# Patient Record
Sex: FEMALE | Race: WHITE | NOT HISPANIC OR LATINO | Employment: UNEMPLOYED | URBAN - METROPOLITAN AREA
[De-identification: names, ages, dates, MRNs, and addresses within clinical notes are randomized per-mention and may not be internally consistent; named-entity substitution may affect disease eponyms.]

---

## 2017-04-18 ENCOUNTER — HOSPITAL ENCOUNTER (OUTPATIENT)
Dept: RADIOLOGY | Facility: HOSPITAL | Age: 42
Discharge: HOME | End: 2017-04-18
Attending: ORTHOPAEDIC SURGERY
Payer: MEDICAID

## 2017-04-18 ENCOUNTER — CLINICAL SUPPORT (OUTPATIENT)
Dept: LAB | Facility: HOSPITAL | Age: 42
End: 2017-04-18
Attending: ORTHOPAEDIC SURGERY
Payer: MEDICAID

## 2017-04-18 DIAGNOSIS — Z01.818 PREOP EXAMINATION: Primary | ICD-10-CM

## 2017-04-18 DIAGNOSIS — Z01.818 PREOP EXAMINATION: ICD-10-CM

## 2017-04-18 PROCEDURE — 71020 XR CHEST PA AND LATERAL: CPT | Mod: 26,,, | Performed by: RADIOLOGY

## 2017-04-18 PROCEDURE — 93010 ELECTROCARDIOGRAM REPORT: CPT | Mod: ,,, | Performed by: INTERNAL MEDICINE

## 2017-04-18 PROCEDURE — 71020 XR CHEST PA AND LATERAL: CPT | Mod: TC

## 2017-04-18 PROCEDURE — 93010 EKG 12-LEAD: ICD-10-PCS | Mod: ,,, | Performed by: INTERNAL MEDICINE

## 2017-04-18 PROCEDURE — 93005 ELECTROCARDIOGRAM TRACING: CPT

## 2017-04-25 ENCOUNTER — ANESTHESIA EVENT (OUTPATIENT)
Dept: SURGERY | Facility: HOSPITAL | Age: 42
End: 2017-04-25
Payer: MEDICAID

## 2017-04-26 ENCOUNTER — HOSPITAL ENCOUNTER (OUTPATIENT)
Dept: PREADMISSION TESTING | Facility: HOSPITAL | Age: 42
Discharge: HOME | End: 2017-04-26
Attending: ORTHOPAEDIC SURGERY
Payer: MEDICAID

## 2017-04-26 VITALS
DIASTOLIC BLOOD PRESSURE: 99 MMHG | BODY MASS INDEX: 43.36 KG/M2 | RESPIRATION RATE: 18 BRPM | WEIGHT: 254 LBS | HEART RATE: 64 BPM | OXYGEN SATURATION: 99 % | HEIGHT: 64 IN | SYSTOLIC BLOOD PRESSURE: 144 MMHG

## 2017-04-26 DIAGNOSIS — Z01.818 PRE-OP EXAM: Primary | ICD-10-CM

## 2017-04-26 RX ORDER — LIDOCAINE HYDROCHLORIDE 10 MG/ML
1 INJECTION, SOLUTION EPIDURAL; INFILTRATION; INTRACAUDAL; PERINEURAL ONCE
Status: CANCELLED | OUTPATIENT
Start: 2017-04-26 | End: 2017-04-26

## 2017-04-26 RX ORDER — SODIUM CHLORIDE, SODIUM LACTATE, POTASSIUM CHLORIDE, CALCIUM CHLORIDE 600; 310; 30; 20 MG/100ML; MG/100ML; MG/100ML; MG/100ML
INJECTION, SOLUTION INTRAVENOUS CONTINUOUS
Status: CANCELLED | OUTPATIENT
Start: 2017-04-26

## 2017-04-26 RX ORDER — AMLODIPINE BESYLATE 5 MG/1
5 TABLET ORAL DAILY
COMMUNITY
End: 2017-06-06

## 2017-04-26 RX ORDER — CEFAZOLIN SODIUM 2 G/50ML
2 SOLUTION INTRAVENOUS
Status: CANCELLED | OUTPATIENT
Start: 2017-05-01

## 2017-04-26 NOTE — ANESTHESIA PREPROCEDURE EVALUATION
04/26/2017  Inna Gallardo is a 42 y.o., female is scheduled for left knee arthroscopy with meniscectomy under reg/gen on 5/01/2017.    Past Surgical History:   Procedure Laterality Date    BRAIN TUMOR EXCISION  9/2011    removal of prolactinoma    CYSTOSCOPY W/ URETERAL STENT PLACEMENT  2016    CYSTOSCOPY W/ URETERAL STENT REMOVAL  2016    HYSTERECTOMY  3/16/15    TLH/BSO for Endometriosis, AUB, fibroids, cyst    MANDIBLE SURGERY  2002    severe overbite correction    PELVIC LAPAROSCOPY  2014    Dx scope, chromopertubation-Endometriosis    TONSILLECTOMY, ADENOIDECTOMY           Anesthesia Evaluation    I have reviewed the Patient Summary Reports.    I have reviewed the Nursing Notes.   I have reviewed the Medications.     Review of Systems  Anesthesia Hx:  Hx of Anesthetic complications  History of prior surgery of interest to airway management or planning: (mandibular surgery to correct overbite) jaw. Previous anesthesia: General, MAC  Procedure performed at an [facility]. Personal Hx of Anesthesia complications  Unintended Unpleasent Intraoperative Awareness (during hysterectomy and cystoscopy; states could hear nurses in OR) and during general anesthesia   Social:  No Alcohol Use, Former Smoker    Hematology/Oncology:  Hematology Normal        EENT/Dental:   chronic allergic rhinitis   Cardiovascular:   Exercise tolerance: poor Hypertension (pt states she ran out of BP meds and needs to get refilled today), well controlled Denies Dysrhythmias.   Denies Angina.     ECG has been reviewed.    Pulmonary:   Denies Shortness of breath.    Renal/:   renal calculi    Hepatic/GI:   Hiatal Hernia, GERD, well controlled  Esophageal / Stomach Disorders Esophageal Disorder, Cramer's Esophagus    Musculoskeletal:   Left knee pain   Endocrine:  Pituitary Disease, Pituitary Tumor (prolactinoma 2011)     Psych:   depression          Physical Exam  General:  Morbid Obesity    Airway/Jaw/Neck:  Airway Findings: Mouth Opening: Small, but > 3cm Tongue: Normal  General Airway Assessment: Adult  Mallampati: III  TM Distance: Normal, at least 6 cm  Jaw/Neck Findings: (pt s/p mandibular surgery for correction of severe overbite; states has hardware in jaw)  Neck ROM: Extension Decreased, Mild  Neck Findings:  Girth Increased     Eyes/Ears/Nose:  EYES/EARS/NOSE FINDINGS: Normal   Dental:  Dental Findings: In tact   Chest/Lungs:  Chest/Lungs Clear    Heart/Vascular:  Heart Findings: Normal Heart murmur: negative    Abdomen:  Abdomen Findings: Normal    Musculoskeletal:  Musculoskeletal Findings: (left knee) Tender Joint, Swollen Joint     Mental Status:  Mental Status Findings: Normal      EKG 4/18/2017  Sinus bradycardia  Otherwise normal ECG  Compared to previous tracing  rate have decreased and T wave no longer inverted in  inferior lateral  leads  Confirmed by Faisal EDWARDS [facility] ([id number]) on 4/18/2017 2:53:58 PM    Anesthesia Plan  Type of Anesthesia, risks & benefits discussed:  Anesthesia Type:  general, regional  Patient's Preference:   Intra-op Monitoring Plan:   Intra-op Monitoring Plan Comments:   Post Op Pain Control Plan:   Post Op Pain Control Plan Comments:   Induction:   IV  Beta Blocker:  Patient is not currently on a Beta-Blocker (No further documentation required).       Informed Consent: Patient understands risks and agrees with Anesthesia plan.  Questions answered.   ASA Score: 3     Day of Surgery Review of History & Physical: I have interviewed and examined the patient. I have reviewed the patient's H&P dated:  There are no significant changes.  H&P update referred to the surgeon.         Ready For Surgery From Anesthesia Perspective.

## 2017-04-26 NOTE — DISCHARGE INSTRUCTIONS
Your surgery is scheduled for 5/1/17.    Please report to Outpatient Surgery Intake Office on the 2nd FLOOR at 5:30 a.m.          INSTRUCTIONS IMPORTANT!!!  ¨ Do not eat or drink after 12 midnight-including water. OK to brush teeth, no   gum, candy or mints!    ¨ Take only these medicines with a small swallow of water-morning of surgery: Norvasc            ____  Do not wear makeup, including mascara.  ____  No powder, lotions or creams to surgical area.  ____  Please remove all jewelry, including piercings and leave at home.  ____  No money or valuables needed. Please leave at home.  ____  Please bring any documents given by your doctor.  ____  If going home the same day, arrange for a ride home. You will not be able to             drive if Anesthesia was used.  ____  Wear loose fitting clothing. Allow for dressings, bandages.  ____  Stop Aspirin, Ibuprofen, Motrin and Aleve at least 3-5 days before surgery, unless otherwise instructed by your doctor, or the nurse.   You MAY use Tylenol/acetaminophen until day of surgery.  ____  Wash the surgical area with Hibiclens the night before surgery, and again the             morning of surgery.  Be sure to rinse hibiclens off completely (if instructed by nurse).  ____  If you take diabetic medication, do not take am of surgery unless instructed by Doctor.  ____  Call MD for temperature above 101 degrees.  ____ Stop taking any Fish Oil supplement or any Vitamins that contain Vitamin E at least 5 days prior to surgery.  ____ Do Not wear your contact lenses the day of your procedure.  You may wear your glasses.        I have read or had read and explained to me, and understand the above information.  Additional comments or instructions:  For additional questions call [phone]     Take a Hibiclens shower twice a day for 3 days prior to surgery, including the morning of surgery.   Gargle with Listerine twice a day for 2 days prior to surgery, including the morning of  surgery.      Pre-Op Bathing Instructions    Before surgery, you can play an important role in your own health.    Because skin is not sterile, we need to be sure that your skin is as free of germs as possible. By following the instructions below, you can reduce the number of germs on your skin before surgery.    IMPORTANT: You will need to shower with a special soap called Hibiclens*, available at any pharmacy.  If you are allergic to Chlorhexidine (the antiseptic in Hibiclens), use an antibacterial soap such as Dial Soap for your preoperative shower.  You will shower with Hibiclens both the night before your surgery and the morning of your surgery.  Do not use Hibiclens on the head, face or genitals to avoid injury to those areas.    STEP #1: THE NIGHT BEFORE YOUR SURGERY     1. Do not shave the area of your body where your surgery will be performed.  2. Shower and wash your hair and body as usual with your normal soap and shampoo.  3. Rinse your hair and body thoroughly after you shower to remove all soap residue.  4. With your hand, apply one packet of Hibiclens soap to the surgical site.   5. Wash the site gently for five (5) minutes. Do not scrub your skin too hard.   6. Do not wash with your regular soap after Hibiclens is used.  7. Rinse your body thoroughly.  8. Pat yourself dry with a clean, soft towel.  9. Do not use lotion, cream, or powder.  10. Wear clean clothes.    STEP #2: THE MORNING OF YOUR SURGERY     1. Repeat Step #1.    * Not to be used by people allergic to Chlorhexidine.          Anesthesia: General Anesthesia  Youre due to have surgery. During surgery, youll be given medication called anesthesia. (It is also called anesthetic.) This will keep you comfortable and pain-free. Your anesthesia provider will use general anesthesia. This sheet tells you more about it.  What is general anesthesia?     You are watched continuously during your procedure by the anesthesia provider   General  anesthesia puts you into a state like deep sleep. It goes into the bloodstream (IV anesthetics), into the lungs (gas anesthetics), or both. You feel nothing during the procedure. You will not remember it. During the procedure, the anesthesia provider monitors you continuously. He or she checks your heart rate and rhythm, blood pressure, breathing, and blood oxygen.  · IV Anesthetics. IV anesthetics are given through an IV line in your arm. Theyre often given first. This is so you are asleep before a gas anesthetic is started. Some kinds of IV anesthetics relieve pain. Others relax you. Your doctor will decide which kind is best in your case.  · Gas Anesthetics. Gas anesthetics are breathed into the lungs. They are often used to keep you asleep. They can be given through a facemask or a tube placed in your larynx or trachea (breathing tube).  ¨ If you have a facemask, your anesthesia provider will most likely place it over your nose and mouth while youre still awake. Youll breathe oxygen through the mask as your IV anesthetic is started. Gas anesthetic may be added through the mask.  ¨ If you have a tube in the larynx or trachea, it will be inserted into your throat after youre asleep.  Anesthesia tools and medications  You will likely have:  · IV anesthetics. These are put into an IV line into your bloodstream.  · Gas anesthetics. You breathe these anesthetics into your lungs, where they pass into your bloodstream.  · Pulse oximeter. This is a small clip that is attached to the end of your finger. This measures your blood oxygen level.  · Electrocardiography leads (electrodes). These are small sticky pads that are placed on your chest. They record your heart rate and rhythm.  · Blood pressure cuff. This reads your blood pressure.  Risks and possible complications  General anesthesia has some risks. These include:  · Breathing problems  · Nausea and vomiting  · Sore throat or hoarseness (usually  temporary)  · Allergic reaction to the anesthetic  · Irregular heartbeat (rare)  · Cardiac arrest (rare)   Anesthesia safety  · Follow all instructions you are given for how long not to eat or drink before your procedure.  · Be sure your doctor knows what medications and drugs you take. This includes over-the-counter medications, herbs, supplements, alcohol or other drugs. You will be asked when those were last taken.  · Have an adult family member or friend drive you home after the procedure.  · For the first 24 hours after your surgery:  ¨ Do not drive or use heavy equipment.  ¨ Have a trusted family member or spouse make important decisions or sign documents.  ¨ Avoid alcohol.  ¨ Have a responsible adult stay with you. He or she can watch for problems and help keep you safe.  Date Last Reviewed: 10/16/2014  © 0604-0991 [employer]. [address] [place] [address]. All rights reserved. This information is not intended as a substitute for professional medical care. Always follow your healthcare professional's instructions.        Knee Arthroscopy  Knee problems can often be diagnosed and treated with a technique called arthroscopy. This type of surgery is done using an instrument called an arthroscope (scope). Only a few small incisions are needed for this surgery. The procedure can be used to diagnose a knee problem. In many cases, treatment can also be done using arthroscopy.     Insertion of fluid, arthroscope, and instruments through small incisions (portals).         Patient undergoes procedure      The arthroscope  The scope allows the doctor to look directly into the knee joint. It is about the size of a pencil and contains a pathway for fluids. It also contains coated glass fibers that beam an intense, cool light into the knee joint. A camera is attached to the scope as well. It provides clear images of most areas in your knee joint. The doctor views these images on a  monitor.  Preparing for the procedure  · Have lab or other testing done as advised.  · Tell your doctor about any medicines or supplements you take  · Do not eat or drink anything for 10 hours before the procedure.  · Once you arrive for surgery, you will be given an IV line in your arm or hand. This provides fluids and medicines.  · To keep you free of pain during the surgery, youll receive medicine called anesthesia. You may have:  ¨ General anesthesia. This puts you into a deep sleep during the surgery.  ¨ Regional anesthesia. This numbs the body from the waist down.  ¨ Local anesthesia. This numbs just the knee.  In addition to regional or local anesthesia, you may receive sedation. This medicine makes you relaxed and sleepy during the surgery.  The procedure  · A few small incisions (portals) are made in your knee.  · The scope is inserted through one of the portals.  · Sterile fluid is put into the knee joint. This makes it easier to see and work inside your joint.  · Using the scope, the doctor confirms the type and degree of knee damage. If possible, the problem is treated at this time. This is done using surgical tools put through the other portals.  · When the surgery is done, all tools are removed. The incisions are closed with sutures, staples, surgical glue, or strips of surgical tape.  Risks and complications of arthroscopy  All surgeries have risks. The risks of arthroscopy include:  · Bleeding  · Infection  · Blood clots  · Swelling and stiffness of the knee  · Injury to normal tissue  · Continuing knee problems   Date Last Reviewed: 9/20/2015  © 0779-0629 [employer]. [address] [place] [address], [place] [address]. All rights reserved. This information is not intended as a substitute for professional medical care. Always follow your healthcare professional's instructions.

## 2017-04-26 NOTE — IP AVS SNAPSHOT
[facility]  [address]  [place] [address]  Phone: [phone]           Patient Discharge Instructions  Our goal is to set you up for success. This packet includes information on your condition, medications, and your home care. It will help you care for yourself to prevent having to return to the hospital.     Please ask your nurse if you have any questions.      There are many details to remember when preparing for your surgery. Here is what you will need to do, please ask your nurse if there are more specific instructions and if you have any questions:    1. Before procedure Do not smoke or drink alcoholic beverages 24 hours prior to your procedure. Do not eat or drink anything 8 hours before your procedure - this includes gum, mints, and candy.     2. Day of procedure Please remove all jewelry for the procedure. If you wear contact lenses, dentures, hearing aids or glasses, bring a container to put them in during your surgery and give to a family member.  If your doctor has scheduled you for an overnight stay, bring a small overnight bag with any personal items that you need.      3. After procedure  Make arrangements in advance for transportation home by a responsible adult. It is not safe to drive a vehicle during the 24 hours following surgery.     PLEASE NOTE: You may be contacted the day before your surgery to confirm your surgery date and arrival time. The Surgery schedule has many variables which may affect the time of your surgery case. Family members should be available if your surgery time changes.               ** Verify the list of medication(s) below is accurate and up to date. Carry this with you in case of emergency. If your medications have changed, please notify your healthcare provider.             Medication List      TAKE these medications        Additional Info                      amlodipine 5 MG tablet   Commonly known as:  NORVASC   Refills:  0   Dose:  5 mg     Instructions:  Take 5 mg by mouth once daily.     Begin Date    AM    Noon    PM    Bedtime       paroxetine 20 MG tablet   Commonly known as:  PAXIL   Quantity:  30 tablet   Refills:  11   Dose:  20 mg    Instructions:  Take 1 tablet (20 mg total) by mouth every morning.     Begin Date    AM    Noon    PM    Bedtime       rabeprazole 20 mg tablet   Commonly known as:  ACIPHEX   Quantity:  90 tablet   Refills:  0   Dose:  20 mg    Instructions:  Take 1 tablet (20 mg total) by mouth once daily.     Begin Date    AM    Noon    PM    Bedtime                  Please bring to all follow up appointments:    1. A copy of your discharge instructions.  2. All medicines you are currently taking in their original bottles.  3. Identification and insurance card.    Please arrive 15 minutes ahead of scheduled appointment time.    Please call 24 hours in advance if you must reschedule your appointment and/or time.        Your Scheduled Appointments     Apr 26, 2017  2:30 PM CDT   Pre-Admit Testing Visit with PRE[facility]ADMIT [facility]    [address]  [place] [address]   [phone]            May 02, 2017  1:00 PM CDT   New Physical Therapy Patient with Kath Gould, PT   [facility]    [address] [facility] [place] [address]   [phone]            May 09, 2017 10:20 AM CDT   Established Patient Visit with [facility] ([facility])    [address]  [place] [id number] [phone]            May 22, 2017  9:00 AM CDT   Established Patient Visit with MD MARY Baca - Rheumatology ([facility] [place])    [address] [place]  [place] [id number] [phone]              Your Future Surgeries/Procedures     May 01, 2017   Surgery with Calvin Nguyen MD   [facility])     [address]  [place] [id number] [phone]                  Discharge Instructions       Your surgery is scheduled for 5/1/17.    Please report to Outpatient Surgery Intake Office on the 2nd FLOOR at 5:30 a.m.          INSTRUCTIONS IMPORTANT!!!  ¨ Do not eat or drink after 12 midnight-including water. OK to brush teeth, no   gum, candy or mints!    ¨ Take only these medicines with a small swallow of water-morning of surgery: Norvasc            ____  Do not wear makeup, including mascara.  ____  No powder, lotions or creams to surgical area.  ____  Please remove all jewelry, including piercings and leave at home.  ____  No money or valuables needed. Please leave at home.  ____  Please bring any documents given by your doctor.  ____  If going home the same day, arrange for a ride home. You will not be able to             drive if Anesthesia was used.  ____  Wear loose fitting clothing. Allow for dressings, bandages.  ____  Stop Aspirin, Ibuprofen, Motrin and Aleve at least 3-5 days before surgery, unless otherwise instructed by your doctor, or the nurse.   You MAY use Tylenol/acetaminophen until day of surgery.  ____  Wash the surgical area with Hibiclens the night before surgery, and again the             morning of surgery.  Be sure to rinse hibiclens off completely (if instructed by nurse).  ____  If you take diabetic medication, do not take am of surgery unless instructed by Doctor.  ____  Call MD for temperature above 101 degrees.  ____ Stop taking any Fish Oil supplement or any Vitamins that contain Vitamin E at least 5 days prior to surgery.  ____ Do Not wear your contact lenses the day of your procedure.  You may wear your glasses.        I have read or had read and explained to me, and understand the above information.  Additional comments or instructions:  For additional questions call [phone]     Take a Hibiclens shower twice a day for 3 days prior to surgery, including the morning of  surgery.   Gargle with Listerine twice a day for 2 days prior to surgery, including the morning of surgery.      Pre-Op Bathing Instructions    Before surgery, you can play an important role in your own health.    Because skin is not sterile, we need to be sure that your skin is as free of germs as possible. By following the instructions below, you can reduce the number of germs on your skin before surgery.    IMPORTANT: You will need to shower with a special soap called Hibiclens*, available at any pharmacy.  If you are allergic to Chlorhexidine (the antiseptic in Hibiclens), use an antibacterial soap such as Dial Soap for your preoperative shower.  You will shower with Hibiclens both the night before your surgery and the morning of your surgery.  Do not use Hibiclens on the head, face or genitals to avoid injury to those areas.    STEP #1: THE NIGHT BEFORE YOUR SURGERY     1. Do not shave the area of your body where your surgery will be performed.  2. Shower and wash your hair and body as usual with your normal soap and shampoo.  3. Rinse your hair and body thoroughly after you shower to remove all soap residue.  4. With your hand, apply one packet of Hibiclens soap to the surgical site.   5. Wash the site gently for five (5) minutes. Do not scrub your skin too hard.   6. Do not wash with your regular soap after Hibiclens is used.  7. Rinse your body thoroughly.  8. Pat yourself dry with a clean, soft towel.  9. Do not use lotion, cream, or powder.  10. Wear clean clothes.    STEP #2: THE MORNING OF YOUR SURGERY     1. Repeat Step #1.    * Not to be used by people allergic to Chlorhexidine.          Anesthesia: General Anesthesia  Youre due to have surgery. During surgery, youll be given medication called anesthesia. (It is also called anesthetic.) This will keep you comfortable and pain-free. Your anesthesia provider will use general anesthesia. This sheet tells you more about it.  What is general  anesthesia?     You are watched continuously during your procedure by the anesthesia provider   General anesthesia puts you into a state like deep sleep. It goes into the bloodstream (IV anesthetics), into the lungs (gas anesthetics), or both. You feel nothing during the procedure. You will not remember it. During the procedure, the anesthesia provider monitors you continuously. He or she checks your heart rate and rhythm, blood pressure, breathing, and blood oxygen.  · IV Anesthetics. IV anesthetics are given through an IV line in your arm. Theyre often given first. This is so you are asleep before a gas anesthetic is started. Some kinds of IV anesthetics relieve pain. Others relax you. Your doctor will decide which kind is best in your case.  · Gas Anesthetics. Gas anesthetics are breathed into the lungs. They are often used to keep you asleep. They can be given through a facemask or a tube placed in your larynx or trachea (breathing tube).  ¨ If you have a facemask, your anesthesia provider will most likely place it over your nose and mouth while youre still awake. Youll breathe oxygen through the mask as your IV anesthetic is started. Gas anesthetic may be added through the mask.  ¨ If you have a tube in the larynx or trachea, it will be inserted into your throat after youre asleep.  Anesthesia tools and medications  You will likely have:  · IV anesthetics. These are put into an IV line into your bloodstream.  · Gas anesthetics. You breathe these anesthetics into your lungs, where they pass into your bloodstream.  · Pulse oximeter. This is a small clip that is attached to the end of your finger. This measures your blood oxygen level.  · Electrocardiography leads (electrodes). These are small sticky pads that are placed on your chest. They record your heart rate and rhythm.  · Blood pressure cuff. This reads your blood pressure.  Risks and possible complications  General anesthesia has some risks. These  include:  · Breathing problems  · Nausea and vomiting  · Sore throat or hoarseness (usually temporary)  · Allergic reaction to the anesthetic  · Irregular heartbeat (rare)  · Cardiac arrest (rare)   Anesthesia safety  · Follow all instructions you are given for how long not to eat or drink before your procedure.  · Be sure your doctor knows what medications and drugs you take. This includes over-the-counter medications, herbs, supplements, alcohol or other drugs. You will be asked when those were last taken.  · Have an adult family member or friend drive you home after the procedure.  · For the first 24 hours after your surgery:  ¨ Do not drive or use heavy equipment.  ¨ Have a trusted family member or spouse make important decisions or sign documents.  ¨ Avoid alcohol.  ¨ Have a responsible adult stay with you. He or she can watch for problems and help keep you safe.  Date Last Reviewed: 10/16/2014  © 3859-9633 [employer]. [address] [place] [address]. All rights reserved. This information is not intended as a substitute for professional medical care. Always follow your healthcare professional's instructions.        Knee Arthroscopy  Knee problems can often be diagnosed and treated with a technique called arthroscopy. This type of surgery is done using an instrument called an arthroscope (scope). Only a few small incisions are needed for this surgery. The procedure can be used to diagnose a knee problem. In many cases, treatment can also be done using arthroscopy.     Insertion of fluid, arthroscope, and instruments through small incisions (portals).         Patient undergoes procedure      The arthroscope  The scope allows the doctor to look directly into the knee joint. It is about the size of a pencil and contains a pathway for fluids. It also contains coated glass fibers that beam an intense, cool light into the knee joint. A camera is attached to the scope as well. It provides clear  images of most areas in your knee joint. The doctor views these images on a monitor.  Preparing for the procedure  · Have lab or other testing done as advised.  · Tell your doctor about any medicines or supplements you take  · Do not eat or drink anything for 10 hours before the procedure.  · Once you arrive for surgery, you will be given an IV line in your arm or hand. This provides fluids and medicines.  · To keep you free of pain during the surgery, youll receive medicine called anesthesia. You may have:  ¨ General anesthesia. This puts you into a deep sleep during the surgery.  ¨ Regional anesthesia. This numbs the body from the waist down.  ¨ Local anesthesia. This numbs just the knee.  In addition to regional or local anesthesia, you may receive sedation. This medicine makes you relaxed and sleepy during the surgery.  The procedure  · A few small incisions (portals) are made in your knee.  · The scope is inserted through one of the portals.  · Sterile fluid is put into the knee joint. This makes it easier to see and work inside your joint.  · Using the scope, the doctor confirms the type and degree of knee damage. If possible, the problem is treated at this time. This is done using surgical tools put through the other portals.  · When the surgery is done, all tools are removed. The incisions are closed with sutures, staples, surgical glue, or strips of surgical tape.  Risks and complications of arthroscopy  All surgeries have risks. The risks of arthroscopy include:  · Bleeding  · Infection  · Blood clots  · Swelling and stiffness of the knee  · Injury to normal tissue  · Continuing knee problems   Date Last Reviewed: 9/20/2015 © 2000-2016 [employer]. [address] [place] [address], [place] [address]. All rights reserved. This information is not intended as a substitute for professional medical care. Always follow your healthcare professional's instructions.            Admission Information     Date &  Time Provider Department CSN    4/26/2017  2:30 PM Calvin Nguyen MD [facility] [id number]      Care Providers     Provider Role Specialty Primary office phone    Calvin Nguyen MD Attending Provider Orthopedic Surgery [phone]      Your Vitals Were     Last Period                   03/03/2015           Recent Lab Values        4/28/2015                           6:11 AM           A1C 5.6                       Allergies as of 4/26/2017        Reactions    Ace Inhibitors Other (See Comments)    cough      [facility]     [facility] Nurse Care Line - 24/7 Assistance  Unless otherwise directed by your provider, please contact [facility], our nurse care line that is available for 24/7 assistance.     Registered nurses in the [facility] provide clinical advisement, health education, appointment booking, and other advisory services.  Call for this free service at [phone].        Advance Directives     An advance directive is a document which, in the event you are no longer able to make decisions for yourself, tells your healthcare team what kind of treatment you do or do not want to receive, or who you would like to make those decisions for you.  If you do not currently have an advance directive, [facility] encourages you to create one.  For more information call:  [phone]), [phone] ([phone]),  or log on to [email or web address].        Smoking Cessation     If you would like to quit smoking:   You may be eligible for free services if you are a [place] resident and started smoking cigarettes before September 1, 1988.  Call the Smoking Cessation Trust (SCT) toll free at (784) 896-2061 or (202) 985-9803.   Call 8-136-QUIT-NOW if you do not meet the above criteria.   Contact us via email: [email or web address]   View our website for more information: [email or web address]        Language Assistance Services     ATTENTION: Language  assistance services are available, free of charge. Please call [phone].      ATENCIÓN: Si habla emery, tiene a montalvo disposición servicios gratuitos de asistencia lingüística. Llame al [phone].     CHÚ Ý: N?u b?n nói Ti?ng Vi?t, có các d?ch v? h? tr? ngôn ng? mi?n phí dành cho b?n. G?i s? [phone].         [facility] complies with applicable Federal civil rights laws and does not discriminate on the basis of race, color, national origin, age, disability, or sex.

## 2017-04-30 NOTE — H&P
Reason For Visit   C/o left knee pain  .  HPI   KNEE PAIN: UNA CABALLERO complains of pain to the left knee.   PAIN LOCATED: Medially  ONSET: 4 months ago.  QUALITY:  Patient states the pain is worsening    INJURY? Yes.  Twisting  ASSOCIATED SYMPTOM AND TRIGGERS: Standing/Weightbearing, walking, troible w stairs, stiffness, swelling, limping, stiffness w sitting   USES ASSISTIVE DEVICE: none  RELIEVED BY: Medications: aleve min relief     PATIENT DENIES: Deformity, bruising, redness  HISTORY: Previous knee injury: No    mri in [place] showed men tear according to patient.  Allergies   ACE Inhibitors.  Current Meds   Rec: [id number].  List Reconciled and Reviewed.  Aciphex 20 MG Oral Tablet Delayed Release (RABEprazole Sodium);; RPT  PARoxetine HCl TABS;; RPT  Gabapentin CAPS;; RPT  Norvasc TABS (AmLODIPine Besylate);; RPT.  Active Problems   Left knee pain, unspecified chronicity   (719.46) (M25.562).  PMH   History of hypertension (V12.59) (Z86.79).  PSH   Tonsillectomy.  Personal Hx   Never a smoker  No alcohol use.  ROS   Review of systems have been reviewed and noted on the intake form.  Vital Signs    Recorded by Elidia Morris on 18 Apr 2017 10:14 AM  BP:178/107,   HR: 61 b/min,   Height: 5 ft , Weight: 257 lb , BMI: 50.2 kg/m2,   BMI Calculated: 50.19 ,   BSA Calculated: 2.08 ,   Pain Scale: 8.  Physical Exam   Ms. UNA CABALLERO is a 42 year old woman seen at the request of [facility], x for left knee pain.   She is a  obese female who is awake and alert and cooperative for the exam. Affect is appropriate, gait is antalgic.  The lower extremities are equal in length and physiologic in alignment. The contralateral extremity demonstrates FROM hip, knee, ankle, foot and toes.  The affected extremity demonstrates effusion of the knee.  There is medial joint line tenderness exacerbated by joint line palpation and compressive maneuvers   Miki's is  positive  The knee is ligamentously  stable.  crepitus:   no  Sensory is intact to light touch, pulses are intact bilaterally, and skin is clear and dry bilaterally  Right Knee Range of Motion is 0 - 110  Left  Knee ROM 0 - 110  + quad weakness.  Assessment   Acute pain of left knee (719.46) (M25.562).  Plan   The patient presents today for followup. MRI revealed a tear of the meniscus as well as some degenerative arthritis of the left knee. We discussed these findings with  the patient. We did discuss arthroscopy and the fact that its role would hopefully  help the symptoms related to meniscal tear; however, would not necessarily address  any symptoms related to degenerative arthritis. The patient was also offered a course of PT first but would rather move forward with arthroscopy. The patient understands this and would like to move forward. I think that is reasonable. We spent approx 15 min face to face reviewing the risks and benefoits of surgery. We also reviewed the role of physical therapy vs arthroscopy. Research indicates that approx 9 mos of PT will achieve similar results to the near immediate improvement with arthroscopy. The patient would like the more quick improvement compared to the delayed improvement with PT. We will go ahead and schedule  this at a time that is convenient for the patient.  patient will bring in mri prior to surgery

## 2017-05-01 ENCOUNTER — HOSPITAL ENCOUNTER (OUTPATIENT)
Facility: HOSPITAL | Age: 42
Discharge: HOME | End: 2017-05-01
Attending: ORTHOPAEDIC SURGERY | Admitting: ORTHOPAEDIC SURGERY
Payer: MEDICAID

## 2017-05-01 ENCOUNTER — ANESTHESIA (OUTPATIENT)
Dept: SURGERY | Facility: HOSPITAL | Age: 42
End: 2017-05-01
Payer: MEDICAID

## 2017-05-01 DIAGNOSIS — Z01.818 PRE-OP EXAM: ICD-10-CM

## 2017-05-01 DIAGNOSIS — S83.207A TEAR OF MENISCUS OF LEFT KNEE: Primary | ICD-10-CM

## 2017-05-01 DIAGNOSIS — S83.207D TEAR OF LEFT MENISCUS AS CURRENT INJURY, SUBSEQUENT ENCOUNTER: ICD-10-CM

## 2017-05-01 PROBLEM — T88.4XXA DIFFICULT AIRWAY FOR INTUBATION: Status: ACTIVE | Noted: 2017-05-01

## 2017-05-01 LAB
APPEARANCE FLD: NORMAL
BODY FLD TYPE: NORMAL
COLOR FLD: YELLOW
LYMPHOCYTES NFR FLD MANUAL: 99 %
NEUTROPHILS NFR FLD MANUAL: 1 %
WBC # FLD: 121 /CU MM

## 2017-05-01 PROCEDURE — 89051 BODY FLUID CELL COUNT: CPT

## 2017-05-01 PROCEDURE — 36000711: Performed by: ORTHOPAEDIC SURGERY

## 2017-05-01 PROCEDURE — 71000039 HC RECOVERY, EACH ADD'L HOUR: Performed by: ORTHOPAEDIC SURGERY

## 2017-05-01 PROCEDURE — 25000003 PHARM REV CODE 250: Performed by: ANESTHESIOLOGY

## 2017-05-01 PROCEDURE — 37000008 HC ANESTHESIA 1ST 15 MINUTES: Performed by: ORTHOPAEDIC SURGERY

## 2017-05-01 PROCEDURE — 71000016 HC POSTOP RECOV ADDL HR: Performed by: ORTHOPAEDIC SURGERY

## 2017-05-01 PROCEDURE — 63600175 PHARM REV CODE 636 W HCPCS: Performed by: ORTHOPAEDIC SURGERY

## 2017-05-01 PROCEDURE — 25000003 PHARM REV CODE 250: Performed by: NURSE PRACTITIONER

## 2017-05-01 PROCEDURE — 63600175 PHARM REV CODE 636 W HCPCS: Performed by: NURSE ANESTHETIST, CERTIFIED REGISTERED

## 2017-05-01 PROCEDURE — 36000710: Performed by: ORTHOPAEDIC SURGERY

## 2017-05-01 PROCEDURE — 25000003 PHARM REV CODE 250: Performed by: NURSE ANESTHETIST, CERTIFIED REGISTERED

## 2017-05-01 PROCEDURE — 37000009 HC ANESTHESIA EA ADD 15 MINS: Performed by: ORTHOPAEDIC SURGERY

## 2017-05-01 PROCEDURE — 97116 GAIT TRAINING THERAPY: CPT

## 2017-05-01 PROCEDURE — 25000003 PHARM REV CODE 250: Performed by: ORTHOPAEDIC SURGERY

## 2017-05-01 PROCEDURE — 71000015 HC POSTOP RECOV 1ST HR: Performed by: ORTHOPAEDIC SURGERY

## 2017-05-01 PROCEDURE — 97161 PT EVAL LOW COMPLEX 20 MIN: CPT

## 2017-05-01 PROCEDURE — 63600175 PHARM REV CODE 636 W HCPCS: Performed by: ANESTHESIOLOGY

## 2017-05-01 PROCEDURE — 27201423 OPTIME MED/SURG SUP & DEVICES STERILE SUPPLY: Performed by: ORTHOPAEDIC SURGERY

## 2017-05-01 PROCEDURE — 71000033 HC RECOVERY, INTIAL HOUR: Performed by: ORTHOPAEDIC SURGERY

## 2017-05-01 RX ORDER — SUCCINYLCHOLINE CHLORIDE 20 MG/ML
INJECTION INTRAMUSCULAR; INTRAVENOUS
Status: DISCONTINUED | OUTPATIENT
Start: 2017-05-01 | End: 2017-05-01

## 2017-05-01 RX ORDER — LIDOCAINE HYDROCHLORIDE 10 MG/ML
1 INJECTION, SOLUTION EPIDURAL; INFILTRATION; INTRACAUDAL; PERINEURAL ONCE
Status: COMPLETED | OUTPATIENT
Start: 2017-05-01 | End: 2017-05-01

## 2017-05-01 RX ORDER — SODIUM CHLORIDE, SODIUM LACTATE, POTASSIUM CHLORIDE, CALCIUM CHLORIDE 600; 310; 30; 20 MG/100ML; MG/100ML; MG/100ML; MG/100ML
INJECTION, SOLUTION INTRAVENOUS CONTINUOUS
Status: DISCONTINUED | OUTPATIENT
Start: 2017-05-01 | End: 2017-05-01 | Stop reason: HOSPADM

## 2017-05-01 RX ORDER — ROCURONIUM BROMIDE 10 MG/ML
INJECTION, SOLUTION INTRAVENOUS
Status: DISCONTINUED | OUTPATIENT
Start: 2017-05-01 | End: 2017-05-01

## 2017-05-01 RX ORDER — OXYCODONE AND ACETAMINOPHEN 5; 325 MG/1; MG/1
1 TABLET ORAL EVERY 6 HOURS PRN
Qty: 21 TABLET | Refills: 0 | Status: SHIPPED | OUTPATIENT
Start: 2017-05-01 | End: 2017-06-06

## 2017-05-01 RX ORDER — BUPIVACAINE HCL/EPINEPHRINE 0.25-.0005
VIAL (ML) INJECTION
Status: DISCONTINUED | OUTPATIENT
Start: 2017-05-01 | End: 2017-05-01 | Stop reason: HOSPADM

## 2017-05-01 RX ORDER — DEXAMETHASONE SODIUM PHOSPHATE 4 MG/ML
INJECTION, SOLUTION INTRA-ARTICULAR; INTRALESIONAL; INTRAMUSCULAR; INTRAVENOUS; SOFT TISSUE
Status: DISCONTINUED | OUTPATIENT
Start: 2017-05-01 | End: 2017-05-01

## 2017-05-01 RX ORDER — GLYCOPYRROLATE 0.2 MG/ML
INJECTION INTRAMUSCULAR; INTRAVENOUS
Status: DISCONTINUED | OUTPATIENT
Start: 2017-05-01 | End: 2017-05-01

## 2017-05-01 RX ORDER — ONDANSETRON 2 MG/ML
4 INJECTION INTRAMUSCULAR; INTRAVENOUS ONCE AS NEEDED
Status: DISCONTINUED | OUTPATIENT
Start: 2017-05-01 | End: 2017-05-01 | Stop reason: HOSPADM

## 2017-05-01 RX ORDER — FENTANYL CITRATE 50 UG/ML
INJECTION, SOLUTION INTRAMUSCULAR; INTRAVENOUS
Status: DISCONTINUED | OUTPATIENT
Start: 2017-05-01 | End: 2017-05-01

## 2017-05-01 RX ORDER — HYDROMORPHONE HYDROCHLORIDE 2 MG/ML
0.2 INJECTION, SOLUTION INTRAMUSCULAR; INTRAVENOUS; SUBCUTANEOUS EVERY 5 MIN PRN
Status: DISCONTINUED | OUTPATIENT
Start: 2017-05-01 | End: 2017-05-01 | Stop reason: HOSPADM

## 2017-05-01 RX ORDER — PROPOFOL 10 MG/ML
VIAL (ML) INTRAVENOUS
Status: DISCONTINUED | OUTPATIENT
Start: 2017-05-01 | End: 2017-05-01

## 2017-05-01 RX ORDER — HYDROMORPHONE HYDROCHLORIDE 2 MG/ML
0.5 INJECTION, SOLUTION INTRAMUSCULAR; INTRAVENOUS; SUBCUTANEOUS EVERY 5 MIN PRN
Status: COMPLETED | OUTPATIENT
Start: 2017-05-01 | End: 2017-05-01

## 2017-05-01 RX ORDER — EPINEPHRINE 1 MG/ML
INJECTION, SOLUTION INTRACARDIAC; INTRAMUSCULAR; INTRAVENOUS; SUBCUTANEOUS
Status: DISCONTINUED | OUTPATIENT
Start: 2017-05-01 | End: 2017-05-01 | Stop reason: HOSPADM

## 2017-05-01 RX ORDER — SODIUM CHLORIDE 0.9 % (FLUSH) 0.9 %
3 SYRINGE (ML) INJECTION EVERY 8 HOURS
Status: DISCONTINUED | OUTPATIENT
Start: 2017-05-01 | End: 2017-05-01 | Stop reason: HOSPADM

## 2017-05-01 RX ORDER — CEFAZOLIN SODIUM 1 G/3ML
INJECTION, POWDER, FOR SOLUTION INTRAMUSCULAR; INTRAVENOUS
Status: DISCONTINUED | OUTPATIENT
Start: 2017-05-01 | End: 2017-05-01

## 2017-05-01 RX ORDER — OXYCODONE AND ACETAMINOPHEN 5; 325 MG/1; MG/1
1 TABLET ORAL EVERY 4 HOURS PRN
Status: DISCONTINUED | OUTPATIENT
Start: 2017-05-01 | End: 2017-05-01 | Stop reason: HOSPADM

## 2017-05-01 RX ORDER — CEFAZOLIN SODIUM 2 G/50ML
2 SOLUTION INTRAVENOUS
Status: DISCONTINUED | OUTPATIENT
Start: 2017-05-01 | End: 2017-05-01 | Stop reason: HOSPADM

## 2017-05-01 RX ORDER — ONDANSETRON 2 MG/ML
INJECTION INTRAMUSCULAR; INTRAVENOUS
Status: DISCONTINUED | OUTPATIENT
Start: 2017-05-01 | End: 2017-05-01

## 2017-05-01 RX ORDER — NEOSTIGMINE METHYLSULFATE 1 MG/ML
INJECTION, SOLUTION INTRAVENOUS
Status: DISCONTINUED | OUTPATIENT
Start: 2017-05-01 | End: 2017-05-01

## 2017-05-01 RX ORDER — MIDAZOLAM HYDROCHLORIDE 1 MG/ML
INJECTION, SOLUTION INTRAMUSCULAR; INTRAVENOUS
Status: DISCONTINUED | OUTPATIENT
Start: 2017-05-01 | End: 2017-05-01

## 2017-05-01 RX ADMIN — PROPOFOL 200 MG: 10 INJECTION, EMULSION INTRAVENOUS at 08:05

## 2017-05-01 RX ADMIN — HYDROMORPHONE HYDROCHLORIDE 0.5 MG: 2 INJECTION INTRAMUSCULAR; INTRAVENOUS; SUBCUTANEOUS at 10:05

## 2017-05-01 RX ADMIN — DEXAMETHASONE SODIUM PHOSPHATE 8 MG: 4 INJECTION, SOLUTION INTRAMUSCULAR; INTRAVENOUS at 08:05

## 2017-05-01 RX ADMIN — FENTANYL CITRATE 50 MCG: 50 INJECTION, SOLUTION INTRAMUSCULAR; INTRAVENOUS at 08:05

## 2017-05-01 RX ADMIN — LIDOCAINE HYDROCHLORIDE 100 MG: 10 INJECTION, SOLUTION EPIDURAL; INFILTRATION; INTRACAUDAL; PERINEURAL at 08:05

## 2017-05-01 RX ADMIN — SUCCINYLCHOLINE CHLORIDE 100 MG: 20 INJECTION, SOLUTION INTRAMUSCULAR; INTRAVENOUS at 08:05

## 2017-05-01 RX ADMIN — MIDAZOLAM 2 MG: 1 INJECTION INTRAMUSCULAR; INTRAVENOUS at 08:05

## 2017-05-01 RX ADMIN — SUCCINYLCHOLINE CHLORIDE 120 MG: 20 INJECTION, SOLUTION INTRAMUSCULAR; INTRAVENOUS at 08:05

## 2017-05-01 RX ADMIN — NEOSTIGMINE METHYLSULFATE 5 MG: 1 INJECTION INTRAVENOUS at 09:05

## 2017-05-01 RX ADMIN — ROCURONIUM BROMIDE 25 MG: 10 INJECTION, SOLUTION INTRAVENOUS at 08:05

## 2017-05-01 RX ADMIN — PROPOFOL 50 MG: 10 INJECTION, EMULSION INTRAVENOUS at 08:05

## 2017-05-01 RX ADMIN — PROMETHAZINE HYDROCHLORIDE 6.25 MG: 25 INJECTION INTRAMUSCULAR; INTRAVENOUS at 10:05

## 2017-05-01 RX ADMIN — SUCCINYLCHOLINE CHLORIDE 80 MG: 20 INJECTION, SOLUTION INTRAMUSCULAR; INTRAVENOUS at 08:05

## 2017-05-01 RX ADMIN — FENTANYL CITRATE 250 MCG: 50 INJECTION, SOLUTION INTRAMUSCULAR; INTRAVENOUS at 08:05

## 2017-05-01 RX ADMIN — CEFAZOLIN 2 G: 330 INJECTION, POWDER, FOR SOLUTION INTRAMUSCULAR; INTRAVENOUS at 08:05

## 2017-05-01 RX ADMIN — ONDANSETRON 4 MG: 2 INJECTION, SOLUTION INTRAMUSCULAR; INTRAVENOUS at 09:05

## 2017-05-01 RX ADMIN — OXYCODONE AND ACETAMINOPHEN 1 TABLET: 5; 325 TABLET ORAL at 10:05

## 2017-05-01 RX ADMIN — SODIUM CHLORIDE, SODIUM LACTATE, POTASSIUM CHLORIDE, AND CALCIUM CHLORIDE: .6; .31; .03; .02 INJECTION, SOLUTION INTRAVENOUS at 06:05

## 2017-05-01 RX ADMIN — GLYCOPYRROLATE 0.8 MG: 0.2 INJECTION, SOLUTION INTRAMUSCULAR; INTRAVENOUS at 09:05

## 2017-05-01 RX ADMIN — ROCURONIUM BROMIDE 5 MG: 10 INJECTION, SOLUTION INTRAVENOUS at 08:05

## 2017-05-01 NOTE — TRANSFER OF CARE
"Anesthesia Transfer of Care Note    Patient: Inna Gallardo    Procedure(s) Performed: Procedure(s) (LRB):  ARTHROSCOPY-KNEE (Left)  MENISCECTOMY MEDIAL (Left)    Patient location: PACU    Anesthesia Type: general    Transport from OR: Transported from OR on room air with adequate spontaneous ventilation    Post pain: adequate analgesia    Post assessment: no apparent anesthetic complications and tolerated procedure well    Post vital signs: stable    Level of consciousness: awake, alert and oriented    Nausea/Vomiting: no nausea/vomiting    Complications: none          Last vitals:   Visit Vitals    BP (!) 172/81 (BP Location: Right arm, Patient Position: Lying, BP Method: Automatic)    Pulse (!) 54    Temp 36.8 °C (98.2 °F) (Oral)    Resp 18    Ht 5' 4" (1.626 m)    Wt 114.3 kg (252 lb)    LMP 03/03/2015    Breastfeeding No    BMI 43.26 kg/m2     "

## 2017-05-01 NOTE — PLAN OF CARE
Pt waking up. Patient requesting physical therapy on walker training. Order received from Dr Nguyen. Notified WILLIAM Rouse of physical therapy order.

## 2017-05-01 NOTE — H&P
H&P completed on 4/30/17 has been reviewed, the patient has been examined and:  I concur with the findings and no changes have occurred since H&P was written.    Active Hospital Problems    Diagnosis  POA    Tear of meniscus of left knee [S83.207A]  Yes      Resolved Hospital Problems    Diagnosis Date Resolved POA   No resolved problems to display.

## 2017-05-01 NOTE — IP AVS SNAPSHOT
[facility]  [address]  [place] [address]  Phone: [phone]           Patient Discharge Instructions   Our goal is to set you up for success. This packet includes information on your condition, medications, and your home care.  It will help you care for yourself to prevent having to return to the hospital.     Please ask your nurse if you have any questions.      There are many details to remember when preparing to leave the hospital. Here is what you will need to do:    1. Take your medicine. If you are prescribed medications, review your Medication List on the following pages. You may have new medications to  at the pharmacy and others that you'll need to stop taking. Review the instructions for how and when to take your medications. Talk with your doctor or nurses if you are unsure of what to do.     2. Go to your follow-up appointments. Specific follow-up information is listed in the following pages. Your may be contacted by a nurse or clinical provider about future appointments. Be sure we have all of the phone numbers to reach you. Please contact your provider's office if you are unable to make an appointment.     3. Watch for warning signs. Your doctor or nurse will give you detailed warning signs to watch for and when to call for assistance. These instructions may also include educational information about your condition. If you experience any of warning signs to your health, call your doctor.               ** Verify the list of medication(s) below is accurate and up to date. Carry this with you in case of emergency. If your medications have changed, please notify your healthcare provider.             Medication List      START taking these medications        Additional Info                      oxycodone-acetaminophen 5-325 mg per tablet   Commonly known as:  PERCOCET   Quantity:  21 tablet   Refills:  0   Dose:  1 tablet    Last time this was given:  1 tablet on 5/1/2017  10:50 AM   Instructions:  Take 1 tablet by mouth every 6 (six) hours as needed for Pain.     Begin Date    AM    Noon    PM    Bedtime         CONTINUE taking these medications        Additional Info                      amlodipine 5 MG tablet   Commonly known as:  NORVASC   Refills:  0   Dose:  5 mg    Instructions:  Take 5 mg by mouth once daily.     Begin Date    AM    Noon    PM    Bedtime       paroxetine 20 MG tablet   Commonly known as:  PAXIL   Quantity:  30 tablet   Refills:  11   Dose:  20 mg    Instructions:  Take 1 tablet (20 mg total) by mouth every morning.     Begin Date    AM    Noon    PM    Bedtime       rabeprazole 20 mg tablet   Commonly known as:  ACIPHEX   Quantity:  90 tablet   Refills:  0   Dose:  20 mg    Instructions:  Take 1 tablet (20 mg total) by mouth once daily.     Begin Date    AM    Noon    PM    Bedtime            Where to Get Your Medications      You can get these medications from any pharmacy     Bring a paper prescription for each of these medications     oxycodone-acetaminophen 5-325 mg per tablet                  Please bring to all follow up appointments:    1. A copy of your discharge instructions.  2. All medicines you are currently taking in their original bottles.  3. Identification and insurance card.    Please arrive 15 minutes ahead of scheduled appointment time.    Please call 24 hours in advance if you must reschedule your appointment and/or time.        Your Scheduled Appointments     May 02, 2017  1:00 PM CDT   New Physical Therapy Patient with Kath Gould, PT   [facility] ([facility]    [address]  [place] [address]   [phone]            May 09, 2017 10:20 AM CDT   Established Patient Visit with [facility] [place] ([facility])    [address] [place] [address]   [phone]            May 22, 2017  9:00 AM CDT   Established Patient Visit  with MD MARY Baca - Rheumatology ([facility])    [address] [facility]  [place] [address] [phone]              Follow-up Information     Follow up with Calvin Nguyen MD In 2 weeks.    Specialty:  Orthopedic Surgery    Contact information:    [address]  [place] [id number] [phone]          Discharge Instructions     Future Orders    Call MD for:  severe uncontrolled pain     Diet general     Questions:    Total calories:      Fat restriction, if any:      Protein restriction, if any:      Na restriction, if any:      Fluid restriction:      Additional restrictions:          Discharge Instructions             DIET: You may resume your home diet. If nausea is present, increase your diet gradually with fluids and bland foods    ACTIVITY LEVEL: If you have received sedation or an anesthetic, you may feel sleepy for several hours. Rest until you are more awake. Gradually resume your normal activities    Medications: Pain medication should be taken only if needed and as directed. If antibiotics are prescribed, the medication should be taken until completed. You will be given an updated list of you medications.    No driving, alcoholic beverages or signing legal documents for next 24 hours or while taking pain medication.       CALL THE DOCTOR:    For any obvious bleeding (some dried blood over the incision is normal).      Redness, swelling, foul smell around incision or fever over 101.   Shortness of breath, Coughing up Bloody sputum, Pains or Swelling in your Calves .   Persistent pain or nausea not relieved by medication.    If any unusual problems or difficulties occur contact your doctor. If you cannot contact your doctor but feel your signs and symptoms warrant a physicians attention return to the emergency room.        Discharge Instructions: After Your Surgery  Youve just had surgery. During surgery you were given medicine called anesthesia to keep  you relaxed and free of pain. After surgery you may have some pain or nausea. This is common. Here are some tips for feeling better and getting well after surgery.     Stay on schedule with your medication.   Going home  Your doctor or nurse will show you how to take care of yourself when you go home. He or she will also answer your questions. Have an adult family member or friend drive you home. For the first 24 hours after your surgery:  · Do not drive or use heavy equipment.  · Do not make important decisions or sign legal papers.  · Do not drink alcohol.  · Have someone stay with you, if needed. He or she can watch for problems and help keep you safe.  Be sure to go to all follow-up visits with your doctor. And rest after your surgery for as long as your doctor tells you to.  Coping with pain  If you have pain after surgery, pain medicine will help you feel better. Take it as told, before pain becomes severe. Also, ask your doctor or pharmacist about other ways to control pain. This might be with heat, ice, or relaxation. And follow any other instructions your surgeon or nurse gives you.  Tips for taking pain medicine  To get the best relief possible, remember these points:  · Pain medicines can upset your stomach. Taking them with a little food may help.  · Most pain relievers taken by mouth need at least 20 to 30 minutes to start to work.  · Taking medicine on a schedule can help you remember to take it. Try to time your medicine so that you can take it before starting an activity. This might be before you get dressed, go for a walk, or sit down for dinner.  · Constipation is a common side effect of pain medicines. Call your doctor before taking any medicines such as laxatives or stool softeners to help ease constipation. Also ask if you should skip any foods. Drinking lots of fluids and eating foods such as fruits and vegetables that are high in fiber can also help. Remember, do not take laxatives unless your  surgeon has prescribed them.  · Drinking alcohol and taking pain medicine can cause dizziness and slow your breathing. It can even be deadly. Do not drink alcohol while taking pain medicine.  · Pain medicine can make you react more slowly to things. Do not drive or run machinery while taking pain medicine.  Your health care provider may tell you to take acetaminophen to help ease your pain. Ask him or her how much you are supposed to take each day. Acetaminophen or other pain relievers may interact with your prescription medicines or other over-the-counter (OTC) drugs. Some prescription medicines have acetaminophen and other ingredients. Using both prescription and OTC acetaminophen for pain can cause you to overdose. Read the labels on your OTC medicines with care. This will help you to clearly know the list of ingredients, how much to take, and any warnings. It may also help you not take too much acetaminophen. If you have questions or do not understand the information, ask your pharmacist or health care provider to explain it to you before you take the OTC medicine.  Managing nausea  Some people have an upset stomach after surgery. This is often because of anesthesia, pain, or pain medicine, or the stress of surgery. These tips will help you handle nausea and eat healthy foods as you get better. If you were on a special food plan before surgery, ask your doctor if you should follow it while you get better. These tips may help:  · Do not push yourself to eat. Your body will tell you when to eat and how much.  · Start off with clear liquids and soup. They are easier to digest.  · Next try semi-solid foods, such as mashed potatoes, applesauce, and gelatin, as you feel ready.  · Slowly move to solid foods. Dont eat fatty, rich, or spicy foods at first.  · Do not force yourself to have 3 large meals a day. Instead eat smaller amounts more often.  · Take pain medicines with a small amount of solid food, such as  crackers or toast, to avoid nausea.     Call your surgeon if  · You still have pain an hour after taking medicine. The medicine may not be strong enough.  · You feel too sleepy, dizzy, or groggy. The medicine may be too strong.  · You have side effects like nausea, vomiting, or skin changes, such as rash, itching, or hives.       If you have obstructive sleep apnea  You were given anesthesia medicine during surgery to keep you comfortable and free of pain. After surgery, you may have more apnea spells because of this medicine and other medicines you were given. The spells may last longer than usual.   At home:  · Keep using the continuous positive airway pressure (CPAP) device when you sleep. Unless your health care provider tells you not to, use it when you sleep, day or night. CPAP is a common device used to treat obstructive sleep apnea.  · Talk with your provider before taking any pain medicine, muscle relaxants, or sedatives. Your provider will tell you about the possible dangers of taking these medicines.  © 0059-1590 [employer]. [address] [place] [address]. All rights reserved. This information is not intended as a substitute for professional medical care. Always follow your healthcare professional's instructions.          Acetaminophen; Oxycodone tablets  What is this medicine?  ACETAMINOPHEN; OXYCODONE (a set a YOUNG jeremiah fen; ox i KOE done) is a pain reliever. It is used to treat moderate to severe pain.  How should I use this medicine?  Take this medicine by mouth with a full glass of water. Follow the directions on the prescription label. You can take it with or without food. If it upsets your stomach, take it with food. Take your medicine at regular intervals. Do not take it more often than directed.  A special MedGuide will be given to you by the pharmacist with each prescription and refill. Be sure to read this information carefully each time.  Talk to your pediatrician  regarding the use of this medicine in children. Special care may be needed.  What side effects may I notice from receiving this medicine?  Side effects that you should report to your doctor or health care professional as soon as possible:  · allergic reactions like skin rash, itching or hives, swelling of the face, lips, or tongue  · breathing problems  · confusion  · redness, blistering, peeling or loosening of the skin, including inside the mouth  · signs and symptoms of liver injury like dark yellow or brown urine; general ill feeling or flu-like symptoms; light-colored stools; loss of appetite; nausea; right upper belly pain; unusually weak or tired; yellowing of the eyes or skin  · signs and symptoms of low blood pressure like dizziness; feeling faint or lightheaded, falls; unusually weak or tired  · trouble passing urine or change in the amount of urine  Side effects that usually do not require medical attention (report to your doctor or health care professional if they continue or are bothersome):  · constipation  · dry mouth  · nausea, vomiting  · tiredness  What may interact with this medicine?  This medicine may interact with the following medications:  · alcohol  · antihistamines for allergy, cough and cold  · antiviral medicines for HIV or AIDS  · atropine  · certain antibiotics like clarithromycin, erythromycin, linezolid, rifampin  · certain medicines for anxiety or sleep  · certain medicines for bladder problems like oxybutynin, tolterodine  · certain medicines for depression like amitriptyline, fluoxetine, sertraline  · certain medicines for fungal infections like ketoconazole, itraconazole, voriconazole  · certain medicines for migraine headache like almotriptan, eletriptan, frovatriptan, naratriptan, rizatriptan, sumatriptan, zolmitriptan  · certain medicines for nausea or vomiting like dolasetron, ondansetron, palonosetron  · certain medicines for Parkinson's disease like benztropine,  trihexyphenidyl  · certain medicines for seizures like phenobarbital, phenytoin, primidone  · certain medicines for stomach problems like dicyclomine, hyoscyamine  · certain medicines for travel sickness like scopolamine  · diuretics  · general anesthetics like halothane, isoflurane, methoxyflurane, propofol  · ipratropium  · local anesthetics like lidocaine, pramoxine, tetracaine  · MAOIs like Carbex, Eldepryl, Marplan, Nardil, and Parnate  · medicines that relax muscles for surgery  · methylene blue  · nilotinib  · other medicines with acetaminophen  · other narcotic medicines for pain or cough  · phenothiazines like chlorpromazine, mesoridazine, prochlorperazine, thioridazine  What if I miss a dose?  If you miss a dose, take it as soon as you can. If it is almost time for your next dose, take only that dose. Do not take double or extra doses.  Where should I keep my medicine?  Keep out of the reach of children. This medicine can be abused. Keep your medicine in a safe place to protect it from theft. Do not share this medicine with anyone. Selling or giving away this medicine is dangerous and against the law.  This medicine may cause accidental overdose and death if it taken by other adults, children, or pets. Mix any unused medicine with a substance like cat litter or coffee grounds. Then throw the medicine away in a sealed container like a sealed bag or a coffee can with a lid. Do not use the medicine after the expiration date.  Store at room temperature between 20 and 25 degrees C (68 and 77 degrees F).  What should I tell my health care provider before I take this medicine?  They need to know if you have any of these conditions:  · brain tumor  · Crohn's disease, inflammatory bowel disease, or ulcerative colitis  · drug abuse or addiction  · head injury  · heart or circulation problems  · if you often drink alcohol  · kidney disease or problems going to the bathroom  · liver disease  · lung disease, asthma, or  breathing problems  · an unusual or allergic reaction to acetaminophen, oxycodone, other opioid analgesics, other medicines, foods, dyes, or preservatives  · pregnant or trying to get pregnant  · breast-feeding  What should I watch for while using this medicine?  Tell your doctor or health care professional if your pain does not go away, if it gets worse, or if you have new or a different type of pain. You may develop tolerance to the medicine. Tolerance means that you will need a higher dose of the medication for pain relief. Tolerance is normal and is expected if you take this medicine for a long time.  Do not suddenly stop taking your medicine because you may develop a severe reaction. Your body becomes used to the medicine. This does NOT mean you are addicted. Addiction is a behavior related to getting and using a drug for a non-medical reason. If you have pain, you have a medical reason to take pain medicine. Your doctor will tell you how much medicine to take. If your doctor wants you to stop the medicine, the dose will be slowly lowered over time to avoid any side effects.  There are different types of narcotic medicines (opiates). If you take more than one type at the same time or if you are taking another medicine that also causes drowsiness, you may have more side effects. Give your health care provider a list of all medicines you use. Your doctor will tell you how much medicine to take. Do not take more medicine than directed. Call emergency for help if you have problems breathing or unusual sleepiness.  Do not take other medicines that contain acetaminophen with this medicine. Always read labels carefully. If you have questions, ask your doctor or pharmacist.  If you take too much acetaminophen get medical help right away. Too much acetaminophen can be very dangerous and cause liver damage. Even if you do not have symptoms, it is important to get help right away.  You may get drowsy or dizzy. Do not  "drive, use machinery, or do anything that needs mental alertness until you know how this medicine affects you. Do not stand or sit up quickly, especially if you are an older patient. This reduces the risk of dizzy or fainting spells. Alcohol may interfere with the effect of this medicine. Avoid alcoholic drinks.  The medicine will cause constipation. Try to have a bowel movement at least every 2 to 3 days. If you do not have a bowel movement for 3 days, call your doctor or health care professional.  Your mouth may get dry. Chewing sugarless gum or sucking hard candy, and drinking plenty or water may help. Contact your doctor if the problem does not go away or is severe.  Date Last Reviewed:   NOTE:This sheet is a summary. It may not cover all possible information. If you have questions about this medicine, talk to your doctor, pharmacist, or health care provider. Copyright© 2016 [employer]            Primary Diagnosis     Your primary diagnosis was:  Tear Of Meniscus Of Knee      Admission Information     Date & Time Provider Department CSN    5/1/2017  5:55 AM Calvin Nguyen MD [facility] [id number]      Care Providers     Provider Role Specialty Primary office phone    Calvin Nguyen MD Attending Provider Orthopedic Surgery [phone]    Calvin Nguyen MD Surgeon  Orthopedic Surgery [phone]      Your Vitals Were     BP Pulse Temp Resp Height Weight    128/73 49 97.6 °F (36.4 °C) (Oral) 12 5' 4" (1.626 m) 114.3 kg (252 lb)    Last Period SpO2 BMI          03/03/2015 93% 43.26 kg/m2        Recent Lab Values        4/28/2015                           6:11 AM           A1C 5.6                       Allergies as of 5/1/2017        Reactions    Ace Inhibitors Other (See Comments)    cough      [facility] Nurse Care Line - 24/7 Assistance  Unless otherwise directed by your provider, please contact [facility], our nurse care line that is available for 24/7 assistance. "     Registered nurses in the [facility] provide clinical advisement, health education, appointment booking, and other advisory services.  Call for this free service at [phone].        Advance Directives     An advance directive is a document which, in the event you are no longer able to make decisions for yourself, tells your healthcare team what kind of treatment you do or do not want to receive, or who you would like to make those decisions for you.  If you do not currently have an advance directive, [facility] encourages you to create one.  For more information call:  [phone]), [phone] ([phone]),  or log on to [email or web address].        Smoking Cessation     If you would like to quit smoking:   You may be eligible for free services if you are a [place] resident and started smoking cigarettes before September 1, 1988.  Call the Smoking Cessation Trust ([facility]) toll free at (923) [phone] or [phone].   Call 2-759-QUIT-NOW if you do not meet the above criteria.   Contact us via email: [email or web address]   View our website for more information: [email or web address]        Language Assistance Services     ATTENTION: Language assistance services are available, free of charge. Please call [phone].      ATENCIÓN: Si habla español, tiene a montalvo disposición servicios gratuitos de asistencia lingüística. Llame al [phone].     CHÚ Ý: N?u b?n nói Ti?ng Vi?t, có các d?ch v? h? tr? ngôn ng? mi?n phí dành cho b?n. G?i s? [phone].         [facility] complies with applicable Federal civil rights laws and does not discriminate on the basis of race, color, national origin, age, disability, or sex.

## 2017-05-01 NOTE — BRIEF OP NOTE
Brief Operative Note    Patient:  Inna Gallardo    Date: 5/1/2017    Pre-op Diagnosis: Left knee meniscal tear.  Post-op Diagnosis: same    Procedure(s):  1. Arthroscopic partial medial meniscectomy.    Anesthesia: General    Surgeon(s):  MD Benson Merrill MD      Estimated Blood Loss: Minimal  Drain: none   Specimens: none    Implants: none    Complications: none    Findings: Left knee meniscal tear  Disposition: awakened from anesthesia, extubated and taken to the recovery room in a stable condition, having suffered no apparent untoward event.  Condition: doing well without problems  Technique: Standard Left knee diagnostic arthroscopy with medial mensicus debridement

## 2017-05-01 NOTE — ANESTHESIA RELEASE NOTE
"Anesthesia Release from PACU Note    Patient: Inna Gallardo    Procedure(s) Performed: Procedure(s) (LRB):  ARTHROSCOPY-KNEE (Left)  MENISCECTOMY MEDIAL (Left)    Anesthesia type: general    Post pain: Adequate analgesia    Post assessment: no apparent anesthetic complications, tolerated procedure well and no evidence of recall    Last Vitals:   Visit Vitals    /73    Pulse (!) 49    Temp 36.4 °C (97.6 °F) (Oral)    Resp 12    Ht 5' 4" (1.626 m)    Wt 114.3 kg (252 lb)    LMP 03/03/2015    SpO2 (!) 93%    Breastfeeding No    BMI 43.26 kg/m2       Post vital signs: stable    Level of consciousness: awake, alert  and oriented    Nausea/Vomiting: no nausea/no vomiting    Complications: none    Airway Patency: patent    Respiratory: unassisted    Cardiovascular: stable and blood pressure at baseline    Hydration: euvolemic  "

## 2017-05-01 NOTE — PLAN OF CARE
Pt assessed. Left knee dressing clean, dry, and intact. Family at bedside. Pt given sprite. Will continue to monitor

## 2017-05-01 NOTE — PLAN OF CARE
Safety precautions maintained. Side rails up x 2. Call bell in reach. Non skid socks on. Bed locked and in lowest position. Family at bedside. Instructed pt to call for assistance. Pt verbalizes understanding.

## 2017-05-01 NOTE — OP NOTE
DATE OF PROCEDURE:  05/01/2017    PREOPERATIVE DIAGNOSIS:  Left knee meniscal tear.    POSTOPERATIVE DIAGNOSIS:  Left knee meniscal tear.    PROCEDURE:  Arthroscopic partial medial meniscectomy.    ATTENDING SURGEON:  Calvin Nguyen M.D.    ASSISTANT:  Dr. Petit.    COMPLICATIONS:  None.    IMPLANTS:  None.    INDICATIONS:  This is a 42-year-old female with mechanical knee pain.  MRI   revealed a tear of the meniscus.  Operative and nonoperative management was   discussed with the patient.  We did discuss the fact that arthroscopy would   hopefully help her symptoms related to meniscal tear; however, may not   necessarily address any symptoms related to degenerative arthritis.  She   understood this and agreed to move forward with arthroscopy.    FINDINGS:  The patient did not have a tear of the anterior horn of the medial   meniscus.    PROCEDURE IN DETAIL:  The patient was brought to the Operating Room and placed   supine on the operative table.  General anesthesia was induced without any   difficulties.  Left leg was placed in leg landry.  Foot table was flexed.    Posterior aspect of right knee was well padded.  Prior to incision, proper site   and procedure as well as antibiotic administration were verified.  Anterolateral   and anteromedial portal sites were injected with Marcaine with epinephrine.    Then, #11 blade was used to establish anterolateral portals and dilated using a   trocar and cannula.  The camera was then placed in the suprapatellar pouch.    Undersurface of the patella was visualized, which showed grade III fraying as   did the trochlea.  Knee was then flexed, medial compartment visualized.  Spinal   needle was used to establish anteromedial portals, then created using #11 blade   and dilated using a trocar.  Knee was then placed in valgus position and medial   compartment was visualized.  Posterior horn mid body of the meniscus was intact.    No tears or disruptions; however, the anterior  horn of the medial meniscus had   a degenerative tear.  This was debrided using a shaver to a stable rim.    Articular surface femoral condyle and tibial plateau showed grade IV changes.    Knee was then flexed, ACL visualized.  This was intact.  No tears or   disruptions.  Knee was then placed in figure-of-four position and lateral   compartment visualized.  Lateral meniscus was intact with no tears or   disruptions.  Articular surface of the femoral condyle showed grade I softening   and grade III fraying of the tibial plateau.  We then turned our attention back   to the anterior compartment and debrided the loose articular cartilage along the   patella as well as trochlea.  Cannula was then placed in the suprapatellar   pouch and all excess fluid evacuated via the cannula.  Portal sites were then   closed with Dermabond and Steri-Strips, injected Marcaine with epinephrine.    Knee was then dressed with sterile ABDs and ACE wrap.  The patient was then   extubated by Anesthesia without any difficulties and transferred to Recovery   Room bed in stable condition.      JELENA  dd: 05/01/2017 09:16:12 (CDT)  td: 05/01/2017 10:32:04 (CDT)  Doc ID   #[id number]  Job ID #[id number]    CC:

## 2017-05-01 NOTE — PLAN OF CARE
Pt finished working with physical therapy. Pt denies any pain. Respirations even and unlabored. Discharge instructions reviewed with pt and pt's . Pt and pt's  verbalize understanding. PIV discontinued as per md order. Tip intact. Pt tolerated well.

## 2017-05-01 NOTE — DISCHARGE INSTRUCTIONS
DIET: You may resume your home diet. If nausea is present, increase your diet gradually with fluids and bland foods    ACTIVITY LEVEL: If you have received sedation or an anesthetic, you may feel sleepy for several hours. Rest until you are more awake. Gradually resume your normal activities    Medications: Pain medication should be taken only if needed and as directed. If antibiotics are prescribed, the medication should be taken until completed. You will be given an updated list of you medications.    No driving, alcoholic beverages or signing legal documents for next 24 hours or while taking pain medication.       CALL THE DOCTOR:    For any obvious bleeding (some dried blood over the incision is normal).      Redness, swelling, foul smell around incision or fever over 101.   Shortness of breath, Coughing up Bloody sputum, Pains or Swelling in your Calves .   Persistent pain or nausea not relieved by medication.    If any unusual problems or difficulties occur contact your doctor. If you cannot contact your doctor but feel your signs and symptoms warrant a physicians attention return to the emergency room.        Discharge Instructions: After Your Surgery  Youve just had surgery. During surgery you were given medicine called anesthesia to keep you relaxed and free of pain. After surgery you may have some pain or nausea. This is common. Here are some tips for feeling better and getting well after surgery.     Stay on schedule with your medication.   Going home  Your doctor or nurse will show you how to take care of yourself when you go home. He or she will also answer your questions. Have an adult family member or friend drive you home. For the first 24 hours after your surgery:  · Do not drive or use heavy equipment.  · Do not make important decisions or sign legal papers.  · Do not drink alcohol.  · Have someone stay with you, if needed. He or she can watch for problems and help keep you safe.  Be  sure to go to all follow-up visits with your doctor. And rest after your surgery for as long as your doctor tells you to.  Coping with pain  If you have pain after surgery, pain medicine will help you feel better. Take it as told, before pain becomes severe. Also, ask your doctor or pharmacist about other ways to control pain. This might be with heat, ice, or relaxation. And follow any other instructions your surgeon or nurse gives you.  Tips for taking pain medicine  To get the best relief possible, remember these points:  · Pain medicines can upset your stomach. Taking them with a little food may help.  · Most pain relievers taken by mouth need at least 20 to 30 minutes to start to work.  · Taking medicine on a schedule can help you remember to take it. Try to time your medicine so that you can take it before starting an activity. This might be before you get dressed, go for a walk, or sit down for dinner.  · Constipation is a common side effect of pain medicines. Call your doctor before taking any medicines such as laxatives or stool softeners to help ease constipation. Also ask if you should skip any foods. Drinking lots of fluids and eating foods such as fruits and vegetables that are high in fiber can also help. Remember, do not take laxatives unless your surgeon has prescribed them.  · Drinking alcohol and taking pain medicine can cause dizziness and slow your breathing. It can even be deadly. Do not drink alcohol while taking pain medicine.  · Pain medicine can make you react more slowly to things. Do not drive or run machinery while taking pain medicine.  Your health care provider may tell you to take acetaminophen to help ease your pain. Ask him or her how much you are supposed to take each day. Acetaminophen or other pain relievers may interact with your prescription medicines or other over-the-counter (OTC) drugs. Some prescription medicines have acetaminophen and other ingredients. Using both  prescription and OTC acetaminophen for pain can cause you to overdose. Read the labels on your OTC medicines with care. This will help you to clearly know the list of ingredients, how much to take, and any warnings. It may also help you not take too much acetaminophen. If you have questions or do not understand the information, ask your pharmacist or health care provider to explain it to you before you take the OTC medicine.  Managing nausea  Some people have an upset stomach after surgery. This is often because of anesthesia, pain, or pain medicine, or the stress of surgery. These tips will help you handle nausea and eat healthy foods as you get better. If you were on a special food plan before surgery, ask your doctor if you should follow it while you get better. These tips may help:  · Do not push yourself to eat. Your body will tell you when to eat and how much.  · Start off with clear liquids and soup. They are easier to digest.  · Next try semi-solid foods, such as mashed potatoes, applesauce, and gelatin, as you feel ready.  · Slowly move to solid foods. Dont eat fatty, rich, or spicy foods at first.  · Do not force yourself to have 3 large meals a day. Instead eat smaller amounts more often.  · Take pain medicines with a small amount of solid food, such as crackers or toast, to avoid nausea.     Call your surgeon if  · You still have pain an hour after taking medicine. The medicine may not be strong enough.  · You feel too sleepy, dizzy, or groggy. The medicine may be too strong.  · You have side effects like nausea, vomiting, or skin changes, such as rash, itching, or hives.       If you have obstructive sleep apnea  You were given anesthesia medicine during surgery to keep you comfortable and free of pain. After surgery, you may have more apnea spells because of this medicine and other medicines you were given. The spells may last longer than usual.   At home:  · Keep using the continuous positive airway  pressure (CPAP) device when you sleep. Unless your health care provider tells you not to, use it when you sleep, day or night. CPAP is a common device used to treat obstructive sleep apnea.  · Talk with your provider before taking any pain medicine, muscle relaxants, or sedatives. Your provider will tell you about the possible dangers of taking these medicines.  © 3163-4047 [employer]. [address] [place] [address], [place] [address]. All rights reserved. This information is not intended as a substitute for professional medical care. Always follow your healthcare professional's instructions.          Acetaminophen; Oxycodone tablets  What is this medicine?  ACETAMINOPHEN; OXYCODONE (a set a YOUNG jeremiah fen; ox i KOE done) is a pain reliever. It is used to treat moderate to severe pain.  How should I use this medicine?  Take this medicine by mouth with a full glass of water. Follow the directions on the prescription label. You can take it with or without food. If it upsets your stomach, take it with food. Take your medicine at regular intervals. Do not take it more often than directed.  A special MedGuide will be given to you by the pharmacist with each prescription and refill. Be sure to read this information carefully each time.  Talk to your pediatrician regarding the use of this medicine in children. Special care may be needed.  What side effects may I notice from receiving this medicine?  Side effects that you should report to your doctor or health care professional as soon as possible:  · allergic reactions like skin rash, itching or hives, swelling of the face, lips, or tongue  · breathing problems  · confusion  · redness, blistering, peeling or loosening of the skin, including inside the mouth  · signs and symptoms of liver injury like dark yellow or brown urine; general ill feeling or flu-like symptoms; light-colored stools; loss of appetite; nausea; right upper belly pain; unusually weak or tired;  yellowing of the eyes or skin  · signs and symptoms of low blood pressure like dizziness; feeling faint or lightheaded, falls; unusually weak or tired  · trouble passing urine or change in the amount of urine  Side effects that usually do not require medical attention (report to your doctor or health care professional if they continue or are bothersome):  · constipation  · dry mouth  · nausea, vomiting  · tiredness  What may interact with this medicine?  This medicine may interact with the following medications:  · alcohol  · antihistamines for allergy, cough and cold  · antiviral medicines for HIV or AIDS  · atropine  · certain antibiotics like clarithromycin, erythromycin, linezolid, rifampin  · certain medicines for anxiety or sleep  · certain medicines for bladder problems like oxybutynin, tolterodine  · certain medicines for depression like amitriptyline, fluoxetine, sertraline  · certain medicines for fungal infections like ketoconazole, itraconazole, voriconazole  · certain medicines for migraine headache like almotriptan, eletriptan, frovatriptan, naratriptan, rizatriptan, sumatriptan, zolmitriptan  · certain medicines for nausea or vomiting like dolasetron, ondansetron, palonosetron  · certain medicines for Parkinson's disease like benztropine, trihexyphenidyl  · certain medicines for seizures like phenobarbital, phenytoin, primidone  · certain medicines for stomach problems like dicyclomine, hyoscyamine  · certain medicines for travel sickness like scopolamine  · diuretics  · general anesthetics like halothane, isoflurane, methoxyflurane, propofol  · ipratropium  · local anesthetics like lidocaine, pramoxine, tetracaine  · MAOIs like Carbex, Eldepryl, Marplan, Nardil, and Parnate  · medicines that relax muscles for surgery  · methylene blue  · nilotinib  · other medicines with acetaminophen  · other narcotic medicines for pain or cough  · phenothiazines like chlorpromazine, mesoridazine, prochlorperazine,  thioridazine  What if I miss a dose?  If you miss a dose, take it as soon as you can. If it is almost time for your next dose, take only that dose. Do not take double or extra doses.  Where should I keep my medicine?  Keep out of the reach of children. This medicine can be abused. Keep your medicine in a safe place to protect it from theft. Do not share this medicine with anyone. Selling or giving away this medicine is dangerous and against the law.  This medicine may cause accidental overdose and death if it taken by other adults, children, or pets. Mix any unused medicine with a substance like cat litter or coffee grounds. Then throw the medicine away in a sealed container like a sealed bag or a coffee can with a lid. Do not use the medicine after the expiration date.  Store at room temperature between 20 and 25 degrees C (68 and 77 degrees F).  What should I tell my health care provider before I take this medicine?  They need to know if you have any of these conditions:  · brain tumor  · Crohn's disease, inflammatory bowel disease, or ulcerative colitis  · drug abuse or addiction  · head injury  · heart or circulation problems  · if you often drink alcohol  · kidney disease or problems going to the bathroom  · liver disease  · lung disease, asthma, or breathing problems  · an unusual or allergic reaction to acetaminophen, oxycodone, other opioid analgesics, other medicines, foods, dyes, or preservatives  · pregnant or trying to get pregnant  · breast-feeding  What should I watch for while using this medicine?  Tell your doctor or health care professional if your pain does not go away, if it gets worse, or if you have new or a different type of pain. You may develop tolerance to the medicine. Tolerance means that you will need a higher dose of the medication for pain relief. Tolerance is normal and is expected if you take this medicine for a long time.  Do not suddenly stop taking your medicine because you may  develop a severe reaction. Your body becomes used to the medicine. This does NOT mean you are addicted. Addiction is a behavior related to getting and using a drug for a non-medical reason. If you have pain, you have a medical reason to take pain medicine. Your doctor will tell you how much medicine to take. If your doctor wants you to stop the medicine, the dose will be slowly lowered over time to avoid any side effects.  There are different types of narcotic medicines (opiates). If you take more than one type at the same time or if you are taking another medicine that also causes drowsiness, you may have more side effects. Give your health care provider a list of all medicines you use. Your doctor will tell you how much medicine to take. Do not take more medicine than directed. Call emergency for help if you have problems breathing or unusual sleepiness.  Do not take other medicines that contain acetaminophen with this medicine. Always read labels carefully. If you have questions, ask your doctor or pharmacist.  If you take too much acetaminophen get medical help right away. Too much acetaminophen can be very dangerous and cause liver damage. Even if you do not have symptoms, it is important to get help right away.  You may get drowsy or dizzy. Do not drive, use machinery, or do anything that needs mental alertness until you know how this medicine affects you. Do not stand or sit up quickly, especially if you are an older patient. This reduces the risk of dizzy or fainting spells. Alcohol may interfere with the effect of this medicine. Avoid alcoholic drinks.  The medicine will cause constipation. Try to have a bowel movement at least every 2 to 3 days. If you do not have a bowel movement for 3 days, call your doctor or health care professional.  Your mouth may get dry. Chewing sugarless gum or sucking hard candy, and drinking plenty or water may help. Contact your doctor if the problem does not go away or is  severe.  Date Last Reviewed:   NOTE:This sheet is a summary. It may not cover all possible information. If you have questions about this medicine, talk to your doctor, pharmacist, or health care provider. Copyright© 2016 [employer]

## 2017-05-01 NOTE — ANESTHESIA POSTPROCEDURE EVALUATION
"Anesthesia Post Evaluation    Patient: Inna Gallardo    Procedure(s) Performed: Procedure(s) (LRB):  ARTHROSCOPY-KNEE (Left)  MENISCECTOMY MEDIAL (Left)    Final Anesthesia Type: general  Patient location during evaluation: PACU  Patient participation: Yes- Able to Participate  Level of consciousness: awake and alert  Post-procedure vital signs: reviewed and stable  Pain management: adequate  Airway patency: patent  PONV status at discharge: No PONV  Anesthetic complications: no      Cardiovascular status: hemodynamically stable  Respiratory status: unassisted  Hydration status: euvolemic  Follow-up not needed.        Visit Vitals    /73    Pulse (!) 49    Temp 36.4 °C (97.6 °F) (Oral)    Resp 12    Ht 5' 4" (1.626 m)    Wt 114.3 kg (252 lb)    LMP 03/03/2015    SpO2 (!) 93%    Breastfeeding No    BMI 43.26 kg/m2       Pain/Alesia Score: Pain Assessment Performed: Yes (5/1/2017 12:25 PM)  Presence of Pain: denies (5/1/2017 12:25 PM)  Pain Rating Prior to Med Admin: 5 (5/1/2017 10:50 AM)  Pain Rating Post Med Admin: 5 (5/1/2017 11:14 AM)  Alesia Score: 10 (5/1/2017 12:25 PM)  Modified Alesia Score: 19 (5/1/2017 11:14 AM)      "

## 2017-05-01 NOTE — PLAN OF CARE
Problem: Patient Care Overview  Goal: Plan of Care Review  Outcome: Ongoing (interventions implemented as appropriate)  Pain and nausea resolved in PACU, patient to ops, vss, no issues

## 2017-05-02 ENCOUNTER — CLINICAL SUPPORT (OUTPATIENT)
Dept: REHABILITATION | Facility: HOSPITAL | Age: 42
End: 2017-05-02
Attending: ORTHOPAEDIC SURGERY
Payer: MEDICAID

## 2017-05-02 VITALS
DIASTOLIC BLOOD PRESSURE: 75 MMHG | RESPIRATION RATE: 14 BRPM | OXYGEN SATURATION: 93 % | BODY MASS INDEX: 43.02 KG/M2 | SYSTOLIC BLOOD PRESSURE: 133 MMHG | WEIGHT: 252 LBS | TEMPERATURE: 97 F | HEART RATE: 55 BPM | HEIGHT: 64 IN

## 2017-05-02 DIAGNOSIS — M25.662 DECREASED ROM OF LEFT KNEE: ICD-10-CM

## 2017-05-02 DIAGNOSIS — R29.898 WEAKNESS OF LEFT LOWER EXTREMITY: ICD-10-CM

## 2017-05-02 DIAGNOSIS — M25.562 LEFT KNEE PAIN, UNSPECIFIED CHRONICITY: ICD-10-CM

## 2017-05-02 DIAGNOSIS — R26.9 IMPAIRED GAIT: ICD-10-CM

## 2017-05-02 PROCEDURE — 97110 THERAPEUTIC EXERCISES: CPT | Mod: PN

## 2017-05-02 PROCEDURE — 97161 PT EVAL LOW COMPLEX 20 MIN: CPT | Mod: PN

## 2017-05-02 NOTE — PLAN OF CARE
TIME RECORD    Date: 05/02/2017    Start Time:  1:02 pm   Stop Time:  1:46 pm    PROCEDURES:    TIMED  Procedure Min.   TE 15'                     UNTIMED  Procedure Min.   IE 19'   CP 10'     Total Timed Minutes:  15'  Total Timed Units:  1  Total Untimed Units:  1  Charges Billed/# of units:  2 ( 1 IE, 1 TE)     OUTPATIENT PHYSICAL THERAPY   PATIENT EVALUATION  Onset Date: s/p (L) meniscectomy on 5/1/17  Primary Diagnosis:   1. Left knee pain, unspecified chronicity     2. Decreased ROM of left knee     3. Weakness of left lower extremity     4. Impaired gait       Treatment Diagnosis: (L) knee pain, decreased (L) knee ROM, decreased LE strength/flexibility, impaired gait/balance, decreased functional mobility  Past Medical History:   Diagnosis Date    Anxiety     Endometriosis     Hypertension     Prolactinoma 2011    Sliding hiatal hernia      Precautions: standard, HTN, fall risk  Prior Therapy: none  Medications: Inna Gallardo has a current medication list which includes the following prescription(s): amlodipine, oxycodone-acetaminophen, paroxetine, and rabeprazole.  Nutrition:  Obese  History of Present Illness: s/p (L) meniscectomy on 5/1/17  Prior Level of Function: Independent  Social History: Pt stated that she lives with her  and her children. Pt stated that her 16 year old daughter is able to assist her as needed.   Place of Residence (Steps/Adaptations): Pt stated that she has no steps/stairs to enter her H.   Functional Deficits Leading to Referral/Nature of Injury: difficulty/increased pain ambulating, standing, performing ADLs, decreased functional mobility  Patient Therapy Goals: improve (L) knee ROM and decrease pain in (L) knee    Subjective     Inna Gallardo states that she underwent (L) meniscectomy on 5/1/17. Pt stated that she has been using RW to ambulate.     Pain:  Location: (L) knee   Description: Aching and Sharp  Activities Which Increase Pain: Standing,  Walking, Extension and Flexing  Activities Which Decrease Pain: pain medication  Pain Scale: 7/10 at best 8/10 now  8/10 at worst    Objective     Incision: no sign of infection noted  Palpation: Pt c/o tenderness to palpation along medial, lateral, and anterior (L) knee      Range of Motion/Strength:     AROM: 5 - 103 degrees  PROM: 3-110 degrees    L/E Strength w/ MicroFET Muscle Inez Dynamometer Right Left Pain/Dysfunction with Movement   (approx 4 sec hold w/ max contraction)   Hip Flexion 11.1 kg  5.2 kg     Hip Abduction 12.3 kg  6.3 kg     Quadriceps 16.5 kg  9.3 kg     Hamstrings 18.5 kg  8.9 kg             Flexibility: decreased hamstring and gastroc flexibility  Gait: With AD.  Device Used -  Rolling walker  Analysis: antalgic gait with decreased stance time on (L) LE, decreased (L) knee flexion, decreased (L) TKE in stance, decreased (B) step length, decreased alejandro  Bed Mobility:Independent  Transfers: Assistive Device  Special Tests: n/a  Other: FOTO knee survey: 74% limited  Treatment: Treatment to consist of manual therapy including STM/MFR left  knee, patellar mobs, knee flex/ext mobs/stretching; therapeutic exercise including LE strengthening/stretching, postural education, balance and gait training, and modalities prn. Pt was educated on, given handout on, and participated in HEP consisting of quad sets, heel slides, LAQ, and seated hip flexion. Pt demo and verbalized understanding. Pt was educated on icing and elevating (L) LE. Pt was also instructed to wear CIRILO hose at all times until appointment with MD in two weeks. Pt verbalized understanding.     Assessment       Initial Assessment (Pertinent finding, problem list and factors affecting outcome): Ms. Gallardo is a 42 year old female s/p (L) meniscectomy on 5/1/17. Pt presents with c/o (L) knee pain, decreased (L) LE strength/flexibility, decreased (L) knee ROM, impaired gait/balance, decreased functional mobility, and FOTO knee survey  score of 74% limited. Pt will benefit from skilled PT to address the limitations listed above in order to return pt to her highest level of function with decreased pain and limitation.     History  Co-morbidities and personal factors that may impact the plan of care Examination  Body Structures and Functions, activity limitations and participation restrictions that may impact the plan of care Clinical Presentation   Decision Making/ Complexity Score   Co-morbidities:     High blood pressure            Personal Factors:     low Body Regions:  LE    Body Systems: musculoskeletal -  ROM, strength; neuromuscular -  gait      Activity limitations: difficulty/increased pain ambulating, difficulty standing, difficulty flexing/extending (L) knee      Participation Restrictions:        Stable and uncomplicated    FOTO knee Survey: 74% limitation    low   low             Rehab Potiential: good    Short Term Goals (4 Weeks): 6/2/17  1. Pt will be independent with HEP  2. Pt will improve (L) knee flexion AROM to at least 115 degrees in order to be able to perform ADLs with less difficulty  3. Pt will improve FOTO knee survey score to < 60% limited in order to improve functional mobility    Long Term Goals (8 Weeks): 7/2/17  1. Pt will be independent with updated HEP  2. Pt will improve (L) knee AROM to 0 -130 degrees in order to be able to perform ADLs with less difficulty  3. Pt will improve (L) LE strength to at least 90% of (R) LE strength measured via MicroFet handheld dynamometer in order to improve functional mobility  4. Pt will improve FOTO knee survey score to </= 40% limited in order to improve functional mobility  5. Pt will report 0/10 pain in (L) knee when performing ADLs in order to be able to perform ADLs with less difficulty.     Plan     Certification Period: 5/2/17 to 7/2/17  Recommended Treatment Plan: 1-3 times per week for 8 weeks: Electrical Stimulation Russian/NMES, Gait Training, Group Therapy, Locomotor  Training, Manual Therapy, Moist Heat/ Ice, Neuromuscular Re-ed, Patient Education, Self Care, Therapeutic Activites, Therapeutic Exercise and Other FDN, IASTM, ASTYM, and modalities prn  Other Recommendations: n/a       Therapist: Celeste Mckinnon PT    I CERTIFY THE NEED FOR THESE SERVICES FURNISHED UNDER THIS PLAN OF TREATMENT AND WHILE UNDER MY CARE    Physician's comments: ________________________________________________________________________________________________________________________________________________      Physician's Name: ___________________________________

## 2017-05-03 ENCOUNTER — CLINICAL SUPPORT (OUTPATIENT)
Dept: REHABILITATION | Facility: HOSPITAL | Age: 42
End: 2017-05-03
Attending: ORTHOPAEDIC SURGERY
Payer: MEDICAID

## 2017-05-03 DIAGNOSIS — M25.662 DECREASED ROM OF LEFT KNEE: ICD-10-CM

## 2017-05-03 DIAGNOSIS — M25.562 LEFT KNEE PAIN, UNSPECIFIED CHRONICITY: ICD-10-CM

## 2017-05-03 DIAGNOSIS — R29.898 WEAKNESS OF LEFT LOWER EXTREMITY: ICD-10-CM

## 2017-05-03 DIAGNOSIS — R26.9 IMPAIRED GAIT: ICD-10-CM

## 2017-05-03 PROCEDURE — 97110 THERAPEUTIC EXERCISES: CPT | Mod: PN

## 2017-05-03 NOTE — PROGRESS NOTES
Physical Therapy Crutch Evaluation/Training/Discharge    Inna Gallardo   MRN: [id number]   [id number]  Eval 15 minutes GT 20 minutes  Admitting Diagnosis: Tear of meniscus of left knee  s/p (L) ATS meniscectomy on 5/1/17      Order: PT Eval and GT with crutches or walker (pt prefers walker)  Order Date: 5/1/2017    Precautions Weight Bearing Status: weight bearing as tolerated: left leg    Patient Active Problem List   Diagnosis    Chronic pelvic pain in female    HTN (hypertension)    Screening    Hiatal hernia with gastroesophageal reflux    Endometriosis    Vaginal bleeding    Gastritis    Iron deficiency anemia    Pituitary microadenoma    Calculus of both kidneys    Hemorrhoids, internal    Chronic pain of right knee    Abnormal MRI, knee    Primary osteoarthritis of both knees    Surgical menopause on hormone replacement therapy    Discharge from left nipple    Tear of meniscus of left knee    Difficult airway for intubation    Left knee pain    Decreased ROM of left knee    Weakness of left lower extremity    Impaired gait     Past Medical History:   Diagnosis Date    Anxiety     Endometriosis     Hypertension     Prolactinoma 2011    Sliding hiatal hernia      Past Surgical History:   Procedure Laterality Date    BRAIN TUMOR EXCISION  9/2011    removal of prolactinoma    CYSTOSCOPY W/ URETERAL STENT PLACEMENT  2016    CYSTOSCOPY W/ URETERAL STENT REMOVAL  2016    HYSTERECTOMY  3/16/15    TLH/BSO for Endometriosis, AUB, fibroids, cyst    MANDIBLE SURGERY  2002    severe overbite correction    PELVIC LAPAROSCOPY  2014    Dx scope, chromopertubation-Endometriosis    TONSILLECTOMY, ADENOIDECTOMY         Subjective Information     Prior level of function: independent  Residence: lives with their spouse 1-story house/ trailer; 1 small threshold  Support available: family  Equipment owned: None  Mental Status: Oriented X 4, somewhat sleepy but able to participate  Skin: Visible  skin intact  Sensation: Intact  Posture: Good  Hearing: Intact  Vision:  Intact    Pain at time of assessment: 6/10 located in L knee, aggravated by WB/movement, relieved by rest/meds.    Objective findings/Assessment  Bed mobility: Facundo  Transfers: sit<>stand with CGA  Gross assessment: good  Standing balance: Fair  ROM: 5-90 L knee; BUE, RLE, and LLE uninvolved jts WFL  Strength: BUEs~4/5 grossly; RLE~4/5 grossly  Patient requires walker fitting and training.    Treatment  Gait: Pt and  instructed in use of RW WBAT LLE over level surfaces in room; return demo in room ~15ft and 2x 8ft with  assisting-CGA with use of gait/txf belt- had instructed both in application and use of gt/txf belt which the  demonstrated knowledge of use  Stairs: Demonstrated up/down step with RW with pt/ verbalizing procedure; denied need for practice at hospital since their step was much lower.  Transfers: Instructed pt/ in sit<>stand from EOB, from toilet, from lower surface of bedside chair with VCs for  Technique during return demonstrations from pt;  assisted from low chair with CGA  Therapeutic Exercise: instructed pt in APs for DVT prophylaxis, instructed to allow pt to flex during gait and during transfers  Education provided in form of: handout, demo, verbal    Goals/Discharge Status  Patient safely and effectively ambulates with walker with  contact guard,  weight bearing as tolerated: left leg on level surfaces.    Recommended Plan:  Patient to be discharged to home with 's support.    Padma Shah, PT  05/01/2017

## 2017-05-03 NOTE — DISCHARGE SUMMARY
Physician Discharge Summary     Patient ID:      Admit date: 5/1/2017    Discharge date: 5/1/2017    Admitting Physician: YADIEL Nguyen    Discharge Physician: Laura    Admission Diagnoses:Left knee meniscal tear.    Discharge Diagnoses: Left knee meniscal tear.    Admission Condition: good    Discharged Condition: good    Indication for Admission: pt discharged home    Hospital Course: Patient presented to [facility] on day of scheduled surgery and underwent left knee arthroscopy, please see operative report for further detail. Patient did well postoperatively and was found to be fit for discharge on POD# 0. Discharged home.        Consults: PT    Significant Diagnostic Studies:none    Treatments:left knee arthroscopy    Discharge Exam:  AAOx3 in NAD  no increased WOB  LLE:  C/D/I dressings to knee  Able to straight leg raise  Extremity WWP    Disposition: Home or Self Care    Patient Instructions:     Discharge Medications  Refer to Discharge Medication List    Activity: activity as tolerated  Diet: regular diet  Wound Care: keep wound clean and dry             Signed:  Benson Terry MD

## 2017-05-03 NOTE — PROGRESS NOTES
"TIME RECORD    Date:  05/03/2017    Start Time:  1110  Stop Time:  1215    PROCEDURES:    TIMED  Procedure Min.   MT 20   TE 35                 UNTIMED  Procedure Min.   CP 10         Total Timed Minutes:  55  Total Timed Units:  4  Total Untimed Units:  0  Charges Billed/# of units:  4 TE      Progress/Current Status    Subjective:     Patient ID: Inna Gallardo is a 42 y.o. female.  Diagnosis:   1. Left knee pain, unspecified chronicity     2. Decreased ROM of left knee     3. Weakness of left lower extremity     4. Impaired gait       Pain: 8 /10 L knee pain. The pain has increased compared to yesterday.     Objective:     Patient arrived to clinic ambulating with RW. MT x 20 consisting of patient supine with LEs elevated on wedge for LLE edema reduction, medial/lateral L knee STM, L gastroc splaying, L hamstring splaying, L patella mobilizations in all directions, f/b TE 1:1 with PT x 30' f/b CP to L knee in sitting x 10'      Date  5/3/2017     VISIT 2   FOTO 2   MT 20   HS stretch  Strap 30"x2 L   Gastroc Str. Strap 30"x2 L   Bike 5' full   QS L 5"x15   SAQ 2x10 L   SLR aarom x10 L   SL Abd 2x10 L   Heel Slides Strap 5"x10 L   Edward Hip Add 5"x15 B   LAQs 2x10 L   Seated Hip Flex 2x10 L   Cybex   Leg Press --   TKE --   Hip Abd // 1x10 B   Hip Flex // 1x10 B   Hip Ext // 1x10 B   HS Curls --   Knee Ext --   Step Ups --   Step Downs --   Gait --   CP 10'   Initials RS     L knee AROM 3-120  L knee PROM 0-124    Assessment:     Patient demonstrated improved L knee ROM compared to the initial evaluation. Patient required verbal and tactile cueing for correct exercise technique.     Patient Education/Response:     Educated on continuing with currently issued HEP, adding using a bed sheet during heel slides for ROM improvement. Patient verbalized understanding    Plans and Goals:     Continue with PT POC    Short Term Goals (4 Weeks): 6/2/17  1. Pt will be independent with HEP  2. Pt will improve (L) knee " flexion AROM to at least 115 degrees in order to be able to perform ADLs with less difficulty  3. Pt will improve FOTO knee survey score to < 60% limited in order to improve functional mobility     Long Term Goals (8 Weeks): 7/2/17  1. Pt will be independent with updated HEP  2. Pt will improve (L) knee AROM to 0 -130 degrees in order to be able to perform ADLs with less difficulty  3. Pt will improve (L) LE strength to at least 90% of (R) LE strength measured via [employer] handheld dynamometer in order to improve functional mobility  4. Pt will improve FOTO knee survey score to </= 40% limited in order to improve functional mobility  5. Pt will report 0/10 pain in (L) knee when performing ADLs in order to be able to perform ADLs with less difficulty.

## 2017-05-05 ENCOUNTER — CLINICAL SUPPORT (OUTPATIENT)
Dept: REHABILITATION | Facility: HOSPITAL | Age: 42
End: 2017-05-05
Attending: ORTHOPAEDIC SURGERY
Payer: MEDICAID

## 2017-05-05 DIAGNOSIS — M25.562 LEFT KNEE PAIN, UNSPECIFIED CHRONICITY: ICD-10-CM

## 2017-05-05 DIAGNOSIS — R26.9 IMPAIRED GAIT: ICD-10-CM

## 2017-05-05 DIAGNOSIS — R29.898 WEAKNESS OF LEFT LOWER EXTREMITY: ICD-10-CM

## 2017-05-05 DIAGNOSIS — M25.662 DECREASED ROM OF LEFT KNEE: ICD-10-CM

## 2017-05-05 PROCEDURE — 97110 THERAPEUTIC EXERCISES: CPT | Mod: PN

## 2017-05-05 NOTE — PROGRESS NOTES
"TIME RECORD    Date:  05/05/2017    Start Time:  3:00  Stop Time:  4:00    PROCEDURES:    TIMED  Procedure Min.   MT 20   TE 30                 UNTIMED  Procedure Min.   ICE 10         Total Timed Minutes:  50  Total Timed Units:  3  Total Untimed Units:  0  Charges Billed/# of units:  3TE      Progress/Current Status    Subjective:     Patient ID: Inna Gallardo is a 42 y.o. female.  Diagnosis:   1. Left knee pain, unspecified chronicity     2. Decreased ROM of left knee     3. Weakness of left lower extremity     4. Impaired gait       Pain: 7 /10  Pt reports she has "intense pain" to left anterior knee today. She is agreeable to PT session.     Objective:     Patient arrived to clinic ambulating without Manual Therapy x 20 consisting of patient supine with LEs elevated on wedge for LLE edema reduction, medial/lateral L knee STM, L gastroc splaying, L hamstring splaying, L patella mobilizations in all directions.  Then completed Therex 1:1 with PTA x 30' .  CP to L knee in supine x 10'      Date  5/5/17 5/3/2017     VISIT 3 2   FOTO 3 2   MT 20 min  20   HS stretch  Strap 30"x2 L Strap 30"x2 L   Gastroc Str. Strap 30"x2 L Strap 30"x2 L   Bike 5" 5' full   QS L 5"x15 L 5"x15   SAQ 2x12 L 2x10 L   SLR AAROM 2x12 L aarom x10 L   SL Abd  2x10 L   Heel Slides Strap 5"x10 L Strap 5"x10 L   Edward Hip Add 5"x15 B 5"x15 B   LAQs 2x10 L 2x10 L   Seated Hip Flex 2x10 L 2x10 L   Cybex   Leg Press - --   TKE - --   Hip Abd //1x12 B // 1x10 B   Hip Flex //1x12 B // 1x10 B   Hip Ext //1x12 B // 1x10 B   HS Curls - --   Knee Ext - --   Step Ups - --   Step Downs - --   Gait - --   CP 10' 10'   Initials JA 1/6 RS       Assessment:     Pt completed today's session with no increased left knee pain. She reported feeling tender to mechano pressure to Left anterior knee (jointline). Noted some difficulty with SLR due to left quad weakness.     Patient Education/Response:     Cont HEP    Plans and Goals:     Con to progress PT as " per POC  Short Term Goals (4 Weeks): 6/2/17  1. Pt will be independent with HEP  2. Pt will improve (L) knee flexion AROM to at least 115 degrees in order to be able to perform ADLs with less difficulty  3. Pt will improve FOTO knee survey score to < 60% limited in order to improve functional mobility     Long Term Goals (8 Weeks): 7/2/17  1. Pt will be independent with updated HEP  2. Pt will improve (L) knee AROM to 0 -130 degrees in order to be able to perform ADLs with less difficulty  3. Pt will improve (L) LE strength to at least 90% of (R) LE strength measured via [employer] handheld dynamometer in order to improve functional mobility  4. Pt will improve FOTO knee survey score to </= 40% limited in order to improve functional mobility  5. Pt will report 0/10 pain in (L) knee when performing ADLs in order to be able to perform ADLs with less difficulty.

## 2017-05-08 ENCOUNTER — CLINICAL SUPPORT (OUTPATIENT)
Dept: REHABILITATION | Facility: HOSPITAL | Age: 42
End: 2017-05-08
Attending: ORTHOPAEDIC SURGERY
Payer: MEDICAID

## 2017-05-08 DIAGNOSIS — R29.898 WEAKNESS OF LEFT LOWER EXTREMITY: ICD-10-CM

## 2017-05-08 DIAGNOSIS — M25.662 DECREASED ROM OF LEFT KNEE: ICD-10-CM

## 2017-05-08 DIAGNOSIS — M25.562 LEFT KNEE PAIN, UNSPECIFIED CHRONICITY: ICD-10-CM

## 2017-05-08 DIAGNOSIS — R26.9 IMPAIRED GAIT: ICD-10-CM

## 2017-05-08 PROCEDURE — 97110 THERAPEUTIC EXERCISES: CPT | Mod: PN

## 2017-05-08 NOTE — PROGRESS NOTES
"TIME RECORD    Date:  05/08/2017    Start Time:  1:02 pm   Stop Time:  2:02 pm     PROCEDURES:    TIMED  Procedure Min.   TE supervised 5'   TE 40'   MT 15'             UNTIMED  Procedure Min.             Total Timed Minutes:  55'  Total Timed Units:  4  Total Untimed Units:  0  Charges Billed/# of units:  4 TE      Progress/Current Status    Subjective:     Patient ID: Inna Gallardo is a 42 y.o. female.  Diagnosis:   1. Left knee pain, unspecified chronicity     2. Decreased ROM of left knee     3. Weakness of left lower extremity     4. Impaired gait       Pain: 6 /10  Pt c/o experiencing 6/10 pain in (L) knee prior to therapy session today.     Objective:     Pt initiated therapy session on stationary bike x 5' supervised by PT. Pt completed Therex 1:1 with PT x 40' .  Manual Therapy x 15 consisting of patient supine with LEs elevated on wedge for LLE edema reduction, medial/lateral L knee STM, L gastroc splaying, L hamstring splaying, L patella mobilizations in all directions.        Date  5/8/17 5/5/17 5/3/2017     VISIT 4 3 2   FOTO 4 3 2   MT 15' 20 min  20   HS stretch  Strap 30"x2 L Strap 30"x2 L Strap 30"x2 L   Gastroc Str. Strap 30"x2 L Strap 30"x2 L Strap 30"x2 L   Bike 5' 5" 5' full   QS 5"x15 L 5"x15 L 5"x15   SAQ 2x12 L 2x12 L 2x10 L   SLR 1x10 B no assist AAROM 2x12 L aarom x10 L   SL Abd 2x10 B  2x10 L   Heel Slides Strap 5"x15 L Strap 5"x10 L Strap 5"x10 L   Edward Hip Add 5"x15  5"x15 B 5"x15 B   LAQs 2x15 B 2x10 L 2x10 L   Seated Hip Flex 2x15 B 2x10 L 2x10 L   Cybex   Leg Press Next? - --   TKE - - --   Hip Abd // 2x10 B //1x12 B // 1x10 B   Hip Flex // 2x10 B //1x12 B // 1x10 B   Hip Ext // 2x10 B //1x12 B // 1x10 B   HS Curls - - --   Knee Ext - - --   Step Ups - - --   Step Downs - - --   Gait  - --   CP Declined due to having to go to another appt 10' 10'   Initials CK JA 1/6 RS     (L) knee AROM flexion = 120 degrees  (L) knee AROM extension = 3 degrees (lacking)   Assessment:     Pt " was able to tolerate increased reps noted per log above. Pt demo improvement in (L) knee AROM flexion and extension compared to measurements taken on initial evaluation last week.  Pt tolerated therapy session without any c/o increased pain in (L) knee.     Patient Education/Response:     Continue with HEP. Educated pt to continue to wear CIRILO hose at all times on (B) LE until appt with Dr. Nguyen next week. Pt verbalized understanding.     Plans and Goals:     Continue to progress PT as per POC    Short Term Goals (4 Weeks): 6/2/17  1. Pt will be independent with HEP  2. Pt will improve (L) knee flexion AROM to at least 115 degrees in order to be able to perform ADLs with less difficulty  3. Pt will improve FOTO knee survey score to < 60% limited in order to improve functional mobility     Long Term Goals (8 Weeks): 7/2/17  1. Pt will be independent with updated HEP  2. Pt will improve (L) knee AROM to 0 -130 degrees in order to be able to perform ADLs with less difficulty  3. Pt will improve (L) LE strength to at least 90% of (R) LE strength measured via MicroFet handheld dynamometer in order to improve functional mobility  4. Pt will improve FOTO knee survey score to </= 40% limited in order to improve functional mobility  5. Pt will report 0/10 pain in (L) knee when performing ADLs in order to be able to perform ADLs with less difficulty.

## 2017-05-11 ENCOUNTER — CLINICAL SUPPORT (OUTPATIENT)
Dept: REHABILITATION | Facility: HOSPITAL | Age: 42
End: 2017-05-11
Attending: ORTHOPAEDIC SURGERY
Payer: MEDICAID

## 2017-05-11 DIAGNOSIS — M25.662 DECREASED ROM OF LEFT KNEE: ICD-10-CM

## 2017-05-11 DIAGNOSIS — R29.898 WEAKNESS OF LEFT LOWER EXTREMITY: ICD-10-CM

## 2017-05-11 DIAGNOSIS — R26.9 IMPAIRED GAIT: ICD-10-CM

## 2017-05-11 DIAGNOSIS — M25.562 LEFT KNEE PAIN, UNSPECIFIED CHRONICITY: ICD-10-CM

## 2017-05-11 PROCEDURE — 97110 THERAPEUTIC EXERCISES: CPT | Mod: PN

## 2017-05-11 NOTE — PROGRESS NOTES
"TIME RECORD    Date:  05/11/2017    Start Time:  9:08 am   Stop Time:  10:12 am     PROCEDURES:    TIMED  Procedure Min.   TE supervised 24' NC   TE 20   MT 10'             UNTIMED  Procedure Min.   CP 10'         Total Timed Minutes:  30'  Total Timed Units:  2  Total Untimed Units:  1  Charges Billed/# of units:  2 TE      Progress/Current Status    Subjective:     Patient ID: Inna Gallardo is a 42 y.o. female.  Diagnosis:   1. Left knee pain, unspecified chronicity     2. Decreased ROM of left knee     3. Weakness of left lower extremity     4. Impaired gait       Pain: 5 /10  Pt c/o experiencing 5/10 pain in (L) knee prior to therapy session    Objective:     Pt initiated therapy session on stationary bike x 10' supervised by PT. Pt participated in therex per log below supervised by PT x 14' and 1:1 with PT x 20'.  Manual Therapy x 10 consisting of patient supine with LEs elevated on wedge for LLE edema reduction, medial/lateral L knee STM, L gastroc splaying, L hamstring splaying, L patella mobilizations in all directions.  CP to (L) knee with LEs elevated on wedge at end of session x 10'.       Date  5/11/17 5/8/17 5/5/17 5/3/2017     VISIT 5 4 3 2   FOTO 5/5 4 3 2   MT 10' 15' 20 min  20   HS stretch  30"x3 L Strap 30"x2 L Strap 30"x2 L Strap 30"x2 L   Gastroc Str.  Strap 30"x2 L Strap 30"x2 L Strap 30"x2 L   Bike 10' 5' 5" 5' full   QS 10"x10  5"x15 L 5"x15 L 5"x15   SAQ 1# 2x10  2x12 L 2x12 L 2x10 L   SLR 1# 2x10 B 1x10 B no assist AAROM 2x12 L aarom x10 L   SL Abd  2x10 B  2x10 L   Heel Slides 5"x15 Strap 5"x15 L Strap 5"x10 L Strap 5"x10 L   Edward Hip Add 10"x10 5"x15  5"x15 B 5"x15 B   LAQs 1# 2x10 L 2x15 B 2x10 L 2x10 L   Seated Hip Flex - 2x15 B 2x10 L 2x10 L   Cybex   Leg Press next Next? - --   TKE - - - --   Hip Abd // 2x15 L // 2x10 B //1x12 B // 1x10 B   Hip Flex // 2x15 L  // 2x10 B //1x12 B // 1x10 B   Hip Ext // 2x15 L // 2x10 B //1x12 B // 1x10 B   HS Curls - - - --   Knee Ext - - - -- "   Step Ups - - - --   Step Downs - - - --   Gait -  - --   CP 10' Declined due to having to go to another appt 10' 10'   Initials CK CK JA 1/6 RS       Assessment:     Pt was able to tolerate increased reps noted per log above. Pt tolerated therapy session without any c/o increased pain in (L) knee.     Patient Education/Response:     Continue with HEP     Plans and Goals:     Continue to progress PT as per POC    Short Term Goals (4 Weeks): 6/2/17  1. Pt will be independent with HEP  2. Pt will improve (L) knee flexion AROM to at least 115 degrees in order to be able to perform ADLs with less difficulty  3. Pt will improve FOTO knee survey score to < 60% limited in order to improve functional mobility     Long Term Goals (8 Weeks): 7/2/17  1. Pt will be independent with updated HEP  2. Pt will improve (L) knee AROM to 0 -130 degrees in order to be able to perform ADLs with less difficulty  3. Pt will improve (L) LE strength to at least 90% of (R) LE strength measured via [employer] handheld dynamometer in order to improve functional mobility  4. Pt will improve FOTO knee survey score to </= 40% limited in order to improve functional mobility  5. Pt will report 0/10 pain in (L) knee when performing ADLs in order to be able to perform ADLs with less difficulty.

## 2017-05-17 ENCOUNTER — CLINICAL SUPPORT (OUTPATIENT)
Dept: REHABILITATION | Facility: HOSPITAL | Age: 42
End: 2017-05-17
Attending: ORTHOPAEDIC SURGERY
Payer: MEDICAID

## 2017-05-17 DIAGNOSIS — R26.9 IMPAIRED GAIT: ICD-10-CM

## 2017-05-17 DIAGNOSIS — R29.898 WEAKNESS OF LEFT LOWER EXTREMITY: ICD-10-CM

## 2017-05-17 DIAGNOSIS — M25.662 DECREASED ROM OF LEFT KNEE: ICD-10-CM

## 2017-05-17 DIAGNOSIS — M25.562 LEFT KNEE PAIN, UNSPECIFIED CHRONICITY: ICD-10-CM

## 2017-05-17 PROCEDURE — 97110 THERAPEUTIC EXERCISES: CPT | Mod: PN

## 2017-05-17 NOTE — PROGRESS NOTES
"TIME RECORD    Date:  05/17/2017    Start Time:  315  Stop Time:  410  PROCEDURES:    TIMED  Procedure Min.   TE supervised 20'   TE 15   MT 10'             UNTIMED  Procedure Min.   CP 10'         Total Timed Minutes:  25  Total Timed Units:  2  Total Untimed Units:  1  Charges Billed/# of units:  2 TE      Progress/Current Status    Subjective:     Patient ID: Inna Gallardo is a 42 y.o. female.  Diagnosis:   1. Left knee pain, unspecified chronicity     2. Decreased ROM of left knee     3. Weakness of left lower extremity     4. Impaired gait       Pain: Patient reports no real pain just mainly soreness and stiffness.    Objective:     Pt initiated therapy session on stationary bike x 10' supervised by PTA.   Pt participated in therex per log below supervised by PTA x 20 minutes and 1:1 with PTA x 15 minutes..  Manual Therapy x 10 consisting of patient supine with LEs elevated on wedge for LLE edema reduction, medial/lateral L knee STM, L gastroc splaying, L hamstring splaying, L patella mobilizations in all directions.  CP to (L) knee with LEs elevated on wedge at end of session x 10'.       Date  5/17/17 5/11/17 5/8/17 5/5/17 5/3/2017     VISIT  5 4 3 2   FOTO 6/10 5/5 4 3 2   MT 15 10' 15' 20 min  20   HS stretch  30"x3 L 30"x3 L Strap 30"x2 L Strap 30"x2 L Strap 30"x2 L   Gastroc Str.   Strap 30"x2 L Strap 30"x2 L Strap 30"x2 L   Bike 10' 10' 5' 5" 5' full   QS 10"x10 10"x10  5"x15 L 5"x15 L 5"x15   SAQ 1# 2x10 L 1# 2x10  2x12 L 2x12 L 2x10 L   SLR 1# 2x10 B 1# 2x10 B 1x10 B no assist AAROM 2x12 L aarom x10 L   SL Abd 1# 2x10  2x10 B  2x10 L   Heel Slides 10"x10 5"x15 Strap 5"x15 L Strap 5"x10 L Strap 5"x10 L   Edward Hip Add  10"x10 5"x15  5"x15 B 5"x15 B   LAQs 1# 3x10 1# 2x10 L 2x15 B 2x10 L 2x10 L   Seated Hip Flex - - 2x15 B 2x10 L 2x10 L   Cybex   Leg Press 3.0  2x10 next Next? - --   TKE  - - - --   Hip Abd // 1# 2x10 L // 2x15 L // 2x10 B //1x12 B // 1x10 B   Hip Flex // 1# 2X10 L // 2x15 L  // " 2x10 B //1x12 B // 1x10 B   Hip Ext // 1# 2x10 L // 2x15 L // 2x10 B //1x12 B // 1x10 B   HS Curls  - - - --   Knee Ext  - - - --   Step Ups  - - - --   Step Downs  - - - --   Gait  -  - --   CP 10' 10' Declined due to having to go to another appt 10' 10'   Initials CS  1/6 CK CK JA 1/6 RS       Assessment:     Pt tolerated therapy session without any c/o increased pain in (L) knee.     Patient Education/Response:     Continue with HEP     Plans and Goals:     Continue to progress PT as per POC    Short Term Goals (4 Weeks): 6/2/17  1. Pt will be independent with HEP  2. Pt will improve (L) knee flexion AROM to at least 115 degrees in order to be able to perform ADLs with less difficulty  3. Pt will improve FOTO knee survey score to < 60% limited in order to improve functional mobility     Long Term Goals (8 Weeks): 7/2/17  1. Pt will be independent with updated HEP  2. Pt will improve (L) knee AROM to 0 -130 degrees in order to be able to perform ADLs with less difficulty  3. Pt will improve (L) LE strength to at least 90% of (R) LE strength measured via [employer] handheld dynamometer in order to improve functional mobility  4. Pt will improve FOTO knee survey score to </= 40% limited in order to improve functional mobility  5. Pt will report 0/10 pain in (L) knee when performing ADLs in order to be able to perform ADLs with less difficulty.

## 2017-05-29 ENCOUNTER — CLINICAL SUPPORT (OUTPATIENT)
Dept: REHABILITATION | Facility: HOSPITAL | Age: 42
End: 2017-05-29
Attending: ORTHOPAEDIC SURGERY
Payer: MEDICAID

## 2017-05-29 DIAGNOSIS — M25.662 DECREASED ROM OF LEFT KNEE: ICD-10-CM

## 2017-05-29 DIAGNOSIS — M25.562 LEFT KNEE PAIN, UNSPECIFIED CHRONICITY: ICD-10-CM

## 2017-05-29 DIAGNOSIS — R26.9 IMPAIRED GAIT: ICD-10-CM

## 2017-05-29 DIAGNOSIS — R29.898 WEAKNESS OF LEFT LOWER EXTREMITY: ICD-10-CM

## 2017-05-29 PROCEDURE — 97110 THERAPEUTIC EXERCISES: CPT | Mod: PN

## 2017-05-29 NOTE — PROGRESS NOTES
"TIME RECORD    Date:  05/29/2017    Start Time:  2:10  Stop Time:  3:05  PROCEDURES:    TIMED  Procedure Min.       TE 10   MT 20             UNTIMED  Procedure Min.   CP 10'         Total Timed Minutes:  30  Total Timed Units:  2  Total Untimed Units:  0  Charges Billed/# of units: 2TE      Progress/Current Status    Subjective:     Patient ID: Inna Gallardo is a 42 y.o. female.  Diagnosis:   1. Left knee pain, unspecified chronicity     2. Decreased ROM of left knee     3. Weakness of left lower extremity     4. Impaired gait       Pain: Patient reports if she stays sitting for a prolonged period its difficult to get up because of her left knee feeling " so stiff". Pt agreeable to PT session.     Objective:     Pt initiated session on stationary bike x 10' supervised by PTA.   Pt participated in therex per log below supervised by PTA x 20 minutes and then 1:1 with PTA x 10 minutes..  Manual Therapy x 20 consisting of patient supine with LEs elevated on wedge for LLE edema reduction, medial/lateral L knee STM, HAWKS  IASTM to  L gastroc splaying, L hamstring splaying, quadriceps mm , then  L patella mobilizations in all directions.  CP to (L) knee with LEs elevated on wedge at end of session x 10'.       Date  5/29/17 5/17/17 5/11/17 5/8/17 5/5/17 5/3/2017     VISIT   5 4 3 2   FOTO 7/10 6/10 5/5 4 3 2   MT 20 15 10' 15' 20 min  20   HS stretch  30"x3 30"x3 L 30"x3 L Strap 30"x2 L Strap 30"x2 L Strap 30"x2 L   Gastroc Str.    Strap 30"x2 L Strap 30"x2 L Strap 30"x2 L   Bike 10' 10' 10' 5' 5" 5' full   QS 10"x10 10"x10 10"x10  5"x15 L 5"x15 L 5"x15   SAQ 1# 2x10 L 1# 2x10 L 1# 2x10  2x12 L 2x12 L 2x10 L   SLR 1# 2x10 B 1# 2x10 B 1# 2x10 B 1x10 B no assist AAROM 2x12 L aarom x10 L   SL Abd 1# 2x10 B 1# 2x10  2x10 B  2x10 L   Heel Slides 10"x10 10"x10 5"x15 Strap 5"x15 L Strap 5"x10 L Strap 5"x10 L   Edward Hip Add   10"x10 5"x15  5"x15 B 5"x15 B   LAQs  1# 3x10 1# 2x10 L 2x15 B 2x10 L 2x10 L   Seated Hip " Flex  - - 2x15 B 2x10 L 2x10 L   Cybex   Leg Press NT 3.0  2x10 next Next? - --   TKE   - - - --   Hip Abd NT // 1# 2x10 L // 2x15 L // 2x10 B //1x12 B // 1x10 B   Hip Flex NT // 1# 2X10 L // 2x15 L  // 2x10 B //1x12 B // 1x10 B   Hip Ext NT // 1# 2x10 L // 2x15 L // 2x10 B //1x12 B // 1x10 B   HS Curls   - - - --   Knee Ext   - - - --   Step Ups   - - - --   Step Downs   - - - --   Gait   -  - --   CP 10' 10' 10' Declined due to having to go to another appt 10' 10'   Initials J A CS  1/6 CK CK JA 1/6 RS       Assessment:     Pt able to complete session with no reports of increased left knee pain. Deferred standing therex and added trial IASTM use today. Pt responded well, no adverse reactions to session.     Patient Education/Response:     Continue with HEP     Plans and Goals:     Continue to progress PT as per POC    Short Term Goals (4 Weeks): 6/2/17  1. Pt will be independent with HEP  2. Pt will improve (L) knee flexion AROM to at least 115 degrees in order to be able to perform ADLs with less difficulty  3. Pt will improve FOTO knee survey score to < 60% limited in order to improve functional mobility     Long Term Goals (8 Weeks): 7/2/17  1. Pt will be independent with updated HEP  2. Pt will improve (L) knee AROM to 0 -130 degrees in order to be able to perform ADLs with less difficulty  3. Pt will improve (L) LE strength to at least 90% of (R) LE strength measured via [employer] handheld dynamometer in order to improve functional mobility  4. Pt will improve FOTO knee survey score to </= 40% limited in order to improve functional mobility  5. Pt will report 0/10 pain in (L) knee when performing ADLs in order to be able to perform ADLs with less difficulty.

## 2017-05-31 ENCOUNTER — CLINICAL SUPPORT (OUTPATIENT)
Dept: REHABILITATION | Facility: HOSPITAL | Age: 42
End: 2017-05-31
Attending: ORTHOPAEDIC SURGERY
Payer: MEDICAID

## 2017-05-31 DIAGNOSIS — M25.562 LEFT KNEE PAIN, UNSPECIFIED CHRONICITY: ICD-10-CM

## 2017-05-31 DIAGNOSIS — M25.662 DECREASED ROM OF LEFT KNEE: ICD-10-CM

## 2017-05-31 DIAGNOSIS — R29.898 WEAKNESS OF LEFT LOWER EXTREMITY: ICD-10-CM

## 2017-05-31 DIAGNOSIS — R26.9 IMPAIRED GAIT: ICD-10-CM

## 2017-05-31 PROCEDURE — 97110 THERAPEUTIC EXERCISES: CPT | Mod: PN

## 2017-05-31 NOTE — PROGRESS NOTES
"TIME RECORD    Date:  05/31/2017    Start Time:  1012  Stop Time:  1045    PROCEDURES:    TIMED  Procedure Min.   Manual therapy 15   Therex 10                 UNTIMED  Procedure Min.   Bike 8         Total Timed Minutes:  25  Total Timed Units:  2  Total Untimed Units:  0  Charges Billed/# of units:  2 TE      Progress/Current Status    Subjective:     Patient ID: Inna Gallardo is a 42 y.o. female.  Diagnosis:   1. Left knee pain, unspecified chronicity     2. Decreased ROM of left knee     3. Weakness of left lower extremity     4. Impaired gait       Pain: 7 /10  Patient reports complaint of left knee pain.  States she needs to leave early today due to another appointment for her daughter    Objective:       Pt initiated session on stationary bike x 8' supervised by PTA.   Pt participated in therex per log below 1:1 with PTA x 10 minutes..  Manual Therapy x 15 consisting of patient supine with LEs elevated on wedge for LLE edema reduction, medial/lateral L knee STM,  L gastroc splaying, L hamstring splaying, quadriceps mm , then  L patella mobilizations in all directions.        Date  5/31/17 5/29/17 5/17/17 5/11/17 5/8/17 5/5/17 5/3/2017     VISIT 8   5 4 3 2   FOTO 8/10 7/10 6/10 5/5 4 3 2   MT 15 20 15 10' 15' 20 min  20   HS stretch  30"x3 L 30"x3 30"x3 L 30"x3 L Strap 30"x2 L Strap 30"x2 L Strap 30"x2 L   Gastroc Str.     Strap 30"x2 L Strap 30"x2 L Strap 30"x2 L   Bike 8' 10' 10' 10' 5' 5" 5' full   QS 10"x10 10"x10 10"x10 10"x10  5"x15 L 5"x15 L 5"x15   SAQ NT 1# 2x10 L 1# 2x10 L 1# 2x10  2x12 L 2x12 L 2x10 L   SLR 1# 2x12 B 1# 2x10 B 1# 2x10 B 1# 2x10 B 1x10 B no assist AAROM 2x12 L aarom x10 L   SL Abd 1# 2x12 B 1# 2x10 B 1# 2x10  2x10 B  2x10 L   Heel Slides NT 10"x10 10"x10 5"x15 Strap 5"x15 L Strap 5"x10 L Strap 5"x10 L   Edward Hip Add    10"x10 5"x15  5"x15 B 5"x15 B   LAQs   1# 3x10 1# 2x10 L 2x15 B 2x10 L 2x10 L   Seated Hip Flex   - - 2x15 B 2x10 L 2x10 L   Cybex   Leg Press NT NT 3.0  2x10 " next Next? - --   TKE    - - - --   Hip Abd NT NT // 1# 2x10 L // 2x15 L // 2x10 B //1x12 B // 1x10 B   Hip Flex NT NT // 1# 2X10 L // 2x15 L  // 2x10 B //1x12 B // 1x10 B   Hip Ext NT NT // 1# 2x10 L // 2x15 L // 2x10 B //1x12 B // 1x10 B   HS Curls --   - - - --   Knee Ext --   - - - --   Step Ups --   - - - --   Step Downs --   - - - --   Gait --   -  - --   CP NT 10' 10' 10' Declined due to having to go to another appt 10' 10'   Initials DH 2/6 J A CS  1/6 CK CK JA 1/6 RS       Assessment:     Treatment slightly limited by time.  Patient late for appointment, and needing to leave early.  Voiced no complaints after session.    Patient Education/Response:     Patient educated to continue HEP.  Verbalized understanding.    Plans and Goals:       Continue to progress PT as per POC    Short Term Goals (4 Weeks): 6/2/17  1. Pt will be independent with HEP  2. Pt will improve (L) knee flexion AROM to at least 115 degrees in order to be able to perform ADLs with less difficulty  3. Pt will improve FOTO knee survey score to < 60% limited in order to improve functional mobility     Long Term Goals (8 Weeks): 7/2/17  1. Pt will be independent with updated HEP  2. Pt will improve (L) knee AROM to 0 -130 degrees in order to be able to perform ADLs with less difficulty  3. Pt will improve (L) LE strength to at least 90% of (R) LE strength measured via [employer] handheld dynamometer in order to improve functional mobility  4. Pt will improve FOTO knee survey score to </= 40% limited in order to improve functional mobility  5. Pt will report 0/10 pain in (L) knee when performing ADLs in order to be able to perform ADLs with less difficulty.

## 2017-06-05 ENCOUNTER — CLINICAL SUPPORT (OUTPATIENT)
Dept: REHABILITATION | Facility: HOSPITAL | Age: 42
End: 2017-06-05
Attending: ORTHOPAEDIC SURGERY
Payer: MEDICAID

## 2017-06-05 DIAGNOSIS — R26.9 IMPAIRED GAIT: ICD-10-CM

## 2017-06-05 DIAGNOSIS — M25.662 DECREASED ROM OF LEFT KNEE: ICD-10-CM

## 2017-06-05 DIAGNOSIS — R29.898 WEAKNESS OF LEFT LOWER EXTREMITY: ICD-10-CM

## 2017-06-05 DIAGNOSIS — M25.562 LEFT KNEE PAIN, UNSPECIFIED CHRONICITY: ICD-10-CM

## 2017-06-05 PROCEDURE — 97110 THERAPEUTIC EXERCISES: CPT | Mod: PN

## 2017-06-05 NOTE — PROGRESS NOTES
"TIME RECORD    Date:  06/05/2017    Start Time:  09:25 am   Stop Time:  10:00 am     PROCEDURES:    TIMED  Procedure Time Min.   TE Start:09:25 am  Stop:09:50 am  25          UNTIMED  Procedure Time Min.   Cold pack  Start:09:50 am   Stop:10:00 am  10      Total Timed Minutes:  25  Total Timed Units:  2  Total Untimed Units:  1  Charges Billed/# of units:  2 TE    Progress/Current Status    Subjective:     Patient ID: Inna Gallardo is a 42 y.o. female.  Diagnosis:   1. Left knee pain, unspecified chronicity     2. Decreased ROM of left knee     3. Weakness of left lower extremity     4. Impaired gait       Patient reports I have been actually experiencing less pain and more weakness and fatigue, which I guess is a good thing.     Objective:     Patient received manual therapy from  (see her note for details) and completed stationary bike.  Then, PT completed therex per log as below 1:1 with PT x 25 minutes consisting of patient able to tolerate without any complaints of increase in pain except with TKE therefore held at this time due to increase in left knee pain. Session ended with cold pack to left knee x 10 minutes.     Date  06/05/17 5/31/17 5/29/17 5/17/17 5/11/17 5/8/17   VISIT 9 8   5 4   FOTO 9/10 8/10 7/10 6/10 5/5 4   MT  note 15 20 15 10' 15'   HS stretch  30"x3 L 30"x3 L 30"x3 30"x3 L 30"x3 L Strap 30"x2 L   Gastroc Str. --     Strap 30"x2 L   Bike 7' 8' 10' 10' 10' 5'   QS 10"x10 10"x10 10"x10 10"x10 10"x10  5"x15   SAQ -- NT 1# 2x10 L 1# 2x10 L 1# 2x10  2x12 L   SLR 2# 2x10 B 1# 2x12 B 1# 2x10 B 1# 2x10 B 1# 2x10 B 1x10 B no assist   SL Abd 2# 2x10 B 1# 2x12 B 1# 2x10 B 1# 2x10  2x10 B   Heel Slides -- NT 10"x10 10"x10 5"x15 Strap 5"x15 L   Edward Hip Add --    10"x10 5"x15    LAQs --   1# 3x10 1# 2x10 L 2x15 B   Seated Hip Flex --   - - 2x15 B   Cybex   Leg Press 4.0  2x10 B NT NT 3.0  2x10 next Next?   L Leg Press 3.0  2x10 B        TKE Pain in knee   HELD    - -   Hip Abd 1.0   2x10 B NT NT " // 1# 2x10 L // 2x15 L // 2x10 B   Hip Flex 1.0   2x10 B NT NT // 1# 2X10 L // 2x15 L  // 2x10 B   Hip Ext 1.0   2x10 B NT NT // 1# 2x10 L // 2x15 L // 2x10 B   HS Curls 5.0  2x10 --   - -   Knee Ext 15#   2x10  --   - -   Step Ups -- --   - -   Step Downs -- --   - -   Gait -- --   -    CP 10' NT 10' 10' 10' Declined due to having to go to another appt   Initials MNB DH 2/6 J A IRINA  1/6 CK CK     Assessment:     Patient reported increase in left knee pain with cybex TKE therefore held at this time. Reviewed patient to complete all exercises in pain free range. Patient able to complete all additional therex without any complaints of increase in pain.     Patient Education/Response:     Patient educated to continue to complete HEP on days not attending therapy with patient demonstrating understanding.    Plans and Goals:     Continue as keena, progress with strengthening to left LE as able in pain free range.     Short Term Goals (4 Weeks): 6/2/17  1. Pt will be independent with HEP  2. Pt will improve (L) knee flexion AROM to at least 115 degrees in order to be able to perform ADLs with less difficulty  3. Pt will improve FOTO knee survey score to < 60% limited in order to improve functional mobility     Long Term Goals (8 Weeks): 7/2/17  1. Pt will be independent with updated HEP  2. Pt will improve (L) knee AROM to 0 -130 degrees in order to be able to perform ADLs with less difficulty  3. Pt will improve (L) LE strength to at least 90% of (R) LE strength measured via [employer] handheld dynamometer in order to improve functional mobility  4. Pt will improve FOTO knee survey score to </= 40% limited in order to improve functional mobility  5. Pt will report 0/10 pain in (L) knee when performing ADLs in order to be able to perform ADLs with less difficulty.

## 2017-06-05 NOTE — PROGRESS NOTES
TIME RECORD    Date:  06/05/2017    Start Time:  900   Stop Time:  925    PROCEDURES:    TIMED  Procedure Min.   Manual therapy 15                     UNTIMED  Procedure Min.   Bike 8         Total Timed Minutes:  15  Total Timed Units:  1  Total Untimed Units:  0  Charges Billed/# of units:  1 TE      Progress/Current Status    Subjective:     Patient ID: Inna Gallardo is a 42 y.o. female.  Diagnosis:   1. Left knee pain, unspecified chronicity     2. Decreased ROM of left knee     3. Weakness of left lower extremity     4. Impaired gait           Objective:       Patient received  Manual Therapy x 15 consisting of patient supine with LEs elevated on wedge for LLE edema reduction, medial/lateral L knee STM,  L gastroc splaying, L hamstring splaying, quadriceps mm , then  L patella mobilizations in all directions.  Patient then performed HS stretches and bike with supervision x 7 minutes. Patient then completed session with Sondra Pike PT.  (See her note)      Assessment:   See note by Sondra Pike PT       Patient Education/Response:     See note by Sondra Pike PT     Plans and Goals:       Continue to progress PT as per POC    Short Term Goals (4 Weeks): 6/2/17  1. Pt will be independent with HEP  2. Pt will improve (L) knee flexion AROM to at least 115 degrees in order to be able to perform ADLs with less difficulty  3. Pt will improve FOTO knee survey score to < 60% limited in order to improve functional mobility     Long Term Goals (8 Weeks): 7/2/17  1. Pt will be independent with updated HEP  2. Pt will improve (L) knee AROM to 0 -130 degrees in order to be able to perform ADLs with less difficulty  3. Pt will improve (L) LE strength to at least 90% of (R) LE strength measured via [employer] handheld dynamometer in order to improve functional mobility  4. Pt will improve FOTO knee survey score to </= 40% limited in order to improve functional mobility  5. Pt will report 0/10 pain in (L) knee when  performing ADLs in order to be able to perform ADLs with less difficulty.

## 2017-06-07 ENCOUNTER — CLINICAL SUPPORT (OUTPATIENT)
Dept: REHABILITATION | Facility: HOSPITAL | Age: 42
End: 2017-06-07
Attending: ORTHOPAEDIC SURGERY
Payer: MEDICAID

## 2017-06-07 DIAGNOSIS — R29.898 WEAKNESS OF LEFT LOWER EXTREMITY: ICD-10-CM

## 2017-06-07 DIAGNOSIS — M25.562 LEFT KNEE PAIN, UNSPECIFIED CHRONICITY: ICD-10-CM

## 2017-06-07 DIAGNOSIS — M25.662 DECREASED ROM OF LEFT KNEE: ICD-10-CM

## 2017-06-07 DIAGNOSIS — R26.9 IMPAIRED GAIT: ICD-10-CM

## 2017-06-07 PROCEDURE — 97110 THERAPEUTIC EXERCISES: CPT | Mod: PN

## 2017-06-07 NOTE — PROGRESS NOTES
"TIME RECORD    Date:  06/07/2017    Start Time:  09:10  Stop Time:  10:05    PROCEDURES:    TIMED  Procedure Min.   TE 10   MT 20         UNTIMED  Procedure Min.   Cold pack  10     Total Timed Minutes:  30  Total Timed Units:  2  Total Untimed Units:  1  Charges Billed/# of units:  2 TE    Progress/Current Status    Subjective:     Patient ID: Inna Gallardo is a 42 y.o. female.  Diagnosis:   1. Left knee pain, unspecified chronicity     2. Decreased ROM of left knee     3. Weakness of left lower extremity     4. Impaired gait       Patient reports I have been actually experiencing less pain and more weakness and fatigue, which I guess is a good thing.     Objective:   Pt initiated session on stationary bike x 5' supervised by PTA.   Pt participated in therex per log below supervised by PTA x 15 minutes and then 1:1 with PTA x 10 minutes..  Manual Therapy x 20 consisting of patient supine with LEs elevated on wedge for LLE edema reduction, medial/lateral L knee STM, HAWKS  IASTM to  L gastroc splaying, L hamstring splaying, quadriceps mm , then  L patella mobilizations in all directions.  CP to (L) knee with LEs elevated on wedge at end of session x 10'.     Date  6/7/17 06/05/17 5/31/17 5/29/17 5/17/17 5/11/17 5/8/17   VISIT 10 9 8   5 4   FOTO 10 DONE 9/10 8/10 7/10 6/10 5/5 4   MT 15 CS note 15 20 15 10' 15'   HS stretch  30"x3 30"x3 L 30"x3 L 30"x3 30"x3 L 30"x3 L Strap 30"x2 L   Gastroc Str.  --     Strap 30"x2 L   Bike 7 7' 8' 10' 10' 10' 5'   QS 10"x10 10"x10 10"x10 10"x10 10"x10 10"x10  5"x15   SAQ  -- NT 1# 2x10 L 1# 2x10 L 1# 2x10  2x12 L   SLR 2# 2x10 B 2# 2x10 B 1# 2x12 B 1# 2x10 B 1# 2x10 B 1# 2x10 B 1x10 B no assist   SL Abd 2# 2x10 B 2# 2x10 B 1# 2x12 B 1# 2x10 B 1# 2x10  2x10 B   Heel Slides - -- NT 10"x10 10"x10 5"x15 Strap 5"x15 L   Edward Hip Add - --    10"x10 5"x15    LAQs - --   1# 3x10 1# 2x10 L 2x15 B   Seated Hip Flex - --   - - 2x15 B   Cybex   Leg Press oot 4.0  2x10 B NT NT " 3.0  2x10 next Next?   L Leg Press oot 3.0  2x10 B        TKE NT Pain in knee   HELD    - -   Hip Abd 1.0   2x10 B 1.0   2x10 B NT NT // 1# 2x10 L // 2x15 L // 2x10 B   Hip Flex 1.0   2x10 B 1.0   2x10 B NT NT // 1# 2X10 L // 2x15 L  // 2x10 B   Hip Ext 1.0   2x10 B 1.0   2x10 B NT NT // 1# 2x10 L // 2x15 L // 2x10 B   HS Curls  5.0  2x10 --   - -   Knee Ext  15#   2x10  --   - -   Step Ups  -- --   - -   Step Downs  -- --   - -   Gait  -- --   -    CP 10' 10' NT 10' 10' 10' Declined due to having to go to another appt   Initials JA 1/ MNB DH 2/6 J KELLY AREVALO  1/6 CK CK     Assessment:     Patient completed today's session with no reports of increased in left knee. Pt reports IASTM has provided some relief of stiffness in R anterior knee.     Patient Education/Response:     Patient educated to continue to complete HEP   Plans and Goals:     Continue PT as per POC    Short Term Goals (4 Weeks): 6/2/17  1. Pt will be independent with HEP  2. Pt will improve (L) knee flexion AROM to at least 115 degrees in order to be able to perform ADLs with less difficulty  3. Pt will improve FOTO knee survey score to < 60% limited in order to improve functional mobility     Long Term Goals (8 Weeks): 7/2/17  1. Pt will be independent with updated HEP  2. Pt will improve (L) knee AROM to 0 -130 degrees in order to be able to perform ADLs with less difficulty  3. Pt will improve (L) LE strength to at least 90% of (R) LE strength measured via [employer] handheld dynamometer in order to improve functional mobility  4. Pt will improve FOTO knee survey score to </= 40% limited in order to improve functional mobility  5. Pt will report 0/10 pain in (L) knee when performing ADLs in order to be able to perform ADLs with less difficulty.

## 2017-06-19 ENCOUNTER — CLINICAL SUPPORT (OUTPATIENT)
Dept: REHABILITATION | Facility: HOSPITAL | Age: 42
End: 2017-06-19
Attending: ORTHOPAEDIC SURGERY
Payer: MEDICAID

## 2017-06-19 DIAGNOSIS — R26.9 IMPAIRED GAIT: ICD-10-CM

## 2017-06-19 DIAGNOSIS — R29.898 WEAKNESS OF LEFT LOWER EXTREMITY: ICD-10-CM

## 2017-06-19 DIAGNOSIS — M25.662 DECREASED ROM OF LEFT KNEE: ICD-10-CM

## 2017-06-19 DIAGNOSIS — M25.562 LEFT KNEE PAIN, UNSPECIFIED CHRONICITY: ICD-10-CM

## 2017-06-19 PROCEDURE — 97110 THERAPEUTIC EXERCISES: CPT | Mod: PN

## 2017-06-19 NOTE — PROGRESS NOTES
"TIME RECORD    Date:  06/19/2017    Start Time:  9:08 am   Stop Time:  10:09 am     PROCEDURES:    TIMED  Procedure Min.   TE supervised 21' NC   TE 30'                 UNTIMED  Procedure Min.   CP 10'         Total Timed Minutes:  30'  Total Timed Units:  2  Total Untimed Units:  1  Charges Billed/# of units:  2 TE       Progress/Current Status    Subjective:     Patient ID: Inna Gallardo is a 42 y.o. female.  Diagnosis:   1. Left knee pain, unspecified chronicity     2. Decreased ROM of left knee     3. Weakness of left lower extremity     4. Impaired gait       Pain: 5 /10  Pt stated that she has been feeling more weakness than pain.     Objective:     Pt initiated session on stationary bike x 10' supervised by PT.   Pt participated in therex per log below supervised by PT x 11 minutes and then 1:1 with PT x 30 minutes. CP to (L) knee with LEs elevated on wedge at end of session x 10'.     Date  6/19/17 6/7/17 06/05/17 5/31/17 5/29/17 5/17/17 5/11/17 5/8/17   VISIT 11 10 9 8   5 4   POC 7/2/17          FOTO  10 DONE 9/10 8/10 7/10 6/10 5/5 4   MT  15 CS note 15 20 15 10' 15'   HS stretch  30"x3 L stairs 30"x3 30"x3 L 30"x3 L 30"x3 30"x3 L 30"x3 L Strap 30"x2 L   Gastroc Str. 30"x3 stairs  --     Strap 30"x2 L   Bike 10' 7 7' 8' 10' 10' 10' 5'   QS HEP 10"x10 10"x10 10"x10 10"x10 10"x10 10"x10  5"x15   SAQ -  -- NT 1# 2x10 L 1# 2x10 L 1# 2x10  2x12 L   SLR HEP 2# 2x10 B 2# 2x10 B 1# 2x12 B 1# 2x10 B 1# 2x10 B 1# 2x10 B 1x10 B no assist   SL Abd HEP 2# 2x10 B 2# 2x10 B 1# 2x12 B 1# 2x10 B 1# 2x10  2x10 B   Heel Slides - - -- NT 10"x10 10"x10 5"x15 Strap 5"x15 L   Edward Hip Add - - --    10"x10 5"x15    LAQs - - --   1# 3x10 1# 2x10 L 2x15 B   Seated Hip Flex - - --   - - 2x15 B   Prone knee hangs 3# 3'          Cybex   Leg Press 4.0 2x10 B  oot 4.0  2x10 B NT NT 3.0  2x10 next Next?   L Leg Press 3.0 2x15 oot 3.0  2x10 B        TKE - NT Pain in knee   HELD    - -   Hip Abd 1.0 2x15 B 1.0   2x10 B 1.0   2x10 B " NT NT // 1# 2x10 L // 2x15 L // 2x10 B   Hip Flex 1.0 2x15 B 1.0   2x10 B 1.0   2x10 B NT NT // 1# 2X10 L // 2x15 L  // 2x10 B   Hip Ext 1.0 2x15 B 1.0   2x10 B 1.0   2x10 B NT NT // 1# 2x10 L // 2x15 L // 2x10 B   HS Curls 5.0 2x15  5.0  2x10 --   - -   Knee Ext 15# 2x15  15#   2x10  --   - -   Step Ups Blue 2x10   -- --   - -   Step Downs   -- --   - -   Gait   -- --   -    CP 10' 10' 10' NT 10' 10' 10' Declined due to having to go to another appt   Initials CK JA 1/ MNB DH 2/6 J A CS  1/6 CK CK       AROM: 2 - 128 degrees  PROM: 0-135 degrees     L/E Strength w/ MicroFET Muscle Inez Dynamometer Right Left Pain/Dysfunction with Movement   (approx 4 sec hold w/ max contraction)   Hip Flexion 13.8 kg  13.7 kg      Hip Abduction 12.9 kg  11.6 kg      Quadriceps 14.1 kg  15.8 kg      Hamstrings 16.2 kg  15.5 kg          Assessment:     Pt was able to tolerate increased reps and additional quad strengthening therex today. Pt tolerated therapy session without any c/o increased pain.     Patient Education/Response:     Continue with HEP    Plans and Goals:     Continue PT as per POC    Short Term Goals (4 Weeks): 6/2/17  1. Pt will be independent with HEP  2. Pt will improve (L) knee flexion AROM to at least 115 degrees in order to be able to perform ADLs with less difficulty  3. Pt will improve FOTO knee survey score to < 60% limited in order to improve functional mobility     Long Term Goals (8 Weeks): 7/2/17  1. Pt will be independent with updated HEP  2. Pt will improve (L) knee AROM to 0 -130 degrees in order to be able to perform ADLs with less difficulty  3. Pt will improve (L) LE strength to at least 90% of (R) LE strength measured via MicroFet handheld dynamometer in order to improve functional mobility  4. Pt will improve FOTO knee survey score to </= 40% limited in order to improve functional mobility  5. Pt will report 0/10 pain in (L) knee when performing ADLs in order to be able to perform ADLs with less  difficulty.

## 2017-06-21 ENCOUNTER — CLINICAL SUPPORT (OUTPATIENT)
Dept: REHABILITATION | Facility: HOSPITAL | Age: 42
End: 2017-06-21
Attending: ORTHOPAEDIC SURGERY
Payer: MEDICAID

## 2017-06-21 DIAGNOSIS — M25.562 LEFT KNEE PAIN, UNSPECIFIED CHRONICITY: ICD-10-CM

## 2017-06-21 DIAGNOSIS — R26.9 IMPAIRED GAIT: ICD-10-CM

## 2017-06-21 DIAGNOSIS — M25.662 DECREASED ROM OF LEFT KNEE: ICD-10-CM

## 2017-06-21 DIAGNOSIS — R29.898 WEAKNESS OF LEFT LOWER EXTREMITY: ICD-10-CM

## 2017-06-21 PROCEDURE — 97110 THERAPEUTIC EXERCISES: CPT | Mod: PN

## 2017-06-21 NOTE — PROGRESS NOTES
"TIME RECORD    Date:  06/21/2017    Start Time:  9:00 am   Stop Time:  9:55 am     PROCEDURES:    TIMED  Procedure Min.   TE      supervised 35'  NC   TE 15'             UNTIMED  Procedure Min.   CP 5'         Total Timed Minutes:  15  Total Timed Units:  1  Total Untimed Units:  0  Charges Billed/# of units:  1 TE       Progress/Current Status    Subjective:     Patient ID: Inna Gallardo is a 42 y.o. female.  Diagnosis:   1. Left knee pain, unspecified chronicity     2. Decreased ROM of left knee     3. Weakness of left lower extremity     4. Impaired gait       Pain: 5 /10 pain today.    Objective:     Pt initiated session on stationary bike x 10' supervised by PTA.   Pt participated in therex per log below supervised by PTA x 25 minutes and then 1:1 with PTA x 15  minutes. CP to (L) knee x 10 minutes.    Date  6/21/17 6/19/17 6/7/17 06/05/17 5/31/17 5/29/17 5/17/17 5/11/17 5/8/17   VISIT 12 11 10 9 8   5 4   POC 7/2/17 7/2/17          FOTO   10 DONE 9/10 8/10 7/10 6/10 5/5 4   MT   15 CS note 15 20 15 10' 15'   HS stretch  30"x3 L stairs 30"x3 L stairs 30"x3 30"x3 L 30"x3 L 30"x3 30"x3 L 30"x3 L Strap 30"x2 L   Gastroc Str. 30"x3 stairs 30"x3 stairs  --     Strap 30"x2 L   Bike 10' 10' 7 7' 8' 10' 10' 10' 5'   QS HEP HEP 10"x10 10"x10 10"x10 10"x10 10"x10 10"x10  5"x15   SAQ  -  -- NT 1# 2x10 L 1# 2x10 L 1# 2x10  2x12 L   SLR HEP HEP 2# 2x10 B 2# 2x10 B 1# 2x12 B 1# 2x10 B 1# 2x10 B 1# 2x10 B 1x10 B no assist   SL Abd HEP HEP 2# 2x10 B 2# 2x10 B 1# 2x12 B 1# 2x10 B 1# 2x10  2x10 B   Heel Slides - - - -- NT 10"x10 10"x10 5"x15 Strap 5"x15 L   Edward Hip Add - - - --    10"x10 5"x15    LAQs - - - --   1# 3x10 1# 2x10 L 2x15 B   Seated Hip Flex - - - --   - - 2x15 B   Prone knee hangs 3#  3' 3# 3'          Cybex   Leg Press 4.0 3x10 B 4.0 2x10 B  oot 4.0  2x10 B NT NT 3.0  2x10 next Next?   L Leg Press 3.0  2x15 3.0 2x15 oot 3.0  2x10 B        TKE - - NT Pain in knee   HELD    - -   Hip Abd 1.0  2x15 B 1.0 2x15 " B 1.0   2x10 B 1.0   2x10 B NT NT // 1# 2x10 L // 2x15 L // 2x10 B   Hip Flex 1.0 2x15 B 1.0 2x15 B 1.0   2x10 B 1.0   2x10 B NT NT // 1# 2X10 L // 2x15 L  // 2x10 B   Hip Ext 1.0 2x15 B 1.0 2x15 B 1.0   2x10 B 1.0   2x10 B NT NT // 1# 2x10 L // 2x15 L // 2x10 B   HS Curls 5.0 2x15 5.0 2x15  5.0  2x10 --   - -   Knee Ext 15# 2x15 15# 2x15  15#   2x10  --   - -   Step Ups Blue 2x15 Blue 2x10   -- --   - -   Lateral step Blue 2x10 L --          Step Downs    -- --   - -   Gait    -- --   -    CP 10' 10' 10' 10' NT 10' 10' 10' Declined due to having to go to another appt   Initials CS  1/6 CK JA 1/ MNB DH 2/6 J A CS  1/6 CK CK             Assessment:     Pt was able to tolerate above without report of increased pain.     Patient Education/Response:     Continue with HEP    Plans and Goals:     Continue PT as per POC    Short Term Goals (4 Weeks): 6/2/17  1. Pt will be independent with HEP  2. Pt will improve (L) knee flexion AROM to at least 115 degrees in order to be able to perform ADLs with less difficulty  3. Pt will improve FOTO knee survey score to < 60% limited in order to improve functional mobility     Long Term Goals (8 Weeks): 7/2/17  1. Pt will be independent with updated HEP  2. Pt will improve (L) knee AROM to 0 -130 degrees in order to be able to perform ADLs with less difficulty  3. Pt will improve (L) LE strength to at least 90% of (R) LE strength measured via [employer] handheld dynamometer in order to improve functional mobility  4. Pt will improve FOTO knee survey score to </= 40% limited in order to improve functional mobility  5. Pt will report 0/10 pain in (L) knee when performing ADLs in order to be able to perform ADLs with less difficulty.

## 2017-06-28 ENCOUNTER — CLINICAL SUPPORT (OUTPATIENT)
Dept: REHABILITATION | Facility: HOSPITAL | Age: 42
End: 2017-06-28
Attending: ORTHOPAEDIC SURGERY
Payer: MEDICAID

## 2017-06-28 DIAGNOSIS — M25.562 LEFT KNEE PAIN, UNSPECIFIED CHRONICITY: ICD-10-CM

## 2017-06-28 DIAGNOSIS — R26.9 IMPAIRED GAIT: ICD-10-CM

## 2017-06-28 DIAGNOSIS — M25.662 DECREASED ROM OF LEFT KNEE: ICD-10-CM

## 2017-06-28 DIAGNOSIS — R29.898 WEAKNESS OF LEFT LOWER EXTREMITY: ICD-10-CM

## 2017-06-28 PROCEDURE — 97110 THERAPEUTIC EXERCISES: CPT | Mod: PN

## 2017-06-28 NOTE — Clinical Note
Please see PT DC summary for Inna Gallardo,  71. Thank you for this referral.   Celeste Mckinnon, PT 2017

## 2017-06-28 NOTE — PROGRESS NOTES
"TIME RECORD    Date:  06/28/2017    Start Time:  9:05 am   Stop Time:  9:49 am     PROCEDURES:    TIMED  Procedure Min.   TE supervised 5'  NC   TE 39'                  UNTIMED  Procedure Min.             Total Timed Minutes:  39'  Total Timed Units:  3  Total Untimed Units:  0  Charges Billed/# of units:  3 TE      Progress/Current Status    Subjective:     Patient ID: Inna Gallardo is a 42 y.o. female.  Diagnosis:   1. Left knee pain, unspecified chronicity     2. Decreased ROM of left knee     3. Weakness of left lower extremity     4. Impaired gait       Pain: 0 /10  Pt reported that she was not experiencing pain, but a little discomfort in (L) knee prior to therapy session. Pt stated that she didn't do her stretches yesterday and noticed a difference.     Objective:     Pt initiated therapy session on stationary bike x 5' supervised by PT. Objective measurements were taken and pt participated in therex per log below 1:1 with PT x 39'.     Date  6/28/17 6/21/17 6/19/17 6/7/17 06/05/17 5/31/17 5/29/17 5/17/17 5/11/17 5/8/17   VISIT 13 12 11 10 9 8   5 4   POC  7/2/17 7/2/17          FOTO    10 DONE 9/10 8/10 7/10 6/10 5/5 4   MT    15 CS note 15 20 15 10' 15'   HS stretch  30"x3 L stairs 30"x3 L stairs 30"x3 L stairs 30"x3 30"x3 L 30"x3 L 30"x3 30"x3 L 30"x3 L Strap 30"x2 L   Gastroc Str. 30"x3 stairs 30"x3 stairs 30"x3 stairs  --     Strap 30"x2 L   Bike 5' 10' 10' 7 7' 8' 10' 10' 10' 5'   QS - HEP HEP 10"x10 10"x10 10"x10 10"x10 10"x10 10"x10  5"x15   SAQ -  -  -- NT 1# 2x10 L 1# 2x10 L 1# 2x10  2x12 L   SLR RTB 2x15 HEP HEP 2# 2x10 B 2# 2x10 B 1# 2x12 B 1# 2x10 B 1# 2x10 B 1# 2x10 B 1x10 B no assist   SL Abd RTB 2x15 HEP HEP 2# 2x10 B 2# 2x10 B 1# 2x12 B 1# 2x10 B 1# 2x10  2x10 B   Heel Slides - - - - -- NT 10"x10 10"x10 5"x15 Strap 5"x15 L   Edward Hip Add - - - - --    10"x10 5"x15    LAQs RTB 2x15 - - - --   1# 3x10 1# 2x10 L 2x15 B   Seated Hip Flex - - - - --   - - 2x15 B   Prone knee hangs - 3#  3' " 3# 3'          Cybex   Leg Press - 4.0 3x10 B 4.0 2x10 B  oot 4.0  2x10 B NT NT 3.0  2x10 next Next?   L Leg Press - 3.0  2x15 3.0 2x15 oot 3.0  2x10 B        TKE - - - NT Pain in knee   HELD    - -   Hip Abd RTB 2x15 B 1.0  2x15 B 1.0 2x15 B 1.0   2x10 B 1.0   2x10 B NT NT // 1# 2x10 L // 2x15 L // 2x10 B   Hip Flex RTB 2x15 B 1.0 2x15 B 1.0 2x15 B 1.0   2x10 B 1.0   2x10 B NT NT // 1# 2X10 L // 2x15 L  // 2x10 B   Hip Ext RTB 2x15 B 1.0 2x15 B 1.0 2x15 B 1.0   2x10 B 1.0   2x10 B NT NT // 1# 2x10 L // 2x15 L // 2x10 B   HS Curls RTB 2x15 5.0 2x15 5.0 2x15  5.0  2x10 --   - -   Knee Ext - 15# 2x15 15# 2x15  15#   2x10  --   - -   Step Ups - Blue 2x15 Blue 2x10   -- --   - -   Lateral step - Blue 2x10 L --          Step Downs -    -- --   - -   Gait -    -- --   -    CP home 10' 10' 10' 10' NT 10' 10' 10' Declined due to having to go to another appt   Initials CK CS  1/6 CK JA 1/ MNB DH 2/6 J A CS  1/6 CK CK       Assessment:     See DC summary below    Patient Education/Response:     See DC summary below    Plans and Goals:     DC from PT     REHAB SERVICES OUTPATIENT DISCHARGE SUMMARY  Physical Therapy      Name:  Inna Gallardo  Date:  6/28/17  Date of Evaluation:  5/2/17  Physician:  Calvin Nguyen MD  Total # Of Visits:  13  Diagnosis:    1. Left knee pain, unspecified chronicity     2. Decreased ROM of left knee     3. Weakness of left lower extremity     4. Impaired gait         Physical/Functional Status:  At time of discharge, patient had attended 12 follow up sessions since initial evaluation on 5/2/17. Pt demo significant improvement in (L) knee ROM, (L) LE strength, and functional mobility. Pt was agreeable to being discharged with HEP at this time. See objective measurements below.     Range of Motion/Strength:      AROM: (L) = 2 - 128 degrees               (R) = 3 - 125 degrees      L/E Strength w/ MicroFET Muscle Inez Dynamometer Right Left Pain/Dysfunction with Movement   (approx 4 sec hold w/  max contraction)   Hip Flexion 11.2 kg  11.7 kg      Hip Abduction 12.3 kg  12.9 kg      Quadriceps 14.5 kg  14.0 kg      Hamstrings 14.3 kg  16.0 kg        FOTO knee survey: 35% limited    The patient is to be discharged from our Therapy service for the following reason(s):  Patient has reached the maximum rehab potential for the present time    Degree of Goal Achievement:  Patient has partially met goals    Short Term Goals (4 Weeks): 6/2/17  1. Pt will be independent with HEP - MET  2. Pt will improve (L) knee flexion AROM to at least 115 degrees in order to be able to perform ADLs with less difficulty - MET  3. Pt will improve FOTO knee survey score to < 60% limited in order to improve functional mobility - MET     Long Term Goals (8 Weeks): 7/2/17  1. Pt will be independent with updated HEP - MET  2. Pt will improve (L) knee AROM to 0 -130 degrees in order to be able to perform ADLs with less difficulty - PARTIALLY MET  3. Pt will improve (L) LE strength to at least 90% of (R) LE strength measured via MicroFet handheld dynamometer in order to improve functional mobility - MET  4. Pt will improve FOTO knee survey score to </= 40% limited in order to improve functional mobility - MET  5. Pt will report 0/10 pain in (L) knee when performing ADLs in order to be able to perform ADLs with less difficulty. - MET    Patient Education:  Pt was educated on and given handout on updated HEP consisting of standing hip flexion, standing hip extension, standing hip abduction, standing hamstring curls, LAQ, SLR, SL hip abduction, standing HSS, and standing GS. Pt was given RTB and GTB to use for resistance. Pt demo and verbalized understanding.     Discharge Plan:  Home Program:  See above

## 2017-09-13 ENCOUNTER — CLINICAL SUPPORT (OUTPATIENT)
Dept: REHABILITATION | Facility: HOSPITAL | Age: 42
End: 2017-09-13
Attending: ORTHOPAEDIC SURGERY
Payer: MEDICAID

## 2017-09-13 DIAGNOSIS — R26.89 DECREASED FUNCTIONAL MOBILITY: ICD-10-CM

## 2017-09-13 DIAGNOSIS — M25.669 DECREASED RANGE OF MOTION (ROM) OF KNEE: ICD-10-CM

## 2017-09-13 DIAGNOSIS — R29.898 WEAKNESS OF BOTH LOWER EXTREMITIES: ICD-10-CM

## 2017-09-13 DIAGNOSIS — R26.89 ANTALGIC GAIT: ICD-10-CM

## 2017-09-13 PROCEDURE — 97161 PT EVAL LOW COMPLEX 20 MIN: CPT

## 2017-09-13 NOTE — PLAN OF CARE
TIME RECORD    Date: 09/14/2017    Start Time:  3:11  Stop Time:  4:00    PROCEDURES:    TIMED  Procedure Min.   TE 15                     UNTIMED  Procedure Min.   PT eval 34         Total Timed Minutes:  15  Total Timed Units:  1  Total Untimed Units:  1  Charges Billed/# of units:  2    OUTPATIENT PHYSICAL THERAPY   PATIENT EVALUATION  Onset Date: July 2017  Primary Diagnosis:   1. Decreased range of motion (ROM) of knee     2. Weakness of both lower extremities     3. Antalgic gait     4. Decreased functional mobility       Treatment Diagnosis: decreased ROM, weakness, antalgic gait, decreased functional mobility  Past Medical History:   Diagnosis Date    Anxiety     Endometriosis     Hypertension     Prolactinoma 2011    Sliding hiatal hernia      Precautions: Standard  Prior Therapy: PT earlier this year follow L knee surgery  Medications: Inna Gallardo has a current medication list which includes the following prescription(s): amlodipine, paroxetine, and rabeprazole.  Nutrition:  Obese  History of Present Illness: exacerbation of L knee pain 3 1/2 wks ago  Prior Level of Function: Independent  Social History: Lives with children, one is three special needs  Place of Residence (Steps/Adaptations): single story home, no steps at home but has to do a flight of steps at [employer]  Functional Deficits Leading to Referral/Nature of Injury: difficulty with walking, transfers, driving, prolonged standing, bathing,   Patient Therapy Goals: pain relief, mobility, strength    Subjective     Inna Gallardo states she has been having L knee pain since she dislocated her L knee cap when walking up some hills in July. She did do the HEP she was give to her from her previous episode of therapy until about 3 1/2 weeks ago when her pain suddenly got worse. Prior to her knee surgery earlier this year she had been having trouble for about a year and now her R knee is now starting to get weak. She is having  trouble getting up after sitting or laying down for awhile, walking more than a few feet increases pain, has to use a crutch at night to walk, has to use hands to lift her leg into car, difficulty with bed mobility, has to hold on to spouse to step over side of tub, she has to stand to shower as she is unable to sit in bottom tub, she has to push herself up to stand from toilet, and has difficulty standing long enough to cook without pain. She has a special needs daughter, 3 years old, that she has to often squat down to  her up but is not doing it now as she is scared that her L knee will give out and she will drop her daughter.     Pain:  Location: L knee   Description: Sharp, Shooting and sore  Activities Which Increase Pain: Sitting, Standing, Walking, Lifting and Getting out of bed/chair  Activities Which Decrease Pain: pain medication, ice and injection  Pain Scale: 7/10 at best 9/10 now  10/10 at worst    Objective     Posture: alternates extending her knees, R knee hyperextension in standing, decreased WB on L LE in standing, patella tracking is normal  Palpation: TTP medial joint line B, lateral joint line L , L femoral medial condyle  Sensation: tingling over L anterior thigh and across patella  DTRs: intact  Range of Motion/Strength:     Hip Right   Left  Pain/Dysfunction with Movement    AROM MMT AROM MMT    Flexion WFL 4/5 WFL 4-/5    Extension NT NT NT NT Unable to lie on stomach   Abduction WFL 3/5 WFL 3/5       Knee  Right   Left  Pain/Dysfunction with Movement    AROM PROM MMT AROM PROM MMT    Flexion 130 135 5/5 120 130 4/5 Pain Medial.anterior knee L   Extension +5 NT 5/5 -10 0 3-/5 Pain anterior knee L     Ankle Right  Left  Pain/Dysfunction with Movement    AROM MMT AROM MMT    Plantarflexion WFL 5/5 WFL 4-/5 pain L anterior/medial knee   Dorsiflexion WFL 5/5 WFL 4/5      Flexibility: SLR WFL  Gait: Without AD  Analysis: Assistance none, antalgic gait, decreased step length R LE  Bed  Mobility:Independent  Transfers: Independent  Special Tests: Patella apprehension positive, Lachman's, Posterior Drawer, Valgus, Varus are all negative  Other: crepitus B knees   Functional Limitation Reports: G codes  Tool: FOTO Knee SURVEY  Category: Mobility ()  Limitation: 79%  Current: CL = least 60% but < 80% impaired, limited or restricted  Goal: CJ = at least 20% but < 40% impaired, limited or restricted    Treatment: Patient was educated on the plan of care and treatment options. She is in agreement with the plan of care. Patient performed therapeutic exercises 1:1 with PT x 10 minutes of heel slides, quad sets, and SLR. She was given handouts of today's exercises for her HEP and instructed to perform her HEP to her tolerance twice a day.     Assessment       Initial Assessment (Pertinent finding, problem list and factors affecting outcome): Mrs. Gallardo is a 42 year old female, who presents to the clinic with complaints of L knee pain that was exacerbated 3 1/2 weeks ago. She demonstrates decreased AROM of L knee that limits her ability to ambulate with a normal gait pattern, perform her usual ADLs and transfers without relying on UE leverage or assistance from family members. Her B LE weakness also limits her ability to ambulate with a normal gait pattern, perform transfers, bed mobility, and care for daughter without an increase in pain. Her patella mobility is equal bilaterally but she has complaints of pain along lateral border of L patella, tenderness to palpation over B medial joint lines, L lateral joint line, positive L patella apprehension and patella grind indicative of OA. When ambulating without an AD she has an antalgic gait pattern and when performing transfers she relies on UE leverage and R LE. Her current score on FOTO knee places her in the 60%<80% impaired, limited, or restricted category. She would benefit from physical therapy to improve her strength, ROM, gait, and functional  mobility.     History  Co-morbidities and personal factors that may impact the plan of care Examination  Body Structures and Functions, activity limitations and participation restrictions that may impact the plan of care    Clinical Presentation   Co-morbidities:   high BMI and prior L knee surgery        Personal Factors:   no deficits Body Regions:   lower extremities    Body Systems:    ROM  strength  gait  transfers          Participation Restrictions:   None     Activity limitations:   Learning and applying knowledge  no deficits    General Tasks and Commands  no deficits    Communication  no deficits    Mobility  lifting and carrying objects  walking    Self care  washing oneself (bathing, drying, washing hands)    Domestic Life  shopping  cooking  doing house work (cleaning house, washing dishes, laundry)  assisting others    Interactions/Relationships  no deficits    Life Areas  no deficits    Community and Social Life  recreation and leisure         evolving clinical presentation with changing clinical characteristics                      Low Complexity      Decision Making/ Complexity Score:  FOTO Knee 79%       Rehab Potiential: good  Barriers to Rehab: None  Short Term Goals (4 Weeks):   1. This patient will be independent with a basic HEP.  2. This patient will increase L knee AROM equal to R knee AROM with no complaints of pain in order to sit in bottom of tub.  3. This patient will increase B LE strength by 1 grade in order to be able to perform vehicle transfers with no increase in symptoms.   4. This patient will have a pain rating of 7/10 at worst with ADLs.  5. This patient will be able to ambulate 100 feet with a normal gait pattern with no AD.  6. Patient will be able to achieve greater than or equal to 45 on the FOTO Knee Survey placing patient in 40%<60% impaired, limited, or restricted category demonstrating overall improved functional ability with lower extremity.   Long Term Goals (8  Weeks):   1. This patient will be independent with an updated HEP.  2. This patient will have B LE strength of 5/5 in order to be able to squat and  her 3 year old daughter with no increase in symptoms.   4. This patient will have a pain rating of 3/10 at worst with ADLs.  5. This patient will be able to ambulate greater than 200 yards with a normal gait pattern with no AD.  6. Patient will be able to achieve greater than or equal to 65 on the FOTO Knee Survey placing patient in 20%<40% impaired, limited, or restricted category demonstrating overall improved functional ability with lower extremity.     Plan     Certification Period: 09/13/17 to 11/13/17  Recommended Treatment Plan: 2 times per week for 8 weeks: Gait Training, Group Therapy, Manual Therapy, Moist Heat/ Ice, Neuromuscular Re-ed, Patient Education and Therapeutic Exercise  Other Recommendations: modalities prn, IASTM prn, kinesiotape prn, Functional Dry Needling prn       Therapist: Salma Valera, PT    I CERTIFY THE NEED FOR THESE SERVICES FURNISHED UNDER THIS PLAN OF TREATMENT AND WHILE UNDER MY CARE    Physician's comments: ________________________________________________________________________________________________________________________________________________      Physician's Name: ___________________________________

## 2017-09-14 PROBLEM — R26.89 DECREASED FUNCTIONAL MOBILITY: Status: ACTIVE | Noted: 2017-09-14

## 2017-09-14 PROBLEM — R26.89 ANTALGIC GAIT: Status: ACTIVE | Noted: 2017-09-14

## 2017-09-14 PROBLEM — M25.669 DECREASED RANGE OF MOTION (ROM) OF KNEE: Status: ACTIVE | Noted: 2017-09-14

## 2017-09-14 PROBLEM — R29.898 WEAKNESS OF BOTH LOWER EXTREMITIES: Status: ACTIVE | Noted: 2017-09-14

## 2017-09-20 ENCOUNTER — CLINICAL SUPPORT (OUTPATIENT)
Dept: REHABILITATION | Facility: HOSPITAL | Age: 42
End: 2017-09-20
Attending: ORTHOPAEDIC SURGERY
Payer: MEDICAID

## 2017-09-20 DIAGNOSIS — R26.89 ANTALGIC GAIT: ICD-10-CM

## 2017-09-20 DIAGNOSIS — R29.898 WEAKNESS OF BOTH LOWER EXTREMITIES: ICD-10-CM

## 2017-09-20 DIAGNOSIS — R26.89 DECREASED FUNCTIONAL MOBILITY: ICD-10-CM

## 2017-09-20 DIAGNOSIS — M25.669 DECREASED RANGE OF MOTION (ROM) OF KNEE: ICD-10-CM

## 2017-09-20 PROCEDURE — 97110 THERAPEUTIC EXERCISES: CPT

## 2017-09-20 NOTE — PROGRESS NOTES
"TIME RECORD    Date:  09/20/2017    Start Time:  11:00  Stop Time:  11:50    PROCEDURES:    TIMED  Procedure Min.   TE 25   TE sup 25 NC                 UNTIMED  Procedure Min.             Total Timed Minutes:  25  Total Timed Units:  2  Total Untimed Units:  0  Charges Billed/# of units:  2 (TE-2)      Progress/Current Status    Subjective:     Patient ID: Inna Gallardo is a 42 y.o. female.  Diagnosis:   1. Decreased range of motion (ROM) of knee     2. Weakness of both lower extremities     3. Antalgic gait     4. Decreased functional mobility       Pain: 7 /10  Patient reports having a lot of pain in her left knee and left quad.    Objective:     Patient was educated and performed therapeutic exercises as per log below 1:1 with PTA x 25 minutes and supervised exercises by PTA x 25 minutes to improve ROM, flexibility, strength and gait.  Patient received cold pack to bilateral knees at the end of today's sessions x 10 minutes long sitting.    Date  09/20/17   VISIT 2/50   FOTO 2/5   POC EXP. DATE 11/13/17   FACE-TO-FACE 10/13/17       Bike  --   TABLE:    Quad sets 15 x 5"   SLR 1 x 10   Hip abduction - supine 1 x 15 RTB   Hip adduction - supine 15 x 3" w/ ball   Marching  1 x 10   Bridging  1 x 10   SAQ 1 x 10   Heel Slides 1 x 10   SEATED:    LAQs 1 x 10   Seated Hip Flex 1 x 10   HS curls 1 x 10 YTB   STANDING:    Mini squats --   TKE --   Hip Abd --   Hip Flex --   Hip Ext --   HS Curls --   Step Ups --       CP 10'       Initials GWA 1/6       Assessment:     Patient required regular cues to keep exercises in a pain free range.  Patient required regular cues for core engagement with sitting exercises.     Patient Education/Response:     Patient was given a written copy of today's exercises for her HEP and instructed to perform twice a day.  Patient verbalized understanding instructions.     Plans and Goals:     Continue with plan of care and progress toward PT goals as patient tolerates.    Short Term " Goals (4 Weeks):   1. This patient will be independent with a basic HEP.  2. This patient will increase L knee AROM equal to R knee AROM with no complaints of pain in order to sit in bottom of tub.  3. This patient will increase B LE strength by 1 grade in order to be able to perform vehicle transfers with no increase in symptoms.   4. This patient will have a pain rating of 7/10 at worst with ADLs.  5. This patient will be able to ambulate 100 feet with a normal gait pattern with no AD.  6. Patient will be able to achieve greater than or equal to 45 on the FOTO Knee Survey placing patient in 40%<60% impaired, limited, or restricted category demonstrating overall improved functional ability with lower extremity.   Long Term Goals (8 Weeks):   1. This patient will be independent with an updated HEP.  2. This patient will have B LE strength of 5/5 in order to be able to squat and  her 3 year old daughter with no increase in symptoms.   4. This patient will have a pain rating of 3/10 at worst with ADLs.  5. This patient will be able to ambulate greater than 200 yards with a normal gait pattern with no AD.  6. Patient will be able to achieve greater than or equal to 65 on the FOTO Knee Survey placing patient in 20%<40% impaired, limited, or restricted category demonstrating overall improved functional ability with lower extremity.

## 2017-09-25 ENCOUNTER — CLINICAL SUPPORT (OUTPATIENT)
Dept: REHABILITATION | Facility: HOSPITAL | Age: 42
End: 2017-09-25
Attending: ORTHOPAEDIC SURGERY
Payer: MEDICAID

## 2017-09-25 DIAGNOSIS — M25.669 DECREASED RANGE OF MOTION (ROM) OF KNEE: ICD-10-CM

## 2017-09-25 DIAGNOSIS — R29.898 WEAKNESS OF BOTH LOWER EXTREMITIES: ICD-10-CM

## 2017-09-25 DIAGNOSIS — R26.89 ANTALGIC GAIT: ICD-10-CM

## 2017-09-25 DIAGNOSIS — R26.89 DECREASED FUNCTIONAL MOBILITY: ICD-10-CM

## 2017-09-25 PROCEDURE — 97110 THERAPEUTIC EXERCISES: CPT

## 2017-09-25 NOTE — PROGRESS NOTES
"TIME RECORD    Date:  09/25/2017    Start Time:  11:00  Stop Time:  12:00    PROCEDURES:    TIMED  Procedure Min.   TE 50                     UNTIMED  Procedure Min.   CP 10NC         Total Timed Minutes:  50  Total Timed Units:  3  Total Untimed Units:  0  Charges Billed/# of units:  3 (TE-3)      Progress/Current Status    Subjective:     Patient ID: Inna Gallardo is a 42 y.o. female.  Diagnosis:   1. Decreased range of motion (ROM) of knee     2. Weakness of both lower extremities     3. Antalgic gait     4. Decreased functional mobility       Pain: 7 /10  She reports having some increased muscle soreness after the last visit but felt better the next day. She would like to get another copy of her HEP as her daughter destroyed her original handouts.     Objective:     Patient initiated the session on stationary bike x 5 minutes supervised by PT. Patient was educated and performed therapeutic exercises as per log below 1:1 with PT x 50 minutes to improve ROM, flexibility, strength and gait.  Patient received cold pack to bilateral knees at the end of today's sessions x 10 minutes long sitting. Kinesiotaping was apllied to L knee to decrease edema at the end of the session.     Date  09/25/17 09/20/17   VISIT 3/50 2/50   FOTO 3/5 2/5   POC EXP. DATE 11/13/17 11/13/17   FACE-TO-FACE 10/13/17 10/13/17        Bike  L2 x 5' --   TABLE:     Quad sets 15 x 5" 15 x 5"   SLR 1 x 15 1 x 10   SLR w/ ER 1 x 10    Hip abduction - supine 1 x 20 RTB 1 x 15 RTB   Hip adduction - supine 20 x 3" w/ ball 15 x 3" w/ ball   Marching  1 x 15 1 x 10   Bridging  1  x15 1 x 10   SAQ 1 x 15 1 x 10   Heel Slides -- 1 x 10   SEATED:     LAQs 1 x 15 1 x 10   Seated Hip Flex 1 x 15 1 x 10   HS curls 1 x 15 YTB 1 x 10 YTB   STANDING:     Mini squats 1 x 10 --   TKE -- --   Hip Abd -- --   Hip Flex -- --   Hip Ext -- --   HS Curls -- --   Step Ups -- --        CP 10' 10'        Initials DG [facility] 1/6     Patient was screened for use of " kinesiotape and was cleared for use.  1. Has the patient ever had a reaction to the adhesive in bandaids? No  2. Has the patient ever uses athletic/kinesiotape in the past? Yes  3. Is the patient hemodynamically impaired (PE, DVT, CHF, Kidney failure)? No  4. Can the PT/PTA apply the tape to your skin? Yes    Patient was instructed on duration to wear the tape, proper drying techniques for the tape, and to remove the tape if he/she had any issues with it.    Patient verbalized understanding of these instructions.    Assessment:     Patient require occasional cues to avoid using momentum with her exercises. She was able to perform all of today's progressions with no increase in symptoms prior to leaving the clinic.      Patient Education/Response:     Patient was given a second written copy of her HEP and instructed to perform her HEP to her tolerance. Patient verbalized understanding instructions.     Plans and Goals:     Continue with plan of care and progress toward PT goals as patient tolerates.    Short Term Goals (4 Weeks):   1. This patient will be independent with a basic HEP.  2. This patient will increase L knee AROM equal to R knee AROM with no complaints of pain in order to sit in bottom of tub.  3. This patient will increase B LE strength by 1 grade in order to be able to perform vehicle transfers with no increase in symptoms.   4. This patient will have a pain rating of 7/10 at worst with ADLs.  5. This patient will be able to ambulate 100 feet with a normal gait pattern with no AD.  6. Patient will be able to achieve greater than or equal to 45 on the FOTO Knee Survey placing patient in 40%<60% impaired, limited, or restricted category demonstrating overall improved functional ability with lower extremity.   Long Term Goals (8 Weeks):   1. This patient will be independent with an updated HEP.  2. This patient will have B LE strength of 5/5 in order to be able to squat and  her 3 year old  daughter with no increase in symptoms.   4. This patient will have a pain rating of 3/10 at worst with ADLs.  5. This patient will be able to ambulate greater than 200 yards with a normal gait pattern with no AD.  6. Patient will be able to achieve greater than or equal to 65 on the FOTO Knee Survey placing patient in 20%<40% impaired, limited, or restricted category demonstrating overall improved functional ability with lower extremity.

## 2017-09-27 ENCOUNTER — CLINICAL SUPPORT (OUTPATIENT)
Dept: REHABILITATION | Facility: HOSPITAL | Age: 42
End: 2017-09-27
Attending: ORTHOPAEDIC SURGERY
Payer: MEDICAID

## 2017-09-27 DIAGNOSIS — M25.669 DECREASED RANGE OF MOTION (ROM) OF KNEE: ICD-10-CM

## 2017-09-27 DIAGNOSIS — R26.89 ANTALGIC GAIT: ICD-10-CM

## 2017-09-27 DIAGNOSIS — R29.898 WEAKNESS OF BOTH LOWER EXTREMITIES: ICD-10-CM

## 2017-09-27 DIAGNOSIS — R26.89 DECREASED FUNCTIONAL MOBILITY: ICD-10-CM

## 2017-09-27 PROCEDURE — 97110 THERAPEUTIC EXERCISES: CPT

## 2017-09-27 NOTE — PROGRESS NOTES
"TIME RECORD    Date:  09/27/2017    Start Time:  11:00  Stop Time:  12:00    PROCEDURES:    TIMED  Procedure Min.   TE 25   TE sup 25NC                 UNTIMED  Procedure Min.   CP 10NC         Total Timed Minutes:  25  Total Timed Units:  2  Total Untimed Units:  0  Charges Billed/# of units:  2 (TE-2)      Progress/Current Status    Subjective:     Patient ID: Inna Gallardo is a 42 y.o. female.  Diagnosis:   1. Decreased range of motion (ROM) of knee     2. Weakness of both lower extremities     3. Antalgic gait     4. Decreased functional mobility       Pain: 8 /10  She reports that both knees are hurting today, the L more than R. She has been doing her HEP but doesn't like doing it and has no difficulty with any of the exercises.      Objective:     Patient initiated the session on stationary bike x 5 minutes supervised by PT. Patient was educated and performed therapeutic exercises as per log below, along with today's progressions supervised by PT x 25 minutes1:1 with PT x 25 minutes to improve ROM, flexibility, strength and gait.  Patient received cold pack to bilateral knees at the end of today's sessions x 10 minutes long sitting.     Date  09/27/17 09/25/17 09/20/17   VISIT 4/50 3/50 2/50   FOTO 4/5 3/5 2/5   POC EXP. DATE 11/13/17 11/13/17 11/13/17   FACE-TO-FACE 10/13/17 10/13/17 10/13/17         Bike  L3 x 5' L2 x 5' --   TABLE:      Quad sets 15 x 5" 15 x 5" 15 x 5"   SLR 2 x 10 1 x 15 1 x 10   SLR w/ ER 1 x 15 1 x 10    Hip abduction - supine 1 x 25 RTB 1 x 20 RTB 1 x 15 RTB   Hip adduction - supine 25 x 3" w/ ball 20 x 3" w/ ball 15 x 3" w/ ball   Marching  2 x 10 1 x 15 1 x 10   Bridging  2 x 10 1  x15 1 x 10   SAQ 2 x 10 1 x 15 1 x 10   Heel Slides -- -- 1 x 10   SEATED:      LAQs 2 x 10 1 x 15 1 x 10   Seated Hip Flex oot 1 x 15 1 x 10   HS curls oot 1 x 15 YTB 1 x 10 YTB   STANDING:      Mini squats oot 1 x 10 --   TKE -- -- --   Hip Abd -- -- --   Hip Flex -- -- --   Hip Ext -- -- --   HS " Curls -- -- --   Step Ups -- -- --         CP 10' 10' 10'         Initials DG DG GWA 1/6       Assessment:     She continues to require frequent cues to not use momentum with her exercises and for correct technique. She did not complete all of her usual exercises as she required a bathroom break during her session. She performed all of today's activities with no increase in symptoms prior to leaving the clinic.     Patient Education/Response:     Patient was educated on the importance of performing her HEP on a regular basis. Patient verbalized understanding instructions.     Plans and Goals:     Continue with plan of care and progress toward PT goals as patient tolerates.    Short Term Goals (4 Weeks):   1. This patient will be independent with a basic HEP.  2. This patient will increase L knee AROM equal to R knee AROM with no complaints of pain in order to sit in bottom of tub.  3. This patient will increase B LE strength by 1 grade in order to be able to perform vehicle transfers with no increase in symptoms.   4. This patient will have a pain rating of 7/10 at worst with ADLs.  5. This patient will be able to ambulate 100 feet with a normal gait pattern with no AD.  6. Patient will be able to achieve greater than or equal to 45 on the FOTO Knee Survey placing patient in 40%<60% impaired, limited, or restricted category demonstrating overall improved functional ability with lower extremity.   Long Term Goals (8 Weeks):   1. This patient will be independent with an updated HEP.  2. This patient will have B LE strength of 5/5 in order to be able to squat and  her 3 year old daughter with no increase in symptoms.   4. This patient will have a pain rating of 3/10 at worst with ADLs.  5. This patient will be able to ambulate greater than 200 yards with a normal gait pattern with no AD.  6. Patient will be able to achieve greater than or equal to 65 on the FOTO Knee Survey placing patient in 20%<40% impaired,  limited, or restricted category demonstrating overall improved functional ability with lower extremity.

## 2017-10-03 ENCOUNTER — CLINICAL SUPPORT (OUTPATIENT)
Dept: REHABILITATION | Facility: HOSPITAL | Age: 42
End: 2017-10-03
Attending: ORTHOPAEDIC SURGERY
Payer: MEDICAID

## 2017-10-03 DIAGNOSIS — R26.89 DECREASED FUNCTIONAL MOBILITY: ICD-10-CM

## 2017-10-03 DIAGNOSIS — R29.898 WEAKNESS OF BOTH LOWER EXTREMITIES: ICD-10-CM

## 2017-10-03 DIAGNOSIS — R26.89 ANTALGIC GAIT: ICD-10-CM

## 2017-10-03 DIAGNOSIS — M25.669 DECREASED RANGE OF MOTION (ROM) OF KNEE: ICD-10-CM

## 2017-10-03 PROCEDURE — 97110 THERAPEUTIC EXERCISES: CPT

## 2017-10-03 NOTE — PROGRESS NOTES
"TIME RECORD    Date:  10/03/2017    Start Time:  9:00  Stop Time:  10:00    PROCEDURES:    TIMED  Procedure Min.   TE 30   TE sup 15NC   Bike  5NC             UNTIMED  Procedure Min.   CP 10NC         Total Timed Minutes:  30  Total Timed Units:  2  Total Untimed Units:  0  Charges Billed/# of units:  2 (TE-2)      Progress/Current Status    Subjective:     Patient ID: Inna Gallardo is a 42 y.o. female.  Diagnosis:   1. Decreased range of motion (ROM) of knee     2. Weakness of both lower extremities     3. Antalgic gait     4. Decreased functional mobility       Pain: 6 /10  Patient reports her pain is getting better, it is not as bad. She has been doing HEP and her SLR w/ ER is hard.    Objective:     Patient initiated the session on stationary bike x 5 minutes supervised by PT. Patient was educated and performed therapeutic exercises as per log below, along with today's progressions supervised by PT x 15 minutes and 1:1 with PT x 30 minutes to improve ROM, flexibility, strength and gait. Patient received cold pack to bilateral knees at the end of today's sessions x 10 minutes long sitting.     Date  10/03/17 09/27/17 09/25/17 09/20/17   VISIT 5/50 4/50 3/50 2/50   FOTO 5/5 4/5 3/5 2/5   POC EXP. DATE 11/13/17 11/13/17 11/13/17 11/13/17   FACE-TO-FACE 10/13/17 10/13/17 10/13/17 10/13/17          Bike  5' L3 x 5' L2 x 5' --   TABLE:       Quad sets 15 x 5" 15 x 5" 15 x 5" 15 x 5"   SLR 3 x 10 2 x 10 1 x 15 1 x 10   SLR w/ ER 2 x 10 1 x 15 1 x 10    Hip abduction - supine 1 x 30 RTB 1 x 25 RTB 1 x 20 RTB 1 x 15 RTB   Hip adduction - supine 30 x 3" w/ ball 25 x 3" w/ ball 20 x 3" w/ ball 15 x 3" w/ ball   Marching  3 x 10 2 x 10 1 x 15 1 x 10   Bridging  3 x 10 2 x 10 1  x15 1 x 10   SAQ 3 x 10 2 x 10 1 x 15 1 x 10   Heel Slides -- -- -- 1 x 10   SEATED:       LAQs 1 x 25 2 x 10 1 x 15 1 x 10   Seated Hip Flex 2 x 10 oot 1 x 15 1 x 10   HS curls 1 x 15 RTB oot 1 x 15 YTB 1 x 10 YTB   STANDING:       Mini " squats 1 x 10 oot 1 x 10 --   Calf raises 1 x 10      TKE -- -- -- --   Hip Abd -- -- -- --   Hip Flex -- -- -- --   Hip Ext 1 x 10 -- -- --   HS Curls -- -- -- --   Step Ups -- -- -- --          CP 10' 10' 10' 10'          Initials DG DG DG GWA 1/6     FOTO Knee 28, 60%<80%  Assessment:     She continues to require cues to avoid using momentum with her exercises and for technique with standing exercises. Her current score on FOTO knee places her in the 60%<80% impaired, limited, or restricted category.     Patient Education/Response:     Patient was educated on the importance of performing her HEP on a regular basis. Patient verbalized understanding instructions.     Plans and Goals:     Continue with plan of care and progress toward PT goals as patient tolerates.    Short Term Goals (4 Weeks):   1. This patient will be independent with a basic HEP.  2. This patient will increase L knee AROM equal to R knee AROM with no complaints of pain in order to sit in bottom of tub.  3. This patient will increase B LE strength by 1 grade in order to be able to perform vehicle transfers with no increase in symptoms.   4. This patient will have a pain rating of 7/10 at worst with ADLs.  5. This patient will be able to ambulate 100 feet with a normal gait pattern with no AD.  6. Patient will be able to achieve greater than or equal to 45 on the FOTO Knee Survey placing patient in 40%<60% impaired, limited, or restricted category demonstrating overall improved functional ability with lower extremity.   Long Term Goals (8 Weeks):   1. This patient will be independent with an updated HEP.  2. This patient will have B LE strength of 5/5 in order to be able to squat and  her 3 year old daughter with no increase in symptoms.   4. This patient will have a pain rating of 3/10 at worst with ADLs.  5. This patient will be able to ambulate greater than 200 yards with a normal gait pattern with no AD.  6. Patient will be able to  achieve greater than or equal to 65 on the FOTO Knee Survey placing patient in 20%<40% impaired, limited, or restricted category demonstrating overall improved functional ability with lower extremity.

## 2017-10-11 ENCOUNTER — CLINICAL SUPPORT (OUTPATIENT)
Dept: REHABILITATION | Facility: HOSPITAL | Age: 42
End: 2017-10-11
Attending: ORTHOPAEDIC SURGERY
Payer: MEDICAID

## 2017-10-11 DIAGNOSIS — R26.89 ANTALGIC GAIT: ICD-10-CM

## 2017-10-11 DIAGNOSIS — R29.898 WEAKNESS OF BOTH LOWER EXTREMITIES: ICD-10-CM

## 2017-10-11 DIAGNOSIS — R26.89 DECREASED FUNCTIONAL MOBILITY: ICD-10-CM

## 2017-10-11 DIAGNOSIS — M25.669 DECREASED RANGE OF MOTION (ROM) OF KNEE: ICD-10-CM

## 2017-10-11 PROCEDURE — 97110 THERAPEUTIC EXERCISES: CPT

## 2017-10-11 NOTE — PROGRESS NOTES
"TIME RECORD    Date:  10/11/2017    Start Time:  10:04  Stop Time:  10:40    PROCEDURES:    TIMED  Procedure Min.   TE 20   TE sup 16 NC                 UNTIMED  Procedure Min.             Total Timed Minutes:  20  Total Timed Units:  1  Total Untimed Units:  0  Charges Billed/# of units:  1 (TE-1)      Progress/Current Status    Subjective:     Patient ID: Inna Gallardo is a 42 y.o. female.  Diagnosis:   1. Decreased range of motion (ROM) of knee     2. Weakness of both lower extremities     3. Antalgic gait     4. Decreased functional mobility       Pain: 7 /10  Patient reports having a lot of pain in her left knee today.  Patient reports she needs to leave early from therapy to  her daughter from school.    Objective:     Patient was educated and performed therapeutic exercises as per log below 1:1 with PTA x 20 minutes and supervised exercises by PTA x 16 minutes to improve ROM, flexibility, strength and gait. Patient declined cold pack at the end of today's session.     Date  10/11/17 10/03/17 09/27/17 09/25/17 09/20/17   VISIT 6/50 5/50 4/50 3/50 2/50   FOTO -- 5/5 4/5 3/5 2/5   POC EXP. DATE 11/13/17 11/13/17 11/13/17 11/13/17 11/13/17   FACE-TO-FACE 10/13/17 10/13/17 10/13/17 10/13/17 10/13/17           Bike  Not today 5' L3 x 5' L2 x 5' --   TABLE:        Quad sets 15 x 5" 15 x 5" 15 x 5" 15 x 5" 15 x 5"   SLR 3 x 10 3 x 10 2 x 10 1 x 15 1 x 10   SLR w/ ER 2 x 10 2 x 10 1 x 15 1 x 10    Hip abduction - supine 1 x 30 RTB 1 x 30 RTB 1 x 25 RTB 1 x 20 RTB 1 x 15 RTB   Hip adduction - supine 30 x 3" w/ ball 30 x 3" w/ ball 25 x 3" w/ ball 20 x 3" w/ ball 15 x 3" w/ ball   Marching  3 x 10 3 x 10 2 x 10 1 x 15 1 x 10   Bridging  3 x 10 3 x 10 2 x 10 1  x15 1 x 10   SAQ 3 x 10 3 x 10 2 x 10 1 x 15 1 x 10   Heel Slides -- -- -- -- 1 x 10   SEATED:        LAQs 1 x 25 1 x 25 2 x 10 1 x 15 1 x 10   Seated Hip Flex 1 x 25 2 x 10 oot 1 x 15 1 x 10   HS curls oot 1 x 15 RTB oot 1 x 15 YTB 1 x 10 YTB "   STANDING:        Mini squats oot 1 x 10 oot 1 x 10 --   Calf raises oot 1 x 10      TKE -- -- -- -- --   Hip Abd -- -- -- -- --   Hip Flex -- -- -- -- --   Hip Ext oot 1 x 10 -- -- --   HS Curls -- -- -- -- --   Step Ups -- -- -- -- --           CP declined 10' 10' 10' 10'           Initials GWA 1/6 DG DG DG GWA 1/6       Assessment:     She continues to require cues to avoid using momentum with her exercises.  Patient did not complete all of her exercises due to time limitations from patient.    Patient Education/Response:     Patient was educated on the importance of performing her HEP on a regular basis. Patient verbalized understanding instructions.     Plans and Goals:     Continue with plan of care and progress toward PT goals as patient tolerates.    Short Term Goals (4 Weeks):   1. This patient will be independent with a basic HEP.  2. This patient will increase L knee AROM equal to R knee AROM with no complaints of pain in order to sit in bottom of tub.  3. This patient will increase B LE strength by 1 grade in order to be able to perform vehicle transfers with no increase in symptoms.   4. This patient will have a pain rating of 7/10 at worst with ADLs.  5. This patient will be able to ambulate 100 feet with a normal gait pattern with no AD.  6. Patient will be able to achieve greater than or equal to 45 on the FOTO Knee Survey placing patient in 40%<60% impaired, limited, or restricted category demonstrating overall improved functional ability with lower extremity.     Long Term Goals (8 Weeks):   1. This patient will be independent with an updated HEP.  2. This patient will have B LE strength of 5/5 in order to be able to squat and  her 3 year old daughter with no increase in symptoms.   4. This patient will have a pain rating of 3/10 at worst with ADLs.  5. This patient will be able to ambulate greater than 200 yards with a normal gait pattern with no AD.  6. Patient will be able to achieve  greater than or equal to 65 on the FOTO Knee Survey placing patient in 20%<40% impaired, limited, or restricted category demonstrating overall improved functional ability with lower extremity.

## 2017-10-17 ENCOUNTER — CLINICAL SUPPORT (OUTPATIENT)
Dept: REHABILITATION | Facility: HOSPITAL | Age: 42
End: 2017-10-17
Attending: ORTHOPAEDIC SURGERY
Payer: MEDICAID

## 2017-10-17 DIAGNOSIS — R29.898 WEAKNESS OF BOTH LOWER EXTREMITIES: ICD-10-CM

## 2017-10-17 DIAGNOSIS — R26.89 ANTALGIC GAIT: ICD-10-CM

## 2017-10-17 DIAGNOSIS — M25.669 DECREASED RANGE OF MOTION (ROM) OF KNEE: ICD-10-CM

## 2017-10-17 DIAGNOSIS — R26.89 DECREASED FUNCTIONAL MOBILITY: ICD-10-CM

## 2017-10-17 PROCEDURE — 97110 THERAPEUTIC EXERCISES: CPT

## 2017-10-17 NOTE — PROGRESS NOTES
"TIME RECORD    Date:  10/17/2017    Start Time:  9:13  Stop Time:  10:00    PROCEDURES:    TIMED  Procedure Min.   TE 30   TE sup 7 NC                 UNTIMED  Procedure Min.   CP 10NC         Total Timed Minutes:  30  Total Timed Units:  2  Total Untimed Units:  0  Charges Billed/# of units:  2 (TE-2)      Progress/Current Status    Subjective:     Patient ID: Inna Gallardo is a 42 y.o. female.  Diagnosis:   1. Decreased range of motion (ROM) of knee     2. Weakness of both lower extremities     3. Antalgic gait     4. Decreased functional mobility       Pain: 7 /10  She reports her knee is hurting today and she is dealing with a runaway teen. She feels she needs to be here to do her therapy even though she is having to deal with this issue.    Objective:     Patient ambulated into the clinic with an antalgic gait pattern. Patient was educated and performed therapeutic exercises as per log below, along with today's progressions and new exercises 1:1 with PT x 30 minutes and supervised exercises by PT x 7 minutes to improve ROM, flexibility, strength and gait. Patient received a cold pack at the end of today's session to L knee x 10 minutes in long sitting.     Date  10/17/17 10/11/17 10/03/17 09/27/17 09/25/17 09/20/17   VISIT 7/50 6/50 5/50 4/50 3/50 2/50   FOTO -- -- 5/5 4/5 3/5 2/5   POC EXP. DATE 11/13/17 11/13/17 11/13/17 11/13/17 11/13/17 11/13/17   FACE-TO-FACE 10/13/17 10/13/17 10/13/17 10/13/17 10/13/17 10/13/17            Bike  held Not today 5' L3 x 5' L2 x 5' --   TABLE:         Quad sets 15 x 5" 15 x 5" 15 x 5" 15 x 5" 15 x 5" 15 x 5"   SLR 2 x 10 x 1# 3 x 10 3 x 10 2 x 10 1 x 15 1 x 10   SLR w/ ER 1 x 25 2 x 10 2 x 10 1 x 15 1 x 10    Hip abduction - supine Sl 1 x 10 1 x 30 RTB 1 x 30 RTB 1 x 25 RTB 1 x 20 RTB 1 x 15 RTB   Hip adduction - supine SL 1 x 10 30 x 3" w/ ball 30 x 3" w/ ball 25 x 3" w/ ball 20 x 3" w/ ball 15 x 3" w/ ball   Marching  oot 3 x 10 3 x 10 2 x 10 1 x 15 1 x 10   Bridging "  3 x 10 3 x 10 3 x 10 2 x 10 1  x15 1 x 10   SAQ 2 x 10 x 1# 3 x 10 3 x 10 2 x 10 1 x 15 1 x 10   Heel Slides -- -- -- -- -- 1 x 10   SEATED:         LAQs 2 x 10 x 1# 1 x 25 1 x 25 2 x 10 1 x 15 1 x 10   Seated Hip Flex 2 x 10 x 1# 1 x 25 2 x 10 oot 1 x 15 1 x 10   HS curls 2 x 10 RTB oot 1 x 15 RTB oot 1 x 15 YTB 1 x 10 YTB   STANDING:         Mini squats 1 x 15 oot 1 x 10 oot 1 x 10 --   Calf raises oot oot 1 x 10      TKE -- -- -- -- -- --   Hip Abd -- -- -- -- -- --   Hip Flex -- -- -- -- -- --   Hip Ext oot oot 1 x 10 -- -- --   HS Curls -- -- -- -- -- --   Step Ups -- -- -- -- -- --            CP 10' declined 10' 10' 10' 10'            Initials DG GWA 1/6 DG DG DG GWA 1/6       Assessment:     Patient required occasional cues for positioning with side lying exercises and to increase WB on L LE with standing exercises. She was very distracted by several phone calls she received during the session. She performed all of today's activities with no increase in symptoms prior to leaving the clinic.     Patient Education/Response:     Patient was educated again on the importance of performing her HEP on a regular basis. Patient verbalized understanding instructions.     Plans and Goals:     Continue with plan of care and progress toward PT goals as patient tolerates.    Short Term Goals (4 Weeks):   1. This patient will be independent with a basic HEP.  2. This patient will increase L knee AROM equal to R knee AROM with no complaints of pain in order to sit in bottom of tub.  3. This patient will increase B LE strength by 1 grade in order to be able to perform vehicle transfers with no increase in symptoms.   4. This patient will have a pain rating of 7/10 at worst with ADLs.  5. This patient will be able to ambulate 100 feet with a normal gait pattern with no AD.  6. Patient will be able to achieve greater than or equal to 45 on the FOTO Knee Survey placing patient in 40%<60% impaired, limited, or restricted  category demonstrating overall improved functional ability with lower extremity.     Long Term Goals (8 Weeks):   1. This patient will be independent with an updated HEP.  2. This patient will have B LE strength of 5/5 in order to be able to squat and  her 3 year old daughter with no increase in symptoms.   4. This patient will have a pain rating of 3/10 at worst with ADLs.  5. This patient will be able to ambulate greater than 200 yards with a normal gait pattern with no AD.  6. Patient will be able to achieve greater than or equal to 65 on the FOTO Knee Survey placing patient in 20%<40% impaired, limited, or restricted category demonstrating overall improved functional ability with lower extremity.

## 2017-10-27 ENCOUNTER — CLINICAL SUPPORT (OUTPATIENT)
Dept: REHABILITATION | Facility: HOSPITAL | Age: 42
End: 2017-10-27
Attending: ORTHOPAEDIC SURGERY
Payer: MEDICAID

## 2017-10-27 ENCOUNTER — DOCUMENTATION ONLY (OUTPATIENT)
Dept: REHABILITATION | Facility: HOSPITAL | Age: 42
End: 2017-10-27

## 2017-10-27 DIAGNOSIS — R26.89 ANTALGIC GAIT: ICD-10-CM

## 2017-10-27 DIAGNOSIS — M25.669 DECREASED RANGE OF MOTION (ROM) OF KNEE: ICD-10-CM

## 2017-10-27 DIAGNOSIS — R29.898 WEAKNESS OF BOTH LOWER EXTREMITIES: ICD-10-CM

## 2017-10-27 DIAGNOSIS — R26.89 DECREASED FUNCTIONAL MOBILITY: ICD-10-CM

## 2017-10-27 PROCEDURE — 97110 THERAPEUTIC EXERCISES: CPT

## 2017-10-27 NOTE — PROGRESS NOTES
Face to Face PTA Conference performed with Hieu Otto, PTA regarding patient's current status, overall progress, and plan of care    Face to Face PTA Conference performed with Salma Valera, PT regarding patient's current status, overall progress, and plan of care    Hieu Otto  10/30/2017

## 2017-10-27 NOTE — PROGRESS NOTES
"TIME RECORD    Date:  10/27/2017    Start Time:  9:02  Stop Time:  9:55    PROCEDURES:    TIMED  Procedure Min.   TE 43   CP 10 NC                 UNTIMED  Procedure Min.             Total Timed Minutes:  43  Total Timed Units:  3  Total Untimed Units:  0  Charges Billed/# of units:  3 (TE-3)      Progress/Current Status    Subjective:     Patient ID: Inna Gallardo is a 42 y.o. female.  Diagnosis:   1. Decreased range of motion (ROM) of knee     2. Weakness of both lower extremities     3. Antalgic gait     4. Decreased functional mobility       Pain: 6 /10  Patient reports her L knee has been hurting more since her last visit after the weights were added.    Objective:     Patient ambulated into the clinic with an antalgic gait pattern. Patient was educated and performed therapeutic exercises as per log below, along with today's progressions, 1:1 with PTA x 43 minutes to improve ROM, flexibility, strength and gait.  Patient received a cold pack at the end of today's session to L knee x 10 minutes in long sitting.     Date  10/27/17 10/17/17 10/11/17 10/03/17 09/27/17 09/25/17 09/20/17   VISIT 8/50 7/50 6/50 5/50 4/50 3/50 2/50   FOTO -- -- -- 5/5 4/5 3/5 2/5   POC EXP. DATE 11/13/17 11/13/17 11/13/17 11/13/17 11/13/17 11/13/17 11/13/17   FACE-TO-FACE 11/27/17 10/13/17 10/13/17 10/13/17 10/13/17 10/13/17 10/13/17             Bike  -- held Not today 5' L3 x 5' L2 x 5' --   TABLE:          Quad sets 15 x 5" 15 x 5" 15 x 5" 15 x 5" 15 x 5" 15 x 5" 15 x 5"   SLR 3 x 10 2 x 10 x 1# 3 x 10 3 x 10 2 x 10 1 x 15 1 x 10   SLR w/ ER 1 x 25 1 x 25 2 x 10 2 x 10 1 x 15 1 x 10    Hip abduction - SL 1 x 10 Sl 1 x 10 1 x 30 RTB 1 x 30 RTB 1 x 25 RTB 1 x 20 RTB 1 x 15 RTB   Hip adduction - SL 1 x 10 SL 1 x 10 30 x 3" w/ ball 30 x 3" w/ ball 25 x 3" w/ ball 20 x 3" w/ ball 15 x 3" w/ ball   Marching  -- oot 3 x 10 3 x 10 2 x 10 1 x 15 1 x 10   Bridging  3 x 10 3 x 10 3 x 10 3 x 10 2 x 10 1  x15 1 x 10   SAQ 3 x 10 2 x 10 x 1# " 3 x 10 3 x 10 2 x 10 1 x 15 1 x 10   SEATED:          LAQs 3 x 10 2 x 10 x 1# 1 x 25 1 x 25 2 x 10 1 x 15 1 x 10   Seated Hip Flex 3 x 10 2 x 10 x 1# 1 x 25 2 x 10 oot 1 x 15 1 x 10   HS curls 3 x 10 RTB 2 x 10 RTB oot 1 x 15 RTB oot 1 x 15 YTB 1 x 10 YTB   STANDING:          Mini squats 1 x 15 1 x 15 oot 1 x 10 oot 1 x 10 --   Calf raises 1 x 10 oot oot 1 x 10      TKE -- -- -- -- -- -- --   Hip Abd -- -- -- -- -- -- --   Hip Flex -- -- -- -- -- -- --   Hip Ext 1 x 10 L LE oot oot 1 x 10 -- -- --   HS Curls -- -- -- -- -- -- --   Step Ups -- -- -- -- -- -- --             CP 10' 10' declined 10' 10' 10' 10'             Initials GWA 1/6 DG GWA 1/6 DG DG DG GWA 1/6       Assessment:     Patient required occasional cues for positioning with side lying exercises.  Weights were held today due to complaint of increased pain.  Patient performed hip extension standing on her L LE only due to increased pain with weight shifting onto L LE when performing exercise on R LE.    Patient Education/Response:     Patient was instructed to continue with her HEP. Patient verbalized understanding instructions.     Plans and Goals:     Continue with plan of care and progress toward PT goals as patient tolerates.    Short Term Goals (4 Weeks):   1. This patient will be independent with a basic HEP.  2. This patient will increase L knee AROM equal to R knee AROM with no complaints of pain in order to sit in bottom of tub.  3. This patient will increase B LE strength by 1 grade in order to be able to perform vehicle transfers with no increase in symptoms.   4. This patient will have a pain rating of 7/10 at worst with ADLs.  5. This patient will be able to ambulate 100 feet with a normal gait pattern with no AD.  6. Patient will be able to achieve greater than or equal to 45 on the FOTO Knee Survey placing patient in 40%<60% impaired, limited, or restricted category demonstrating overall improved functional ability with lower extremity.      Long Term Goals (8 Weeks):   1. This patient will be independent with an updated HEP.  2. This patient will have B LE strength of 5/5 in order to be able to squat and  her 3 year old daughter with no increase in symptoms.   4. This patient will have a pain rating of 3/10 at worst with ADLs.  5. This patient will be able to ambulate greater than 200 yards with a normal gait pattern with no AD.  6. Patient will be able to achieve greater than or equal to 65 on the FOTO Knee Survey placing patient in 20%<40% impaired, limited, or restricted category demonstrating overall improved functional ability with lower extremity.

## 2017-10-31 ENCOUNTER — CLINICAL SUPPORT (OUTPATIENT)
Dept: REHABILITATION | Facility: HOSPITAL | Age: 42
End: 2017-10-31
Attending: ORTHOPAEDIC SURGERY
Payer: MEDICAID

## 2017-10-31 DIAGNOSIS — R26.89 ANTALGIC GAIT: ICD-10-CM

## 2017-10-31 DIAGNOSIS — M25.669 DECREASED RANGE OF MOTION (ROM) OF KNEE: ICD-10-CM

## 2017-10-31 DIAGNOSIS — R29.898 WEAKNESS OF BOTH LOWER EXTREMITIES: ICD-10-CM

## 2017-10-31 DIAGNOSIS — R26.89 DECREASED FUNCTIONAL MOBILITY: ICD-10-CM

## 2017-10-31 PROCEDURE — 97110 THERAPEUTIC EXERCISES: CPT

## 2017-10-31 NOTE — PROGRESS NOTES
"TIME RECORD    Date:  10/31/2017    Start Time:  9:00  Stop Time:  10:00    PROCEDURES:    TIMED  Procedure Min.   TE 45   CP 10 NC   TE sup 5NC             UNTIMED  Procedure Min.             Total Timed Minutes:  45  Total Timed Units:  3  Total Untimed Units:  0  Charges Billed/# of units:  3 (TE-3)      Progress/Current Status    Subjective:     Patient ID: Inna Gallardo is a 42 y.o. female.  Diagnosis:   1. Decreased range of motion (ROM) of knee     2. Weakness of both lower extremities     3. Antalgic gait     4. Decreased functional mobility       Pain: 7 /10  She reports her L knee is swollen today and is hurting more. She has been doing her HEP but not icing her knee.     Objective:     Patient ambulated into the clinic with an antalgic gait pattern. Patient was educated and performed therapeutic exercises as per log below, along with today's progressions, 1:1 with PT x 45 minutes and supervised by PT x 5 minutes to improve ROM, flexibility, strength and gait. Patient received a cold pack at the end of today's session to L knee x 10 minutes in long sitting.     Date  10/31/17 10/27/17 10/17/17 10/11/17 10/03/17 09/27/17 09/25/17 09/20/17   VISIT 9/50 8/50 7/50 6/50 5/50 4/50 3/50 2/50   FOTO -- -- -- -- 5/5 4/5 3/5 2/5   POC EXP. DATE 11/13/17 11/13/17 11/13/17 11/13/17 11/13/17 11/13/17 11/13/17 11/13/17   FACE-TO-FACE 11/27/17 11/27/17 10/13/17 10/13/17 10/13/17 10/13/17 10/13/17 10/13/17              Bike  -- -- held Not today 5' L3 x 5' L2 x 5' --   TABLE:           Quad sets 15 x 5" 15 x 5" 15 x 5" 15 x 5" 15 x 5" 15 x 5" 15 x 5" 15 x 5"   SLR 3 x 10 3 x 10 2 x 10 x 1# 3 x 10 3 x 10 2 x 10 1 x 15 1 x 10   SLR w/ ER 3 x 10 1 x 25 1 x 25 2 x 10 2 x 10 1 x 15 1 x 10    Hip abduction - SL 2 x 10 1 x 10 Sl 1 x 10 1 x 30 RTB 1 x 30 RTB 1 x 25 RTB 1 x 20 RTB 1 x 15 RTB   Hip adduction - SL 2 x 10 1 x 10 SL 1 x 10 30 x 3" w/ ball 30 x 3" w/ ball 25 x 3" w/ ball 20 x 3" w/ ball 15 x 3" w/ ball "   Marching  -- -- oot 3 x 10 3 x 10 2 x 10 1 x 15 1 x 10   Bridging  3 x 10  3 x 10 3 x 10 3 x 10 3 x 10 2 x 10 1  x15 1 x 10   SAQ 3 x 10  3 x 10 2 x 10 x 1# 3 x 10 3 x 10 2 x 10 1 x 15 1 x 10   SEATED:           LAQs 3 x 10 3 x 10 2 x 10 x 1# 1 x 25 1 x 25 2 x 10 1 x 15 1 x 10   Seated Hip Flex 3 x 10  3 x 10 2 x 10 x 1# 1 x 25 2 x 10 oot 1 x 15 1 x 10   HS curls 3 x 10 RTB 3 x 10 RTB 2 x 10 RTB oot 1 x 15 RTB oot 1 x 15 YTB 1 x 10 YTB   STANDING:           Mini squats 1 x 15 1 x 15 1 x 15 oot 1 x 10 oot 1 x 10 --   Calf raises 1 x 10 1 x 10 oot oot 1 x 10      TKE -- -- -- -- -- -- -- --   Hip Abd -- -- -- -- -- -- -- --   Hip Flex -- -- -- -- -- -- -- --   Hip Ext 1 x 10 B LE 1 x 10 L LE oot oot 1 x 10 -- -- --   HS Curls -- -- -- -- -- -- -- --   Step Ups -- -- -- -- -- -- -- --              CP  10' 10' declined 10' 10' 10' 10'              Initials [facility] 1/6 [facility] 1/6 [facility] 1/6       Assessment:     She continues to require cues to perform her exercises correctly and to keep her exercises in a pain free range of motion. She was able to perform all of today's activities with no increase in symptoms prior to leaving the clinic.     Patient Education/Response:     Patient was instructed to continue with her HEP. Patient verbalized understanding instructions.     Plans and Goals:     Continue with plan of care and progress toward PT goals as patient tolerates.    Short Term Goals (4 Weeks):   1. This patient will be independent with a basic HEP.  2. This patient will increase L knee AROM equal to R knee AROM with no complaints of pain in order to sit in bottom of tub.  3. This patient will increase B LE strength by 1 grade in order to be able to perform vehicle transfers with no increase in symptoms.   4. This patient will have a pain rating of 7/10 at worst with ADLs.  5. This patient will be able to ambulate 100 feet with a normal gait pattern with no AD.  6. Patient will be able to achieve greater than  or equal to 45 on the FOTO Knee Survey placing patient in 40%<60% impaired, limited, or restricted category demonstrating overall improved functional ability with lower extremity.     Long Term Goals (8 Weeks):   1. This patient will be independent with an updated HEP.  2. This patient will have B LE strength of 5/5 in order to be able to squat and  her 3 year old daughter with no increase in symptoms.   4. This patient will have a pain rating of 3/10 at worst with ADLs.  5. This patient will be able to ambulate greater than 200 yards with a normal gait pattern with no AD.  6. Patient will be able to achieve greater than or equal to 65 on the FOTO Knee Survey placing patient in 20%<40% impaired, limited, or restricted category demonstrating overall improved functional ability with lower extremity.

## 2017-11-06 ENCOUNTER — CLINICAL SUPPORT (OUTPATIENT)
Dept: REHABILITATION | Facility: HOSPITAL | Age: 42
End: 2017-11-06
Attending: ORTHOPAEDIC SURGERY
Payer: MEDICAID

## 2017-11-06 DIAGNOSIS — R26.89 DECREASED FUNCTIONAL MOBILITY: ICD-10-CM

## 2017-11-06 DIAGNOSIS — M25.669 DECREASED RANGE OF MOTION (ROM) OF KNEE: ICD-10-CM

## 2017-11-06 DIAGNOSIS — R26.89 ANTALGIC GAIT: ICD-10-CM

## 2017-11-06 DIAGNOSIS — R29.898 WEAKNESS OF BOTH LOWER EXTREMITIES: ICD-10-CM

## 2017-11-06 PROCEDURE — 97110 THERAPEUTIC EXERCISES: CPT

## 2017-11-06 NOTE — PROGRESS NOTES
"TIME RECORD    Date:  11/06/2017    Start Time:  9:03  Stop Time:  9:53    PROCEDURES:    TIMED  Procedure Min.   TE 43   CP 10 NC                 UNTIMED  Procedure Min.             Total Timed Minutes:  43  Total Timed Units:  3  Total Untimed Units:  0  Charges Billed/# of units:  3 (TE-3)      Progress/Current Status    Subjective:     Patient ID: Inna Gallardo is a 42 y.o. female.  Diagnosis:   1. Decreased range of motion (ROM) of knee     2. Weakness of both lower extremities     3. Antalgic gait     4. Decreased functional mobility       Pain: 7 /10  She reports her knee is still hurting today.     Objective:     Patient ambulated into the clinic with a normal gait pattern. Patient was educated and performed therapeutic exercises as per log below, along with today's progressions, 1:1 with PT x 43 minutes to improve ROM, flexibility, strength and gait. Patient received a cold pack at the end of today's session to L knee x 10 minutes in long sitting.     Date  11/06/17 10/31/17 10/27/17 10/17/17 10/11/17 10/03/17 09/27/17 09/25/17 09/20/17   VISIT 10/50 9/50 8/50 7/50 6/50 5/50 4/50 3/50 2/50   FOTO - -- -- -- -- 5/5 4/5 3/5 2/5   POC EXP. DATE 11/13/17 11/13/17 11/13/17 11/13/17 11/13/17 11/13/17 11/13/17 11/13/17 11/13/17   FACE-TO-FACE 11/27/17 11/27/17 11/27/17 10/13/17 10/13/17 10/13/17 10/13/17 10/13/17 10/13/17               Bike  -- -- -- held Not today 5' L3 x 5' L2 x 5' --   TABLE:            Quad sets -- 15 x 5" 15 x 5" 15 x 5" 15 x 5" 15 x 5" 15 x 5" 15 x 5" 15 x 5"   SLR 2 x 10 x 1# 3 x 10 3 x 10 2 x 10 x 1# 3 x 10 3 x 10 2 x 10 1 x 15 1 x 10   SLR w/ ER 3 x 10 3 x 10 1 x 25 1 x 25 2 x 10 2 x 10 1 x 15 1 x 10    Hip abduction - SL 2 x 10 2 x 10 1 x 10 Sl 1 x 10 1 x 30 RTB 1 x 30 RTB 1 x 25 RTB 1 x 20 RTB 1 x 15 RTB   Hip adduction - SL 2 x 10 2 x 10 1 x 10 SL 1 x 10 30 x 3" w/ ball 30 x 3" w/ ball 25 x 3" w/ ball 20 x 3" w/ ball 15 x 3" w/ ball   Marching  -- -- -- oot 3 x 10 3 x 10 2 x 10 " 1 x 15 1 x 10   Bridging  3 x 10 3 x 10  3 x 10 3 x 10 3 x 10 3 x 10 2 x 10 1  x15 1 x 10   SAQ 2 x 10 x 1# 3 x 10  3 x 10 2 x 10 x 1# 3 x 10 3 x 10 2 x 10 1 x 15 1 x 10   SEATED:            LAQs 3 x 10 3 x 10 3 x 10 2 x 10 x 1# 1 x 25 1 x 25 2 x 10 1 x 15 1 x 10   Seated Hip Flex 3 x 10 3 x 10  3 x 10 2 x 10 x 1# 1 x 25 2 x 10 oot 1 x 15 1 x 10   HS curls 2 x 10 GTB 3 x 10 RTB 3 x 10 RTB 2 x 10 RTB oot 1 x 15 RTB oot 1 x 15 YTB 1 x 10 YTB   STANDING:            Mini squats 1 x 15 1 x 15 1 x 15 1 x 15 oot 1 x 10 oot 1 x 10 --   Calf raises 1 x 15 1 x 10 1 x 10 oot oot 1 x 10      TKE -- -- -- -- -- -- -- -- --   Hip Abd -- -- -- -- -- -- -- -- --   Hip Flex -- -- -- -- -- -- -- -- --   Hip Ext 1 x 15 B LE 1 x 10 B LE 1 x 10 L LE oot oot 1 x 10 -- -- --   HS Curls -- -- -- -- -- -- -- -- --   Step Ups -- -- -- -- -- -- -- -- --               CP 10'  10' 10' declined 10' 10' 10' 10'               Initials DG  GWA 1/6 DG GWA 1/6 [facility] GWA 1/6       Assessment:     She continues to require cues for side lying exercises and to avoid using momentum with her mat exercises. She performed all of today's progressions with no increase in symptoms prior to leaving the clinic.     Patient Education/Response:     Patient was instructed to continue with her HEP. Patient verbalized understanding instructions.     Plans and Goals:     Continue with plan of care and progress toward PT goals as patient tolerates.    Short Term Goals (4 Weeks):   1. This patient will be independent with a basic HEP.  2. This patient will increase L knee AROM equal to R knee AROM with no complaints of pain in order to sit in bottom of tub.  3. This patient will increase B LE strength by 1 grade in order to be able to perform vehicle transfers with no increase in symptoms.   4. This patient will have a pain rating of 7/10 at worst with ADLs.  5. This patient will be able to ambulate 100 feet with a normal gait pattern with no AD.  6. Patient will be  able to achieve greater than or equal to 45 on the FOTO Knee Survey placing patient in 40%<60% impaired, limited, or restricted category demonstrating overall improved functional ability with lower extremity.     Long Term Goals (8 Weeks):   1. This patient will be independent with an updated HEP.  2. This patient will have B LE strength of 5/5 in order to be able to squat and  her 3 year old daughter with no increase in symptoms.   4. This patient will have a pain rating of 3/10 at worst with ADLs.  5. This patient will be able to ambulate greater than 200 yards with a normal gait pattern with no AD.  6. Patient will be able to achieve greater than or equal to 65 on the FOTO Knee Survey placing patient in 20%<40% impaired, limited, or restricted category demonstrating overall improved functional ability with lower extremity.

## 2017-11-08 ENCOUNTER — CLINICAL SUPPORT (OUTPATIENT)
Dept: REHABILITATION | Facility: HOSPITAL | Age: 42
End: 2017-11-08
Attending: ORTHOPAEDIC SURGERY
Payer: MEDICAID

## 2017-11-08 DIAGNOSIS — R26.89 DECREASED FUNCTIONAL MOBILITY: ICD-10-CM

## 2017-11-08 DIAGNOSIS — M25.669 DECREASED RANGE OF MOTION (ROM) OF KNEE: ICD-10-CM

## 2017-11-08 DIAGNOSIS — R29.898 WEAKNESS OF BOTH LOWER EXTREMITIES: ICD-10-CM

## 2017-11-08 DIAGNOSIS — R26.89 ANTALGIC GAIT: ICD-10-CM

## 2017-11-08 PROCEDURE — 97110 THERAPEUTIC EXERCISES: CPT

## 2017-11-08 NOTE — PROGRESS NOTES
"TIME RECORD    Date:  11/08/2017    Start Time:  9:08  Stop Time:  9:58    PROCEDURES:    TIMED  Procedure Min.   TE 25   TE sup 15 NC   CP 10 NC             UNTIMED  Procedure Min.             Total Timed Minutes:  25  Total Timed Units:  2  Total Untimed Units:  0  Charges Billed/# of units:  2 (TE-2)      Progress/Current Status    Subjective:     Patient ID: Inna Gallardo is a 42 y.o. female.  Diagnosis:   1. Decreased range of motion (ROM) of knee     2. Weakness of both lower extremities     3. Antalgic gait     4. Decreased functional mobility       Pain: 8 /10  Patient reports having a lot of pain in her L knee, more medial.  Patient reports feeling dizzy today from the headache she is currently experiencing.     Objective:     Patient ambulated into the clinic with a normal gait pattern. Patient was educated and performed therapeutic exercises as per log below, along with today's progressions, 1:1 with PTA x 25 minutes and supervised exercises by PTA x 15 minutes to improve ROM, flexibility, strength and gait. Patient received a cold pack at the end of today's session to L knee x 10 minutes in long sitting.     Date  11/08/17 11/06/17 10/31/17 10/27/17 10/17/17 10/11/17 10/03/17 09/27/17 09/25/17 09/20/17   VISIT 11/50 10/50 9/50 8/50 7/50 6/50 5/50 4/50 3/50 2/50   FOTO -- - -- -- -- -- 5/5 4/5 3/5 2/5   POC EXP. DATE 11/13/17 11/13/17 11/13/17 11/13/17 11/13/17 11/13/17 11/13/17 11/13/17 11/13/17 11/13/17   FACE-TO-FACE 11/27/17 11/27/17 11/27/17 11/27/17 10/13/17 10/13/17 10/13/17 10/13/17 10/13/17 10/13/17                Bike  -- -- -- -- held Not today 5' L3 x 5' L2 x 5' --   TABLE:             Quad sets -- -- 15 x 5" 15 x 5" 15 x 5" 15 x 5" 15 x 5" 15 x 5" 15 x 5" 15 x 5"   SLR 2 x 10 x 1# 2 x 10 x 1# 3 x 10 3 x 10 2 x 10 x 1# 3 x 10 3 x 10 2 x 10 1 x 15 1 x 10   SLR w/ ER 3 x 10 3 x 10 3 x 10 1 x 25 1 x 25 2 x 10 2 x 10 1 x 15 1 x 10    Hip abduction - SL 3 x 10 GTB supine 2 x 10 2 x 10 1 x 10 " "Sl 1 x 10 1 x 30 RTB 1 x 30 RTB 1 x 25 RTB 1 x 20 RTB 1 x 15 RTB   Hip adduction - SL 30 x 3" w/ ball 2 x 10 2 x 10 1 x 10 SL 1 x 10 30 x 3" w/ ball 30 x 3" w/ ball 25 x 3" w/ ball 20 x 3" w/ ball 15 x 3" w/ ball   Marching  -- -- -- -- oot 3 x 10 3 x 10 2 x 10 1 x 15 1 x 10   Bridging  Not done todfay 3 x 10 3 x 10  3 x 10 3 x 10 3 x 10 3 x 10 2 x 10 1  x15 1 x 10   SAQ 3 x 10 x 1# 2 x 10 x 1# 3 x 10  3 x 10 2 x 10 x 1# 3 x 10 3 x 10 2 x 10 1 x 15 1 x 10   SEATED:             LAQs 2 x 10 x 1# 3 x 10 3 x 10 3 x 10 2 x 10 x 1# 1 x 25 1 x 25 2 x 10 1 x 15 1 x 10   Seated Hip Flex 2 x 10 x 1# 3 x 10 3 x 10  3 x 10 2 x 10 x 1# 1 x 25 2 x 10 oot 1 x 15 1 x 10   HS curls 3 x 10 GTB 2 x 10 GTB 3 x 10 RTB 3 x 10 RTB 2 x 10 RTB oot 1 x 15 RTB oot 1 x 15 YTB 1 x 10 YTB   STANDING:             Mini squats 1 x 15 1 x 15 1 x 15 1 x 15 1 x 15 oot 1 x 10 oot 1 x 10 --   Calf raises 1 x 15 1 x 15 1 x 10 1 x 10 oot oot 1 x 10      TKE -- -- -- -- -- -- -- -- -- --   Hip Abd -- -- -- -- -- -- -- -- -- --   Hip Flex -- -- -- -- -- -- -- -- -- --   Hip Ext 1 x 15 B LE 1 x 15 B LE 1 x 10 B LE 1 x 10 L LE oot oot 1 x 10 -- -- --   HS Curls -- -- -- -- -- -- -- -- -- --   Step Ups -- -- -- -- -- -- -- -- -- --                CP 10' 10'  10' 10' declined 10' 10' 10' 10'                Initials GWA 1/6 DG DG GWA 1/6 DG GWA 1/6 DG DG DG GWA 1/6       Assessment:     She continues to require cues to avoid using momentum with her mat exercises.  Patient did not perform side lying exercises today due to feeling dizzy when lying down.  All of her mat exercises were performed in long sitting today.    Patient Education/Response:     Patient was instructed to continue with her HEP. Patient verbalized understanding instructions.     Plans and Goals:     Continue with plan of care and progress toward PT goals as patient tolerates.    Short Term Goals (4 Weeks):   1. This patient will be independent with a basic HEP.  2. This patient will increase L " knee AROM equal to R knee AROM with no complaints of pain in order to sit in bottom of tub.  3. This patient will increase B LE strength by 1 grade in order to be able to perform vehicle transfers with no increase in symptoms.   4. This patient will have a pain rating of 7/10 at worst with ADLs.  5. This patient will be able to ambulate 100 feet with a normal gait pattern with no AD.  6. Patient will be able to achieve greater than or equal to 45 on the FOTO Knee Survey placing patient in 40%<60% impaired, limited, or restricted category demonstrating overall improved functional ability with lower extremity.     Long Term Goals (8 Weeks):   1. This patient will be independent with an updated HEP.  2. This patient will have B LE strength of 5/5 in order to be able to squat and  her 3 year old daughter with no increase in symptoms.   4. This patient will have a pain rating of 3/10 at worst with ADLs.  5. This patient will be able to ambulate greater than 200 yards with a normal gait pattern with no AD.  6. Patient will be able to achieve greater than or equal to 65 on the FOTO Knee Survey placing patient in 20%<40% impaired, limited, or restricted category demonstrating overall improved functional ability with lower extremity.

## 2017-11-13 ENCOUNTER — CLINICAL SUPPORT (OUTPATIENT)
Dept: REHABILITATION | Facility: HOSPITAL | Age: 42
End: 2017-11-13
Attending: ORTHOPAEDIC SURGERY
Payer: MEDICAID

## 2017-11-13 DIAGNOSIS — R29.898 WEAKNESS OF BOTH LOWER EXTREMITIES: ICD-10-CM

## 2017-11-13 DIAGNOSIS — R26.89 ANTALGIC GAIT: ICD-10-CM

## 2017-11-13 DIAGNOSIS — M25.669 DECREASED RANGE OF MOTION (ROM) OF KNEE: ICD-10-CM

## 2017-11-13 DIAGNOSIS — R26.89 DECREASED FUNCTIONAL MOBILITY: ICD-10-CM

## 2017-11-13 PROCEDURE — 97110 THERAPEUTIC EXERCISES: CPT

## 2017-11-13 NOTE — PROGRESS NOTES
"TIME RECORD    Date:  11/13/2017    Start Time:  9:00  Stop Time:  9:30    PROCEDURES:    TIMED  Procedure Min.   TE 23   TE sup 7 NC                 UNTIMED  Procedure Min.             Total Timed Minutes:  23  Total Timed Units:  2  Total Untimed Units:  0  Charges Billed/# of units:  2 (TE-2)      Progress/Current Status    Subjective:     Patient ID: Inna Gallardo is a 42 y.o. female.  Diagnosis:   1. Decreased range of motion (ROM) of knee     2. Weakness of both lower extremities     3. Antalgic gait     4. Decreased functional mobility       Pain: 6 /10  Patient reports her swelling is down a little in her L knee so it does not hurt quite as much today and is not feeling as dizzy but still can not lie down supine.  Patient reports she needs to leave a little early as she has another appointment to go to this morning.     Objective:     Patient ambulated into the clinic with a normal gait pattern. Patient was educated and performed therapeutic exercises as per log below 1:1 with PTA x 23 minutes and supervised exercises by PTA x 7 minutes to improve ROM, flexibility, strength and gait. Patient declined a cold pack at the end of today's session.     Date  11/13/17 11/08/17 11/06/17 10/31/17 10/27/17 10/17/17 10/11/17 10/03/17 09/27/17 09/25/17 09/20/17   VISIT 12/50 11/50 10/50 9/50 8/50 7/50 6/50 5/50 4/50 3/50 2/50   FOTO -- -- - -- -- -- -- 5/5 4/5 3/5 2/5   POC EXP. DATE 11/13/17 11/13/17 11/13/17 11/13/17 11/13/17 11/13/17 11/13/17 11/13/17 11/13/17 11/13/17 11/13/17   FACE-TO-FACE 11/27/17 11/27/17 11/27/17 11/27/17 11/27/17 10/13/17 10/13/17 10/13/17 10/13/17 10/13/17 10/13/17                 Bike  -- -- -- -- -- held Not today 5' L3 x 5' L2 x 5' --   TABLE:              Quad sets -- -- -- 15 x 5" 15 x 5" 15 x 5" 15 x 5" 15 x 5" 15 x 5" 15 x 5" 15 x 5"   SLR 2 x 10 x 1# 2 x 10 x 1# 2 x 10 x 1# 3 x 10 3 x 10 2 x 10 x 1# 3 x 10 3 x 10 2 x 10 1 x 15 1 x 10   SLR w/ ER 3 x 10 3 x 10 3 x 10 3 x 10 1 x " "25 1 x 25 2 x 10 2 x 10 1 x 15 1 x 10    Hip abduction - SL 3 x 10 GTB supine 3 x 10 GTB supine 2 x 10 2 x 10 1 x 10 Sl 1 x 10 1 x 30 RTB 1 x 30 RTB 1 x 25 RTB 1 x 20 RTB 1 x 15 RTB   Hip adduction - SL 30 x 3" w/ ball 30 x 3" w/ ball 2 x 10 2 x 10 1 x 10 SL 1 x 10 30 x 3" w/ ball 30 x 3" w/ ball 25 x 3" w/ ball 20 x 3" w/ ball 15 x 3" w/ ball   Marching  -- -- -- -- -- oot 3 x 10 3 x 10 2 x 10 1 x 15 1 x 10   Bridging  Not done today Not done today 3 x 10 3 x 10  3 x 10 3 x 10 3 x 10 3 x 10 2 x 10 1  x15 1 x 10   SAQ 3 x 10 x 1# 3 x 10 x 1# 2 x 10 x 1# 3 x 10  3 x 10 2 x 10 x 1# 3 x 10 3 x 10 2 x 10 1 x 15 1 x 10   SEATED:              LAQs 2 x 10 x 1# 2 x 10 x 1# 3 x 10 3 x 10 3 x 10 2 x 10 x 1# 1 x 25 1 x 25 2 x 10 1 x 15 1 x 10   Seated Hip Flex 2 x 10 x 1# 2 x 10 x 1# 3 x 10 3 x 10  3 x 10 2 x 10 x 1# 1 x 25 2 x 10 oot 1 x 15 1 x 10   HS curls 3 x 10 GTB 3 x 10 GTB 2 x 10 GTB 3 x 10 RTB 3 x 10 RTB 2 x 10 RTB oot 1 x 15 RTB oot 1 x 15 YTB 1 x 10 YTB   STANDING:              Mini squats 1 x 15 1 x 15 1 x 15 1 x 15 1 x 15 1 x 15 oot 1 x 10 oot 1 x 10 --   Calf raises 1 x 15 1 x 15 1 x 15 1 x 10 1 x 10 oot oot 1 x 10      TKE -- -- -- -- -- -- -- -- -- -- --   Hip Abd -- -- -- -- -- -- -- -- -- -- --   Hip Flex -- -- -- -- -- -- -- -- -- -- --   Hip Ext 1 x 15 BLE 1 x 15 B LE 1 x 15 B LE 1 x 10 B LE 1 x 10 L LE oot oot 1 x 10 -- -- --   HS Curls -- -- -- -- -- -- -- -- -- -- --   Step Ups -- -- -- -- -- -- -- -- -- -- --                 CP declined 10' 10'  10' 10' declined 10' 10' 10' 10'                 Initials GWA 2/6 GWA 1/6 DG DG GWA 1/6 DG GWA 1/6 DG DG DG GWA 1/6       Assessment:     She continues to require cues to avoid using momentum with her mat exercises.  Patient did not perform side lying exercises today due to feeling dizzy when lying down and did not perform bridging due to complaint of increased knee pain when attempting.  All of her mat exercises were performed in long sitting today.    Patient " Education/Response:     Patient was instructed to continue with her HEP.  Patient verbalized understanding instructions.     Plans and Goals:     Continue with plan of care and progress toward PT goals as patient tolerates.    Short Term Goals (4 Weeks):   1. This patient will be independent with a basic HEP.  2. This patient will increase L knee AROM equal to R knee AROM with no complaints of pain in order to sit in bottom of tub.  3. This patient will increase B LE strength by 1 grade in order to be able to perform vehicle transfers with no increase in symptoms.   4. This patient will have a pain rating of 7/10 at worst with ADLs.  5. This patient will be able to ambulate 100 feet with a normal gait pattern with no AD.  6. Patient will be able to achieve greater than or equal to 45 on the FOTO Knee Survey placing patient in 40%<60% impaired, limited, or restricted category demonstrating overall improved functional ability with lower extremity.     Long Term Goals (8 Weeks):   1. This patient will be independent with an updated HEP.  2. This patient will have B LE strength of 5/5 in order to be able to squat and  her 3 year old daughter with no increase in symptoms.   4. This patient will have a pain rating of 3/10 at worst with ADLs.  5. This patient will be able to ambulate greater than 200 yards with a normal gait pattern with no AD.  6. Patient will be able to achieve greater than or equal to 65 on the FOTO Knee Survey placing patient in 20%<40% impaired, limited, or restricted category demonstrating overall improved functional ability with lower extremity.

## 2017-11-16 ENCOUNTER — CLINICAL SUPPORT (OUTPATIENT)
Dept: REHABILITATION | Facility: HOSPITAL | Age: 42
End: 2017-11-16
Attending: ORTHOPAEDIC SURGERY
Payer: MEDICAID

## 2017-11-16 DIAGNOSIS — R26.89 ANTALGIC GAIT: ICD-10-CM

## 2017-11-16 DIAGNOSIS — M25.669 DECREASED RANGE OF MOTION (ROM) OF KNEE: ICD-10-CM

## 2017-11-16 DIAGNOSIS — R29.898 WEAKNESS OF BOTH LOWER EXTREMITIES: ICD-10-CM

## 2017-11-16 DIAGNOSIS — R26.89 DECREASED FUNCTIONAL MOBILITY: ICD-10-CM

## 2017-11-16 PROCEDURE — 97110 THERAPEUTIC EXERCISES: CPT

## 2017-11-16 NOTE — PATIENT INSTRUCTIONS
Strengthening: Hip Adduction - Isometric        With ball or folded pillow between knees, squeeze knees together. Hold 3 seconds.  Repeat 10 times per set. Do 3 sets per session. Do 2 sessions per day.     Strengthening: Straight Leg Raise (Phase 1)        Tighten muscles on front of right thigh, then lift leg, keeping knee locked.   Repeat 10 times per set. Do 3 sets per session. Do 2 sessions per day.      Strengthening: Hip Abduction (Side-Lying)        Tighten muscles on front of left thigh, then lift leg, keeping knee locked.   Repeat 10 times per set. Do 3 sets per session. Do 2 sessions per day.     Strengthening: Hip Adduction (Side-Lying)        Tighten muscles on front of right thigh, then lift leg, keeping knee locked.   Repeat 10 times per set. Do 3 sets per session. Do 2 sessions per day.     Strengthening: Hip Adduction - Resisted        With tubing around left leg, bring leg across body.  Repeat 10 times per set. Do 3 sets per session. Do 2 sessions per day.     Strengthening: Hip Abduction - Resisted        With tubing around right leg, other side toward anchor, extend leg out from side.  Repeat 10 times per set. Do 3 sets per session. Do 2 sessions per day.     Strengthening: Hip Extension - Resisted        With tubing around right ankle, face anchor and pull leg straight back.  Repeat 10 times per set. Do 3 sets per session. Do 2 sessions per day.     Strengthening: Hip Flexion - Resisted        With tubing around left ankle, anchor behind, bring leg forward, keeping knee straight.  Repeat 10 times per set. Do 3 sets per session. Do 2 sessions per day.     Hamstring Curl: Resisted (Sitting)        Facing anchor with tubing on right ankle, leg straight out, bend knee.  Repeat 10 times per set. Do 3 sets per session. Do 2 sessions per day.      Strengthening: Hip Abductor - Resisted        With band looped around both legs above knees, push thighs apart.  Repeat 10 times per set. Do 3 sets per  session. Do 2 sessions per day.       Knee Extension: Resisted (Sitting)        With band looped around right ankle and under other foot, straighten leg with ankle loop. Keep other leg bent to increase resistance.  Repeat 10 times per set. Do 3 sets per session. Do 2 sessions per day.     Straight Leg Raise: With External Leg Rotation        Lie on back with right leg straight, opposite leg bent. Rotate straight leg out and lift.  Repeat 10 times per set. Do 3 sets per session. Do 2 sessions per day.       Functional Quadriceps: Chair Squat        Keeping feet flat on floor, shoulder width apart, squat as low as is comfortable. Use support as necessary.  Repeat 10 times per set. Do 3 sets per session. Do 2 sessions per day.

## 2017-11-16 NOTE — PLAN OF CARE
REHAB SERVICES OUTPATIENT DISCHARGE SUMMARY  Physical Therapy      Name:  Inna Gallardo  Date:  11/16/17  Date of Evaluation:  09/13/17  Physician:  Calvin Nguyen MD  Total # Of Visits: 13   Cancelled:  6  No Shows:  0  Diagnosis:    1. Decreased range of motion (ROM) of knee     2. Weakness of both lower extremities     3. Antalgic gait     4. Decreased functional mobility         Physical/Functional Status:  At time of discharge, patient was able to demonstrated an increase in AROM of her L knee equal to her R knee with no complaints of pain. Her L LE strength has improved but she continues to have weakness with L knee extension and hip abduction compared to R. She was able to ambulate today with a normal gait pattern inside the clinic. Her current score on FOTO Knee Survey places her in the 20%<40% impaired, limited, or restricted category. She is independent with her HEP and has reached her max rehab potential at this time. She is ready for discharge to [facility].     The patient is to be discharged from our Therapy service for the following reason(s):  Patient has reached the maximum rehab potential for the present time    Degree of Goal Achievement:  Patient has partially met goals    Patient Education:  Patient educated to continue with her HEP on a regular basis.    Discharge Plan:  Home Program:  For LE strength, flexibility, ROM, and gait.    Salma Valera, PT

## 2017-11-16 NOTE — PROGRESS NOTES
"TIME RECORD    Date:  11/16/2017    Start Time:  10:00  Stop Time:  11:00    PROCEDURES:    TIMED  Procedure Min.   TE 25   TE sup 25NC       CP 10         UNTIMED  Procedure Min.             Total Timed Minutes:  25  Total Timed Units:  2  Total Untimed Units:  0  Charges Billed/# of units:  2 (TE-2)      Progress/Current Status    Subjective:     Patient ID: Inna Gallardo is a 42 y.o. female.  Diagnosis:   1. Decreased range of motion (ROM) of knee     2. Weakness of both lower extremities     3. Antalgic gait     4. Decreased functional mobility       Pain: 5 /10  She reports feeling better and she is ready for discharge to [facility].    Objective:     Patient ambulated into the clinic with a normal gait pattern. Patient was educated and performed therapeutic exercises as per log below, along with today's reassessment supervised by PT x 25 minutes and 1:1 with PT x 25 minutes to improve ROM, flexibility, strength and gait. Patient received a cold pack to L knee x 10 minutes at the end of the session     Date  11/16/17 11/13/17 11/08/17 11/06/17 10/31/17 10/27/17 10/17/17 10/11/17 10/03/17 09/27/17 09/25/17 09/20/17   VISIT 13/50 12/50 11/50 10/50 9/50 8/50 7/50 6/50 5/50 4/50 3/50 2/50   FOTO -- -- -- - -- -- -- -- 5/5 4/5 3/5 2/5   POC EXP. DATE -- 11/13/17 11/13/17 11/13/17 11/13/17 11/13/17 11/13/17 11/13/17 11/13/17 11/13/17 11/13/17 11/13/17   FACE-TO-FACE -- 11/27/17 11/27/17 11/27/17 11/27/17 11/27/17 10/13/17 10/13/17 10/13/17 10/13/17 10/13/17 10/13/17                  Bike   -- -- -- -- -- held Not today 5' L3 x 5' L2 x 5' --   TABLE:               Quad sets  -- -- -- 15 x 5" 15 x 5" 15 x 5" 15 x 5" 15 x 5" 15 x 5" 15 x 5" 15 x 5"   SLR 2 x 10 x 1# 2 x 10 x 1# 2 x 10 x 1# 2 x 10 x 1# 3 x 10 3 x 10 2 x 10 x 1# 3 x 10 3 x 10 2 x 10 1 x 15 1 x 10   SLR w/ ER 2 x 10 x 1# 3 x 10 3 x 10 3 x 10 3 x 10 1 x 25 1 x 25 2 x 10 2 x 10 1 x 15 1 x 10    Hip abduction - SL 3 x 10 GTB 3 x 10 GTB supine 3 x 10 GTB " "supine 2 x 10 2 x 10 1 x 10 Sl 1 x 10 1 x 30 RTB 1 x 30 RTB 1 x 25 RTB 1 x 20 RTB 1 x 15 RTB   Hip adduction - SL 30 x 3" w/ ball 30 x 3" w/ ball 30 x 3" w/ ball 2 x 10 2 x 10 1 x 10 SL 1 x 10 30 x 3" w/ ball 30 x 3" w/ ball 25 x 3" w/ ball 20 x 3" w/ ball 15 x 3" w/ ball   Marching  -- -- -- -- -- -- oot 3 x 10 3 x 10 2 x 10 1 x 15 1 x 10   Bridging  oot Not done today Not done today 3 x 10 3 x 10  3 x 10 3 x 10 3 x 10 3 x 10 2 x 10 1  x15 1 x 10   SAQ 3 x 10 x 1# 3 x 10 x 1# 3 x 10 x 1# 2 x 10 x 1# 3 x 10  3 x 10 2 x 10 x 1# 3 x 10 3 x 10 2 x 10 1 x 15 1 x 10   SEATED:               LAQs 2 x 10 x 1# 2 x 10 x 1# 2 x 10 x 1# 3 x 10 3 x 10 3 x 10 2 x 10 x 1# 1 x 25 1 x 25 2 x 10 1 x 15 1 x 10   Seated Hip Flex 2 x 10 x 1# 2 x 10 x 1# 2 x 10 x 1# 3 x 10 3 x 10  3 x 10 2 x 10 x 1# 1 x 25 2 x 10 oot 1 x 15 1 x 10   HS curls oot 3 x 10 GTB 3 x 10 GTB 2 x 10 GTB 3 x 10 RTB 3 x 10 RTB 2 x 10 RTB oot 1 x 15 RTB oot 1 x 15 YTB 1 x 10 YTB   STANDING:               Mini squats oot 1 x 15 1 x 15 1 x 15 1 x 15 1 x 15 1 x 15 oot 1 x 10 oot 1 x 10 --   Calf raises oot 1 x 15 1 x 15 1 x 15 1 x 10 1 x 10 oot oot 1 x 10      TKE -- -- -- -- -- -- -- -- -- -- -- --   Hip Abd -- -- -- -- -- -- -- -- -- -- -- --   Hip Flex -- -- -- -- -- -- -- -- -- -- -- --   Hip Ext oot 1 x 15 BLE 1 x 15 B LE 1 x 15 B LE 1 x 10 B LE 1 x 10 L LE oot oot 1 x 10 -- -- --   HS Curls -- -- -- -- -- -- -- -- -- -- -- --   Step Ups -- -- -- -- -- -- -- -- -- -- -- --                  CP 10' declined 10' 10'  10' 10' declined 10' 10' 10' 10'                  Initials DG GWA 2/6 GWA 1/6 DG DG GWA 1/6 DG GWA 1/6 DG DG DG GWA 1/6     AROM L knee 0- 135* with no complaints of pain  L/E MMT Right Left Pain/Dysfunction with Movement   Hip Flexion 5/5 5/5    Hip Extension NT NT    Hip Abduction 4/5 4/5    Knee Flexion 5/5 5/5    Knee Extension 5/5 3+/5 Pain medial knee L   Ankle DF 5/5 5/5    Ankle PF 5/5 5/5 Tightening across knee L     FOTO Knee 62, " 20%<40%  Assessment:     She demonstrated an increase in AROM of her L knee equal to her R knee with no complaints of pain. Her L LE strength has improved but she continues to have weakness with L knee extension and hip abduction compared to R. She was able to ambulate today with a normal gait pattern inside the clinic. Her current score on FOTO Knee Survey places her in the 20%<40% impaired, limited, or restricted category. She is independent with her HEP and has reached her max rehab potential at this time. She is ready for discharge to [facility].     Patient Education/Response:     Patient was instructed to continue with her HEP.  Patient verbalized understanding instructions.     Plans and Goals:     Continue with plan of care and progress toward PT goals as patient tolerates.    Short Term Goals (4 Weeks):   1. This patient will be independent with a basic HEP. MET  2. This patient will increase L knee AROM equal to R knee AROM with no complaints of pain in order to sit in bottom of tub. MET  3. This patient will increase B LE strength by 1 grade in order to be able to perform vehicle transfers with no increase in symptoms. PARTIALLY MET  4. This patient will have a pain rating of 7/10 at worst with ADLs. MET  5. This patient will be able to ambulate 100 feet with a normal gait pattern with no AD. MET  6. Patient will be able to achieve greater than or equal to 45 on the FOTO Knee Survey placing patient in 40%<60% impaired, limited, or restricted category demonstrating overall improved functional ability with lower extremity. MET    Long Term Goals (8 Weeks):   1. This patient will be independent with an updated HEP. PARTIALLY MET  2. This patient will have B LE strength of 5/5 in order to be able to squat and  her 3 year old daughter with no increase in symptoms. PARTIALLY MET  4. This patient will have a pain rating of 3/10 at worst with ADLs. PARTIALLY MET  5. This patient will be able to ambulate greater  than 200 yards with a normal gait pattern with no AD. PARTIALLY MET  6. Patient will be able to achieve greater than or equal to 65 on the FOTO Knee Survey placing patient in 20%<40% impaired, limited, or restricted category demonstrating overall improved functional ability with lower extremity. MET

## 2017-12-11 PROBLEM — R26.89 ANTALGIC GAIT: Status: RESOLVED | Noted: 2017-09-14 | Resolved: 2017-12-11

## 2017-12-11 PROBLEM — R26.89 DECREASED FUNCTIONAL MOBILITY: Status: RESOLVED | Noted: 2017-09-14 | Resolved: 2017-12-11

## 2017-12-11 PROBLEM — R29.898 WEAKNESS OF BOTH LOWER EXTREMITIES: Status: RESOLVED | Noted: 2017-09-14 | Resolved: 2017-12-11

## 2017-12-11 PROBLEM — M25.669 DECREASED RANGE OF MOTION (ROM) OF KNEE: Status: RESOLVED | Noted: 2017-09-14 | Resolved: 2017-12-11

## 2018-01-10 PROBLEM — M25.662 DECREASED ROM OF LEFT KNEE: Status: RESOLVED | Noted: 2017-05-02 | Resolved: 2018-01-10

## 2018-01-10 PROBLEM — R29.898 WEAKNESS OF LEFT LOWER EXTREMITY: Status: RESOLVED | Noted: 2017-05-02 | Resolved: 2018-01-10

## 2018-01-10 PROBLEM — R26.9 IMPAIRED GAIT: Status: RESOLVED | Noted: 2017-05-02 | Resolved: 2018-01-10

## 2018-01-10 PROBLEM — M25.562 LEFT KNEE PAIN: Status: RESOLVED | Noted: 2017-05-02 | Resolved: 2018-01-10

## 2018-01-24 DIAGNOSIS — M23.92 INTERNAL DERANGEMENT OF LEFT KNEE: Primary | ICD-10-CM

## 2018-02-01 ENCOUNTER — CLINICAL SUPPORT (OUTPATIENT)
Dept: REHABILITATION | Facility: HOSPITAL | Age: 43
End: 2018-02-01
Payer: MEDICAID

## 2018-02-01 DIAGNOSIS — M62.81 MUSCLE WEAKNESS: ICD-10-CM

## 2018-02-01 DIAGNOSIS — M25.562 CHRONIC PAIN OF LEFT KNEE: Primary | ICD-10-CM

## 2018-02-01 DIAGNOSIS — G89.29 CHRONIC PAIN OF LEFT KNEE: Primary | ICD-10-CM

## 2018-02-01 DIAGNOSIS — R26.2 DIFFICULTY WALKING: ICD-10-CM

## 2018-02-01 PROCEDURE — 97162 PT EVAL MOD COMPLEX 30 MIN: CPT | Performed by: PHYSICAL THERAPIST

## 2018-02-01 PROCEDURE — 97110 THERAPEUTIC EXERCISES: CPT | Performed by: PHYSICAL THERAPIST

## 2018-02-01 NOTE — PLAN OF CARE
PHYSICAL THERAPY INITIAL EVALUATION    Name:Inna Gallardo  Physician:Cathi Chávez NP  Date of eval:2/1/2018  Orders:  Physical Therapy evaluate and treat  Clinic: [id number]  Diagnosis:  1. Chronic pain of left knee     2. Muscle weakness     3. Difficulty walking         Precautions: as per diagnosis   Evaluation date: 2/1/2018  Visit # authorized: 1/20  Authorization period: 12-31-18  Plan of care expiration: 3-28-18  MD Follow up appt: Dr. Nguyen 2-6-18, none scheduled with Ms Chávez    Subjective   Chief complaint: L knee pain  Onset of pain : 2 months ago   Mechanism of onset :  No known injury   Pt completed therapy and continued HEP.  Pt went to her primary care MD and aquatic therapy was recommended  After surgery was better and then slipped and dislocated patella and led to PT again.      Aggravating factors: standing, movement and walking  Easing factors: exercises, help and being non weight bearing  Sleep is disturbed. 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    Allergies:    Review of patient's allergies indicates:   Allergen Reactions    Ace inhibitors Other (See Comments)     cough       Medical history: May 2017 menisectomy had PT in [place]  Past Medical History:   Diagnosis Date    Anxiety     Endometriosis     Hypertension     Prolactinoma 2011    Sliding hiatal hernia        Medication:   Current Outpatient Prescriptions on File Prior to Visit   Medication Sig Dispense Refill    buPROPion (WELLBUTRIN XL) 150 MG TB24 tablet Take 150 mg by mouth.      losartan (COZAAR) 50 MG tablet Take 50 mg by mouth.      omeprazole (PRILOSEC) 40  "MG capsule Take 40 mg by mouth once daily.      paroxetine (PAXIL) 20 MG tablet Take 1 tablet (20 mg total) by mouth every morning. 30 tablet 11     No current facility-administered medications on file prior to visit.          Xray: Nov 2015 Mild to moderate medial compartment joint space narrowing left knee with minimal medial marginal spurring.  Small left-sided joint effusion suspected.  Mild medial compartment joint space narrowing right knee.     Pain level with 0 being the lowest and 10 being the highest presently: 8  Pain level with 0 being the lowest and 10 being the highest at worst: 9  Pain level with 0 being the lowest and 10 being the highest at best: 6     Patient Goals: "less knee pain"    Objective     Postural examination in standing:  - forward head  - forward shoulders  - R hip high  - L shoulder high    Postural examination in sitting:   - forward head  - forward shoulders  - knee positioned relaxed at 90 degrees      Functional assessment:   - walking/gait:antalgic L   - sit to stand: mod use of hands  - sit to supine: min difficulty       - supine to sit: min difficulty   - supine to prone: min difficulty     Knee Girth: (measured in centimeters)   Knee RLE LLE   Mid joint 58.5 58.2       Knee  ROM: (measured in degrees)   Knee (R) (L)   Flexion 120 120   Extension 0 0         Flexibility testing:  - hamstrings:     90/90 test R 25 L 30           - gastrocnemius:   DF ankle R 5 degrees L 5 degrees    Muscle Strength  MMT R L   Hip flexion 4-/5 3+/5   Hip abduction 4-/5 3+/5   Hip extension 4-/5 3+/5   Glut max 3/5 3-/5   Hip adduction 4/5 3-/5   Knee extension 5/5 3+/5   Knee flexion 5/5 4+/5     Endurance is poor.    Palpation: no significant tenderness to palpation    Joint mobility: normal patella mobility    Sensation: Intact    Outcome measures:    FOTO knee disability index score: 10  PT reviewed FOTO scores for Inna Gallardo on 02/01/2018.   FOTO scores were entered into EPIC - " see media section.    TREATMENT:  Therapeutic exercise: Inna Lynne received therapeutic exercises to develop strength, stabilization and endurance; flexibility and range of motion for 15 minutes including:see HEP sheet.   Quad set x 10  SLR x 10  Hip abd/add supine x 10  Hip ext prone x 10  HS stretch sitting x 10  GSS seated with strap x 10  Provided pt with aquatic therapy handout  Instructed pt in ambulation with straight cane ambulating 25' SBA 1    Pt. Education: Instructed pt. regarding:proper technique with all exercises. Pt. to demonstrate good understanding of the education provided. Inna Lynne demonstrated good return demonstration of activities. No cultural, environmental, or spiritual barriers identified to treatment or learning.  Assessment   This is a 42 y.o. female referred to outpatient physical therapy and presents with a medical diagnosis of internal derangement of L knee and PT diagnosis/findings of knee pain with muscle weakness and decreased HS and calf flexibility demonstrating limitations as described in the problem list. Patient was in agreement with set goals and plan of care. Pt was given a HEP consisting of posture reeducation, instruction on body mechanics, activity modification/avoidance, and core/LE strengthening regimen. Pt. verbally understood instructions and demonstrated proper form/technique. Pt was advised to perform these exercises free of pain, and discontinue use if symptoms persist/worsen. Pt will benefit from physical therapy services in order to maximize pain free functional independence. Rehab potential is good  History  Co-morbidities and personal factors that may impact the plan of care Examination  Body Structures and Functions, activity limitations and participation restrictions that may impact the plan of care    Clinical Presentation   Co-morbidities:   high BMI        Personal Factors:   no deficits Body Regions:   lower extremities    Body Systems:     ROM  strength  gait            Participation Restrictions:   Exercise for wellness and to lose weight     Activity limitations:   Learning and applying knowledge  no deficits    General Tasks and Commands  no deficits    Communication  no deficits    Mobility  walking    Self care  no deficits    Domestic Life  shopping  cooking  doing house work (cleaning house, washing dishes, laundry)    Interactions/Relationships  no deficits    Life Areas  no deficits    Community and Social Life  no deficits         evolving clinical presentation with changing clinical characteristics                      moderate   moderate  moderate Decision Making/ Complexity Score:  moderate           Medical necessity is demonstrated by the following IMPAIRMENTS/PROBLEM LIST:  Decreased range of motion  Decreased strength  Increased pain with walking  Antalgic gait  Increased pain with prolonged standing  Disturbed sleep  ADL and household activities lead to increased pain and are limited  Functional impairment rating of: 10    GOALS:   Short Term Goals:  4 weeks  Increase range of motion 25%  Increase strength 1/2 muscle grade  Be able to perform HEP with minimal cueing required  Improve functional impairment to 25    Long Term Goals: 8 weeks  Increase range of motion to 75% to 100% full   Improve muscle strength 1 muscle grade  Restore ability to ambulate with normal gait pattern with mod pain  Walking for ADL with mod pain  Restore ability to stand for ADL with mod pain  Restore normal sleep habits with mod disturbances due to pain  Restore ability to perform ADL's and household activities independently and with mod pain  Improve functional impairment of mobility to 40    Plan     Pt will be treated by physical therapy 1-3 times a week for 8 weeks to include: Therapeutic exercises to increase ROM, strength and stabilization; joint and soft tissue mobilization with manual therapy techniques to decrease muscle tightness, pain and  improve joint mobility; neuromuscular re-education to improve balance, coordination, kinesthetic sense and proprioception, therapeutic activities to improve coordination, strength and function, therapeutic taping to decrease pain, provide support and improve function of the LE(s); modalities such as moist heat, ice, ultrasound and electrical stimulation to increase circulation, decrease pain and inflammation; dry needling with manual therapy techniques to decrease pain, inflammation and swelling, increase circulation and promote healing process will be considered and utilized as needed; temporary orthotics will be considered and utilized as needed to further decrease pain in WB; aquatic physical therapy will be utilized as needed.  Pt may be seen by PTA to carry out plan of care as part of the Rehab team.    I certify the need for these services furnished under this plan of treatment and while under my care.    ____________________________________ Physician/Referring Practitioner                                Date of Signature

## 2018-02-21 ENCOUNTER — CLINICAL SUPPORT (OUTPATIENT)
Dept: REHABILITATION | Facility: HOSPITAL | Age: 43
End: 2018-02-21
Payer: MEDICAID

## 2018-02-21 DIAGNOSIS — R26.2 DIFFICULTY WALKING: ICD-10-CM

## 2018-02-21 DIAGNOSIS — M62.81 MUSCLE WEAKNESS: ICD-10-CM

## 2018-02-21 DIAGNOSIS — M25.562 CHRONIC PAIN OF LEFT KNEE: Primary | ICD-10-CM

## 2018-02-21 DIAGNOSIS — G89.29 CHRONIC PAIN OF LEFT KNEE: Primary | ICD-10-CM

## 2018-02-21 PROCEDURE — 97110 THERAPEUTIC EXERCISES: CPT

## 2018-02-21 NOTE — PLAN OF CARE
"Name:Inna Gallardo  Physician:Cathi Chávez NP  Date of eval: 2/1/18  Orders:  Physical Therapy evaluate and treat  Clinical#:[id number]  Diagnosis:  1. Chronic pain of left knee     2. Muscle weakness     3. Difficulty walking       Today's Date: 2/21/2018    Subjective: states she had an injection in her L knee and her pain has dropped from a 9 to a 7. Has helped "a little bit, but I didn't get the results I wanted."    Onset of pain : 2-3 months ago   Mechanism of onset :  No known injury     Chief complaint: Patient is a 41 yo F referred to OP PT for aquatic therapy secondary L knee pain. Patient was unable to make her scheduled aquatic sessions due to illness. Returns today for reassessment to be rescheduled for aquatic therapy sessions.    Aggravating factors: standing, movement and walking  Easing factors: exercises, help and being non weight bearing    Sleep is disturbed. Sleeping position: back and R side    Previous functional limitations includes:moderate level of dysfunction pt felt with walking, standing and movement  Current functional limitations:  More severe levels 5 min of standing walking      Allergies:    Review of patient's allergies indicates:   Allergen Reactions    Ace inhibitors Other (See Comments)     cough       Past Surgical History:   Procedure Laterality Date    BRAIN TUMOR EXCISION  9/2011    removal of prolactinoma    CYSTOSCOPY W/ URETERAL STENT PLACEMENT  2016    CYSTOSCOPY W/ URETERAL STENT REMOVAL  2016    HYSTERECTOMY  3/16/15    TLH/BSO for Endometriosis, AUB, fibroids, cyst    MANDIBLE SURGERY  2002    severe overbite correction    PELVIC LAPAROSCOPY  2014    Dx scope, chromopertubation-Endometriosis    TONSILLECTOMY, ADENOIDECTOMY         Medical history:   Past Medical History:   Diagnosis Date    Anxiety     Endometriosis     Hypertension     Prolactinoma 2011    Sliding hiatal hernia          Medication:   Current Outpatient Prescriptions on File Prior " to Visit   Medication Sig Dispense Refill    buPROPion (WELLBUTRIN XL) 150 MG TB24 tablet Take 150 mg by mouth.      losartan (COZAAR) 50 MG tablet Take 50 mg by mouth.      omeprazole (PRILOSEC) 40 MG capsule Take 40 mg by mouth once daily.      paroxetine (PAXIL) 20 MG tablet Take 1 tablet (20 mg total) by mouth every morning. 30 tablet 11     No current facility-administered medications on file prior to visit.          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    Pts goals: to get stronger, to decreased the L knee pain  Pain level with 0 being the lowest and 10 being the highest presently: 7  Pain level with 0 being the lowest and 10 being the highest at worst: 7    OBJECTIVE:     Functional assessment:   - walking: amb without a.d, decreased L knee flexion noted throughout gait cycle.  - sit to stand: I    AROM  LE MMT  R  L    Hip flexion  5/5  5/5    Hip abduction  5/5  3+/5    Hip extension  5/5  3/5    Hip ER  5/5  5/5    Hip IR  5/5  5/5    Knee extension  5/5  4/5    Knee flexion  5/5  5/5    Ankle dorsiflexion  5/5  5/5    Ankle plantar flexion  5/5  5/5        Flexibility testing:  - hamstrings:       B: WNL  - gastrocnemius: R: WNL, L: tight, L: 4 deg  - piriformis:          B: WNL  - quadriceps:       B: WNL  - hip adductors:   B: WNL  - IT bands:           B: WNL    Joint mobility:  R knee AROM: 5 to 125 deg  L knee AROM: 5 to 130 deg    TREATMENT: one on one ther ex x 29 minutes  Reassessed strength and flexibility    No cultural, environmental, or spiritual barriers identified to treatment or learning.    Pt was provided educational information, including  Ed pt to use ice prn 10- 20 min when pain or swelling occurs      Discussed insurance limitations with pt.    ASSESSMENT: PT diagnosis: L knee pain, decreased L LE strength  Patient can benefit  from outpatient physical therapy and a home program. Prognosis is good. Will resume aquatic therapy to achieve below listed goals.    Medical necessity is demonstrated by the following IMPAIRMENTS:  - pain  - decreased flexibility  - decreased muscle strength  - impaired function  - decreased work ability  - decreased exercise ability    GOALS:   Short Term Goals:  4 weeks  Increase range of motion 25%  Increase strength 1/2 muscle grade  Be able to perform HEP with minimal cueing required  Improve functional impairment to 25     Long Term Goals: 8 weeks  Increase range of motion to 75% to 100% full   Improve muscle strength 1 muscle grade  Restore ability to ambulate with normal gait pattern with mod pain  Walking for ADL with mod pain  Restore ability to stand for ADL with mod pain  Restore normal sleep habits with mod disturbances due to pain  Restore ability to perform ADL's and household activities independently and with mod pain  Improve functional impairment of mobility to 40    PLAN:  Outpatient physical therapy 2 times weekly to include: pt ed, hep, therapeutic exercises, neuromuscular re-education/ balance exercises, joint mobilizations, and modalities prn. Pt may be seen by PTA to carry out plan of care.      Cont PT for 6-8 weeks.     I certify the need for these services furnished under this plan of treatment and while under my care.      ____________________________________ Physician/Referring Practitioner         _____________________ Date of Signature

## 2018-03-05 ENCOUNTER — CLINICAL SUPPORT (OUTPATIENT)
Dept: REHABILITATION | Facility: HOSPITAL | Age: 43
End: 2018-03-05
Payer: MEDICAID

## 2018-03-05 DIAGNOSIS — G89.29 CHRONIC PAIN OF LEFT KNEE: Primary | ICD-10-CM

## 2018-03-05 DIAGNOSIS — M62.81 MUSCLE WEAKNESS: ICD-10-CM

## 2018-03-05 DIAGNOSIS — R26.2 DIFFICULTY WALKING: ICD-10-CM

## 2018-03-05 DIAGNOSIS — M25.562 CHRONIC PAIN OF LEFT KNEE: Primary | ICD-10-CM

## 2018-03-05 PROCEDURE — 97113 AQUATIC THERAPY/EXERCISES: CPT

## 2018-03-05 NOTE — PROGRESS NOTES
Time In: 1040  Time Out: 1135    Subjective  Pt reports: 8/10 pain in L knee with most of the pain in the medial knee.    Objective    Treatment: Pt was instructed in and performed therapeutic exercises to develop  for 60 minutes. Patient performed therapeutic exercises consisting of the following exercises:    Warm-up Laps 2 x each  fwd/bkw/lat     Stretches: 2 x 20 sec  HS  Quad     LE exs: 20x each   Mini Squats with QS  Heel Raise with GS  HS curl/Hip ext  Hip flex/LAQ  Hip abd/add  Step ups 10x  Lunges fwd/lat     Endurance: 2 min  Marching  Bicycle     Cool down laps 1 x each  fwd/bkw/lat    Patient was not issued HEP for pool.      Assessment  Pt's tolerated initial aquatic tx well w/ no increase in symptoms. Pt reported being challenged by step ups due to B quad weakness. Tx focused on BLE flexibility/strengthening and hip/core stabilization.  Will progress as tolerated.  Patient will continue to benefit from skilled PT intervention.    Inna Gallardo is making good progress towards established goals.    Anticipated barriers to physical therapy: None    Medical necessity is demonstrated by the following IMPAIRMENTS:  - pain  - decreased flexibility  - decreased muscle strength  - impaired function  - decreased work ability  - decreased exercise ability     GOALS:   Short Term Goals:  4 weeks  Increase range of motion 25%  Increase strength 1/2 muscle grade  Be able to perform HEP with minimal cueing required  Improve functional impairment to 25     Long Term Goals: 8 weeks  Increase range of motion to 75% to 100% full   Improve muscle strength 1 muscle grade  Restore ability to ambulate with normal gait pattern with mod pain  Walking for ADL with mod pain  Restore ability to stand for ADL with mod pain  Restore normal sleep habits with mod disturbances due to pain  Restore ability to perform ADL's and household activities independently and with mod pain  Improve functional impairment of mobility to  40    Plan  Outpatient physical therapy 2 times weekly to include: pt ed, hep, therapeutic exercises, neuromuscular re-education/ balance exercises, joint mobilizations, and modalities prn. Pt may be seen by PTA to carry out plan of care.

## 2018-03-07 ENCOUNTER — CLINICAL SUPPORT (OUTPATIENT)
Dept: REHABILITATION | Facility: HOSPITAL | Age: 43
End: 2018-03-07
Payer: MEDICAID

## 2018-03-07 DIAGNOSIS — G89.29 CHRONIC PAIN OF LEFT KNEE: Primary | ICD-10-CM

## 2018-03-07 DIAGNOSIS — M62.81 MUSCLE WEAKNESS: ICD-10-CM

## 2018-03-07 DIAGNOSIS — R26.2 DIFFICULTY WALKING: ICD-10-CM

## 2018-03-07 DIAGNOSIS — M25.562 CHRONIC PAIN OF LEFT KNEE: Primary | ICD-10-CM

## 2018-03-07 PROCEDURE — 97113 AQUATIC THERAPY/EXERCISES: CPT

## 2018-03-07 NOTE — PROGRESS NOTES
Time In: 1040  Time Out: 1135    Subjective  Pt reports: 8/10 pain in L knee with most of the pain in the medial knee; pt notes increased L knee stiffness post last tx session.    Objective    Treatment: Pt was instructed in and performed therapeutic exercises to develop  for 60 minutes. Patient performed therapeutic exercises consisting of the following exercises:    Warm-up Laps 2 x each  fwd/bkw/lat     Stretches: 2 x 20 sec  HS  Quad     LE exs: 30x each   Mini Squats with QS  Heel Raise with GS  HS curl/Hip ext  Hip flex/LAQ  Hip abd/add  Step ups 20x  Lunges fwd/lat     Endurance: 2 min  Marching  Bicycle     Cool down laps 1 x each  fwd/bkw/lat    Patient was not issued HEP for [place].      Assessment  Pt's tolerated tx progression well as pt was challenged by step ups due to B quad weakness but was still able to complete the exercise w/o knee pain.  Tx focused on BLE flexibility/strengthening and hip/core stabilization. Will progress as tolerated.  Patient will continue to benefit from skilled PT intervention.    Inna Gallardo is making good progress towards established goals.    Anticipated barriers to physical therapy: None    Medical necessity is demonstrated by the following IMPAIRMENTS:  - pain  - decreased flexibility  - decreased muscle strength  - impaired function  - decreased work ability  - decreased exercise ability     GOALS:   Short Term Goals:  4 weeks  Increase range of motion 25%  Increase strength 1/2 muscle grade  Be able to perform HEP with minimal cueing required  Improve functional impairment to 25     Long Term Goals: 8 weeks  Increase range of motion to 75% to 100% full   Improve muscle strength 1 muscle grade  Restore ability to ambulate with normal gait pattern with mod pain  Walking for ADL with mod pain  Restore ability to stand for ADL with mod pain  Restore normal sleep habits with mod disturbances due to pain  Restore ability to perform ADL's and household activities  independently and with mod pain  Improve functional impairment of mobility to 40    Plan  Outpatient physical therapy 2 times weekly to include: pt ed, hep, therapeutic exercises, neuromuscular re-education/ balance exercises, joint mobilizations, and modalities prn. Pt may be seen by PTA to carry out plan of care.

## 2018-03-12 ENCOUNTER — CLINICAL SUPPORT (OUTPATIENT)
Dept: REHABILITATION | Facility: HOSPITAL | Age: 43
End: 2018-03-12
Payer: MEDICAID

## 2018-03-12 DIAGNOSIS — G89.29 CHRONIC PAIN OF LEFT KNEE: ICD-10-CM

## 2018-03-12 DIAGNOSIS — M62.81 MUSCLE WEAKNESS: ICD-10-CM

## 2018-03-12 DIAGNOSIS — M25.562 CHRONIC PAIN OF LEFT KNEE: ICD-10-CM

## 2018-03-12 DIAGNOSIS — R26.2 DIFFICULTY WALKING: Primary | ICD-10-CM

## 2018-03-12 PROCEDURE — 97113 AQUATIC THERAPY/EXERCISES: CPT

## 2018-03-12 NOTE — PROGRESS NOTES
Time In: 940  Time Out: 1035    Subjective  Pt reports: 7/10 pain in L knee with most of the pain in the medial knee; pt notes a significant decrease in L knee stiffness post last tx session.    Objective    Treatment: Pt was instructed in and performed therapeutic exercises to develop  for 60 minutes. Patient performed therapeutic exercises consisting of the following exercises:    Warm-up Laps 2 x each  fwd/bkw/lat     Stretches: 2 x 20 sec  HS  Quad     LE exs: 30x each 1.6#  Mini Squats with QS  Heel Raise with GS  HS curl/Hip ext  Hip flex/LAQ  Hip abd/add  Step ups 3x10  Lunges fwd/lat     Endurance: 2 min  Marching  Bicycle     Cool down laps 1 x each  fwd/bkw/lat    Patient was not issued HEP for pool.      Assessment  Pt tolerated tx progression fairly well as pt performed all aquatic exercises with increased resistance int the form of ankle weights. Pt c/o of L knee discomfort when performing step ups. Discomfort was minimized with B UE support. Tx focused on BLE flexibility/strengthening and hip/core stabilization. Will progress as tolerated.  Patient will continue to benefit from skilled PT intervention.    Inna Gallardo is making good progress towards established goals.    Anticipated barriers to physical therapy: None    Medical necessity is demonstrated by the following IMPAIRMENTS:  - pain  - decreased flexibility  - decreased muscle strength  - impaired function  - decreased work ability  - decreased exercise ability     GOALS:   Short Term Goals:  4 weeks  Increase range of motion 25%  Increase strength 1/2 muscle grade  Be able to perform HEP with minimal cueing required  Improve functional impairment to 25     Long Term Goals: 8 weeks  Increase range of motion to 75% to 100% full   Improve muscle strength 1 muscle grade  Restore ability to ambulate with normal gait pattern with mod pain  Walking for ADL with mod pain  Restore ability to stand for ADL with mod pain  Restore normal sleep  habits with mod disturbances due to pain  Restore ability to perform ADL's and household activities independently and with mod pain  Improve functional impairment of mobility to 40    Plan  Outpatient physical therapy 2 times weekly to include: pt ed, hep, therapeutic exercises, neuromuscular re-education/ balance exercises, joint mobilizations, and modalities prn. Pt may be seen by PTA to carry out plan of care.

## 2018-03-19 ENCOUNTER — CLINICAL SUPPORT (OUTPATIENT)
Dept: REHABILITATION | Facility: HOSPITAL | Age: 43
End: 2018-03-19
Payer: MEDICAID

## 2018-03-19 DIAGNOSIS — M62.81 MUSCLE WEAKNESS: ICD-10-CM

## 2018-03-19 DIAGNOSIS — G89.29 CHRONIC PAIN OF LEFT KNEE: Primary | ICD-10-CM

## 2018-03-19 DIAGNOSIS — R26.2 DIFFICULTY WALKING: ICD-10-CM

## 2018-03-19 DIAGNOSIS — M25.562 CHRONIC PAIN OF LEFT KNEE: Primary | ICD-10-CM

## 2018-03-19 PROCEDURE — 97110 THERAPEUTIC EXERCISES: CPT

## 2018-03-19 NOTE — PROGRESS NOTES
Time In: 1530  Time Out: 1630     Subjective  Pt reports: 5/10 pain in L knee with most of the pain in the medial knee; pt notes a significant decrease in L knee pain as she received a steroid injection earlier today.     Objective    Treatment: Pt was instructed in and performed therapeutic exercises to develop  for 60 minutes. Patient performed therapeutic exercises consisting of the following exercises:    Warm-up Laps 2 x each  fwd/bkw/lat     Stretches: 2 x 20 sec  HS  Quad     LE exs: 30x each   Mini Squats with QS  Heel Raise with GS  HS curl/Hip ext  Hip flex/LAQ  Hip abd/add  Step ups 15x  Lunges fwd 15x     Endurance: 3 min  Marching   Bicycle     Cool down laps 1 x each  fwd/bkw/lat    Patient was not issued HEP for [place].      Assessment  Pt tolerated tx progression fairly well as pt reported increased L knee pain with step ups and lunges. It was noted that pt arrived with decreased L knee pain as pt received a steroid injection in L knee earlier today. Tx focused on BLE flexibility/strengthening and hip/core stabilization. Will progress as tolerated.  Patient will continue to benefit from skilled PT intervention.    Inna Gallardo is making good progress towards established goals.    Anticipated barriers to physical therapy: None    Medical necessity is demonstrated by the following IMPAIRMENTS:  - pain  - decreased flexibility  - decreased muscle strength  - impaired function  - decreased work ability  - decreased exercise ability     GOALS:   Short Term Goals:  4 weeks  Increase range of motion 25%  Increase strength 1/2 muscle grade  Be able to perform HEP with minimal cueing required  Improve functional impairment to 25     Long Term Goals: 8 weeks  Increase range of motion to 75% to 100% full   Improve muscle strength 1 muscle grade  Restore ability to ambulate with normal gait pattern with mod pain  Walking for ADL with mod pain  Restore ability to stand for ADL with mod pain  Restore normal  sleep habits with mod disturbances due to pain  Restore ability to perform ADL's and household activities independently and with mod pain  Improve functional impairment of mobility to 40    Plan  Outpatient physical therapy 2 times weekly to include: pt ed, hep, therapeutic exercises, neuromuscular re-education/ balance exercises, joint mobilizations, and modalities prn. Pt may be seen by PTA to carry out plan of care.

## 2018-03-25 ENCOUNTER — TELEPHONE (OUTPATIENT)
Dept: OBSTETRICS AND GYNECOLOGY | Facility: HOSPITAL | Age: 43
End: 2018-03-25

## 2018-03-25 DIAGNOSIS — A60.9 HSV (HERPES SIMPLEX VIRUS) ANOGENITAL INFECTION: Primary | ICD-10-CM

## 2018-03-25 RX ORDER — VALACYCLOVIR HYDROCHLORIDE 1 G/1
1000 TABLET, FILM COATED ORAL EVERY 12 HOURS
Qty: 4 TABLET | Status: SHIPPED | OUTPATIENT
Start: 2018-03-25 | End: 2018-03-25 | Stop reason: SDUPTHER

## 2018-03-25 RX ORDER — VALACYCLOVIR HYDROCHLORIDE 1 G/1
1000 TABLET, FILM COATED ORAL EVERY 12 HOURS
Qty: 4 TABLET | Status: SHIPPED | OUTPATIENT
Start: 2018-03-25 | End: 2019-03-14

## 2018-03-25 NOTE — TELEPHONE ENCOUNTER
Called patient to discuss results.     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    Discussed STI and implications with patient. Voiced understanding. Will bring patient back to clinic in 1 week for assessment of resolution as well as further STI testing.    Elenita Harris M.D.  PGY-4 OB/GYN

## 2018-11-29 DIAGNOSIS — M77.31 CALCANEAL SPUR OF RIGHT FOOT: Primary | ICD-10-CM

## 2018-12-05 ENCOUNTER — HOSPITAL ENCOUNTER (OUTPATIENT)
Dept: RADIOLOGY | Facility: HOSPITAL | Age: 43
Discharge: HOME | End: 2018-12-05
Attending: NURSE PRACTITIONER
Payer: MEDICAID

## 2018-12-05 DIAGNOSIS — M77.31 CALCANEAL SPUR OF RIGHT FOOT: ICD-10-CM

## 2018-12-05 PROCEDURE — 73630 X-RAY EXAM OF FOOT: CPT | Mod: 50,TC,FY,PO

## 2019-03-14 ENCOUNTER — OFFICE VISIT (OUTPATIENT)
Dept: GASTROENTEROLOGY | Facility: CLINIC | Age: 44
End: 2019-03-14
Payer: MEDICAID

## 2019-03-14 VITALS
HEIGHT: 64 IN | DIASTOLIC BLOOD PRESSURE: 81 MMHG | WEIGHT: 268.94 LBS | SYSTOLIC BLOOD PRESSURE: 123 MMHG | BODY MASS INDEX: 45.91 KG/M2

## 2019-03-14 DIAGNOSIS — K21.9 GASTROESOPHAGEAL REFLUX DISEASE, ESOPHAGITIS PRESENCE NOT SPECIFIED: ICD-10-CM

## 2019-03-14 DIAGNOSIS — R10.13 ABDOMINAL PAIN, EPIGASTRIC: ICD-10-CM

## 2019-03-14 DIAGNOSIS — R19.8 ALTERNATING CONSTIPATION AND DIARRHEA: ICD-10-CM

## 2019-03-14 DIAGNOSIS — R10.13 DYSPEPSIA: ICD-10-CM

## 2019-03-14 DIAGNOSIS — R14.0 ABDOMINAL BLOATING: Primary | ICD-10-CM

## 2019-03-14 DIAGNOSIS — R13.10 DYSPHAGIA, UNSPECIFIED TYPE: ICD-10-CM

## 2019-03-14 PROCEDURE — 99204 OFFICE O/P NEW MOD 45 MIN: CPT | Mod: S$PBB,,, | Performed by: NURSE PRACTITIONER

## 2019-03-14 PROCEDURE — 99999 PR PBB SHADOW E&M-EST. PATIENT-LVL III: CPT | Mod: PBBFAC,,, | Performed by: NURSE PRACTITIONER

## 2019-03-14 PROCEDURE — 99213 OFFICE O/P EST LOW 20 MIN: CPT | Mod: PBBFAC,PN | Performed by: NURSE PRACTITIONER

## 2019-03-14 PROCEDURE — 99999 PR PBB SHADOW E&M-EST. PATIENT-LVL III: ICD-10-PCS | Mod: PBBFAC,,, | Performed by: NURSE PRACTITIONER

## 2019-03-14 PROCEDURE — 99204 PR OFFICE/OUTPT VISIT, NEW, LEVL IV, 45-59 MIN: ICD-10-PCS | Mod: S$PBB,,, | Performed by: NURSE PRACTITIONER

## 2019-03-14 RX ORDER — PANTOPRAZOLE SODIUM 40 MG/1
40 TABLET, DELAYED RELEASE ORAL DAILY
Qty: 30 TABLET | Refills: 3 | Status: SHIPPED | OUTPATIENT
Start: 2019-03-14 | End: 2019-08-01

## 2019-03-14 NOTE — PROGRESS NOTES
Subjective:       Patient ID: Inna Gallardo is a 43 y.o. female.    Chief Complaint: Other (seeking a 2nd opinion)    HPI  Presents to discuss GI symptoms and previous workup.  She reports known hiatal hernia for at least ten years.  Notes worsening symptoms over the last year that she relates to hernia.  She describes dysphagia with food lodging in the esophagus and at times need for regurgitation.  Dysphagia can occur even with liquids.  She has bloating, indigestion, belching.  Heartburn and reflux despite omeprazole use.  Will often use two doses daily with continued complaints.  She will note worsening reflux if she does not use the omeprazole.  Has been on aciphex in the past also.  Had EGD last year with gastric biopsies consistent with lymphocytic gastritis.  H pylori negative and small bowel biopsies normal.    EGD:  Esophagus: Schatzki B-ring@35cm, 4-5cm hiatal hernia     Stomach: normal    Duodenum: normal    Pathology report:  1. Duodenum, biopsy:                                                        - No significant histopathologic abnormalities.                          Comment: There is no increase in intraepithelial lymphocytes to         suggest a diagnosis of celiac disease. No parasites, granulomas         or viral inclusions are identified. The biopsy is negative for          dysplasia and malignancy.                                             2. Stomach, biopsy:                                                         - Gastric antral mucosa with increased intraepithelial                 lymphocytes consistent with lymphocytic gastritis, see Comment.            - Negative for dysplasia, metaplasia, malignancy and atrophy.           - Immunohistochemical stain for Helicobacter pylori is                 negative.                                                                 Comment: The histologic pattern lymphocytic gastritis is                non-specific and has been described in  "patient's with H. pylori         gastritis, Menetrier's disease, NSAID use, and celiac disease.          No Helicobacter pylori organisms are seen by immunostain.               Clinical correlation is recommended.                                  3. Gastroesophageal junction, biopsy:                                       - Squamous mucosa with rare eosinophils, suggestive of mild            reflux esophagitis.                                                      - Negative for dysplasia, metaplasia and malignancy.           Review of Dr. Mainor Hurtado GI notes report 3 negative CT scans in the last year, normal HIDA and GES.  Reports she was recently hospitalized at [facility] with no new findings.    She has history of diverticulitis and has been treated with antibiotics.  Review of records with cipro and flagyl treatment at the end of 2018.  She notes that this relieved what was thought to be diverticulitis pain, but had no effect on her upper abdominal pain, dysphagia or reflux.  Describes epigastric pain as " a ton of bricks on top of me" and a pressure and heaviness.  Pain is worse when eating.    Reports alternating diarrhea and constipation.  No blood with bowel movements or black stools.  Reports normal colonoscopy last year.      She has tried dairy free trial with no change in her complaints.      Reports she was treated with steroids last year for her lymphocytic gastritis with no change in any symptoms.        Review of Systems   Constitutional: Negative.  Negative for activity change, appetite change, fatigue, fever and unexpected weight change.   HENT: Positive for trouble swallowing. Negative for sore throat.    Respiratory: Positive for chest tightness. Negative for cough, choking and shortness of breath.    Cardiovascular: Negative.  Negative for chest pain.   Gastrointestinal: Positive for abdominal distention, abdominal pain, constipation and diarrhea. Negative for blood in stool, nausea " and vomiting.   Genitourinary: Negative.  Negative for difficulty urinating, dysuria and hematuria.   Musculoskeletal: Negative.  Negative for neck pain and neck stiffness.   Skin: Negative.    Neurological: Negative.  Negative for dizziness, syncope, weakness and light-headedness.   Psychiatric/Behavioral: Negative.        Objective:      Physical Exam   Constitutional: She is oriented to person, place, and time. She appears well-developed and well-nourished. No distress.   HENT:   Head: Normocephalic.   Eyes: No scleral icterus.   Neck: Neck supple.   Cardiovascular: Normal rate.   Pulmonary/Chest: Effort normal. No respiratory distress.   Abdominal: Soft. Bowel sounds are normal. She exhibits no distension and no mass. There is tenderness in the epigastric area, left upper quadrant and left lower quadrant. There is no guarding.   Musculoskeletal: Normal range of motion.   Neurological: She is alert and oriented to person, place, and time.   Skin: Skin is warm and dry. She is not diaphoretic.   Psychiatric: She has a normal mood and affect. Her behavior is normal. Judgment and thought content normal.   Vitals reviewed.      Assessment:       1. Abdominal bloating    2. Alternating constipation and diarrhea    3. Abdominal pain, epigastric    4. Dyspepsia    5. Dysphagia, unspecified type    6. Gastroesophageal reflux disease, esophagitis presence not specified        Plan:     Inna Lynne was seen today for other.    Diagnoses and all orders for this visit:    Abdominal bloating  -     H. pylori antigen, stool; Future  -     Tissue transglutaminase, IgA; Future  -     IgA; Future    Alternating constipation and diarrhea  -     Tissue transglutaminase, IgA; Future  -     IgA; Future    Abdominal pain, epigastric  -     H. pylori antigen, stool; Future    Dyspepsia  -     Tissue transglutaminase, IgA; Future  -     IgA; Future    Dysphagia, unspecified type  -     FL Esophagram Complete; Future    Gastroesophageal  reflux disease, esophagitis presence not specified  -     FL Esophagram Complete; Future    Other orders  -     pantoprazole (PROTONIX) 40 MG tablet; Take 1 tablet (40 mg total) by mouth once daily.          Add probiotic.    Change PPI.      Esophagram, labs and stool specimen.    May need to consider repeat EGD.    We have discussed the possibility of functional disease and could consider trial of FDGard or IBGard.    Could consider surgical referral for hernia repair depending upon findings and symptoms.

## 2019-03-15 ENCOUNTER — LAB VISIT (OUTPATIENT)
Dept: LAB | Facility: HOSPITAL | Age: 44
End: 2019-03-15
Attending: NURSE PRACTITIONER
Payer: MEDICAID

## 2019-03-15 DIAGNOSIS — R10.13 ABDOMINAL PAIN, EPIGASTRIC: ICD-10-CM

## 2019-03-15 DIAGNOSIS — R19.8 ALTERNATING CONSTIPATION AND DIARRHEA: ICD-10-CM

## 2019-03-15 DIAGNOSIS — R10.13 DYSPEPSIA: ICD-10-CM

## 2019-03-15 DIAGNOSIS — R14.0 ABDOMINAL BLOATING: ICD-10-CM

## 2019-03-15 LAB — IGA SERPL-MCNC: 111 MG/DL

## 2019-03-15 PROCEDURE — 87338 HPYLORI STOOL AG IA: CPT | Mod: PO

## 2019-03-15 PROCEDURE — 82784 ASSAY IGA/IGD/IGG/IGM EACH: CPT | Mod: PO

## 2019-03-15 PROCEDURE — 36415 COLL VENOUS BLD VENIPUNCTURE: CPT | Mod: PO

## 2019-03-15 PROCEDURE — 83516 IMMUNOASSAY NONANTIBODY: CPT | Mod: PO

## 2019-03-18 LAB — TTG IGA SER-ACNC: 4 UNITS

## 2019-03-19 ENCOUNTER — TELEPHONE (OUTPATIENT)
Dept: GASTROENTEROLOGY | Facility: CLINIC | Age: 44
End: 2019-03-19

## 2019-03-19 ENCOUNTER — HOSPITAL ENCOUNTER (OUTPATIENT)
Dept: RADIOLOGY | Facility: HOSPITAL | Age: 44
Discharge: HOME | End: 2019-03-19
Attending: NURSE PRACTITIONER
Payer: MEDICAID

## 2019-03-19 DIAGNOSIS — K21.9 GASTROESOPHAGEAL REFLUX DISEASE, ESOPHAGITIS PRESENCE NOT SPECIFIED: ICD-10-CM

## 2019-03-19 DIAGNOSIS — R13.10 DYSPHAGIA, UNSPECIFIED TYPE: ICD-10-CM

## 2019-03-19 LAB — H PYLORI AG STL QL IA: NOT DETECTED

## 2019-03-19 PROCEDURE — 74220 X-RAY XM ESOPHAGUS 1CNTRST: CPT | Mod: 26,,, | Performed by: RADIOLOGY

## 2019-03-19 PROCEDURE — 74220 X-RAY XM ESOPHAGUS 1CNTRST: CPT | Mod: TC

## 2019-03-19 PROCEDURE — 74220 FL ESOPHAGRAM COMPLETE: ICD-10-PCS | Mod: 26,,, | Performed by: RADIOLOGY

## 2019-03-19 NOTE — TELEPHONE ENCOUNTER
----- Message from REYNA Tello sent at 3/19/2019  8:41 AM CDT -----  Let her know that labs are negative for celiac disease.

## 2019-03-20 ENCOUNTER — TELEPHONE (OUTPATIENT)
Dept: GASTROENTEROLOGY | Facility: CLINIC | Age: 44
End: 2019-03-20

## 2019-03-20 NOTE — TELEPHONE ENCOUNTER
----- Message from REYNA Tello sent at 3/20/2019 11:00 AM CDT -----  Let her know that stool for h pylori is negative.  Esophagram shows some reflux, small hiatal hernia and some abnormal motility in the esophagus.  I am reviewing with Dr. Bose and then will make further recommendations about next steps.

## 2019-03-21 ENCOUNTER — TELEPHONE (OUTPATIENT)
Dept: GASTROENTEROLOGY | Facility: CLINIC | Age: 44
End: 2019-03-21

## 2019-03-21 DIAGNOSIS — R10.9 ABDOMINAL PAIN, UNSPECIFIED ABDOMINAL LOCATION: ICD-10-CM

## 2019-03-21 DIAGNOSIS — R13.10 DYSPHAGIA, UNSPECIFIED TYPE: ICD-10-CM

## 2019-03-21 DIAGNOSIS — R93.3 ABNORMAL ESOPHAGRAM: Primary | ICD-10-CM

## 2019-03-21 NOTE — TELEPHONE ENCOUNTER
Reviewed symptoms, previous GI workup, esophagram with Dr. Bose.  Will schedule repeat EGD.    I have discussed with patient.  Please call her to schedule EGD for abnormal esophagram, dysphagia, abd pain, abnormal gastric biopsy.    Requests call at- [phone]

## 2019-03-21 NOTE — TELEPHONE ENCOUNTER
Spoke with pt and was able to schedule the EGD on 4/9/19 with Dr. Bose in [place]. Instructions were reviewed with patient and sent via nGamet. Verbal understanding.

## 2019-03-21 NOTE — TELEPHONE ENCOUNTER
----- Message from Gissell Judd sent at 3/21/2019  9:10 AM CDT -----  Contact: Self [phone]  Patient would like to speak with you about a personal matter. Please advise

## 2019-04-08 ENCOUNTER — TELEPHONE (OUTPATIENT)
Dept: GASTROENTEROLOGY | Facility: CLINIC | Age: 44
End: 2019-04-08

## 2019-04-08 NOTE — TELEPHONE ENCOUNTER
----- Message from Aida Baird sent at 4/8/2019  8:59 AM CDT -----  Contact: Pt  Pt called to cancel/reschedule her 4/9/19 procedure and would like a call back today    Pt can be reached at [phone]

## 2019-04-16 ENCOUNTER — TELEPHONE (OUTPATIENT)
Dept: GASTROENTEROLOGY | Facility: CLINIC | Age: 44
End: 2019-04-16

## 2019-04-16 NOTE — TELEPHONE ENCOUNTER
----- Message from Nancy Butt sent at 4/16/2019  9:57 AM CDT -----  No. [phone]   Please call patient to schedule the endoscopy.

## 2019-04-30 PROBLEM — R10.13 EPIGASTRIC PAIN: Status: ACTIVE | Noted: 2019-04-30

## 2019-05-15 ENCOUNTER — OFFICE VISIT (OUTPATIENT)
Dept: CARDIOLOGY | Facility: CLINIC | Age: 44
End: 2019-05-15
Payer: MEDICAID

## 2019-05-15 VITALS
SYSTOLIC BLOOD PRESSURE: 140 MMHG | HEART RATE: 62 BPM | RESPIRATION RATE: 18 BRPM | BODY MASS INDEX: 47.84 KG/M2 | WEIGHT: 270 LBS | HEIGHT: 63 IN | DIASTOLIC BLOOD PRESSURE: 92 MMHG | OXYGEN SATURATION: 97 %

## 2019-05-15 DIAGNOSIS — E66.01 MORBID OBESITY: ICD-10-CM

## 2019-05-15 DIAGNOSIS — R00.2 PALPITATIONS: Primary | ICD-10-CM

## 2019-05-15 DIAGNOSIS — I10 ESSENTIAL HYPERTENSION: ICD-10-CM

## 2019-05-15 PROCEDURE — 99205 OFFICE O/P NEW HI 60 MIN: CPT | Mod: S$PBB,,, | Performed by: INTERNAL MEDICINE

## 2019-05-15 PROCEDURE — 99213 OFFICE O/P EST LOW 20 MIN: CPT | Mod: PBBFAC,PN | Performed by: INTERNAL MEDICINE

## 2019-05-15 PROCEDURE — 99999 PR PBB SHADOW E&M-EST. PATIENT-LVL III: CPT | Mod: PBBFAC,,, | Performed by: INTERNAL MEDICINE

## 2019-05-15 PROCEDURE — 99205 PR OFFICE/OUTPT VISIT, NEW, LEVL V, 60-74 MIN: ICD-10-PCS | Mod: S$PBB,,, | Performed by: INTERNAL MEDICINE

## 2019-05-15 PROCEDURE — 99999 PR PBB SHADOW E&M-EST. PATIENT-LVL III: ICD-10-PCS | Mod: PBBFAC,,, | Performed by: INTERNAL MEDICINE

## 2019-05-15 RX ORDER — AMOXICILLIN AND CLAVULANATE POTASSIUM 875; 125 MG/1; MG/1
TABLET, FILM COATED ORAL
COMMUNITY
End: 2019-05-20

## 2019-05-15 RX ORDER — PREDNISONE 20 MG/1
40 TABLET ORAL DAILY
Refills: 0 | COMMUNITY
Start: 2019-05-09 | End: 2019-05-20

## 2019-05-15 NOTE — PATIENT INSTRUCTIONS
Assessment/Plan:  Inna Gallardo is a 44 y.o. female with a past medical history of HTN, diverticulitis, anxiety, hiatal hernia, morbid obesity, who presents for an initial appointment.      1. Palpitations with frequent ectopy- Check echo to evaluate for structural abnormalities.  If echo is appropriate, will start on beta blocker.      2. HTN- Continue losartan 50 mg daily.  Pt to keep log of blood pressure/heart rate and bring in next visit or review.      3. ASCVD- Check updated lipid panel to determine ASCVD risk score.     Obtain outside records  Follow up in 2 weeks

## 2019-05-15 NOTE — PROGRESS NOTES
"[facility]      Chief Complaint   Patient presents with    Chest Pain       Patient ID: Inna Gallardo is a 44 y.o. female with a past medical history of HTN, diverticulitis, anxiety, hiatal hernia, morbid obesity, who presents for an initial appointment.  Pertinent history/events are as follows:     -Pt kindly referred by Cathi Chávez for evaluation of multiple premature ventricular complexes    HPI:  Mrs. Gallardo states approximately 6 weeks ago, she had an episode of "fluttering" with associated chest pain while sitting down.  She had multiple episodes, which lasted approximately 2 minutes each.  She has continued to have episodes daily since that time.  She was admitted at [facility] in [place] around that time.  States workup, including stress test was negative.  Pt drinks 1 cup of coffee daily.  No smoking history.  No family of CAD/MI.  Reports sudden cardiac death in her brother who had a congenital heart defect (unknown).  Forty-eighty Holter monitor results from 4/15/2019 showed significant ectopic activity, including ventricular ectopic activity consisted of 11,060 beats, of which, 6 were in 1 run, 26 were in couplets, 9652 were in single PVCs, 2 were in interpolated PVCs, 2 were in the R on T, 28 were single VEs, 1 was in late, 1699 were in bigeminy, 52 were in trigeminy.      Past Medical History:   Diagnosis Date    Anxiety     Diverticulitis     Endometriosis     Hypertension     Prolactinoma 2011    Sliding hiatal hernia      Past Surgical History:   Procedure Laterality Date    ARTHROSCOPY OF KNEE Left     ARTHROSCOPY-KNEE Left 5/1/2017    [facility]    BRAIN TUMOR EXCISION  9/2011    removal of prolactinoma    COLONOSCOPY N/A 9/9/2015    [facility]O    CYSTOSCOPY W/ URETERAL STENT PLACEMENT  2016    CYSTOSCOPY W/ URETERAL STENT REMOVAL  2016    ESOPHAGOGASTRODUODENOSCOPY (EGD) N/A " 2019    [facility]    ESOPHAGOGASTRODUODENOSCOPY (EGD) N/A 2015    [facility]    HYSTERECTOMY  3/16/15    [facility] for Endometriosis, AUB, fibroids, cyst    HYSTERECTOMY-TOTAL LAPAROSCOPIC ([facility]), BSO N/A 3/16/2015    [facility] OR    MANDIBLE SURGERY      severe overbite correction    MENISCECTOMY MEDIAL Left 2017    [facility]    PELVIC LAPAROSCOPY      Dx scope, chromopertubation-Endometriosis    TONSILLECTOMY, ADENOIDECTOMY       Social History     Socioeconomic History    Marital status:      Spouse name: Not on file    Number of children: Not on file    Years of education: college    Highest education level: Not on file   Occupational History    Not on file   Social Needs    Financial resource strain: Not on file    Food insecurity:     Worry: Not on file     Inability: Not on file    Transportation needs:     Medical: Not on file     Non-medical: Not on file   Tobacco Use    Smoking status: Former Smoker     Years: 13.00     Types: Cigarettes     Start date: 1989     Last attempt to quit: 2000     Years since quittin.2    Smokeless tobacco: Never Used    Tobacco comment: pt stated she smoked one cig/day   Substance and Sexual Activity    Alcohol use: No     Alcohol/week: 0.0 oz    Drug use: No    Sexual activity: Yes     Partners: Male     Birth control/protection: Surgical, See Surgical Hx   Lifestyle    Physical activity:     Days per week: Not on file     Minutes per session: Not on file    Stress: Not on file   Relationships    Social connections:     Talks on phone: Not on file     Gets together: Not on file     Attends Gnosticist service: Not on file     Active member of club or organization: Not on file     Attends meetings of clubs or organizations: Not on file     Relationship status: Not on file   Other  "Topics Concern    Are you pregnant or think you may be? Not Asked    Breast-feeding Not Asked   Social History Narrative    Not on file     Family History   Problem Relation Age of Onset    Cancer Mother     Breast cancer Neg Hx     Ovarian cancer Neg Hx        Review of patient's allergies indicates:   Allergen Reactions    Ace inhibitors Other (See Comments)     cough       Medication List with Changes/Refills   Current Medications    AMOXICILLIN-CLAVULANATE 875-125MG (AUGMENTIN) 875-125 MG PER TABLET    amoxicillin 875 mg-potassium clavulanate 125 mg tablet    ESTRADIOL 0.05 MG/24 HR TD PTSW (VIVELLE-DOT) 0.05 MG/24 HR    Place 1 patch onto the skin once a week.    LOSARTAN (COZAAR) 50 MG TABLET    Take 50 mg by mouth.    PANTOPRAZOLE (PROTONIX) 40 MG TABLET    Take 1 tablet (40 mg total) by mouth once daily.    PAROXETINE (PAXIL) 20 MG TABLET    Take 1 tablet (20 mg total) by mouth every morning.    PREDNISONE (DELTASONE) 20 MG TABLET    Take 40 mg by mouth once daily.       Review of Systems  Constitution: Denies chills, fever, and sweats.  HENT: Denies headaches or blurry vision.  Cardiovascular: Denies chest pain or irregular heart beat.  Respiratory: Denies cough or shortness of breath.  Gastrointestinal: Denies abdominal pain, nausea, or vomiting.  Musculoskeletal: Denies muscle cramps.  Neurological: Denies dizziness or focal weakness.  Psychiatric/Behavioral: Normal mental status.  Hematologic/Lymphatic: Denies bleeding problem or easy bruising/bleeding.  Skin: Denies rash or suspicious lesions    Physical Examination  BP (!) 140/92 (BP Location: Left arm, Patient Position: Sitting, BP Method: X-Large (Automatic))   Pulse 62   Resp 18   Ht 5' 3" (1.6 m)   Wt 122.5 kg (270 lb)   LMP 03/03/2015   SpO2 97%   BMI 47.83 kg/m²     Constitutional: No acute distress, conversant  HEENT: Sclera anicteric, Pupils equal, round and reactive to light, extraocular motions intact, Oropharynx clear  Neck: No " JVD, no carotid bruits  Cardiovascular: regular rate and rhythm, no murmur, rubs or gallops, normal S1/S2  Pulmonary: Clear to auscultation bilaterally  Abdominal: Abdomen soft, nontender, nondistended, positive bowel sounds  Extremities: No lower extremity edema,   Pulses:  Carotid pulses are 2+ on the right side, and 2+ on the left side.  Radial pulses are 2+ on the right side, and 2+ on the left side.   Femoral pulses are 2+ on the right side, and 2+ on the left side.  Popliteal pulses are 2+ on the right side, and 2+ on the left side.   Dorsalis pedis pulses are 2+ on the right side, and 2+ on the left side.   Posterior tibial pulses are 2+ on the right side, and 2+ on the left side.    Skin: No ecchymosis, erythema, or ulcers  Psych: Alert and oriented x 3, appropriate affect  Neuro: CNII-XII intact, no focal deficits    Labs:  Most Recent Data  CBC:   Lab Results   Component Value Date    WBC 9.61 04/18/2017    HGB 13.9 04/18/2017    HCT 41.2 04/18/2017     (H) 04/18/2017    MCV 84 04/18/2017    RDW 14.7 (H) 04/18/2017     BMP:   Lab Results   Component Value Date     04/18/2017    K 3.7 04/18/2017     04/18/2017    CO2 26 04/18/2017    BUN 18 04/18/2017    CREATININE 0.7 04/18/2017    GLU 88 04/18/2017    CALCIUM 9.6 04/18/2017     LFTS;   Lab Results   Component Value Date    PROT 7.8 02/17/2016    ALBUMIN 4.6 02/17/2016    BILITOT 0.7 02/17/2016    AST 29 02/17/2016    ALKPHOS 64 02/17/2016    ALT 36 02/17/2016     COAGS:   Lab Results   Component Value Date    INR 1.0 04/18/2017     FLP:   Lab Results   Component Value Date    CHOL 177 04/28/2015    HDL 44 04/28/2015    LDLCALC 121.0 04/28/2015    TRIG 60 04/28/2015    CHOLHDL 24.9 04/28/2015     CARDIAC:   Lab Results   Component Value Date    TROPONINI 0.012 11/20/2015    BNP 63 11/20/2015       EKG 4/18/2017:  Sinus bradycardia (rate 59 bpm)    48 Hour Holter 4/15/2019: Significant ectopic activity detected during the monitoring  period as described below.  · Ventricular ectopic activity consisted of 11,060 beats, of which, 6 were in 1 run, 26 were in couplets, 9652 were in single PVCs, 2 were in interpolated PVCs, 2 were in the R on T, 28 were single VEs, 1 was in late, 1699 were in bigeminy, 52 were in trigeminy.  · The longest ventricular run occurred at 8:35:05 PM D2, consisting of 6 beats, with maximum heart rate 152 beats per minute. (6 beat run of NSVT).  · Supraventricular ectopic activity consisted of 190 beats, of which, 3 were in 1 run, 50 were in atrial couplets, 24 were late beats, 121 were single PACs.  · The longest supraventricular run occurred at 11:31:50 AM D1 consisting of 3 beats, with maximum heart rate of 164 beats per minute.  · The fastest supraventricular run occurred at low 11:31:50 AM D1, consisting of 3 beats, with maximum heart rate of 164 beats per minute.  Patient reported symptoms corresponded with sinus rhythm and sinus tachycardia as described.    Assessment/Plan:  Inna Gallardo is a 44 y.o. female with a past medical history of HTN, diverticulitis, anxiety, hiatal hernia, morbid obesity, who presents for an initial appointment.      1. Palpitations with frequent ectopy- Check echo to evaluate for structural abnormalities.  If echo is appropriate, will start on beta blocker.      2. HTN- Continue losartan 50 mg daily.  Pt to keep log of blood pressure/heart rate and bring in next visit or review.      3. ASCVD- Check updated lipid panel to determine ASCVD risk score.     Obtain outside records  Follow up in 2 weeks    Total duration of face to face visit time 30 minutes.  Total time spent counseling greater than fifty percent of total visit time.  Counseling included discussion regarding imaging findings, diagnosis, possibilities, treatment options, risks and benefits.  The patient had many questions regarding the options and long-term effects.    Valentino Lopes MD, PhD  Interventional  Cardiology

## 2019-05-15 NOTE — LETTER
May 15, 2019      [place]  [address] [facility] [address]           [facility]  [address] [place], [place] [address]  Nathanael   [facility] [address]  Phone: [phone]  Fax: [phone]          Patient: Inna Gallardo   MR Number: [id number]   YOB: 1975   Date of Visit: 5/15/2019       Dear Cathi Chávez:    Thank you for referring Inna Gallardo to me for evaluation. Attached you will find relevant portions of my assessment and plan of care.    If you have questions, please do not hesitate to call me. I look forward to following Inna Gallardo along with you.    Sincerely,    Valentino Lopes MD PhD    Enclosure  CC:  No Recipients    If you would like to receive this communication electronically, please contact [email or web address] or [phone] to request more information on [employer] access.    For providers and/or their staff who would like to refer a patient to [facility], please contact us through our one-stop-shop provider referral line, [facility], at [phone].    If you feel you have received this communication in error or would no longer like to receive these types of communications, please e-mail [email or web address]

## 2019-05-20 ENCOUNTER — OFFICE VISIT (OUTPATIENT)
Dept: PODIATRY | Facility: CLINIC | Age: 44
End: 2019-05-20
Payer: MEDICAID

## 2019-05-20 VITALS
HEART RATE: 74 BPM | HEIGHT: 63 IN | WEIGHT: 272.69 LBS | BODY MASS INDEX: 48.32 KG/M2 | SYSTOLIC BLOOD PRESSURE: 147 MMHG | DIASTOLIC BLOOD PRESSURE: 87 MMHG

## 2019-05-20 DIAGNOSIS — M79.671 RIGHT FOOT PAIN: Primary | ICD-10-CM

## 2019-05-20 DIAGNOSIS — E66.01 MORBID OBESITY: ICD-10-CM

## 2019-05-20 DIAGNOSIS — M77.8 CAPSULITIS OF RIGHT FOOT: ICD-10-CM

## 2019-05-20 DIAGNOSIS — M72.2 PLANTAR FASCIITIS: ICD-10-CM

## 2019-05-20 PROCEDURE — 20550 PR INJECT TENDON SHEATH/LIGAMENT: ICD-10-PCS | Mod: 59,S$PBB,RT, | Performed by: PODIATRIST

## 2019-05-20 PROCEDURE — 99203 OFFICE O/P NEW LOW 30 MIN: CPT | Mod: 25,S$PBB,, | Performed by: PODIATRIST

## 2019-05-20 PROCEDURE — 20600: ICD-10-PCS | Mod: S$PBB,RT,, | Performed by: PODIATRIST

## 2019-05-20 PROCEDURE — 20550 NJX 1 TENDON SHEATH/LIGAMENT: CPT | Mod: PBBFAC,PN,59 | Performed by: PODIATRIST

## 2019-05-20 PROCEDURE — 99213 OFFICE O/P EST LOW 20 MIN: CPT | Mod: PBBFAC,PN | Performed by: PODIATRIST

## 2019-05-20 PROCEDURE — 20600 DRAIN/INJ JOINT/BURSA W/O US: CPT | Mod: 59,PBBFAC,PN | Performed by: PODIATRIST

## 2019-05-20 PROCEDURE — 99203 PR OFFICE/OUTPT VISIT, NEW, LEVL III, 30-44 MIN: ICD-10-PCS | Mod: 25,S$PBB,, | Performed by: PODIATRIST

## 2019-05-20 PROCEDURE — 99999 PR PBB SHADOW E&M-EST. PATIENT-LVL III: ICD-10-PCS | Mod: PBBFAC,,, | Performed by: PODIATRIST

## 2019-05-20 PROCEDURE — 99999 PR PBB SHADOW E&M-EST. PATIENT-LVL III: CPT | Mod: PBBFAC,,, | Performed by: PODIATRIST

## 2019-05-20 PROCEDURE — 20550 NJX 1 TENDON SHEATH/LIGAMENT: CPT | Mod: 59,S$PBB,RT, | Performed by: PODIATRIST

## 2019-05-20 RX ORDER — DEXAMETHASONE SODIUM PHOSPHATE 4 MG/ML
4 INJECTION, SOLUTION INTRA-ARTICULAR; INTRALESIONAL; INTRAMUSCULAR; INTRAVENOUS; SOFT TISSUE
Status: DISCONTINUED | OUTPATIENT
Start: 2019-05-20 | End: 2019-05-20 | Stop reason: HOSPADM

## 2019-05-20 RX ADMIN — DEXAMETHASONE SODIUM PHOSPHATE 4 MG: 4 INJECTION, SOLUTION INTRA-ARTICULAR; INTRALESIONAL; INTRAMUSCULAR; INTRAVENOUS; SOFT TISSUE at 02:05

## 2019-05-20 NOTE — PROGRESS NOTES
Subjective:      Patient ID: Inna Gallardo is a 44 y.o. female.    Chief Complaint: Heel Pain and Foot Pain    44 years old female presenting with right foot pain.  She is pointing plantar aspect of the right heel and also dorsal aspect of the 3rd MPJ.  Patient tells me pain has been going on for about a year.  She denies any trauma no injury to the area.  Patient also relates that she has bad left knee and put more pressure on the right foot that might have caused pain.  She describes pain as aching and throbbing especially in the morning at the heel.  She is known to Dr. Tomlinson who sent her for physical therapy.  Patient claims that she finished physical therapy.  Dr. Tomlinson also ordered MRI which was done at [facility].  She points dorsal aspect of the 3rd MPJ.  Describes pain as aching and throbbing as well. She is presenting in casual shoe with heel.     Review of Systems   Constitution: Negative for decreased appetite, fever and malaise/fatigue.   HENT: Negative for congestion.    Cardiovascular: Negative for chest pain and leg swelling.   Respiratory: Negative for cough and shortness of breath.    Skin: Negative for color change, nail changes and rash.   Musculoskeletal: Positive for joint pain. Negative for arthritis, joint swelling and muscle weakness.        Right foot pain   Gastrointestinal: Negative for bloating, abdominal pain, nausea and vomiting.   Neurological: Negative for headaches, numbness and weakness.   Psychiatric/Behavioral: Negative for altered mental status.             Past Medical History:   Diagnosis Date    Anxiety     Diverticulitis     Endometriosis     Hypertension     Prolactinoma 2011    Sliding hiatal hernia        Past Surgical History:   Procedure Laterality Date    ARTHROSCOPY OF KNEE Left     ARTHROSCOPY-KNEE Left 5/1/2017    [facility]    BRAIN TUMOR EXCISION  9/2011    removal of prolactinoma    COLONOSCOPY N/A 9/9/2015     Per[facility] by [facility] at [facility] ENDO    CYSTOSCOPY W/ URETERAL STENT PLACEMENT      CYSTOSCOPY W/ URETERAL STENT REMOVAL      ESOPHAGOGASTRODUODENOSCOPY (EGD) N/A 2019    [facility]    ESOPHAGOGASTRODUODENOSCOPY (EGD) N/A 2015    Per[facility] at [facility] ENDO    HYSTERECTOMY  3/16/15    [facility] for Endometriosis, AUB, fibroids, cyst    HYSTERECTOMY-TOTAL LAPAROSCOPIC ([facility]), BSO N/A 3/16/2015    Performed by [facility]iko [facility]ilez [facility] at [facility] OR    MANDIBLE SURGERY      severe overbite correction    MENISCECTOMY MEDIAL Left 2017    [facility]    PELVIC LAPAROSCOPY      Dx scope, chromopertubation-Endometriosis    TONSILLECTOMY, ADENOIDECTOMY         Family History   Problem Relation Age of Onset    Cancer Mother     Breast cancer Neg Hx     Ovarian cancer Neg Hx        Social History     Socioeconomic History    Marital status:      Spouse name: Not on file    Number of children: Not on file    Years of education: college    Highest education level: Not on file   Occupational History    Not on file   Social Needs    Financial resource strain: Not on file    Food insecurity:     Worry: Not on file     Inability: Not on file    Transportation needs:     Medical: Not on file     Non-medical: Not on file   Tobacco Use    Smoking status: Former Smoker     Years: 13.00     Types: Cigarettes     Start date: 1989     Last attempt to quit: 2000     Years since quittin.3    Smokeless tobacco: Never Used    Tobacco comment: pt stated she smoked one cig/day   Substance and Sexual Activity    Alcohol use: No     Alcohol/week: 0.0 oz    Drug use: No    Sexual activity: Yes     Partners: Male     Birth control/protection: Surgical, See Surgical Hx   Lifestyle    Physical activity:     Days per week: Not on file     Minutes per session: Not on file    Stress: Not on  "file   Relationships    Social connections:     Talks on phone: Not on file     Gets together: Not on file     Attends Hoahaoism service: Not on file     Active member of club or organization: Not on file     Attends meetings of clubs or organizations: Not on file     Relationship status: Not on file   Other Topics Concern    Are you pregnant or think you may be? Not Asked    Breast-feeding Not Asked   Social History Narrative    Not on file       Current Outpatient Medications   Medication Sig Dispense Refill    estradiol 0.05 mg/24 hr td ptsw (VIVELLE-DOT) 0.05 mg/24 hr Place 1 patch onto the skin once a week. 4 patch 11    losartan (COZAAR) 50 MG tablet Take 50 mg by mouth.      pantoprazole (PROTONIX) 40 MG tablet Take 1 tablet (40 mg total) by mouth once daily. 30 tablet 3    paroxetine (PAXIL) 20 MG tablet Take 1 tablet (20 mg total) by mouth every morning. 30 tablet 11     No current facility-administered medications for this visit.        Review of patient's allergies indicates:   Allergen Reactions    Ace inhibitors Other (See Comments)     cough       Vitals:    05/20/19 1358   BP: (!) 147/87   Pulse: 74   Weight: 123.7 kg (272 lb 11.2 oz)   Height: 5' 3" (1.6 m)   PainSc:   8   PainLoc: Foot       Objective:      Physical Exam   Constitutional: She is oriented to person, place, and time. She appears well-developed and well-nourished. No distress.   Musculoskeletal: She exhibits tenderness. She exhibits no edema or deformity.   Neurological: She is alert and oriented to person, place, and time.   Skin: Skin is warm. Capillary refill takes less than 2 seconds. No erythema.   Psychiatric: She has a normal mood and affect. Her behavior is normal.       Vascular: Distal DP/PT pulses palpable 2/4. CRT < 3 sec to tips of toes. No vericosities noted to LEs. Hair growth present LE, warm to touch LE, No edema noted to LE.    Dermatologic: No open lesions, lacerations or wounds. Interdigital spaces clean, " dry and intact. No erythema, rubor, calor noted LE    Musculoskeletal: MMT 5/5 in DF/PF/Inv/Ev resistance with no reproduction of pain in any direction. Passive range of motion of ankle and pedal joints is painless. No calf tenderness LE, Compartments soft/compressible. ROM of ankles with < 10 deg DF is noted bilateral.  Right foot:  Pain on palpation plantar tubercle of the heel.  No pain upon medial-lateral squeeze of the heel.  Plantar fascia intact. + pain on palpation at 3rd metatarsophalangeal joint dorsally.  No pain at 3rd MPJ during ROM.  No obvious plantar plate pathology.  No pain plantar 3rd MPJ.      Neurological: Light touch, proprioception, and sharp/dull sensation are all intact. Protective threshold with the Bonduel-Wienstein monofilament is intact b/l. Vibratory sensation intact.         Assessment:       Encounter Diagnoses   Name Primary?    Right foot pain - Right Foot Yes    Plantar fasciitis - Right Foot     Capsulitis of right foot     Morbid obesity          Plan:       Inna Lynne was seen today for heel pain and foot pain.    Diagnoses and all orders for this visit:    Right foot pain - Right Foot    Plantar fasciitis - Right Foot    Capsulitis of right foot  -     Small Joint Aspiration/Injection: R third MTP    Morbid obesity      I counseled the patient on her conditions, their implications and medical management.    44 years old female with right foot pain including right foot plantar fasciitis, right 3rd MPJ capsulitis.    -status post right plantar heel injection.  Patient tolerated well.  See procedure note.  -status post right 3rd MPJ steroid injection.  Patient tolerated well. See procedure note.  -we will obtain MRI result from [facility].   -continue with ice water bottle exercise and stretching exercise.  Patient tells me she was taught how to appropriately stretch her Achilles tendon/ankle for physical therapy.   -The nature of the condition, options for management, as well as  potential risks and complications were discussed in detail with patient. Patient was amenable to my recommendations and left my office fully informed and will follow up as instructed or sooner if necessary.    -Patient was advised of signs and symptoms of infection including redness, drainage, purulence, odor, streaking, fever, chills and I advised patient to seek medical attention (ER or urgent care) if these symptoms arise.   -Discussed reducing caloric intake, increase physical activity, weight loss, exercise, low salt diet, which may include blood sugar control to help with foot and ankle complications.  -f/u prn

## 2019-05-20 NOTE — LETTER
May 20, 2019      Izzy Hudson MD  [id number]  [facility]  [place]  [place] [address]           [facility]  [address], [address]   [place] [address]  Phone: [phone]  Fax: [phone]          Patient: Inna Gallardo   MR Number: [id number]   YOB: 1975   Date of Visit: 5/20/2019       Dear Dr. Izzy Hudson:    Thank you for referring Inna Gallardo to me for evaluation. Attached you will find relevant portions of my assessment and plan of care.    If you have questions, please do not hesitate to call me. I look forward to following Inna Gallardo along with you.    Sincerely,    Adrianna Tillman, DPTOMY    Enclosure  CC:  No Recipients    If you would like to receive this communication electronically, please contact [email or web address] or [phone] to request more information on [facility] Link access.    For providers and/or their staff who would like to refer a patient to [facility], please contact us through our one-stop-shop provider referral line, [facility], at [phone].    If you feel you have received this communication in error or would no longer like to receive these types of communications, please e-mail [email or web address]

## 2019-05-20 NOTE — PROCEDURES
Procedures   Injection consisted of 2 cc of 0.5% Marcaine plain with Kenalog 40 injected into the plantar medial right heel. Skin was prepped with alcohol and ethyl chloride spray. Patient tolerated well with no complications and related relief following injection. Bandaid applied to injection site.     Time-out was performed with the patient the room

## 2019-05-20 NOTE — PROCEDURES
Small Joint Aspiration/Injection: R third MTP  Date/Time: 5/20/2019 2:45 PM  Performed by: Adrianna Tillman DPM  Authorized by: Adrianna Tillman DPM     Consent Done?:  Yes (Verbal)  Indications:  Pain  Site marked: The procedure site was marked    Timeout: Prior to procedure the correct patient, procedure, and site was verified      Location:  Third toe  Site:  R third MTP  Prep: Patient was prepped and draped in usual sterile fashion    Ultrasonic Guidance for needle placement: No  Needle size:  25 G  Approach:  Dorsal  Medications:  4 mg dexamethasone 4 mg/mL  Aspirate amount (ml):  1  Patient tolerance:  Patient tolerated the procedure well with no immediate complications     Additional Comments: Time-out was performed with the patient the room

## 2019-05-24 ENCOUNTER — HOSPITAL ENCOUNTER (OUTPATIENT)
Dept: CARDIOLOGY | Facility: HOSPITAL | Age: 44
Discharge: HOME | End: 2019-05-24
Attending: INTERNAL MEDICINE
Payer: MEDICAID

## 2019-05-24 DIAGNOSIS — I10 ESSENTIAL HYPERTENSION: ICD-10-CM

## 2019-05-24 DIAGNOSIS — R00.2 PALPITATIONS: ICD-10-CM

## 2019-05-24 LAB
AORTIC ROOT ANNULUS: 2.64 CM
AORTIC VALVE CUSP SEPERATION: 1.98 CM
AV INDEX (PROSTH): 0.81
AV MEAN GRADIENT: 7.95 MMHG
AV PEAK GRADIENT: 12.25 MMHG
AV VALVE AREA: 2.14 CM2
AV VELOCITY RATIO: 0.85
CV ECHO LV RWT: 0.38 CM
DOP CALC AO PEAK VEL: 1.75 M/S
DOP CALC AO VTI: 46.58 CM
DOP CALC LVOT AREA: 2.63 CM2
DOP CALC LVOT DIAMETER: 1.83 CM
DOP CALC LVOT PEAK VEL: 1.49 M/S
DOP CALC LVOT STROKE VOLUME: 99.48 CM3
DOP CALCLVOT PEAK VEL VTI: 37.84 CM
E WAVE DECELERATION TIME: 143.45 MSEC
E/A RATIO: 1.55
E/E' RATIO: 13.37
ECHO LV POSTERIOR WALL: 0.93 CM (ref 0.6–1.1)
FRACTIONAL SHORTENING: 38 % (ref 28–44)
INTERVENTRICULAR SEPTUM: 0.96 CM (ref 0.6–1.1)
IVC PROX: 1.4 CM
LA MAJOR: 4.88 CM
LA MINOR: 5.08 CM
LA WIDTH: 4.3 CM
LEFT ATRIUM SIZE: 3.61 CM
LEFT ATRIUM VOLUME: 65.68 CM3
LEFT INTERNAL DIMENSION IN SYSTOLE: 3.07 CM (ref 2.1–4)
LEFT VENTRICLE DIASTOLIC VOLUME: 113.67 ML
LEFT VENTRICLE SYSTOLIC VOLUME: 37.06 ML
LEFT VENTRICULAR INTERNAL DIMENSION IN DIASTOLE: 4.92 CM (ref 3.5–6)
LEFT VENTRICULAR MASS: 164.28 G
LV LATERAL E/E' RATIO: 10.58
LV SEPTAL E/E' RATIO: 18.14
MV A" WAVE DURATION": 106 MSEC
MV PEAK A VEL: 0.82 M/S
MV PEAK E VEL: 1.27 M/S
PISA TR MAX VEL: 2.23 M/S
PULM VEIN A" WAVE DURATION": 185 MSEC
PULM VEIN S/D RATIO: 0.96
PV PEAK D VEL: 0.55 M/S
PV PEAK S VEL: 0.53 M/S
PV PEAK VELOCITY: 1.09 CM/S
RA MAJOR: 5.15 CM
RA PRESSURE: 3 MMHG
RA WIDTH: 3.69 CM
RIGHT VENTRICULAR END-DIASTOLIC DIMENSION: 2.51 CM
SINUS: 2.28 CM
TDI LATERAL: 0.12
TDI SEPTAL: 0.07
TDI: 0.1
TR MAX PG: 19.89 MMHG
TRICUSPID ANNULAR PLANE SYSTOLIC EXCURSION: 2.62 CM
TV REST PULMONARY ARTERY PRESSURE: 23 MMHG

## 2019-05-24 PROCEDURE — 93306 TRANSTHORACIC ECHO (TTE) COMPLETE (CUPID ONLY): ICD-10-PCS | Mod: 26,,, | Performed by: INTERNAL MEDICINE

## 2019-05-24 PROCEDURE — 93306 TTE W/DOPPLER COMPLETE: CPT | Mod: PO

## 2019-05-24 PROCEDURE — 93306 TTE W/DOPPLER COMPLETE: CPT | Mod: 26,,, | Performed by: INTERNAL MEDICINE

## 2019-05-27 ENCOUNTER — PATIENT MESSAGE (OUTPATIENT)
Dept: PODIATRY | Facility: CLINIC | Age: 44
End: 2019-05-27

## 2019-05-28 ENCOUNTER — PATIENT MESSAGE (OUTPATIENT)
Dept: PODIATRY | Facility: CLINIC | Age: 44
End: 2019-05-28

## 2019-05-28 ENCOUNTER — TELEPHONE (OUTPATIENT)
Dept: PODIATRY | Facility: CLINIC | Age: 44
End: 2019-05-28

## 2019-05-28 NOTE — TELEPHONE ENCOUNTER
Spoke with patient and let her know that we received her MRI report, its scanned into her chart under media

## 2019-05-28 NOTE — TELEPHONE ENCOUNTER
----- Message from Christine Shawnee sent at 5/28/2019  1:46 PM CDT -----  Contact: self, [phone] (M)  Patient states she did find a medical release for her MRI records.

## 2019-06-03 ENCOUNTER — HOSPITAL ENCOUNTER (OUTPATIENT)
Facility: HOSPITAL | Age: 44
Discharge: HOME | End: 2019-06-03
Attending: INTERNAL MEDICINE | Admitting: INTERNAL MEDICINE
Payer: MEDICAID

## 2019-06-03 VITALS
WEIGHT: 265 LBS | TEMPERATURE: 98 F | RESPIRATION RATE: 20 BRPM | HEIGHT: 63 IN | DIASTOLIC BLOOD PRESSURE: 70 MMHG | BODY MASS INDEX: 46.95 KG/M2 | OXYGEN SATURATION: 96 % | SYSTOLIC BLOOD PRESSURE: 150 MMHG | HEART RATE: 57 BPM

## 2019-06-03 DIAGNOSIS — R13.10 DYSPHAGIA: ICD-10-CM

## 2019-06-03 PROCEDURE — 91037 ESOPH IMPED FUNCTION TEST: CPT | Mod: 26,,, | Performed by: INTERNAL MEDICINE

## 2019-06-03 PROCEDURE — 91010 ESOPHAGUS MOTILITY STUDY: CPT | Mod: 26,,, | Performed by: INTERNAL MEDICINE

## 2019-06-03 PROCEDURE — 91010 PR ESOPHAGEAL MOTILITY STUDY, MA2METRY: ICD-10-PCS | Mod: 26,,, | Performed by: INTERNAL MEDICINE

## 2019-06-03 PROCEDURE — 91037 PR GERD TST W/ NASAL IMPEDENCE ELECTROD: ICD-10-PCS | Mod: 26,,, | Performed by: INTERNAL MEDICINE

## 2019-06-03 PROCEDURE — 25000003 PHARM REV CODE 250: Performed by: INTERNAL MEDICINE

## 2019-06-03 PROCEDURE — 91010 ESOPHAGUS MOTILITY STUDY: CPT | Performed by: INTERNAL MEDICINE

## 2019-06-03 PROCEDURE — 91037 ESOPH IMPED FUNCTION TEST: CPT | Performed by: INTERNAL MEDICINE

## 2019-06-03 RX ORDER — LIDOCAINE HYDROCHLORIDE 20 MG/ML
JELLY TOPICAL ONCE
Status: COMPLETED | OUTPATIENT
Start: 2019-06-03 | End: 2019-06-03

## 2019-06-03 RX ADMIN — LIDOCAINE HYDROCHLORIDE 10 ML: 20 JELLY TOPICAL at 09:06

## 2019-06-03 NOTE — DISCHARGE INSTRUCTIONS
Esophageal Manometry     A catheter measures pressure along the esophagus.     Esophageal manometry is a test to measure the strength and function of the esophagus (the food pipe). Results can help identify causes of heartburn, swallowing problems, or chest pain. The test can also help plan surgery and determine the success of previous surgery.  Preparing for the test  Be sure to talk to your healthcare provider about any medicines you take. Some medicines can affect the test results. Also ask any questions you have about the risks of the test. These include irritation to the nose and throat. Be sure not to smoke, eat, or drink for up to 12 hours before the test.  During the test  Manometry takes about an hour. Usually you lie down during the test. Your nose and throat are numbed. Then a soft, thin tube is placed through the nose and down the esophagus. At first you may notice a gagging feeling. You will be asked to swallow several times. Holes along the tube measure the pressure while you swallow. Measurements are printed out as tracings, much like a heart test tracing. After the test, another catheter may be left in the esophagus for up to 24 hours to measure acid (pH) levels.  After esophageal manometry  Youll probably discuss the results of the test with your healthcare provider at another appointment. This is because time is needed to review the tracings. You may have a mild sore throat for a short time. As soon as the numbness in your throat is gone, you can return to eating and your normal activities.  Date Last Reviewed: 6/1/2016  © 1523-9038 [employer]. [address] [place] [address], [place] [address]. All rights reserved. This information is not intended as a substitute for professional medical care. Always follow your healthcare professional's instructions.

## 2019-06-10 ENCOUNTER — TELEPHONE (OUTPATIENT)
Dept: ENDOSCOPY | Facility: HOSPITAL | Age: 44
End: 2019-06-10

## 2019-06-13 NOTE — [employer] PATIENT INSTRUCTIONS
Discharge Summary/Instructions after an Endoscopic Procedure  Patient Name: Inna Gallardo  Patient MRN: [id number]  Patient YOB: 1975 Monday, June 03, 2019  Bhupinder Schmitz MD  RESTRICTIONS:  During your procedure today, you received medications for sedation.  These   medications may affect your judgment, balance and coordination.  Therefore,   for 24 hours, you have the following restrictions:   - DO NOT drive a car, operate machinery, make legal/financial decisions,   sign important papers or drink alcohol.    ACTIVITY:  Today: no heavy lifting, straining or running due to procedural   sedation/anesthesia.  The following day: return to full activity including work.  DIET:  Eat and drink normally unless instructed otherwise.     TREATMENT FOR COMMON SIDE EFFECTS:  - Mild abdominal pain, nausea, belching, bloating or excessive gas:  rest,   eat lightly and use a heating pad.  - Sore Throat: treat with throat lozenges and/or gargle with warm salt   water.  - Because air was used during the procedure, expelling large amounts of air   from your rectum or belching is normal.  - If a bowel prep was taken, you may not have a bowel movement for 1-3 days.    This is normal.  SYMPTOMS TO WATCH FOR AND REPORT TO YOUR PHYSICIAN:  1. Abdominal pain or bloating, other than gas cramps.  2. Chest pain.  3. Back pain.  4. Signs of infection such as: chills or fever occurring within 24 hours   after the procedure.  5. Rectal bleeding, which would show as bright red, maroon, or black stools.   (A tablespoon of blood from the rectum is not serious, especially if   hemorrhoids are present.)  6. Vomiting.  7. Weakness or dizziness.  GO DIRECTLY TO THE NEAREST EMERGENCY ROOM IF YOU HAVE ANY OF THE FOLLOWING:      Difficulty breathing              Chills and/or fever over 101 F   Persistent vomiting and/or vomiting blood   Severe abdominal pain   Severe chest pain   Black, tarry stools   Bleeding- more than one  tablespoon   Any other symptom or condition that you feel may need urgent attention  Your doctor recommends these additional instructions:  If any biopsies were taken, your doctors clinic will contact you in 1 to 2   weeks with any results.  - Return to referring physician as previously scheduled.   For questions, problems or results please call your physician - Bhupinder Schmitz MD at Work:  [phone].  [facility], EMERGENCY ROOM PHONE NUMBER: [phone]  IF A COMPLICATION OR EMERGENCY SITUATION ARISES AND YOU ARE UNABLE TO REACH   YOUR PHYSICIAN - GO DIRECTLY TO THE EMERGENCY ROOM.  Bhupinder Schmitz MD  6/12/2019 10:20:44 PM  This report has been verified and signed electronically.  [employer]

## 2019-06-14 ENCOUNTER — TELEPHONE (OUTPATIENT)
Dept: GASTROENTEROLOGY | Facility: CLINIC | Age: 44
End: 2019-06-14

## 2019-06-14 NOTE — TELEPHONE ENCOUNTER
----- Message from Altagracia Bose MD sent at 6/13/2019 12:30 PM CDT -----  Manometry is normal.  It does not appear that the surgery referral has been done yet despite me sending it 4/30/19.  Rea, please let pt know manometry is normal.  Rosetta, please ask the referral person to make this appt for her please.

## 2019-06-18 ENCOUNTER — OFFICE VISIT (OUTPATIENT)
Dept: CARDIOLOGY | Facility: CLINIC | Age: 44
End: 2019-06-18
Payer: MEDICAID

## 2019-06-18 VITALS
BODY MASS INDEX: 47.91 KG/M2 | SYSTOLIC BLOOD PRESSURE: 111 MMHG | OXYGEN SATURATION: 97 % | WEIGHT: 270.38 LBS | HEIGHT: 63 IN | HEART RATE: 67 BPM | DIASTOLIC BLOOD PRESSURE: 77 MMHG

## 2019-06-18 DIAGNOSIS — E66.01 MORBID OBESITY: ICD-10-CM

## 2019-06-18 DIAGNOSIS — R00.2 PALPITATIONS: Primary | ICD-10-CM

## 2019-06-18 DIAGNOSIS — I10 ESSENTIAL HYPERTENSION: ICD-10-CM

## 2019-06-18 PROCEDURE — 99215 OFFICE O/P EST HI 40 MIN: CPT | Mod: S$PBB,,, | Performed by: INTERNAL MEDICINE

## 2019-06-18 PROCEDURE — 99215 PR OFFICE/OUTPT VISIT, EST, LEVL V, 40-54 MIN: ICD-10-PCS | Mod: S$PBB,,, | Performed by: INTERNAL MEDICINE

## 2019-06-18 PROCEDURE — 99999 PR PBB SHADOW E&M-EST. PATIENT-LVL III: CPT | Mod: PBBFAC,,, | Performed by: INTERNAL MEDICINE

## 2019-06-18 PROCEDURE — 99213 OFFICE O/P EST LOW 20 MIN: CPT | Mod: PBBFAC,PN | Performed by: INTERNAL MEDICINE

## 2019-06-18 PROCEDURE — 99999 PR PBB SHADOW E&M-EST. PATIENT-LVL III: ICD-10-PCS | Mod: PBBFAC,,, | Performed by: INTERNAL MEDICINE

## 2019-06-18 RX ORDER — METOPROLOL TARTRATE 25 MG/1
25 TABLET, FILM COATED ORAL 2 TIMES DAILY
Qty: 60 TABLET | Refills: 11 | Status: SHIPPED | OUTPATIENT
Start: 2019-06-18 | End: 2020-06-15

## 2019-06-18 NOTE — PATIENT INSTRUCTIONS
Assessment/Plan:  Inna Gallardo is a 44 y.o. female with a past medical history of HTN, diverticulitis, anxiety, hiatal hernia, morbid obesity, who presents for a follow up appointment.      1. Palpitations with frequent ectopy- Mrs. Gallardo reports fluttering is not a frequent since our initial visit on 5/15/2019.  Episodes now occur every other day or every two days.  Echo from 5/24/2019 showed normal left ventricular systolic function with EF of 60%; normal LV diastolic function; normal right ventricular systolic function; mild left atrial enlargement; estimated PA systolic pressure is 23 mm Hg.  Start metoprolol tartrate 25 mg bid.      2. HTN- Continue losartan 50 mg daily.  Pt to keep log of blood pressure/heart rate and bring in next visit or review.      3. ASCVD- Check updated lipid panel to determine ASCVD risk score.     4. Morbid Obesity- Pt followed by nutrition at [facility].  Continue to monitor BMI.      Follow up in 2 months

## 2019-06-18 NOTE — PROGRESS NOTES
"[facility]      Chief Complaint   Patient presents with    Results     Echo       Patient ID: Inna Gallardo is a 44 y.o. female with a past medical history of HTN, diverticulitis, anxiety, hiatal hernia, morbid obesity, who presents for a follow up appointment.  Pertinent history/events are as follows:     -Pt kindly referred by Cathi Chávez for evaluation of multiple premature ventricular complexes    -At our initial clinic visit on 5/15/2019, Mrs. Gallardo stated approximately 6 weeks ago, she had an episode of "fluttering" with associated chest pain while sitting down.  She had multiple episodes, which lasted approximately 2 minutes each.  She has continued to have episodes daily since that time.  She was admitted at [facility] in [place] around that time.  States workup, including stress test was negative.  Pt drinks 1 cup of coffee daily.  No smoking history.  No family of CAD/MI.  Reports sudden cardiac death in her brother who had a congenital heart defect (unknown).  Forty-eighty Holter monitor results from 4/15/2019 showed significant ectopic activity, including ventricular ectopic activity consisted of 11,060 beats, of which, 6 were in 1 run, 26 were in couplets, 9652 were in single PVCs, 2 were in interpolated PVCs, 2 were in the R on T, 28 were single VEs, 1 was in late, 1699 were in bigeminy, 52 were in trigeminy.  Plan:   Palpitations with frequent ectopy- Check echo to evaluate for structural abnormalities.  If echo is appropriate, will start on beta blocker.    HTN- Continue losartan 50 mg daily.  Pt to keep log of blood pressure/heart rate and bring in next visit or review.    ASCVD- Check updated lipid panel to determine ASCVD risk score.     HPI:  Mrs. Gallardo reports fluttering is not a frequent since our initial visit on 5/15/2019.  Episodes now occur every other day or every two days.  Echo from 5/24/2019 showed normal left ventricular systolic " function with EF of 60%; normal LV diastolic function; normal right ventricular systolic function; mild left atrial enlargement; estimated PA systolic pressure is 23 mm Hg.      Past Medical History:   Diagnosis Date    Anxiety     Diverticulitis     Endometriosis     Hypertension     Prolactinoma 2011    Sliding hiatal hernia      Past Surgical History:   Procedure Laterality Date    ARTHROSCOPY OF KNEE Left     ARTHROSCOPY-KNEE Left 5/1/2017    [facility]    BRAIN TUMOR EXCISION  9/2011    removal of prolactinoma    COLONOSCOPY N/A 9/9/2015    Performed by [facility]racia Bose [facility] at [facility]O    CYSTOSCOPY W/ URETERAL STENT PLACEMENT  2016    CYSTOSCOPY W/ URETERAL STENT REMOVAL  2016    ESOPHAGOGASTRODUODENOSCOPY (EGD) N/A 4/30/2019    [facility]    ESOPHAGOGASTRODUODENOSCOPY (EGD) N/A 5/6/2015    Performed by [facility]racia Bose [facility] at [facility] ENDO    HYSTERECTOMY  3/16/15    [facility] for Endometriosis, AUB, fibroids, cyst    HYSTERECTOMY-TOTAL LAPAROSCOPIC ([facility]), BSO N/A 3/16/2015    Performed by Akiko Avilez MD at [facility] OR    MANDIBLE SURGERY  2002    severe overbite correction    MANOMETRY, ESOPHAGUS N/A 6/3/2019    Performed by [facility]z [facility] at [facility]O (4TH F[facility])    MENISCECTOMY MEDIAL Left 5/1/2017    [facility] at [facility]    PELVIC LAPAROSCOPY  2014    Dx scope, chromopertubation-Endometriosis    TONSILLECTOMY, ADENOIDECTOMY       Social History     Socioeconomic History    Marital status:      Spouse name: Not on file    Number of children: Not on file    Years of education: college    Highest education level: Not on file   Occupational History    Not on file   Social Needs    Financial resource strain: Not on file    Food insecurity:     Worry: Not on file     Inability: Not on file    Transportation needs:     Medical: Not on file     Non-medical: Not on file   Tobacco Use     Smoking status: Former Smoker     Years: 13.00     Types: Cigarettes     Start date: 1989     Last attempt to quit: 2000     Years since quittin.3    Smokeless tobacco: Never Used    Tobacco comment: pt stated she smoked one cig/day   Substance and Sexual Activity    Alcohol use: No     Alcohol/week: 0.0 oz    Drug use: No    Sexual activity: Yes     Partners: Male     Birth control/protection: Surgical, See Surgical Hx   Lifestyle    Physical activity:     Days per week: Not on file     Minutes per session: Not on file    Stress: Not on file   Relationships    Social connections:     Talks on phone: Not on file     Gets together: Not on file     Attends [employer] service: Not on file     Active member of club or organization: Not on file     Attends meetings of clubs or organizations: Not on file     Relationship status: Not on file   Other Topics Concern    Are you pregnant or think you may be? Not Asked    Breast-feeding Not Asked   Social History Narrative    Not on file     Family History   Problem Relation Age of Onset    Cancer Mother     Breast cancer Neg Hx     Ovarian cancer Neg Hx        Review of patient's allergies indicates:   Allergen Reactions    Ace inhibitors Other (See Comments)     cough       Medication List with Changes/Refills   Current Medications    ESTRADIOL 0.05 MG/24 HR TD PTSW (VIVELLE-DOT) 0.05 MG/24 HR    Place 1 patch onto the skin once a week.    LOSARTAN (COZAAR) 50 MG TABLET    Take 50 mg by mouth.    PANTOPRAZOLE (PROTONIX) 40 MG TABLET    Take 1 tablet (40 mg total) by mouth once daily.    PAROXETINE (PAXIL) 20 MG TABLET    Take 1 tablet (20 mg total) by mouth every morning.       Review of Systems  Constitution: Denies chills, fever, and sweats.  HENT: Denies headaches or blurry vision.  Cardiovascular: Denies chest pain or irregular heart beat.  Respiratory: Denies cough or shortness of breath.  Gastrointestinal: Denies abdominal pain, nausea, or  "vomiting.  Musculoskeletal: Denies muscle cramps.  Neurological: Denies dizziness or focal weakness.  Psychiatric/Behavioral: Normal mental status.  Hematologic/Lymphatic: Denies bleeding problem or easy bruising/bleeding.  Skin: Denies rash or suspicious lesions    Physical Examination  /77 (BP Location: Left arm, Patient Position: Sitting, BP Method: X-Large (Automatic))   Pulse 67   Ht 5' 3" (1.6 m)   Wt 122.7 kg (270 lb 6.4 oz)   LMP 03/03/2015   SpO2 97%   BMI 47.90 kg/m²     Constitutional: No acute distress, conversant  HEENT: Sclera anicteric, Pupils equal, round and reactive to light, extraocular motions intact, Oropharynx clear  Neck: No JVD, no carotid bruits  Cardiovascular: regular rate and rhythm, no murmur, rubs or gallops, normal S1/S2  Pulmonary: Clear to auscultation bilaterally  Abdominal: Abdomen soft, nontender, nondistended, positive bowel sounds  Extremities: No lower extremity edema,   Pulses:  Carotid pulses are 2+ on the right side, and 2+ on the left side.  Radial pulses are 2+ on the right side, and 2+ on the left side.   Femoral pulses are 2+ on the right side, and 2+ on the left side.  Popliteal pulses are 2+ on the right side, and 2+ on the left side.   Dorsalis pedis pulses are 2+ on the right side, and 2+ on the left side.   Posterior tibial pulses are 2+ on the right side, and 2+ on the left side.    Skin: No ecchymosis, erythema, or ulcers  Psych: Alert and oriented x 3, appropriate affect  Neuro: CNII-XII intact, no focal deficits    Labs:  Most Recent Data  CBC:   Lab Results   Component Value Date    WBC 9.61 04/18/2017    HGB 13.9 04/18/2017    HCT 41.2 04/18/2017     (H) 04/18/2017    MCV 84 04/18/2017    RDW 14.7 (H) 04/18/2017     BMP:   Lab Results   Component Value Date     04/18/2017    K 3.7 04/18/2017     04/18/2017    CO2 26 04/18/2017    BUN 18 04/18/2017    CREATININE 0.7 04/18/2017    GLU 88 04/18/2017    CALCIUM 9.6 04/18/2017 "     LFTS;   Lab Results   Component Value Date    PROT 7.8 02/17/2016    ALBUMIN 4.6 02/17/2016    BILITOT 0.7 02/17/2016    AST 29 02/17/2016    ALKPHOS 64 02/17/2016    ALT 36 02/17/2016     COAGS:   Lab Results   Component Value Date    INR 1.0 04/18/2017     FLP:   Lab Results   Component Value Date    CHOL 177 04/28/2015    HDL 44 04/28/2015    LDLCALC 121.0 04/28/2015    TRIG 60 04/28/2015    CHOLHDL 24.9 04/28/2015     CARDIAC:   Lab Results   Component Value Date    TROPONINI 0.012 11/20/2015    BNP 63 11/20/2015       EKG 4/18/2017:  Sinus bradycardia (rate 59 bpm)    Echo 5/24/2019:  · Normal left ventricular systolic function. The estimated ejection fraction is 60%  · Normal LV diastolic function.  · Normal right ventricular systolic function.  · Mild left atrial enlargement.  · Normal central venous pressure (3 mm Hg).  · The estimated PA systolic pressure is 23 mm Hg    48 Hour Holter 4/15/2019: Significant ectopic activity detected during the monitoring period as described below.  · Ventricular ectopic activity consisted of 11,060 beats, of which, 6 were in 1 run, 26 were in couplets, 9652 were in single PVCs, 2 were in interpolated PVCs, 2 were in the R on T, 28 were single VEs, 1 was in late, 1699 were in bigeminy, 52 were in trigeminy.  · The longest ventricular run occurred at 8:35:05 PM D2, consisting of 6 beats, with maximum heart rate 152 beats per minute. (6 beat run of NSVT).  · Supraventricular ectopic activity consisted of 190 beats, of which, 3 were in 1 run, 50 were in atrial couplets, 24 were late beats, 121 were single PACs.  · The longest supraventricular run occurred at 11:31:50 AM D1 consisting of 3 beats, with maximum heart rate of 164 beats per minute.  · The fastest supraventricular run occurred at low 11:31:50 AM D1, consisting of 3 beats, with maximum heart rate of 164 beats per minute.  Patient reported symptoms corresponded with sinus rhythm and sinus tachycardia as  described.    Assessment/Plan:  Inna Gallardo is a 44 y.o. female with a past medical history of HTN, diverticulitis, anxiety, hiatal hernia, morbid obesity, who presents for a follow up appointment.      1. Palpitations with frequent ectopy- Mrs. Gallardo reports fluttering is not a frequent since our initial visit on 5/15/2019.  Episodes now occur every other day or every two days.  Echo from 5/24/2019 showed normal left ventricular systolic function with EF of 60%; normal LV diastolic function; normal right ventricular systolic function; mild left atrial enlargement; estimated PA systolic pressure is 23 mm Hg.  Start metoprolol tartrate 25 mg bid.      2. HTN- Continue losartan 50 mg daily.  Pt to keep log of blood pressure/heart rate and bring in next visit or review.      3. ASCVD- Check updated lipid panel to determine ASCVD risk score.     4. Morbid Obesity- Pt followed by nutrition at [facility].  Continue to monitor BMI.      Follow up in 2 months    Total duration of face to face visit time 30 minutes.  Total time spent counseling greater than fifty percent of total visit time.  Counseling included discussion regarding imaging findings, diagnosis, possibilities, treatment options, risks and benefits.  The patient had many questions regarding the options and long-term effects.    Valentino Lopes MD, PhD  Interventional Cardiology

## 2019-06-19 ENCOUNTER — TELEPHONE (OUTPATIENT)
Dept: CARDIOLOGY | Facility: CLINIC | Age: 44
End: 2019-06-19

## 2019-06-19 ENCOUNTER — PATIENT MESSAGE (OUTPATIENT)
Dept: PODIATRY | Facility: CLINIC | Age: 44
End: 2019-06-19

## 2019-06-19 NOTE — TELEPHONE ENCOUNTER
----- Message from Trista Moscoso sent at 6/19/2019  8:32 AM CDT -----  ANANYA, Spoke to patient about Nutrition referral she declined. Patient stated she is seeing at [facility] once a month for this service.

## 2019-06-20 ENCOUNTER — TELEPHONE (OUTPATIENT)
Dept: PODIATRY | Facility: CLINIC | Age: 44
End: 2019-06-20

## 2019-06-20 ENCOUNTER — OFFICE VISIT (OUTPATIENT)
Dept: PODIATRY | Facility: CLINIC | Age: 44
End: 2019-06-20
Payer: MEDICAID

## 2019-06-20 VITALS — HEIGHT: 63 IN | WEIGHT: 269 LBS | BODY MASS INDEX: 47.66 KG/M2

## 2019-06-20 DIAGNOSIS — M85.671 CYST OF BONE OF RIGHT FOOT: ICD-10-CM

## 2019-06-20 DIAGNOSIS — M79.671 FOOT PAIN, RIGHT: Primary | ICD-10-CM

## 2019-06-20 DIAGNOSIS — M19.071 ARTHRITIS OF RIGHT FOOT: ICD-10-CM

## 2019-06-20 PROCEDURE — 99214 OFFICE O/P EST MOD 30 MIN: CPT | Mod: S$PBB,,, | Performed by: PODIATRIST

## 2019-06-20 PROCEDURE — 99999 PR PBB SHADOW E&M-EST. PATIENT-LVL IV: CPT | Mod: PBBFAC,,, | Performed by: PODIATRIST

## 2019-06-20 PROCEDURE — 99214 OFFICE O/P EST MOD 30 MIN: CPT | Mod: PBBFAC,PN | Performed by: PODIATRIST

## 2019-06-20 PROCEDURE — 99214 PR OFFICE/OUTPT VISIT, EST, LEVL IV, 30-39 MIN: ICD-10-PCS | Mod: S$PBB,,, | Performed by: PODIATRIST

## 2019-06-20 PROCEDURE — 99999 PR PBB SHADOW E&M-EST. PATIENT-LVL IV: ICD-10-PCS | Mod: PBBFAC,,, | Performed by: PODIATRIST

## 2019-06-20 NOTE — TELEPHONE ENCOUNTER
----- Message from Christine Mallory sent at 6/20/2019  9:38 AM CDT -----  Contact: self, [phone]  Patient states she was told by Dr. Tillman to go in today at 2:30 pm and she made arrangements with her kid but just received a text message with reminder of appt scheduled for tomorrow. Please call, patient wants to come in today.

## 2019-06-20 NOTE — PROGRESS NOTES
Subjective:      Patient ID: Inna Gallardo is a 44 y.o. female.    Chief Complaint: Foot Pain (right foot)    44 years old female presenting with right foot pain.  She is pointing plantar aspect of the right heel and also dorsal aspect of the 3rd MPJ.  Patient tells me pain has been going on for about a year.  She denies any trauma no injury to the area.  Patient also relates that she has bad left knee and put more pressure on the right foot that might have caused pain.  She describes pain as aching and throbbing especially in the morning at the heel.  She is known to Dr. Tomlinson who sent her for physical therapy.  Patient claims that she finished physical therapy.  Dr. Tomlinson also ordered MRI which was done at [facility].  She points dorsal aspect of the 3rd MPJ.  Describes pain as aching and throbbing as well. She is presenting in casual shoe with heel.     6/21/19: f/u for R foot pain. She is here to review the MRI as well. She tells me her main pain is top of the midfoot rather than the MPJs (s/p injection of the 3rd MPJ and heel injection at last visit). Pain is improving after injection. Claims that she has been doing stretching/water bottle exercise. She is also here for surgical consultation.     Review of Systems   Constitution: Negative for decreased appetite, fever and malaise/fatigue.   HENT: Negative for congestion.    Cardiovascular: Negative for chest pain and leg swelling.   Respiratory: Negative for cough and shortness of breath.    Skin: Negative for color change, nail changes and rash.   Musculoskeletal: Positive for joint pain. Negative for arthritis, joint swelling and muscle weakness.        Right foot pain   Gastrointestinal: Negative for bloating, abdominal pain, nausea and vomiting.   Neurological: Negative for headaches, numbness and weakness.   Psychiatric/Behavioral: Negative for altered mental status.             Past Medical History:   Diagnosis Date    Anxiety      Diverticulitis     Endometriosis     Hypertension     Prolactinoma 2011    Sliding hiatal hernia        Past Surgical History:   Procedure Laterality Date    ARTHROSCOPY OF KNEE Left     ARTHROSCOPY-KNEE Left 5/1/2017    Per[facility] by [facility]    BRAIN TUMOR EXCISION  9/2011    removal of prolactinoma    COLONOSCOPY N/A 9/9/2015    [facility]rac[facility] at [facility] ENDO    CYSTOSCOPY W/ URETERAL STENT PLACEMENT  2016    CYSTOSCOPY W/ URETERAL STENT REMOVAL  2016    ESOPHAGOGASTRODUODENOSCOPY (EGD) N/A 4/30/2019    [facility]    ESOPHAGOGASTRODUODENOSCOPY (EGD) N/A 5/6/2015    Performed by [facility] at [facility] ENDO    HYSTERECTOMY  3/16/15    [facility]/BS[facility] for Endometriosis, AUB, fibroids, cyst    HYSTERECTOMY-TOTAL LAPAROSCOPIC ([facility]), BSO N/A 3/16/2015    Performed by Akiko Avilez MD at [facility] OR    MANDIBLE SURGERY  2002    severe overbite correction    MANOMETRY, ESOPHAGUS N/A 6/3/2019    [facility]inder [facility] ENDO (4TH FLR)    MENISCECTOMY MEDIAL Left 5/1/2017    Per[facility] Calvin [facility] at [facility] OR    PELVIC LAPAROSCOPY  2014    Dx scope, chromopertubation-Endometriosis    TONSILLECTOMY, ADENOIDECTOMY         Family History   Problem Relation Age of Onset    Cancer Mother     Breast cancer Neg Hx     Ovarian cancer Neg Hx        Social History     Socioeconomic History    Marital status:      Spouse name: Not on file    Number of children: Not on file    Years of education: college    Highest education level: Not on file   Occupational History    Not on file   Social Needs    Financial resource strain: Not on file    Food insecurity:     Worry: Not on file     Inability: Not on file    Transportation needs:     Medical: Not on file     Non-medical: Not on file   Tobacco Use    Smoking status: Former Smoker     Years: 13.00     Types: Cigarettes     Start date: 2/2/1989     Last  "attempt to quit: 2000     Years since quittin.3    Smokeless tobacco: Never Used    Tobacco comment: pt stated she smoked one cig/day   Substance and Sexual Activity    Alcohol use: No     Alcohol/week: 0.0 oz    Drug use: No    Sexual activity: Yes     Partners: Male     Birth control/protection: Surgical, See Surgical Hx   Lifestyle    Physical activity:     Days per week: Not on file     Minutes per session: Not on file    Stress: Not on file   Relationships    Social connections:     Talks on phone: Not on file     Gets together: Not on file     Attends Worship service: Not on file     Active member of club or organization: Not on file     Attends meetings of clubs or organizations: Not on file     Relationship status: Not on file   Other Topics Concern    Are you pregnant or think you may be? Not Asked    Breast-feeding Not Asked   Social History Narrative    Not on file       Current Outpatient Medications   Medication Sig Dispense Refill    losartan (COZAAR) 50 MG tablet Take 50 mg by mouth.      metoprolol tartrate (LOPRESSOR) 25 MG tablet Take 1 tablet (25 mg total) by mouth 2 (two) times daily. 60 tablet 11    pantoprazole (PROTONIX) 40 MG tablet Take 1 tablet (40 mg total) by mouth once daily. 30 tablet 3    estradiol 0.05 mg/24 hr td ptsw (VIVELLE-DOT) 0.05 mg/24 hr Place 1 patch onto the skin once a week. 4 patch 11    paroxetine (PAXIL) 20 MG tablet Take 1 tablet (20 mg total) by mouth every morning. 30 tablet 11     No current facility-administered medications for this visit.        Review of patient's allergies indicates:   Allergen Reactions    Ace inhibitors Other (See Comments)     cough       Vitals:    19 1614   Weight: 122 kg (269 lb)   Height: 5' 3" (1.6 m)       Objective:      Physical Exam   Constitutional: She is oriented to person, place, and time. She appears well-developed and well-nourished. No distress.   Musculoskeletal: She exhibits tenderness. She " exhibits no edema or deformity.   Neurological: She is alert and oriented to person, place, and time.   Skin: Skin is warm. Capillary refill takes less than 2 seconds. No erythema.   Psychiatric: She has a normal mood and affect. Her behavior is normal.       Vascular: Distal DP/PT pulses palpable 2/4. CRT < 3 sec to tips of toes. No vericosities noted to LEs. Hair growth present LE, warm to touch LE, No edema noted to LE.    Dermatologic: No open lesions, lacerations or wounds. Interdigital spaces clean, dry and intact. No erythema, rubor, calor noted LE    Musculoskeletal: MMT 5/5 in DF/PF/Inv/Ev resistance with no reproduction of pain in any direction. Passive range of motion of ankle and pedal joints is painless. No calf tenderness LE, Compartments soft/compressible. ROM of ankles with < 10 deg DF is noted bilateral.  Right foot:  Pain on palpation on TMTJ of 2nd and 3rd. Pain along the 3rd metatarsal base. Mild tenderness at 3rd MPJ and heel.     Neurological: Light touch, proprioception, and sharp/dull sensation are all intact. Protective threshold with the [place]-Wienstein monofilament is intact b/l. Vibratory sensation intact.         Assessment:       Encounter Diagnoses   Name Primary?    Foot pain, right - Right Foot Yes    Cyst of bone of right foot - Right Foot     Arthritis of right foot - Right Foot          Plan:       Inna Lynne was seen today for foot pain.    Diagnoses and all orders for this visit:    Foot pain, right - Right Foot  -     Ambulatory Referral to Physical/Occupational Therapy  -     Case Request Operating Room: FUSION, JOINT, EXCISION, LESION, METATARSAL BONE  -     Pulse Oximetry Q4H; Standing  -     Full code; Standing  -     Place in Outpatient; Standing  -     Insert peripheral IV; Standing    Cyst of bone of right foot - Right Foot  -     Case Request Operating Room: FUSION, JOINT, EXCISION, LESION, METATARSAL BONE  -     Pulse Oximetry Q4H; Standing  -     Full code;  Standing  -     Place in Outpatient; Standing  -     Insert peripheral IV; Standing    Arthritis of right foot - Right Foot  -     Case Request Operating Room: FUSION, JOINT, EXCISION, LESION, METATARSAL BONE  -     Pulse Oximetry Q4H; Standing  -     Full code; Standing  -     Place in Outpatient; Standing  -     Insert peripheral IV; Standing    Other orders  -     sodium chloride 0.9% flush 10 mL  -     lidocaine (PF) 10 mg/ml (1%) injection 10 mg  -     ceFAZolin (ANCEF) 3 g in dextrose 5 % 50 mL IVPB  -     0.9%  NaCl infusion      I counseled the patient on her conditions, their implications and medical management.    44 years old female with right midfoot pain at TMTJ of 2nd and 3rd.    -MRI reviewed with patient: +arthritis of 2nd TMTJ with bone cyst formation at shaft of the 3rd metatarsal. Correlating with clinical findings.   -pt is exhausted from conservative treatments.  Patient would like to proceed with surgical intervention.  Will plan for right foot 2nd TMT fusion, 3rd metatarsal excision of the cyst with bone graft application, bone graft harvest. Sx will be on 7/16/19 at [facility].  -surgical clearance pending  -Long discussion with patient regarding the procedure in detail.  Informed consent discussed in detail  including all alternatives, risks and complications not limited to consent form. These included but is not limited to bleeding, pain, infection, swelling, scarring, nerve entrapment, RSD, paralysis, loss of function of part or all of foot, brain damage and death.  Patient understands all risks, potential complications, and alternatives, including, but not limited to those listed on the consent form. All questions were answered. No guarantees given or implied as to outcome. Informed verbal and written consent was obtained. Consent forms read, signed, witnessed.   -During today's patient's visit, I spent 40 minutes with the patient. Greater than 50% of the time was spent discussing the items  listed including chart review. The patient was evaluated and treated. I discussed diagnoses, prognosis and treatment with patient and family and reviewed diagnostic images. I have recommended surgery for patient. The risks vs. benefits of a surgical procedure to address the patient's concerns were discussed as well as alternative management options. Patient desires surgery and arrangements will be made accordingly. The alternatives, risks and complications of surgery were discussed including but not limited to  infection, swelling, numbness, post-operative pain, along with the fact that no guarantees can be given. All the patients' questions were answered and the patient seemed comfortable with my explanation. The patient was also instructed that prior to surgery if at any time more questions were to come up patient should contact the office and we'll be happy to answer them. The types of surgical approaches available were reviewed as well as alternatives to a surgical approach. The patient will be scheduled for surgery.The informed surgical consent was reviewed and read aloud to the patient. I have reviewed the films and I have discussed my findings with the patient in great detail. I have discussed all risks including but not limited to infection, stiffness, scarring, limp, disability, deformity, damage to blood vessels or nerves, numbness, poor healing, need for braces, arthritis, chronic pain, amputation, and death. All benefits and realistic expectations discussed in great detail. I have made no promises as to the outcome. I have provided realistic expectations. I have offered the patient a second opinion which they have declined and have assured me they prefer to proceed despite the risks.  -I stressed the importance of usage of narcotics. I told patient that I am not a long term pain management physician. I told patient I will be able to dispense narcotics to control the acute phase of pain but I WILL NOT be  able to prescribe long term pain medications. patient understood.   -The nature of the condition, options for management, as well as potential risks and complications were discussed in detail with patient. Patient was amenable to my recommendations and left my office fully informed and will follow up as instructed or sooner if necessary.      -Post op Protocol    Weight bearing status: NWB RLE for 4-6 weeks, WBAT for a month in CAM   Rx. Physical therapy: yes   Pain medication: percocet   Anticoagulation: TBD   Antibiotics: none

## 2019-06-20 NOTE — TELEPHONE ENCOUNTER
Spoke to patient, she made an appt for 6/21/2019@4:15pm  With Dr Tillman @[facility]

## 2019-06-21 RX ORDER — LIDOCAINE HYDROCHLORIDE 10 MG/ML
1 INJECTION, SOLUTION EPIDURAL; INFILTRATION; INTRACAUDAL; PERINEURAL ONCE
Status: CANCELLED | OUTPATIENT
Start: 2019-06-21 | End: 2019-06-21

## 2019-06-21 RX ORDER — SODIUM CHLORIDE 0.9 % (FLUSH) 0.9 %
10 SYRINGE (ML) INJECTION
Status: CANCELLED | OUTPATIENT
Start: 2019-06-21

## 2019-06-21 RX ORDER — SODIUM CHLORIDE 9 MG/ML
INJECTION, SOLUTION INTRAVENOUS CONTINUOUS
Status: CANCELLED | OUTPATIENT
Start: 2019-06-21

## 2019-07-08 ENCOUNTER — TELEPHONE (OUTPATIENT)
Dept: CARDIOLOGY | Facility: CLINIC | Age: 44
End: 2019-07-08

## 2019-07-08 NOTE — TELEPHONE ENCOUNTER
Called patient and left detail voice message.  Will try again tomorrow.    ----- Message from Arcelia Rincon sent at 7/8/2019  4:37 PM CDT -----  Contact: [phone]-self  Patient requesting a callback concerning a medical clearance she needs for her procedure with Dr. Tillman. Please call.

## 2019-07-10 ENCOUNTER — TELEPHONE (OUTPATIENT)
Dept: CARDIOLOGY | Facility: CLINIC | Age: 44
End: 2019-07-10

## 2019-07-10 ENCOUNTER — DOCUMENTATION ONLY (OUTPATIENT)
Dept: CARDIOLOGY | Facility: CLINIC | Age: 44
End: 2019-07-10

## 2019-07-10 ENCOUNTER — TELEPHONE (OUTPATIENT)
Dept: PODIATRY | Facility: CLINIC | Age: 44
End: 2019-07-10

## 2019-07-10 NOTE — TELEPHONE ENCOUNTER
Spoke to patient regarding medical clearance for her surgey , she stated she did get medical clearance from her Pcp, she will have them fax it today or she will bring it to us no later than Friday.

## 2019-07-10 NOTE — PROGRESS NOTES
Preoperative cardiac risk stratification:  Mrs. Gallardo is a 44 y.o. female with a past medical history of HTN, diverticulitis, anxiety, hiatal hernia, morbid obesity, who is followed in cardiology clinic.  She is scheduled to undergo bone Fusion with Dr Tillman on 7/16/19.  She has no chest pain, heart failure symptoms, or significant arrhythmias..  Echo from 5/24/2019 showed Conclusion:  · Normal left ventricular systolic function. The estimated ejection fraction is 60%  · Normal LV diastolic function.  · Normal right ventricular systolic function.  · Mild left atrial enlargement.  · Normal central venous pressure (3 mm Hg).  · The estimated PA systolic pressure is 23 mm Hg    Hence, she is at low risk for perioperative major adverse cardiac event during this low to moderate risk procedure.  Revised cardiac risk index of 3.9%.  No further testing indicated.

## 2019-07-10 NOTE — TELEPHONE ENCOUNTER
Documentation sent to Dr. Tillman.      ----- Message from Marisela Young MA sent at 7/10/2019 11:20 AM CDT -----  Mrs Gallardo is requesting cardiac clearance for a bone Fusion with Dr Tillman on 7/16/19. She said she had already spoken to you but Dr Tillman's staff needs documentation.

## 2019-07-11 ENCOUNTER — CLINICAL SUPPORT (OUTPATIENT)
Dept: REHABILITATION | Facility: HOSPITAL | Age: 44
End: 2019-07-11
Attending: PODIATRIST
Payer: MEDICAID

## 2019-07-11 DIAGNOSIS — R29.898 ANKLE WEAKNESS: ICD-10-CM

## 2019-07-11 DIAGNOSIS — M79.671 RIGHT FOOT PAIN: ICD-10-CM

## 2019-07-11 PROCEDURE — 97162 PT EVAL MOD COMPLEX 30 MIN: CPT | Mod: PO

## 2019-07-11 NOTE — PLAN OF CARE
[facility]  Physical Therapy Initial Evaluation    Name: Inna Gallardo  Clinic Number: [id number]    Therapy Diagnosis:   Encounter Diagnoses   Name Primary?    Right foot pain     Ankle weakness      Physician: Adrianna Tillman DPM    Physician Orders: PT Eval and Treat  Medical Diagnosis from Referral: Foot pain, right  Evaluation Date: 7/11/2019  Authorization Period Expiration:   Plan of Care Expiration: 9/20/19  Visit # / Visits authorized: 1/ 20     Time In: 5:00 pm  Time Out: 5:50 pm  Total Billable Time: 50 minutes    Precautions: Standard and Fall    Subjective   Date of onset: About 3 years  History of current condition - Inna Lynne reports: Pain all across the front of her foot and in her heel. Cyst built up in her heel and she is getting bone fusion. Her L knee is destroyed so she doesn't know how she is going to get around. Her sister has a knee scooter. She tried crutches at her house but they didn't work. Her R foot pain comes from putting too much weight on her R LE. Foot swells up. Her foot feels weak because it hurts if she puts any weight on it.     Medical History:   Past Medical History:   Diagnosis Date    Anxiety     Diverticulitis     Endometriosis     Hypertension     Prolactinoma 2011    Sliding hiatal hernia        Surgical History:   Inna Gallardo  has a past surgical history that includes TONSILLECTOMY, ADENOIDECTOMY; Pelvic laparoscopy (2014); Mandible surgery (2002); Hysterectomy (3/16/15); Brain tumor excision (9/2011); Cystoscopy w/ ureteral stent placement (2016); Cystoscopy w/ ureteral stent removal (2016); Arthroscopy of knee (Left); Esophagogastroduodenoscopy (N/A, 4/30/2019); and Esophageal manometry (N/A, 6/3/2019).    Medications:   Inna Lynne has a current medication list which includes the following prescription(s): estradiol 0.05 mg/24 hr td ptsw, losartan, metoprolol tartrate, pantoprazole, and paroxetine.    Allergies:   Review  of patient's allergies indicates:   Allergen Reactions    Ace inhibitors Other (See Comments)     cough        Imaging: X-ray foot complete 3 view bilateral 12/5/18  Impression       1. There are small spurs at the site of attachment of the plantar fascia to the calcaneus in both feet. There are small spurs at the site of attachment of the Achilles tendon to the calcaneus in both feet.  2. There is pes planus deformity bilaterally.  3. There are possible foreign bodies projected over the soft tissues of both feet. One of the larger ones measures 2 mm and is projected over the diaphyseal portion of the right 2nd metatarsal on the frontal view.  4. There is an 8 mm os naviculare in the right foot.  There are several os naviculare in the left foot. The larger 1 measures 6 mm.         Prior Therapy: has been to PT for her R foot about 8 months ago  Social History: lives with , no steps. Tries to avoid steps when she goes out but just takes it slow if she has to.   Occupation: stay at home mom  Prior Level of Function: independent  Current Level of Function: special needs daughter who throws herself down a lot, she weighs 40 lbs and she has to pick her up which is difficult. When she bathes her daughter she has to sit. Tough to get up and down out of the tub, doesn't like to stand for a long time.    Pain:  Current 7/10, worst 9/10, best 5/10   Location: R foot  Description: heel is throbbing, top of foot is stabbing, toes are throbbing  Aggravating Factors: sleeping, putting weight on her R LE, standing about an hour, walking around for an hour  Easing Factors: ice, stretching, hot tub    Pts goals: less pain, strength    Objective     Observation: Pt ambulated into clinic in no apparent distress      Posture: decreased WB on R LE in standing      Gait: antalgic gait avoiding weight bearing on R LE    Range of Motion: AROM (PROM):    Knee Left Right   Extension WNL WNL   Flexion WNL WNL       Ankle Left Right    Dorsiflexion 20 17(18)   Plantarflexion 60 60(65)   Inversion 18(25) 17(21)   Eversion 9(10) 11(11)       Strength:  Hip Left Right   Flexion 4 4     Knee Left Right   Extension 4 4   Flexion 4 4     Ankle Left Right   Dorsiflexion 5 4   Plantarflexion 5 5   Inversion 4 4   Eversion 4 4     Joint Mobility: talocrural joint with appropriate mobility    Palpation: TTP to R 3rd, 4th and 5th metatarsals, posterior calcaneus    Sensation: LT impaired on R lateral foot    Flexibility: n/a      CMS Impairment/Limitation/Restriction for FOTO Foot Survey    Therapist reviewed FOTO scores for Inna Gallardo on 7/11/2019.   FOTO documents entered into [employer] - see Media section.    Limitation Score: 71%  Category: Mobility    Current : CL = least 60% but < 80% impaired, limited or restricted  Goal: CK = at least 40% but < 60% impaired, limited or restricted  Discharge: n/a at this time         TREATMENT     Home Exercises and Patient Education Provided    Education provided:   - compliance with HEP  - role of PT and goals for PT    Home exercises to be provided when patient returns to physical therapy after R foot surgery.    Assessment   Inna Lynne is a 44 y.o. female referred to outpatient Physical Therapy with a medical diagnosis of right foot pain. Pt presents with tenderness to palpation of R metatarsals 3,4 and 5. She also complains of pain in her R posterior calcaneus. Pt also complains of L knee pain which limits her in functional activities. Her pain causes her to have difficulty with activities of daily living, including taking care of her special needs daughter, standing and walking for long periods of time, and getting in and out of the bath tub. Pt demonstrates some muscular weakness in bilateral ankles. PT attempted to show pt how to use a RW with NWB status on her R LE, but pt was unable to bear weight on her L LE. Her FOTO foot survey results show that she is currently 71% limited, placing her in the  20-40% limited category.     Pt prognosis is Fair.   Pt will benefit from skilled outpatient Physical Therapy to address the deficits stated above and in the chart below, provide pt/family education, and to maximize pt's level of independence.     Plan of care discussed with patient: Yes  Pt's spiritual, cultural and educational needs considered and patient is agreeable to the plan of care and goals as stated below:     Anticipated Barriers for therapy: L knee pain potentially causing difficulty with NWB status on R LE after surgery    Medical Necessity is demonstrated by the following  History  Co-morbidities and personal factors that may impact the plan of care Co-morbidities:   High BMI, HTN, chronic L knee pain    Personal Factors:   no deficits     moderate   Examination  Body Structures and Functions, activity limitations and participation restrictions that may impact the plan of care Body Regions:   lower extremities    Body Systems:    ROM  strength  gait    Participation Restrictions:   Taking care of her daughter, standing, walking    Activity limitations:   Learning and applying knowledge  no deficits    General Tasks and Commands  no deficits    Communication  no deficits    Mobility  lifting and carrying objects  walking    Self care  washing oneself (bathing, drying, washing hands)    Domestic Life  cooking  doing house work (cleaning house, washing dishes, laundry)    Interactions/Relationships  no deficits    Life Areas  no deficits    Community and Social Life  community life  recreation and leisure         moderate   Clinical Presentation evolving clinical presentation with changing clinical characteristics moderate   Decision Making/ Complexity Score: moderate     Goals: Goals to be made when patient returns to PT after R foot surgery.    Plan   Plan of care Certification: 7/11/2019 to 9/11/19.    Outpatient Physical Therapy 2 times weekly for 8 weeks to include the following interventions: Gait  Training, Manual Therapy, Moist Heat/ Ice, Neuromuscular Re-ed, Patient Education and Therapeutic Exercise, IASTM.   Recommendations: Patient would benefit from a kneeling walker and a wheelchair to maneuver in her home and community post operatively.     Rick Dean, SPT    I certify that I was present in the room directing the student in service delivery and guiding them using my skilled judgment. As the co-signing therapist I have reviewed the students documentation and am responsible for the treatment, assessment, and plan.     Salma Valera, PT

## 2019-07-12 PROBLEM — R29.898 ANKLE WEAKNESS: Status: ACTIVE | Noted: 2019-07-12

## 2019-07-12 PROBLEM — M79.671 RIGHT FOOT PAIN: Status: ACTIVE | Noted: 2019-07-12

## 2019-07-16 PROBLEM — M79.671 FOOT PAIN, RIGHT: Status: ACTIVE | Noted: 2019-07-16

## 2019-07-18 ENCOUNTER — TELEPHONE (OUTPATIENT)
Dept: PODIATRY | Facility: CLINIC | Age: 44
End: 2019-07-18

## 2019-07-18 NOTE — TELEPHONE ENCOUNTER
----- Message from Luzma Moscoso sent at 7/18/2019  8:57 AM CDT -----  Contact: patient  Patient called to speak with your office to advise the pain medication is not helping her after her surgery.    She wants to know what to do.    Please call [phone] to discuss today.

## 2019-07-19 ENCOUNTER — NURSE TRIAGE (OUTPATIENT)
Dept: ADMINISTRATIVE | Facility: CLINIC | Age: 44
End: 2019-07-19

## 2019-07-19 ENCOUNTER — DOCUMENTATION ONLY (OUTPATIENT)
Dept: PODIATRY | Facility: OTHER | Age: 44
End: 2019-07-19

## 2019-07-19 RX ORDER — HYDROCODONE BITARTRATE AND ACETAMINOPHEN 10; 325 MG/1; MG/1
1 TABLET ORAL EVERY 6 HOURS PRN
Qty: 25 TABLET | Refills: 0 | Status: SHIPPED | OUTPATIENT
Start: 2019-07-19 | End: 2019-07-26

## 2019-07-19 NOTE — TELEPHONE ENCOUNTER
----- Message from Keturah Adamson sent at 7/19/2019  7:43 AM CDT -----  Contact: Self [phone]  Patient is calling to talk to nurse in regards to her medication is not helping and she would like to get something different since she is still having severe pain.

## 2019-07-19 NOTE — TELEPHONE ENCOUNTER
Patient states her  Pain medication Norco is not helping with pain even with dosage increase, she would like something stronger to help with pain.

## 2019-07-20 ENCOUNTER — HOSPITAL ENCOUNTER (EMERGENCY)
Facility: HOSPITAL | Age: 44
Discharge: HOME | End: 2019-07-20
Attending: EMERGENCY MEDICINE
Payer: MEDICAID

## 2019-07-20 VITALS
DIASTOLIC BLOOD PRESSURE: 90 MMHG | TEMPERATURE: 98 F | WEIGHT: 260 LBS | OXYGEN SATURATION: 95 % | SYSTOLIC BLOOD PRESSURE: 175 MMHG | HEART RATE: 60 BPM | RESPIRATION RATE: 18 BRPM | BODY MASS INDEX: 44.39 KG/M2 | HEIGHT: 64 IN

## 2019-07-20 DIAGNOSIS — S96.911A STRAIN OF RIGHT ANKLE, INITIAL ENCOUNTER: Primary | ICD-10-CM

## 2019-07-20 DIAGNOSIS — M25.579 ANKLE PAIN: ICD-10-CM

## 2019-07-20 PROCEDURE — 63600175 PHARM REV CODE 636 W HCPCS: Mod: ER | Performed by: EMERGENCY MEDICINE

## 2019-07-20 PROCEDURE — 25000003 PHARM REV CODE 250: Mod: ER | Performed by: EMERGENCY MEDICINE

## 2019-07-20 PROCEDURE — 96372 THER/PROPH/DIAG INJ SC/IM: CPT | Mod: ER

## 2019-07-20 PROCEDURE — 25000003 PHARM REV CODE 250: Mod: ER

## 2019-07-20 PROCEDURE — 99284 EMERGENCY DEPT VISIT MOD MDM: CPT | Mod: 25,ER

## 2019-07-20 RX ORDER — KETOROLAC TROMETHAMINE 30 MG/ML
60 INJECTION, SOLUTION INTRAMUSCULAR; INTRAVENOUS
Status: COMPLETED | OUTPATIENT
Start: 2019-07-20 | End: 2019-07-20

## 2019-07-20 RX ORDER — IBUPROFEN 400 MG/1
TABLET ORAL
Status: COMPLETED
Start: 2019-07-20 | End: 2019-07-20

## 2019-07-20 RX ORDER — IBUPROFEN 400 MG/1
800 TABLET ORAL
Status: COMPLETED | OUTPATIENT
Start: 2019-07-20 | End: 2019-07-20

## 2019-07-20 RX ORDER — HYDROCODONE BITARTRATE AND ACETAMINOPHEN 5; 325 MG/1; MG/1
1 TABLET ORAL
Status: COMPLETED | OUTPATIENT
Start: 2019-07-20 | End: 2019-07-20

## 2019-07-20 RX ADMIN — IBUPROFEN 800 MG: 400 TABLET ORAL at 12:07

## 2019-07-20 RX ADMIN — KETOROLAC TROMETHAMINE 60 MG: 30 INJECTION, SOLUTION INTRAMUSCULAR; INTRAVENOUS at 12:07

## 2019-07-20 RX ADMIN — IBUPROFEN 800 MG: 400 TABLET, FILM COATED ORAL at 12:07

## 2019-07-20 RX ADMIN — HYDROCODONE BITARTRATE AND ACETAMINOPHEN 1 TABLET: 5; 325 TABLET ORAL at 12:07

## 2019-07-20 NOTE — TELEPHONE ENCOUNTER
Reason for Disposition   Patient sounds very sick or weak to the triager    Protocols used: POST-OP SYMPTOMS AND [id number]  Surgery 7/16  Knife in R heel feels like really burning. MD recently adjusted doing of pain med. Cant keena pain. took pain med 40 mins ago. rates pain 10 +. rec ED. Pt requests to speak with MD on call. Pt transferred to speak with MD on call.

## 2019-07-20 NOTE — ED PROVIDER NOTES
"Encounter Date: 7/20/2019       History     Chief Complaint   Patient presents with    Ankle Pain     Pt reports surgery to R foot on 7/16/19. Pt reports she has been unable to manage the pain at home. Pt states the on-call doctor told patient to come to the ER. Pt states she attempted to use her knee stroller and it slide forward causing her r leg to go forward. Pt states "I feel like my ankle twisted when that happened."      44-year-old female presents complaining of right ankle pain. Patient is using a knee stroller and had recent hardware placed and are right foot.  Patient felt like her knee sled forward causing her right leg to go forward and feels like her right ankle is now twisted.  Patient contacted person on-call for her ft surgeon who suggested she come to the emergency room to be checked out.  Patient feels or current pain medication is not strong enough.        Review of patient's allergies indicates:   Allergen Reactions    Ace inhibitors Other (See Comments)     cough    Percocet [oxycodone-acetaminophen] Hallucinations    Xarelto [rivaroxaban] Hives     Past Medical History:   Diagnosis Date    Anxiety     Diverticulitis     Endometriosis     Hypertension     Prolactinoma 2011    Sliding hiatal hernia      Past Surgical History:   Procedure Laterality Date    ARTHROSCOPY OF KNEE Left     ARTHROSCOPY-KNEE Left 5/1/2017    [facility]    BRAIN TUMOR EXCISION  9/2011    removal of prolactinoma    COLONOSCOPY N/A 9/9/2015    [facility]O    CYSTOSCOPY W/ URETERAL STENT PLACEMENT  2016    CYSTOSCOPY W/ URETERAL STENT REMOVAL  2016    ESOPHAGOGASTRODUODENOSCOPY (EGD) N/A 4/30/2019    [facility]    ESOPHAGOGASTRODUODENOSCOPY (EGD) N/A 5/6/2015    [facility] at [facility] ENDO    EXCISION, LESION, METATARSAL BONE Right 7/16/2019    [facility] " OR    FUSION, JOINT Right 2019    [facility]    HYSTERECTOMY  3/16/15    [facility]/BSO for Endometriosis, AUB, fibroids, cyst    HYSTERECTOMY-TOTAL LAPAROSCOPIC ([facility]), BSO N/A 3/16/2015    [facility] at [facility] OR    MANDIBLE SURGERY      severe overbite correction    MANOMETRY, ESOPHAGUS N/A 6/3/2019    Performed by [facility]inder [facility]z [facility] at [facility] ENDO (4TH FLR)    MENISCECTOMY MEDIAL Left 2017    [facility]    PELVIC LAPAROSCOPY      Dx scope, chromopertubation-Endometriosis    SURGICAL PROCUREMENT, BONE GRAFT Right 2019    [facility] at [facility] OR    TONSILLECTOMY, ADENOIDECTOMY       Family History   Problem Relation Age of Onset    Cancer Mother     Breast cancer Neg Hx     Ovarian cancer Neg Hx      Social History     Tobacco Use    Smoking status: Former Smoker     Years: 13.00     Types: Cigarettes     Start date: 1989     Last attempt to quit: 2000     Years since quittin.5    Smokeless tobacco: Never Used    Tobacco comment: pt stated she smoked one cig/day   Substance Use Topics    Alcohol use: No     Alcohol/week: 0.0 oz    Drug use: No     Review of Systems   Musculoskeletal: Positive for gait problem.   All other systems reviewed and are negative.      Physical Exam     Initial Vitals [19 0014]   BP Pulse Resp Temp SpO2   (!) 175/90 60 18 98.2 °F (36.8 °C) 95 %      MAP       --         Physical Exam    Nursing note and vitals reviewed.  Constitutional: She appears well-developed and well-nourished.   HENT:   Head: Normocephalic and atraumatic.   Cardiovascular: Normal rate, regular rhythm and normal heart sounds.   Pulmonary/Chest: Breath sounds normal.   Musculoskeletal: She exhibits tenderness.   Patient complaining of right ankle pain patient in cast and unable to fully examine ankle but toe show good capillary refill and color.  Patient is able wiggle all  toes.   Neurological: She is alert. GCS score is 15. GCS eye subscore is 4. GCS verbal subscore is 5. GCS motor subscore is 6.   Skin: Capillary refill takes less than 2 seconds.         ED Course   Procedures  Labs Reviewed - No data to display       Imaging Results          X-Ray Ankle Complete Right (Final result)  Result time 07/20/19 09:27:42    Final result by Alfonzo Jimenez MD (07/20/19 09:27:42)                 Impression:      No acute abnormality identified.      Electronically signed by: Alfonzo Jimenez  Date:    07/20/2019  Time:    09:27             Narrative:    EXAMINATION:  XR ANKLE COMPLETE 3 VIEW RIGHT    CLINICAL HISTORY:  Pain in unspecified ankle and joints of unspecified foot    TECHNIQUE:  AP, lateral, and oblique images of the right ankle were performed.    COMPARISON:  None    FINDINGS:  Cast obscures detail.  Dorsal midfoot plate and screw fixation device without evidence of hardware complication.  Achilles insertional and plantar calcaneal spurring/enthesopathy.  No evidence of acute fracture or dislocation.  Soft tissues unremarkable.  No evidence of foreign body.  Ankle mortise appears congruent.  No appreciable tibiotalar joint effusion.                                                      Clinical Impression:       ICD-10-CM ICD-9-CM   1. Strain of right ankle, initial encounter S96.911A 845.00   2. Ankle pain M25.579 719.47                                Arnold Vila MD  07/20/19 0408       Arnold Vila MD  08/02/19 4691

## 2019-07-20 NOTE — PROGRESS NOTES
Patient called the triage nursing service at 11 pm 7/191/9 and asked to speak to the podiatrist on call.  She underwent FUSION, JOINT Right 2nd TMTJ (17445)  EXCISION, LESION, METATARSAL BONE Right 3rd met (54543) SURGICAL PROCUREMENT, BONE GRAFT Right calcaneus (20900) three days ago by Dr. Tillman.  She reports increasing pain and swelling in her mid and rearfoot, constant, to level of 10, feels as though the foot and now leg are more swollen, denies cp, headache, fever or chills.  No new trauma.  She has a bivalved cast.  Asked to loosen and stated that cast is bivalved and gives a little.  I advised to loosen the wrap around cast and report to the closest ED for evaluation for a blood clot or hematoma and her incisions.  She verbalized understanding and stated that she would go as soon as call ended.

## 2019-07-22 ENCOUNTER — OFFICE VISIT (OUTPATIENT)
Dept: PODIATRY | Facility: CLINIC | Age: 44
End: 2019-07-22
Payer: MEDICAID

## 2019-07-22 VITALS
BODY MASS INDEX: 44.39 KG/M2 | RESPIRATION RATE: 18 BRPM | WEIGHT: 260 LBS | DIASTOLIC BLOOD PRESSURE: 75 MMHG | HEIGHT: 64 IN | HEART RATE: 51 BPM | OXYGEN SATURATION: 97 % | SYSTOLIC BLOOD PRESSURE: 113 MMHG

## 2019-07-22 DIAGNOSIS — Z09 FOLLOW-UP EXAMINATION FOLLOWING SURGERY: Primary | ICD-10-CM

## 2019-07-22 PROCEDURE — 99214 OFFICE O/P EST MOD 30 MIN: CPT | Mod: PBBFAC,PN | Performed by: PODIATRIST

## 2019-07-22 PROCEDURE — 99999 PR PBB SHADOW E&M-EST. PATIENT-LVL IV: ICD-10-PCS | Mod: PBBFAC,,, | Performed by: PODIATRIST

## 2019-07-22 PROCEDURE — 99024 PR POST-OP FOLLOW-UP VISIT: ICD-10-PCS | Mod: ,,, | Performed by: PODIATRIST

## 2019-07-22 PROCEDURE — 99999 PR PBB SHADOW E&M-EST. PATIENT-LVL IV: CPT | Mod: PBBFAC,,, | Performed by: PODIATRIST

## 2019-07-22 PROCEDURE — 99024 POSTOP FOLLOW-UP VISIT: CPT | Mod: ,,, | Performed by: PODIATRIST

## 2019-07-22 RX ORDER — OMEPRAZOLE 40 MG/1
1 CAPSULE, DELAYED RELEASE ORAL DAILY
COMMUNITY
Start: 2019-06-05 | End: 2020-03-25

## 2019-07-23 NOTE — PROGRESS NOTES
Subjective:      Patient ID: Inna Gallardo is a 44 y.o. female.    Chief Complaint: Post-op Evaluation (cast removal today right foot) and Fall (x 3 days ago hit right foot pain in right foot)    44 years old female presenting with right foot pain.  She is pointing plantar aspect of the right heel and also dorsal aspect of the 3rd MPJ.  Patient tells me pain has been going on for about a year.  She denies any trauma no injury to the area.  Patient also relates that she has bad left knee and put more pressure on the right foot that might have caused pain.  She describes pain as aching and throbbing especially in the morning at the heel.  She is known to Dr. Tomlinson who sent her for physical therapy.  Patient claims that she finished physical therapy.  Dr. Tomlinson also ordered MRI which was done at [facility].  She points dorsal aspect of the 3rd MPJ.  Describes pain as aching and throbbing as well. She is presenting in casual shoe with heel.     6/21/19: f/u for R foot pain. She is here to review the MRI as well. She tells me her main pain is top of the midfoot rather than the MPJs (s/p injection of the 3rd MPJ and heel injection at last visit). Pain is improving after injection. Claims that she has been doing stretching/water bottle exercise. She is also here for surgical consultation.     7/22/2019  F/u for R 2nd TMTJ fusion with 3rd metatarsal cyst excision, R calc graft harvest. Pt had trouble controlling the pain. She was taking norco 5mg which did not help with pain. She was doubling up norco 5mg time to time to see if that works. It did not work. She requested to have norco 10mg. She went to ED because of pain. xrays were taken in the ED but cast was kept intact. Cast was not taken off in the ED. Pt tells me pain is now somewhat well controlled. NWB of R foot with knee scooter.     Review of Systems   Constitution: Negative for decreased appetite, fever and malaise/fatigue.   HENT: Negative for  congestion.    Cardiovascular: Negative for chest pain and leg swelling.   Respiratory: Negative for cough and shortness of breath.    Skin: Negative for color change, nail changes and rash.   Musculoskeletal: Positive for joint pain. Negative for arthritis, joint swelling and muscle weakness.        Right foot pain   Gastrointestinal: Negative for bloating, abdominal pain, nausea and vomiting.   Neurological: Negative for headaches, numbness and weakness.   Psychiatric/Behavioral: Negative for altered mental status.             Past Medical History:   Diagnosis Date    Anxiety     Diverticulitis     Endometriosis     Hypertension     Prolactinoma 2011    Sliding hiatal hernia        Past Surgical History:   Procedure Laterality Date    ARTHROSCOPY OF KNEE Left     ARTHROSCOPY-KNEE Left 5/1/2017    [facility]    BRAIN TUMOR EXCISION  9/2011    removal of prolactinoma    COLONOSCOPY N/A 9/9/2015    [facility] END[facility]    CYSTOSCOPY W/ URETERAL STENT PLACEMENT  2016    CYSTOSCOPY W/ URETERAL STENT REMOVAL  2016    ESOPHAGOGASTRODUODENOSCOPY (EGD) N/A 4/30/2019    [facility]    ESOPHAGOGASTRODUODENOSCOPY (EGD) N/A 5/6/2015    [facility] ENDO    EXCISION, LESION, METATARSAL BONE Right 7/16/2019    [facility] at [facility] OR    FUSION, JOINT Right 7/16/2019    [facility] at [facility] OR    HYSTERECTOMY  3/16/15    [facility]/BSO for Endometriosis, AUB, fibroids, cyst    HYSTERECTOMY-TOTAL LAPAROSCOPIC ([facility]), BSO N/A 3/16/2015    [facility] at [facility] OR    MANDIBLE SURGERY  2002    severe overbite correction    MANOMETRY, ESOPHAGUS N/A 6/3/2019    [facility]formed by [facility] at [facility] ENDO (4TH FLR)    MENISCECTOMY MEDIAL Left 5/1/2017    [facility] at [facility] OR    PELVIC LAPAROSCOPY  2014    Dx scope,  chromopertubation-Endometriosis    SURGICAL PROCUREMENT, BONE GRAFT Right 2019    Performed by [facility]man DPM at [facility] OR    TONSILLECTOMY, ADENOIDECTOMY         Family History   Problem Relation Age of Onset    Cancer Mother     Breast cancer Neg Hx     Ovarian cancer Neg Hx        Social History     Socioeconomic History    Marital status:      Spouse name: Not on file    Number of children: Not on file    Years of education: college    Highest education level: Not on file   Occupational History    Not on file   Social Needs    Financial resource strain: Not on file    Food insecurity:     Worry: Not on file     Inability: Not on file    Transportation needs:     Medical: Not on file     Non-medical: Not on file   Tobacco Use    Smoking status: Former Smoker     Years: 13.00     Types: Cigarettes     Start date: 1989     Last attempt to quit: 2000     Years since quittin.4    Smokeless tobacco: Never Used    Tobacco comment: pt stated she smoked one cig/day   Substance and Sexual Activity    Alcohol use: No     Alcohol/week: 0.0 oz    Drug use: No    Sexual activity: Yes     Partners: Male     Birth control/protection: Surgical, See Surgical Hx   Lifestyle    Physical activity:     Days per week: Not on file     Minutes per session: Not on file    Stress: Not on file   Relationships    Social connections:     Talks on phone: Not on file     Gets together: Not on file     Attends Orthodox service: Not on file     Active member of club or organization: Not on file     Attends meetings of clubs or organizations: Not on file     Relationship status: Not on file   Other Topics Concern    Are you pregnant or think you may be? Not Asked    Breast-feeding Not Asked   Social History Narrative    Not on file       Current Outpatient Medications   Medication Sig Dispense Refill    HYDROcodone-acetaminophen (NORCO)  mg per tablet Take 1 tablet by mouth every 6 (six)  "hours as needed for Pain. 25 tablet 0    losartan (COZAAR) 50 MG tablet Take 50 mg by mouth once daily.       metoprolol tartrate (LOPRESSOR) 25 MG tablet Take 1 tablet (25 mg total) by mouth 2 (two) times daily. 60 tablet 11    omeprazole (PRILOSEC) 40 MG capsule Take 1 capsule by mouth once daily.      paroxetine (PAXIL) 20 MG tablet Take 1 tablet (20 mg total) by mouth every morning. 30 tablet 11    rivaroxaban (XARELTO) 10 mg Tab Take 1 tablet (10 mg total) by mouth daily with dinner or evening meal. 30 tablet 0    pantoprazole (PROTONIX) 40 MG tablet Take 1 tablet (40 mg total) by mouth once daily. 30 tablet 3     No current facility-administered medications for this visit.        Review of patient's allergies indicates:   Allergen Reactions    Ace inhibitors Other (See Comments)     cough    Percocet [oxycodone-acetaminophen] Hallucinations       Vitals:    07/22/19 1537   BP: 113/75   Pulse: (!) 51   Resp: 18   SpO2: 97%   Weight: 117.9 kg (260 lb)   Height: 5' 4" (1.626 m)   PainSc:   8   PainLoc: Foot       Objective:      Physical Exam   Constitutional: She is oriented to person, place, and time. She appears well-developed and well-nourished. No distress.   Musculoskeletal: She exhibits tenderness. She exhibits no edema or deformity.   Neurological: She is alert and oriented to person, place, and time.   Skin: Skin is warm. Capillary refill takes less than 2 seconds. No erythema.   Psychiatric: She has a normal mood and affect. Her behavior is normal.       Vascular: Distal DP/PT pulses palpable 2/4. CRT < 3 sec to tips of toes. No vericosities noted to LEs. Hair growth present LE, warm to touch LE  R foot: +edema dorsally    Dermatologic:   R foot: suture c/d/i.  No erythema, + rubor over the incision.  No crepitus, no drainage, no pus, no cellulitis, +ecchymosis b/w toes and dorsal lateral foot.     Musculoskeletal:   R foot: pain on palpation over the surgical sites    Neurological: Light touch, " proprioception, and sharp/dull sensation are all intact. Protective threshold with the Ruby-Wienstein monofilament is intact b/l. Vibratory sensation intact.         Assessment:       No diagnosis found.      Plan:       There are no diagnoses linked to this encounter.  I counseled the patient on her conditions, their implications and medical management.    44 years old female with s/p R 2nd TMTJ fusion, 3rd metatarsal bone cyst excision, heel calc harvest.    -Cast changed. Incision appears to be clean, dry and intact. No signs of infection.  -PS applied with gomez compression.  -NWB RLE  -c/w norco 5mg  -c/w RICE  -f/u 1 week for suture removal.  -The nature of the condition, options for management, as well as potential risks and complications were discussed in detail with patient. Patient was amenable to my recommendations and left my office fully informed and will follow up as instructed or sooner if necessary.    -Patient was advised of signs and symptoms of infection including redness, drainage, purulence, odor, streaking, fever, chills and I advised patient to seek medical attention (ER or urgent care) if these symptoms arise.

## 2019-07-26 ENCOUNTER — PATIENT MESSAGE (OUTPATIENT)
Dept: PODIATRY | Facility: CLINIC | Age: 44
End: 2019-07-26

## 2019-07-26 RX ORDER — HYDROCODONE BITARTRATE AND ACETAMINOPHEN 5; 325 MG/1; MG/1
1 TABLET ORAL EVERY 6 HOURS PRN
Qty: 20 TABLET | Refills: 0 | Status: SHIPPED | OUTPATIENT
Start: 2019-07-26 | End: 2019-08-01

## 2019-07-30 ENCOUNTER — TELEPHONE (OUTPATIENT)
Dept: PODIATRY | Facility: CLINIC | Age: 44
End: 2019-07-30

## 2019-07-30 NOTE — TELEPHONE ENCOUNTER
----- Message from Yoana Urban sent at 7/30/2019  4:46 PM CDT -----  Contact: self / [phone]  Patient is requesting a call back regarding her blood thinner. Please advise

## 2019-07-30 NOTE — TELEPHONE ENCOUNTER
Called patient she states that she broke out in hives the day before yesterday. She went to urgent care by her house and they gave her a steroid injection and was told she may have to stop the blood thinner she was given. She want to know should she stop this?

## 2019-08-01 ENCOUNTER — OFFICE VISIT (OUTPATIENT)
Dept: PODIATRY | Facility: CLINIC | Age: 44
End: 2019-08-01
Payer: MEDICAID

## 2019-08-01 VITALS
BODY MASS INDEX: 44.39 KG/M2 | HEIGHT: 64 IN | HEART RATE: 52 BPM | DIASTOLIC BLOOD PRESSURE: 80 MMHG | SYSTOLIC BLOOD PRESSURE: 125 MMHG | WEIGHT: 260 LBS

## 2019-08-01 DIAGNOSIS — Z09 FOLLOW-UP EXAMINATION FOLLOWING SURGERY: ICD-10-CM

## 2019-08-01 DIAGNOSIS — M79.671 FOOT PAIN, RIGHT: Primary | ICD-10-CM

## 2019-08-01 PROCEDURE — 99024 PR POST-OP FOLLOW-UP VISIT: ICD-10-PCS | Mod: ,,, | Performed by: PODIATRIST

## 2019-08-01 PROCEDURE — 99213 OFFICE O/P EST LOW 20 MIN: CPT | Mod: PBBFAC,PN | Performed by: PODIATRIST

## 2019-08-01 PROCEDURE — 99999 PR PBB SHADOW E&M-EST. PATIENT-LVL III: CPT | Mod: PBBFAC,,, | Performed by: PODIATRIST

## 2019-08-01 PROCEDURE — 99024 POSTOP FOLLOW-UP VISIT: CPT | Mod: ,,, | Performed by: PODIATRIST

## 2019-08-01 PROCEDURE — 99999 PR PBB SHADOW E&M-EST. PATIENT-LVL III: ICD-10-PCS | Mod: PBBFAC,,, | Performed by: PODIATRIST

## 2019-08-01 RX ORDER — LOSARTAN POTASSIUM AND HYDROCHLOROTHIAZIDE 12.5; 1 MG/1; MG/1
TABLET ORAL
COMMUNITY
Start: 2019-07-26 | End: 2019-08-01

## 2019-08-01 NOTE — PROGRESS NOTES
Subjective:      Patient ID: Inna Gallardo is a 44 y.o. female.    Chief Complaint: Post-op Evaluation (#2)    44 years old female presenting with right foot pain.  She is pointing plantar aspect of the right heel and also dorsal aspect of the 3rd MPJ.  Patient tells me pain has been going on for about a year.  She denies any trauma no injury to the area.  Patient also relates that she has bad left knee and put more pressure on the right foot that might have caused pain.  She describes pain as aching and throbbing especially in the morning at the heel.  She is known to Dr. Tomlinson who sent her for physical therapy.  Patient claims that she finished physical therapy.  Dr. Tomlinson also ordered MRI which was done at [facility].  She points dorsal aspect of the 3rd MPJ.  Describes pain as aching and throbbing as well. She is presenting in casual shoe with heel.     6/21/19: f/u for R foot pain. She is here to review the MRI as well. She tells me her main pain is top of the midfoot rather than the MPJs (s/p injection of the 3rd MPJ and heel injection at last visit). Pain is improving after injection. Claims that she has been doing stretching/water bottle exercise. She is also here for surgical consultation.     7/22/2019  F/u for R 2nd TMTJ fusion with 3rd metatarsal cyst excision, R calc graft harvest. Pt had trouble controlling the pain. She was taking norco 5mg which did not help with pain. She was doubling up norco 5mg time to time to see if that works. It did not work. She requested to have norco 10mg. She went to ED because of pain. xrays were taken in the ED but cast was kept intact. Cast was not taken off in the ED. Pt tells me pain is now somewhat well controlled. NWB of R foot with knee scooter.     8/1/19: f/u R foot sx. Was breaking in hives. Went to ED. Recommended to stop taking xarelto. She has not been taking xarelto and her rash improved significantly. Pain is controlled with current pain  medication regimen. NWB R foot.       Review of Systems   Constitution: Negative for decreased appetite, fever and malaise/fatigue.   HENT: Negative for congestion.    Cardiovascular: Negative for chest pain and leg swelling.   Respiratory: Negative for cough and shortness of breath.    Skin: Negative for color change, nail changes and rash.   Musculoskeletal: Positive for joint pain. Negative for arthritis, joint swelling and muscle weakness.        Right foot pain   Gastrointestinal: Negative for bloating, abdominal pain, nausea and vomiting.   Neurological: Negative for headaches, numbness and weakness.   Psychiatric/Behavioral: Negative for altered mental status.             Past Medical History:   Diagnosis Date    Anxiety     Diverticulitis     Endometriosis     Hypertension     Prolactinoma 2011    Sliding hiatal hernia        Past Surgical History:   Procedure Laterality Date    ARTHROSCOPY OF KNEE Left     ARTHROSCOPY-KNEE Left 5/1/2017    [facility]    BRAIN TUMOR EXCISION  9/2011    removal of prolactinoma    COLONOSCOPY N/A 9/9/2015    [facility] oBse [facility] at [facility] ENDO    CYSTOSCOPY W/ URETERAL STENT PLACEMENT  2016    CYSTOSCOPY W/ URETERAL STENT REMOVAL  2016    ESOPHAGOGASTRODUODENOSCOPY (EGD) N/A 4/30/2019    [facility]O    ESOPHAGOGASTRODUODENOSCOPY (EGD) N/A 5/6/2015    [facility] at [facility] ENDO    EXCISION, LESION, METATARSAL BONE Right 7/16/2019    [facility] OR    FUSION, JOINT Right 7/16/2019    [facility] OR    HYSTERECTOMY  3/16/15    [facility] for Endometriosis, AUB, fibroids, cyst    HYSTERECTOMY-TOTAL LAPAROSCOPIC ([facility]), BSO N/A 3/16/2015    [facility] by [facility]iko [facility]ilez [facility] at [facility] OR    MANDIBLE SURGERY  2002    severe overbite correction    MANOMETRY, ESOPHAGUS N/A 6/3/2019    Performed by [facility]upinder Schmitz MD at  [facility] ENDO (4TH FLR)    MENISCECTOMY MEDIAL Left 2017    [facility]    PELVIC LAPAROSCOPY      Dx scope, chromopertubation-Endometriosis    SURGICAL PROCUREMENT, BONE GRAFT Right 2019    [facility]    TONSILLECTOMY, ADENOIDECTOMY         Family History   Problem Relation Age of Onset    Cancer Mother     Breast cancer Neg Hx     Ovarian cancer Neg Hx        Social History     Socioeconomic History    Marital status:      Spouse name: Not on file    Number of children: Not on file    Years of education: college    Highest education level: Not on file   Occupational History    Not on file   Social Needs    Financial resource strain: Not on file    Food insecurity:     Worry: Not on file     Inability: Not on file    Transportation needs:     Medical: Not on file     Non-medical: Not on file   Tobacco Use    Smoking status: Former Smoker     Years: 13.00     Types: Cigarettes     Start date: 1989     Last attempt to quit: 2000     Years since quittin.5    Smokeless tobacco: Never Used    Tobacco comment: pt stated she smoked one cig/day   Substance and Sexual Activity    Alcohol use: No     Alcohol/week: 0.0 oz    Drug use: No    Sexual activity: Yes     Partners: Male     Birth control/protection: Surgical, See Surgical Hx   Lifestyle    Physical activity:     Days per week: Not on file     Minutes per session: Not on file    Stress: Not on file   Relationships    Social connections:     Talks on phone: Not on file     Gets together: Not on file     Attends Tenriism service: Not on file     Active member of club or organization: Not on file     Attends meetings of clubs or organizations: Not on file     Relationship status: Not on file   Other Topics Concern    Are you pregnant or think you may be? Not Asked    Breast-feeding Not Asked   Social History Narrative    Not on file       Current Outpatient  "Medications   Medication Sig Dispense Refill    losartan (COZAAR) 50 MG tablet Take 50 mg by mouth once daily.       metoprolol tartrate (LOPRESSOR) 25 MG tablet Take 1 tablet (25 mg total) by mouth 2 (two) times daily. 60 tablet 11    omeprazole (PRILOSEC) 40 MG capsule Take 1 capsule by mouth once daily.      paroxetine (PAXIL) 20 MG tablet Take 1 tablet (20 mg total) by mouth every morning. 30 tablet 11     No current facility-administered medications for this visit.        Review of patient's allergies indicates:   Allergen Reactions    Ace inhibitors Other (See Comments)     cough    Percocet [oxycodone-acetaminophen] Hallucinations    Xarelto [rivaroxaban] Hives       Vitals:    08/01/19 1446   BP: 125/80   Pulse: (!) 52   Weight: 117.9 kg (260 lb)   Height: 5' 4" (1.626 m)   PainSc:   4   PainLoc: Ankle       Objective:      Physical Exam   Constitutional: She is oriented to person, place, and time. She appears well-developed and well-nourished. No distress.   Musculoskeletal: She exhibits tenderness. She exhibits no edema or deformity.   Neurological: She is alert and oriented to person, place, and time.   Skin: Skin is warm. Capillary refill takes less than 2 seconds. No erythema.   Psychiatric: She has a normal mood and affect. Her behavior is normal.       Vascular: Distal DP/PT pulses palpable 2/4. CRT < 3 sec to tips of toes. No vericosities noted to LEs. Hair growth present LE, warm to touch LE  R foot: +mild edema dorsally    Dermatologic:   R foot: suture c/d/i.  No erythema, + rubor over the incision.  No crepitus, no drainage, no pus, no cellulitis, +ecchymosis b/w toes and dorsal lateral foot.     Musculoskeletal:   R foot: pain on palpation over the surgical sites    Neurological: Light touch, proprioception, and sharp/dull sensation are all intact. Protective threshold with the Auberry-Wienstein monofilament is intact b/l. Vibratory sensation intact.         Assessment:       Encounter " Diagnoses   Name Primary?    Foot pain, right Yes    Follow-up examination following surgery          Plan:       Inna Lynne was seen today for post-op evaluation.    Diagnoses and all orders for this visit:    Foot pain, right  -     X-Ray Foot Complete Right; Future    Follow-up examination following surgery      I counseled the patient on her conditions, their implications and medical management.    44 years old female with s/p R 2nd TMTJ fusion, 3rd metatarsal bone cyst excision, heel calc harvest.    -removed sutures. Tolerated well. Incision healed. No wound complication   -NWB in CAM. F/u 1 month (total of NWB 6 weeks)  -rx. R foot xray for next visit.   -NWB RLE  -c/w norco 5mg  -c/w RICE  -f/u 4 week for suture removal.  -The nature of the condition, options for management, as well as potential risks and complications were discussed in detail with patient. Patient was amenable to my recommendations and left my office fully informed and will follow up as instructed or sooner if necessary.    -Patient was advised of signs and symptoms of infection including redness, drainage, purulence, odor, streaking, fever, chills and I advised patient to seek medical attention (ER or urgent care) if these symptoms arise.

## 2019-08-19 ENCOUNTER — HOSPITAL ENCOUNTER (OUTPATIENT)
Dept: RADIOLOGY | Facility: HOSPITAL | Age: 44
Discharge: HOME | End: 2019-08-19
Attending: PODIATRIST
Payer: MEDICAID

## 2019-08-19 DIAGNOSIS — M79.671 FOOT PAIN, RIGHT: ICD-10-CM

## 2019-08-19 PROCEDURE — 73630 X-RAY EXAM OF FOOT: CPT | Mod: TC,FY,PO,RT

## 2019-08-22 ENCOUNTER — OFFICE VISIT (OUTPATIENT)
Dept: PODIATRY | Facility: CLINIC | Age: 44
End: 2019-08-22
Payer: MEDICAID

## 2019-08-22 VITALS
RESPIRATION RATE: 16 BRPM | BODY MASS INDEX: 45.75 KG/M2 | SYSTOLIC BLOOD PRESSURE: 144 MMHG | WEIGHT: 268 LBS | HEART RATE: 81 BPM | DIASTOLIC BLOOD PRESSURE: 82 MMHG | HEIGHT: 64 IN

## 2019-08-22 DIAGNOSIS — Z09 FOLLOW-UP EXAMINATION FOLLOWING SURGERY: Primary | ICD-10-CM

## 2019-08-22 DIAGNOSIS — T81.31XA POSTOPERATIVE WOUND DEHISCENCE, INITIAL ENCOUNTER: ICD-10-CM

## 2019-08-22 PROCEDURE — 99213 OFFICE O/P EST LOW 20 MIN: CPT | Mod: PBBFAC,PN | Performed by: PODIATRIST

## 2019-08-22 PROCEDURE — 99999 PR PBB SHADOW E&M-EST. PATIENT-LVL III: ICD-10-PCS | Mod: PBBFAC,,, | Performed by: PODIATRIST

## 2019-08-22 PROCEDURE — 99999 PR PBB SHADOW E&M-EST. PATIENT-LVL III: CPT | Mod: PBBFAC,,, | Performed by: PODIATRIST

## 2019-08-22 PROCEDURE — 99024 POSTOP FOLLOW-UP VISIT: CPT | Mod: ,,, | Performed by: PODIATRIST

## 2019-08-22 PROCEDURE — 99024 PR POST-OP FOLLOW-UP VISIT: ICD-10-PCS | Mod: ,,, | Performed by: PODIATRIST

## 2019-08-22 RX ORDER — HYDROXYZINE HYDROCHLORIDE 25 MG/1
25 TABLET, FILM COATED ORAL 3 TIMES DAILY PRN
Refills: 2 | COMMUNITY
Start: 2019-08-12 | End: 2024-02-26

## 2019-08-22 NOTE — PROGRESS NOTES
Subjective:      Patient ID: Inna Gallardo is a 44 y.o. female.    Chief Complaint: Post-op Evaluation (post op Right foot)    44 years old female presenting with right foot pain.  She is pointing plantar aspect of the right heel and also dorsal aspect of the 3rd MPJ.  Patient tells me pain has been going on for about a year.  She denies any trauma no injury to the area.  Patient also relates that she has bad left knee and put more pressure on the right foot that might have caused pain.  She describes pain as aching and throbbing especially in the morning at the heel.  She is known to Dr. Tomlinson who sent her for physical therapy.  Patient claims that she finished physical therapy.  Dr. Tomlinson also ordered MRI which was done at [facility].  She points dorsal aspect of the 3rd MPJ.  Describes pain as aching and throbbing as well. She is presenting in casual shoe with heel.     6/21/19: f/u for R foot pain. She is here to review the MRI as well. She tells me her main pain is top of the midfoot rather than the MPJs (s/p injection of the 3rd MPJ and heel injection at last visit). Pain is improving after injection. Claims that she has been doing stretching/water bottle exercise. She is also here for surgical consultation.     7/22/19 F/u for R 2nd TMTJ fusion with 3rd metatarsal cyst excision, R calc graft harvest. Pt had trouble controlling the pain. She was taking norco 5mg which did not help with pain. She was doubling up norco 5mg time to time to see if that works. It did not work. She requested to have norco 10mg. She went to ED because of pain. xrays were taken in the ED but cast was kept intact. Cast was not taken off in the ED. Pt tells me pain is now somewhat well controlled. NWB of R foot with knee scooter. (DOS: 7/16/19 GP: 10/16/19)    8/1/19: f/u R foot sx. Was breaking in hives. Went to ED. Recommended to stop taking xarelto. She has not been taking xarelto and her rash improved significantly.  Pain is controlled with current pain medication regimen. NWB R foot.     8/22/19: f/u for R foot sx. Has been NWB in PS with knee scooter. Pain is controlled. Complains of swelling. She is in CAM walker. Not taking Norco anymore. She is not taking xarelto 2/2 rash. She no longer presenting with rash.     Review of Systems   Constitution: Negative for decreased appetite, fever and malaise/fatigue.   HENT: Negative for congestion.    Cardiovascular: Negative for chest pain and leg swelling.   Respiratory: Negative for cough and shortness of breath.    Skin: Negative for color change, nail changes and rash.   Musculoskeletal: Positive for joint pain and joint swelling. Negative for arthritis and muscle weakness.   Gastrointestinal: Negative for bloating, abdominal pain, nausea and vomiting.   Neurological: Negative for headaches, numbness and weakness.   Psychiatric/Behavioral: Negative for altered mental status.     Hemoglobin A1C   Date Value Ref Range Status   04/28/2015 5.6 4.5 - 6.2 % Final             Past Medical History:   Diagnosis Date    Anxiety     Diverticulitis     Endometriosis     Hypertension     Prolactinoma 2011    Sliding hiatal hernia        Past Surgical History:   Procedure Laterality Date    ARTHROSCOPY OF KNEE Left     ARTHROSCOPY-KNEE Left 5/1/2017    [facility]    BRAIN TUMOR EXCISION  9/2011    removal of prolactinoma    COLONOSCOPY N/A 9/9/2015    [facility] ENDO    CYSTOSCOPY W/ URETERAL STENT PLACEMENT  2016    CYSTOSCOPY W/ URETERAL STENT REMOVAL  2016    ESOPHAGOGASTRODUODENOSCOPY (EGD) N/A 4/30/2019    [facility]soe [facility]    ESOPHAGOGASTRODUODENOSCOPY (EGD) N/A 5/6/2015    [facility] at [facility] ENDO    EXCISION, LESION, METATARSAL BONE Right 7/16/2019    [facility] OR    FUSION, JOINT Right 7/16/2019    [facility]  [facility] at [facility] OR    HYSTERECTOMY  3/16/15    [facility] for Endometriosis, AUB, fibroids, cyst    HYSTERECTOMY-TOTAL LAPAROSCOPIC ([facility]), BSO N/A 3/16/2015    [facility] OR    MANDIBLE SURGERY      severe overbite correction    MANOMETRY, ESOPHAGUS N/A 6/3/2019    [facility] ([facility])    MENISCECTOMY MEDIAL Left 2017    [facility]    PELVIC LAPAROSCOPY      Dx scope, chromopertubation-Endometriosis    SURGICAL PROCUREMENT, BONE GRAFT Right 2019    [facility] at [facility] OR    TONSILLECTOMY, ADENOIDECTOMY         Family History   Problem Relation Age of Onset    Cancer Mother     Breast cancer Neg Hx     Ovarian cancer Neg Hx        Social History     Socioeconomic History    Marital status:      Spouse name: Not on file    Number of children: Not on file    Years of education: college    Highest education level: Not on file   Occupational History    Not on file   Social Needs    Financial resource strain: Not on file    Food insecurity:     Worry: Not on file     Inability: Not on file    Transportation needs:     Medical: Not on file     Non-medical: Not on file   Tobacco Use    Smoking status: Former Smoker     Years: 13.00     Types: Cigarettes     Start date: 1989     Last attempt to quit: 2000     Years since quittin.5    Smokeless tobacco: Never Used    Tobacco comment: pt stated she smoked one cig/day   Substance and Sexual Activity    Alcohol use: No     Alcohol/week: 0.0 oz    Drug use: No    Sexual activity: Yes     Partners: Male     Birth control/protection: Surgical, See Surgical Hx   Lifestyle    Physical activity:     Days per week: Not on file     Minutes per session: Not on file    Stress: Not on file   Relationships    Social connections:     Talks on phone: Not on file     Gets together: Not on file     Attends Roman [employer] service:  "Not on file     Active member of club or organization: Not on file     Attends meetings of clubs or organizations: Not on file     Relationship status: Not on file   Other Topics Concern    Are you pregnant or think you may be? Not Asked    Breast-feeding Not Asked   Social History Narrative    Not on file       Current Outpatient Medications   Medication Sig Dispense Refill    losartan (COZAAR) 50 MG tablet Take 50 mg by mouth once daily.       metoprolol tartrate (LOPRESSOR) 25 MG tablet Take 1 tablet (25 mg total) by mouth 2 (two) times daily. 60 tablet 11    omeprazole (PRILOSEC) 40 MG capsule Take 1 capsule by mouth once daily.      hydrOXYzine HCl (ATARAX) 25 MG tablet Take 25 mg by mouth 3 (three) times daily as needed.  2    paroxetine (PAXIL) 20 MG tablet Take 1 tablet (20 mg total) by mouth every morning. 30 tablet 11     No current facility-administered medications for this visit.        Review of patient's allergies indicates:   Allergen Reactions    Ace inhibitors Other (See Comments)     cough    Percocet [oxycodone-acetaminophen] Hallucinations    Xarelto [rivaroxaban] Hives       Vitals:    08/22/19 1001   BP: (!) 144/82   Pulse: 81   Resp: 16   Weight: 121.6 kg (268 lb)   Height: 5' 4" (1.626 m)   PainSc: 0-No pain       Objective:      Physical Exam   Constitutional: She is oriented to person, place, and time. She appears well-developed and well-nourished. No distress.   Musculoskeletal: She exhibits tenderness. She exhibits no edema or deformity.   Neurological: She is alert and oriented to person, place, and time.   Skin: Skin is warm. Capillary refill takes less than 2 seconds. No erythema.   Psychiatric: She has a normal mood and affect. Her behavior is normal.       Vascular: Distal DP/PT pulses palpable 2/4. CRT < 3 sec to tips of toes. No vericosities noted to LEs. Hair growth present LE, warm to touch LE  R foot: +mild edema dorsally    Dermatologic:   R foot:+ wound dehiscence, " superficial wound with mild serous drainage, mild erythema, no pus, no crepitus, no deep ulcers, no malodor  R heel:  Superficial scab, no ulcer, no drainage, no rubor, no erythema, no signs of infection.      Musculoskeletal:   R foot: pain on palpation over the surgical sites    Neurological: Light touch, proprioception, and sharp/dull sensation are all intact. Protective threshold with the Morgan-Wienstein monofilament is intact b/l. Vibratory sensation intact.     8/22/19          Assessment:       Encounter Diagnoses   Name Primary?    Follow-up examination following surgery Yes    Postoperative wound dehiscence, initial encounter          Plan:       Inna Lynne was seen today for post-op evaluation.    Diagnoses and all orders for this visit:    Follow-up examination following surgery    Postoperative wound dehiscence, initial encounter      I counseled the patient on her conditions, their implications and medical management.    44 years old female with s/p R 2nd TMTJ fusion, 3rd metatarsal bone cyst excision, heel calc harvest.    -+ wound dehiscence dorsal foot incision.  Continue with local care with triple antibiotics  -remain nonweightbearing for 2 more weeks (trimmed make total of 7 weeks)  -x-rays were reviewed the patient. Hardware intact + routine bone healing  -c/w RICE and aggressive elevation of the heel to prevent decubitus ulcer  -The nature of the condition, options for management, as well as potential risks and complications were discussed in detail with patient. Patient was amenable to my recommendations and left my office fully informed and will follow up as instructed or sooner if necessary.    -Patient was advised of signs and symptoms of infection including redness, drainage, purulence, odor, streaking, fever, chills and I advised patient to seek medical attention (ER or urgent care) if these symptoms arise.   -follow-up in 2 weeks

## 2019-09-05 ENCOUNTER — OFFICE VISIT (OUTPATIENT)
Dept: PODIATRY | Facility: CLINIC | Age: 44
End: 2019-09-05
Payer: MEDICAID

## 2019-09-05 VITALS — DIASTOLIC BLOOD PRESSURE: 73 MMHG | SYSTOLIC BLOOD PRESSURE: 112 MMHG | HEART RATE: 59 BPM

## 2019-09-05 DIAGNOSIS — Z09 FOLLOW-UP EXAMINATION FOLLOWING SURGERY: Primary | ICD-10-CM

## 2019-09-05 DIAGNOSIS — M19.071 ARTHRITIS OF RIGHT FOOT: ICD-10-CM

## 2019-09-05 PROCEDURE — 99213 OFFICE O/P EST LOW 20 MIN: CPT | Mod: PBBFAC,PN | Performed by: PODIATRIST

## 2019-09-05 PROCEDURE — 99999 PR PBB SHADOW E&M-EST. PATIENT-LVL III: CPT | Mod: PBBFAC,,, | Performed by: PODIATRIST

## 2019-09-05 PROCEDURE — 99024 POSTOP FOLLOW-UP VISIT: CPT | Mod: ,,, | Performed by: PODIATRIST

## 2019-09-05 PROCEDURE — 99024 PR POST-OP FOLLOW-UP VISIT: ICD-10-PCS | Mod: ,,, | Performed by: PODIATRIST

## 2019-09-05 PROCEDURE — 99999 PR PBB SHADOW E&M-EST. PATIENT-LVL III: ICD-10-PCS | Mod: PBBFAC,,, | Performed by: PODIATRIST

## 2019-09-05 NOTE — PROGRESS NOTES
Subjective:      Patient ID: Inna Gallardo is a 44 y.o. female.    Chief Complaint: Follow-up (right foot)    44 years old female presenting with right foot pain.  She is pointing plantar aspect of the right heel and also dorsal aspect of the 3rd MPJ.  Patient tells me pain has been going on for about a year.  She denies any trauma no injury to the area.  Patient also relates that she has bad left knee and put more pressure on the right foot that might have caused pain.  She describes pain as aching and throbbing especially in the morning at the heel.  She is known to Dr. Tomlinson who sent her for physical therapy.  Patient claims that she finished physical therapy.  Dr. Tomlinson also ordered MRI which was done at [facility].  She points dorsal aspect of the 3rd MPJ.  Describes pain as aching and throbbing as well. She is presenting in casual shoe with heel.     6/21/19: f/u for R foot pain. She is here to review the MRI as well. She tells me her main pain is top of the midfoot rather than the MPJs (s/p injection of the 3rd MPJ and heel injection at last visit). Pain is improving after injection. Claims that she has been doing stretching/water bottle exercise. She is also here for surgical consultation.     7/22/19 F/u for R 2nd TMTJ fusion with 3rd metatarsal cyst excision, R calc graft harvest. Pt had trouble controlling the pain. She was taking norco 5mg which did not help with pain. She was doubling up norco 5mg time to time to see if that works. It did not work. She requested to have norco 10mg. She went to ED because of pain. xrays were taken in the ED but cast was kept intact. Cast was not taken off in the ED. Pt tells me pain is now somewhat well controlled. NWB of R foot with knee scooter. (DOS: 7/16/19 GP: 10/16/19)    8/1/19: f/u R foot sx. Was breaking in hives. Went to ED. Recommended to stop taking xarelto. She has not been taking xarelto and her rash improved significantly. Pain is controlled  with current pain medication regimen. NWB R foot.     8/22/19: f/u for R foot sx. Has been NWB in [place] with knee scooter. Pain is controlled. Complains of swelling. She is in CAM walker. Not taking Norco anymore. She is not taking xarelto 2/2 rash. She no longer presenting with rash.     9/5/19: f/u for R foot sx. Doing well. No pain today. Post op wound dehiscence has been healing with LWC. Has been NWB for roxy.7 weeks.     Review of Systems   Constitution: Negative for decreased appetite, fever and malaise/fatigue.   HENT: Negative for congestion.    Cardiovascular: Negative for chest pain and leg swelling.   Respiratory: Negative for cough and shortness of breath.    Skin: Negative for color change, nail changes and rash.   Musculoskeletal: Positive for joint pain and joint swelling. Negative for arthritis and muscle weakness.   Gastrointestinal: Negative for bloating, abdominal pain, nausea and vomiting.   Neurological: Negative for headaches, numbness and weakness.   Psychiatric/Behavioral: Negative for altered mental status.     Hemoglobin A1C   Date Value Ref Range Status   04/28/2015 5.6 4.5 - 6.2 % Final             Past Medical History:   Diagnosis Date    Anxiety     Diverticulitis     Endometriosis     Hypertension     Prolactinoma 2011    Sliding hiatal hernia        Past Surgical History:   Procedure Laterality Date    ARTHROSCOPY OF KNEE Left     ARTHROSCOPY-KNEE Left 5/1/2017    [facility]    BRAIN TUMOR EXCISION  9/2011    removal of prolactinoma    COLONOSCOPY N/A 9/9/2015    [facility] ENDO    CYSTOSCOPY W/ URETERAL STENT PLACEMENT  2016    CYSTOSCOPY W/ URETERAL STENT REMOVAL  2016    ESOPHAGOGASTRODUODENOSCOPY (EGD) N/A 4/30/2019    [facility]    ESOPHAGOGASTRODUODENOSCOPY (EGD) N/A 5/6/2015    [facility] at [facility] ENDO    EXCISION, LESION, METATARSAL BONE Right  2019    [facility] OR    FUSION, JOINT Right 2019    [facility]    HYSTERECTOMY  3/16/15    [facility]/BSO for Endometriosis, AUB, fibroids, cyst    HYSTERECTOMY-TOTAL LAPAROSCOPIC (TLH), BSO N/A 3/16/2015    [facility] OR    MANDIBLE SURGERY  2002    severe overbite correction    MANOMETRY, ESOPHAGUS N/A 6/3/2019    [facility] (4TH F[facility])    MENISCECTOMY MEDIAL Left 2017    [facility]    PELVIC LAPAROSCOPY      Dx scope, chromopertubation-Endometriosis    SURGICAL PROCUREMENT, BONE GRAFT Right 2019    [facility] OR    TONSILLECTOMY, ADENOIDECTOMY         Family History   Problem Relation Age of Onset    Cancer Mother     Breast cancer Neg Hx     Ovarian cancer Neg Hx        Social History     Socioeconomic History    Marital status:      Spouse name: Not on file    Number of children: Not on file    Years of education: college    Highest education level: Not on file   Occupational History    Not on file   Social Needs    Financial resource strain: Not on file    Food insecurity:     Worry: Not on file     Inability: Not on file    Transportation needs:     Medical: Not on file     Non-medical: Not on file   Tobacco Use    Smoking status: Former Smoker     Years: 13.00     Types: Cigarettes     Start date: 1989     Last attempt to quit: 2000     Years since quittin.6    Smokeless tobacco: Never Used    Tobacco comment: pt stated she smoked one cig/day   Substance and Sexual Activity    Alcohol use: No     Alcohol/week: 0.0 oz    Drug use: No    Sexual activity: Yes     Partners: Male     Birth control/protection: Surgical, See Surgical Hx   Lifestyle    Physical activity:     Days per week: Not on file     Minutes per session: Not on file    Stress: Not on file   Relationships     Social connections:     Talks on phone: Not on file     Gets together: Not on file     Attends Anabaptism service: Not on file     Active member of club or organization: Not on file     Attends meetings of clubs or organizations: Not on file     Relationship status: Not on file   Other Topics Concern    Are you pregnant or think you may be? Not Asked    Breast-feeding Not Asked   Social History Narrative    Not on file       Current Outpatient Medications   Medication Sig Dispense Refill    hydrOXYzine HCl (ATARAX) 25 MG tablet Take 25 mg by mouth 3 (three) times daily as needed.  2    losartan (COZAAR) 50 MG tablet Take 50 mg by mouth once daily.       metoprolol tartrate (LOPRESSOR) 25 MG tablet Take 1 tablet (25 mg total) by mouth 2 (two) times daily. 60 tablet 11    omeprazole (PRILOSEC) 40 MG capsule Take 1 capsule by mouth once daily.      paroxetine (PAXIL) 20 MG tablet Take 1 tablet (20 mg total) by mouth every morning. 30 tablet 11     No current facility-administered medications for this visit.        Review of patient's allergies indicates:   Allergen Reactions    Ace inhibitors Other (See Comments)     cough    Percocet [oxycodone-acetaminophen] Hallucinations    Xarelto [rivaroxaban] Hives       Vitals:    09/05/19 1448   BP: 112/73   Pulse: (!) 59   PainSc: 0-No pain       Objective:      Physical Exam   Constitutional: She is oriented to person, place, and time. She appears well-developed and well-nourished. No distress.   Musculoskeletal: She exhibits tenderness. She exhibits no edema or deformity.   Neurological: She is alert and oriented to person, place, and time.   Skin: Skin is warm. Capillary refill takes less than 2 seconds. No erythema.   Psychiatric: She has a normal mood and affect. Her behavior is normal.       Vascular: Distal DP/PT pulses palpable 2/4. CRT < 3 sec to tips of toes. No vericosities noted to LEs. Hair growth present LE, warm to touch LE  R foot: +mild edema  dorsally    Dermatologic:   R foot: incision healed. No open wound.      Musculoskeletal:   R foot: pain on palpation over the surgical sites    Neurological: Light touch, proprioception, and sharp/dull sensation are all intact. Protective threshold with the [place] monofilament is intact b/l. Vibratory sensation intact.     8/22/19 9/5/19              Assessment:       Encounter Diagnoses   Name Primary?    Arthritis of right foot - Right Foot     Follow-up examination following surgery Yes         Plan:       Inna Lynne was seen today for follow-up.    Diagnoses and all orders for this visit:    Follow-up examination following surgery    Arthritis of right foot - Right Foot  -     Ambulatory Referral to Physical/Occupational Therapy      I counseled the patient on her conditions, their implications and medical management.    44 years old female with s/p R 2nd TMTJ fusion, 3rd metatarsal bone cyst excision, heel calc harvest.    -rx. Physical therapy.   -dorsal and heel wound healed but fragile. C/w LWC with band aid.  -transition to [facility] in [facility] x 4 weeks  -The nature of the condition, options for management, as well as potential risks and complications were discussed in detail with patient. Patient was amenable to my recommendations and left my office fully informed and will follow up as instructed or sooner if necessary.    -Patient was advised of signs and symptoms of infection including redness, drainage, purulence, odor, streaking, fever, chills and I advised patient to seek medical attention (ER or urgent care) if these symptoms arise.   -follow-up in 6 weeks

## 2019-09-06 ENCOUNTER — PATIENT MESSAGE (OUTPATIENT)
Dept: PODIATRY | Facility: CLINIC | Age: 44
End: 2019-09-06

## 2019-09-16 ENCOUNTER — LAB VISIT (OUTPATIENT)
Dept: LAB | Facility: HOSPITAL | Age: 44
End: 2019-09-16
Attending: INTERNAL MEDICINE
Payer: MEDICAID

## 2019-09-16 DIAGNOSIS — E66.01 MORBID OBESITY: ICD-10-CM

## 2019-09-16 DIAGNOSIS — R00.2 PALPITATIONS: ICD-10-CM

## 2019-09-16 DIAGNOSIS — I10 ESSENTIAL HYPERTENSION: ICD-10-CM

## 2019-09-16 LAB
CHOLEST SERPL-MCNC: 192 MG/DL (ref 120–199)
CHOLEST/HDLC SERPL: 3.9 {RATIO} (ref 2–5)
HDLC SERPL-MCNC: 49 MG/DL (ref 40–75)
HDLC SERPL: 25.5 % (ref 20–50)
LDLC SERPL CALC-MCNC: 120.6 MG/DL (ref 63–159)
NONHDLC SERPL-MCNC: 143 MG/DL
TRIGL SERPL-MCNC: 112 MG/DL (ref 30–150)

## 2019-09-16 PROCEDURE — 36415 COLL VENOUS BLD VENIPUNCTURE: CPT

## 2019-09-16 PROCEDURE — 80061 LIPID PANEL: CPT

## 2019-09-23 ENCOUNTER — OFFICE VISIT (OUTPATIENT)
Dept: CARDIOLOGY | Facility: CLINIC | Age: 44
End: 2019-09-23
Payer: MEDICAID

## 2019-09-23 VITALS
DIASTOLIC BLOOD PRESSURE: 86 MMHG | SYSTOLIC BLOOD PRESSURE: 132 MMHG | HEIGHT: 64 IN | OXYGEN SATURATION: 98 % | WEIGHT: 276 LBS | BODY MASS INDEX: 47.12 KG/M2 | HEART RATE: 60 BPM

## 2019-09-23 DIAGNOSIS — E78.2 MIXED HYPERLIPIDEMIA: ICD-10-CM

## 2019-09-23 DIAGNOSIS — I10 ESSENTIAL HYPERTENSION: ICD-10-CM

## 2019-09-23 DIAGNOSIS — R00.2 PALPITATIONS: Primary | ICD-10-CM

## 2019-09-23 DIAGNOSIS — E66.01 MORBID OBESITY: ICD-10-CM

## 2019-09-23 PROCEDURE — 99213 OFFICE O/P EST LOW 20 MIN: CPT | Mod: PBBFAC,PN | Performed by: INTERNAL MEDICINE

## 2019-09-23 PROCEDURE — 99215 PR OFFICE/OUTPT VISIT, EST, LEVL V, 40-54 MIN: ICD-10-PCS | Mod: S$PBB,,, | Performed by: INTERNAL MEDICINE

## 2019-09-23 PROCEDURE — 99999 PR PBB SHADOW E&M-EST. PATIENT-LVL III: ICD-10-PCS | Mod: PBBFAC,,, | Performed by: INTERNAL MEDICINE

## 2019-09-23 PROCEDURE — 99999 PR PBB SHADOW E&M-EST. PATIENT-LVL III: CPT | Mod: PBBFAC,,, | Performed by: INTERNAL MEDICINE

## 2019-09-23 PROCEDURE — 99215 OFFICE O/P EST HI 40 MIN: CPT | Mod: S$PBB,,, | Performed by: INTERNAL MEDICINE

## 2019-09-23 RX ORDER — ATORVASTATIN CALCIUM 40 MG/1
40 TABLET, FILM COATED ORAL DAILY
Qty: 90 TABLET | Refills: 3 | Status: SHIPPED | OUTPATIENT
Start: 2019-09-23 | End: 2020-06-15

## 2019-09-23 NOTE — PROGRESS NOTES
"[facility]      Chief Complaint   Patient presents with    Results     Labs       Patient ID: Inna Gallardo is a 44 y.o. female with a past medical history of HTN, diverticulitis, anxiety, hiatal hernia, morbid obesity, who presents for a follow up appointment.  Pertinent history/events are as follows:     -Pt kindly referred by Cathi Chávez for evaluation of multiple premature ventricular complexes    -At our initial clinic visit on 5/15/2019, Mrs. Gallardo stated approximately 6 weeks ago, she had an episode of "fluttering" with associated chest pain while sitting down.  She had multiple episodes, which lasted approximately 2 minutes each.  She has continued to have episodes daily since that time.  She was admitted at [facility] in [place] around that time.  States workup, including stress test was negative.  Pt drinks 1 cup of coffee daily.  No smoking history.  No family of CAD/MI.  Reports sudden cardiac death in her brother who had a congenital heart defect (unknown).  Forty-eighty Holter monitor results from 4/15/2019 showed significant ectopic activity, including ventricular ectopic activity consisted of 11,060 beats, of which, 6 were in 1 run, 26 were in couplets, 9652 were in single PVCs, 2 were in interpolated PVCs, 2 were in the R on T, 28 were single VEs, 1 was in late, 1699 were in bigeminy, 52 were in trigeminy.  Plan:   Palpitations with frequent ectopy- Check echo to evaluate for structural abnormalities.  If echo is appropriate, will start on beta blocker.    HTN- Continue losartan 50 mg daily.  Pt to keep log of blood pressure/heart rate and bring in next visit or review.    ASCVD- Check updated lipid panel to determine ASCVD risk score.     -At follow up clinic visit on 6/18/2019, Mrs. Gallardo reported fluttering was not a frequent since our initial visit on 5/15/2019.  Episodes now occur every other day or every two days.  Echo from 5/24/2019 showed " normal left ventricular systolic function with EF of 60%; normal LV diastolic function; normal right ventricular systolic function; mild left atrial enlargement; estimated PA systolic pressure is 23 mm Hg.   Plan:   Palpitations with frequent ectopy- Mrs. Gallardo reports fluttering is not a frequent since our initial visit on 5/15/2019.  Episodes now occur every other day or every two days.  Echo from 5/24/2019 showed normal left ventricular systolic function with EF of 60%; normal LV diastolic function; normal right ventricular systolic function; mild left atrial enlargement; estimated PA systolic pressure is 23 mm Hg.  Start metoprolol tartrate 25 mg bid.    HTN- Continue losartan 50 mg daily.  Pt to keep log of blood pressure/heart rate and bring in next visit or review.    ASCVD- Check updated lipid panel to determine ASCVD risk score.   Morbid Obesity- Pt followed by nutrition at .  Continue to monitor BMI.      HPI:  Mrs. Gallardo reports doing well.  States she has had no fluttering for the past month.  She has no chest pain or SOB.  Labs from 9/16/2019 show .    Past Medical History:   Diagnosis Date    Anxiety     Diverticulitis     Endometriosis     Hypertension     Prolactinoma 2011    Sliding hiatal hernia      Past Surgical History:   Procedure Laterality Date    ARTHROSCOPY OF KNEE Left     ARTHROSCOPY-KNEE Left 5/1/2017    [facility]    BRAIN TUMOR EXCISION  9/2011    removal of prolactinoma    COLONOSCOPY N/A 9/9/2015    [facility] ENDO    CYSTOSCOPY W/ URETERAL STENT PLACEMENT  2016    CYSTOSCOPY W/ URETERAL STENT REMOVAL  2016    ESOPHAGOGASTRODUODENOSCOPY (EGD) N/A 4/30/2019    [facility]    ESOPHAGOGASTRODUODENOSCOPY (EGD) N/A 5/6/2015    [facility] at [facility] ENDO    EXCISION, LESION, METATARSAL BONE Right 7/16/2019     [facility] OR    FUSION, JOINT Right 2019    [facility] OR    HYSTERECTOMY  3/16/15    TLH/BSO for Endometriosis, AUB, fibroids, cyst    HYSTERECTOMY-TOTAL LAPAROSCOPIC (TLH), BSO N/A 3/16/2015    [facility] OR    MANDIBLE SURGERY      severe overbite correction    MANOMETRY, ESOPHAGUS N/A 6/3/2019    Performed by [facility] at [facility] ENDO (4TH FLR)    MENISCECTOMY MEDIAL Left 2017    [facility] OR    PELVIC LAPAROSCOPY      Dx scope, chromopertubation-Endometriosis    SURGICAL PROCUREMENT, BONE GRAFT Right 2019    [facility] OR    TONSILLECTOMY, ADENOIDECTOMY       Social History     Socioeconomic History    Marital status:      Spouse name: Not on file    Number of children: Not on file    Years of education: college    Highest education level: Not on file   Occupational History    Not on file   Social Needs    Financial resource strain: Not on file    Food insecurity:     Worry: Not on file     Inability: Not on file    Transportation needs:     Medical: Not on file     Non-medical: Not on file   Tobacco Use    Smoking status: Former Smoker     Years: 13.00     Types: Cigarettes     Start date: 1989     Last attempt to quit: 2000     Years since quittin.6    Smokeless tobacco: Never Used    Tobacco comment: pt stated she smoked one cig/day   Substance and Sexual Activity    Alcohol use: No     Alcohol/week: 0.0 standard drinks    Drug use: No    Sexual activity: Yes     Partners: Male     Birth control/protection: Surgical, See Surgical Hx   Lifestyle    Physical activity:     Days per week: Not on file     Minutes per session: Not on file    Stress: Not on file   Relationships    Social connections:     Talks on phone: Not on file     Gets together: Not on file     Attends Synagogue service: Not on  "file     Active member of club or organization: Not on file     Attends meetings of clubs or organizations: Not on file     Relationship status: Not on file   Other Topics Concern    Are you pregnant or think you may be? Not Asked    Breast-feeding Not Asked   Social History Narrative    Not on file     Family History   Problem Relation Age of Onset    Cancer Mother     Breast cancer Neg Hx     Ovarian cancer Neg Hx        Review of patient's allergies indicates:   Allergen Reactions    Ace inhibitors Other (See Comments)     cough    Xarelto [rivaroxaban] Hives       Medication List with Changes/Refills   Current Medications    HYDROXYZINE HCL (ATARAX) 25 MG TABLET    Take 25 mg by mouth 3 (three) times daily as needed.    LOSARTAN (COZAAR) 50 MG TABLET    Take 50 mg by mouth once daily.     METOPROLOL TARTRATE (LOPRESSOR) 25 MG TABLET    Take 1 tablet (25 mg total) by mouth 2 (two) times daily.    OMEPRAZOLE (PRILOSEC) 40 MG CAPSULE    Take 1 capsule by mouth once daily.    PAROXETINE (PAXIL) 20 MG TABLET    Take 1 tablet (20 mg total) by mouth every morning.       Review of Systems  Constitution: Denies chills, fever, and sweats.  HENT: Denies headaches or blurry vision.  Cardiovascular: Denies chest pain or irregular heart beat.  Respiratory: Denies cough or shortness of breath.  Gastrointestinal: Denies abdominal pain, nausea, or vomiting.  Musculoskeletal: Denies muscle cramps.  Neurological: Denies dizziness or focal weakness.  Psychiatric/Behavioral: Normal mental status.  Hematologic/Lymphatic: Denies bleeding problem or easy bruising/bleeding.  Skin: Denies rash or suspicious lesions    Physical Examination  /86 (BP Location: Left arm, Patient Position: Sitting, BP Method: X-Large (Manual))   Pulse 60   Ht 5' 4" (1.626 m)   Wt 125.2 kg (276 lb)   LMP 03/03/2015   SpO2 98%   BMI 47.38 kg/m²     Constitutional: No acute distress, conversant  HEENT: Sclera anicteric, Pupils equal, round " and reactive to light, extraocular motions intact, Oropharynx clear  Neck: No JVD, no carotid bruits  Cardiovascular: regular rate and rhythm, no murmur, rubs or gallops, normal S1/S2  Pulmonary: Clear to auscultation bilaterally  Abdominal: Abdomen soft, nontender, nondistended, positive bowel sounds  Extremities: No lower extremity edema,   Pulses:  Carotid pulses are 2+ on the right side, and 2+ on the left side.  Radial pulses are 2+ on the right side, and 2+ on the left side.   Femoral pulses are 2+ on the right side, and 2+ on the left side.  Popliteal pulses are 2+ on the right side, and 2+ on the left side.   Dorsalis pedis pulses are 2+ on the right side, and 2+ on the left side.   Posterior tibial pulses are 2+ on the right side, and 2+ on the left side.    Skin: No ecchymosis, erythema, or ulcers  Psych: Alert and oriented x 3, appropriate affect  Neuro: CNII-XII intact, no focal deficits    Labs:  Most Recent Data  CBC:   Lab Results   Component Value Date    WBC 9.61 04/18/2017    HGB 13.9 04/18/2017    HCT 41.2 04/18/2017     (H) 04/18/2017    MCV 84 04/18/2017    RDW 14.7 (H) 04/18/2017     BMP:   Lab Results   Component Value Date     04/18/2017    K 3.7 04/18/2017     04/18/2017    CO2 26 04/18/2017    BUN 18 04/18/2017    CREATININE 0.7 04/18/2017    GLU 88 04/18/2017    CALCIUM 9.6 04/18/2017     LFTS;   Lab Results   Component Value Date    PROT 7.8 02/17/2016    ALBUMIN 4.6 02/17/2016    BILITOT 0.7 02/17/2016    AST 29 02/17/2016    ALKPHOS 64 02/17/2016    ALT 36 02/17/2016     COAGS:   Lab Results   Component Value Date    INR 1.0 04/18/2017     FLP:   Lab Results   Component Value Date    CHOL 192 09/16/2019    HDL 49 09/16/2019    LDLCALC 120.6 09/16/2019    TRIG 112 09/16/2019    CHOLHDL 25.5 09/16/2019     CARDIAC:   Lab Results   Component Value Date    TROPONINI 0.012 11/20/2015    BNP 63 11/20/2015       EKG 4/18/2017:  Sinus bradycardia (rate 59 bpm)    Echo  5/24/2019:  · Normal left ventricular systolic function. The estimated ejection fraction is 60%  · Normal LV diastolic function.  · Normal right ventricular systolic function.  · Mild left atrial enlargement.  · Normal central venous pressure (3 mm Hg).  · The estimated PA systolic pressure is 23 mm Hg    48 Hour Holter 4/15/2019: Significant ectopic activity detected during the monitoring period as described below.  · Ventricular ectopic activity consisted of 11,060 beats, of which, 6 were in 1 run, 26 were in couplets, 9652 were in single PVCs, 2 were in interpolated PVCs, 2 were in the R on T, 28 were single VEs, 1 was in late, 1699 were in bigeminy, 52 were in trigeminy.  · The longest ventricular run occurred at 8:35:05 PM D2, consisting of 6 beats, with maximum heart rate 152 beats per minute. (6 beat run of NSVT).  · Supraventricular ectopic activity consisted of 190 beats, of which, 3 were in 1 run, 50 were in atrial couplets, 24 were late beats, 121 were single PACs.  · The longest supraventricular run occurred at 11:31:50 AM D1 consisting of 3 beats, with maximum heart rate of 164 beats per minute.  · The fastest supraventricular run occurred at low 11:31:50 AM D1, consisting of 3 beats, with maximum heart rate of 164 beats per minute.  Patient reported symptoms corresponded with sinus rhythm and sinus tachycardia as described.    Assessment/Plan:  Inna Gallardo is a 44 y.o. female with a past medical history of HTN, diverticulitis, anxiety, hiatal hernia, morbid obesity, who presents for a follow up appointment.      1. Palpitations with frequent ectopy- Mrs. Gallardo reports no fluttering for the past month.  Echo from 5/24/2019 showed normal left ventricular systolic function with EF of 60%; normal LV diastolic function; normal right ventricular systolic function; mild left atrial enlargement; estimated PA systolic pressure is 23 mm Hg.  Continue metoprolol tartrate 25 mg bid.      2. HTN-  Continue losartan 50 mg daily.  Pt to keep log of blood pressure/heart rate and bring in next visit or review.      3. ASCVD- Ten year ASCVD risk score low at 1%.  However, LDL elevate at 121.  Start atorvastatin 40 mg daily.     4. Morbid Obesity- Pt followed by nutrition at [facility].  Continue to monitor BMI.      Follow up in 6 months with lipids    Total duration of face to face visit time 30 minutes.  Total time spent counseling greater than fifty percent of total visit time.  Counseling included discussion regarding imaging findings, diagnosis, possibilities, treatment options, risks and benefits.  The patient had many questions regarding the options and long-term effects.    Valentino Lopes MD, PhD  Interventional Cardiology

## 2019-09-23 NOTE — PATIENT INSTRUCTIONS
Assessment/Plan:  Inna Gallardo is a 44 y.o. female with a past medical history of HTN, diverticulitis, anxiety, hiatal hernia, morbid obesity, who presents for a follow up appointment.      1. Palpitations with frequent ectopy- Mrs. Gallardo reports no fluttering for the past month.  Echo from 5/24/2019 showed normal left ventricular systolic function with EF of 60%; normal LV diastolic function; normal right ventricular systolic function; mild left atrial enlargement; estimated PA systolic pressure is 23 mm Hg.  Continue metoprolol tartrate 25 mg bid.      2. HTN- Continue losartan 50 mg daily.  Pt to keep log of blood pressure/heart rate and bring in next visit or review.      3. ASCVD- Ten year ASCVD risk score low at 1%.  However, LDL elevate at 121.  Start atorvastatin 40 mg daily.     4. Morbid Obesity- Pt followed by nutrition at [facility].  Continue to monitor BMI.      Follow up in 6 months  with lipids

## 2019-11-05 ENCOUNTER — OFFICE VISIT (OUTPATIENT)
Dept: PODIATRY | Facility: CLINIC | Age: 44
End: 2019-11-05
Payer: MEDICAID

## 2019-11-05 VITALS
OXYGEN SATURATION: 98 % | WEIGHT: 264.81 LBS | DIASTOLIC BLOOD PRESSURE: 80 MMHG | HEIGHT: 64 IN | HEART RATE: 52 BPM | SYSTOLIC BLOOD PRESSURE: 122 MMHG | BODY MASS INDEX: 45.21 KG/M2

## 2019-11-05 DIAGNOSIS — M19.071 ARTHRITIS OF RIGHT FOOT: Primary | ICD-10-CM

## 2019-11-05 DIAGNOSIS — Z98.890 STATUS POST RIGHT FOOT SURGERY: ICD-10-CM

## 2019-11-05 PROCEDURE — 99999 PR PBB SHADOW E&M-EST. PATIENT-LVL IV: CPT | Mod: PBBFAC,,, | Performed by: PODIATRIST

## 2019-11-05 PROCEDURE — 99213 PR OFFICE/OUTPT VISIT, EST, LEVL III, 20-29 MIN: ICD-10-PCS | Mod: S$PBB,,, | Performed by: PODIATRIST

## 2019-11-05 PROCEDURE — 99214 OFFICE O/P EST MOD 30 MIN: CPT | Mod: PBBFAC,PN | Performed by: PODIATRIST

## 2019-11-05 PROCEDURE — 99999 PR PBB SHADOW E&M-EST. PATIENT-LVL IV: ICD-10-PCS | Mod: PBBFAC,,, | Performed by: PODIATRIST

## 2019-11-05 PROCEDURE — 99213 OFFICE O/P EST LOW 20 MIN: CPT | Mod: S$PBB,,, | Performed by: PODIATRIST

## 2019-11-05 RX ORDER — HYDROCHLOROTHIAZIDE 12.5 MG/1
1 CAPSULE ORAL DAILY
COMMUNITY
Start: 2019-10-24 | End: 2020-06-15

## 2019-11-05 RX ORDER — EPINEPHRINE 0.3 MG/.3ML
0.3 INJECTION SUBCUTANEOUS
Refills: 0 | COMMUNITY
Start: 2019-10-04

## 2019-11-05 NOTE — PROGRESS NOTES
Subjective:      Patient ID: Inna Gallardo is a 44 y.o. female.    Chief Complaint: Follow-up (2 month)    44 years old female presenting with right foot pain.  She is pointing plantar aspect of the right heel and also dorsal aspect of the 3rd MPJ.  Patient tells me pain has been going on for about a year.  She denies any trauma no injury to the area.  Patient also relates that she has bad left knee and put more pressure on the right foot that might have caused pain.  She describes pain as aching and throbbing especially in the morning at the heel.  She is known to Dr. Tomlinson who sent her for physical therapy.  Patient claims that she finished physical therapy.  Dr. Tomlinson also ordered MRI which was done at [facility].  She points dorsal aspect of the 3rd MPJ.  Describes pain as aching and throbbing as well. She is presenting in casual shoe with heel.     6/21/19: f/u for R foot pain. She is here to review the MRI as well. She tells me her main pain is top of the midfoot rather than the MPJs (s/p injection of the 3rd MPJ and heel injection at last visit). Pain is improving after injection. Claims that she has been doing stretching/water bottle exercise. She is also here for surgical consultation.     7/22/19 F/u for R 2nd TMTJ fusion with 3rd metatarsal cyst excision, R calc graft harvest. Pt had trouble controlling the pain. She was taking norco 5mg which did not help with pain. She was doubling up norco 5mg time to time to see if that works. It did not work. She requested to have norco 10mg. She went to ED because of pain. xrays were taken in the ED but cast was kept intact. Cast was not taken off in the ED. Pt tells me pain is now somewhat well controlled. NWB of R foot with knee scooter. (DOS: 7/16/19 GP: 10/16/19)    8/1/19: f/u R foot sx. Was breaking in hives. Went to ED. Recommended to stop taking xarelto. She has not been taking xarelto and her rash improved significantly. Pain is controlled  with current pain medication regimen. NWB R foot.     8/22/19: f/u for R foot sx. Has been NWB in PS with knee scooter. Pain is controlled. Complains of swelling. She is in CAM walker. Not taking Norco anymore. She is not taking xarelto 2/2 rash. She no longer presenting with rash.     9/5/19: f/u for R foot sx. Doing well. No pain today. Post op wound dehiscence has been healing with LWC. Has been NWB for roxy.7 weeks.     11/5/19: f/u for R foot sx. Has been ambulating in CAM for about 2 months. Still going to physical therapy. Has been going to physical therapy about a month. Pain is improving. Concerned about scar along the incision. No major pedal complaints.       Review of Systems   Constitution: Negative for decreased appetite, fever and malaise/fatigue.   HENT: Negative for congestion.    Cardiovascular: Negative for chest pain and leg swelling.   Respiratory: Negative for cough and shortness of breath.    Skin: Negative for color change, nail changes and rash.   Musculoskeletal: Positive for joint pain and joint swelling. Negative for arthritis and muscle weakness.   Gastrointestinal: Negative for bloating, abdominal pain, nausea and vomiting.   Neurological: Negative for headaches, numbness and weakness.   Psychiatric/Behavioral: Negative for altered mental status.     Hemoglobin A1C   Date Value Ref Range Status   04/28/2015 5.6 4.5 - 6.2 % Final             Past Medical History:   Diagnosis Date    Anxiety     Diverticulitis     Endometriosis     Hypertension     Prolactinoma 2011    Sliding hiatal hernia        Past Surgical History:   Procedure Laterality Date    ARTHROSCOPY OF KNEE Left     BRAIN TUMOR EXCISION  9/2011    removal of prolactinoma    CYSTOSCOPY W/ URETERAL STENT PLACEMENT  2016    CYSTOSCOPY W/ URETERAL STENT REMOVAL  2016    ESOPHAGEAL MANOMETRY N/A 6/3/2019    Procedure: MANOMETRY, ESOPHAGUS;  Surgeon: Bhupinder Schmitz MD;  Location: [facility] ([address] [place] [address]);  Service:  Endoscopy;  Laterality: N/A;    ESOPHAGOGASTRODUODENOSCOPY N/A 2019    Procedure: ESOPHAGOGASTRODUODENOSCOPY (EGD);  Surgeon: Altagracia Bose MD;  Location: [facility] ENDO;  Service: Endoscopy;  Laterality: N/A;    HARVESTING OF BONE GRAFT Right 2019    Procedure: SURGICAL PROCUREMENT, BONE GRAFT;  Surgeon: Adrianna Tillman DPM;  Location: [facility] OR;  Service: Podiatry;  Laterality: Right;    HYSTERECTOMY  3/16/15    TLH/BSO for Endometriosis, AUB, fibroids, cyst    MANDIBLE SURGERY      severe overbite correction    PELVIC LAPAROSCOPY      Dx scope, chromopertubation-Endometriosis    TONSILLECTOMY, ADENOIDECTOMY         Family History   Problem Relation Age of Onset    Cancer Mother     Breast cancer Neg Hx     Ovarian cancer Neg Hx        Social History     Socioeconomic History    Marital status:      Spouse name: Not on file    Number of children: Not on file    Years of education: college    Highest education level: Not on file   Occupational History    Not on file   Social Needs    Financial resource strain: Not on file    Food insecurity:     Worry: Not on file     Inability: Not on file    Transportation needs:     Medical: Not on file     Non-medical: Not on file   Tobacco Use    Smoking status: Former Smoker     Years: 13.00     Types: Cigarettes     Start date: 1989     Last attempt to quit: 2000     Years since quittin.7    Smokeless tobacco: Never Used    Tobacco comment: pt stated she smoked one cig/day   Substance and Sexual Activity    Alcohol use: No     Alcohol/week: 0.0 standard drinks    Drug use: No    Sexual activity: Yes     Partners: Male     Birth control/protection: Surgical, See Surgical Hx   Lifestyle    Physical activity:     Days per week: Not on file     Minutes per session: Not on file    Stress: Not on file   Relationships    Social connections:     Talks on phone: Not on file     Gets together: Not on file     Attends  "Mormonism service: Not on file     Active member of club or organization: Not on file     Attends meetings of clubs or organizations: Not on file     Relationship status: Not on file   Other Topics Concern    Are you pregnant or think you may be? Not Asked    Breast-feeding Not Asked   Social History Narrative    Not on file       Current Outpatient Medications   Medication Sig Dispense Refill    atorvastatin (LIPITOR) 40 MG tablet Take 1 tablet (40 mg total) by mouth once daily. 90 tablet 3    EPINEPHrine (EPIPEN) 0.3 mg/0.3 mL AtIn Inject 0.3 mLs into the muscle as needed.  0    hydroCHLOROthiazide (MICROZIDE) 12.5 mg capsule Take 1 capsule by mouth once daily.      hydrOXYzine HCl (ATARAX) 25 MG tablet Take 25 mg by mouth 3 (three) times daily as needed.  2    losartan (COZAAR) 50 MG tablet Take 50 mg by mouth once daily.       metoprolol tartrate (LOPRESSOR) 25 MG tablet Take 1 tablet (25 mg total) by mouth 2 (two) times daily. 60 tablet 11    omeprazole (PRILOSEC) 40 MG capsule Take 1 capsule by mouth once daily.      paroxetine (PAXIL) 20 MG tablet Take 1 tablet (20 mg total) by mouth every morning. 30 tablet 11     No current facility-administered medications for this visit.        Review of patient's allergies indicates:   Allergen Reactions    Ace inhibitors Other (See Comments)     cough    Xarelto [rivaroxaban] Hives       Vitals:    11/05/19 0916   BP: 122/80   Pulse: (!) 52   SpO2: 98%   Weight: 120.1 kg (264 lb 12.8 oz)   Height: 5' 4" (1.626 m)   PainSc:   4       Objective:      Physical Exam   Constitutional: She is oriented to person, place, and time. She appears well-developed and well-nourished. No distress.   Musculoskeletal: She exhibits tenderness. She exhibits no edema or deformity.   Neurological: She is alert and oriented to person, place, and time.   Skin: Skin is warm. Capillary refill takes less than 2 seconds. No erythema.   Psychiatric: She has a normal mood and affect. " Her behavior is normal.       Vascular: Distal DP/PT pulses palpable 2/4. CRT < 3 sec to tips of toes. No vericosities noted to LEs. Hair growth present LE, warm to touch LE  R foot: +mild edema dorsally    Dermatologic:   R foot: incision healed. No open wound. +hypertrophic scar. Healed heel wound.      Musculoskeletal:   R foot: pain on palpation over the surgical incision    Neurological: Light touch, proprioception, and sharp/dull sensation are all intact. Protective threshold with the [place]-Wienstein monofilament is intact b/l. Vibratory sensation intact.     8/22/19 9/5/19              Assessment:       Encounter Diagnoses   Name Primary?    Arthritis of right foot - Right Foot Yes    Status post right foot surgery - Right Foot          Plan:       Inna Lynne was seen today for follow-up.    Diagnoses and all orders for this visit:    Arthritis of right foot - Right Foot  -     X-Ray Foot Complete Right; Future    Status post right foot surgery - Right Foot      I counseled the patient on her conditions, their implications and medical management.    44 years old female with s/p R 2nd TMTJ fusion, 3rd metatarsal bone cyst excision, heel calc harvest.    -recommend Mderma for hypertrophic scar.   -R foot xray taken and reviewed. Hardware intact. +minimal consolidation of the 2nd TMTJ fusion site however she has been walking in CAM for last 2 months. Ok to transition to WBAT in normal tennis shoe to help with bone healing.   -continue with physical therapy.   -The nature of the condition, options for management, as well as potential risks and complications were discussed in detail with patient. Patient was amenable to my recommendations and left my office fully informed and will follow up as instructed or sooner if necessary.    -Patient was advised of signs and symptoms of infection including redness, drainage, purulence, odor, streaking, fever, chills and I advised patient to seek medical attention (ER or  urgent care) if these symptoms arise.   -follow-up in 8 weeks

## 2019-11-20 PROBLEM — R29.898 ANKLE WEAKNESS: Status: RESOLVED | Noted: 2019-07-12 | Resolved: 2019-11-20

## 2019-11-20 PROBLEM — M79.671 RIGHT FOOT PAIN: Status: RESOLVED | Noted: 2019-07-12 | Resolved: 2019-11-20

## 2020-01-20 ENCOUNTER — OFFICE VISIT (OUTPATIENT)
Dept: PODIATRY | Facility: CLINIC | Age: 45
End: 2020-01-20
Payer: MEDICAID

## 2020-01-20 VITALS
HEIGHT: 64 IN | BODY MASS INDEX: 45.73 KG/M2 | DIASTOLIC BLOOD PRESSURE: 85 MMHG | HEART RATE: 53 BPM | SYSTOLIC BLOOD PRESSURE: 123 MMHG | WEIGHT: 267.88 LBS

## 2020-01-20 DIAGNOSIS — Z98.890 STATUS POST RIGHT FOOT SURGERY: ICD-10-CM

## 2020-01-20 DIAGNOSIS — M77.8 CAPSULITIS OF FOOT, RIGHT: ICD-10-CM

## 2020-01-20 DIAGNOSIS — M79.671 RIGHT FOOT PAIN: Primary | ICD-10-CM

## 2020-01-20 PROCEDURE — 99213 PR OFFICE/OUTPT VISIT, EST, LEVL III, 20-29 MIN: ICD-10-PCS | Mod: 25,S$PBB,, | Performed by: PODIATRIST

## 2020-01-20 PROCEDURE — 99999 PR PBB SHADOW E&M-EST. PATIENT-LVL IV: ICD-10-PCS | Mod: PBBFAC,,, | Performed by: PODIATRIST

## 2020-01-20 PROCEDURE — 99214 OFFICE O/P EST MOD 30 MIN: CPT | Mod: PBBFAC,PN | Performed by: PODIATRIST

## 2020-01-20 PROCEDURE — 99213 OFFICE O/P EST LOW 20 MIN: CPT | Mod: 25,S$PBB,, | Performed by: PODIATRIST

## 2020-01-20 PROCEDURE — 99999 PR PBB SHADOW E&M-EST. PATIENT-LVL IV: CPT | Mod: PBBFAC,,, | Performed by: PODIATRIST

## 2020-01-20 RX ORDER — HYDROXYZINE PAMOATE 25 MG/1
25 CAPSULE ORAL 3 TIMES DAILY PRN
Status: ON HOLD | COMMUNITY
End: 2020-06-16 | Stop reason: CLARIF

## 2020-01-20 RX ORDER — PAROXETINE HYDROCHLORIDE 20 MG/1
20 TABLET, FILM COATED ORAL EVERY MORNING
COMMUNITY
End: 2024-03-04

## 2020-01-20 RX ORDER — TRIAMCINOLONE ACETONIDE 40 MG/ML
40 INJECTION, SUSPENSION INTRA-ARTICULAR; INTRAMUSCULAR
Status: COMPLETED | OUTPATIENT
Start: 2020-01-20 | End: 2020-01-20

## 2020-01-20 RX ADMIN — TRIAMCINOLONE ACETONIDE 40 MG: 40 INJECTION, SUSPENSION INTRA-ARTICULAR; INTRAMUSCULAR at 12:01

## 2020-01-20 NOTE — PROGRESS NOTES
Subjective:      Patient ID: Inna Gallardo is a 44 y.o. female.    Chief Complaint: Follow-up (2 months, right foot)    44 years old female presenting with right foot pain.  She is pointing plantar aspect of the right heel and also dorsal aspect of the 3rd MPJ.  Patient tells me pain has been going on for about a year.  She denies any trauma no injury to the area.  Patient also relates that she has bad left knee and put more pressure on the right foot that might have caused pain.  She describes pain as aching and throbbing especially in the morning at the heel.  She is known to Dr. Tomlinson who sent her for physical therapy.  Patient claims that she finished physical therapy.  Dr. Tomlinson also ordered MRI which was done at [facility].  She points dorsal aspect of the 3rd MPJ.  Describes pain as aching and throbbing as well. She is presenting in casual shoe with heel.     6/21/19: f/u for R foot pain. She is here to review the MRI as well. She tells me her main pain is top of the midfoot rather than the MPJs (s/p injection of the 3rd MPJ and heel injection at last visit). Pain is improving after injection. Claims that she has been doing stretching/water bottle exercise. She is also here for surgical consultation.     7/22/19 F/u for R 2nd TMTJ fusion with 3rd metatarsal cyst excision, R calc graft harvest. Pt had trouble controlling the pain. She was taking norco 5mg which did not help with pain. She was doubling up norco 5mg time to time to see if that works. It did not work. She requested to have norco 10mg. She went to ED because of pain. xrays were taken in the ED but cast was kept intact. Cast was not taken off in the ED. Pt tells me pain is now somewhat well controlled. NWB of R foot with knee scooter. (DOS: 7/16/19 GP: 10/16/19)    8/1/19: f/u R foot sx. Was breaking in hives. Went to ED. Recommended to stop taking xarelto. She has not been taking xarelto and her rash improved significantly. Pain is  controlled with current pain medication regimen. NWB R foot.     8/22/19: f/u for R foot sx. Has been NWB in PS with knee scooter. Pain is controlled. Complains of swelling. She is in CAM walker. Not taking Norco anymore. She is not taking xarelto 2/2 rash. She no longer presenting with rash.     9/5/19: f/u for R foot sx. Doing well. No pain today. Post op wound dehiscence has been healing with LWC. Has been NWB for roxy.7 weeks.     11/5/19: f/u for R foot sx. Has been ambulating in CAM for about 2 months. Still going to physical therapy. Has been going to physical therapy about a month. Pain is improving. Concerned about scar along the incision. No major pedal complaints.     1/20/20 follow-up status post right foot surgery.  Ambulating in normal tennis shoe.  Patient is still going to physical therapy.  Patient still complains of diffuse pain dorsal aspect of the midfoot and level of MPJs.  Describes MPJ pain as sharp and needle pain which gets worse with the prolonged standing and ambulation.  Pain level of 7/10.       Review of Systems   Constitution: Negative for decreased appetite, fever and malaise/fatigue.   HENT: Negative for congestion.    Cardiovascular: Negative for chest pain and leg swelling.   Respiratory: Negative for cough and shortness of breath.    Skin: Negative for color change, nail changes and rash.   Musculoskeletal: Positive for joint pain and joint swelling. Negative for arthritis and muscle weakness.   Gastrointestinal: Negative for bloating, abdominal pain, nausea and vomiting.   Neurological: Negative for headaches, numbness and weakness.   Psychiatric/Behavioral: Negative for altered mental status.     Hemoglobin A1C   Date Value Ref Range Status   04/28/2015 5.6 4.5 - 6.2 % Final             Past Medical History:   Diagnosis Date    Anxiety     Diverticulitis     Endometriosis     Hypertension     Prolactinoma 2011    Sliding hiatal hernia        Past Surgical History:    Procedure Laterality Date    ARTHROSCOPY OF KNEE Left     BRAIN TUMOR EXCISION  2011    removal of prolactinoma    CYSTOSCOPY W/ URETERAL STENT PLACEMENT      CYSTOSCOPY W/ URETERAL STENT REMOVAL      ESOPHAGEAL MANOMETRY N/A 6/3/2019    Procedure: MANOMETRY, ESOPHAGUS;  Surgeon: Bhupinder Schmitz MD;  [facility]  Service: Endoscopy;  Laterality: N/A;    ESOPHAGOGASTRODUODENOSCOPY N/A 2019    Procedure: ESOPHAGOGASTRODUODENOSCOPY (EGD);  Surgeon: Altagracia Bose MD;  Location: [facility];  Service: Endoscopy;  Laterality: N/A;    HARVESTING OF BONE GRAFT Right 2019    Procedure: SURGICAL PROCUREMENT, BONE GRAFT;  Surgeon: Adrianna Tillman DPM;  [facility];  Service: Podiatry;  Laterality: Right;    HYSTERECTOMY  3/16/15    TLH/BSO for Endometriosis, AUB, fibroids, cyst    MANDIBLE SURGERY      severe overbite correction    PELVIC LAPAROSCOPY      Dx scope, chromopertubation-Endometriosis    TONSILLECTOMY, ADENOIDECTOMY         Family History   Problem Relation Age of Onset    Cancer Mother     Breast cancer Neg Hx     Ovarian cancer Neg Hx        Social History     Socioeconomic History    Marital status:      Spouse name: Not on file    Number of children: Not on file    Years of education: college    Highest education level: Not on file   Occupational History    Not on file   Social Needs    Financial resource strain: Not on file    Food insecurity:     Worry: Not on file     Inability: Not on file    Transportation needs:     Medical: Not on file     Non-medical: Not on file   Tobacco Use    Smoking status: Former Smoker     Years: 13.00     Types: Cigarettes     Start date: 1989     Last attempt to quit: 2000     Years since quittin.9    Smokeless tobacco: Never Used    Tobacco comment: pt stated she smoked one cig/day   Substance and Sexual Activity    Alcohol use: No     Alcohol/week: 0.0 standard drinks     Drug use: No    Sexual activity: Yes     Partners: Male     Birth control/protection: Surgical, See Surgical Hx   Lifestyle    Physical activity:     Days per week: Not on file     Minutes per session: Not on file    Stress: Not on file   Relationships    Social connections:     Talks on phone: Not on file     Gets together: Not on file     Attends Yazidism service: Not on file     Active member of club or organization: Not on file     Attends meetings of clubs or organizations: Not on file     Relationship status: Not on file   Other Topics Concern    Are you pregnant or think you may be? Not Asked    Breast-feeding Not Asked   Social History Narrative    Not on file       Current Outpatient Medications   Medication Sig Dispense Refill    hydrOXYzine HCl (ATARAX) 25 MG tablet Take 25 mg by mouth 3 (three) times daily as needed.  2    hydrOXYzine pamoate (VISTARIL) 25 MG Cap Take 25 mg by mouth 3 (three) times daily as needed.      losartan (COZAAR) 50 MG tablet Take 50 mg by mouth once daily.       metoprolol tartrate (LOPRESSOR) 25 MG tablet Take 1 tablet (25 mg total) by mouth 2 (two) times daily. 60 tablet 11    omeprazole (PRILOSEC) 40 MG capsule Take 1 capsule by mouth once daily.      paroxetine (PAXIL) 20 MG tablet Take 20 mg by mouth every morning.      sodium chloride (OCEAN) 0.65 % nasal spray 1 spray by Nasal route.      atorvastatin (LIPITOR) 40 MG tablet Take 1 tablet (40 mg total) by mouth once daily. (Patient not taking: Reported on 1/20/2020) 90 tablet 3    EPINEPHrine (EPIPEN) 0.3 mg/0.3 mL AtIn Inject 0.3 mLs into the muscle as needed.  0    hydroCHLOROthiazide (MICROZIDE) 12.5 mg capsule Take 1 capsule by mouth once daily.       No current facility-administered medications for this visit.        Review of patient's allergies indicates:   Allergen Reactions    Ace inhibitors Other (See Comments)     cough    Xarelto [rivaroxaban] Hives       Vitals:    01/20/20 1042   BP:  "123/85   Pulse: (!) 53   Weight: 121.5 kg (267 lb 14.4 oz)   Height: 5' 4" (1.626 m)   PainSc:   8   PainLoc: Foot       Objective:      Physical Exam   Constitutional: She is oriented to person, place, and time. She appears well-developed and well-nourished. No distress.   Musculoskeletal: She exhibits tenderness. She exhibits no edema or deformity.   Neurological: She is alert and oriented to person, place, and time.   Skin: Skin is warm. Capillary refill takes less than 2 seconds. No erythema.   Psychiatric: She has a normal mood and affect. Her behavior is normal.       Vascular: Distal DP/PT pulses palpable 2/4. CRT < 3 sec to tips of toes. No vericosities noted to LEs. Hair growth present LE, warm to touch LE    Dermatologic:   R foot: incision healed. No open wound. +hypertrophic scar. Healed heel wound.      Musculoskeletal:   R foot:  Diffuse tenderness dorsal midfoot around the surgical incision and TMTJ, pain on palpation 2nd and 3rd MPJ. No pain at MPJs during ROM.     Neurological: Light touch, proprioception, and sharp/dull sensation are all intact. Protective threshold with the [place]-Wienstein monofilament is intact b/l. Vibratory sensation intact.     8/22/19 9/5/19              Assessment:       Encounter Diagnoses   Name Primary?    Right foot pain - Right Foot Yes    Status post right foot surgery - Right Foot     Capsulitis of foot, right - Right Foot          Plan:       Inna Lynne was seen today for follow-up.    Diagnoses and all orders for this visit:    Right foot pain - Right Foot  -     X-Ray Foot Complete Right; Future    Status post right foot surgery - Right Foot    Capsulitis of foot, right - Right Foot      I counseled the patient on her conditions, their implications and medical management.    44 years old female with s/p R 2nd TMTJ fusion, 3rd metatarsal bone cyst excision, heel calc harvest.    -x-ray taken reviewed with the patient. Hardware intact. Joint space at 2nd TMTJ " still visible espeically on AP view. Discussed about obtaining CT to confirm fusion status.   -pt has symptoms at 2nd and 3rd MPJ. Most likely it is not related to previous surgery. S/p injection. Tolerated well. Verbal consent was obtained.   -continue with physical therapy.   -The nature of the condition, options for management, as well as potential risks and complications were discussed in detail with patient. Patient was amenable to my recommendations and left my office fully informed and will follow up as instructed or sooner if necessary.    -Patient was advised of signs and symptoms of infection including redness, drainage, purulence, odor, streaking, fever, chills and I advised patient to seek medical attention (ER or urgent care) if these symptoms arise.   -follow-up in 8 weeks

## 2020-01-20 NOTE — PROCEDURES
Procedures     Injection consisted of total of 2 cc of Kenalog 40 with 0.5% Marcaine plain injected into the dorsal aspect of the 2nd and 3rd MPJ. Skin was prepped with alcohol and ethyl chloride spray. Patient tolerated well with no complications and related relief following injection. Bandaid applied to injection site.

## 2020-03-09 ENCOUNTER — OFFICE VISIT (OUTPATIENT)
Dept: PODIATRY | Facility: CLINIC | Age: 45
End: 2020-03-09
Payer: MEDICAID

## 2020-03-09 VITALS
WEIGHT: 264.88 LBS | SYSTOLIC BLOOD PRESSURE: 138 MMHG | HEART RATE: 61 BPM | DIASTOLIC BLOOD PRESSURE: 84 MMHG | HEIGHT: 64 IN | BODY MASS INDEX: 45.22 KG/M2 | RESPIRATION RATE: 16 BRPM

## 2020-03-09 DIAGNOSIS — G57.91 NEURITIS OF RIGHT FOOT: ICD-10-CM

## 2020-03-09 DIAGNOSIS — T84.84XA PAINFUL ORTHOPAEDIC HARDWARE: ICD-10-CM

## 2020-03-09 DIAGNOSIS — M79.671 RIGHT FOOT PAIN: Primary | ICD-10-CM

## 2020-03-09 PROCEDURE — 99999 PR PBB SHADOW E&M-EST. PATIENT-LVL III: ICD-10-PCS | Mod: PBBFAC,,, | Performed by: PODIATRIST

## 2020-03-09 PROCEDURE — 99213 OFFICE O/P EST LOW 20 MIN: CPT | Mod: PBBFAC,PN,25 | Performed by: PODIATRIST

## 2020-03-09 PROCEDURE — 99999 PR PBB SHADOW E&M-EST. PATIENT-LVL III: CPT | Mod: PBBFAC,,, | Performed by: PODIATRIST

## 2020-03-09 PROCEDURE — 99213 PR OFFICE/OUTPT VISIT, EST, LEVL III, 20-29 MIN: ICD-10-PCS | Mod: 25,S$PBB,, | Performed by: PODIATRIST

## 2020-03-09 PROCEDURE — 64450 NJX AA&/STRD OTHER PN/BRANCH: CPT | Mod: S$PBB,RT,, | Performed by: PODIATRIST

## 2020-03-09 PROCEDURE — 64450 PR NERVE BLOCK INJ, ANES/STEROID, OTHER PERIPHERAL: ICD-10-PCS | Mod: S$PBB,RT,, | Performed by: PODIATRIST

## 2020-03-09 PROCEDURE — 99213 OFFICE O/P EST LOW 20 MIN: CPT | Mod: 25,S$PBB,, | Performed by: PODIATRIST

## 2020-03-09 PROCEDURE — 64450 NJX AA&/STRD OTHER PN/BRANCH: CPT | Mod: PBBFAC,PN | Performed by: PODIATRIST

## 2020-03-09 RX ORDER — NORELGESTROMIN AND ETHINYL ESTRADIOL 150; 35 UG/D; UG/D
PATCH TRANSDERMAL
COMMUNITY
Start: 2020-03-06 | End: 2021-10-06 | Stop reason: SDUPTHER

## 2020-03-09 NOTE — PROGRESS NOTES
Subjective:      Patient ID: Inna Gallardo is a 44 y.o. female.    Chief Complaint: Follow-up (right foot. Cant be on foot more than an hour) and Allergic Reaction (breaking out in hives ever since SX)    44 years old female presenting with right foot pain.  She is pointing plantar aspect of the right heel and also dorsal aspect of the 3rd MPJ.  Patient tells me pain has been going on for about a year.  She denies any trauma no injury to the area.  Patient also relates that she has bad left knee and put more pressure on the right foot that might have caused pain.  She describes pain as aching and throbbing especially in the morning at the heel.  She is known to Dr. Tomlinson who sent her for physical therapy.  Patient claims that she finished physical therapy.  Dr. Tomlinson also ordered MRI which was done at [facility].  She points dorsal aspect of the 3rd MPJ.  Describes pain as aching and throbbing as well. She is presenting in casual shoe with heel.     6/21/19: f/u for R foot pain. She is here to review the MRI as well. She tells me her main pain is top of the midfoot rather than the MPJs (s/p injection of the 3rd MPJ and heel injection at last visit). Pain is improving after injection. Claims that she has been doing stretching/water bottle exercise. She is also here for surgical consultation.     7/22/19 F/u for R 2nd TMTJ fusion with 3rd metatarsal cyst excision, R calc graft harvest. Pt had trouble controlling the pain. She was taking norco 5mg which did not help with pain. She was doubling up norco 5mg time to time to see if that works. It did not work. She requested to have norco 10mg. She went to ED because of pain. xrays were taken in the ED but cast was kept intact. Cast was not taken off in the ED. Pt tells me pain is now somewhat well controlled. NWB of R foot with knee scooter. (DOS: 7/16/19 GP: 10/16/19)    8/1/19: f/u R foot sx. Was breaking in hives. Went to ED. Recommended to stop taking  xarelto. She has not been taking xarelto and her rash improved significantly. Pain is controlled with current pain medication regimen. NWB R foot.     8/22/19: f/u for R foot sx. Has been NWB in PS with knee scooter. Pain is controlled. Complains of swelling. She is in CAM walker. Not taking Norco anymore. She is not taking xarelto 2/2 rash. She no longer presenting with rash.     9/5/19: f/u for R foot sx. Doing well. No pain today. Post op wound dehiscence has been healing with LWC. Has been NWB for roxy.7 weeks.     11/5/19: f/u for R foot sx. Has been ambulating in CAM for about 2 months. Still going to physical therapy. Has been going to physical therapy about a month. Pain is improving. Concerned about scar along the incision. No major pedal complaints.     1/20/20 follow-up status post right foot surgery.  Ambulating in normal tennis shoe.  Patient is still going to physical therapy.  Patient still complains of diffuse pain dorsal aspect of the midfoot and level of MPJs.  Describes MPJ pain as sharp and needle pain which gets worse with the prolonged standing and ambulation.  Pain level of 7/10.     3/9/20 f/u for R foot pain. Points dorsal midfoot and dorsal MPJs for pain. Aching pain worsening with ambulation. S/p steroid injection 2nd and 3rd MPJ last time which helped with symptoms. Pt also tells me she still getting hives which she takes hydroxyzine. She is wondering if she has allergy reaction to plates and screws. Tells me pain is associated with swelling when she is on her feet.       Review of Systems   Constitution: Negative for decreased appetite, fever and malaise/fatigue.   HENT: Negative for congestion.    Cardiovascular: Negative for chest pain and leg swelling.   Respiratory: Negative for cough and shortness of breath.    Skin: Negative for color change, nail changes and rash.   Musculoskeletal: Positive for joint pain and joint swelling. Negative for arthritis and muscle weakness.        R  foot pain    Gastrointestinal: Negative for bloating, abdominal pain, nausea and vomiting.   Neurological: Negative for headaches, numbness and weakness.   Psychiatric/Behavioral: Negative for altered mental status.     Hemoglobin A1C   Date Value Ref Range Status   04/28/2015 5.6 4.5 - 6.2 % Final             Past Medical History:   Diagnosis Date    Anxiety     Diverticulitis     Endometriosis     Hypertension     Prolactinoma 2011    Sliding hiatal hernia        Past Surgical History:   Procedure Laterality Date    ARTHROSCOPY OF KNEE Left     BRAIN TUMOR EXCISION  9/2011    removal of prolactinoma    CYSTOSCOPY W/ URETERAL STENT PLACEMENT  2016    CYSTOSCOPY W/ URETERAL STENT REMOVAL  2016    ESOPHAGEAL MANOMETRY N/A 6/3/2019    Procedure: MANOMETRY, ESOPHAGUS;  Surgeon: Bhupinder Schmitz MD;  Location: [facility] ENDO ([address] [place] [address]);  Service: Endoscopy;  Laterality: N/A;    ESOPHAGOGASTRODUODENOSCOPY N/A 4/30/2019    Procedure: ESOPHAGOGASTRODUODENOSCOPY (EGD);  Surgeon: Altagracia Bose MD;  Location: [facility] ENDO;  Service: Endoscopy;  Laterality: N/A;    HARVESTING OF BONE GRAFT Right 7/16/2019    Procedure: SURGICAL PROCUREMENT, BONE GRAFT;  Surgeon: Adrianna Tillman DPM;  Location: [facility] OR;  Service: Podiatry;  Laterality: Right;    HYSTERECTOMY  3/16/15    TLH/BSO for Endometriosis, AUB, fibroids, cyst    MANDIBLE SURGERY  2002    severe overbite correction    PELVIC LAPAROSCOPY  2014    Dx scope, chromopertubation-Endometriosis    TONSILLECTOMY, ADENOIDECTOMY         Family History   Problem Relation Age of Onset    Cancer Mother     Breast cancer Neg Hx     Ovarian cancer Neg Hx        Social History     Socioeconomic History    Marital status:      Spouse name: Not on file    Number of children: Not on file    Years of education: college    Highest education level: Not on file   Occupational History    Not on file   Social Needs    Financial resource strain: Not on file    Food  insecurity:     Worry: Not on file     Inability: Not on file    Transportation needs:     Medical: Not on file     Non-medical: Not on file   Tobacco Use    Smoking status: Former Smoker     Years: 13.00     Types: Cigarettes     Start date: 1989     Last attempt to quit: 2000     Years since quittin.1    Smokeless tobacco: Never Used    Tobacco comment: pt stated she smoked one cig/day   Substance and Sexual Activity    Alcohol use: No     Alcohol/week: 0.0 standard drinks    Drug use: No    Sexual activity: Yes     Partners: Male     Birth control/protection: Surgical, See Surgical Hx   Lifestyle    Physical activity:     Days per week: Not on file     Minutes per session: Not on file    Stress: Not on file   Relationships    Social connections:     Talks on phone: Not on file     Gets together: Not on file     Attends [employer] service: Not on file     Active member of club or organization: Not on file     Attends meetings of clubs or organizations: Not on file     Relationship status: Not on file   Other Topics Concern    Are you pregnant or think you may be? Not Asked    Breast-feeding Not Asked   Social History Narrative    Not on file       Current Outpatient Medications   Medication Sig Dispense Refill    EPINEPHrine (EPIPEN) 0.3 mg/0.3 mL AtIn Inject 0.3 mLs into the muscle as needed.  0    hydroCHLOROthiazide (MICROZIDE) 12.5 mg capsule Take 1 capsule by mouth once daily.      losartan (COZAAR) 50 MG tablet Take 50 mg by mouth once daily.       omeprazole (PRILOSEC) 40 MG capsule Take 1 capsule by mouth once daily.      paroxetine (PAXIL) 20 MG tablet Take 20 mg by mouth every morning.      sodium chloride (OCEAN) 0.65 % nasal spray 1 spray by Nasal route.      XULANE 150-35 mcg/24 hr APPLY 1 PATCH AS DIRECTED ONCE A WEEK FOR 3 WEEKS AND OFF FOR 1 WEEK      atorvastatin (LIPITOR) 40 MG tablet Take 1 tablet (40 mg total) by mouth once daily. (Patient not taking: Reported  "on 1/20/2020) 90 tablet 3    hydrOXYzine HCl (ATARAX) 25 MG tablet Take 25 mg by mouth 3 (three) times daily as needed.  2    hydrOXYzine pamoate (VISTARIL) 25 MG Cap Take 25 mg by mouth 3 (three) times daily as needed.      metoprolol tartrate (LOPRESSOR) 25 MG tablet Take 1 tablet (25 mg total) by mouth 2 (two) times daily. (Patient not taking: Reported on 3/9/2020) 60 tablet 11     No current facility-administered medications for this visit.        Review of patient's allergies indicates:   Allergen Reactions    Ace inhibitors Other (See Comments)     cough    Xarelto [rivaroxaban] Hives       Vitals:    03/09/20 0856   BP: 138/84   Pulse: 61   Resp: 16   Weight: 120.2 kg (264 lb 14.4 oz)   Height: 5' 4" (1.626 m)   PainSc:   6   PainLoc: Foot       Objective:      Physical Exam   Constitutional: She is oriented to person, place, and time. She appears well-developed and well-nourished. No distress.   Musculoskeletal: She exhibits tenderness. She exhibits no edema or deformity.   Neurological: She is alert and oriented to person, place, and time.   Skin: Skin is warm. Capillary refill takes less than 2 seconds. No erythema.   Psychiatric: She has a normal mood and affect. Her behavior is normal.       Vascular: Distal DP/PT pulses palpable 2/4. CRT < 3 sec to tips of toes. No vericosities noted to LEs. Hair growth present LE, warm to touch LE    Dermatologic:   R foot: incision healed. No open wound. +hypertrophic scar. Healed heel wound.      Musculoskeletal:   R foot:  Diffuse tenderness dorsal midfoot around the surgical incision and TMTJ, pain on palpation 2nd and 3rd MPJ. No pain at MPJs during ROM.     Neurological: Light touch, proprioception, and sharp/dull sensation are all intact. Protective threshold with the [place]ienstein monofilament is intact b/l. Vibratory sensation intact.     8/22/19 9/5/19              Assessment:       Encounter Diagnoses   Name Primary?    Right foot pain - Right " Foot Yes    Painful orthopaedic hardware - Right Foot     Neuritis of right foot - Right Foot          Plan:       Inna Lynne was seen today for follow-up and allergic reaction.    Diagnoses and all orders for this visit:    Right foot pain - Right Foot  -     CT Foot Without Contrast Right; Future    Painful orthopaedic hardware - Right Foot    Neuritis of right foot - Right Foot      I counseled the patient on her conditions, their implications and medical management.    44 years old female with s/p R 2nd TMTJ fusion, 3rd metatarsal bone cyst excision, heel calc harvest.    -rx. R foot CT. Discussed about possible HWR.  -s/p deep peroneal nerve injection. Verbal consent was obtained. Tolerated well. See procedure note.    -The nature of the condition, options for management, as well as potential risks and complications were discussed in detail with patient. Patient was amenable to my recommendations and left my office fully informed and will follow up as instructed or sooner if necessary.    -Patient was advised of signs and symptoms of infection including redness, drainage, purulence, odor, streaking, fever, chills and I advised patient to seek medical attention (ER or urgent care) if these symptoms arise.   -follow-up in 2 weeks

## 2020-03-09 NOTE — PROCEDURES
Procedures     Injection consisted of 2 cc of 0.5% marcaine plain injected into anterior ankle around the deep peroneal nerve. Skin was prepped with alcohol and ethyl chloride spray. Patient tolerated well with no complications and related relief following injection. Bandaid applied to injection site.

## 2020-03-10 ENCOUNTER — TELEPHONE (OUTPATIENT)
Dept: PODIATRY | Facility: CLINIC | Age: 45
End: 2020-03-10

## 2020-03-10 ENCOUNTER — HOSPITAL ENCOUNTER (EMERGENCY)
Facility: HOSPITAL | Age: 45
Discharge: HOME | End: 2020-03-10
Attending: EMERGENCY MEDICINE
Payer: MEDICAID

## 2020-03-10 VITALS
DIASTOLIC BLOOD PRESSURE: 71 MMHG | RESPIRATION RATE: 18 BRPM | BODY MASS INDEX: 44.9 KG/M2 | WEIGHT: 263 LBS | HEIGHT: 64 IN | OXYGEN SATURATION: 100 % | TEMPERATURE: 98 F | HEART RATE: 70 BPM | SYSTOLIC BLOOD PRESSURE: 128 MMHG

## 2020-03-10 DIAGNOSIS — K57.32 DIVERTICULITIS OF LARGE INTESTINE WITHOUT PERFORATION OR ABSCESS WITHOUT BLEEDING: Primary | ICD-10-CM

## 2020-03-10 DIAGNOSIS — R10.13 EPIGASTRIC PAIN: ICD-10-CM

## 2020-03-10 DIAGNOSIS — R10.9 ABDOMINAL PAIN, UNSPECIFIED ABDOMINAL LOCATION: ICD-10-CM

## 2020-03-10 LAB
ALBUMIN SERPL BCP-MCNC: 4.7 G/DL (ref 3.5–5.2)
ALP SERPL-CCNC: 98 U/L (ref 38–126)
ALT SERPL W/O P-5'-P-CCNC: 19 U/L (ref 10–44)
ANION GAP SERPL CALC-SCNC: 10 MMOL/L (ref 8–16)
AST SERPL-CCNC: 21 U/L (ref 15–46)
BASOPHILS # BLD AUTO: 0.06 K/UL (ref 0–0.2)
BASOPHILS NFR BLD: 0.4 % (ref 0–1.9)
BILIRUB SERPL-MCNC: 0.4 MG/DL (ref 0.1–1)
BILIRUB UR QL STRIP: NEGATIVE
BUN SERPL-MCNC: 16 MG/DL (ref 7–17)
CALCIUM SERPL-MCNC: 9.7 MG/DL (ref 8.7–10.5)
CHLORIDE SERPL-SCNC: 102 MMOL/L (ref 95–110)
CLARITY UR REFRACT.AUTO: CLEAR
CO2 SERPL-SCNC: 26 MMOL/L (ref 23–29)
COLOR UR AUTO: YELLOW
CREAT SERPL-MCNC: 0.6 MG/DL (ref 0.5–1.4)
DIFFERENTIAL METHOD: ABNORMAL
EOSINOPHIL # BLD AUTO: 0.3 K/UL (ref 0–0.5)
EOSINOPHIL NFR BLD: 1.9 % (ref 0–8)
ERYTHROCYTE [DISTWIDTH] IN BLOOD BY AUTOMATED COUNT: 14.3 % (ref 11.5–14.5)
EST. GFR  (AFRICAN AMERICAN): >60 ML/MIN/1.73 M^2
EST. GFR  (NON AFRICAN AMERICAN): >60 ML/MIN/1.73 M^2
GLUCOSE SERPL-MCNC: 111 MG/DL (ref 70–110)
GLUCOSE UR QL STRIP: NEGATIVE
HCT VFR BLD AUTO: 42.1 % (ref 37–48.5)
HGB BLD-MCNC: 13.4 G/DL (ref 12–16)
HGB UR QL STRIP: NEGATIVE
IMM GRANULOCYTES # BLD AUTO: 0.06 K/UL (ref 0–0.04)
IMM GRANULOCYTES NFR BLD AUTO: 0.4 % (ref 0–0.5)
KETONES UR QL STRIP: ABNORMAL
LEUKOCYTE ESTERASE UR QL STRIP: NEGATIVE
LIPASE SERPL-CCNC: 128 U/L (ref 23–300)
LYMPHOCYTES # BLD AUTO: 3.6 K/UL (ref 1–4.8)
LYMPHOCYTES NFR BLD: 22.4 % (ref 18–48)
MCH RBC QN AUTO: 27.5 PG (ref 27–31)
MCHC RBC AUTO-ENTMCNC: 31.8 G/DL (ref 32–36)
MCV RBC AUTO: 86 FL (ref 82–98)
MONOCYTES # BLD AUTO: 1.2 K/UL (ref 0.3–1)
MONOCYTES NFR BLD: 7.4 % (ref 4–15)
NEUTROPHILS # BLD AUTO: 10.9 K/UL (ref 1.8–7.7)
NEUTROPHILS NFR BLD: 67.5 % (ref 38–73)
NITRITE UR QL STRIP: NEGATIVE
NRBC BLD-RTO: 0 /100 WBC
PH UR STRIP: 5 [PH] (ref 5–8)
PLATELET # BLD AUTO: 409 K/UL (ref 150–350)
PMV BLD AUTO: 10.1 FL (ref 9.2–12.9)
POTASSIUM SERPL-SCNC: 3.7 MMOL/L (ref 3.5–5.1)
PROT SERPL-MCNC: 8.1 G/DL (ref 6–8.4)
PROT UR QL STRIP: NEGATIVE
RBC # BLD AUTO: 4.87 M/UL (ref 4–5.4)
SODIUM SERPL-SCNC: 138 MMOL/L (ref 136–145)
SP GR UR STRIP: 1.02 (ref 1–1.03)
URN SPEC COLLECT METH UR: ABNORMAL
UROBILINOGEN UR STRIP-ACNC: NEGATIVE EU/DL
WBC # BLD AUTO: 16.17 K/UL (ref 3.9–12.7)

## 2020-03-10 PROCEDURE — 80053 COMPREHEN METABOLIC PANEL: CPT | Mod: ER

## 2020-03-10 PROCEDURE — 93010 ELECTROCARDIOGRAM REPORT: CPT | Mod: ,,, | Performed by: INTERNAL MEDICINE

## 2020-03-10 PROCEDURE — 93010 EKG 12-LEAD: ICD-10-PCS | Mod: ,,, | Performed by: INTERNAL MEDICINE

## 2020-03-10 PROCEDURE — 83690 ASSAY OF LIPASE: CPT | Mod: ER

## 2020-03-10 PROCEDURE — 25500020 PHARM REV CODE 255: Mod: ER | Performed by: EMERGENCY MEDICINE

## 2020-03-10 PROCEDURE — 63600175 PHARM REV CODE 636 W HCPCS: Mod: ER | Performed by: EMERGENCY MEDICINE

## 2020-03-10 PROCEDURE — 96374 THER/PROPH/DIAG INJ IV PUSH: CPT | Mod: 59,ER

## 2020-03-10 PROCEDURE — 85025 COMPLETE CBC W/AUTO DIFF WBC: CPT | Mod: ER

## 2020-03-10 PROCEDURE — 81003 URINALYSIS AUTO W/O SCOPE: CPT | Mod: ER

## 2020-03-10 PROCEDURE — 99285 EMERGENCY DEPT VISIT HI MDM: CPT | Mod: 25,ER

## 2020-03-10 PROCEDURE — 93005 ELECTROCARDIOGRAM TRACING: CPT | Mod: ER

## 2020-03-10 PROCEDURE — 96375 TX/PRO/DX INJ NEW DRUG ADDON: CPT | Mod: ER

## 2020-03-10 PROCEDURE — 25000003 PHARM REV CODE 250: Mod: ER | Performed by: EMERGENCY MEDICINE

## 2020-03-10 RX ORDER — METRONIDAZOLE 500 MG/1
500 TABLET ORAL 3 TIMES DAILY
Qty: 21 TABLET | Refills: 0 | Status: SHIPPED | OUTPATIENT
Start: 2020-03-10 | End: 2020-03-17

## 2020-03-10 RX ORDER — CIPROFLOXACIN 500 MG/1
500 TABLET ORAL
Status: COMPLETED | OUTPATIENT
Start: 2020-03-10 | End: 2020-03-10

## 2020-03-10 RX ORDER — CIPROFLOXACIN 500 MG/1
500 TABLET ORAL 2 TIMES DAILY
Qty: 14 TABLET | Refills: 0 | Status: SHIPPED | OUTPATIENT
Start: 2020-03-10 | End: 2020-03-17

## 2020-03-10 RX ORDER — ONDANSETRON 2 MG/ML
4 INJECTION INTRAMUSCULAR; INTRAVENOUS
Status: COMPLETED | OUTPATIENT
Start: 2020-03-10 | End: 2020-03-10

## 2020-03-10 RX ORDER — KETOROLAC TROMETHAMINE 30 MG/ML
10 INJECTION, SOLUTION INTRAMUSCULAR; INTRAVENOUS
Status: COMPLETED | OUTPATIENT
Start: 2020-03-10 | End: 2020-03-10

## 2020-03-10 RX ORDER — KETOROLAC TROMETHAMINE 10 MG/1
10 TABLET, FILM COATED ORAL EVERY 8 HOURS PRN
Qty: 15 TABLET | Refills: 0 | Status: SHIPPED | OUTPATIENT
Start: 2020-03-10 | End: 2020-06-15

## 2020-03-10 RX ORDER — METRONIDAZOLE 500 MG/1
500 TABLET ORAL
Status: COMPLETED | OUTPATIENT
Start: 2020-03-10 | End: 2020-03-10

## 2020-03-10 RX ADMIN — IOHEXOL 100 ML: 350 INJECTION, SOLUTION INTRAVENOUS at 09:03

## 2020-03-10 RX ADMIN — METRONIDAZOLE 500 MG: 500 TABLET, FILM COATED ORAL at 10:03

## 2020-03-10 RX ADMIN — CIPROFLOXACIN 500 MG: 500 TABLET, FILM COATED ORAL at 10:03

## 2020-03-10 RX ADMIN — ONDANSETRON 4 MG: 2 INJECTION INTRAMUSCULAR; INTRAVENOUS at 09:03

## 2020-03-10 RX ADMIN — KETOROLAC TROMETHAMINE 10 MG: 30 INJECTION, SOLUTION INTRAMUSCULAR at 09:03

## 2020-03-10 NOTE — TELEPHONE ENCOUNTER
----- Message from Rosa Villanueva sent at 3/10/2020  1:41 PM CDT -----  Contact: [phone]/wvlg  Patient states a prior authorization is needed for a CAT SCAN  Please advise

## 2020-03-11 NOTE — ED TRIAGE NOTES
Reports to ED c/o generalized abd pain x 3 days. States skip flank pain; however worse on L side. Reports bladder pressure and frequency. States nausea. Denies vomiting, diarrhea, and fever. Denies vaginal discharge or dysuria.

## 2020-03-11 NOTE — ED PROVIDER NOTES
Encounter Date: 3/10/2020       History     Chief Complaint   Patient presents with    Abdominal Pain     Pt c/o generalized abdominal pain that radiates to her back x3 days. Pt states that she also has nausea. Pt states that the pain has been intermittent but progressively getting worse. Pt denies any vaginal bleeding or discharge, dysuria, or diarrhea.     Patient currently presents with concern regarding left-sided abdominal pain. This onset about 3 days ago but has progressed in intensity.  She notes that it does radiate around to the back.  Patient notes nausea but no vomiting.  She denies any vaginal discharge or other urinary complaints such as dysuria, frequency, or urgency.  She has not noted any hematuria and denies a history of nephrolithiasis.  Past surgical history on the abdomen notable only for prior hysterectomy.  Bowel function has not been altered.        Review of patient's allergies indicates:   Allergen Reactions    Ace inhibitors Other (See Comments)     cough    Xarelto [rivaroxaban] Hives     Past Medical History:   Diagnosis Date    Anxiety     Diverticulitis     Endometriosis     Hypertension     Prolactinoma 2011    Sliding hiatal hernia      Past Surgical History:   Procedure Laterality Date    ARTHROSCOPY OF KNEE Left     BRAIN TUMOR EXCISION  9/2011    removal of prolactinoma    CYSTOSCOPY W/ URETERAL STENT PLACEMENT  2016    CYSTOSCOPY W/ URETERAL STENT REMOVAL  2016    ESOPHAGEAL MANOMETRY N/A 6/3/2019    Procedure: MANOMETRY, ESOPHAGUS;  Surgeon: Bhupinder Schmitz MD;  Location: [facility] ([address] [place] [address]);  Service: Endoscopy;  Laterality: N/A;    ESOPHAGOGASTRODUODENOSCOPY N/A 4/30/2019    Procedure: ESOPHAGOGASTRODUODENOSCOPY (EGD);  Surgeon: Altagracia Bose MD;  Location: [facility];  Service: Endoscopy;  Laterality: N/A;    HARVESTING OF BONE GRAFT Right 7/16/2019    Procedure: SURGICAL PROCUREMENT, BONE GRAFT;  Surgeon: Adrianna Tillman DPM;  Location: [facility] OR;  Service:  Podiatry;  Laterality: Right;    HYSTERECTOMY  3/16/15    TLH/BSO for Endometriosis, AUB, fibroids, cyst    MANDIBLE SURGERY  2002    severe overbite correction    PELVIC LAPAROSCOPY  2014    Dx scope, chromopertubation-Endometriosis    TONSILLECTOMY, ADENOIDECTOMY       Family History   Problem Relation Age of Onset    Cancer Mother     Breast cancer Neg Hx     Ovarian cancer Neg Hx      Social History     Tobacco Use    Smoking status: Former Smoker     Years: 13.00     Types: Cigarettes     Start date: 1989     Last attempt to quit: 2000     Years since quittin.1    Smokeless tobacco: Never Used    Tobacco comment: pt stated she smoked one cig/day   Substance Use Topics    Alcohol use: No     Alcohol/week: 0.0 standard drinks    Drug use: No     Review of Systems   Constitutional: Negative for chills and fever.   HENT: Negative for congestion and rhinorrhea.    Respiratory: Negative for cough, chest tightness, shortness of breath and wheezing.    Cardiovascular: Negative for chest pain, palpitations and leg swelling.   Gastrointestinal: Positive for abdominal pain and nausea. Negative for abdominal distention, constipation, diarrhea and vomiting.   Genitourinary: Negative for dysuria, frequency, urgency, vaginal bleeding and vaginal discharge.   Skin: Negative for color change and rash.   Allergic/Immunologic: Negative for immunocompromised state.   Neurological: Negative for dizziness, weakness and numbness.   Hematological: Negative for adenopathy. Does not bruise/bleed easily.   All other systems reviewed and are negative.      Physical Exam     Initial Vitals [03/10/20 1955]   BP Pulse Resp Temp SpO2   (!) 188/98 66 18 97.6 °F (36.4 °C) 99 %      MAP       --         Vitals:    03/10/20 1955 03/10/20 2031 03/10/20 2052 03/10/20 2113   BP: (!) 188/98 (!) 163/90 (!) 157/75 132/80   Pulse: 66 71 67 74   Resp:    Temp: 97.6 °F (36.4 °C)   97.6 °F (36.4 °C)   TempSrc: Oral    "Oral   SpO2: 99% 100% 99% 97%   Weight: 119.3 kg (263 lb)      Height: 5' 4" (1.626 m)       03/10/20 2219   BP: 128/71   Pulse: 70   Resp: 18   Temp: 97.7 °F (36.5 °C)   TempSrc: Oral   SpO2: 100%   Weight:    Height:          Physical Exam    Nursing note and vitals reviewed.  Constitutional: She appears well-developed and well-nourished. She is not diaphoretic. No distress.   HENT:   Head: Normocephalic and atraumatic.   Right Ear: External ear normal.   Left Ear: External ear normal.   Nose: Nose normal.   Mouth/Throat: Oropharynx is clear and moist.   Eyes: Conjunctivae and EOM are normal. Pupils are equal, round, and reactive to light. No scleral icterus.   Neck: Neck supple. No tracheal deviation present. No JVD present.   Cardiovascular: Normal rate, regular rhythm, normal heart sounds and intact distal pulses. Exam reveals no gallop and no friction rub.    No murmur heard.  Pulmonary/Chest: Breath sounds normal. No respiratory distress. She has no wheezes. She has no rhonchi. She has no rales.   Abdominal: Soft. Bowel sounds are normal. She exhibits no distension and no mass. There is tenderness ( left-sided). There is no guarding.   Severe morbid obesity   Musculoskeletal: Normal range of motion. She exhibits no edema.   Neurological: She is alert and oriented to person, place, and time. She has normal strength. No cranial nerve deficit or sensory deficit.   Skin: Skin is warm and dry. No rash noted.   Psychiatric: She has a normal mood and affect. Her behavior is normal.         ED Course   Procedures  Labs Reviewed   CBC W/ AUTO DIFFERENTIAL - Abnormal; Notable for the following components:       Result Value    WBC 16.17 (*)     Mean Corpuscular Hemoglobin Conc 31.8 (*)     Platelets 409 (*)     Gran # (ANC) 10.9 (*)     Immature Grans (Abs) 0.06 (*)     Mono # 1.2 (*)     All other components within normal limits   COMPREHENSIVE METABOLIC PANEL - Abnormal; Notable for the following components:    " Glucose 111 (*)     All other components within normal limits   URINALYSIS, REFLEX TO URINE CULTURE - Abnormal; Notable for the following components:    Ketones, UA 1+ (*)     All other components within normal limits    Narrative:     Preferred Collection Type->Urine, Clean Catch   LIPASE     EKG Readings: (Independently Interpreted)   Initial Reading: No STEMI. Rhythm: Normal Sinus Rhythm. Heart Rate: 65. Ectopy: No Ectopy. Conduction: Normal.       Imaging Results          CT Abdomen Pelvis With Contrast (Final result)  Result time 03/10/20 21:49:55    Final result by Wilfrido Caballero MD (03/10/20 21:49:55)                 Impression:      Remote hysterectomy.  Normal appendix.  Normal gallbladder.  No acute abdominal abnormality.  Diverticulosis, no radiographic evidence of diverticulitis.    All CT scans at this facility use dose modulation, iterative reconstruction and/or weight based dosing when appropriate to reduce radiation dose to as low as reasonably achievable.      Electronically signed by: Wilfrido Caballero MD  Date:    03/10/2020  Time:    21:49             Narrative:    EXAMINATION:  CT ABDOMEN PELVIS WITH CONTRAST    CLINICAL HISTORY:  LLQ pain, suspect diverticulitis;    TECHNIQUE:  Low dose axial images, sagittal and coronal reformations were obtained from the lung bases to the pubic symphysis following the IV administration of 100 mL of Omnipaque 350 .  Oral contrast was not given.    COMPARISON:  None.    FINDINGS:  ABDOMEN:    - Lung bases: No infiltrates and no nodules.    - Liver: No focal mass.    - Gallbladder: No calcified gallstones.    - Bile Ducts: No evidence of intra or extra hepatic biliary ductal dilation.    - Spleen: Negative.    - Kidneys: No mass or hydronephrosis.    - Adrenals: Unremarkable.    - Pancreas: No mass or peripancreatic fat stranding.    - Retroperitoneum:  No significant adenopathy.    - Vascular: No abdominal aortic aneurysm.    - Abdominal wall:   Unremarkable.    PELVIS:    Remote hysterectomy.  No abnormal masses or fluid collections.    BOWEL/MESENTERY:    Sigmoidal diverticulosis no radiographic evidence of diverticulitis.  The appendix appears normal.    BONES:  No acute osseous abnormality and no suspicious lytic or blastic lesion.                               X-Ray Abdomen Flat And Erect (Final result)  Result time 03/10/20 20:49:34    Final result by Wilfrido Caballero MD (03/10/20 20:49:34)                 Impression:      Normal two-view of the abdomen.      Electronically signed by: Wilfrido Caballero MD  Date:    03/10/2020  Time:    20:49             Narrative:    EXAMINATION:  XR ABDOMEN FLAT AND ERECT    CLINICAL HISTORY:  Epigastric Pain;    TECHNIQUE:  Flat and erect AP views of the abdomen were performed.    COMPARISON:  11/03/2015    FINDINGS:  The lung bases appear clear.  Nonspecific abdominal gas pattern without evidence of obstruction or free air.  No abnormal calcifications.                                 Medical Decision Making:   ED Management:  All findings were reviewed with the patient/family in detail along with the diagnosis of presumed diverticulitis.  Although the patient's CT scan did not confirm the diagnosis, there is diverticulosis and a history of prior infections along with strong clinical picture that would be consistent with diverticulitis.  I see no indication of an emergent process beyond that addressed during our encounter but have duly counseled the patient/family regarding the need for prompt follow-up as well as the indications that should prompt immediate return to the emergency room should new or worrisome developments occur.  The patient has additionally been provided with printed information regarding diagnosis as well as instructions regarding follow up and any medications intended to manage the patient's aforementioned conditions.  The patient/family communicates understanding of all this information and all  remaining questions and concerns were addressed at this time.                                       Clinical Impression:       ICD-10-CM ICD-9-CM   1. Diverticulitis of large intestine without perforation or abscess without bleeding K57.32 562.11   2. Epigastric pain R10.13 789.06   3. Abdominal pain, unspecified abdominal location R10.9 789.00             ED Disposition Condition    Discharge Stable        ED Prescriptions     Medication Sig Dispense Start Date End Date Auth. Provider    ciprofloxacin HCl (CIPRO) 500 MG tablet Take 1 tablet (500 mg total) by mouth 2 (two) times daily. for 7 days 14 tablet 3/10/2020 3/17/2020 Kyler Edgar MD    metroNIDAZOLE (FLAGYL) 500 MG tablet Take 1 tablet (500 mg total) by mouth 3 (three) times daily. for 7 days 21 tablet 3/10/2020 3/17/2020 Kyler Edgar MD    ketorolac (TORADOL) 10 mg tablet Take 1 tablet (10 mg total) by mouth every 8 (eight) hours as needed for Pain. 15 tablet 3/10/2020  Kyler Edgar MD        Follow-up Information     Follow up With Specialties Details Why Contact Info    Cathi Chávez NP Family Medicine Schedule an appointment as soon as possible for a visit in 1 day for reassessment [id number] [facility] [address] [phone]                                       Kyler Edgar MD  03/11/20 0121

## 2020-03-11 NOTE — ED NOTES
"Went to reval pt pain level. States she needs to relax and "all that going back and forth to CT shook her up a little." Reported to pt will  Be back to reeval once settled. Verbalized understanding.   "

## 2020-03-11 NOTE — ED NOTES
Pt ambulatory from restroom to room c SOB noted d/t pain. Pt changed into gown at this time.    RT at bedside for EKG

## 2020-03-12 ENCOUNTER — HOSPITAL ENCOUNTER (OUTPATIENT)
Dept: RADIOLOGY | Facility: HOSPITAL | Age: 45
Discharge: HOME | End: 2020-03-12
Attending: PODIATRIST
Payer: MEDICAID

## 2020-03-12 DIAGNOSIS — M79.671 RIGHT FOOT PAIN: ICD-10-CM

## 2020-03-12 PROCEDURE — 73700 CT LOWER EXTREMITY W/O DYE: CPT | Mod: TC,PO,RT

## 2020-03-17 ENCOUNTER — PATIENT MESSAGE (OUTPATIENT)
Dept: PODIATRY | Facility: CLINIC | Age: 45
End: 2020-03-17

## 2020-04-15 ENCOUNTER — OFFICE VISIT (OUTPATIENT)
Dept: PODIATRY | Facility: CLINIC | Age: 45
End: 2020-04-15
Payer: MEDICAID

## 2020-04-15 DIAGNOSIS — Z98.890 STATUS POST OPEN REDUCTION AND INTERNAL FIXATION (ORIF) OF FRACTURE WITH NONUNION: Primary | ICD-10-CM

## 2020-04-15 DIAGNOSIS — Z87.81 STATUS POST OPEN REDUCTION AND INTERNAL FIXATION (ORIF) OF FRACTURE WITH NONUNION: Primary | ICD-10-CM

## 2020-04-15 DIAGNOSIS — G57.91 NEURITIS OF RIGHT FOOT: ICD-10-CM

## 2020-04-15 PROCEDURE — 99213 PR OFFICE/OUTPT VISIT, EST, LEVL III, 20-29 MIN: ICD-10-PCS | Mod: 95,,, | Performed by: PODIATRIST

## 2020-04-15 PROCEDURE — 99213 OFFICE O/P EST LOW 20 MIN: CPT | Mod: 95,,, | Performed by: PODIATRIST

## 2020-04-15 NOTE — PROGRESS NOTES
Subjective:      Patient ID: Inna Gallardo is a 45 y.o. female.    Chief Complaint: No chief complaint on file.    44 years old female presenting with right foot pain.  She is pointing plantar aspect of the right heel and also dorsal aspect of the 3rd MPJ.  Patient tells me pain has been going on for about a year.  She denies any trauma no injury to the area.  Patient also relates that she has bad left knee and put more pressure on the right foot that might have caused pain.  She describes pain as aching and throbbing especially in the morning at the heel.  She is known to Dr. Tomlinson who sent her for physical therapy.  Patient claims that she finished physical therapy.  Dr. Tomlinson also ordered MRI which was done at [facility].  She points dorsal aspect of the 3rd MPJ.  Describes pain as aching and throbbing as well. She is presenting in casual shoe with heel.     6/21/19: f/u for R foot pain. She is here to review the MRI as well. She tells me her main pain is top of the midfoot rather than the MPJs (s/p injection of the 3rd MPJ and heel injection at last visit). Pain is improving after injection. Claims that she has been doing stretching/water bottle exercise. She is also here for surgical consultation.     7/22/19 F/u for R 2nd TMTJ fusion with 3rd metatarsal cyst excision, R calc graft harvest. Pt had trouble controlling the pain. She was taking norco 5mg which did not help with pain. She was doubling up norco 5mg time to time to see if that works. It did not work. She requested to have norco 10mg. She went to ED because of pain. xrays were taken in the ED but cast was kept intact. Cast was not taken off in the ED. Pt tells me pain is now somewhat well controlled. NWB of R foot with knee scooter. (DOS: 7/16/19 GP: 10/16/19)    8/1/19: f/u R foot sx. Was breaking in hives. Went to ED. Recommended to stop taking xarelto. She has not been taking xarelto and her rash improved significantly. Pain is  controlled with current pain medication regimen. NWB R foot.     8/22/19: f/u for R foot sx. Has been NWB in PS with knee scooter. Pain is controlled. Complains of swelling. She is in CAM walker. Not taking Norco anymore. She is not taking xarelto 2/2 rash. She no longer presenting with rash.     9/5/19: f/u for R foot sx. Doing well. No pain today. Post op wound dehiscence has been healing with LWC. Has been NWB for roxy.7 weeks.     11/5/19: f/u for R foot sx. Has been ambulating in CAM for about 2 months. Still going to physical therapy. Has been going to physical therapy about a month. Pain is improving. Concerned about scar along the incision. No major pedal complaints.     1/20/20 follow-up status post right foot surgery.  Ambulating in normal tennis shoe.  Patient is still going to physical therapy.  Patient still complains of diffuse pain dorsal aspect of the midfoot and level of MPJs.  Describes MPJ pain as sharp and needle pain which gets worse with the prolonged standing and ambulation.  Pain level of 7/10.     3/9/20 f/u for R foot pain. Points dorsal midfoot and dorsal MPJs for pain. Aching pain worsening with ambulation. S/p steroid injection 2nd and 3rd MPJ last time which helped with symptoms. Pt also tells me she still getting hives which she takes hydroxyzine. She is wondering if she has allergy reaction to plates and screws. Tells me pain is associated with swelling when she is on her feet.     4/15/20   The patient location is: home  The chief complaint leading to consultation is: right foot pain  Visit type: audiovisual  Total time spent with patient: 20min  Each patient to whom he or she provides medical services by telemedicine is:  (1) informed of the relationship between the physician and patient and the respective role of any other health care provider with respect to management of the patient; and (2) notified that he or she may decline to receive medical services by telemedicine and may  withdraw from such care at any time.    Notes: complains of right foot pain starting from the midfoot and raidates to toes. Wants to review recent CT scan. Combination of sharp and aching tenderness. It is not continuous pain. Can not come up with onset. She also tells me injection (DPN) last time did not help with pain.       Review of Systems   Constitution: Negative for decreased appetite, fever and malaise/fatigue.   HENT: Negative for congestion.    Cardiovascular: Negative for chest pain and leg swelling.   Respiratory: Negative for cough and shortness of breath.    Skin: Negative for color change, nail changes and rash.   Musculoskeletal: Positive for joint pain and joint swelling. Negative for arthritis and muscle weakness.        R foot pain    Gastrointestinal: Negative for bloating, abdominal pain, nausea and vomiting.   Neurological: Negative for headaches, numbness and weakness.   Psychiatric/Behavioral: Negative for altered mental status.     Hemoglobin A1C   Date Value Ref Range Status   04/28/2015 5.6 4.5 - 6.2 % Final             Past Medical History:   Diagnosis Date    Anxiety     Diverticulitis     Endometriosis     Hypertension     Prolactinoma 2011    Sliding hiatal hernia        Past Surgical History:   Procedure Laterality Date    ARTHROSCOPY OF KNEE Left     BRAIN TUMOR EXCISION  9/2011    removal of prolactinoma    CYSTOSCOPY W/ URETERAL STENT PLACEMENT  2016    CYSTOSCOPY W/ URETERAL STENT REMOVAL  2016    ESOPHAGEAL MANOMETRY N/A 6/3/2019    Procedure: MANOMETRY, ESOPHAGUS;  Surgeon: Bhupinder Schmitz MD;  Location: [address]);  Service: Endoscopy;  Laterality: N/A;    ESOPHAGOGASTRODUODENOSCOPY N/A 4/30/2019    Procedure: ESOPHAGOGASTRODUODENOSCOPY (EGD);  Surgeon: Altagracia Bose MD;  Location: [facility];  Service: Endoscopy;  Laterality: N/A;    HARVESTING OF BONE GRAFT Right 7/16/2019    Procedure: SURGICAL PROCUREMENT, BONE GRAFT;  Surgeon: Adrianna Tillman DPM;   Location: Replaced by [facility];  Service: Podiatry;  Laterality: Right;    HYSTERECTOMY  3/16/15    [facility]/BSO for Endometriosis, AUB, fibroids, cyst    MANDIBLE SURGERY  2002    severe overbite correction    PELVIC LAPAROSCOPY      Dx scope, chromopertubation-Endometriosis    TONSILLECTOMY, ADENOIDECTOMY         Family History   Problem Relation Age of Onset    Cancer Mother     Breast cancer Neg Hx     Ovarian cancer Neg Hx        Social History     Socioeconomic History    Marital status:      Spouse name: Not on file    Number of children: Not on file    Years of education: college    Highest education level: Not on file   Occupational History    Not on file   Social Needs    Financial resource strain: Not on file    Food insecurity:     Worry: Not on file     Inability: Not on file    Transportation needs:     Medical: Not on file     Non-medical: Not on file   Tobacco Use    Smoking status: Former Smoker     Years: 13.00     Types: Cigarettes     Start date: 1989     Last attempt to quit: 2000     Years since quittin.2    Smokeless tobacco: Never Used    Tobacco comment: pt stated she smoked one cig/day   Substance and Sexual Activity    Alcohol use: No     Alcohol/week: 0.0 standard drinks    Drug use: No    Sexual activity: Yes     Partners: Male     Birth control/protection: Surgical, See Surgical Hx   Lifestyle    Physical activity:     Days per week: Not on file     Minutes per session: Not on file    Stress: Not on file   Relationships    Social connections:     Talks on phone: Not on file     Gets together: Not on file     Attends [employer] service: Not on file     Active member of club or organization: Not on file     Attends meetings of clubs or organizations: Not on file     Relationship status: Not on file   Other Topics Concern    Are you pregnant or think you may be? Not Asked    Breast-feeding Not Asked   Social History Narrative    Not on file       Current  Outpatient Medications   Medication Sig Dispense Refill    atorvastatin (LIPITOR) 40 MG tablet Take 1 tablet (40 mg total) by mouth once daily. (Patient not taking: Reported on 1/20/2020) 90 tablet 3    EPINEPHrine (EPIPEN) 0.3 mg/0.3 mL AtIn Inject 0.3 mLs into the muscle as needed.  0    hydroCHLOROthiazide (MICROZIDE) 12.5 mg capsule Take 1 capsule by mouth once daily.      hydrOXYzine HCl (ATARAX) 25 MG tablet Take 25 mg by mouth 3 (three) times daily as needed.  2    hydrOXYzine pamoate (VISTARIL) 25 MG Cap Take 25 mg by mouth 3 (three) times daily as needed.      ketorolac (TORADOL) 10 mg tablet Take 1 tablet (10 mg total) by mouth every 8 (eight) hours as needed for Pain. 15 tablet 0    losartan (COZAAR) 50 MG tablet Take 50 mg by mouth once daily.       metoprolol tartrate (LOPRESSOR) 25 MG tablet Take 1 tablet (25 mg total) by mouth 2 (two) times daily. (Patient not taking: Reported on 3/9/2020) 60 tablet 11    omeprazole (PRILOSEC) 40 MG capsule TAKE 1 CAPSULE BY MOUTH EVERY MORNING 30 capsule 2    paroxetine (PAXIL) 20 MG tablet Take 20 mg by mouth every morning.      sodium chloride (OCEAN) 0.65 % nasal spray 1 spray by Nasal route.      XULANE 150-35 mcg/24 hr APPLY 1 PATCH AS DIRECTED ONCE A WEEK FOR 3 WEEKS AND OFF FOR 1 WEEK       No current facility-administered medications for this visit.        Review of patient's allergies indicates:   Allergen Reactions    Ace inhibitors Other (See Comments)     cough    Xarelto [rivaroxaban] Hives       There were no vitals filed for this visit.    Objective:      Physical Exam   Constitutional: She is oriented to person, place, and time. She appears well-developed and well-nourished. No distress.   Musculoskeletal: She exhibits tenderness. She exhibits no edema or deformity.   Neurological: She is alert and oriented to person, place, and time.   Skin: Skin is warm. Capillary refill takes less than 2 seconds. No erythema.   Psychiatric: She has a  normal mood and affect. Her behavior is normal.       Vascular: Distal DP/PT pulses palpable 2/4. CRT < 3 sec to tips of toes. No vericosities noted to LEs. Hair growth present LE, warm to touch LE    Dermatologic:   R foot: incision healed. No open wound. +hypertrophic scar. Healed heel wound.      Musculoskeletal:   R foot:  Diffuse tenderness dorsal midfoot around the surgical incision and TMTJ, pain on palpation 2nd and 3rd MPJ. No pain at MPJs during ROM.     Neurological: Light touch, proprioception, and sharp/dull sensation are all intact. Protective threshold with the [place] monofilament is intact b/l. Vibratory sensation intact.     8/22/19 9/5/19              Assessment:       Encounter Diagnoses   Name Primary?   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    44 years old female with s/p R 2nd TMTJ fusion, 3rd metatarsal bone cyst excision, heel calc harvest.    -right foot CT reviewed.  2nd TMTJ still visible on both lateral and coronal view. DPN injection did not help with pain. Discussed about hardware removal with revision of 2nd TMTJ fusion with DPN nerve release. Pt verbalized understanding. I will have her come in 2 week to go over the images and discuss about surgery.    -The nature of the condition, options for management, as well as potential risks and complications were discussed in detail with patient. Patient was amenable to my recommendations and left my office fully informed and will follow up as instructed or sooner if necessary.    -Patient was advised of signs and symptoms of infection including redness, drainage,  purulence, odor, streaking, fever, chills and I advised patient to seek medical attention (ER or urgent care) if these symptoms arise.   -follow-up in 2 weeks

## 2020-05-18 ENCOUNTER — OFFICE VISIT (OUTPATIENT)
Dept: PODIATRY | Facility: CLINIC | Age: 45
End: 2020-05-18
Payer: MEDICAID

## 2020-05-18 VITALS
HEART RATE: 84 BPM | SYSTOLIC BLOOD PRESSURE: 122 MMHG | HEIGHT: 64 IN | DIASTOLIC BLOOD PRESSURE: 90 MMHG | BODY MASS INDEX: 45.82 KG/M2 | RESPIRATION RATE: 16 BRPM | WEIGHT: 268.38 LBS

## 2020-05-18 DIAGNOSIS — Z98.890 STATUS POST RIGHT FOOT SURGERY: ICD-10-CM

## 2020-05-18 DIAGNOSIS — M79.671 FOOT PAIN, RIGHT: ICD-10-CM

## 2020-05-18 DIAGNOSIS — G57.91 NERVE ENTRAPMENT SYNDROME OF RIGHT FOOT: ICD-10-CM

## 2020-05-18 DIAGNOSIS — Z01.818 PREOP EXAMINATION: Primary | ICD-10-CM

## 2020-05-18 DIAGNOSIS — M96.89 DELAYED UNION AFTER OSTEOTOMY: ICD-10-CM

## 2020-05-18 PROCEDURE — 64450 NJX AA&/STRD OTHER PN/BRANCH: CPT | Mod: S$PBB,RT,, | Performed by: PODIATRIST

## 2020-05-18 PROCEDURE — 99215 OFFICE O/P EST HI 40 MIN: CPT | Mod: PBBFAC,PN,25 | Performed by: PODIATRIST

## 2020-05-18 PROCEDURE — 99214 OFFICE O/P EST MOD 30 MIN: CPT | Mod: 25,S$PBB,, | Performed by: PODIATRIST

## 2020-05-18 PROCEDURE — 99999 PR PBB SHADOW E&M-EST. PATIENT-LVL V: ICD-10-PCS | Mod: PBBFAC,,, | Performed by: PODIATRIST

## 2020-05-18 PROCEDURE — 64450 NJX AA&/STRD OTHER PN/BRANCH: CPT | Mod: PBBFAC,PN | Performed by: PODIATRIST

## 2020-05-18 PROCEDURE — 99999 PR PBB SHADOW E&M-EST. PATIENT-LVL V: CPT | Mod: PBBFAC,,, | Performed by: PODIATRIST

## 2020-05-18 PROCEDURE — 99214 PR OFFICE/OUTPT VISIT, EST, LEVL IV, 30-39 MIN: ICD-10-PCS | Mod: 25,S$PBB,, | Performed by: PODIATRIST

## 2020-05-18 PROCEDURE — 64450 PR NERVE BLOCK INJ, ANES/STEROID, OTHER PERIPHERAL: ICD-10-PCS | Mod: S$PBB,RT,, | Performed by: PODIATRIST

## 2020-05-18 NOTE — PROGRESS NOTES
Subjective:      Patient ID: Inna Gallardo is a 45 y.o. female.    Chief Complaint: Follow-up (right foot) and Foot Pain (right foot)    44 years old female presenting with right foot pain.  She is pointing plantar aspect of the right heel and also dorsal aspect of the 3rd MPJ.  Patient tells me pain has been going on for about a year.  She denies any trauma no injury to the area.  Patient also relates that she has bad left knee and put more pressure on the right foot that might have caused pain.  She describes pain as aching and throbbing especially in the morning at the heel.  She is known to Dr. Tomlinson who sent her for physical therapy.  Patient claims that she finished physical therapy.  Dr. Tomlinson also ordered MRI which was done at [facility].  She points dorsal aspect of the 3rd MPJ.  Describes pain as aching and throbbing as well. She is presenting in casual shoe with heel.     6/21/19: f/u for R foot pain. She is here to review the MRI as well. She tells me her main pain is top of the midfoot rather than the MPJs (s/p injection of the 3rd MPJ and heel injection at last visit). Pain is improving after injection. Claims that she has been doing stretching/water bottle exercise. She is also here for surgical consultation.     7/22/19 F/u for R 2nd TMTJ fusion with 3rd metatarsal cyst excision, R calc graft harvest. Pt had trouble controlling the pain. She was taking norco 5mg which did not help with pain. She was doubling up norco 5mg time to time to see if that works. It did not work. She requested to have norco 10mg. She went to ED because of pain. xrays were taken in the ED but cast was kept intact. Cast was not taken off in the ED. Pt tells me pain is now somewhat well controlled. NWB of R foot with knee scooter. (DOS: 7/16/19 GP: 10/16/19)    8/1/19: f/u R foot sx. Was breaking in hives. Went to ED. Recommended to stop taking xarelto. She has not been taking xarelto and her rash improved  significantly. Pain is controlled with current pain medication regimen. NWB R foot.     8/22/19: f/u for R foot sx. Has been NWB in PS with knee scooter. Pain is controlled. Complains of swelling. She is in CAM walker. Not taking Norco anymore. She is not taking xarelto 2/2 rash. She no longer presenting with rash.     9/5/19: f/u for R foot sx. Doing well. No pain today. Post op wound dehiscence has been healing with LWC. Has been NWB for roxy.7 weeks.     11/5/19: f/u for R foot sx. Has been ambulating in CAM for about 2 months. Still going to physical therapy. Has been going to physical therapy about a month. Pain is improving. Concerned about scar along the incision. No major pedal complaints.     1/20/20 follow-up status post right foot surgery.  Ambulating in normal tennis shoe.  Patient is still going to physical therapy.  Patient still complains of diffuse pain dorsal aspect of the midfoot and level of MPJs.  Describes MPJ pain as sharp and needle pain which gets worse with the prolonged standing and ambulation.  Pain level of 7/10.     3/9/20 f/u for R foot pain. Points dorsal midfoot and dorsal MPJs for pain. Aching pain worsening with ambulation. S/p steroid injection 2nd and 3rd MPJ last time which helped with symptoms. Pt also tells me she still getting hives which she takes hydroxyzine. She is wondering if she has allergy reaction to plates and screws. Tells me pain is associated with swelling when she is on her feet.     4/15/20   The patient location is: home  The chief complaint leading to consultation is: right foot pain  Visit type: audiovisual  Total time spent with patient: 20min  Each patient to whom he or she provides medical services by telemedicine is:  (1) informed of the relationship between the physician and patient and the respective role of any other health care provider with respect to management of the patient; and (2) notified that he or she may decline to receive medical services by  telemedicine and may withdraw from such care at any time.    Notes: complains of right foot pain starting from the midfoot and raidates to toes. Wants to review recent CT scan. Combination of sharp and aching tenderness. It is not continuous pain. Can not come up with onset. She also tells me injection (DPN) last time did not help with pain.     5/18/20 f/u for right foot pain. Complains of diffuse tenderness over midfoot with radiating pain to MPJs. Pain gets worse with standing and pressure. Pain has not been relieved with all conservative treatments.     Review of Systems   Constitution: Negative for decreased appetite, fever and malaise/fatigue.   HENT: Negative for congestion.    Cardiovascular: Negative for chest pain and leg swelling.   Respiratory: Negative for cough and shortness of breath.    Skin: Negative for color change, nail changes and rash.   Musculoskeletal: Positive for joint pain and joint swelling. Negative for arthritis and muscle weakness.        R foot pain    Gastrointestinal: Negative for bloating, abdominal pain, nausea and vomiting.   Neurological: Negative for headaches, numbness and weakness.   Psychiatric/Behavioral: Negative for altered mental status.     Hemoglobin A1C   Date Value Ref Range Status   04/28/2015 5.6 4.5 - 6.2 % Final             Past Medical History:   Diagnosis Date    Anxiety     Diverticulitis     Endometriosis     Hypertension     Prolactinoma 2011    Sliding hiatal hernia        Past Surgical History:   Procedure Laterality Date    ARTHROSCOPY OF KNEE Left     BRAIN TUMOR EXCISION  9/2011    removal of prolactinoma    CYSTOSCOPY W/ URETERAL STENT PLACEMENT  2016    CYSTOSCOPY W/ URETERAL STENT REMOVAL  2016    ESOPHAGEAL MANOMETRY N/A 6/3/2019    Procedure: MANOMETRY, ESOPHAGUS;  Surgeon: Bhupinder Schmitz MD;  Location: [facility] ([address] [place] [address]);  Service: Endoscopy;  Laterality: N/A;    ESOPHAGOGASTRODUODENOSCOPY N/A 4/30/2019    Procedure:  ESOPHAGOGASTRODUODENOSCOPY (EGD);  Surgeon: Altagracia Bose MD;  Location: [facility] ENDO;  Service: Endoscopy;  Laterality: N/A;    HARVESTING OF BONE GRAFT Right 2019    Procedure: SURGICAL PROCUREMENT, BONE GRAFT;  Surgeon: Adrianna Tillman DPM;  Location: [facility] OR;  Service: Podiatry;  Laterality: Right;    HYSTERECTOMY  3/16/15    [facility] for Endometriosis, AUB, fibroids, cyst    MANDIBLE SURGERY  2002    severe overbite correction    PELVIC LAPAROSCOPY      Dx scope, chromopertubation-Endometriosis    TONSILLECTOMY, ADENOIDECTOMY         Family History   Problem Relation Age of Onset    Cancer Mother     Breast cancer Neg Hx     Ovarian cancer Neg Hx        Social History     Socioeconomic History    Marital status:      Spouse name: Not on file    Number of children: Not on file    Years of education: college    Highest education level: Not on file   Occupational History    Not on file   Social Needs    Financial resource strain: Not on file    Food insecurity:     Worry: Not on file     Inability: Not on file    Transportation needs:     Medical: Not on file     Non-medical: Not on file   Tobacco Use    Smoking status: Former Smoker     Years: 13.00     Types: Cigarettes     Start date: 1989     Last attempt to quit: 2000     Years since quittin.3    Smokeless tobacco: Never Used    Tobacco comment: pt stated she smoked one cig/day   Substance and Sexual Activity    Alcohol use: No     Alcohol/week: 0.0 standard drinks    Drug use: No    Sexual activity: Yes     Partners: Male     Birth control/protection: Surgical, See Surgical Hx   Lifestyle    Physical activity:     Days per week: Not on file     Minutes per session: Not on file    Stress: Not on file   Relationships    Social connections:     Talks on phone: Not on file     Gets together: Not on file     Attends Mosque service: Not on file     Active member of club or organization: Not on file      "Attends meetings of clubs or organizations: Not on file     Relationship status: Not on file   Other Topics Concern    Are you pregnant or think you may be? Not Asked    Breast-feeding Not Asked   Social History Narrative    Not on file       Current Outpatient Medications   Medication Sig Dispense Refill    EPINEPHrine (EPIPEN) 0.3 mg/0.3 mL AtIn Inject 0.3 mLs into the muscle as needed.  0    hydroCHLOROthiazide (MICROZIDE) 12.5 mg capsule Take 1 capsule by mouth once daily.      hydrOXYzine HCl (ATARAX) 25 MG tablet Take 25 mg by mouth 3 (three) times daily as needed.  2    hydrOXYzine pamoate (VISTARIL) 25 MG Cap Take 25 mg by mouth 3 (three) times daily as needed.      ketorolac (TORADOL) 10 mg tablet Take 1 tablet (10 mg total) by mouth every 8 (eight) hours as needed for Pain. 15 tablet 0    losartan (COZAAR) 50 MG tablet Take 50 mg by mouth once daily.       omeprazole (PRILOSEC) 40 MG capsule TAKE 1 CAPSULE BY MOUTH EVERY MORNING 30 capsule 2    paroxetine (PAXIL) 20 MG tablet Take 20 mg by mouth every morning.      sodium chloride (OCEAN) 0.65 % nasal spray 1 spray by Nasal route.      XULANE 150-35 mcg/24 hr APPLY 1 PATCH AS DIRECTED ONCE A WEEK FOR 3 WEEKS AND OFF FOR 1 WEEK      atorvastatin (LIPITOR) 40 MG tablet Take 1 tablet (40 mg total) by mouth once daily. (Patient not taking: Reported on 1/20/2020) 90 tablet 3    metoprolol tartrate (LOPRESSOR) 25 MG tablet Take 1 tablet (25 mg total) by mouth 2 (two) times daily. (Patient not taking: Reported on 3/9/2020) 60 tablet 11     No current facility-administered medications for this visit.        Review of patient's allergies indicates:   Allergen Reactions    Ace inhibitors Other (See Comments)     cough    Xarelto [rivaroxaban] Hives       Vitals:    05/18/20 1518   BP: (!) 122/90   Pulse: 84   Resp: 16   Weight: 121.7 kg (268 lb 6.4 oz)   Height: 5' 4" (1.626 m)   PainSc:   8   PainLoc: Foot       Objective:      Physical Exam "   Constitutional: She is oriented to person, place, and time. She appears well-developed and well-nourished. No distress.   Musculoskeletal: She exhibits tenderness. She exhibits no edema or deformity.   Neurological: She is alert and oriented to person, place, and time.   Skin: Skin is warm. Capillary refill takes less than 2 seconds. No erythema.   Psychiatric: She has a normal mood and affect. Her behavior is normal.       Vascular: Distal DP/PT pulses palpable 2/4. CRT < 3 sec to tips of toes. No vericosities noted to LEs. Hair growth present LE, warm to touch LE    Dermatologic:   R foot: incision healed. No open wound. +hypertrophic scar. Healed heel wound.      Musculoskeletal:   R foot:  Diffuse tenderness dorsal midfoot around the surgical incision and TMTJ, pain on palpation 2nd and 3rd MPJ. No pain at MPJs during ROM.     Neurological: Light touch, proprioception, and sharp/dull sensation are all intact. Protective threshold with the [place] monofilament is intact b/l. Vibratory sensation intact.     8/22/19          Assessment:       Encounter Diagnoses   Name Primary?   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    44 years old female with s/p R 2nd TMTJ fusion with non union and DPN nerve entrapment.     -rx. Vitamin D: 20  -rx. Vitamin D3 46670 unit ocne a week for 3  months   -s/p R foot injection. Tolerated well. See procedure note.   -rx. covid screening. This case has waited 30 days and pt is having worsening of pain with daily activity limitations therefore it should be addressed within the next 30 days per [place] state Tier2 guidelines. Clearance pending.     -right foot CT reviewed.  2nd TMTJ still visible on both lateral and coronal view. Discussed about hardware removal with revision of 2nd TMTJ fusion with DPN nerve release. We had a lengthy discussion in regards to different surgical options. Appears that 2nd TMTJ did not/has not fused after surgery. Recommend HWR and evaluate 2nd TMTJ and possible revision 2nd TMTJ fusion with autograft application. Internal fixation vs monorail ex fix.     -Long discussion with patient regarding the procedure in detail.  Informed consent discussed in detail  including all alternatives, risks and complications not limited to consent form. These included but is not limited to bleeding, pain, infection, swelling, scarring, nerve entrapment, RSD, paralysis, loss of function of part or all of foot, brain damage and death.  Patient understands all risks, potential complications, and alternatives, including, but not limited to those listed on the consent form. All questions were answered. No guarantees given or implied as to outcome. Informed verbal and written consent was obtained. Consent forms read, signed, witnessed.   -During today's patient's visit, I spent 45 minutes with the patient. Greater than 50% of the time was spent discussing the items listed including chart review. The patient was evaluated and treated. I discussed diagnoses, prognosis and treatment with patient and family and reviewed diagnostic images. I have recommended surgery for patient. The risks vs. benefits of a surgical procedure to address the patient's concerns were discussed as well as alternative management options. Patient desires surgery and arrangements will be  made accordingly. The alternatives, risks and complications of surgery were discussed including but not limited to  infection, swelling, numbness, post-operative pain, along with the fact that no guarantees can be given. All the patients' questions were answered and the patient seemed comfortable with my explanation. The patient was also instructed that prior to surgery if at any time more questions were to come up patient should contact the office and we'll be happy to answer them. The types of surgical approaches available were reviewed as well as alternatives to a surgical approach. The patient will be scheduled for surgery.The informed surgical consent was reviewed and read aloud to the patient. I have reviewed the films and I have discussed my findings with the patient in great detail. I have discussed all risks including but not limited to infection, stiffness, scarring, limp, disability, deformity, damage to blood vessels or nerves, numbness, poor healing, need for braces, arthritis, chronic pain, amputation, and death. All benefits and realistic expectations discussed in great detail. I have made no promises as to the outcome. I have provided realistic expectations. I have offered the patient a second opinion which they have declined and have assured me they prefer to proceed despite the risks.  -I stressed the importance of usage of narcotics. I told patient that I am not a long term pain management physician. I told patient I will be able to dispense narcotics to control the acute phase of pain but I WILL NOT be able to prescribe long term pain medications. patient understood.   -The nature of the condition, options for management, as well as potential risks and complications were discussed in detail with patient. Patient was amenable to my recommendations and left my office fully informed and will follow up as instructed or sooner if necessary.      -Post op Protocol    Weight bearing status: PWB in CAM  (HWR) vs NWB 6 weeks (with fusion)   Rx. Physical therapy: none   Pain medication: percocet   Anticoagulation: none   Antibiotics: none

## 2020-05-21 RX ORDER — VIT C/E/ZN/COPPR/LUTEIN/ZEAXAN 250MG-90MG
5000 CAPSULE ORAL
Qty: 60 CAPSULE | Refills: 0 | Status: SHIPPED | OUTPATIENT
Start: 2020-05-21 | End: 2020-06-04

## 2020-05-21 RX ORDER — TRIAMCINOLONE ACETONIDE 40 MG/ML
40 INJECTION, SUSPENSION INTRA-ARTICULAR; INTRAMUSCULAR
Status: COMPLETED | OUTPATIENT
Start: 2020-05-21 | End: 2020-05-21

## 2020-05-21 RX ADMIN — TRIAMCINOLONE ACETONIDE 40 MG: 40 INJECTION, SUSPENSION INTRA-ARTICULAR; INTRAMUSCULAR at 04:05

## 2020-05-21 NOTE — PROCEDURES
Procedures     Injection consisted of total of 2cc of 0.5 percent Marcaine plain with Kenalog 40 injected into dorsal right foot the level of 2nd TMTJ. Skin was prepped with alcohol and ethyl chloride spray. Patient tolerated well with no complications and related relief following injection. Bandaid applied to injection site.

## 2020-06-03 ENCOUNTER — TELEPHONE (OUTPATIENT)
Dept: PODIATRY | Facility: CLINIC | Age: 45
End: 2020-06-03

## 2020-06-03 DIAGNOSIS — M96.89 DELAYED UNION AFTER OSTEOTOMY: Primary | ICD-10-CM

## 2020-06-03 DIAGNOSIS — M79.671 FOOT PAIN, RIGHT: ICD-10-CM

## 2020-06-03 NOTE — TELEPHONE ENCOUNTER
PT stated she has used a knee scooter before. However, she needs a knee replacement and the knee scooter at this time may not be suitable. She would prefer a wheelchair due to her knee problems.       She also wanted to know about her labs results if everything was ok.

## 2020-06-03 NOTE — TELEPHONE ENCOUNTER
----- Message from Rosa Villanueva sent at 6/2/2020  4:57 PM CDT -----  Contact: [phone]/self   Patient states she's returning your call   Please advise

## 2020-06-04 RX ORDER — ASPIRIN 325 MG
50000 TABLET, DELAYED RELEASE (ENTERIC COATED) ORAL WEEKLY
Qty: 12 CAPSULE | Refills: 0 | Status: SHIPPED | OUTPATIENT
Start: 2020-06-04 | End: 2020-08-25 | Stop reason: ALTCHOICE

## 2020-06-11 DIAGNOSIS — Z01.818 PREOP EXAMINATION: Primary | ICD-10-CM

## 2020-06-13 ENCOUNTER — LAB VISIT (OUTPATIENT)
Dept: URGENT CARE | Facility: CLINIC | Age: 45
End: 2020-06-13
Attending: PODIATRIST
Payer: MEDICAID

## 2020-06-13 VITALS — TEMPERATURE: 98 F | OXYGEN SATURATION: 95 % | HEART RATE: 67 BPM

## 2020-06-13 DIAGNOSIS — Z01.818 PREOP EXAMINATION: ICD-10-CM

## 2020-06-13 PROCEDURE — U0003 INFECTIOUS AGENT DETECTION BY NUCLEIC ACID (DNA OR RNA); SEVERE ACUTE RESPIRATORY SYNDROME CORONAVIRUS 2 (SARS-COV-2) (CORONAVIRUS DISEASE [COVID-19]), AMPLIFIED PROBE TECHNIQUE, MAKING USE OF HIGH THROUGHPUT TECHNOLOGIES AS DESCRIBED BY CMS-2020-01-R: HCPCS

## 2020-06-15 ENCOUNTER — TELEPHONE (OUTPATIENT)
Dept: URGENT CARE | Facility: CLINIC | Age: 45
End: 2020-06-15

## 2020-06-15 LAB — SARS-COV-2 RNA RESP QL NAA+PROBE: NOT DETECTED

## 2020-06-15 NOTE — TELEPHONE ENCOUNTER
ATTEMPTED TO CONTACT PATIENT TO INFORM OF NEGATIVE COVID TEST RESULTS, NO ANSWER. VOICEMAIL LEFT WITH CALLBACK NUMBER.

## 2020-06-15 NOTE — TELEPHONE ENCOUNTER
PATIENT NOTIFIED OF NEGATIVE COVID TEST RESULTS AFTER VERIFYING PATIENT IDENTIFIERS. PATIENT VERBALIZED UNDERSTANDING.

## 2020-07-02 ENCOUNTER — OFFICE VISIT (OUTPATIENT)
Dept: PODIATRY | Facility: CLINIC | Age: 45
End: 2020-07-02
Payer: MEDICAID

## 2020-07-02 VITALS — HEART RATE: 81 BPM | DIASTOLIC BLOOD PRESSURE: 86 MMHG | SYSTOLIC BLOOD PRESSURE: 133 MMHG

## 2020-07-02 DIAGNOSIS — Z09 FOLLOW-UP EXAMINATION FOLLOWING SURGERY: Primary | ICD-10-CM

## 2020-07-02 PROCEDURE — 99024 POSTOP FOLLOW-UP VISIT: CPT | Mod: ,,, | Performed by: PODIATRIST

## 2020-07-02 PROCEDURE — 99999 PR PBB SHADOW E&M-EST. PATIENT-LVL III: CPT | Mod: PBBFAC,,, | Performed by: PODIATRIST

## 2020-07-02 PROCEDURE — 99213 OFFICE O/P EST LOW 20 MIN: CPT | Mod: PBBFAC,PN | Performed by: PODIATRIST

## 2020-07-02 PROCEDURE — 99024 PR POST-OP FOLLOW-UP VISIT: ICD-10-PCS | Mod: ,,, | Performed by: PODIATRIST

## 2020-07-02 PROCEDURE — 99999 PR PBB SHADOW E&M-EST. PATIENT-LVL III: ICD-10-PCS | Mod: PBBFAC,,, | Performed by: PODIATRIST

## 2020-07-02 NOTE — PROGRESS NOTES
Subjective:      Patient ID: Inna Gallardo is a 45 y.o. female.    Chief Complaint: Sx 06/16/2020 (post op right foot)    44 years old female presenting with right foot pain.  She is pointing plantar aspect of the right heel and also dorsal aspect of the 3rd MPJ.  Patient tells me pain has been going on for about a year.  She denies any trauma no injury to the area.  Patient also relates that she has bad left knee and put more pressure on the right foot that might have caused pain.  She describes pain as aching and throbbing especially in the morning at the heel.  She is known to Dr. Tomlinson who sent her for physical therapy.  Patient claims that she finished physical therapy.  Dr. Tomlinson also ordered MRI which was done at [facility].  She points dorsal aspect of the 3rd MPJ.  Describes pain as aching and throbbing as well. She is presenting in casual shoe with heel.     6/21/19: f/u for R foot pain. She is here to review the MRI as well. She tells me her main pain is top of the midfoot rather than the MPJs (s/p injection of the 3rd MPJ and heel injection at last visit). Pain is improving after injection. Claims that she has been doing stretching/water bottle exercise. She is also here for surgical consultation.     7/22/19 F/u for R 2nd TMTJ fusion with 3rd metatarsal cyst excision, R calc graft harvest. Pt had trouble controlling the pain. She was taking norco 5mg which did not help with pain. She was doubling up norco 5mg time to time to see if that works. It did not work. She requested to have norco 10mg. She went to ED because of pain. xrays were taken in the ED but cast was kept intact. Cast was not taken off in the ED. Pt tells me pain is now somewhat well controlled. NWB of R foot with knee scooter. (DOS: 7/16/19 GP: 10/16/19)    8/1/19: f/u R foot sx. Was breaking in hives. Went to ED. Recommended to stop taking xarelto. She has not been taking xarelto and her rash improved significantly. Pain  is controlled with current pain medication regimen. NWB R foot.     8/22/19: f/u for R foot sx. Has been NWB in PS with knee scooter. Pain is controlled. Complains of swelling. She is in CAM walker. Not taking Norco anymore. She is not taking xarelto 2/2 rash. She no longer presenting with rash.     9/5/19: f/u for R foot sx. Doing well. No pain today. Post op wound dehiscence has been healing with LWC. Has been NWB for roxy.7 weeks.     11/5/19: f/u for R foot sx. Has been ambulating in CAM for about 2 months. Still going to physical therapy. Has been going to physical therapy about a month. Pain is improving. Concerned about scar along the incision. No major pedal complaints.     1/20/20 follow-up status post right foot surgery.  Ambulating in normal tennis shoe.  Patient is still going to physical therapy.  Patient still complains of diffuse pain dorsal aspect of the midfoot and level of MPJs.  Describes MPJ pain as sharp and needle pain which gets worse with the prolonged standing and ambulation.  Pain level of 7/10.     3/9/20 f/u for R foot pain. Points dorsal midfoot and dorsal MPJs for pain. Aching pain worsening with ambulation. S/p steroid injection 2nd and 3rd MPJ last time which helped with symptoms. Pt also tells me she still getting hives which she takes hydroxyzine. She is wondering if she has allergy reaction to plates and screws. Tells me pain is associated with swelling when she is on her feet.     4/15/20   The patient location is: home  The chief complaint leading to consultation is: right foot pain  Visit type: audiovisual  Total time spent with patient: 20min  Each patient to whom he or she provides medical services by telemedicine is:  (1) informed of the relationship between the physician and patient and the respective role of any other health care provider with respect to management of the patient; and (2) notified that he or she may decline to receive medical services by telemedicine and may  withdraw from such care at any time.    Notes: complains of right foot pain starting from the midfoot and raidates to toes. Wants to review recent CT scan. Combination of sharp and aching tenderness. It is not continuous pain. Can not come up with onset. She also tells me injection (DPN) last time did not help with pain.     5/18/20 f/u for right foot pain. Complains of diffuse tenderness over midfoot with radiating pain to MPJs. Pain gets worse with standing and pressure. Pain has not been relieved with all conservative treatments.     7/2/20 status post right foot HWR (DOS: 6/16/20 GP: 9/16/20).  Has been weight-bearing in CAM walker. Pain is controlled.  She has been taking vitamin D.     Review of Systems   Constitution: Negative for decreased appetite, fever and malaise/fatigue.   HENT: Negative for congestion.    Cardiovascular: Negative for chest pain and leg swelling.   Respiratory: Negative for cough and shortness of breath.    Skin: Negative for color change, nail changes and rash.   Musculoskeletal: Positive for joint pain and joint swelling. Negative for arthritis and muscle weakness.        R foot pain    Gastrointestinal: Negative for bloating, abdominal pain, nausea and vomiting.   Neurological: Negative for headaches, numbness and weakness.   Psychiatric/Behavioral: Negative for altered mental status.     Hemoglobin A1C   Date Value Ref Range Status   04/28/2015 5.6 4.5 - 6.2 % Final             Past Medical History:   Diagnosis Date    Anxiety     Diverticulitis     Endometriosis     Hypertension     Prolactinoma 2011    Sliding hiatal hernia        Past Surgical History:   Procedure Laterality Date    ARTHROSCOPY OF KNEE Left     BRAIN TUMOR EXCISION  9/2011    removal of prolactinoma    CYSTOSCOPY W/ URETERAL STENT PLACEMENT  2016    CYSTOSCOPY W/ URETERAL STENT REMOVAL  2016    ESOPHAGEAL MANOMETRY N/A 6/3/2019    Procedure: MANOMETRY, ESOPHAGUS;  Surgeon: Bhupinder Schmitz MD;   [facility]  Service: Endoscopy;  Laterality: N/A;    ESOPHAGOGASTRODUODENOSCOPY N/A 2019    Procedure: ESOPHAGOGASTRODUODENOSCOPY (EGD);  Surgeon: Altagracia Bose MD;  Location: [facility];  Service: Endoscopy;  Laterality: N/A;    FOOT HARDWARE REMOVAL Right 2020    Procedure: REMOVAL, HARDWARE, FOOT;  Surgeon: Adrianna Tillman DPM;  Location: [facility];  Service: Podiatry;  Laterality: Right;    HARVESTING OF BONE GRAFT Right 2019    Procedure: SURGICAL PROCUREMENT, BONE GRAFT;  Surgeon: Adrianna Tillman DPM;  Location: [facility];  Service: Podiatry;  Laterality: Right;    HYSTERECTOMY  3/16/15    TLH/BSO for Endometriosis, AUB, fibroids, cyst    MANDIBLE SURGERY      severe overbite correction    PELVIC LAPAROSCOPY      Dx scope, chromopertubation-Endometriosis    TONSILLECTOMY, ADENOIDECTOMY         Family History   Problem Relation Age of Onset    Cancer Mother     Breast cancer Neg Hx     Ovarian cancer Neg Hx        Social History     Socioeconomic History    Marital status:      Spouse name: Not on file    Number of children: Not on file    Years of education: college    Highest education level: Not on file   Occupational History    Not on file   Social Needs    Financial resource strain: Not on file    Food insecurity     Worry: Not on file     Inability: Not on file    Transportation needs     Medical: Not on file     Non-medical: Not on file   Tobacco Use    Smoking status: Former Smoker     Years: 13.00     Types: Cigarettes     Start date: 1989     Quit date: 2000     Years since quittin.4    Smokeless tobacco: Never Used    Tobacco comment: pt stated she smoked one cig/day   Substance and Sexual Activity    Alcohol use: No     Alcohol/week: 0.0 standard drinks    Drug use: No    Sexual activity: Yes     Partners: Male     Birth control/protection: Surgical, See Surgical Hx   Lifestyle    Physical activity     Days per  Detail Level: Detailed week: Not on file     Minutes per session: Not on file    Stress: Not on file   Relationships    Social connections     Talks on phone: Not on file     Gets together: Not on file     Attends Mosque service: Not on file     Active member of club or organization: Not on file     Attends meetings of clubs or organizations: Not on file     Relationship status: Not on file   Other Topics Concern    Are you pregnant or think you may be? Not Asked    Breast-feeding Not Asked   Social History Narrative    Not on file       Current Outpatient Medications   Medication Sig Dispense Refill    cholecalciferol, vitamin D3, (D3-50 CHOLECALCIFEROL) 1,250 mcg (50,000 unit) capsule Take 1 capsule (50,000 Units total) by mouth once a week. 12 capsule 0    EPINEPHrine (EPIPEN) 0.3 mg/0.3 mL AtIn Inject 0.3 mLs into the muscle as needed.  0    hydrOXYzine HCl (ATARAX) 25 MG tablet Take 25 mg by mouth 3 (three) times daily as needed.  2    losartan (COZAAR) 50 MG tablet Take 50 mg by mouth once daily.       omeprazole (PRILOSEC) 40 MG capsule TAKE 1 CAPSULE BY MOUTH EVERY MORNING 30 capsule 2    paroxetine (PAXIL) 20 MG tablet Take 20 mg by mouth every morning.      sodium chloride (OCEAN) 0.65 % nasal spray 1 spray by Nasal route.      XULANE 150-35 mcg/24 hr APPLY 1 PATCH AS DIRECTED ONCE A WEEK FOR 3 WEEKS AND OFF FOR 1 WEEK      oxyCODONE-acetaminophen (PERCOCET) 5-325 mg per tablet Take 1 tablet by mouth every 6 (six) hours as needed for Pain. (Patient not taking: Reported on 7/2/2020) 20 tablet 0     No current facility-administered medications for this visit.        Review of patient's allergies indicates:   Allergen Reactions    Ace inhibitors Other (See Comments)     cough    Xarelto [rivaroxaban] Hives       Vitals:    07/02/20 1518   BP: 133/86   Pulse: 81   PainSc: 0-No pain       Objective:      Physical Exam  Constitutional:       General: She is not in acute distress.     Appearance: She is  well-developed.   Musculoskeletal:         General: Tenderness present. No deformity.   Skin:     General: Skin is warm.      Capillary Refill: Capillary refill takes less than 2 seconds.      Findings: No erythema.   Neurological:      Mental Status: She is alert and oriented to person, place, and time.   Psychiatric:         Behavior: Behavior normal.         Vascular: Distal DP/PT pulses palpable 2/4. CRT < 3 sec to tips of toes. No vericosities noted to LEs. Hair growth present LE, warm to touch LE    Dermatologic:   R foot: suture c/d/i dorsal midfoot. No wound complication noted,      Musculoskeletal:   R foot:  Diffuse tenderness around the incision dorsal midfoot.     Neurological: Light touch, proprioception, and sharp/dull sensation are all intact. Protective threshold with the Magnolia-Wienstein monofilament is intact b/l. Vibratory sensation intact.     8/22/19          Assessment:       Encounter Diagnosis   Name Primary?    Follow-up examination following surgery - Right Foot Yes         Plan:       Inna Lynne was seen today for sx 06/16/2020.    Diagnoses and all orders for this visit:    Follow-up examination following surgery - Right Foot      I counseled the patient on her conditions, their implications and medical management.    45 y.o. female with s/p R 2nd TMTJ fusion hardware removal.    -suture removed. Tolerated well. Verbal consent was obtained. No wound complication noted.   -postop x-ray reviewed.  Broken hardware noted.  It is embedded deeply into bone. Explained to pt I would have caused more harm than good taking out the broken screw. Pt verbalized understanding. Also explained to patient that her 2nd TMTJ was stable upon intraoperative manipulation of the joint thus we ended up not applying ex fix as we discussed.  -WBAT in CAM and slowly transition to normal WB.    -The nature of the condition, options for management, as well as potential risks and complications were discussed in detail  with patient. Patient was amenable to my recommendations and left my office fully informed and will follow up as instructed or sooner if necessary.    -f/u 6 weeks.                 Detail Level: Zone

## 2020-07-22 DIAGNOSIS — Z98.890 STATUS POST OPEN REDUCTION AND INTERNAL FIXATION (ORIF) OF FRACTURE WITH NONUNION: Primary | ICD-10-CM

## 2020-07-22 DIAGNOSIS — Z87.81 STATUS POST OPEN REDUCTION AND INTERNAL FIXATION (ORIF) OF FRACTURE WITH NONUNION: Primary | ICD-10-CM

## 2020-08-19 ENCOUNTER — TELEPHONE (OUTPATIENT)
Dept: SURGERY | Facility: CLINIC | Age: 45
End: 2020-08-19

## 2020-08-20 ENCOUNTER — OFFICE VISIT (OUTPATIENT)
Dept: SURGERY | Facility: CLINIC | Age: 45
End: 2020-08-20
Payer: MEDICAID

## 2020-08-20 VITALS
HEIGHT: 63 IN | SYSTOLIC BLOOD PRESSURE: 138 MMHG | TEMPERATURE: 98 F | DIASTOLIC BLOOD PRESSURE: 90 MMHG | BODY MASS INDEX: 47.27 KG/M2 | WEIGHT: 266.75 LBS

## 2020-08-20 DIAGNOSIS — K57.32 DIVERTICULITIS OF LARGE INTESTINE WITHOUT PERFORATION OR ABSCESS WITHOUT BLEEDING: Primary | ICD-10-CM

## 2020-08-20 PROCEDURE — 99999 PR PBB SHADOW E&M-EST. PATIENT-LVL III: ICD-10-PCS | Mod: PBBFAC,,, | Performed by: COLON & RECTAL SURGERY

## 2020-08-20 PROCEDURE — 99213 OFFICE O/P EST LOW 20 MIN: CPT | Mod: PBBFAC,PN | Performed by: COLON & RECTAL SURGERY

## 2020-08-20 PROCEDURE — 99204 OFFICE O/P NEW MOD 45 MIN: CPT | Mod: S$PBB,,, | Performed by: COLON & RECTAL SURGERY

## 2020-08-20 PROCEDURE — 99204 PR OFFICE/OUTPT VISIT, NEW, LEVL IV, 45-59 MIN: ICD-10-PCS | Mod: S$PBB,,, | Performed by: COLON & RECTAL SURGERY

## 2020-08-20 PROCEDURE — 99999 PR PBB SHADOW E&M-EST. PATIENT-LVL III: CPT | Mod: PBBFAC,,, | Performed by: COLON & RECTAL SURGERY

## 2020-08-20 RX ORDER — SODIUM, POTASSIUM,MAG SULFATES 17.5-3.13G
1 SOLUTION, RECONSTITUTED, ORAL ORAL DAILY
Qty: 1 KIT | Refills: 0 | Status: SHIPPED | OUTPATIENT
Start: 2020-08-20 | End: 2020-08-22

## 2020-08-20 RX ORDER — DICYCLOMINE HYDROCHLORIDE 20 MG/1
20 TABLET ORAL EVERY 6 HOURS
Qty: 120 TABLET | Refills: 0 | Status: SHIPPED | OUTPATIENT
Start: 2020-08-20 | End: 2020-09-19

## 2020-08-20 NOTE — PROGRESS NOTES
CRS Office Visit History and Physical    Referring Md:   Cathi Chávez, Clarissa  [address] [place] [address]    SUBJECTIVE:     Chief Complaint:  Diverticulitis    History of Present Illness:  The patient is new patient to this practice.   Course is as follows:  Patient is a 45 y.o. female presents with diverticulitis.    She has had diverticulitis for the past 1-2 years.  Intermittent left abdominal and left upper quadrant abdominal pain with eating.  Happens once every 2-3 months period improves with antibiotics.  No associated fever.  No associated change in her bowel habits.  She has developed food fear although her weight has been stable over the past several months.  Past abdominal surgeries include transabdominal hysterectomy with an umbilical extraction.  Intermittent constipation.  No fecal incontinence.  No family history of colon rectal cancer.      Last Colonoscopy:  2017.  Unable to see report.  She reports the study was normal    Review of patient's allergies indicates:   Allergen Reactions    Ace inhibitors Other (See Comments)     cough    Xarelto [rivaroxaban] Hives       Past Medical History:   Diagnosis Date    Anxiety     Diverticulitis     Endometriosis     Hypertension     Prolactinoma 2011    Sliding hiatal hernia      Past Surgical History:   Procedure Laterality Date    ARTHROSCOPY OF KNEE Left     BRAIN TUMOR EXCISION  9/2011    removal of prolactinoma    CYSTOSCOPY W/ URETERAL STENT PLACEMENT  2016    CYSTOSCOPY W/ URETERAL STENT REMOVAL  2016    ESOPHAGEAL MANOMETRY N/A 6/3/2019    Procedure: MANOMETRY, ESOPHAGUS;  Surgeon: Bhupinder Schmitz MD;  Location: [facility] ([address] [place] [address]);  Service: Endoscopy;  Laterality: N/A;    ESOPHAGOGASTRODUODENOSCOPY N/A 4/30/2019    Procedure: ESOPHAGOGASTRODUODENOSCOPY (EGD);  Surgeon: Altagracia Bose MD;  Location: [facility];  Service: Endoscopy;  Laterality: N/A;    FOOT HARDWARE REMOVAL Right 6/16/2020    Procedure: REMOVAL, HARDWARE,  "FOOT;  Surgeon: Adrianna Tillman DPM;  Location: [facility] OR;  Service: Podiatry;  Laterality: Right;    HARVESTING OF BONE GRAFT Right 2019    Procedure: SURGICAL PROCUREMENT, BONE GRAFT;  Surgeon: Adrianna Tillman DPM;  Location: [facility] OR;  Service: Podiatry;  Laterality: Right;    HYSTERECTOMY  3/16/15    TLH/BSO for Endometriosis, AUB, fibroids, cyst    MANDIBLE SURGERY  2002    severe overbite correction    PELVIC LAPAROSCOPY      Dx scope, chromopertubation-Endometriosis    TONSILLECTOMY, ADENOIDECTOMY       Family History   Problem Relation Age of Onset    Cancer Mother     Breast cancer Neg Hx     Ovarian cancer Neg Hx      Social History     Tobacco Use    Smoking status: Former Smoker     Years: 13.00     Types: Cigarettes     Start date: 1989     Quit date: 2000     Years since quittin.5    Smokeless tobacco: Never Used    Tobacco comment: pt stated she smoked one cig/day   Substance Use Topics    Alcohol use: No     Alcohol/week: 0.0 standard drinks    Drug use: No        Review of Systems:  Review of Systems   Constitutional: Negative for chills, diaphoresis, fever, malaise/fatigue and weight loss.   HENT: Negative for congestion.    Respiratory: Negative for shortness of breath.    Cardiovascular: Negative for chest pain and leg swelling.   Gastrointestinal: Positive for abdominal pain and constipation. Negative for blood in stool, nausea and vomiting.   Genitourinary: Negative for dysuria.   Musculoskeletal: Negative for back pain and myalgias.   Skin: Negative for rash.   Neurological: Negative for dizziness and weakness.   Endo/Heme/Allergies: Does not bruise/bleed easily.   Psychiatric/Behavioral: Negative for depression.       OBJECTIVE:     Vital Signs (Most Recent)  BP (!) 138/90 (BP Location: Left arm, Patient Position: Sitting)   Temp 97.9 °F (36.6 °C)   Ht 5' 3" (1.6 m)   Wt 121 kg (266 lb 12.1 oz)   LMP 2015   BMI 47.25 kg/m²     Physical Exam:  General: " White female in no distress   Neuro: alert and oriented x 4.  Moves all extremities.     HEENT: no icterus.  Trachea midline  Respiratory: respirations are even and unlabored  Cardiac: regular rate  Abdomen:  Soft, tender in the left upper quadrant.  No masses.  No hernias.  Prior umbilical incision well healed.  Extremities: Warm dry and intact  Skin: no rashes  Anorectal:  Deferred    Labs:  H&H 13 and 42.  Albumin 4.7.  Normal renal function.    Imaging:   CT of the abdomen pelvis 03/10/2020 personally reviewed demonstrates diverticulosis but no evidence of diverticulitis.  CT of the abdomen pelvis from 2015 personally reviewed similarly demonstrates diverticulosis of the sigmoid colon.      ASSESSMENT/PLAN:     Diagnoses and all orders for this visit:    Diverticulitis of large intestine without perforation or abscess without bleeding  -     sodium,potassium,mag sulfates (SUPREP BOWEL PREP KIT) 17.5-3.13-1.6 gram SolR; Take 177 mLs by mouth once daily. for 2 days  -     Case request GI: COLONOSCOPY  -     dicyclomine (BENTYL) 20 mg tablet; Take 1 tablet (20 mg total) by mouth every 6 (six) hours.        45-year-old woman with concern for diverticulitis.  Multiple CT scans were reviewed and extensive diverticulosis without evidence of infection.  Discussed that she would likely benefit from further workup including repeat colonoscopy to evaluate colon look for stricture or narrowing consistent with past diverticular disease.  In the meantime, Bentyl given as the anti spasmodic.  If there is no improvement with Bentyl, would trial Carafate to rule out any underlying gastric etiology of her left upper quadrant abdominal pain    SHELLIE Negro MD, FACS, FASCRS  Staff Surgeon  Colon & Rectal Surgery

## 2020-08-20 NOTE — LETTER
August 20, 2020      Cathi Chávez NP  [address] [address] [facility] [address]           [place] - Colon and Rectal Surgery  [address] [facility] [facility] [address]  [facility] [address]  Phone: [phone]  Fax: [phone]          Patient: Inna Gallardo   MR Number: [id number]   YOB: 1975   Date of Visit: 8/20/2020       Dear Cathi Chávez:    Thank you for referring Inna Gallardo to me for evaluation. Attached you will find relevant portions of my assessment and plan of care.    If you have questions, please do not hesitate to call me. I look forward to following Inna Gallardo along with you.    Sincerely,    Valentino Negro MD    Enclosure  CC:  No Recipients    If you would like to receive this communication electronically, please contact [email or web address] or [phone] to request more information on [facility] access.    For providers and/or their staff who would like to refer a patient to [facility], please contact us through our one-stop-shop provider referral line, [facility] , at [phone].    If you feel you have received this communication in error or would no longer like to receive these types of communications, please e-mail [email or web address]

## 2020-08-21 ENCOUNTER — TELEPHONE (OUTPATIENT)
Dept: PODIATRY | Facility: CLINIC | Age: 45
End: 2020-08-21

## 2020-08-26 ENCOUNTER — NURSE TRIAGE (OUTPATIENT)
Dept: ADMINISTRATIVE | Facility: CLINIC | Age: 45
End: 2020-08-26

## 2020-08-27 PROBLEM — K57.92 DIVERTICULITIS: Status: ACTIVE | Noted: 2020-08-27

## 2020-08-27 NOTE — TELEPHONE ENCOUNTER
Patient calling stating she has a colonoscopy scheduled at 6am in the morning and when she took her first sip of the supraprep she vomited and then knocked the bottle of prep over and it spilled on the floor. Patient advised to wait 20-30 min and try drinking 2nd bottle. No MD on call for [facility] tonight. Please contact caller directly to discuss care advice.    Reason for Disposition   Question about upcoming scheduled test, no triage required and triager able to answer question    Additional Information   Negative: [1] Caller is not with the adult (patient) AND [2] reporting urgent symptoms   Negative: Lab result questions   Negative: Medication questions   Negative: Caller can't be reached by phone   Negative: Caller has already spoken to PCP or another triager   Negative: RN needs further essential information from caller in order to complete triage   Negative: Requesting regular office appointment   Negative: [1] Caller requesting NON-URGENT health information AND [2] PCP's office is the best resource   Negative: General information question, no triage required and triager able to answer question   Negative: Health Information question, no triage required and triager able to answer question    Protocols used: INFORMATION ONLY CALL - NO TRIAGE-A-

## 2020-10-15 ENCOUNTER — OFFICE VISIT (OUTPATIENT)
Dept: PODIATRY | Facility: CLINIC | Age: 45
End: 2020-10-15
Payer: MEDICAID

## 2020-10-15 VITALS
WEIGHT: 264.63 LBS | SYSTOLIC BLOOD PRESSURE: 132 MMHG | HEART RATE: 80 BPM | HEIGHT: 63 IN | DIASTOLIC BLOOD PRESSURE: 90 MMHG | BODY MASS INDEX: 46.89 KG/M2

## 2020-10-15 DIAGNOSIS — Z98.890 STATUS POST RIGHT FOOT SURGERY: Primary | ICD-10-CM

## 2020-10-15 PROCEDURE — 99213 OFFICE O/P EST LOW 20 MIN: CPT | Mod: PBBFAC,PN | Performed by: PODIATRIST

## 2020-10-15 PROCEDURE — 99999 PR PBB SHADOW E&M-EST. PATIENT-LVL III: ICD-10-PCS | Mod: PBBFAC,,, | Performed by: PODIATRIST

## 2020-10-15 PROCEDURE — 99213 OFFICE O/P EST LOW 20 MIN: CPT | Mod: S$PBB,,, | Performed by: PODIATRIST

## 2020-10-15 PROCEDURE — 99999 PR PBB SHADOW E&M-EST. PATIENT-LVL III: CPT | Mod: PBBFAC,,, | Performed by: PODIATRIST

## 2020-10-15 PROCEDURE — 99213 PR OFFICE/OUTPT VISIT, EST, LEVL III, 20-29 MIN: ICD-10-PCS | Mod: S$PBB,,, | Performed by: PODIATRIST

## 2020-10-15 RX ORDER — METRONIDAZOLE 500 MG/1
TABLET ORAL
COMMUNITY
Start: 2020-08-07 | End: 2021-07-12

## 2020-10-15 RX ORDER — PAROXETINE 10 MG/5ML
SUSPENSION ORAL
COMMUNITY
End: 2021-06-10

## 2020-10-15 RX ORDER — LORATADINE 10 MG/1
TABLET ORAL
COMMUNITY
Start: 2015-12-15

## 2020-10-15 RX ORDER — HYDROCHLOROTHIAZIDE 12.5 MG/1
12.5 CAPSULE ORAL DAILY
COMMUNITY
Start: 2020-08-07 | End: 2023-03-08

## 2020-10-15 NOTE — PROGRESS NOTES
Subjective:      Patient ID: Inna Gallardo is a 45 y.o. female.    Chief Complaint: Follow-up (right foot ) and Sx Date: 6/16/2020      44 years old female presenting with right foot pain.  She is pointing plantar aspect of the right heel and also dorsal aspect of the 3rd MPJ.  Patient tells me pain has been going on for about a year.  She denies any trauma no injury to the area.  Patient also relates that she has bad left knee and put more pressure on the right foot that might have caused pain.  She describes pain as aching and throbbing especially in the morning at the heel.  She is known to Dr. Tomlinson who sent her for physical therapy.  Patient claims that she finished physical therapy.  Dr. Tomlinson also ordered MRI which was done at [facility].  She points dorsal aspect of the 3rd MPJ.  Describes pain as aching and throbbing as well. She is presenting in casual shoe with heel.     6/21/19: f/u for R foot pain. She is here to review the MRI as well. She tells me her main pain is top of the midfoot rather than the MPJs (s/p injection of the 3rd MPJ and heel injection at last visit). Pain is improving after injection. Claims that she has been doing stretching/water bottle exercise. She is also here for surgical consultation.     7/22/19 F/u for R 2nd TMTJ fusion with 3rd metatarsal cyst excision, R calc graft harvest. Pt had trouble controlling the pain. She was taking norco 5mg which did not help with pain. She was doubling up norco 5mg time to time to see if that works. It did not work. She requested to have norco 10mg. She went to ED because of pain. xrays were taken in the ED but cast was kept intact. Cast was not taken off in the ED. Pt tells me pain is now somewhat well controlled. NWB of R foot with knee scooter. (DOS: 7/16/19 GP: 10/16/19)    8/1/19: f/u R foot sx. Was breaking in hives. Went to ED. Recommended to stop taking xarelto. She has not been taking xarelto and her rash improved  significantly. Pain is controlled with current pain medication regimen. NWB R foot.     8/22/19: f/u for R foot sx. Has been NWB in PS with knee scooter. Pain is controlled. Complains of swelling. She is in CAM walker. Not taking Norco anymore. She is not taking xarelto 2/2 rash. She no longer presenting with rash.     9/5/19: f/u for R foot sx. Doing well. No pain today. Post op wound dehiscence has been healing with LWC. Has been NWB for roxy.7 weeks.     11/5/19: f/u for R foot sx. Has been ambulating in CAM for about 2 months. Still going to physical therapy. Has been going to physical therapy about a month. Pain is improving. Concerned about scar along the incision. No major pedal complaints.     1/20/20 follow-up status post right foot surgery.  Ambulating in normal tennis shoe.  Patient is still going to physical therapy.  Patient still complains of diffuse pain dorsal aspect of the midfoot and level of MPJs.  Describes MPJ pain as sharp and needle pain which gets worse with the prolonged standing and ambulation.  Pain level of 7/10.     3/9/20 f/u for R foot pain. Points dorsal midfoot and dorsal MPJs for pain. Aching pain worsening with ambulation. S/p steroid injection 2nd and 3rd MPJ last time which helped with symptoms. Pt also tells me she still getting hives which she takes hydroxyzine. She is wondering if she has allergy reaction to plates and screws. Tells me pain is associated with swelling when she is on her feet.     4/15/20   The patient location is: home  The chief complaint leading to consultation is: right foot pain  Visit type: audiovisual  Total time spent with patient: 20min  Each patient to whom he or she provides medical services by telemedicine is:  (1) informed of the relationship between the physician and patient and the respective role of any other health care provider with respect to management of the patient; and (2) notified that he or she may decline to receive medical services by  telemedicine and may withdraw from such care at any time.    Notes: complains of right foot pain starting from the midfoot and raidates to toes. Wants to review recent CT scan. Combination of sharp and aching tenderness. It is not continuous pain. Can not come up with onset. She also tells me injection (DPN) last time did not help with pain.     5/18/20 f/u for right foot pain. Complains of diffuse tenderness over midfoot with radiating pain to MPJs. Pain gets worse with standing and pressure. Pain has not been relieved with all conservative treatments.     7/2/20 status post right foot HWR (DOS: 6/16/20 GP: 9/16/20).  Has been weight-bearing in CAM walker. Pain is controlled.  She has been taking vitamin D.     10/15/20 status post right foot hardware removal.  Ambulating in regular casual shoes.  Denies pain but some tingling and numbness around incision. Last physical therapist session tomorrow.     Review of Systems   Constitution: Negative for decreased appetite, fever and malaise/fatigue.   HENT: Negative for congestion.    Cardiovascular: Negative for chest pain and leg swelling.   Respiratory: Negative for cough and shortness of breath.    Skin: Negative for color change, nail changes and rash.   Musculoskeletal: Negative for arthritis, joint pain, joint swelling and muscle weakness.   Gastrointestinal: Negative for bloating, abdominal pain, nausea and vomiting.   Neurological: Positive for numbness. Negative for headaches and weakness.   Psychiatric/Behavioral: Negative for altered mental status.     Hemoglobin A1C   Date Value Ref Range Status   04/28/2015 5.6 4.5 - 6.2 % Final             Past Medical History:   Diagnosis Date    Anxiety     Diverticulitis     Endometriosis     Hypertension     Prolactinoma 2011    Sliding hiatal hernia        Past Surgical History:   Procedure Laterality Date    ARTHROSCOPY OF KNEE Left     BRAIN TUMOR EXCISION  9/2011    removal of prolactinoma    COLONOSCOPY  N/A 2020    Procedure: COLONOSCOPY;  Surgeon: Valentino Negro MD;  Location: [facility] ENDO;  Service: Endoscopy;  Laterality: N/A;    CYSTOSCOPY W/ URETERAL STENT PLACEMENT      CYSTOSCOPY W/ URETERAL STENT REMOVAL      ESOPHAGEAL MANOMETRY N/A 6/3/2019    Procedure: MANOMETRY, ESOPHAGUS;  Surgeon: Bhupinder Schmitz MD;  [facility]  Service: Endoscopy;  Laterality: N/A;    ESOPHAGOGASTRODUODENOSCOPY N/A 2019    Procedure: ESOPHAGOGASTRODUODENOSCOPY (EGD);  Surgeon: Altagracia Bose MD;  Location: [facility] ENDO;  Service: Endoscopy;  Laterality: N/A;    FOOT HARDWARE REMOVAL Right 2020    Procedure: REMOVAL, HARDWARE, FOOT;  Surgeon: Adrianna Tillman DPM;  Location: [facility] OR;  Service: Podiatry;  Laterality: Right;    HARVESTING OF BONE GRAFT Right 2019    Procedure: SURGICAL PROCUREMENT, BONE GRAFT;  Surgeon: Adrianna Tillman DPM;  Location: [facility] OR;  Service: Podiatry;  Laterality: Right;    HYSTERECTOMY  3/16/15    TLH/BSO for Endometriosis, AUB, fibroids, cyst    MANDIBLE SURGERY      severe overbite correction    PELVIC LAPAROSCOPY      Dx scope, chromopertubation-Endometriosis    TONSILLECTOMY, ADENOIDECTOMY         Family History   Problem Relation Age of Onset    Cancer Mother     Breast cancer Neg Hx     Ovarian cancer Neg Hx        Social History     Socioeconomic History    Marital status:      Spouse name: Not on file    Number of children: Not on file    Years of education: college    Highest education level: Not on file   Occupational History    Not on file   Social Needs    Financial resource strain: Not on file    Food insecurity     Worry: Not on file     Inability: Not on file    Transportation needs     Medical: Not on file     Non-medical: Not on file   Tobacco Use    Smoking status: Former Smoker     Years: 13.00     Types: Cigarettes     Start date: 1989     Quit date: 2000     Years since quittin.7     Smokeless tobacco: Never Used    Tobacco comment: pt stated she smoked one cig/day   Substance and Sexual Activity    Alcohol use: No     Alcohol/week: 0.0 standard drinks    Drug use: No    Sexual activity: Yes     Partners: Male     Birth control/protection: Surgical, See Surgical Hx   Lifestyle    Physical activity     Days per week: Not on file     Minutes per session: Not on file    Stress: Not on file   Relationships    Social connections     Talks on phone: Not on file     Gets together: Not on file     Attends Orthodoxy service: Not on file     Active member of club or organization: Not on file     Attends meetings of clubs or organizations: Not on file     Relationship status: Not on file   Other Topics Concern    Are you pregnant or think you may be? Not Asked    Breast-feeding Not Asked   Social History Narrative    Not on file       Current Outpatient Medications   Medication Sig Dispense Refill    hydroCHLOROthiazide (MICROZIDE) 12.5 mg capsule Take 12.5 mg by mouth once daily.      hydrOXYzine HCl (ATARAX) 25 MG tablet Take 25 mg by mouth 3 (three) times daily as needed.  2    loratadine (CLARITIN) 10 mg tablet Take 1 tablet every day by oral route.      losartan (COZAAR) 50 MG tablet Take 50 mg by mouth once daily.       metroNIDAZOLE (FLAGYL) 500 MG tablet TAKE 1 TABLET BY MOUTH TWO TIMES DAILY FOR 7 DAYS      omeprazole (PRILOSEC) 40 MG capsule TAKE 1 CAPSULE BY MOUTH EVERY MORNING 30 capsule 2    paroxetine (PAXIL) 10 mg/5 mL Susp       paroxetine (PAXIL) 20 MG tablet Take 20 mg by mouth every morning.      EPINEPHrine (EPIPEN) 0.3 mg/0.3 mL AtIn Inject 0.3 mLs into the muscle as needed.  0    XULANE 150-35 mcg/24 hr APPLY 1 PATCH AS DIRECTED ONCE A WEEK FOR 3 WEEKS AND OFF FOR 1 WEEK       No current facility-administered medications for this visit.        Review of patient's allergies indicates:   Allergen Reactions    Ace inhibitors Other (See Comments)     cough    Xarelto  "[rivaroxaban] Hives       Vitals:    10/15/20 1054   BP: (!) 132/90   Pulse: 80   Weight: 120 kg (264 lb 9.6 oz)   Height: 5' 3" (1.6 m)   PainSc: 0-No pain       Objective:      Physical Exam  Constitutional:       General: She is not in acute distress.     Appearance: She is well-developed.   Musculoskeletal:         General: No tenderness or deformity.   Skin:     General: Skin is warm.      Capillary Refill: Capillary refill takes less than 2 seconds.      Findings: No erythema.   Neurological:      Mental Status: She is alert and oriented to person, place, and time.   Psychiatric:         Behavior: Behavior normal.         Vascular: Distal DP/PT pulses palpable 2/4. CRT < 3 sec to tips of toes. No vericosities noted to LEs. Hair growth present LE, warm to touch LE    Dermatologic:   R foot: suture c/d/i dorsal midfoot. No wound complication noted     Musculoskeletal:   R foot:  No pain at 2nd TMTJ.     Neurological: Light touch, proprioception, and sharp/dull sensation are all intact. Protective threshold with the [place] monofilament is intact b/l. Vibratory sensation intact.     8/22/19          Assessment:       Encounter Diagnosis   Name Primary?    Status post right foot surgery - Right Foot Yes         Plan:       Inna Lynne was seen today for follow-up and sx date: 6/16/2020.    Diagnoses and all orders for this visit:    Status post right foot surgery - Right Foot      I counseled the patient on her conditions, their implications and medical management.    45 y.o. female with s/p R 2nd TMTJ fusion, s/p hardware removal.    -pt is doing well. No pain over the surgical site. Able to ambulate without pain. Surgical incision completely healed.     -Recommended New Balance/[employer]ics shoe brands with adequate arch supports to alleviate abnormal pressure and improve stability of foot while walking. Avoid flat shoes and barefoot walking as these will exacerbate or worsen symptoms.   -The nature of the " condition, options for management, as well as potential risks and complications were discussed in detail with patient. Patient was amenable to my recommendations and left my office fully informed and will follow up as instructed or sooner if necessary.    -f/u prn

## 2020-10-26 ENCOUNTER — OFFICE VISIT (OUTPATIENT)
Dept: UROLOGY | Facility: CLINIC | Age: 45
End: 2020-10-26
Payer: MEDICAID

## 2020-10-26 VITALS
TEMPERATURE: 97 F | HEIGHT: 63 IN | SYSTOLIC BLOOD PRESSURE: 138 MMHG | BODY MASS INDEX: 46.78 KG/M2 | RESPIRATION RATE: 18 BRPM | WEIGHT: 264 LBS | OXYGEN SATURATION: 98 % | DIASTOLIC BLOOD PRESSURE: 70 MMHG | HEART RATE: 88 BPM

## 2020-10-26 DIAGNOSIS — R31.29 MICROSCOPIC HEMATURIA: ICD-10-CM

## 2020-10-26 DIAGNOSIS — N20.0 NEPHROLITHIASIS: ICD-10-CM

## 2020-10-26 DIAGNOSIS — N23 RENAL COLIC, BILATERAL: Primary | ICD-10-CM

## 2020-10-26 LAB
BILIRUB SERPL-MCNC: NORMAL MG/DL
BLOOD URINE, POC: NORMAL
CLARITY, POC UA: NORMAL
COLOR, POC UA: YELLOW
GLUCOSE UR QL STRIP: NORMAL
KETONES UR QL STRIP: NORMAL
LEUKOCYTE ESTERASE URINE, POC: NORMAL
NITRITE, POC UA: NORMAL
PH, POC UA: 6.5
PROTEIN, POC: NORMAL
SPECIFIC GRAVITY, POC UA: 1.02
UROBILINOGEN, POC UA: 0.2

## 2020-10-26 PROCEDURE — 99999 PR PBB SHADOW E&M-EST. PATIENT-LVL V: CPT | Mod: PBBFAC,,, | Performed by: NURSE PRACTITIONER

## 2020-10-26 PROCEDURE — 99204 PR OFFICE/OUTPT VISIT, NEW, LEVL IV, 45-59 MIN: ICD-10-PCS | Mod: S$PBB,,, | Performed by: NURSE PRACTITIONER

## 2020-10-26 PROCEDURE — 99204 OFFICE O/P NEW MOD 45 MIN: CPT | Mod: S$PBB,,, | Performed by: NURSE PRACTITIONER

## 2020-10-26 PROCEDURE — 81002 URINALYSIS NONAUTO W/O SCOPE: CPT | Mod: PBBFAC,PO | Performed by: NURSE PRACTITIONER

## 2020-10-26 PROCEDURE — 87086 URINE CULTURE/COLONY COUNT: CPT

## 2020-10-26 PROCEDURE — 99215 OFFICE O/P EST HI 40 MIN: CPT | Mod: PBBFAC,PO | Performed by: NURSE PRACTITIONER

## 2020-10-26 PROCEDURE — 99999 PR PBB SHADOW E&M-EST. PATIENT-LVL V: ICD-10-PCS | Mod: PBBFAC,,, | Performed by: NURSE PRACTITIONER

## 2020-10-26 RX ORDER — TAMSULOSIN HYDROCHLORIDE 0.4 MG/1
1 CAPSULE ORAL DAILY
COMMUNITY
Start: 2020-10-19 | End: 2021-07-12

## 2020-10-26 RX ORDER — KETOROLAC TROMETHAMINE 10 MG/1
10 TABLET, FILM COATED ORAL EVERY 6 HOURS PRN
COMMUNITY
Start: 2020-10-19 | End: 2021-07-12

## 2020-10-26 RX ORDER — HYDROCODONE BITARTRATE AND ACETAMINOPHEN 10; 325 MG/1; MG/1
TABLET ORAL
COMMUNITY
Start: 2020-10-16 | End: 2021-07-12

## 2020-10-26 RX ORDER — IBUPROFEN 800 MG/1
800 TABLET ORAL 3 TIMES DAILY PRN
Qty: 21 TABLET | Refills: 1 | Status: SHIPPED | OUTPATIENT
Start: 2020-10-26 | End: 2020-11-09

## 2020-10-26 NOTE — PROGRESS NOTES
Subjective:       Patient ID: Inna Gallardo is a 45 y.o. female.    Chief Complaint: Nephrolithiasis and Flank Pain    Patient is new to me. She is a 46 yo female who is here today for evaluation of bilateral nephrolithiasis that was reported in ER noted on 10/15/2020. Patient was seen at [facility] ER where CT was performed. Patient did not bring CT report with her. She was started on tamsulosin and given something for pain. Patient still reports pain and not feeling well. She has a hx of nephrolithiasis with procedure to remove stone in the past.     Flank Pain  This is a new problem. Episode onset: 1 week ago. The problem occurs constantly. The problem has been waxing and waning since onset. The pain is present in the costovertebral angle (bilateral). The quality of the pain is described as shooting. The pain does not radiate. The pain is at a severity of 9/10. The pain is severe. Associated symptoms include headaches. Pertinent negatives include no abdominal pain, bladder incontinence, bowel incontinence, dysuria, fever, pelvic pain or weakness. Risk factors include obesity, renal stones, sedentary lifestyle and lack of exercise. She has tried NSAIDs (toradol) for the symptoms. The treatment provided significant relief.     Review of Systems   Constitutional: Negative for appetite change, chills, fatigue and fever.   HENT: Negative.    Respiratory: Negative.    Cardiovascular: Negative.    Gastrointestinal: Negative for abdominal pain, bowel incontinence, change in bowel habit, constipation, diarrhea, nausea, vomiting and change in bowel habit.   Genitourinary: Positive for flank pain (bilateral). Negative for bladder incontinence, decreased urine volume, difficulty urinating, dysuria, frequency, hematuria, nocturia, pelvic pain, urgency, vaginal bleeding, vaginal discharge and vaginal pain.   Neurological: Positive for headaches. Negative for dizziness and weakness.   Psychiatric/Behavioral:  Negative.          Objective:      Physical Exam  Vitals signs and nursing note reviewed.   Constitutional:       General: She is not in acute distress.     Appearance: She is well-developed. She is morbidly obese. She is not ill-appearing.   HENT:      Head: Normocephalic and atraumatic.   Eyes:      Pupils: Pupils are equal, round, and reactive to light.   Neck:      Musculoskeletal: Normal range of motion.   Cardiovascular:      Rate and Rhythm: Normal rate.   Pulmonary:      Effort: Pulmonary effort is normal. No respiratory distress.   Abdominal:      Palpations: Abdomen is soft.      Tenderness: There is no abdominal tenderness. There is right CVA tenderness and left CVA tenderness.   Musculoskeletal: Normal range of motion.   Skin:     General: Skin is warm and dry.   Neurological:      Mental Status: She is alert and oriented to person, place, and time.      Coordination: Coordination normal.   Psychiatric:         Mood and Affect: Mood normal.         Behavior: Behavior normal.         Thought Content: Thought content normal.         Judgment: Judgment normal.         Assessment:       1. Renal colic, bilateral    2. Nephrolithiasis    3. Microscopic hematuria        Plan:       Inna Lynne was seen today for nephrolithiasis and flank pain.    Diagnoses and all orders for this visit:    Renal colic, bilateral  -     US Retroperitoneal Complete (Kidney and; Future  -     POCT URINE DIPSTICK WITHOUT MICROSCOPE  -     Urine culture  -     ibuprofen (ADVIL,MOTRIN) 800 MG tablet; Take 1 tablet (800 mg total) by mouth 3 (three) times daily as needed for Pain.    Nephrolithiasis  -     US Retroperitoneal Complete (Kidney and; Future  -     POCT URINE DIPSTICK WITHOUT MICROSCOPE  -     Urine culture  -     ibuprofen (ADVIL,MOTRIN) 800 MG tablet; Take 1 tablet (800 mg total) by mouth 3 (three) times daily as needed for Pain.    Microscopic hematuria  -     US Retroperitoneal Complete (Kidney and; Future  -     POCT  URINE DIPSTICK WITHOUT MICROSCOPE  -     Urine culture    Other orders  1. Continue taking tamsulosin 0.4 mg daily for kidney stones.  2. Continue taking toradol as previously prescribed for pain.  3. May take ibuprofen once you're out of toradol.     Follow-up pending U/S and urine cx results.     Raj Dubon NP

## 2020-10-26 NOTE — LETTER
October 26, 2020      Cathi Chávez NP  [address] [address]           [place] - Urology  [address]  [facility] [address]  Phone: [phone]  Fax: [phone]          Patient: Inna Gallardo   MR Number: [id number]   YOB: 1975   Date of Visit: 10/26/2020       Dear Cathi Chávez:    Thank you for referring Inna Gallardo to me for evaluation. Attached you will find relevant portions of my assessment and plan of care.    If you have questions, please do not hesitate to call me. I look forward to following Inna Gallardo along with you.    Sincerely,    Raj Dubon NP    Enclosure  CC:  No Recipients    If you would like to receive this communication electronically, please contact [email or web address] or [phone] to request more information on [employer] Link access.    For providers and/or their staff who would like to refer a patient to [facility], please contact us through our one-stop-shop provider referral line, [facility] , at [phone].    If you feel you have received this communication in error or would no longer like to receive these types of communications, please e-mail [email or web address]

## 2020-10-27 ENCOUNTER — TELEPHONE (OUTPATIENT)
Dept: UROLOGY | Facility: CLINIC | Age: 45
End: 2020-10-27

## 2020-10-27 DIAGNOSIS — N23 BILATERAL RENAL COLIC: Primary | ICD-10-CM

## 2020-10-27 DIAGNOSIS — Z87.442 HISTORY OF NEPHROLITHIASIS: ICD-10-CM

## 2020-10-27 PROBLEM — N28.1 RENAL CYST, RIGHT: Status: ACTIVE | Noted: 2020-10-27

## 2020-10-27 LAB
BACTERIA UR CULT: NORMAL
BACTERIA UR CULT: NORMAL

## 2020-10-27 NOTE — TELEPHONE ENCOUNTER
----- Message from Raj Dubon NP sent at 10/27/2020  2:48 PM CDT -----  Please inform patient her renal u/s noted a simple right renal cyst (fluid filled sac; benign finding). Monitor renal cyst with yearly u/s. This is not the cause of patient's pain. No renal stones noted. Next step would be to get a CT RSS since no known cause for her back pain was noted on U/S. Please schedule patient at [facility] if this works for her. Thanks.

## 2020-10-27 NOTE — TELEPHONE ENCOUNTER
----- Message from Spencer Rincon sent at 10/27/2020 12:17 PM CDT -----  Type:  Test Results    Who Called: patient  Name of Test (Lab/Mammo/Etc): ultrasound  Date of Test: 10/26/20  Ordering Provider: Jagdish  Where the test was performed: [facility]  Would the patient rather a call back or a response via [facility]? call  Best Call Back Number: [phone]  Additional Information:  none

## 2020-10-28 ENCOUNTER — TELEPHONE (OUTPATIENT)
Dept: UROLOGY | Facility: CLINIC | Age: 45
End: 2020-10-28

## 2020-10-28 NOTE — TELEPHONE ENCOUNTER
----- Message from Raj Dubon NP sent at 10/28/2020 11:09 AM CDT -----  Please inform patient her urine cx showed some bacteria, but none in predominance to diagnose a UTI. Patient may need repeat urine cx (I&O cath).

## 2020-10-30 ENCOUNTER — TELEPHONE (OUTPATIENT)
Dept: FAMILY MEDICINE | Facility: CLINIC | Age: 45
End: 2020-10-30

## 2020-10-30 NOTE — TELEPHONE ENCOUNTER
----- Message from Luzma Moscoso sent at 10/30/2020 12:54 PM CDT -----  Regarding: CAT scan results  Type:  Test Results    Who Called:  patient  Name of Test (Lab/Mammo/Etc):  CAT scan  Date of Test:  10/28/2020  Ordering Provider:  Jagdish  Where the test was performed:  [facility]  Would the patient rather a call back or a response via [facility]?  Call back  Best Call Back Number:  [phone]  Additional Information:   please call today to discuss

## 2020-11-02 ENCOUNTER — TELEPHONE (OUTPATIENT)
Dept: UROLOGY | Facility: CLINIC | Age: 45
End: 2020-11-02

## 2020-11-02 ENCOUNTER — PATIENT MESSAGE (OUTPATIENT)
Dept: UROLOGY | Facility: CLINIC | Age: 45
End: 2020-11-02

## 2020-11-02 DIAGNOSIS — N30.01 ACUTE CYSTITIS WITH HEMATURIA: ICD-10-CM

## 2020-11-02 DIAGNOSIS — R10.9 BILATERAL FLANK PAIN: Primary | ICD-10-CM

## 2020-11-02 DIAGNOSIS — N20.0 BILATERAL NEPHROLITHIASIS: ICD-10-CM

## 2020-11-02 RX ORDER — CIPROFLOXACIN 500 MG/1
500 TABLET ORAL EVERY 12 HOURS
Qty: 20 TABLET | Refills: 0 | Status: SHIPPED | OUTPATIENT
Start: 2020-11-02 | End: 2020-11-12

## 2020-11-02 NOTE — TELEPHONE ENCOUNTER
Spoke to patient with results. She states that she will make an appointment with her primary doctor and go from there.    She again states she does not want to do a cath urine and will call back to schedule a follow up

## 2020-11-02 NOTE — TELEPHONE ENCOUNTER
----- Message from Raj Dubno NP sent at 11/2/2020 12:51 PM CST -----  Please inform patient her CT showed bilateral small renal stones. CT also reviewed with Dr. Harley. He recommends that patient f/u with her PCP or back doctor. He does not think pain is urological. No need for urologic intervention at this time because there is no evidence of obstructive uropathy. However, I sent patient a script for Cipro to [facility] since I&O cath urine has not been done yet as I recommended on 10/28/2020. Schedule patient a 4 weeks f/u appt. Thanks.

## 2020-11-02 NOTE — TELEPHONE ENCOUNTER
----- Message from Ana Luisa Thapa sent at 11/2/2020 10:06 AM CST -----  Contact: [phone]  Type:  Test Results    Who Called:  Pt  Name of Test (Lab/Mammo/Etc):  CT Scan  Date of Test:  10/29  Ordering Provider:  Jagdish  Where the test was performed: [facility]  Would the patient rather a call back or a response via [facility]? Call back  Best Call Back Number: [phone]

## 2021-01-07 ENCOUNTER — TELEPHONE (OUTPATIENT)
Dept: ORTHOPEDICS | Facility: CLINIC | Age: 46
End: 2021-01-07

## 2021-01-07 ENCOUNTER — OFFICE VISIT (OUTPATIENT)
Dept: ORTHOPEDICS | Facility: CLINIC | Age: 46
End: 2021-01-07
Payer: MEDICAID

## 2021-01-07 VITALS
DIASTOLIC BLOOD PRESSURE: 99 MMHG | BODY MASS INDEX: 47.47 KG/M2 | SYSTOLIC BLOOD PRESSURE: 145 MMHG | WEIGHT: 268 LBS | HEART RATE: 65 BPM

## 2021-01-07 DIAGNOSIS — M17.11 PRIMARY OSTEOARTHRITIS OF RIGHT KNEE: ICD-10-CM

## 2021-01-07 DIAGNOSIS — M17.12 PRIMARY OSTEOARTHRITIS OF LEFT KNEE: Primary | ICD-10-CM

## 2021-01-07 PROCEDURE — 20610 DRAIN/INJ JOINT/BURSA W/O US: CPT | Mod: 50,PBBFAC,PN | Performed by: PHYSICIAN ASSISTANT

## 2021-01-07 PROCEDURE — 99213 OFFICE O/P EST LOW 20 MIN: CPT | Mod: S$PBB,25,, | Performed by: PHYSICIAN ASSISTANT

## 2021-01-07 PROCEDURE — 99999 PR PBB SHADOW E&M-EST. PATIENT-LVL III: CPT | Mod: PBBFAC,,, | Performed by: PHYSICIAN ASSISTANT

## 2021-01-07 PROCEDURE — 99999 PR PBB SHADOW E&M-EST. PATIENT-LVL III: ICD-10-PCS | Mod: PBBFAC,,, | Performed by: PHYSICIAN ASSISTANT

## 2021-01-07 PROCEDURE — 20610 DRAIN/INJ JOINT/BURSA W/O US: CPT | Mod: 50,S$PBB,, | Performed by: PHYSICIAN ASSISTANT

## 2021-01-07 PROCEDURE — 20610 LARGE JOINT ASPIRATION/INJECTION: BILATERAL KNEE: ICD-10-PCS | Mod: 50,S$PBB,, | Performed by: PHYSICIAN ASSISTANT

## 2021-01-07 PROCEDURE — 99213 PR OFFICE/OUTPT VISIT, EST, LEVL III, 20-29 MIN: ICD-10-PCS | Mod: S$PBB,25,, | Performed by: PHYSICIAN ASSISTANT

## 2021-01-07 PROCEDURE — 99213 OFFICE O/P EST LOW 20 MIN: CPT | Mod: PBBFAC,PN | Performed by: PHYSICIAN ASSISTANT

## 2021-01-07 RX ORDER — TRIAMCINOLONE ACETONIDE 40 MG/ML
40 INJECTION, SUSPENSION INTRA-ARTICULAR; INTRAMUSCULAR
Status: DISCONTINUED | OUTPATIENT
Start: 2021-01-07 | End: 2021-01-07 | Stop reason: HOSPADM

## 2021-01-07 RX ADMIN — TRIAMCINOLONE ACETONIDE 40 MG: 40 INJECTION, SUSPENSION INTRA-ARTICULAR; INTRAMUSCULAR at 03:01

## 2021-01-28 ENCOUNTER — OFFICE VISIT (OUTPATIENT)
Dept: PODIATRY | Facility: CLINIC | Age: 46
End: 2021-01-28
Payer: MEDICAID

## 2021-01-28 VITALS — HEIGHT: 63 IN | WEIGHT: 268.13 LBS | BODY MASS INDEX: 47.51 KG/M2

## 2021-01-28 DIAGNOSIS — M79.671 PAIN OF RIGHT MIDFOOT: Primary | ICD-10-CM

## 2021-01-28 DIAGNOSIS — Z98.890 STATUS POST RIGHT FOOT SURGERY: ICD-10-CM

## 2021-01-28 DIAGNOSIS — E66.01 MORBID OBESITY: ICD-10-CM

## 2021-01-28 DIAGNOSIS — G57.91 NERVE ENTRAPMENT SYNDROME OF RIGHT FOOT: ICD-10-CM

## 2021-01-28 DIAGNOSIS — G57.91 NEURITIS OF RIGHT FOOT: ICD-10-CM

## 2021-01-28 PROCEDURE — 64450 NJX AA&/STRD OTHER PN/BRANCH: CPT | Mod: S$PBB,RT,, | Performed by: PODIATRIST

## 2021-01-28 PROCEDURE — 99999 PR PBB SHADOW E&M-EST. PATIENT-LVL III: ICD-10-PCS | Mod: PBBFAC,,, | Performed by: PODIATRIST

## 2021-01-28 PROCEDURE — 99213 OFFICE O/P EST LOW 20 MIN: CPT | Mod: 25,S$PBB,, | Performed by: PODIATRIST

## 2021-01-28 PROCEDURE — 99999 PR PBB SHADOW E&M-EST. PATIENT-LVL III: CPT | Mod: PBBFAC,,, | Performed by: PODIATRIST

## 2021-01-28 PROCEDURE — 64450 PR NERVE BLOCK INJ, ANES/STEROID, OTHER PERIPHERAL: ICD-10-PCS | Mod: S$PBB,RT,, | Performed by: PODIATRIST

## 2021-01-28 PROCEDURE — 64450 NJX AA&/STRD OTHER PN/BRANCH: CPT | Mod: PBBFAC,PN | Performed by: PODIATRIST

## 2021-01-28 PROCEDURE — 99213 PR OFFICE/OUTPT VISIT, EST, LEVL III, 20-29 MIN: ICD-10-PCS | Mod: 25,S$PBB,, | Performed by: PODIATRIST

## 2021-01-28 PROCEDURE — 99213 OFFICE O/P EST LOW 20 MIN: CPT | Mod: PBBFAC,PN,25 | Performed by: PODIATRIST

## 2021-01-28 RX ORDER — TRIAMCINOLONE ACETONIDE 40 MG/ML
40 INJECTION, SUSPENSION INTRA-ARTICULAR; INTRAMUSCULAR
Status: COMPLETED | OUTPATIENT
Start: 2021-01-28 | End: 2021-01-28

## 2021-01-28 RX ADMIN — TRIAMCINOLONE ACETONIDE 40 MG: 40 INJECTION, SUSPENSION INTRA-ARTICULAR; INTRAMUSCULAR at 11:01

## 2021-04-19 ENCOUNTER — PATIENT MESSAGE (OUTPATIENT)
Dept: ORTHOPEDICS | Facility: CLINIC | Age: 46
End: 2021-04-19

## 2021-05-04 ENCOUNTER — TELEPHONE (OUTPATIENT)
Dept: ORTHOPEDICS | Facility: CLINIC | Age: 46
End: 2021-05-04

## 2021-05-05 ENCOUNTER — OFFICE VISIT (OUTPATIENT)
Dept: ORTHOPEDICS | Facility: CLINIC | Age: 46
End: 2021-05-05
Payer: MEDICAID

## 2021-05-05 VITALS
HEIGHT: 63 IN | SYSTOLIC BLOOD PRESSURE: 152 MMHG | DIASTOLIC BLOOD PRESSURE: 96 MMHG | WEIGHT: 267 LBS | HEART RATE: 87 BPM | BODY MASS INDEX: 47.31 KG/M2

## 2021-05-05 DIAGNOSIS — M17.11 PRIMARY OSTEOARTHRITIS OF RIGHT KNEE: ICD-10-CM

## 2021-05-05 DIAGNOSIS — M17.12 PRIMARY OSTEOARTHRITIS OF LEFT KNEE: Primary | ICD-10-CM

## 2021-05-05 PROCEDURE — 20610 DRAIN/INJ JOINT/BURSA W/O US: CPT | Mod: 50,PBBFAC,PN | Performed by: PHYSICIAN ASSISTANT

## 2021-05-05 PROCEDURE — 99999 PR PBB SHADOW E&M-EST. PATIENT-LVL III: ICD-10-PCS | Mod: PBBFAC,,, | Performed by: PHYSICIAN ASSISTANT

## 2021-05-05 PROCEDURE — 99999 PR PBB SHADOW E&M-EST. PATIENT-LVL III: CPT | Mod: PBBFAC,,, | Performed by: PHYSICIAN ASSISTANT

## 2021-05-05 PROCEDURE — 99213 OFFICE O/P EST LOW 20 MIN: CPT | Mod: PBBFAC,PN | Performed by: PHYSICIAN ASSISTANT

## 2021-05-05 PROCEDURE — 20610 LARGE JOINT ASPIRATION/INJECTION: BILATERAL KNEE: ICD-10-PCS | Mod: 50,S$PBB,, | Performed by: PHYSICIAN ASSISTANT

## 2021-05-05 PROCEDURE — 99213 PR OFFICE/OUTPT VISIT, EST, LEVL III, 20-29 MIN: ICD-10-PCS | Mod: S$PBB,25,, | Performed by: PHYSICIAN ASSISTANT

## 2021-05-05 PROCEDURE — 99213 OFFICE O/P EST LOW 20 MIN: CPT | Mod: S$PBB,25,, | Performed by: PHYSICIAN ASSISTANT

## 2021-05-05 PROCEDURE — 20610 DRAIN/INJ JOINT/BURSA W/O US: CPT | Mod: 50,S$PBB,, | Performed by: PHYSICIAN ASSISTANT

## 2021-05-05 RX ORDER — TRIAMCINOLONE ACETONIDE 40 MG/ML
40 INJECTION, SUSPENSION INTRA-ARTICULAR; INTRAMUSCULAR
Status: DISCONTINUED | OUTPATIENT
Start: 2021-05-05 | End: 2021-05-05 | Stop reason: HOSPADM

## 2021-05-05 RX ORDER — FLUTICASONE PROPIONATE 50 MCG
SPRAY, SUSPENSION (ML) NASAL
COMMUNITY
Start: 2021-04-10 | End: 2024-02-26

## 2021-05-05 RX ADMIN — TRIAMCINOLONE ACETONIDE 40 MG: 40 INJECTION, SUSPENSION INTRA-ARTICULAR; INTRAMUSCULAR at 11:05

## 2021-06-10 ENCOUNTER — TELEPHONE (OUTPATIENT)
Dept: GASTROENTEROLOGY | Facility: CLINIC | Age: 46
End: 2021-06-10

## 2021-06-10 ENCOUNTER — OFFICE VISIT (OUTPATIENT)
Dept: SURGERY | Facility: CLINIC | Age: 46
End: 2021-06-10
Payer: MEDICAID

## 2021-06-10 ENCOUNTER — OFFICE VISIT (OUTPATIENT)
Dept: HEMATOLOGY/ONCOLOGY | Facility: CLINIC | Age: 46
End: 2021-06-10
Payer: MEDICAID

## 2021-06-10 VITALS
WEIGHT: 269.75 LBS | HEART RATE: 72 BPM | HEIGHT: 63 IN | WEIGHT: 269.63 LBS | BODY MASS INDEX: 47.77 KG/M2 | OXYGEN SATURATION: 98 % | SYSTOLIC BLOOD PRESSURE: 126 MMHG | BODY MASS INDEX: 47.8 KG/M2 | DIASTOLIC BLOOD PRESSURE: 58 MMHG | HEART RATE: 72 BPM | SYSTOLIC BLOOD PRESSURE: 126 MMHG | HEIGHT: 63 IN | DIASTOLIC BLOOD PRESSURE: 58 MMHG

## 2021-06-10 DIAGNOSIS — R13.19 ESOPHAGEAL DYSPHAGIA: ICD-10-CM

## 2021-06-10 DIAGNOSIS — D72.829 LEUKOCYTOSIS, UNSPECIFIED TYPE: Primary | ICD-10-CM

## 2021-06-10 DIAGNOSIS — K44.9 SLIDING HIATAL HERNIA: Primary | ICD-10-CM

## 2021-06-10 PROCEDURE — 99999 PR PBB SHADOW E&M-EST. PATIENT-LVL III: ICD-10-PCS | Mod: PBBFAC,,, | Performed by: SURGERY

## 2021-06-10 PROCEDURE — 99204 PR OFFICE/OUTPT VISIT, NEW, LEVL IV, 45-59 MIN: ICD-10-PCS | Mod: S$PBB,,, | Performed by: SURGERY

## 2021-06-10 PROCEDURE — 99999 PR PBB SHADOW E&M-EST. PATIENT-LVL III: CPT | Mod: PBBFAC,,, | Performed by: INTERNAL MEDICINE

## 2021-06-10 PROCEDURE — 99204 OFFICE O/P NEW MOD 45 MIN: CPT | Mod: S$PBB,,, | Performed by: INTERNAL MEDICINE

## 2021-06-10 PROCEDURE — 99204 PR OFFICE/OUTPT VISIT, NEW, LEVL IV, 45-59 MIN: ICD-10-PCS | Mod: S$PBB,,, | Performed by: INTERNAL MEDICINE

## 2021-06-10 PROCEDURE — 99213 OFFICE O/P EST LOW 20 MIN: CPT | Mod: PBBFAC,27,PN | Performed by: INTERNAL MEDICINE

## 2021-06-10 PROCEDURE — 99213 OFFICE O/P EST LOW 20 MIN: CPT | Mod: PBBFAC,PN | Performed by: SURGERY

## 2021-06-10 PROCEDURE — 99999 PR PBB SHADOW E&M-EST. PATIENT-LVL III: CPT | Mod: PBBFAC,,, | Performed by: SURGERY

## 2021-06-10 PROCEDURE — 99999 PR PBB SHADOW E&M-EST. PATIENT-LVL III: ICD-10-PCS | Mod: PBBFAC,,, | Performed by: INTERNAL MEDICINE

## 2021-06-10 PROCEDURE — 99204 OFFICE O/P NEW MOD 45 MIN: CPT | Mod: S$PBB,,, | Performed by: SURGERY

## 2021-06-10 RX ORDER — AMLODIPINE BESYLATE 5 MG/1
5 TABLET ORAL DAILY
COMMUNITY
Start: 2021-05-25 | End: 2023-03-08

## 2021-06-10 RX ORDER — METFORMIN HYDROCHLORIDE 500 MG/1
500 TABLET, EXTENDED RELEASE ORAL DAILY
COMMUNITY
Start: 2021-05-13 | End: 2024-02-26

## 2021-06-10 RX ORDER — ERGOCALCIFEROL 1.25 MG/1
50000 CAPSULE ORAL
COMMUNITY
Start: 2021-06-07 | End: 2023-03-08

## 2021-06-10 RX ORDER — DICLOFENAC SODIUM 10 MG/G
GEL TOPICAL
COMMUNITY
Start: 2021-05-05 | End: 2021-07-12

## 2021-06-23 ENCOUNTER — OFFICE VISIT (OUTPATIENT)
Dept: HEMATOLOGY/ONCOLOGY | Facility: CLINIC | Age: 46
End: 2021-06-23
Payer: MEDICAID

## 2021-06-23 DIAGNOSIS — D72.829 LEUKOCYTOSIS, UNSPECIFIED TYPE: Primary | ICD-10-CM

## 2021-06-23 PROCEDURE — 99212 OFFICE O/P EST SF 10 MIN: CPT | Mod: 95,,, | Performed by: INTERNAL MEDICINE

## 2021-06-23 PROCEDURE — 99212 PR OFFICE/OUTPT VISIT, EST, LEVL II, 10-19 MIN: ICD-10-PCS | Mod: 95,,, | Performed by: INTERNAL MEDICINE

## 2021-07-12 ENCOUNTER — OFFICE VISIT (OUTPATIENT)
Dept: GASTROENTEROLOGY | Facility: CLINIC | Age: 46
End: 2021-07-12
Payer: MEDICAID

## 2021-07-12 VITALS
SYSTOLIC BLOOD PRESSURE: 128 MMHG | WEIGHT: 274.5 LBS | HEART RATE: 63 BPM | BODY MASS INDEX: 48.63 KG/M2 | DIASTOLIC BLOOD PRESSURE: 84 MMHG

## 2021-07-12 DIAGNOSIS — Z01.818 PREOP EXAMINATION: ICD-10-CM

## 2021-07-12 DIAGNOSIS — R13.19 ESOPHAGEAL DYSPHAGIA: Primary | ICD-10-CM

## 2021-07-12 PROCEDURE — 99214 OFFICE O/P EST MOD 30 MIN: CPT | Mod: S$PBB,,, | Performed by: INTERNAL MEDICINE

## 2021-07-12 PROCEDURE — 99999 PR PBB SHADOW E&M-EST. PATIENT-LVL III: ICD-10-PCS | Mod: PBBFAC,,, | Performed by: INTERNAL MEDICINE

## 2021-07-12 PROCEDURE — 99214 PR OFFICE/OUTPT VISIT, EST, LEVL IV, 30-39 MIN: ICD-10-PCS | Mod: S$PBB,,, | Performed by: INTERNAL MEDICINE

## 2021-07-12 PROCEDURE — 99999 PR PBB SHADOW E&M-EST. PATIENT-LVL III: CPT | Mod: PBBFAC,,, | Performed by: INTERNAL MEDICINE

## 2021-07-12 PROCEDURE — 99213 OFFICE O/P EST LOW 20 MIN: CPT | Mod: PBBFAC,PO | Performed by: INTERNAL MEDICINE

## 2021-07-31 ENCOUNTER — HOSPITAL ENCOUNTER (OUTPATIENT)
Dept: PREADMISSION TESTING | Facility: HOSPITAL | Age: 46
Discharge: HOME | End: 2021-07-31
Attending: INTERNAL MEDICINE
Payer: MEDICAID

## 2021-07-31 DIAGNOSIS — Z01.818 PREOP EXAMINATION: ICD-10-CM

## 2021-07-31 PROCEDURE — U0005 INFEC AGEN DETEC AMPLI PROBE: HCPCS | Performed by: INTERNAL MEDICINE

## 2021-07-31 PROCEDURE — U0003 INFECTIOUS AGENT DETECTION BY NUCLEIC ACID (DNA OR RNA); SEVERE ACUTE RESPIRATORY SYNDROME CORONAVIRUS 2 (SARS-COV-2) (CORONAVIRUS DISEASE [COVID-19]), AMPLIFIED PROBE TECHNIQUE, MAKING USE OF HIGH THROUGHPUT TECHNOLOGIES AS DESCRIBED BY CMS-2020-01-R: HCPCS | Performed by: INTERNAL MEDICINE

## 2021-08-02 LAB
SARS-COV-2 RNA RESP QL NAA+PROBE: NOT DETECTED
SARS-COV-2- CYCLE NUMBER: -1

## 2021-08-05 ENCOUNTER — OFFICE VISIT (OUTPATIENT)
Dept: ORTHOPEDICS | Facility: CLINIC | Age: 46
End: 2021-08-05
Payer: MEDICAID

## 2021-08-05 VITALS
HEIGHT: 63 IN | DIASTOLIC BLOOD PRESSURE: 97 MMHG | WEIGHT: 270.81 LBS | HEART RATE: 71 BPM | BODY MASS INDEX: 47.98 KG/M2 | SYSTOLIC BLOOD PRESSURE: 140 MMHG

## 2021-08-05 DIAGNOSIS — M17.11 PRIMARY OSTEOARTHRITIS OF RIGHT KNEE: ICD-10-CM

## 2021-08-05 DIAGNOSIS — M17.12 PRIMARY OSTEOARTHRITIS OF LEFT KNEE: Primary | ICD-10-CM

## 2021-08-05 PROCEDURE — 99999 PR PBB SHADOW E&M-EST. PATIENT-LVL IV: CPT | Mod: PBBFAC,,, | Performed by: PHYSICIAN ASSISTANT

## 2021-08-05 PROCEDURE — 99213 OFFICE O/P EST LOW 20 MIN: CPT | Mod: S$PBB,25,, | Performed by: PHYSICIAN ASSISTANT

## 2021-08-05 PROCEDURE — 20610 DRAIN/INJ JOINT/BURSA W/O US: CPT | Mod: 50,PBBFAC,PN | Performed by: PHYSICIAN ASSISTANT

## 2021-08-05 PROCEDURE — 99213 PR OFFICE/OUTPT VISIT, EST, LEVL III, 20-29 MIN: ICD-10-PCS | Mod: S$PBB,25,, | Performed by: PHYSICIAN ASSISTANT

## 2021-08-05 PROCEDURE — 99214 OFFICE O/P EST MOD 30 MIN: CPT | Mod: PBBFAC,PN | Performed by: PHYSICIAN ASSISTANT

## 2021-08-05 PROCEDURE — 99999 PR PBB SHADOW E&M-EST. PATIENT-LVL IV: ICD-10-PCS | Mod: PBBFAC,,, | Performed by: PHYSICIAN ASSISTANT

## 2021-08-05 PROCEDURE — 20610 LARGE JOINT ASPIRATION/INJECTION: BILATERAL KNEE JOINT: ICD-10-PCS | Mod: 50,S$PBB,, | Performed by: PHYSICIAN ASSISTANT

## 2021-08-05 PROCEDURE — 20610 DRAIN/INJ JOINT/BURSA W/O US: CPT | Mod: 50,S$PBB,, | Performed by: PHYSICIAN ASSISTANT

## 2021-08-05 RX ORDER — KETOROLAC TROMETHAMINE 30 MG/ML
30 INJECTION, SOLUTION INTRAMUSCULAR; INTRAVENOUS
Status: DISCONTINUED | OUTPATIENT
Start: 2021-08-05 | End: 2021-08-05 | Stop reason: HOSPADM

## 2021-08-05 RX ADMIN — KETOROLAC TROMETHAMINE 30 MG: 30 INJECTION, SOLUTION INTRAMUSCULAR at 09:08

## 2021-08-09 ENCOUNTER — PATIENT MESSAGE (OUTPATIENT)
Dept: ORTHOPEDICS | Facility: CLINIC | Age: 46
End: 2021-08-09

## 2021-08-10 ENCOUNTER — OFFICE VISIT (OUTPATIENT)
Dept: ORTHOPEDICS | Facility: CLINIC | Age: 46
End: 2021-08-10
Payer: MEDICAID

## 2021-08-10 VITALS
DIASTOLIC BLOOD PRESSURE: 90 MMHG | WEIGHT: 270 LBS | SYSTOLIC BLOOD PRESSURE: 145 MMHG | HEART RATE: 70 BPM | BODY MASS INDEX: 47.84 KG/M2 | HEIGHT: 63 IN

## 2021-08-10 DIAGNOSIS — M17.12 PRIMARY OSTEOARTHRITIS OF LEFT KNEE: Primary | ICD-10-CM

## 2021-08-10 DIAGNOSIS — M17.11 PRIMARY OSTEOARTHRITIS OF RIGHT KNEE: ICD-10-CM

## 2021-08-10 PROCEDURE — 99499 UNLISTED E&M SERVICE: CPT | Mod: S$PBB,,, | Performed by: PHYSICIAN ASSISTANT

## 2021-08-10 PROCEDURE — 99999 PR PBB SHADOW E&M-EST. PATIENT-LVL III: CPT | Mod: PBBFAC,,, | Performed by: PHYSICIAN ASSISTANT

## 2021-08-10 PROCEDURE — 99499 NO LOS: ICD-10-PCS | Mod: S$PBB,,, | Performed by: PHYSICIAN ASSISTANT

## 2021-08-10 PROCEDURE — 20610 LARGE JOINT ASPIRATION/INJECTION: BILATERAL KNEE: ICD-10-PCS | Mod: 50,S$PBB,, | Performed by: PHYSICIAN ASSISTANT

## 2021-08-10 PROCEDURE — 20610 DRAIN/INJ JOINT/BURSA W/O US: CPT | Mod: 50,PBBFAC,PN | Performed by: PHYSICIAN ASSISTANT

## 2021-08-10 PROCEDURE — 99999 PR PBB SHADOW E&M-EST. PATIENT-LVL III: ICD-10-PCS | Mod: PBBFAC,,, | Performed by: PHYSICIAN ASSISTANT

## 2021-08-10 PROCEDURE — 20610 DRAIN/INJ JOINT/BURSA W/O US: CPT | Mod: 50,S$PBB,, | Performed by: PHYSICIAN ASSISTANT

## 2021-08-10 RX ADMIN — Medication 60 MG: at 09:08

## 2021-08-12 ENCOUNTER — TELEPHONE (OUTPATIENT)
Dept: GASTROENTEROLOGY | Facility: CLINIC | Age: 46
End: 2021-08-12

## 2021-08-17 ENCOUNTER — OFFICE VISIT (OUTPATIENT)
Dept: ORTHOPEDICS | Facility: CLINIC | Age: 46
End: 2021-08-17
Payer: MEDICAID

## 2021-08-17 VITALS
HEART RATE: 71 BPM | WEIGHT: 272.69 LBS | BODY MASS INDEX: 48.32 KG/M2 | SYSTOLIC BLOOD PRESSURE: 155 MMHG | HEIGHT: 63 IN | DIASTOLIC BLOOD PRESSURE: 98 MMHG

## 2021-08-17 DIAGNOSIS — M17.12 PRIMARY OSTEOARTHRITIS OF LEFT KNEE: Primary | ICD-10-CM

## 2021-08-17 DIAGNOSIS — M17.11 PRIMARY OSTEOARTHRITIS OF RIGHT KNEE: ICD-10-CM

## 2021-08-17 PROCEDURE — 99213 OFFICE O/P EST LOW 20 MIN: CPT | Mod: PBBFAC,PN,25 | Performed by: PHYSICIAN ASSISTANT

## 2021-08-17 PROCEDURE — 99999 PR PBB SHADOW E&M-EST. PATIENT-LVL III: ICD-10-PCS | Mod: PBBFAC,,, | Performed by: PHYSICIAN ASSISTANT

## 2021-08-17 PROCEDURE — 20610 LARGE JOINT ASPIRATION/INJECTION: BILATERAL KNEE: ICD-10-PCS | Mod: 50,S$PBB,, | Performed by: PHYSICIAN ASSISTANT

## 2021-08-17 PROCEDURE — 99213 PR OFFICE/OUTPT VISIT, EST, LEVL III, 20-29 MIN: ICD-10-PCS | Mod: 25,S$PBB,, | Performed by: PHYSICIAN ASSISTANT

## 2021-08-17 PROCEDURE — 20610 DRAIN/INJ JOINT/BURSA W/O US: CPT | Mod: 50,S$PBB,, | Performed by: PHYSICIAN ASSISTANT

## 2021-08-17 PROCEDURE — 20610 DRAIN/INJ JOINT/BURSA W/O US: CPT | Mod: 50,PBBFAC,PN | Performed by: PHYSICIAN ASSISTANT

## 2021-08-17 PROCEDURE — 99999 PR PBB SHADOW E&M-EST. PATIENT-LVL III: CPT | Mod: PBBFAC,,, | Performed by: PHYSICIAN ASSISTANT

## 2021-08-17 PROCEDURE — 99213 OFFICE O/P EST LOW 20 MIN: CPT | Mod: 25,S$PBB,, | Performed by: PHYSICIAN ASSISTANT

## 2021-08-17 RX ORDER — TRIAMCINOLONE ACETONIDE 40 MG/ML
40 INJECTION, SUSPENSION INTRA-ARTICULAR; INTRAMUSCULAR
Status: DISCONTINUED | OUTPATIENT
Start: 2021-08-17 | End: 2021-08-17 | Stop reason: HOSPADM

## 2021-08-17 RX ADMIN — TRIAMCINOLONE ACETONIDE 40 MG: 40 INJECTION, SUSPENSION INTRA-ARTICULAR; INTRAMUSCULAR at 09:08

## 2021-10-04 ENCOUNTER — LAB VISIT (OUTPATIENT)
Dept: LAB | Facility: HOSPITAL | Age: 46
End: 2021-10-04
Attending: INTERNAL MEDICINE
Payer: MEDICAID

## 2021-10-04 DIAGNOSIS — D72.829 LEUKOCYTOSIS, UNSPECIFIED TYPE: ICD-10-CM

## 2021-10-04 LAB
BASOPHILS # BLD AUTO: 0.06 K/UL (ref 0–0.2)
BASOPHILS NFR BLD: 0.6 % (ref 0–1.9)
DIFFERENTIAL METHOD: ABNORMAL
EOSINOPHIL # BLD AUTO: 0.2 K/UL (ref 0–0.5)
EOSINOPHIL NFR BLD: 2.1 % (ref 0–8)
ERYTHROCYTE [DISTWIDTH] IN BLOOD BY AUTOMATED COUNT: 13.8 % (ref 11.5–14.5)
HCT VFR BLD AUTO: 42.4 % (ref 37–48.5)
HGB BLD-MCNC: 13.1 G/DL (ref 12–16)
IMM GRANULOCYTES # BLD AUTO: 0.03 K/UL (ref 0–0.04)
IMM GRANULOCYTES NFR BLD AUTO: 0.3 % (ref 0–0.5)
LYMPHOCYTES # BLD AUTO: 2.8 K/UL (ref 1–4.8)
LYMPHOCYTES NFR BLD: 29.3 % (ref 18–48)
MCH RBC QN AUTO: 28.2 PG (ref 27–31)
MCHC RBC AUTO-ENTMCNC: 30.9 G/DL (ref 32–36)
MCV RBC AUTO: 91 FL (ref 82–98)
MONOCYTES # BLD AUTO: 0.7 K/UL (ref 0.3–1)
MONOCYTES NFR BLD: 6.9 % (ref 4–15)
NEUTROPHILS # BLD AUTO: 5.7 K/UL (ref 1.8–7.7)
NEUTROPHILS NFR BLD: 60.8 % (ref 38–73)
NRBC BLD-RTO: 0 /100 WBC
PLATELET # BLD AUTO: 398 K/UL (ref 150–450)
PMV BLD AUTO: 10.2 FL (ref 9.2–12.9)
RBC # BLD AUTO: 4.65 M/UL (ref 4–5.4)
WBC # BLD AUTO: 9.37 K/UL (ref 3.9–12.7)

## 2021-10-04 PROCEDURE — 36415 COLL VENOUS BLD VENIPUNCTURE: CPT | Mod: PO | Performed by: INTERNAL MEDICINE

## 2021-10-04 PROCEDURE — 85025 COMPLETE CBC W/AUTO DIFF WBC: CPT | Mod: PO | Performed by: INTERNAL MEDICINE

## 2021-10-06 ENCOUNTER — OFFICE VISIT (OUTPATIENT)
Dept: HEMATOLOGY/ONCOLOGY | Facility: CLINIC | Age: 46
End: 2021-10-06
Payer: MEDICAID

## 2021-10-06 DIAGNOSIS — Z78.0 MENOPAUSE: ICD-10-CM

## 2021-10-06 DIAGNOSIS — D72.829 LEUKOCYTOSIS, UNSPECIFIED TYPE: Primary | ICD-10-CM

## 2021-10-06 PROCEDURE — 99213 OFFICE O/P EST LOW 20 MIN: CPT | Mod: 95,,, | Performed by: INTERNAL MEDICINE

## 2021-10-06 PROCEDURE — 99213 PR OFFICE/OUTPT VISIT, EST, LEVL III, 20-29 MIN: ICD-10-PCS | Mod: 95,,, | Performed by: INTERNAL MEDICINE

## 2021-10-06 RX ORDER — NORELGESTROMIN AND ETHINYL ESTRADIOL 150; 35 UG/D; UG/D
PATCH TRANSDERMAL
Qty: 3 PATCH | Refills: 0 | Status: SHIPPED | OUTPATIENT
Start: 2021-10-06 | End: 2023-03-08

## 2021-10-21 ENCOUNTER — TELEPHONE (OUTPATIENT)
Dept: ORTHOPEDICS | Facility: CLINIC | Age: 46
End: 2021-10-21

## 2021-10-25 ENCOUNTER — OFFICE VISIT (OUTPATIENT)
Dept: ORTHOPEDICS | Facility: CLINIC | Age: 46
End: 2021-10-25
Payer: MEDICAID

## 2021-10-25 VITALS
SYSTOLIC BLOOD PRESSURE: 170 MMHG | HEART RATE: 84 BPM | WEIGHT: 275.69 LBS | BODY MASS INDEX: 48.85 KG/M2 | DIASTOLIC BLOOD PRESSURE: 94 MMHG | HEIGHT: 63 IN

## 2021-10-25 DIAGNOSIS — M17.11 PRIMARY OSTEOARTHRITIS OF RIGHT KNEE: ICD-10-CM

## 2021-10-25 DIAGNOSIS — E66.01 MORBID OBESITY DUE TO EXCESS CALORIES: ICD-10-CM

## 2021-10-25 DIAGNOSIS — M17.12 PRIMARY OSTEOARTHRITIS OF LEFT KNEE: Primary | ICD-10-CM

## 2021-10-25 PROCEDURE — 99213 OFFICE O/P EST LOW 20 MIN: CPT | Mod: 25,S$PBB,, | Performed by: PHYSICIAN ASSISTANT

## 2021-10-25 PROCEDURE — 99999 PR PBB SHADOW E&M-EST. PATIENT-LVL III: CPT | Mod: PBBFAC,,, | Performed by: PHYSICIAN ASSISTANT

## 2021-10-25 PROCEDURE — 99213 OFFICE O/P EST LOW 20 MIN: CPT | Mod: PBBFAC,PN,25 | Performed by: PHYSICIAN ASSISTANT

## 2021-10-25 PROCEDURE — 99213 PR OFFICE/OUTPT VISIT, EST, LEVL III, 20-29 MIN: ICD-10-PCS | Mod: 25,S$PBB,, | Performed by: PHYSICIAN ASSISTANT

## 2021-10-25 PROCEDURE — 20610 DRAIN/INJ JOINT/BURSA W/O US: CPT | Mod: 50,PBBFAC,PN | Performed by: PHYSICIAN ASSISTANT

## 2021-10-25 PROCEDURE — 99999 PR PBB SHADOW E&M-EST. PATIENT-LVL III: ICD-10-PCS | Mod: PBBFAC,,, | Performed by: PHYSICIAN ASSISTANT

## 2021-10-25 PROCEDURE — 20610 DRAIN/INJ JOINT/BURSA W/O US: CPT | Mod: 50,S$PBB,, | Performed by: PHYSICIAN ASSISTANT

## 2021-10-25 PROCEDURE — 20610 LARGE JOINT ASPIRATION/INJECTION: BILATERAL KNEE: ICD-10-PCS | Mod: 50,S$PBB,, | Performed by: PHYSICIAN ASSISTANT

## 2021-10-25 RX ORDER — KETOROLAC TROMETHAMINE 30 MG/ML
30 INJECTION, SOLUTION INTRAMUSCULAR; INTRAVENOUS
Status: DISCONTINUED | OUTPATIENT
Start: 2021-10-25 | End: 2021-10-25 | Stop reason: HOSPADM

## 2021-10-25 RX ADMIN — KETOROLAC TROMETHAMINE 30 MG: 30 INJECTION, SOLUTION INTRAMUSCULAR at 10:10

## 2021-10-27 ENCOUNTER — PATIENT MESSAGE (OUTPATIENT)
Dept: GASTROENTEROLOGY | Facility: CLINIC | Age: 46
End: 2021-10-27
Payer: MEDICAID

## 2021-10-28 ENCOUNTER — OFFICE VISIT (OUTPATIENT)
Dept: GASTROENTEROLOGY | Facility: CLINIC | Age: 46
End: 2021-10-28
Payer: MEDICAID

## 2021-10-28 VITALS
RESPIRATION RATE: 16 BRPM | WEIGHT: 273.13 LBS | OXYGEN SATURATION: 98 % | HEIGHT: 63 IN | BODY MASS INDEX: 48.39 KG/M2 | SYSTOLIC BLOOD PRESSURE: 138 MMHG | HEART RATE: 69 BPM | DIASTOLIC BLOOD PRESSURE: 88 MMHG

## 2021-10-28 DIAGNOSIS — E66.01 CLASS 3 SEVERE OBESITY DUE TO EXCESS CALORIES WITHOUT SERIOUS COMORBIDITY WITH BODY MASS INDEX (BMI) OF 45.0 TO 49.9 IN ADULT: ICD-10-CM

## 2021-10-28 DIAGNOSIS — R10.84 GENERALIZED ABDOMINAL PAIN: Primary | ICD-10-CM

## 2021-10-28 DIAGNOSIS — N20.0 KIDNEY STONES: ICD-10-CM

## 2021-10-28 DIAGNOSIS — R10.13 DYSPEPSIA: ICD-10-CM

## 2021-10-28 PROCEDURE — 99999 PR PBB SHADOW E&M-EST. PATIENT-LVL V: ICD-10-PCS | Mod: PBBFAC,,, | Performed by: NURSE PRACTITIONER

## 2021-10-28 PROCEDURE — 99214 PR OFFICE/OUTPT VISIT, EST, LEVL IV, 30-39 MIN: ICD-10-PCS | Mod: S$PBB,,, | Performed by: NURSE PRACTITIONER

## 2021-10-28 PROCEDURE — 99999 PR PBB SHADOW E&M-EST. PATIENT-LVL V: CPT | Mod: PBBFAC,,, | Performed by: NURSE PRACTITIONER

## 2021-10-28 PROCEDURE — 99214 OFFICE O/P EST MOD 30 MIN: CPT | Mod: S$PBB,,, | Performed by: NURSE PRACTITIONER

## 2021-10-28 PROCEDURE — 99215 OFFICE O/P EST HI 40 MIN: CPT | Mod: PBBFAC,PO | Performed by: NURSE PRACTITIONER

## 2021-10-28 RX ORDER — SUCRALFATE 1 G/1
1 TABLET ORAL
Qty: 90 TABLET | Refills: 1 | Status: SHIPPED | OUTPATIENT
Start: 2021-10-28

## 2021-10-28 RX ORDER — OMEPRAZOLE 40 MG/1
40 CAPSULE, DELAYED RELEASE ORAL EVERY MORNING
Qty: 30 CAPSULE | Refills: 11 | Status: SHIPPED | OUTPATIENT
Start: 2021-10-28 | End: 2022-12-29 | Stop reason: SDUPTHER

## 2021-10-28 RX ORDER — DICYCLOMINE HYDROCHLORIDE 20 MG/1
20 TABLET ORAL 3 TIMES DAILY PRN
Qty: 60 TABLET | Refills: 2 | Status: SHIPPED | OUTPATIENT
Start: 2021-10-28 | End: 2023-03-08

## 2021-10-29 ENCOUNTER — TELEPHONE (OUTPATIENT)
Dept: GASTROENTEROLOGY | Facility: CLINIC | Age: 46
End: 2021-10-29
Payer: MEDICAID

## 2021-10-29 ENCOUNTER — PATIENT MESSAGE (OUTPATIENT)
Dept: GASTROENTEROLOGY | Facility: CLINIC | Age: 46
End: 2021-10-29
Payer: MEDICAID

## 2021-10-29 DIAGNOSIS — R10.84 GENERALIZED ABDOMINAL PAIN: Primary | ICD-10-CM

## 2021-10-29 DIAGNOSIS — N20.0 KIDNEY STONES: ICD-10-CM

## 2021-10-29 RX ORDER — TRAMADOL HYDROCHLORIDE 50 MG/1
50 TABLET ORAL EVERY 8 HOURS PRN
Qty: 30 TABLET | Refills: 0 | Status: SHIPPED | OUTPATIENT
Start: 2021-10-29 | End: 2023-03-08

## 2021-10-29 RX ORDER — KETOROLAC TROMETHAMINE 10 MG/1
10 TABLET, FILM COATED ORAL EVERY 6 HOURS PRN
Qty: 20 TABLET | Refills: 0 | Status: SHIPPED | OUTPATIENT
Start: 2021-10-29 | End: 2023-03-08

## 2021-10-29 RX ORDER — TAMSULOSIN HYDROCHLORIDE 0.4 MG/1
0.4 CAPSULE ORAL DAILY
Qty: 30 CAPSULE | Refills: 0 | Status: SHIPPED | OUTPATIENT
Start: 2021-10-29 | End: 2023-03-08

## 2021-11-02 PROBLEM — T88.4XXA DIFFICULT AIRWAY FOR INTUBATION: Status: RESOLVED | Noted: 2017-05-01 | Resolved: 2021-11-02

## 2021-11-02 PROBLEM — Z87.442 HISTORY OF RENAL CALCULI: Status: ACTIVE | Noted: 2020-10-19

## 2021-11-02 PROBLEM — K57.92 DIVERTICULITIS: Status: RESOLVED | Noted: 2020-08-27 | Resolved: 2021-11-02

## 2021-11-16 ENCOUNTER — TELEPHONE (OUTPATIENT)
Dept: ORTHOPEDICS | Facility: CLINIC | Age: 46
End: 2021-11-16
Payer: MEDICAID

## 2021-11-16 ENCOUNTER — PATIENT MESSAGE (OUTPATIENT)
Dept: ORTHOPEDICS | Facility: CLINIC | Age: 46
End: 2021-11-16
Payer: MEDICAID

## 2021-11-17 ENCOUNTER — OFFICE VISIT (OUTPATIENT)
Dept: ORTHOPEDICS | Facility: CLINIC | Age: 46
End: 2021-11-17
Payer: MEDICAID

## 2021-11-17 VITALS
WEIGHT: 276.56 LBS | HEART RATE: 72 BPM | HEIGHT: 63 IN | SYSTOLIC BLOOD PRESSURE: 148 MMHG | DIASTOLIC BLOOD PRESSURE: 94 MMHG | BODY MASS INDEX: 49 KG/M2

## 2021-11-17 DIAGNOSIS — E66.01 MORBID OBESITY DUE TO EXCESS CALORIES: ICD-10-CM

## 2021-11-17 DIAGNOSIS — M17.11 PRIMARY OSTEOARTHRITIS OF RIGHT KNEE: ICD-10-CM

## 2021-11-17 DIAGNOSIS — M17.12 PRIMARY OSTEOARTHRITIS OF LEFT KNEE: Primary | ICD-10-CM

## 2021-11-17 PROCEDURE — 20610 LARGE JOINT ASPIRATION/INJECTION: BILATERAL KNEE: ICD-10-PCS | Mod: 50,S$PBB,, | Performed by: PHYSICIAN ASSISTANT

## 2021-11-17 PROCEDURE — 99999 PR PBB SHADOW E&M-EST. PATIENT-LVL III: ICD-10-PCS | Mod: PBBFAC,,, | Performed by: PHYSICIAN ASSISTANT

## 2021-11-17 PROCEDURE — 99213 OFFICE O/P EST LOW 20 MIN: CPT | Mod: PBBFAC,PN,25 | Performed by: PHYSICIAN ASSISTANT

## 2021-11-17 PROCEDURE — 99999 PR PBB SHADOW E&M-EST. PATIENT-LVL III: CPT | Mod: PBBFAC,,, | Performed by: PHYSICIAN ASSISTANT

## 2021-11-17 PROCEDURE — 99213 OFFICE O/P EST LOW 20 MIN: CPT | Mod: 25,S$PBB,, | Performed by: PHYSICIAN ASSISTANT

## 2021-11-17 PROCEDURE — 99213 PR OFFICE/OUTPT VISIT, EST, LEVL III, 20-29 MIN: ICD-10-PCS | Mod: 25,S$PBB,, | Performed by: PHYSICIAN ASSISTANT

## 2021-11-17 PROCEDURE — 20610 DRAIN/INJ JOINT/BURSA W/O US: CPT | Mod: 50,25,PBBFAC,PN | Performed by: PHYSICIAN ASSISTANT

## 2021-11-17 PROCEDURE — 20610 DRAIN/INJ JOINT/BURSA W/O US: CPT | Mod: 50,S$PBB,, | Performed by: PHYSICIAN ASSISTANT

## 2021-11-17 RX ORDER — TRIAMCINOLONE ACETONIDE 40 MG/ML
40 INJECTION, SUSPENSION INTRA-ARTICULAR; INTRAMUSCULAR
Status: DISCONTINUED | OUTPATIENT
Start: 2021-11-17 | End: 2021-11-17 | Stop reason: HOSPADM

## 2021-11-17 RX ADMIN — TRIAMCINOLONE ACETONIDE 40 MG: 40 INJECTION, SUSPENSION INTRA-ARTICULAR; INTRAMUSCULAR at 08:11

## 2021-12-03 NOTE — ED NOTES
Family and staff have noted no episodes concerning for seizure overnight.      Physical Exam  Text Template:   NEUROLOGIC EXAM:   Mental Status:   Sedated, eyes closed.     Cranial Nerves:   Tongue midline, Face symmetric.     Motor Examination (bulk, tone, strength):   Sedated    CT head on 12/2/2021 - IMPRESSION: 1.  Enlargement of the lateral and third ventricles, concerning for hydrocephalus. 2.  Mild transependymal flow of CSF. 3.  Effacement of the cerebral sulci, concerning for cerebral   edema/increased intracranial pressure.  Recommend further evaluation with contrast-enhanced MRI to evaluate for underlying cause. 4.  Fluid throughout the left mastoid air cells and left middle ear.    MRI brain on 12/2/2021 - IMPRESSION: Findings consistent with meningitis and ventriculitis. Mild acute hydrocephalus. Restricted diffusion of the bilateral basal ganglia.  Differential considerations include infarctions and encephalitis.    MRV on 12/2/2021 - IMPRESSION:  No demonstrated venous sinus thrombosis.    CT head on 12/2/2021 - IMPRESSION: Interval placement of right frontal approach ventricular drain with tip terminating near the foramen of Lazaro.  In comparison to prior examination 12/02/2021, there has been mild interval decrease in the caliber of the ventricular system which remains mildly prominent with evolving transependymal CSF flow.    Discussion/Summary    Impression:.   She is hooked up to prolonged EEG monitoring which has shown no seizures but significant slowing and disorganization. I would recommend Keppra 300mg twice a day for seizure prevention. I would recommend loading with fosphenytoin 20mg/kg with any seizure activity.     Adam Vásquez MD            Physician at bedside.

## 2022-01-13 ENCOUNTER — HOSPITAL ENCOUNTER (EMERGENCY)
Facility: HOSPITAL | Age: 47
Discharge: HOME | End: 2022-01-13
Attending: EMERGENCY MEDICINE
Payer: MEDICAID

## 2022-01-13 VITALS
BODY MASS INDEX: 46.95 KG/M2 | RESPIRATION RATE: 18 BRPM | DIASTOLIC BLOOD PRESSURE: 93 MMHG | SYSTOLIC BLOOD PRESSURE: 175 MMHG | OXYGEN SATURATION: 96 % | HEART RATE: 66 BPM | HEIGHT: 63 IN | TEMPERATURE: 98 F | WEIGHT: 265 LBS

## 2022-01-13 DIAGNOSIS — R03.0 ELEVATED BLOOD PRESSURE READING: Primary | ICD-10-CM

## 2022-01-13 DIAGNOSIS — M79.604 BILATERAL LEG PAIN: ICD-10-CM

## 2022-01-13 DIAGNOSIS — M79.606 LEG PAIN: ICD-10-CM

## 2022-01-13 DIAGNOSIS — M79.605 BILATERAL LEG PAIN: ICD-10-CM

## 2022-01-13 DIAGNOSIS — M79.89 LEG SWELLING: ICD-10-CM

## 2022-01-13 DIAGNOSIS — M54.16 LUMBAR RADICULOPATHY: ICD-10-CM

## 2022-01-13 LAB
ALBUMIN SERPL BCP-MCNC: 3.9 G/DL (ref 3.5–5.2)
ALP SERPL-CCNC: 69 U/L (ref 55–135)
ALT SERPL W/O P-5'-P-CCNC: 23 U/L (ref 10–44)
ANION GAP SERPL CALC-SCNC: 9 MMOL/L (ref 8–16)
AST SERPL-CCNC: 19 U/L (ref 10–40)
B-HCG UR QL: NEGATIVE
BASOPHILS # BLD AUTO: 0.05 K/UL (ref 0–0.2)
BASOPHILS NFR BLD: 0.4 % (ref 0–1.9)
BILIRUB SERPL-MCNC: 0.3 MG/DL (ref 0.1–1)
BILIRUB UR QL STRIP: NEGATIVE
BNP SERPL-MCNC: 32 PG/ML (ref 0–99)
BUN SERPL-MCNC: 17 MG/DL (ref 6–20)
CALCIUM SERPL-MCNC: 9.7 MG/DL (ref 8.7–10.5)
CHLORIDE SERPL-SCNC: 107 MMOL/L (ref 95–110)
CLARITY UR: CLEAR
CO2 SERPL-SCNC: 27 MMOL/L (ref 23–29)
COLOR UR: YELLOW
CREAT SERPL-MCNC: 0.8 MG/DL (ref 0.5–1.4)
CTP QC/QA: YES
DIFFERENTIAL METHOD: ABNORMAL
EOSINOPHIL # BLD AUTO: 0.2 K/UL (ref 0–0.5)
EOSINOPHIL NFR BLD: 1.3 % (ref 0–8)
ERYTHROCYTE [DISTWIDTH] IN BLOOD BY AUTOMATED COUNT: 14.5 % (ref 11.5–14.5)
EST. GFR  (AFRICAN AMERICAN): >60 ML/MIN/1.73 M^2
EST. GFR  (NON AFRICAN AMERICAN): >60 ML/MIN/1.73 M^2
GLUCOSE SERPL-MCNC: 109 MG/DL (ref 70–110)
GLUCOSE UR QL STRIP: NEGATIVE
HCT VFR BLD AUTO: 38.1 % (ref 37–48.5)
HGB BLD-MCNC: 12.4 G/DL (ref 12–16)
HGB UR QL STRIP: NEGATIVE
IMM GRANULOCYTES # BLD AUTO: 0.08 K/UL (ref 0–0.04)
IMM GRANULOCYTES NFR BLD AUTO: 0.6 % (ref 0–0.5)
KETONES UR QL STRIP: NEGATIVE
LEUKOCYTE ESTERASE UR QL STRIP: NEGATIVE
LYMPHOCYTES # BLD AUTO: 3.2 K/UL (ref 1–4.8)
LYMPHOCYTES NFR BLD: 26.2 % (ref 18–48)
MCH RBC QN AUTO: 28.4 PG (ref 27–31)
MCHC RBC AUTO-ENTMCNC: 32.5 G/DL (ref 32–36)
MCV RBC AUTO: 87 FL (ref 82–98)
MONOCYTES # BLD AUTO: 1 K/UL (ref 0.3–1)
MONOCYTES NFR BLD: 7.9 % (ref 4–15)
NEUTROPHILS # BLD AUTO: 7.9 K/UL (ref 1.8–7.7)
NEUTROPHILS NFR BLD: 63.6 % (ref 38–73)
NITRITE UR QL STRIP: NEGATIVE
NRBC BLD-RTO: 0 /100 WBC
PH UR STRIP: 6 [PH] (ref 5–8)
PLATELET # BLD AUTO: 389 K/UL (ref 150–450)
PMV BLD AUTO: 10.3 FL (ref 9.2–12.9)
POTASSIUM SERPL-SCNC: 3.9 MMOL/L (ref 3.5–5.1)
PROT SERPL-MCNC: 7 G/DL (ref 6–8.4)
PROT UR QL STRIP: ABNORMAL
RBC # BLD AUTO: 4.36 M/UL (ref 4–5.4)
SODIUM SERPL-SCNC: 143 MMOL/L (ref 136–145)
SP GR UR STRIP: 1.03 (ref 1–1.03)
URN SPEC COLLECT METH UR: ABNORMAL
UROBILINOGEN UR STRIP-ACNC: NEGATIVE EU/DL
WBC # BLD AUTO: 12.35 K/UL (ref 3.9–12.7)

## 2022-01-13 PROCEDURE — 83880 ASSAY OF NATRIURETIC PEPTIDE: CPT | Performed by: NURSE PRACTITIONER

## 2022-01-13 PROCEDURE — 96374 THER/PROPH/DIAG INJ IV PUSH: CPT

## 2022-01-13 PROCEDURE — 93005 ELECTROCARDIOGRAM TRACING: CPT

## 2022-01-13 PROCEDURE — 63600175 PHARM REV CODE 636 W HCPCS: Performed by: NURSE PRACTITIONER

## 2022-01-13 PROCEDURE — 81025 URINE PREGNANCY TEST: CPT | Performed by: NURSE PRACTITIONER

## 2022-01-13 PROCEDURE — 80053 COMPREHEN METABOLIC PANEL: CPT | Performed by: NURSE PRACTITIONER

## 2022-01-13 PROCEDURE — 93010 EKG 12-LEAD: ICD-10-PCS | Mod: ,,, | Performed by: INTERNAL MEDICINE

## 2022-01-13 PROCEDURE — 85025 COMPLETE CBC W/AUTO DIFF WBC: CPT | Performed by: NURSE PRACTITIONER

## 2022-01-13 PROCEDURE — 99285 EMERGENCY DEPT VISIT HI MDM: CPT | Mod: 25

## 2022-01-13 PROCEDURE — 81003 URINALYSIS AUTO W/O SCOPE: CPT | Performed by: NURSE PRACTITIONER

## 2022-01-13 PROCEDURE — 93010 ELECTROCARDIOGRAM REPORT: CPT | Mod: ,,, | Performed by: INTERNAL MEDICINE

## 2022-01-13 RX ORDER — NAPROXEN 500 MG/1
500 TABLET ORAL 2 TIMES DAILY WITH MEALS
Qty: 14 TABLET | Refills: 0 | Status: SHIPPED | OUTPATIENT
Start: 2022-01-13

## 2022-01-13 RX ORDER — METHOCARBAMOL 500 MG/1
1000 TABLET, FILM COATED ORAL 3 TIMES DAILY PRN
Qty: 30 TABLET | Refills: 0 | Status: SHIPPED | OUTPATIENT
Start: 2022-01-13 | End: 2022-01-18

## 2022-01-13 RX ORDER — KETOROLAC TROMETHAMINE 30 MG/ML
15 INJECTION, SOLUTION INTRAMUSCULAR; INTRAVENOUS
Status: COMPLETED | OUTPATIENT
Start: 2022-01-13 | End: 2022-01-13

## 2022-01-13 RX ADMIN — KETOROLAC TROMETHAMINE 15 MG: 30 INJECTION, SOLUTION INTRAMUSCULAR at 04:01

## 2022-01-13 NOTE — DISCHARGE INSTRUCTIONS
Your blood pressure was a little high today.  You need to have it rechecked by your doctor in one week.      Return to the ED if your condition changes, progresses, or if you have any concerns.      You may continue tylenol as directed on the labeling to help with pain.     Thank you for choosing [facility]! We appreciate you coming to us for your medical care. We hope you feel better soon! Please come back to [facility] for all of your future medical needs.      Our goal in the emergency department is to always give you outstanding care and exceptional service. You may receive a survey by mail or e-mail in the next week regarding your experience in our ED. We would greatly appreciate your completing and returning the survey. Your feedback provides us with a way to recognize our staff who give very good care and it helps us learn how to improve when your experience was below our aspiration of excellence.      Sincerely,  FLORY Swanson  [facility]

## 2022-01-13 NOTE — ED PROVIDER NOTES
"Encounter Date: 1/13/2022       History     Chief Complaint   Patient presents with    Leg Swelling     Pt presents to ED with c/o bilateral leg swelling since Dec. 27th 2021. Pt denies cardiac hx.  Pt denies chest pain or SOB at thi time.      46-year-old female presents to the ED for bilateral leg pain.  The patient reports that starting December 27th she has had pain in her right leg.  No trauma.  She reports that she was diagnosed with COVID at the end December and has been lying in bed.  She denies chest pain and shortness of breath.  Over the past few days her left leg has also started hurting, however pain is worse in right leg.  Pain is worse with movements and after long periods of standing.  She states that sometimes her legs "give out" due to pain.  She denies fever, abd pain, weakness, numbness, dysuria, loss of bowel and bladder control, and saddle anesthesia. Pt reports frequent urination.  She has had right lower back pain for the past few months.  Also, her legs occasionally "tingle" at night.  Pt reports pmhx of sciatica. No other complaints at this time.     The history is provided by the patient and medical records.     Review of patient's allergies indicates:   Allergen Reactions    Ace inhibitors Other (See Comments)     cough    Xarelto [rivaroxaban] Hives     Past Medical History:   Diagnosis Date    Anxiety     Diverticulitis     Endometriosis     General anesthetics causing adverse effect in therapeutic use     Hypertension     Kidney stone     Prolactinoma 2011    Sliding hiatal hernia     Urinary tract infection     Vaginal infection      Past Surgical History:   Procedure Laterality Date    ARTHROSCOPY OF KNEE Left     BRAIN TUMOR EXCISION  9/2011    removal of prolactinoma    COLONOSCOPY N/A 8/27/2020    Procedure: COLONOSCOPY;  Surgeon: Valentino Negro MD;  Location: [facility];  Service: Endoscopy;  Laterality: N/A;    CYSTOSCOPY      CYSTOSCOPY W/ URETERAL STENT " PLACEMENT      CYSTOSCOPY W/ URETERAL STENT REMOVAL      ESOPHAGEAL MANOMETRY N/A 6/3/2019    Procedure: MANOMETRY, ESOPHAGUS;  Surgeon: Bhupinder Schmitz MD;  Location: [facility] ENDO ([facility] FLR);  Service: Endoscopy;  Laterality: N/A;    ESOPHAGOGASTRODUODENOSCOPY N/A 2019    Procedure: ESOPHAGOGASTRODUODENOSCOPY (EGD);  Surgeon: Altagracia Bose MD;  Location: [facility] ENDO;  Service: Endoscopy;  Laterality: N/A;    ESOPHAGOGASTRODUODENOSCOPY N/A 2021    Procedure: EGD (ESOPHAGOGASTRODUODENOSCOPY);  Surgeon: Altagracia Bose MD;  Location: [facility] ENDO;  Service: Endoscopy;  Laterality: N/A;    FOOT HARDWARE REMOVAL Right 2020    Procedure: REMOVAL, HARDWARE, FOOT;  Surgeon: Adrianna Tillman DPM;  Location: [facility] OR;  Service: Podiatry;  Laterality: Right;    HARVESTING OF BONE GRAFT Right 2019    Procedure: SURGICAL PROCUREMENT, BONE GRAFT;  Surgeon: Adrianna Tillman DPM;  Location: [facility] OR;  Service: Podiatry;  Laterality: Right;    HYSTERECTOMY  3/16/15    [facility]/BSO for Endometriosis, AUB, fibroids, cyst    LITHOTRIPSY      MANDIBLE SURGERY      severe overbite correction    PELVIC LAPAROSCOPY      Dx scope, chromopertubation-Endometriosis    TONSILLECTOMY, ADENOIDECTOMY       Family History   Problem Relation Age of Onset    Cancer Mother     Breast cancer Neg Hx     Ovarian cancer Neg Hx     Kidney disease Neg Hx      Social History     Tobacco Use    Smoking status: Former Smoker     Years: 13.00     Types: Cigarettes     Start date: 1989     Quit date: 2000     Years since quittin.9    Smokeless tobacco: Never Used    Tobacco comment: pt stated she smoked one cig/day   Substance Use Topics    Alcohol use: No     Alcohol/week: 0.0 standard drinks    Drug use: No     Review of Systems   Constitutional: Negative for chills, fatigue and fever.   HENT: Negative for congestion, rhinorrhea and sore throat.    Respiratory: Negative for cough and shortness of  breath.    Cardiovascular: Positive for leg swelling. Negative for chest pain.   Gastrointestinal: Negative for abdominal pain, diarrhea, nausea and vomiting.   Genitourinary: Negative for decreased urine volume and dysuria.   Musculoskeletal: Positive for back pain and myalgias.   Skin: Negative for rash.   Neurological: Negative for weakness, numbness and headaches.   Psychiatric/Behavioral: Negative for confusion.   All other systems reviewed and are negative.      Physical Exam     Initial Vitals   BP Pulse Resp Temp SpO2   01/13/22 1411 01/13/22 1410 01/13/22 1410 01/13/22 1410 01/13/22 1410   (!) 144/97 69 18 97.5 °F (36.4 °C) 96 %      MAP       --                Physical Exam    Nursing note and vitals reviewed.  Constitutional: She appears well-developed and well-nourished. She is Obese . She is active and cooperative. She is easily aroused.  Non-toxic appearance. She does not have a sickly appearance. She does not appear ill.   HENT:   Head: Normocephalic and atraumatic.   Eyes: Conjunctivae and EOM are normal.   Neck: Neck supple.   Normal range of motion.  Cardiovascular: Normal rate, regular rhythm and normal heart sounds.   Pulses:       Dorsalis pedis pulses are 2+ on the right side and 2+ on the left side.   Pulmonary/Chest: Effort normal and breath sounds normal.   Abdominal: Abdomen is soft. Normal appearance and bowel sounds are normal.   Genitourinary:    Genitourinary Comments: Normal rectal tone.   Witnessed by SHANTA Soria     Musculoskeletal:      Cervical back: Normal range of motion and neck supple.      Comments: Pt reports pain with extension of bilateral hips.   Pt reports generalized TTP of lower extremities.   No erythema or warmth of BLE.  Body habitus limits exam.      Neurological: She is alert, oriented to person, place, and time and easily aroused. She has normal strength. GCS eye subscore is 4. GCS verbal subscore is 5. GCS motor subscore is 6.   Skin: Skin is warm, dry and intact.  No rash noted.   Psychiatric: She has a normal mood and affect. Her speech is normal and behavior is normal. Judgment and thought content normal. Cognition and memory are normal.         ED Course   Procedures  Labs Reviewed   CBC W/ AUTO DIFFERENTIAL - Abnormal; Notable for the following components:       Result Value    Immature Granulocytes 0.6 (*)     Gran # (ANC) 7.9 (*)     Immature Grans (Abs) 0.08 (*)     All other components within normal limits   URINALYSIS, REFLEX TO URINE CULTURE - Abnormal; Notable for the following components:    Protein, UA Trace (*)     All other components within normal limits    Narrative:     Specimen Source->Urine   COMPREHENSIVE METABOLIC PANEL   B-TYPE NATRIURETIC PEPTIDE   POCT URINE PREGNANCY   POCT GLUCOSE MONITORING CONTINUOUS          Imaging Results          X-Ray Lumbar Spine Ap And Lateral (Final result)  Result time 01/13/22 17:25:09    Final result by Attila Villarreal MD (01/13/22 17:25:09)                 Impression:      No convincing evidence of acute fracture or traumatic subluxation of the lumbar spine.      Electronically signed by: Attila Villarreal  Date:    01/13/2022  Time:    17:25             Narrative:    EXAMINATION:  XR LUMBAR SPINE AP AND LATERAL    CLINICAL HISTORY:  back pain;    TECHNIQUE:  AP, lateral and spot images were performed of the lumbar spine.    COMPARISON:  None    FINDINGS:  Five non-rib-bearing lumbar type vertebral bodies identified.  No definite evidence of acute fracture or traumatic subluxation of the lumbar spine is appreciated.  Vertebral body heights appear maintained.  Minor degenerative endplate sclerosis is noted.  No acute findings are suggested in the visualized portions of the abdomen or pelvis.  No acute soft tissue abnormality is identified.                               US Lower Extremity Veins Bilateral (Final result)  Result time 01/13/22 16:18:14   Procedure changed from US Lower Extremity Veins Left     Final result  by Hector Eldridge MD (01/13/22 16:18:14)                 Impression:      No evidence of deep venous thrombosis in either lower extremity.      Electronically signed by: Hector Eldridge  Date:    01/13/2022  Time:    16:18             Narrative:    EXAMINATION:  US LOWER EXTREMITY VEINS BILATERAL    CLINICAL HISTORY:  leg swelling; Pain in leg, unspecified    TECHNIQUE:  Duplex and color flow Doppler and dynamic compression was performed of the bilateral lower extremity veins was performed.    COMPARISON:  None    FINDINGS:  Right thigh veins: The common femoral, femoral, popliteal, upper greater saphenous, and deep femoral veins are patent and free of thrombus. The veins are normally compressible and have normal phasic flow and augmentation response.    Right calf veins: The visualized calf veins are patent.    Left thigh veins: The common femoral, femoral, popliteal, upper greater saphenous, and deep femoral veins are patent and free of thrombus. The veins are normally compressible and have normal phasic flow and augmentation response.    Left calf veins: The visualized calf veins are patent.    Miscellaneous: None                                 Medications   ketorolac injection 15 mg (15 mg Intravenous Given 1/13/22 1621)     Medical Decision Making:   Initial Assessment:   47yo female here for bilateral leg pain.  Pain started in RLE December 28 and has progressed to BLE.  Pt reports recent diagnosis of COVID and has been lying in bed.  She denies trauma, CP, SOB.  She does feel that her legs are swollen.  She reports right lower back pain and urinary frequency.    Differential Diagnosis:   Lumbar radiculopathy, strain, sprain, spasm, DVT, CHF, electrolyte derangement, fracture, cauda equina syndrome  Clinical Tests:   Lab Tests: Ordered and Reviewed  Radiological Study: Ordered and Reviewed  ED Management:  US of BLE was negative for DVT.   I do not suspect cauda equina syndrome. Pt is able to walk unassisted.    The patient was given Toradol to help with pain. Pt reports mild improvement. WBC normal. H&H stable. UA negative for infection. Labs unremarkable. I suspect lumbar radiculopathy as the patient's origin of pain.     No red flags on presentation or physical exam. Unlikely to be spinal stenosis as there are no neurologic findings, does not appear to be infectious given no fever, no point tenderness, coulg be DJD or disc herniation but xray normal and no red flags that would prompt further imaging. Likely musculoskeletal pain. Will instruct pt to follow up with PCP in one week if symptoms do not improve. We disscused at length warning signs for return including but not limited to increased pain, change in gait, sensation changes around the rectum or perineum, bowel or bladder issues, fever and the pt understands. The pt is comfortable going home at this time. After taking into careful account the historical factors and physical exam findings of the patient's presentation today, in conjunction with the empirical and objective data obtained on ED workup, no acute emergent medical condition requiring admission has been identified. The patient appears to be low risk for an emergent medical condition and I feel it is safe and appropriate at this time for the patient to be discharged to follow-up as detailed in their discharge instructions for reevaluation and possible continued outpatient workup and management. I have discussed the specifics of the workup with the patient and the patient has verbalized understanding of the details of the workup, the diagnosis, the treatment plan, and the need for outpatient follow-up.  Although the patient has no emergent etiology today this does not preclude the development of an emergent condition so in addition, I have advised the patient that they can return to the ED and/or activate EMS at any time with worsening of their symptoms, change of their symptoms, or with any other medical  complaint.  The patient remained comfortable and stable during their visit in the ED.  Discharge and follow-up instructions discussed with the patient who expressed understanding and willingness to comply with my recommendations.     This medical record was prepared using voice dictation software. There may be phonetic errors.                          Clinical Impression:   Final diagnoses:  [M79.89] Leg swelling  [M79.606] Leg pain  [R03.0] Elevated blood pressure reading (Primary)  [M79.604, M79.605] Bilateral leg pain  [M54.16] Lumbar radiculopathy          ED Disposition Condition    Discharge Stable        ED Prescriptions     Medication Sig Dispense Start Date End Date Auth. Provider    methocarbamoL (ROBAXIN) 500 MG Tab Take 2 tablets (1,000 mg total) by mouth 3 (three) times daily as needed (Muscle spasms). 30 tablet 1/13/2022 1/18/2022 REYNA Moore    naproxen (NAPROSYN) 500 MG tablet Take 1 tablet (500 mg total) by mouth 2 (two) times daily with meals. 14 tablet 1/13/2022  REYNA Moore        Follow-up Information     Follow up With Specialties Details Why Contact Info    Cathi Chávez NP Family Medicine Schedule an appointment as soon as possible for a visit in 1 week  [address] [facility] [address]  [phone]             REYNA Moore  01/13/22 7119

## 2022-01-13 NOTE — FIRST PROVIDER EVALUATION
Emergency Department TeleTriage Encounter Note      CHIEF COMPLAINT    Chief Complaint   Patient presents with    Leg Swelling     Pt presents to ED with c/o bilateral leg swelling since Dec. 27th 2021. Pt denies cardiac hx.  Pt denies chest pain or SOB at thi time.        VITAL SIGNS   Initial Vitals   BP Pulse Resp Temp SpO2   01/13/22 1411 01/13/22 1410 01/13/22 1410 01/13/22 1410 01/13/22 1410   (!) 144/97 69 18 97.5 °F (36.4 °C) 96 %      MAP       --                   ALLERGIES    Review of patient's allergies indicates:   Allergen Reactions    Ace inhibitors Other (See Comments)     cough    Xarelto [rivaroxaban] Hives       PROVIDER TRIAGE NOTE  TeleTriage Note: Inna Gallardo, a nontoxic/well appearing, 46 y.o. female, presented to the ED with c/o bilateral leg swelling since yesterday. Denies chest pain or SOB.     All ED beds are full at present; patient notified of this status.  Patient seen and medically screened by Nurse Practitioner via teletriage. Orders initiated at triage to expedite care.  Patient is stable to return to the waiting room and will be placed in an ED bed when available.  Care will be transferred to an alternate provider when patient has been placed in an Exam Room from the [facility] for physical exam, additional orders, and disposition.  2:16 PM Elidia Watkins DNP, FNP-C        ORDERS  Labs Reviewed - No data to display    ED Orders (720h ago, onward)    Start Ordered     Status Ordering Provider    01/13/22 1418 01/13/22 1417  Insert peripheral IV  Continuous         Ordered ELIDIA WATKINS    01/13/22 1418 01/13/22 1417  CBC auto differential  STAT         Ordered ELIDIA WATKINS    01/13/22 1418 01/13/22 1417  EKG 12-lead  Once         Ordered ELIDIA WATKINS            Virtual Visit Note: The provider triage portion of this emergency department evaluation and documentation was performed via [employer], a HIPAA-compliant telemedicine application, in concert with  a tele-presenter in the room. A face to face patient evaluation with one of my colleagues will occur once the patient is placed in an emergency department room.      DISCLAIMER: This note was prepared with [employer] voice recognition transcription software. Garbled syntax, mangled pronouns, and other bizarre constructions may be attributed to that software system.

## 2022-01-26 ENCOUNTER — PATIENT MESSAGE (OUTPATIENT)
Dept: ORTHOPEDICS | Facility: CLINIC | Age: 47
End: 2022-01-26
Payer: MEDICAID

## 2022-02-03 ENCOUNTER — TELEPHONE (OUTPATIENT)
Dept: ORTHOPEDICS | Facility: CLINIC | Age: 47
End: 2022-02-03
Payer: MEDICAID

## 2022-02-03 NOTE — TELEPHONE ENCOUNTER
----- Message from Ana Araiza sent at 2/3/2022  8:14 AM CST -----  Contact: [phone]/ Self  Type: Requesting to speak with nurse    Who Called: Pt  Regarding: reschedule time for today's appt to 11:45 AM due to bad weather    Would the patient rather a call back or a response via [facility]? Call back  Best Call Back Number: [phone]  Additional Information: n/a

## 2022-02-22 ENCOUNTER — OFFICE VISIT (OUTPATIENT)
Dept: ORTHOPEDICS | Facility: CLINIC | Age: 47
End: 2022-02-22
Payer: MEDICAID

## 2022-02-22 VITALS
WEIGHT: 276.38 LBS | DIASTOLIC BLOOD PRESSURE: 76 MMHG | HEIGHT: 63 IN | RESPIRATION RATE: 18 BRPM | BODY MASS INDEX: 48.97 KG/M2 | SYSTOLIC BLOOD PRESSURE: 130 MMHG | HEART RATE: 77 BPM

## 2022-02-22 DIAGNOSIS — M17.12 PRIMARY OSTEOARTHRITIS OF LEFT KNEE: Primary | ICD-10-CM

## 2022-02-22 DIAGNOSIS — E66.01 MORBID OBESITY DUE TO EXCESS CALORIES: ICD-10-CM

## 2022-02-22 DIAGNOSIS — M17.11 PRIMARY OSTEOARTHRITIS OF RIGHT KNEE: ICD-10-CM

## 2022-02-22 PROCEDURE — 3078F PR MOST RECENT DIASTOLIC BLOOD PRESSURE < 80 MM HG: ICD-10-PCS | Mod: CPTII,,, | Performed by: PHYSICIAN ASSISTANT

## 2022-02-22 PROCEDURE — 99499 UNLISTED E&M SERVICE: CPT | Mod: S$PBB,,, | Performed by: PHYSICIAN ASSISTANT

## 2022-02-22 PROCEDURE — 1160F RVW MEDS BY RX/DR IN RCRD: CPT | Mod: CPTII,,, | Performed by: PHYSICIAN ASSISTANT

## 2022-02-22 PROCEDURE — 20610 DRAIN/INJ JOINT/BURSA W/O US: CPT | Mod: 50,S$PBB,, | Performed by: PHYSICIAN ASSISTANT

## 2022-02-22 PROCEDURE — 3075F SYST BP GE 130 - 139MM HG: CPT | Mod: CPTII,,, | Performed by: PHYSICIAN ASSISTANT

## 2022-02-22 PROCEDURE — 20610 DRAIN/INJ JOINT/BURSA W/O US: CPT | Mod: 50,PBBFAC,PN | Performed by: PHYSICIAN ASSISTANT

## 2022-02-22 PROCEDURE — 99499 NO LOS: ICD-10-PCS | Mod: S$PBB,,, | Performed by: PHYSICIAN ASSISTANT

## 2022-02-22 PROCEDURE — 99999 PR PBB SHADOW E&M-EST. PATIENT-LVL IV: CPT | Mod: PBBFAC,,, | Performed by: PHYSICIAN ASSISTANT

## 2022-02-22 PROCEDURE — 3008F PR BODY MASS INDEX (BMI) DOCUMENTED: ICD-10-PCS | Mod: CPTII,,, | Performed by: PHYSICIAN ASSISTANT

## 2022-02-22 PROCEDURE — 3078F DIAST BP <80 MM HG: CPT | Mod: CPTII,,, | Performed by: PHYSICIAN ASSISTANT

## 2022-02-22 PROCEDURE — 3008F BODY MASS INDEX DOCD: CPT | Mod: CPTII,,, | Performed by: PHYSICIAN ASSISTANT

## 2022-02-22 PROCEDURE — 1160F PR REVIEW ALL MEDS BY PRESCRIBER/CLIN PHARMACIST DOCUMENTED: ICD-10-PCS | Mod: CPTII,,, | Performed by: PHYSICIAN ASSISTANT

## 2022-02-22 PROCEDURE — 99214 OFFICE O/P EST MOD 30 MIN: CPT | Mod: PBBFAC,PN,25 | Performed by: PHYSICIAN ASSISTANT

## 2022-02-22 PROCEDURE — 20610 LARGE JOINT ASPIRATION/INJECTION: BILATERAL KNEE: ICD-10-PCS | Mod: 50,S$PBB,, | Performed by: PHYSICIAN ASSISTANT

## 2022-02-22 PROCEDURE — 1159F PR MEDICATION LIST DOCUMENTED IN MEDICAL RECORD: ICD-10-PCS | Mod: CPTII,,, | Performed by: PHYSICIAN ASSISTANT

## 2022-02-22 PROCEDURE — 99999 PR PBB SHADOW E&M-EST. PATIENT-LVL IV: ICD-10-PCS | Mod: PBBFAC,,, | Performed by: PHYSICIAN ASSISTANT

## 2022-02-22 PROCEDURE — 3075F PR MOST RECENT SYSTOLIC BLOOD PRESS GE 130-139MM HG: ICD-10-PCS | Mod: CPTII,,, | Performed by: PHYSICIAN ASSISTANT

## 2022-02-22 PROCEDURE — 1159F MED LIST DOCD IN RCRD: CPT | Mod: CPTII,,, | Performed by: PHYSICIAN ASSISTANT

## 2022-02-22 RX ORDER — TRIAMCINOLONE ACETONIDE 40 MG/ML
40 INJECTION, SUSPENSION INTRA-ARTICULAR; INTRAMUSCULAR
Status: DISCONTINUED | OUTPATIENT
Start: 2022-02-22 | End: 2022-02-22 | Stop reason: HOSPADM

## 2022-02-22 RX ADMIN — TRIAMCINOLONE ACETONIDE 40 MG: 40 INJECTION, SUSPENSION INTRA-ARTICULAR; INTRAMUSCULAR at 09:02

## 2022-02-22 NOTE — PROCEDURES
Large Joint Aspiration/Injection: bilateral knee    Date/Time: 2/22/2022 9:30 AM  Performed by: Pat Taylor PA-C  Authorized by: Pat Taylor PA-C     Consent Done?:  Yes (Verbal)  Indications:  Pain  Site marked: the procedure site was marked    Timeout: prior to procedure the correct patient, procedure, and site was verified    Prep: patient was prepped and draped in usual sterile fashion    Local anesthesia used?: No    Local anesthetic:  Topical anesthetic and lidocaine 1% without epinephrine    Details:  Needle Size:  22 G  Ultrasonic Guidance for needle placement?: No    Approach:  Anterolateral  Location:  Knee  Laterality:  Bilateral  Site:  Bilateral knee  Medications (Right):  40 mg triamcinolone acetonide 40 mg/mL  Medications (Left):  40 mg triamcinolone acetonide 40 mg/mL  Patient tolerance:  Patient tolerated the procedure well with no immediate complications

## 2022-02-22 NOTE — PROGRESS NOTES
Subjective:      Patient ID: Inna Gallardo is a 46 y.o. female.    Chief Complaint: Pain of the Left Knee and Pain of the Right Knee      47yo female follow up bilateral knee pain and osteoarthritis. Comes in every 3 mon for injections.  Recently had to go to [facility] ED for spine related symptoms. Was told she has a benign mass on her spine. Has follow up with ortho spine next month. Using a walker today.       Review of Systems   Constitutional: Negative for chills and fever.   Cardiovascular: Negative for chest pain.   Respiratory: Negative for cough.    Hematologic/Lymphatic: Does not bruise/bleed easily.   Skin: Negative for poor wound healing and rash.   Musculoskeletal: Positive for arthritis, back pain, joint pain, joint swelling, muscle weakness, myalgias and stiffness.   Gastrointestinal: Negative for abdominal pain.   Genitourinary: Negative for bladder incontinence.   Neurological: Negative for dizziness, loss of balance and weakness.   Psychiatric/Behavioral: Negative for altered mental status.       Review of patient's allergies indicates:   Allergen Reactions    Ace inhibitors Other (See Comments)     cough    Xarelto [rivaroxaban] Hives        Current Outpatient Medications   Medication Sig Dispense Refill    amLODIPine (NORVASC) 5 MG tablet Take 5 mg by mouth once daily.      dicyclomine (BENTYL) 20 mg tablet Take 1 tablet (20 mg total) by mouth 3 (three) times daily as needed (abdominal pain). 60 tablet 2    EPINEPHrine (EPIPEN) 0.3 mg/0.3 mL AtIn Inject 0.3 mLs into the muscle as needed.  0    ergocalciferol (ERGOCALCIFEROL) 50,000 unit Cap Take 50,000 Units by mouth every 7 days.      fluticasone propionate (FLONASE) 50 mcg/actuation nasal spray       hydroCHLOROthiazide (MICROZIDE) 12.5 mg capsule Take 12.5 mg by mouth once daily.      hydrOXYzine HCl (ATARAX) 25 MG tablet Take 25 mg by mouth 3 (three) times daily as needed.  2    ketorolac (TORADOL) 10 mg tablet Take 1 tablet (10 mg  "total) by mouth every 6 (six) hours as needed for Pain. 20 tablet 0    loratadine (CLARITIN) 10 mg tablet Take 1 tablet every day by oral route.      losartan (COZAAR) 50 MG tablet Take 50 mg by mouth once daily.       metFORMIN (GLUCOPHAGE-XR) 500 MG ER 24hr tablet Take 500 mg by mouth once daily.      naproxen (NAPROSYN) 500 MG tablet Take 1 tablet (500 mg total) by mouth 2 (two) times daily with meals. 14 tablet 0    omeprazole (PRILOSEC) 40 MG capsule Take 1 capsule (40 mg total) by mouth every morning. 30 capsule 11    paroxetine (PAXIL) 20 MG tablet Take 20 mg by mouth every morning.      sucralfate (CARAFATE) 1 gram tablet Take 1 tablet (1 g total) by mouth before meals and at bedtime as needed (abdominal  pain). 90 tablet 1    tamsulosin (FLOMAX) 0.4 mg Cap Take 1 capsule (0.4 mg total) by mouth once daily. 30 capsule 0    traMADoL (ULTRAM) 50 mg tablet Take 1 tablet (50 mg total) by mouth every 8 (eight) hours as needed for Pain. 30 tablet 0    XULANE 150-35 mcg/24 hr APPLY 1 PATCH AS DIRECTED ONCE A WEEK FOR 3 WEEKS AND OFF FOR 1 WEEK 3 patch 0     No current facility-administered medications for this visit.        The patient's relevant past medical, surgical, and social history was reviewed in Epic.       Objective:      VITAL SIGNS: /76   Pulse 77   Resp 18   Ht 5' 3" (1.6 m)   Wt 125.4 kg (276 lb 5.6 oz)   LMP 03/03/2015   BMI 48.95 kg/m²     Ortho/SPM Exam         Assessment:       1. Primary osteoarthritis of left knee    2. Primary osteoarthritis of right knee    3. Morbid obesity due to excess calories          Plan:         Inna Lynne was seen today for pain and pain.    Diagnoses and all orders for this visit:    Primary osteoarthritis of left knee  -     Large Joint Aspiration/Injection: bilateral knee    Primary osteoarthritis of right knee  -     Large Joint Aspiration/Injection: bilateral knee    Morbid obesity due to excess calories      I injected the bilateral knee " with Kenalog and lidocaine under sterile conditions with the patient's informed consent for moderate OA. Follow up in 3 months or as needed.           Pat Taylor PA-C   02/22/2022

## 2022-04-20 ENCOUNTER — HOSPITAL ENCOUNTER (EMERGENCY)
Facility: HOSPITAL | Age: 47
Discharge: HOME | End: 2022-04-20
Attending: EMERGENCY MEDICINE
Payer: MEDICAID

## 2022-04-20 VITALS
OXYGEN SATURATION: 96 % | SYSTOLIC BLOOD PRESSURE: 131 MMHG | RESPIRATION RATE: 19 BRPM | TEMPERATURE: 98 F | HEART RATE: 63 BPM | HEIGHT: 63 IN | WEIGHT: 276.38 LBS | DIASTOLIC BLOOD PRESSURE: 70 MMHG | BODY MASS INDEX: 48.97 KG/M2

## 2022-04-20 DIAGNOSIS — R10.84 GENERALIZED ABDOMINAL PAIN: Primary | ICD-10-CM

## 2022-04-20 DIAGNOSIS — R11.2 NON-INTRACTABLE VOMITING WITH NAUSEA, UNSPECIFIED VOMITING TYPE: ICD-10-CM

## 2022-04-20 DIAGNOSIS — R10.9 ABDOMINAL PAIN: ICD-10-CM

## 2022-04-20 LAB
ALBUMIN SERPL BCP-MCNC: 4.5 G/DL (ref 3.5–5.2)
ALP SERPL-CCNC: 69 U/L (ref 38–126)
ALT SERPL W/O P-5'-P-CCNC: 30 U/L (ref 10–44)
AMPHET+METHAMPHET UR QL: NEGATIVE
ANION GAP SERPL CALC-SCNC: 13 MMOL/L (ref 8–16)
AST SERPL-CCNC: 29 U/L (ref 15–46)
BACTERIA #/AREA URNS AUTO: NORMAL /HPF
BARBITURATES UR QL SCN>200 NG/ML: NEGATIVE
BASOPHILS # BLD AUTO: 0.04 K/UL (ref 0–0.2)
BASOPHILS NFR BLD: 0.5 % (ref 0–1.9)
BENZODIAZ UR QL SCN>200 NG/ML: NEGATIVE
BILIRUB SERPL-MCNC: 0.7 MG/DL (ref 0.1–1)
BILIRUB UR QL STRIP: NEGATIVE
BZE UR QL SCN: NEGATIVE
CALCIUM SERPL-MCNC: 9.2 MG/DL (ref 8.7–10.5)
CANNABINOIDS UR QL SCN: ABNORMAL
CAOX CRY UR QL COMP ASSIST: NORMAL
CHLORIDE SERPL-SCNC: 108 MMOL/L (ref 95–110)
CLARITY UR REFRACT.AUTO: CLEAR
CO2 SERPL-SCNC: 19 MMOL/L (ref 23–29)
COLOR UR AUTO: ABNORMAL
CREAT SERPL-MCNC: 0.57 MG/DL (ref 0.5–1.4)
CREAT UR-MCNC: 232.9 MG/DL (ref 15–325)
DIFFERENTIAL METHOD: NORMAL
EOSINOPHIL # BLD AUTO: 0.3 K/UL (ref 0–0.5)
EOSINOPHIL NFR BLD: 3.4 % (ref 0–8)
ERYTHROCYTE [DISTWIDTH] IN BLOOD BY AUTOMATED COUNT: 13.6 % (ref 11.5–14.5)
EST. GFR  (AFRICAN AMERICAN): >60 ML/MIN/1.73 M^2
EST. GFR  (NON AFRICAN AMERICAN): >60 ML/MIN/1.73 M^2
GLUCOSE SERPL-MCNC: 129 MG/DL (ref 70–110)
GLUCOSE UR QL STRIP: NEGATIVE
HCT VFR BLD AUTO: 41.3 % (ref 37–48.5)
HGB BLD-MCNC: 13.6 G/DL (ref 12–16)
HGB UR QL STRIP: ABNORMAL
IMM GRANULOCYTES # BLD AUTO: 0.02 K/UL (ref 0–0.04)
IMM GRANULOCYTES NFR BLD AUTO: 0.2 % (ref 0–0.5)
KETONES UR QL STRIP: NEGATIVE
LEUKOCYTE ESTERASE UR QL STRIP: ABNORMAL
LIPASE SERPL-CCNC: 119 U/L (ref 23–300)
LYMPHOCYTES # BLD AUTO: 3.1 K/UL (ref 1–4.8)
LYMPHOCYTES NFR BLD: 36 % (ref 18–48)
MAGNESIUM SERPL-MCNC: 2.2 MG/DL (ref 1.6–2.6)
MCH RBC QN AUTO: 28.3 PG (ref 27–31)
MCHC RBC AUTO-ENTMCNC: 32.9 G/DL (ref 32–36)
MCV RBC AUTO: 86 FL (ref 82–98)
METHADONE UR QL SCN>300 NG/ML: NEGATIVE
MICROSCOPIC COMMENT: NORMAL
MONOCYTES # BLD AUTO: 0.8 K/UL (ref 0.3–1)
MONOCYTES NFR BLD: 9.4 % (ref 4–15)
NEUTROPHILS # BLD AUTO: 4.3 K/UL (ref 1.8–7.7)
NEUTROPHILS NFR BLD: 50.5 % (ref 38–73)
NITRITE UR QL STRIP: NEGATIVE
NRBC BLD-RTO: 0 /100 WBC
OPIATES UR QL SCN: NEGATIVE
PCP UR QL SCN>25 NG/ML: NEGATIVE
PH UR STRIP: 5 [PH] (ref 5–8)
PLATELET # BLD AUTO: 336 K/UL (ref 150–450)
PMV BLD AUTO: 9.7 FL (ref 9.2–12.9)
POTASSIUM SERPL-SCNC: 3.6 MMOL/L (ref 3.5–5.1)
PROT SERPL-MCNC: 7.9 G/DL (ref 6–8.4)
PROT UR QL STRIP: ABNORMAL
RBC # BLD AUTO: 4.8 M/UL (ref 4–5.4)
RBC #/AREA URNS AUTO: 1 /HPF (ref 0–4)
SODIUM SERPL-SCNC: 140 MMOL/L (ref 136–145)
SP GR UR STRIP: 1.02 (ref 1–1.03)
TOXICOLOGY INFORMATION: ABNORMAL
URN SPEC COLLECT METH UR: ABNORMAL
UROBILINOGEN UR STRIP-ACNC: 1 EU/DL
UUN UR-MCNC: 17 MG/DL (ref 7–17)
WBC # BLD AUTO: 8.48 K/UL (ref 3.9–12.7)
WBC #/AREA URNS AUTO: 5 /HPF (ref 0–5)

## 2022-04-20 PROCEDURE — 96361 HYDRATE IV INFUSION ADD-ON: CPT | Mod: ER

## 2022-04-20 PROCEDURE — 85025 COMPLETE CBC W/AUTO DIFF WBC: CPT | Mod: ER | Performed by: EMERGENCY MEDICINE

## 2022-04-20 PROCEDURE — 96374 THER/PROPH/DIAG INJ IV PUSH: CPT | Mod: ER

## 2022-04-20 PROCEDURE — 83735 ASSAY OF MAGNESIUM: CPT | Mod: ER | Performed by: EMERGENCY MEDICINE

## 2022-04-20 PROCEDURE — 96375 TX/PRO/DX INJ NEW DRUG ADDON: CPT | Mod: ER

## 2022-04-20 PROCEDURE — 81000 URINALYSIS NONAUTO W/SCOPE: CPT | Mod: ER,59 | Performed by: EMERGENCY MEDICINE

## 2022-04-20 PROCEDURE — 25000003 PHARM REV CODE 250: Mod: ER | Performed by: EMERGENCY MEDICINE

## 2022-04-20 PROCEDURE — 80053 COMPREHEN METABOLIC PANEL: CPT | Mod: ER | Performed by: EMERGENCY MEDICINE

## 2022-04-20 PROCEDURE — 80307 DRUG TEST PRSMV CHEM ANLYZR: CPT | Mod: ER | Performed by: EMERGENCY MEDICINE

## 2022-04-20 PROCEDURE — 63600175 PHARM REV CODE 636 W HCPCS: Mod: ER | Performed by: EMERGENCY MEDICINE

## 2022-04-20 PROCEDURE — 99284 EMERGENCY DEPT VISIT MOD MDM: CPT | Mod: 25,ER

## 2022-04-20 PROCEDURE — 83690 ASSAY OF LIPASE: CPT | Mod: ER | Performed by: EMERGENCY MEDICINE

## 2022-04-20 RX ORDER — NABUMETONE 500 MG/1
500 TABLET, FILM COATED ORAL
COMMUNITY
Start: 2022-03-02 | End: 2023-03-02

## 2022-04-20 RX ORDER — METOCLOPRAMIDE 10 MG/1
10 TABLET ORAL EVERY 6 HOURS PRN
Qty: 14 TABLET | Refills: 0 | Status: SHIPPED | OUTPATIENT
Start: 2022-04-20 | End: 2023-03-08

## 2022-04-20 RX ORDER — DICYCLOMINE HYDROCHLORIDE 20 MG/1
20 TABLET ORAL 3 TIMES DAILY PRN
Qty: 14 TABLET | Refills: 0 | Status: SHIPPED | OUTPATIENT
Start: 2022-04-20 | End: 2022-05-20

## 2022-04-20 RX ORDER — LIDOCAINE HYDROCHLORIDE 20 MG/ML
10 SOLUTION OROPHARYNGEAL ONCE
Status: COMPLETED | OUTPATIENT
Start: 2022-04-20 | End: 2022-04-20

## 2022-04-20 RX ORDER — LACTULOSE 10 G/15ML
10 SOLUTION ORAL EVERY 8 HOURS PRN
Qty: 150 ML | Refills: 0 | Status: SHIPPED | OUTPATIENT
Start: 2022-04-20 | End: 2023-03-08

## 2022-04-20 RX ORDER — OMEPRAZOLE 20 MG/1
20 CAPSULE, DELAYED RELEASE ORAL
Qty: 30 CAPSULE | Refills: 0 | Status: SHIPPED | OUTPATIENT
Start: 2022-04-20 | End: 2022-05-20

## 2022-04-20 RX ORDER — METHOCARBAMOL 500 MG/1
500 TABLET, FILM COATED ORAL
COMMUNITY
End: 2022-10-08

## 2022-04-20 RX ORDER — MAG HYDROX/ALUMINUM HYD/SIMETH 200-200-20
30 SUSPENSION, ORAL (FINAL DOSE FORM) ORAL ONCE
Status: COMPLETED | OUTPATIENT
Start: 2022-04-20 | End: 2022-04-20

## 2022-04-20 RX ORDER — MELOXICAM 15 MG/1
15 TABLET ORAL
COMMUNITY
End: 2022-10-08

## 2022-04-20 RX ORDER — KETOROLAC TROMETHAMINE 30 MG/ML
15 INJECTION, SOLUTION INTRAMUSCULAR; INTRAVENOUS
Status: COMPLETED | OUTPATIENT
Start: 2022-04-20 | End: 2022-04-20

## 2022-04-20 RX ORDER — PROCHLORPERAZINE EDISYLATE 5 MG/ML
10 INJECTION INTRAMUSCULAR; INTRAVENOUS
Status: COMPLETED | OUTPATIENT
Start: 2022-04-20 | End: 2022-04-20

## 2022-04-20 RX ADMIN — PROCHLORPERAZINE EDISYLATE 10 MG: 5 INJECTION INTRAMUSCULAR; INTRAVENOUS at 03:04

## 2022-04-20 RX ADMIN — SODIUM CHLORIDE 1000 ML: 0.9 INJECTION, SOLUTION INTRAVENOUS at 03:04

## 2022-04-20 RX ADMIN — KETOROLAC TROMETHAMINE 15 MG: 30 INJECTION, SOLUTION INTRAMUSCULAR at 04:04

## 2022-04-20 RX ADMIN — ALUMINUM HYDROXIDE, MAGNESIUM HYDROXIDE, AND SIMETHICONE 30 ML: 200; 200; 20 SUSPENSION ORAL at 04:04

## 2022-04-20 RX ADMIN — LIDOCAINE HYDROCHLORIDE 10 ML: 20 SOLUTION ORAL; TOPICAL at 04:04

## 2022-04-20 NOTE — ED PROVIDER NOTES
Encounter Date: 4/20/2022       History     Chief Complaint   Patient presents with    Abdominal Pain     Reports to ED c c/o abd pain x 2 weeks. +Worsened generalized pain tonight. 10 emesis episodes. Denies diarrhea or fever. Denies  symptoms. Hx of diverticulitis.      47-year-old female presents emergency department complaining of nausea, vomiting, abdominal pain.  Patient reports she has been having some abdominal discomfort for a couple of weeks.  However it has worsened over the past couple of days.  Since yesterday she has been having constant, generalized aching and stabbing abdominal pain.  Worse with vomiting and without alleviating factors.  Notes she has been nauseated but began throwing up several hours ago.  Notes several episodes of nonbloody, nonbilious emesis.  Notes last bowel movement was yesterday.  Has not had any diarrhea but has had some constipation over the past couple of weeks as well.  Denies any fever, chest pain, shortness of breath, cough, congestion, dysuria, frequency, urgency.  No other symptoms reported at this time.         Review of patient's allergies indicates:   Allergen Reactions    Ace inhibitors Other (See Comments)     cough    Xarelto [rivaroxaban] Hives     Past Medical History:   Diagnosis Date    Anxiety     Diverticulitis     Endometriosis     General anesthetics causing adverse effect in therapeutic use     Hypertension     Kidney stone     Prolactinoma 2011    Sliding hiatal hernia     Urinary tract infection     Vaginal infection      Past Surgical History:   Procedure Laterality Date    ARTHROSCOPY OF KNEE Left     BRAIN TUMOR EXCISION  9/2011    removal of prolactinoma    COLONOSCOPY N/A 8/27/2020    Procedure: COLONOSCOPY;  Surgeon: Valentino Negro MD;  Location: [facility];  Service: Endoscopy;  Laterality: N/A;    CYSTOSCOPY      CYSTOSCOPY W/ URETERAL STENT PLACEMENT  2016    CYSTOSCOPY W/ URETERAL STENT REMOVAL  2016    ESOPHAGEAL  MANOMETRY N/A 6/3/2019    Procedure: MANOMETRY, ESOPHAGUS;  Surgeon: Bhupinder Schmitz MD;  Location: [facility] ENDO (4TH FLR);  Service: Endoscopy;  Laterality: N/A;    ESOPHAGOGASTRODUODENOSCOPY N/A 2019    Procedure: ESOPHAGOGASTRODUODENOSCOPY (EGD);  Surgeon: Altagracia Bose MD;  Location: [facility] ENDO;  Service: Endoscopy;  Laterality: N/A;    ESOPHAGOGASTRODUODENOSCOPY N/A 2021    Procedure: EGD (ESOPHAGOGASTRODUODENOSCOPY);  Surgeon: Altagracia Bose MD;  Location: [facility] ENDO;  Service: Endoscopy;  Laterality: N/A;    FOOT HARDWARE REMOVAL Right 2020    Procedure: REMOVAL, HARDWARE, FOOT;  Surgeon: Adrianna Tillman DPM;  Location: [facility] OR;  Service: Podiatry;  Laterality: Right;    HARVESTING OF BONE GRAFT Right 2019    Procedure: SURGICAL PROCUREMENT, BONE GRAFT;  Surgeon: Adrianna Tillman DPM;  Location: [facility] OR;  Service: Podiatry;  Laterality: Right;    HYSTERECTOMY  3/16/15    [facility]/BSO for Endometriosis, AUB, fibroids, cyst    LITHOTRIPSY      MANDIBLE SURGERY      severe overbite correction    PELVIC LAPAROSCOPY      Dx scope, chromopertubation-Endometriosis    TONSILLECTOMY, ADENOIDECTOMY       Family History   Problem Relation Age of Onset    Cancer Mother     Breast cancer Neg Hx     Ovarian cancer Neg Hx     Kidney disease Neg Hx      Social History     Tobacco Use    Smoking status: Former Smoker     Years: 13.00     Types: Cigarettes     Start date: 1989     Quit date: 2000     Years since quittin.2    Smokeless tobacco: Never Used    Tobacco comment: pt stated she smoked one cig/day   Substance Use Topics    Alcohol use: No     Alcohol/week: 0.0 standard drinks    Drug use: No     Review of Systems   Constitutional: Negative for chills, fatigue and fever.   HENT: Negative for congestion and sore throat.    Eyes: Negative for photophobia and visual disturbance.   Respiratory: Negative for cough and shortness of breath.    Cardiovascular: Negative for  chest pain and palpitations.   Gastrointestinal: Positive for abdominal pain, nausea and vomiting.   Musculoskeletal: Negative for back pain, neck pain and neck stiffness.   Neurological: Negative for light-headedness, numbness and headaches.       Physical Exam     Initial Vitals [04/20/22 0306]   BP Pulse Resp Temp SpO2   (!) 208/95 97 19 97.6 °F (36.4 °C) 98 %      MAP       --         Physical Exam    Nursing note and vitals reviewed.  Constitutional: She appears well-developed and well-nourished. No distress.   HENT:   Head: Normocephalic and atraumatic.   Eyes: Conjunctivae and EOM are normal. Pupils are equal, round, and reactive to light.   Neck: Neck supple. No tracheal deviation present.   Normal range of motion.  Cardiovascular: Normal rate and intact distal pulses.   Pulmonary/Chest: No respiratory distress.   Abdominal: Abdomen is soft. She exhibits no distension. There is abdominal tenderness. There is guarding (Diffuse). There is no rebound.   Musculoskeletal:         General: No tenderness or edema. Normal range of motion.      Cervical back: Normal range of motion and neck supple.     Neurological: She is alert and oriented to person, place, and time. She has normal strength. No cranial nerve deficit. GCS score is 15. GCS eye subscore is 4. GCS verbal subscore is 5. GCS motor subscore is 6.   Skin: Skin is warm and dry.         ED Course   Procedures  Labs Reviewed   COMPREHENSIVE METABOLIC PANEL - Abnormal; Notable for the following components:       Result Value    CO2 19 (*)     Glucose 129 (*)     All other components within normal limits   URINALYSIS - Abnormal; Notable for the following components:    Protein, UA Trace (*)     Occult Blood UA Trace (*)     Leukocytes, UA 1+ (*)     All other components within normal limits    Narrative:     Collection Type->Urine, Clean Catch  Specimen Source->Urine   CBC W/ AUTO DIFFERENTIAL   DRUG SCREEN PANEL, URINE EMERGENCY    Narrative:     Collection  Type->Urine, Clean Catch  Specimen Source->Urine   MAGNESIUM   LIPASE   URINALYSIS MICROSCOPIC    Narrative:     Collection Type->Urine, Clean Catch  Specimen Source->Urine            X-Rays:   Independently Interpreted Readings:   Other Readings:  Imaging interpreted by me pending radiology over read:  Nonobstructive bowel gas pattern, no free air     Medications   sodium chloride 0.9% bolus 1,000 mL (1,000 mLs Intravenous New Bag 4/20/22 0323)   prochlorperazine injection Soln 10 mg (10 mg Intravenous Given 4/20/22 0323)   ketorolac injection 15 mg (15 mg Intravenous Given 4/20/22 0405)   aluminum-magnesium hydroxide-simethicone 200-200-20 mg/5 mL suspension 30 mL (30 mLs Oral Given 4/20/22 0406)     And   LIDOcaine HCl 2% oral solution 10 mL (10 mLs Oral Given 4/20/22 0406)     Medical Decision Making:   Initial Assessment:   47-year-old female presents emergency department complaining of abdominal pain, nausea, vomiting  Differential Diagnosis:   Diverticulitis, cholecystitis, pancreatitis, appendicitis, obstruction, constipation UTI, pyelonephritis, kidney stone, gastroenteritis  Independently Interpreted Test(s):   I have ordered and independently interpreted X-rays - see prior notes.  Clinical Tests:   Lab Tests: Reviewed       <> Summary of Lab: Benign  ED Management:  Patient given IV fluid, Compazine, Toradol, GI cocktail.  She is tolerating p.o. with improvement in symptoms.  Vital signs stable.  Informed her of results as well as plan to discharge with prescriptions for Bentyl, Reglan, lactulose, instructions on home management, follow up with primary care physician, strict return precautions given.  Patient expressed good understanding and is comfortable with discharge at this time.                       Clinical Impression:   Final diagnoses:  [R10.9] Abdominal pain  [R10.84] Generalized abdominal pain (Primary)  [R11.2] Non-intractable vomiting with nausea, unspecified vomiting type          ED  Disposition Condition    Discharge Stable        ED Prescriptions     Medication Sig Dispense Start Date End Date Auth. Provider    dicyclomine (BENTYL) 20 mg tablet Take 1 tablet (20 mg total) by mouth 3 (three) times daily as needed (for abdominal cramping). 14 tablet 4/20/2022 5/20/2022 Teddy Shirley MD    metoclopramide HCl (REGLAN) 10 MG tablet Take 1 tablet (10 mg total) by mouth every 6 (six) hours as needed (Nausea). 14 tablet 4/20/2022  Teddy Shirley MD    lactulose (CHRONULAC) 20 gram/30 mL Soln Take 15 mLs (10 g total) by mouth every 8 (eight) hours as needed (Constipation). 150 mL 4/20/2022  Teddy Shirley MD    omeprazole (PRILOSEC) 20 MG capsule Take 1 capsule (20 mg total) by mouth before breakfast. 30 capsule 4/20/2022 5/20/2022 Teddy Shirley MD        Follow-up Information     Follow up With Specialties Details Why Contact Info    Cathi Chávez NP Family Medicine Schedule an appointment as soon as possible for a visit   [id number] [facility] [id number] [phone]             Teddy Shirley MD  04/20/22 7357

## 2022-04-20 NOTE — DISCHARGE INSTRUCTIONS
Please make sure you are drinking plenty of fluids.  I recommend taking ibuprofen 600 mg every 8 hours with food and/or Tylenol 650 mg every 8 hours as needed for pain.  Please call your primary care physician for follow-up appointment for further evaluation and management.  Please return with any new or worsening symptoms.

## 2022-05-24 ENCOUNTER — OFFICE VISIT (OUTPATIENT)
Dept: ORTHOPEDICS | Facility: CLINIC | Age: 47
End: 2022-05-24
Payer: MEDICAID

## 2022-05-24 VITALS
HEART RATE: 73 BPM | WEIGHT: 270.75 LBS | SYSTOLIC BLOOD PRESSURE: 168 MMHG | HEIGHT: 63 IN | DIASTOLIC BLOOD PRESSURE: 108 MMHG | BODY MASS INDEX: 47.97 KG/M2

## 2022-05-24 DIAGNOSIS — M17.11 PRIMARY OSTEOARTHRITIS OF RIGHT KNEE: ICD-10-CM

## 2022-05-24 DIAGNOSIS — M17.12 PRIMARY OSTEOARTHRITIS OF LEFT KNEE: Primary | ICD-10-CM

## 2022-05-24 PROCEDURE — 4010F ACE/ARB THERAPY RXD/TAKEN: CPT | Mod: CPTII,,, | Performed by: PHYSICIAN ASSISTANT

## 2022-05-24 PROCEDURE — 4010F PR ACE/ARB THEARPY RXD/TAKEN: ICD-10-PCS | Mod: CPTII,,, | Performed by: PHYSICIAN ASSISTANT

## 2022-05-24 PROCEDURE — 20610 DRAIN/INJ JOINT/BURSA W/O US: CPT | Mod: 50,S$PBB,, | Performed by: PHYSICIAN ASSISTANT

## 2022-05-24 PROCEDURE — 99999 PR PBB SHADOW E&M-EST. PATIENT-LVL III: CPT | Mod: PBBFAC,,, | Performed by: PHYSICIAN ASSISTANT

## 2022-05-24 PROCEDURE — 3077F PR MOST RECENT SYSTOLIC BLOOD PRESSURE >= 140 MM HG: ICD-10-PCS | Mod: CPTII,,, | Performed by: PHYSICIAN ASSISTANT

## 2022-05-24 PROCEDURE — 99499 NO LOS: ICD-10-PCS | Mod: S$PBB,,, | Performed by: PHYSICIAN ASSISTANT

## 2022-05-24 PROCEDURE — 3008F PR BODY MASS INDEX (BMI) DOCUMENTED: ICD-10-PCS | Mod: CPTII,,, | Performed by: PHYSICIAN ASSISTANT

## 2022-05-24 PROCEDURE — 99499 UNLISTED E&M SERVICE: CPT | Mod: S$PBB,,, | Performed by: PHYSICIAN ASSISTANT

## 2022-05-24 PROCEDURE — 99999 PR PBB SHADOW E&M-EST. PATIENT-LVL III: ICD-10-PCS | Mod: PBBFAC,,, | Performed by: PHYSICIAN ASSISTANT

## 2022-05-24 PROCEDURE — 1160F PR REVIEW ALL MEDS BY PRESCRIBER/CLIN PHARMACIST DOCUMENTED: ICD-10-PCS | Mod: CPTII,,, | Performed by: PHYSICIAN ASSISTANT

## 2022-05-24 PROCEDURE — 3008F BODY MASS INDEX DOCD: CPT | Mod: CPTII,,, | Performed by: PHYSICIAN ASSISTANT

## 2022-05-24 PROCEDURE — 99213 OFFICE O/P EST LOW 20 MIN: CPT | Mod: PBBFAC,PN,25 | Performed by: PHYSICIAN ASSISTANT

## 2022-05-24 PROCEDURE — 3077F SYST BP >= 140 MM HG: CPT | Mod: CPTII,,, | Performed by: PHYSICIAN ASSISTANT

## 2022-05-24 PROCEDURE — 20610 DRAIN/INJ JOINT/BURSA W/O US: CPT | Mod: 50,PBBFAC,PN | Performed by: PHYSICIAN ASSISTANT

## 2022-05-24 PROCEDURE — 1159F PR MEDICATION LIST DOCUMENTED IN MEDICAL RECORD: ICD-10-PCS | Mod: CPTII,,, | Performed by: PHYSICIAN ASSISTANT

## 2022-05-24 PROCEDURE — 3080F PR MOST RECENT DIASTOLIC BLOOD PRESSURE >= 90 MM HG: ICD-10-PCS | Mod: CPTII,,, | Performed by: PHYSICIAN ASSISTANT

## 2022-05-24 PROCEDURE — 1159F MED LIST DOCD IN RCRD: CPT | Mod: CPTII,,, | Performed by: PHYSICIAN ASSISTANT

## 2022-05-24 PROCEDURE — 20610 LARGE JOINT ASPIRATION/INJECTION: BILATERAL KNEE: ICD-10-PCS | Mod: 50,S$PBB,, | Performed by: PHYSICIAN ASSISTANT

## 2022-05-24 PROCEDURE — 3080F DIAST BP >= 90 MM HG: CPT | Mod: CPTII,,, | Performed by: PHYSICIAN ASSISTANT

## 2022-05-24 PROCEDURE — 1160F RVW MEDS BY RX/DR IN RCRD: CPT | Mod: CPTII,,, | Performed by: PHYSICIAN ASSISTANT

## 2022-05-24 RX ORDER — TRIAMCINOLONE ACETONIDE 40 MG/ML
40 INJECTION, SUSPENSION INTRA-ARTICULAR; INTRAMUSCULAR
Status: DISCONTINUED | OUTPATIENT
Start: 2022-05-24 | End: 2022-05-24 | Stop reason: HOSPADM

## 2022-05-24 RX ADMIN — TRIAMCINOLONE ACETONIDE 40 MG: 40 INJECTION, SUSPENSION INTRA-ARTICULAR; INTRAMUSCULAR at 09:05

## 2022-05-24 NOTE — PROGRESS NOTES
Subjective:      Patient ID: Inna Gallardo is a 47 y.o. female.    Chief Complaint: Pain of the Right Knee and Pain of the Left Knee      48yo obese female follow up bilateral knee corticosteroid injections. Helps for 3 mon.       Review of Systems   Constitutional: Negative for chills and fever.   Cardiovascular: Negative for chest pain.   Respiratory: Negative for cough.    Hematologic/Lymphatic: Does not bruise/bleed easily.   Skin: Negative for poor wound healing and rash.   Musculoskeletal: Positive for arthritis, joint pain, myalgias and stiffness.   Gastrointestinal: Negative for abdominal pain.   Genitourinary: Negative for bladder incontinence.   Neurological: Negative for dizziness, loss of balance and weakness.   Psychiatric/Behavioral: Negative for altered mental status.       Review of patient's allergies indicates:   Allergen Reactions    Ace inhibitors Other (See Comments)     cough    Xarelto [rivaroxaban] Hives        Current Outpatient Medications   Medication Sig Dispense Refill    amLODIPine (NORVASC) 5 MG tablet Take 5 mg by mouth once daily.      EPINEPHrine (EPIPEN) 0.3 mg/0.3 mL AtIn Inject 0.3 mLs into the muscle as needed.  0    fluticasone propionate (FLONASE) 50 mcg/actuation nasal spray       lactulose (CHRONULAC) 20 gram/30 mL Soln Take 15 mLs (10 g total) by mouth every 8 (eight) hours as needed (Constipation). 150 mL 0    loratadine (CLARITIN) 10 mg tablet Take 1 tablet every day by oral route.      losartan (COZAAR) 50 MG tablet Take 50 mg by mouth once daily.       metFORMIN (GLUCOPHAGE-XR) 500 MG ER 24hr tablet Take 500 mg by mouth once daily.      naproxen (NAPROSYN) 500 MG tablet Take 1 tablet (500 mg total) by mouth 2 (two) times daily with meals. 14 tablet 0    dicyclomine (BENTYL) 20 mg tablet Take 1 tablet (20 mg total) by mouth 3 (three) times daily as needed (abdominal pain). (Patient not taking: Reported on 5/24/2022) 60 tablet 2    ergocalciferol  (ERGOCALCIFEROL) 50,000 unit Cap Take 50,000 Units by mouth every 7 days.      hydroCHLOROthiazide (MICROZIDE) 12.5 mg capsule Take 12.5 mg by mouth once daily.      hydrOXYzine HCl (ATARAX) 25 MG tablet Take 25 mg by mouth 3 (three) times daily as needed.  2    ketorolac (TORADOL) 10 mg tablet Take 1 tablet (10 mg total) by mouth every 6 (six) hours as needed for Pain. (Patient not taking: Reported on 5/24/2022) 20 tablet 0    meloxicam (MOBIC) 15 MG tablet Take 15 mg by mouth.      methocarbamoL (ROBAXIN) 500 MG Tab Take 500 mg by mouth.      metoclopramide HCl (REGLAN) 10 MG tablet Take 1 tablet (10 mg total) by mouth every 6 (six) hours as needed (Nausea). (Patient not taking: Reported on 5/24/2022) 14 tablet 0    nabumetone (RELAFEN) 500 MG tablet Take 500 mg by mouth.      omeprazole (PRILOSEC) 40 MG capsule Take 1 capsule (40 mg total) by mouth every morning. (Patient not taking: Reported on 5/24/2022) 30 capsule 11    paroxetine (PAXIL) 20 MG tablet Take 20 mg by mouth every morning.      sucralfate (CARAFATE) 1 gram tablet Take 1 tablet (1 g total) by mouth before meals and at bedtime as needed (abdominal  pain). (Patient not taking: Reported on 5/24/2022) 90 tablet 1    tamsulosin (FLOMAX) 0.4 mg Cap Take 1 capsule (0.4 mg total) by mouth once daily. (Patient not taking: Reported on 5/24/2022) 30 capsule 0    traMADoL (ULTRAM) 50 mg tablet Take 1 tablet (50 mg total) by mouth every 8 (eight) hours as needed for Pain. (Patient not taking: Reported on 5/24/2022) 30 tablet 0    XULANE 150-35 mcg/24 hr APPLY 1 PATCH AS DIRECTED ONCE A WEEK FOR 3 WEEKS AND OFF FOR 1 WEEK (Patient not taking: Reported on 5/24/2022) 3 patch 0     No current facility-administered medications for this visit.   Under you use the him in the clinic as a holiday year old BTL   A of urine Avila a no win-win the year but you have been taking PTO adhere is using The patient's relevant past medical, surgical, and social  "history was reviewed in Epic.       Objective:      VITAL SIGNS: BP (!) 168/108 (BP Location: Left arm, Patient Position: Sitting, BP Method: X-Large (Automatic))   Pulse 73   Ht 5' 3" (1.6 m)   Wt 122.8 kg (270 lb 11.6 oz)   LMP 03/03/2015   BMI 47.96 kg/m²     Ortho/SPM Exam         Assessment:       1. Primary osteoarthritis of left knee    2. Primary osteoarthritis of right knee          Plan:         Inna Lynne was seen today for pain and pain.    Diagnoses and all orders for this visit:    Primary osteoarthritis of left knee  -     Large Joint Aspiration/Injection: bilateral knee    Primary osteoarthritis of right knee  -     Large Joint Aspiration/Injection: bilateral knee      I injected the bilateral knee with Kenalog and lidocaine under sterile conditions with the patient's informed consent for severe OA. Follow up in 3 months or as needed.           Pat Taylor PA-C   05/24/2022    "

## 2022-05-24 NOTE — PROCEDURES
Large Joint Aspiration/Injection: bilateral knee    Date/Time: 5/24/2022 9:00 AM  Performed by: Pat Taylor PA-C  Authorized by: Pat Taylor PA-C     Consent Done?:  Yes (Verbal)  Indications:  Pain  Site marked: the procedure site was marked    Timeout: prior to procedure the correct patient, procedure, and site was verified    Prep: patient was prepped and draped in usual sterile fashion    Local anesthesia used?: No    Local anesthetic:  Topical anesthetic and lidocaine 1% without epinephrine    Details:  Needle Size:  22 G  Ultrasonic Guidance for needle placement?: No    Approach:  Anterolateral  Location:  Knee  Laterality:  Bilateral  Site:  Bilateral knee  Medications (Right):  40 mg triamcinolone acetonide 40 mg/mL  Medications (Left):  40 mg triamcinolone acetonide 40 mg/mL  Patient tolerance:  Patient tolerated the procedure well with no immediate complications

## 2022-08-29 ENCOUNTER — OFFICE VISIT (OUTPATIENT)
Dept: ORTHOPEDICS | Facility: CLINIC | Age: 47
End: 2022-08-29
Payer: MEDICAID

## 2022-08-29 VITALS
WEIGHT: 262.38 LBS | DIASTOLIC BLOOD PRESSURE: 80 MMHG | HEART RATE: 84 BPM | BODY MASS INDEX: 46.47 KG/M2 | SYSTOLIC BLOOD PRESSURE: 136 MMHG

## 2022-08-29 DIAGNOSIS — M17.11 PRIMARY OSTEOARTHRITIS OF RIGHT KNEE: ICD-10-CM

## 2022-08-29 DIAGNOSIS — E66.01 MORBID OBESITY DUE TO EXCESS CALORIES: ICD-10-CM

## 2022-08-29 DIAGNOSIS — M17.12 PRIMARY OSTEOARTHRITIS OF LEFT KNEE: Primary | ICD-10-CM

## 2022-08-29 PROCEDURE — 4010F PR ACE/ARB THEARPY RXD/TAKEN: ICD-10-PCS | Mod: CPTII,,, | Performed by: PHYSICIAN ASSISTANT

## 2022-08-29 PROCEDURE — 99499 UNLISTED E&M SERVICE: CPT | Mod: S$PBB,,, | Performed by: PHYSICIAN ASSISTANT

## 2022-08-29 PROCEDURE — 99999 PR PBB SHADOW E&M-EST. PATIENT-LVL II: ICD-10-PCS | Mod: PBBFAC,,, | Performed by: PHYSICIAN ASSISTANT

## 2022-08-29 PROCEDURE — 20610 DRAIN/INJ JOINT/BURSA W/O US: CPT | Mod: 50,PBBFAC,PN | Performed by: PHYSICIAN ASSISTANT

## 2022-08-29 PROCEDURE — 3008F PR BODY MASS INDEX (BMI) DOCUMENTED: ICD-10-PCS | Mod: CPTII,,, | Performed by: PHYSICIAN ASSISTANT

## 2022-08-29 PROCEDURE — 1160F PR REVIEW ALL MEDS BY PRESCRIBER/CLIN PHARMACIST DOCUMENTED: ICD-10-PCS | Mod: CPTII,,, | Performed by: PHYSICIAN ASSISTANT

## 2022-08-29 PROCEDURE — 99212 OFFICE O/P EST SF 10 MIN: CPT | Mod: PBBFAC,PN | Performed by: PHYSICIAN ASSISTANT

## 2022-08-29 PROCEDURE — 1160F RVW MEDS BY RX/DR IN RCRD: CPT | Mod: CPTII,,, | Performed by: PHYSICIAN ASSISTANT

## 2022-08-29 PROCEDURE — 1159F MED LIST DOCD IN RCRD: CPT | Mod: CPTII,,, | Performed by: PHYSICIAN ASSISTANT

## 2022-08-29 PROCEDURE — 1159F PR MEDICATION LIST DOCUMENTED IN MEDICAL RECORD: ICD-10-PCS | Mod: CPTII,,, | Performed by: PHYSICIAN ASSISTANT

## 2022-08-29 PROCEDURE — 3008F BODY MASS INDEX DOCD: CPT | Mod: CPTII,,, | Performed by: PHYSICIAN ASSISTANT

## 2022-08-29 PROCEDURE — 3079F PR MOST RECENT DIASTOLIC BLOOD PRESSURE 80-89 MM HG: ICD-10-PCS | Mod: CPTII,,, | Performed by: PHYSICIAN ASSISTANT

## 2022-08-29 PROCEDURE — 20610 LARGE JOINT ASPIRATION/INJECTION: BILATERAL KNEE: ICD-10-PCS | Mod: 50,S$PBB,, | Performed by: PHYSICIAN ASSISTANT

## 2022-08-29 PROCEDURE — 20610 DRAIN/INJ JOINT/BURSA W/O US: CPT | Mod: 50,S$PBB,, | Performed by: PHYSICIAN ASSISTANT

## 2022-08-29 PROCEDURE — 99499 NO LOS: ICD-10-PCS | Mod: S$PBB,,, | Performed by: PHYSICIAN ASSISTANT

## 2022-08-29 PROCEDURE — 99999 PR PBB SHADOW E&M-EST. PATIENT-LVL II: CPT | Mod: PBBFAC,,, | Performed by: PHYSICIAN ASSISTANT

## 2022-08-29 PROCEDURE — 3075F PR MOST RECENT SYSTOLIC BLOOD PRESS GE 130-139MM HG: ICD-10-PCS | Mod: CPTII,,, | Performed by: PHYSICIAN ASSISTANT

## 2022-08-29 PROCEDURE — 3075F SYST BP GE 130 - 139MM HG: CPT | Mod: CPTII,,, | Performed by: PHYSICIAN ASSISTANT

## 2022-08-29 PROCEDURE — 3079F DIAST BP 80-89 MM HG: CPT | Mod: CPTII,,, | Performed by: PHYSICIAN ASSISTANT

## 2022-08-29 PROCEDURE — 4010F ACE/ARB THERAPY RXD/TAKEN: CPT | Mod: CPTII,,, | Performed by: PHYSICIAN ASSISTANT

## 2022-08-29 RX ORDER — IPRATROPIUM BROMIDE 21 UG/1
SPRAY, METERED NASAL
COMMUNITY
Start: 2022-08-02 | End: 2023-03-08

## 2022-08-29 RX ORDER — TRIAMCINOLONE ACETONIDE 40 MG/ML
40 INJECTION, SUSPENSION INTRA-ARTICULAR; INTRAMUSCULAR
Status: DISCONTINUED | OUTPATIENT
Start: 2022-08-29 | End: 2022-08-29 | Stop reason: HOSPADM

## 2022-08-29 RX ORDER — PREDNISONE 20 MG/1
20 TABLET ORAL DAILY
COMMUNITY
Start: 2022-08-20 | End: 2023-03-08

## 2022-08-29 RX ORDER — LOSARTAN POTASSIUM 100 MG/1
100 TABLET ORAL DAILY
COMMUNITY
Start: 2022-05-25 | End: 2023-03-08

## 2022-08-29 RX ORDER — DOCUSATE SODIUM 100 MG/1
100 CAPSULE, LIQUID FILLED ORAL
COMMUNITY
Start: 2022-08-20 | End: 2022-09-03

## 2022-08-29 RX ORDER — OXYCODONE HYDROCHLORIDE 5 MG/1
5 TABLET ORAL EVERY 6 HOURS PRN
COMMUNITY
Start: 2022-08-20 | End: 2023-03-08

## 2022-08-29 RX ORDER — NORGESTIMATE AND ETHINYL ESTRADIOL 0.25-0.035
1 KIT ORAL DAILY
COMMUNITY
Start: 2022-08-02 | End: 2024-03-04

## 2022-08-29 RX ADMIN — TRIAMCINOLONE ACETONIDE 40 MG: 40 INJECTION, SUSPENSION INTRA-ARTICULAR; INTRAMUSCULAR at 10:08

## 2022-08-29 NOTE — PROCEDURES
Large Joint Aspiration/Injection: bilateral knee    Date/Time: 8/29/2022 10:15 AM  Performed by: Pat Taylor PA-C  Authorized by: Pat Taylor PA-C     Consent Done?:  Yes (Verbal)  Indications:  Pain  Site marked: the procedure site was marked    Timeout: prior to procedure the correct patient, procedure, and site was verified    Prep: patient was prepped and draped in usual sterile fashion    Local anesthesia used?: No    Local anesthetic:  Topical anesthetic and lidocaine 1% without epinephrine    Details:  Needle Size:  22 G  Ultrasonic Guidance for needle placement?: No    Approach:  Anterolateral  Location:  Knee  Laterality:  Bilateral  Site:  Bilateral knee  Medications (Right):  40 mg triamcinolone acetonide 40 mg/mL  Medications (Left):  40 mg triamcinolone acetonide 40 mg/mL  Patient tolerance:  Patient tolerated the procedure well with no immediate complications

## 2022-08-29 NOTE — PROGRESS NOTES
Subjective:      Patient ID: Inna Gallardo is a 47 y.o. female.    Chief Complaint: Pain of the Right Knee and Pain of the Left Knee      48yo obese female follow-up bilateral knee osteoarthritis.  She is here for bilateral knee corticosteroid injections.  The help every 3 months.  She recently had surgery on her neck.      Review of Systems   Constitutional: Negative for chills and fever.   Cardiovascular:  Negative for chest pain.   Respiratory:  Negative for cough.    Hematologic/Lymphatic: Does not bruise/bleed easily.   Skin:  Negative for poor wound healing and rash.   Musculoskeletal:  Positive for arthritis, joint pain, myalgias and stiffness.   Gastrointestinal:  Negative for abdominal pain.   Genitourinary:  Negative for bladder incontinence.   Neurological:  Negative for dizziness, loss of balance and weakness.   Psychiatric/Behavioral:  Negative for altered mental status.      Review of patient's allergies indicates:   Allergen Reactions    Ace inhibitors Other (See Comments)     cough    Xarelto [rivaroxaban] Hives        Current Outpatient Medications   Medication Sig Dispense Refill    amLODIPine (NORVASC) 5 MG tablet Take 5 mg by mouth once daily.      docusate sodium (COLACE) 100 MG capsule Take 100 mg by mouth.      ergocalciferol (ERGOCALCIFEROL) 50,000 unit Cap Take 50,000 Units by mouth every 7 days.      ESTARYLLA 0.25-35 mg-mcg per tablet Take 1 tablet by mouth once daily.      fluticasone propionate (FLONASE) 50 mcg/actuation nasal spray       hydroCHLOROthiazide (MICROZIDE) 12.5 mg capsule Take 12.5 mg by mouth once daily.      hydrOXYzine HCl (ATARAX) 25 MG tablet Take 25 mg by mouth 3 (three) times daily as needed.  2    ipratropium (ATROVENT) 21 mcg (0.03 %) nasal spray SMARTSI-2 Spray(s) Both Nares 3 Times Daily PRN      lactulose (CHRONULAC) 20 gram/30 mL Soln Take 15 mLs (10 g total) by mouth every 8 (eight) hours as needed (Constipation). 150 mL 0    loratadine (CLARITIN) 10  mg tablet Take 1 tablet every day by oral route.      losartan (COZAAR) 100 MG tablet Take 100 mg by mouth once daily.      losartan (COZAAR) 50 MG tablet Take 50 mg by mouth once daily.       meloxicam (MOBIC) 15 MG tablet Take 15 mg by mouth.      metFORMIN (GLUCOPHAGE-XR) 500 MG ER 24hr tablet Take 500 mg by mouth once daily.      methocarbamoL (ROBAXIN) 500 MG Tab Take 500 mg by mouth.      nabumetone (RELAFEN) 500 MG tablet Take 500 mg by mouth.      naproxen (NAPROSYN) 500 MG tablet Take 1 tablet (500 mg total) by mouth 2 (two) times daily with meals. 14 tablet 0    oxyCODONE (ROXICODONE) 5 MG immediate release tablet Take 5 mg by mouth every 6 (six) hours as needed.      paroxetine (PAXIL) 20 MG tablet Take 20 mg by mouth every morning.      predniSONE (DELTASONE) 20 MG tablet Take 20 mg by mouth once daily.      dicyclomine (BENTYL) 20 mg tablet Take 1 tablet (20 mg total) by mouth 3 (three) times daily as needed (abdominal pain). (Patient not taking: No sig reported) 60 tablet 2    EPINEPHrine (EPIPEN) 0.3 mg/0.3 mL AtIn Inject 0.3 mLs into the muscle as needed.  0    ketorolac (TORADOL) 10 mg tablet Take 1 tablet (10 mg total) by mouth every 6 (six) hours as needed for Pain. (Patient not taking: No sig reported) 20 tablet 0    metoclopramide HCl (REGLAN) 10 MG tablet Take 1 tablet (10 mg total) by mouth every 6 (six) hours as needed (Nausea). (Patient not taking: No sig reported) 14 tablet 0    omeprazole (PRILOSEC) 40 MG capsule Take 1 capsule (40 mg total) by mouth every morning. (Patient not taking: No sig reported) 30 capsule 11    sucralfate (CARAFATE) 1 gram tablet Take 1 tablet (1 g total) by mouth before meals and at bedtime as needed (abdominal  pain). (Patient not taking: No sig reported) 90 tablet 1    tamsulosin (FLOMAX) 0.4 mg Cap Take 1 capsule (0.4 mg total) by mouth once daily. (Patient not taking: No sig reported) 30 capsule 0    traMADoL (ULTRAM) 50 mg tablet Take 1 tablet (50 mg total)  by mouth every 8 (eight) hours as needed for Pain. (Patient not taking: No sig reported) 30 tablet 0    XULANE 150-35 mcg/24 hr APPLY 1 PATCH AS DIRECTED ONCE A WEEK FOR 3 WEEKS AND OFF FOR 1 WEEK (Patient not taking: No sig reported) 3 patch 0     No current facility-administered medications for this visit.        The patient's relevant past medical, surgical, and social history was reviewed in Epic.       Objective:      VITAL SIGNS: /80   Pulse 84   Wt 119 kg (262 lb 5.6 oz)   LMP 03/03/2015   BMI 46.47 kg/m²     Ortho/SPM Exam         Assessment:       1. Primary osteoarthritis of left knee    2. Primary osteoarthritis of right knee    3. Morbid obesity due to excess calories          Plan:         Inna Lynne was seen today for pain and pain.    Diagnoses and all orders for this visit:    Primary osteoarthritis of left knee  -     Large Joint Aspiration/Injection: bilateral knee    Primary osteoarthritis of right knee  -     Large Joint Aspiration/Injection: bilateral knee    Morbid obesity due to excess calories    I injected the bilateral knee with Kenalog and lidocaine under sterile conditions with the patient's informed consent for severe OA. Follow up in 3 months or as needed.       Diagnoses and plan discussed with the patient, as well as the expected course and duration of his symptoms.  All questions and concerns were addressed prior to the end of the visit.   Instructed patient to call office if they have any future questions/concerns or to schedule apt. Patient will return to see me if symptoms worsen or fail to improve    Note dictated with voice recognition software, please excuse any grammatical errors.        Pat Taylor PA-C   08/29/2022

## 2022-10-08 ENCOUNTER — HOSPITAL ENCOUNTER (EMERGENCY)
Facility: HOSPITAL | Age: 47
Discharge: HOME | End: 2022-10-08
Attending: FAMILY MEDICINE
Payer: MEDICAID

## 2022-10-08 VITALS
OXYGEN SATURATION: 98 % | HEART RATE: 74 BPM | BODY MASS INDEX: 46.07 KG/M2 | RESPIRATION RATE: 20 BRPM | SYSTOLIC BLOOD PRESSURE: 135 MMHG | DIASTOLIC BLOOD PRESSURE: 87 MMHG | TEMPERATURE: 99 F | HEIGHT: 63 IN | WEIGHT: 260 LBS

## 2022-10-08 DIAGNOSIS — S39.012A STRAIN OF LUMBAR REGION, INITIAL ENCOUNTER: Primary | ICD-10-CM

## 2022-10-08 PROCEDURE — 25000003 PHARM REV CODE 250: Mod: ER | Performed by: PHYSICIAN ASSISTANT

## 2022-10-08 PROCEDURE — 96372 THER/PROPH/DIAG INJ SC/IM: CPT | Performed by: PHYSICIAN ASSISTANT

## 2022-10-08 PROCEDURE — 99284 EMERGENCY DEPT VISIT MOD MDM: CPT | Mod: 25,ER

## 2022-10-08 PROCEDURE — 63600175 PHARM REV CODE 636 W HCPCS: Mod: ER | Performed by: PHYSICIAN ASSISTANT

## 2022-10-08 RX ORDER — LIDOCAINE 50 MG/G
1 PATCH TOPICAL DAILY
Qty: 15 PATCH | Refills: 0 | Status: SHIPPED | OUTPATIENT
Start: 2022-10-08 | End: 2024-02-26

## 2022-10-08 RX ORDER — METHOCARBAMOL 750 MG/1
1500 TABLET, FILM COATED ORAL 3 TIMES DAILY
Qty: 30 TABLET | Refills: 0 | Status: SHIPPED | OUTPATIENT
Start: 2022-10-08 | End: 2022-10-13

## 2022-10-08 RX ORDER — KETOROLAC TROMETHAMINE 30 MG/ML
30 INJECTION, SOLUTION INTRAMUSCULAR; INTRAVENOUS
Status: COMPLETED | OUTPATIENT
Start: 2022-10-08 | End: 2022-10-08

## 2022-10-08 RX ORDER — MELOXICAM 7.5 MG/1
7.5 TABLET ORAL DAILY
Qty: 30 TABLET | Refills: 0 | Status: SHIPPED | OUTPATIENT
Start: 2022-10-08 | End: 2023-03-08

## 2022-10-08 RX ORDER — LIDOCAINE 50 MG/G
1 PATCH TOPICAL
Status: DISCONTINUED | OUTPATIENT
Start: 2022-10-08 | End: 2022-10-08 | Stop reason: HOSPADM

## 2022-10-08 RX ORDER — ORPHENADRINE CITRATE 30 MG/ML
30 INJECTION INTRAMUSCULAR; INTRAVENOUS
Status: COMPLETED | OUTPATIENT
Start: 2022-10-08 | End: 2022-10-08

## 2022-10-08 RX ADMIN — ORPHENADRINE CITRATE 30 MG: 30 INJECTION INTRAMUSCULAR; INTRAVENOUS at 03:10

## 2022-10-08 RX ADMIN — LIDOCAINE 1 PATCH: 50 PATCH CUTANEOUS at 04:10

## 2022-10-08 RX ADMIN — KETOROLAC TROMETHAMINE 30 MG: 30 INJECTION, SOLUTION INTRAMUSCULAR at 03:10

## 2022-10-08 NOTE — ED PROVIDER NOTES
Encounter Date: 10/8/2022       History     Chief Complaint   Patient presents with    Back Pain     Reports right lower back pain starting yesterday, worse with movement. Hx neck surgery in august. Denies any loss of bladder or bowel continence. Ambulatory to triage.     HPI: Inna Gallardo, a 47 y.o. female  has a past medical history of Anxiety, Diverticulitis, Endometriosis, General anesthetics causing adverse effect in therapeutic use, Hypertension, Kidney stone, Prolactinoma (2011), Sliding hiatal hernia, Urinary tract infection, and Vaginal infection.     She presents to the ED for evaluation of right sided back pain x 2 days.  Pain is worse with standing and touch.  Describes it as a spasm.  Denies trauma.  Pain started while sitting and has increased with intensity.  Denies urinary symptoms.  States that she's suffered with kidney stones in the past and states that pain is not similar.  Denies accidental loss of bowel or bladder.  Treatments tried include administration of aleve yesterday with no improvement.          The history is provided by the patient.   Review of patient's allergies indicates:   Allergen Reactions    Ace inhibitors Other (See Comments)     cough    Xarelto [rivaroxaban] Hives     Past Medical History:   Diagnosis Date    Anxiety     Diverticulitis     Endometriosis     General anesthetics causing adverse effect in therapeutic use     Hypertension     Kidney stone     Prolactinoma 2011    Sliding hiatal hernia     Urinary tract infection     Vaginal infection      Past Surgical History:   Procedure Laterality Date    ARTHROSCOPY OF KNEE Left     BRAIN TUMOR EXCISION  9/2011    removal of prolactinoma    COLONOSCOPY N/A 8/27/2020    Procedure: COLONOSCOPY;  Surgeon: Valentino Negro MD;  Location: [facility];  Service: Endoscopy;  Laterality: N/A;    CYSTOSCOPY      CYSTOSCOPY W/ URETERAL STENT PLACEMENT  2016    CYSTOSCOPY W/ URETERAL STENT REMOVAL  2016    ESOPHAGEAL  MANOMETRY N/A 6/3/2019    Procedure: MANOMETRY, ESOPHAGUS;  Surgeon: Bhupinder Schmitz MD;  Location: [facility] ENDO (4TH FLR);  Service: Endoscopy;  Laterality: N/A;    ESOPHAGOGASTRODUODENOSCOPY N/A 2019    Procedure: ESOPHAGOGASTRODUODENOSCOPY (EGD);  Surgeon: Altagracia Bose MD;  [facility];  Service: Endoscopy;  Laterality: N/A;    ESOPHAGOGASTRODUODENOSCOPY N/A 2021    Procedure: EGD (ESOPHAGOGASTRODUODENOSCOPY);  Surgeon: Altagracia Bose MD;  [facility];  Service: Endoscopy;  Laterality: N/A;    FOOT HARDWARE REMOVAL Right 2020    Procedure: REMOVAL, HARDWARE, FOOT;  Surgeon: Adrianna Tillman DPM;  [facility];  Service: Podiatry;  Laterality: Right;    HARVESTING OF BONE GRAFT Right 2019    Procedure: SURGICAL PROCUREMENT, BONE GRAFT;  Surgeon: Adrianna Tillman DPM;  [facility];  Service: Podiatry;  Laterality: Right;    HYSTERECTOMY  3/16/15    [facility]/BSO for Endometriosis, AUB, fibroids, cyst    LITHOTRIPSY      MANDIBLE SURGERY      severe overbite correction    PELVIC LAPAROSCOPY      Dx scope, chromopertubation-Endometriosis    TONSILLECTOMY, ADENOIDECTOMY       Family History   Problem Relation Age of Onset    Cancer Mother     Breast cancer Neg Hx     Ovarian cancer Neg Hx     Kidney disease Neg Hx      Social History     Tobacco Use    Smoking status: Former     Years: 13.00     Types: Cigarettes     Start date: 1989     Quit date: 2000     Years since quittin.6    Smokeless tobacco: Never    Tobacco comments:     pt stated she smoked one cig/day   Substance Use Topics    Alcohol use: No     Alcohol/week: 0.0 standard drinks    Drug use: No     Review of Systems   Constitutional:  Negative for fever.   Genitourinary:  Negative for dysuria and hematuria.   Musculoskeletal:  Positive for back pain. Negative for arthralgias.   All other systems reviewed and are negative.    Physical Exam     Initial Vitals [10/08/22 1457]   BP Pulse Resp  Temp SpO2   135/87 74 20 98.8 °F (37.1 °C) 98 %      MAP       --         Physical Exam    Nursing note and vitals reviewed.  Constitutional: She appears well-developed and well-nourished. She is not diaphoretic. No distress.   HENT:   Head: Normocephalic and atraumatic.   Right Ear: External ear normal.   Left Ear: External ear normal.   Nose: Nose normal.   Eyes: Conjunctivae and EOM are normal.   Neck:   Normal range of motion.  Cardiovascular:  Normal rate and regular rhythm.           Pulmonary/Chest: No respiratory distress.   Musculoskeletal:      Cervical back: Normal and normal range of motion.      Thoracic back: Normal.      Lumbar back: Tenderness present. No bony tenderness. Decreased range of motion.        Back:      Neurological: She is alert and oriented to person, place, and time.   Skin: Skin is warm. Capillary refill takes less than 2 seconds. No rash noted.   Psychiatric: She has a normal mood and affect. Thought content normal.       ED Course   Procedures  Labs Reviewed - No data to display       Imaging Results              X-Ray Lumbar Spine Ap And Lateral (Final result)  Result time 10/08/22 16:27:10      Final result by Omar Devlin MD (10/08/22 16:27:10)                   Impression:      No acute abnormality.    Mild moderate multilevel degenerative disc disease      Electronically signed by: Claus Nelson  Date:    10/08/2022  Time:    16:27               Narrative:    EXAMINATION:  XR LUMBAR SPINE AP AND LATERAL    CLINICAL HISTORY:  Pain    COMPARISON:  None    FINDINGS:  Multiple radiographic views  were obtained.    No evidence of acute fracture or dislocation.  Bony mineralization is normal.  Soft tissues are unremarkable. Mild moderate multilevel degenerative disc disease.  Sacroiliac degenerative joint disease suspected.                                       Medications   ketorolac injection 30 mg (30 mg Intramuscular Given 10/8/22 1528)   orphenadrine injection 30 mg (30 mg  Intramuscular Given 10/8/22 1528)     Medical Decision Making:   Initial Assessment:   Right sided back pain  Differential Diagnosis:   Herniated disk, muscular strain, radiculopathy   Clinical Tests:   Radiological Study: Ordered and Reviewed  ED Management:  Pt presents to ED for evaluation of right sided back pain.  No skin changes.  No inadvertent loss of bowel or bladder or fever.  Given toradol and norflex with lidoderm.  X-ray concerning for osteoarthritis.  Will prescribe a course of anti-inflammatory muscle relaxer of well as Lidoderm patch.  Encouraged follow-up with PCP or to return with any new or worsening symptoms.                        Clinical Impression:   Final diagnoses:  [S39.012A] Strain of lumbar region, initial encounter (Primary)      ED Disposition Condition    Discharge Stable          ED Prescriptions       Medication Sig Dispense Start Date End Date Auth. Provider    meloxicam (MOBIC) 7.5 MG tablet Take 1 tablet (7.5 mg total) by mouth once daily. 30 tablet 10/8/2022 -- Elidia Wong PA-C    methocarbamoL (ROBAXIN) 750 MG Tab Take 2 tablets (1,500 mg total) by mouth 3 (three) times daily. for 5 days 30 tablet 10/8/2022 10/13/2022 Elidia Wong PA-C    LIDOcaine (LIDODERM) 5 % Place 1 patch onto the skin once daily. Remove & Discard patch within 12 hours or as directed by MD 15 patch 10/8/2022 -- Elidia Wong PA-C          Follow-up Information    None          Elidia Wong PA-C  10/09/22 [id number]

## 2022-11-29 ENCOUNTER — OFFICE VISIT (OUTPATIENT)
Dept: ORTHOPEDICS | Facility: CLINIC | Age: 47
End: 2022-11-29
Payer: MEDICAID

## 2022-11-29 VITALS
HEART RATE: 80 BPM | HEIGHT: 63 IN | BODY MASS INDEX: 47.57 KG/M2 | WEIGHT: 268.5 LBS | DIASTOLIC BLOOD PRESSURE: 114 MMHG | SYSTOLIC BLOOD PRESSURE: 156 MMHG

## 2022-11-29 DIAGNOSIS — M17.11 PRIMARY OSTEOARTHRITIS OF RIGHT KNEE: ICD-10-CM

## 2022-11-29 DIAGNOSIS — M17.12 PRIMARY OSTEOARTHRITIS OF LEFT KNEE: Primary | ICD-10-CM

## 2022-11-29 PROCEDURE — 3077F PR MOST RECENT SYSTOLIC BLOOD PRESSURE >= 140 MM HG: ICD-10-PCS | Mod: CPTII,,, | Performed by: PHYSICIAN ASSISTANT

## 2022-11-29 PROCEDURE — 4010F PR ACE/ARB THEARPY RXD/TAKEN: ICD-10-PCS | Mod: CPTII,,, | Performed by: PHYSICIAN ASSISTANT

## 2022-11-29 PROCEDURE — 1159F PR MEDICATION LIST DOCUMENTED IN MEDICAL RECORD: ICD-10-PCS | Mod: CPTII,,, | Performed by: PHYSICIAN ASSISTANT

## 2022-11-29 PROCEDURE — 99999 PR PBB SHADOW E&M-EST. PATIENT-LVL III: ICD-10-PCS | Mod: PBBFAC,,, | Performed by: PHYSICIAN ASSISTANT

## 2022-11-29 PROCEDURE — 99499 UNLISTED E&M SERVICE: CPT | Mod: S$PBB,,, | Performed by: PHYSICIAN ASSISTANT

## 2022-11-29 PROCEDURE — 1160F RVW MEDS BY RX/DR IN RCRD: CPT | Mod: CPTII,,, | Performed by: PHYSICIAN ASSISTANT

## 2022-11-29 PROCEDURE — 99999 PR PBB SHADOW E&M-EST. PATIENT-LVL III: CPT | Mod: PBBFAC,,, | Performed by: PHYSICIAN ASSISTANT

## 2022-11-29 PROCEDURE — 20610 DRAIN/INJ JOINT/BURSA W/O US: CPT | Mod: 50,S$PBB,, | Performed by: PHYSICIAN ASSISTANT

## 2022-11-29 PROCEDURE — 3080F DIAST BP >= 90 MM HG: CPT | Mod: CPTII,,, | Performed by: PHYSICIAN ASSISTANT

## 2022-11-29 PROCEDURE — 4010F ACE/ARB THERAPY RXD/TAKEN: CPT | Mod: CPTII,,, | Performed by: PHYSICIAN ASSISTANT

## 2022-11-29 PROCEDURE — 1160F PR REVIEW ALL MEDS BY PRESCRIBER/CLIN PHARMACIST DOCUMENTED: ICD-10-PCS | Mod: CPTII,,, | Performed by: PHYSICIAN ASSISTANT

## 2022-11-29 PROCEDURE — 3008F BODY MASS INDEX DOCD: CPT | Mod: CPTII,,, | Performed by: PHYSICIAN ASSISTANT

## 2022-11-29 PROCEDURE — 99499 NO LOS: ICD-10-PCS | Mod: S$PBB,,, | Performed by: PHYSICIAN ASSISTANT

## 2022-11-29 PROCEDURE — 3077F SYST BP >= 140 MM HG: CPT | Mod: CPTII,,, | Performed by: PHYSICIAN ASSISTANT

## 2022-11-29 PROCEDURE — 1159F MED LIST DOCD IN RCRD: CPT | Mod: CPTII,,, | Performed by: PHYSICIAN ASSISTANT

## 2022-11-29 PROCEDURE — 3008F PR BODY MASS INDEX (BMI) DOCUMENTED: ICD-10-PCS | Mod: CPTII,,, | Performed by: PHYSICIAN ASSISTANT

## 2022-11-29 PROCEDURE — 20610 DRAIN/INJ JOINT/BURSA W/O US: CPT | Mod: 50,PBBFAC,PN | Performed by: PHYSICIAN ASSISTANT

## 2022-11-29 PROCEDURE — 3080F PR MOST RECENT DIASTOLIC BLOOD PRESSURE >= 90 MM HG: ICD-10-PCS | Mod: CPTII,,, | Performed by: PHYSICIAN ASSISTANT

## 2022-11-29 PROCEDURE — 20610 LARGE JOINT ASPIRATION/INJECTION: BILATERAL KNEE: ICD-10-PCS | Mod: 50,S$PBB,, | Performed by: PHYSICIAN ASSISTANT

## 2022-11-29 PROCEDURE — 99213 OFFICE O/P EST LOW 20 MIN: CPT | Mod: PBBFAC,PN,25 | Performed by: PHYSICIAN ASSISTANT

## 2022-11-29 RX ORDER — TRIAMCINOLONE ACETONIDE 40 MG/ML
40 INJECTION, SUSPENSION INTRA-ARTICULAR; INTRAMUSCULAR
Status: DISCONTINUED | OUTPATIENT
Start: 2022-11-29 | End: 2022-11-29 | Stop reason: HOSPADM

## 2022-11-29 RX ADMIN — TRIAMCINOLONE ACETONIDE 40 MG: 40 INJECTION, SUSPENSION INTRA-ARTICULAR; INTRAMUSCULAR at 09:11

## 2022-11-29 NOTE — PROCEDURES
Large Joint Aspiration/Injection: bilateral knee    Date/Time: 11/29/2022 9:45 AM  Performed by: Pat Taylor PA-C  Authorized by: Pat Taylor PA-C     Consent Done?:  Yes (Verbal)  Indications:  Pain  Site marked: the procedure site was marked    Timeout: prior to procedure the correct patient, procedure, and site was verified    Prep: patient was prepped and draped in usual sterile fashion    Local anesthesia used?: No    Local anesthetic:  Topical anesthetic and lidocaine 1% without epinephrine    Details:  Needle Size:  22 G  Ultrasonic Guidance for needle placement?: No    Approach:  Anterolateral  Location:  Knee  Laterality:  Bilateral  Site:  Bilateral knee  Medications (Right):  40 mg triamcinolone acetonide 40 mg/mL  Medications (Left):  40 mg triamcinolone acetonide 40 mg/mL  Patient tolerance:  Patient tolerated the procedure well with no immediate complications

## 2022-11-29 NOTE — PROGRESS NOTES
Subjective:      Patient ID: Inna Gallardo is a 47 y.o. female.    Chief Complaint: Pain of the Right Knee and Pain of the Left Knee      48yo obese female follow up bilateral knee kenalog injections. Help for about 3 mon.      Review of Systems   Constitutional: Negative for chills and fever.   Cardiovascular:  Negative for chest pain.   Respiratory:  Negative for cough.    Hematologic/Lymphatic: Does not bruise/bleed easily.   Skin:  Negative for poor wound healing and rash.   Musculoskeletal:  Positive for arthritis, joint pain, myalgias and stiffness.   Gastrointestinal:  Negative for abdominal pain.   Genitourinary:  Negative for bladder incontinence.   Neurological:  Negative for dizziness, loss of balance and weakness.   Psychiatric/Behavioral:  Negative for altered mental status.      Review of patient's allergies indicates:   Allergen Reactions    Ace inhibitors Other (See Comments)     cough        Current Outpatient Medications   Medication Sig Dispense Refill    amLODIPine (NORVASC) 5 MG tablet Take 5 mg by mouth once daily.      dicyclomine (BENTYL) 20 mg tablet Take 1 tablet (20 mg total) by mouth 3 (three) times daily as needed (abdominal pain). 60 tablet 2    ergocalciferol (ERGOCALCIFEROL) 50,000 unit Cap Take 50,000 Units by mouth every 7 days.      ESTARYLLA 0.25-35 mg-mcg per tablet Take 1 tablet by mouth once daily.      fluticasone propionate (FLONASE) 50 mcg/actuation nasal spray       hydroCHLOROthiazide (MICROZIDE) 12.5 mg capsule Take 12.5 mg by mouth once daily.      hydrOXYzine HCl (ATARAX) 25 MG tablet Take 25 mg by mouth 3 (three) times daily as needed.  2    ipratropium (ATROVENT) 21 mcg (0.03 %) nasal spray SMARTSI-2 Spray(s) Both Nares 3 Times Daily PRN      ketorolac (TORADOL) 10 mg tablet Take 1 tablet (10 mg total) by mouth every 6 (six) hours as needed for Pain. 20 tablet 0    lactulose (CHRONULAC) 20 gram/30 mL Soln Take 15 mLs (10 g total) by mouth every 8 (eight)  hours as needed (Constipation). 150 mL 0    LIDOcaine (LIDODERM) 5 % Place 1 patch onto the skin once daily. Remove & Discard patch within 12 hours or as directed by MD 15 patch 0    loratadine (CLARITIN) 10 mg tablet Take 1 tablet every day by oral route.      losartan (COZAAR) 50 MG tablet Take 50 mg by mouth once daily.       meloxicam (MOBIC) 7.5 MG tablet Take 1 tablet (7.5 mg total) by mouth once daily. 30 tablet 0    metFORMIN (GLUCOPHAGE-XR) 500 MG ER 24hr tablet Take 500 mg by mouth once daily.      metoclopramide HCl (REGLAN) 10 MG tablet Take 1 tablet (10 mg total) by mouth every 6 (six) hours as needed (Nausea). 14 tablet 0    nabumetone (RELAFEN) 500 MG tablet Take 500 mg by mouth.      naproxen (NAPROSYN) 500 MG tablet Take 1 tablet (500 mg total) by mouth 2 (two) times daily with meals. 14 tablet 0    paroxetine (PAXIL) 20 MG tablet Take 20 mg by mouth every morning.      predniSONE (DELTASONE) 20 MG tablet Take 20 mg by mouth once daily.      sucralfate (CARAFATE) 1 gram tablet Take 1 tablet (1 g total) by mouth before meals and at bedtime as needed (abdominal  pain). 90 tablet 1    EPINEPHrine (EPIPEN) 0.3 mg/0.3 mL AtIn Inject 0.3 mLs into the muscle as needed.  0    losartan (COZAAR) 100 MG tablet Take 100 mg by mouth once daily.      omeprazole (PRILOSEC) 40 MG capsule Take 1 capsule (40 mg total) by mouth every morning. (Patient not taking: Reported on 5/24/2022) 30 capsule 11    oxyCODONE (ROXICODONE) 5 MG immediate release tablet Take 5 mg by mouth every 6 (six) hours as needed.      tamsulosin (FLOMAX) 0.4 mg Cap Take 1 capsule (0.4 mg total) by mouth once daily. (Patient not taking: Reported on 5/24/2022) 30 capsule 0    traMADoL (ULTRAM) 50 mg tablet Take 1 tablet (50 mg total) by mouth every 8 (eight) hours as needed for Pain. 30 tablet 0    XULANE 150-35 mcg/24 hr APPLY 1 PATCH AS DIRECTED ONCE A WEEK FOR 3 WEEKS AND OFF FOR 1 WEEK 3 patch 0     No current facility-administered  "medications for this visit.        The patient's relevant past medical, surgical, and social history was reviewed in Epic.       Objective:      VITAL SIGNS: BP (!) 156/114   Pulse 80   Ht 5' 3" (1.6 m)   Wt 121.8 kg (268 lb 8.3 oz)   LMP 03/03/2015   BMI 47.57 kg/m²     Ortho/SPM Exam         Assessment:       1. Primary osteoarthritis of left knee    2. Primary osteoarthritis of right knee          Plan:         Inna Lynne was seen today for pain and pain.    Diagnoses and all orders for this visit:    Primary osteoarthritis of left knee  -     Large Joint Aspiration/Injection: bilateral knee    Primary osteoarthritis of right knee  -     Large Joint Aspiration/Injection: bilateral knee    I injected the bilateral knee with Kenalog and lidocaine under sterile conditions with the patient's informed consent for severe OA. Follow up in 3 months or as needed.       Diagnoses and plan discussed with the patient, as well as the expected course and duration of his symptoms.  All questions and concerns were addressed prior to the end of the visit.   Instructed patient to call office if they have any future questions/concerns or to schedule apt. Patient will return to see me if symptoms worsen or fail to improve    Note dictated with voice recognition software, please excuse any grammatical errors.        Pat Taylor PA-C   11/29/2022      "

## 2023-01-23 DIAGNOSIS — M54.50 LOW BACK PAIN: Primary | ICD-10-CM

## 2023-02-02 ENCOUNTER — CLINICAL SUPPORT (OUTPATIENT)
Dept: REHABILITATION | Facility: HOSPITAL | Age: 48
End: 2023-02-02
Payer: MEDICAID

## 2023-02-02 DIAGNOSIS — R29.898 WEAKNESS OF BOTH LOWER EXTREMITIES: ICD-10-CM

## 2023-02-02 DIAGNOSIS — R26.89 GAIT, ANTALGIC: ICD-10-CM

## 2023-02-02 DIAGNOSIS — M53.86 DECREASED RANGE OF MOTION OF LUMBAR SPINE: ICD-10-CM

## 2023-02-02 DIAGNOSIS — Z74.09 IMPAIRED FUNCTIONAL MOBILITY, BALANCE, AND ENDURANCE: ICD-10-CM

## 2023-02-02 PROCEDURE — 97161 PT EVAL LOW COMPLEX 20 MIN: CPT | Mod: PO

## 2023-02-02 PROCEDURE — 97110 THERAPEUTIC EXERCISES: CPT | Mod: PO

## 2023-02-02 NOTE — PLAN OF CARE
[facility]   Physical Therapy Initial Evaluation     Date: 2/2/2023   Name: Inna Gallardo  [facility] Number: [id number]    Therapy Diagnosis:   Encounter Diagnoses   Name Primary?    Gait, antalgic     Impaired functional mobility, balance, and endurance     Decreased range of motion of lumbar spine     Weakness of both lower extremities      Physician: Temo Larson MD    Physician Orders: PT Eval and Treat   Medical Diagnosis from Referral: M54.50 (ICD-10-CM) - Low back pain  Evaluation Date: 2/2/2023  Authorization Period Expiration: 12/31/2023  Plan of Care Expiration: 3/16/2023  Progress Note Due: 3/2/2023  Visit # / Visits authorized: 1/ 1   FOTO: 0/5    Precautions: Standard     Time In: 3:15 pm  Time Out: 4:00 pm  Total Appointment Time (timed & untimed codes): 45 minutes      SUBJECTIVE     Date of onset: 7 months prior    History of current condition - Inna reports: I started having the low back pain about 7 months ago and it became worse and worse, with it becoming really bad about 5 months ago. It has gotten to the point that it is radiating across the low back. From mid back down, I am having major pain, and it feels stiff everywhere. Even turning over bed is uncomfortable, and I feel the stiffness as well as a lot of pain. I have never had pain like this before.    Falls: None reported    Imaging,     X-ray Lumbar Spine:  FINDINGS:  Multiple radiographic views  were obtained.     No evidence of acute fracture or dislocation.  Bony mineralization is normal.  Soft tissues are unremarkable. Mild moderate multilevel degenerative disc disease.  Sacroiliac degenerative joint disease suspected.     Impression:     No acute abnormality.     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  Prior Level of  Function: Independent  Current Level of Function: Modified Independent (sometimes using a cane in the community and at night time when the pain gets worse)    Pain:  Current 9/10, worst 10/10, best 9/10   Location: lumbosacral spine (R side)  Description: Throbbing and Tight  Aggravating Factors: Standing, Walking, Night Time, Flexing, Lifting, and Getting out of bed/chair  Easing Factors: hot bath (provides brief pain relief)    Patients goals: some pain relief, improved movement     Medical History:   Past Medical History:   Diagnosis Date    Anxiety     Diverticulitis     Endometriosis     General anesthetics causing adverse effect in therapeutic use     Hypertension     Kidney stone     Prolactinoma 2011    Sliding hiatal hernia     Urinary tract infection     Vaginal infection        Surgical History:   Inna Gallardo  has a past surgical history that includes TONSILLECTOMY, ADENOIDECTOMY; Pelvic laparoscopy (2014); Mandible surgery (2002); Hysterectomy (3/16/15); Brain tumor excision (9/2011); Cystoscopy w/ ureteral stent placement (2016); Cystoscopy w/ ureteral stent removal (2016); Arthroscopy of knee (Left); Esophagogastroduodenoscopy (N/A, 4/30/2019); Esophageal manometry (N/A, 6/3/2019); Harvesting of bone graft (Right, 7/16/2019); Foot hardware removal (Right, 6/16/2020); Colonoscopy (N/A, 8/27/2020); Lithotripsy; Cystoscopy; and Esophagogastroduodenoscopy (N/A, 8/2/2021).    Medications:   Inna has a current medication list which includes the following prescription(s): amlodipine, dicyclomine, epinephrine, ergocalciferol, estarylla, fluticasone propionate, hydrochlorothiazide, hydroxyzine hcl, ipratropium, ketorolac, lactulose, lidocaine, loratadine, losartan, losartan, meloxicam, metformin, metoclopramide hcl, nabumetone, naproxen, omeprazole, oxycodone, paroxetine, prednisone, sucralfate, tamsulosin, tramadol, and xulane.    Allergies:   Review of patient's allergies indicates:   Allergen Reactions     Ace inhibitors Other (See Comments)     cough          OBJECTIVE     Observation: Pt is a 48 yo F presenting to therapy with significant challenges with transfers and gait.    Transfers: Challenges w sit to stand requiring BUE support, bradykinetic movement    Gait: myopathic and antalgic gait, decreased R hip flexion, decreased R foot clearance in toe off, and multiple steps needed for turns     Lumbar Range of Motion:    Degrees Pain   Flexion 75 deg   Yes        Extension 25 deg   Yes        Left Side Bending Patella Yes R>L        Right Side Bending Distal thigh Yes R>L        Left rotation   WFL No        Right Rotation   WFL No             Lower Extremity Strength  Right LE  Left LE    Knee extension: 4+/5 Knee extension: 5/5   Knee flexion: 4+/5 Knee flexion: 5/5   Hip flexion: 3+/5 (compensatory hip ER) Hip flexion: 4+/5   Hip extension:  3/5 Hip extension: 4/5   Hip abduction: 4-/5 Hip abduction: 4-/5   Hip adduction: 4/5 Hip adduction 4/5   Ankle dorsiflexion: 5/5 Ankle dorsiflexion: 5/5   Ankle plantarflexion: 3/5 Ankle plantarflexion: 3/5         Special Tests:  - Long sitting test - R posterior innominate rotation (MET performed for correction)  -JR - Pos R and L (SIJ Dysfunction)  -FADIR - Neg R and L    SIJ Cluster:  - Thigh thrust - Neg R and L  - Sacral thrust - Pos  - ASIS Compression - Pos  - ASIS Distraction - Pos      Neuro Dynamic Testing:    Sciatic nerve:      SLR: R = 90 (Neg - painful)     L = 90 (Neg - painful)        Joint Mobility: Hypomobile lumbar segments (some pain relief with PA mobs)    Palpation: TTP at lumbar paraspinals and R QL and glute max and glute med/min    Flexibility:    Ely's test: R = 112 degrees ; L = 105 degrees          Limitation/Restriction for FOTO Lumbar Spine Survey    Therapist reviewed FOTO scores for Inna Portillordelle on 2/2/2023.   FOTO documents entered into [employer] - see Media section.    Limitation Score: 25%         TREATMENT     Total Treatment time  (time-based codes) separate from Evaluation: 10 minutes      Inna received the treatments listed below:      therapeutic exercises to develop strength, endurance, ROM, flexibility, posture, and core stabilization for 10 minutes including: *HEP Instructions  - NuStep  - Hip flexor str R  - Hip ABD  - Hip ADD  - Bridges  - LTR  - Supine marching w abs contracted  - Iso abs w physioball  - LAQ  - HS curls    manual therapy techniques: Joint mobilizations, Myofacial release, and Soft tissue Mobilization were applied to the: Lumbar spine and SIJ dysfunction for 10 minutes, including:  - MET for R posterior innominate rotation  - PA mobs Lumbar spine (Gr I-III)    neuromuscular re-education activities to improve: Balance, Coordination, Proprioception, and Posture for 0 minutes. The following activities were included:  - Tandem stance  - SLS    therapeutic activities to improve functional performance for 0 minutes, including:  - Sit to stands      PATIENT EDUCATION AND HOME EXERCISES     Education provided:   - importance of consistent performance of HEP  - role of PT/PTA    Written Home Exercises Provided: yes. Exercises were reviewed and Inna was able to demonstrate them prior to the end of the session.  Inna demonstrated good  understanding of the education provided. See EMR under Patient Instructions for exercises provided during therapy sessions.    ASSESSMENT     Inna is a 47 y.o. female referred to outpatient Physical Therapy with a medical diagnosis of low back pain. Patient presents with maximal levels of low back pain with TTP at R lumbar paraspinals as well as hypomobile lumbar segments. She appears to have signs and symptoms consistent with SIJ dysfunction, and displays R posterior innominate rotation which is alleviated by performance of MET.  She displays BLE weakness as well as some lumbar ROM limitations. 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 PT to work on functional activities to help patient with appropriate body mechanics when caring for her child.    Patient prognosis is Good.   Patient will benefit from skilled outpatient Physical Therapy to address the deficits stated above and in the chart below, provide patient /family education, and to maximize patient's level of independence.     Plan of care discussed with patient: Yes  Patient's spiritual, cultural and educational needs considered and patient is agreeable to the plan of care and goals as stated below:     Anticipated Barriers for therapy: None    Medical Necessity is demonstrated by the following  History  Co-morbidities and personal factors that may impact the plan of care Co-morbidities:   Past Medical History:   Diagnosis Date    Anxiety     Diverticulitis     Endometriosis     General anesthetics causing adverse effect in therapeutic use     Hypertension     Kidney stone     Prolactinoma 2011    Sliding hiatal hernia     Urinary tract infection     Vaginal infection        Personal Factors:   no deficits     low   Examination  Body Structures and Functions, activity limitations and participation restrictions that may impact the plan of care Body Regions:   back  lower extremities  trunk    Body Systems:    ROM  strength    Participation Restrictions:   None    Activity limitations:   Learning and applying knowledge  no deficits    General Tasks and Commands  no deficits    Communication  no deficits    Mobility  lifting and carrying objects  moving around using equipment (WC)  using transportation (bus, train, plane, car)  driving (bike, car, motorcycle)    Self care  no deficits    Domestic Life  shopping  cooking  doing house work (cleaning house, washing dishes, laundry)    Interactions/Relationships  no deficits    Life Areas  no deficits    Community and Social Life  community life  recreation and leisure         low   Clinical Presentation stable  and uncomplicated low   Decision Making/ Complexity Score: low     Goals:  Short Term Goals: 4 weeks   1. This patient will be independent with a basic HEP. In progress, not met  2. This patient will increase B LE strength by 1 grade in order to be able to better take care of her special needs child with less difficulty. In progress, not met  3. This patient will have a pain rating of 7/10 at worst with ADLs. In progress, not met  4. Patient able to score greater than or equal to 35% on the FOTO Lumbar Spine Survey indicating patient is 65% impaired, limited, or restricted and demonstrating overall decreased low back pain with functional activities. In progress, not met     Long Term Goals 8 weeks   1. This patient will be independent with an updated HEP. In progress, not met  2. This patient will increase B LE strength to 5/5 in order to be able to perform usual household duties and caring for her child with minimal complaints of pain. In progress, not met  3. Patient able to score greater than or equal to 50% on the FOTO Lumbar Spine Survey indicating patient is 50% impaired, limited, or restricted and demonstrating overall decreased low back pain with functional activities. In progress, not met     PLAN   Plan of care Certification: 2/2/2023 to 3/16/2023.    Outpatient Physical Therapy 2 times weekly for 6 weeks to include the following interventions: Gait Training, Manual Therapy, Moist Heat/ Ice, Neuromuscular Re-ed, Patient Education, Therapeutic Activities, and Therapeutic Exercise.     Elenita Washington, PT      I CERTIFY THE NEED FOR THESE SERVICES FURNISHED UNDER THIS PLAN OF TREATMENT AND WHILE UNDER MY CARE   Physician's comments:     Physician's Signature: ___________________________________________________

## 2023-02-07 ENCOUNTER — CLINICAL SUPPORT (OUTPATIENT)
Dept: REHABILITATION | Facility: HOSPITAL | Age: 48
End: 2023-02-07
Payer: MEDICAID

## 2023-02-07 DIAGNOSIS — M53.86 DECREASED RANGE OF MOTION OF LUMBAR SPINE: ICD-10-CM

## 2023-02-07 DIAGNOSIS — Z74.09 IMPAIRED FUNCTIONAL MOBILITY, BALANCE, AND ENDURANCE: ICD-10-CM

## 2023-02-07 DIAGNOSIS — R29.898 WEAKNESS OF BOTH LOWER EXTREMITIES: ICD-10-CM

## 2023-02-07 DIAGNOSIS — R26.89 GAIT, ANTALGIC: Primary | ICD-10-CM

## 2023-02-07 PROCEDURE — 97110 THERAPEUTIC EXERCISES: CPT | Mod: PO,CQ

## 2023-02-07 NOTE — PROGRESS NOTES
"[facility]   Physical Therapy Treatment Note     Name: Inna Gallardo  Clinic Number: [id number]    Therapy Diagnosis:   Encounter Diagnoses   Name Primary?    Gait, antalgic Yes    Impaired functional mobility, balance, and endurance     Decreased range of motion of lumbar spine     Weakness of both lower extremities      Physician: Temo Larson MD    Visit Date: 2/7/2023    Physician: Temo Larson MD     Physician Orders: PT Eval and Treat   Medical Diagnosis from Referral: M54.50 (ICD-10-CM) - Low back pain  Evaluation Date: 2/2/2023  Authorization Period Expiration: 12/31/2023  Plan of Care Expiration: 3/16/2023  Progress Note Due: 3/2/2023  Visit # / Visits authorized: 1/ 1, 1/12   FOTO: 0/5     Precautions: Standard     PTA Visit #: 1/5     Time In: 0800  Time Out: 0900  Total Billable Time: 60 minutes    SUBJECTIVE     Pt reports: her L knee gave out on her over the weekend which caused her to sustain a fall over the weekend.    She was compliant with home exercise program.  Response to previous treatment: Initial eval.   Functional change: Ongoing    Pain: /10  Location:       OBJECTIVE     Objective Measures updated at progress report unless specified.     Treatment     Inna received the treatments listed below:      therapeutic exercises to develop strength, endurance, ROM, flexibility, posture, and core stabilization for 60 minutes including:    Nustep 8'   LTR's x 30   Supine hip adduction sqz's 2' 5" holds   Supine clamshells x 30 3" holds GTB  Supine bridge's 3 x 10 GTB   SLR's 2 x 10 B   Side lying clamshells 3 x 10 RTB x 3" hold   LAQ's 3 x 10 3# B         manual therapy techniques:       Patient Education and Home Exercises     Home Exercises Provided and Patient Education Provided     Education provided:   -     Written Home Exercises Provided: Patient instructed to cont prior HEP. Exercises were reviewed and Inna was able to demonstrate them prior to the end " of the session.  Inna demonstrated good  understanding of the education provided. See EMR under Patient Instructions for exercises provided during therapy sessions    ASSESSMENT     Pt with some note's of muscle fatigue at the end of each exercise this morning.  Pt required verbal cueing on proper muscle engagement as well as exercise form throughout treatment session to maximize exercise benefit.  Will continue to monitor and progress pt within her tolerance.       Inna Is progressing well towards her goals.   Pt prognosis is Good.     Pt will continue to benefit from skilled outpatient physical therapy to address the deficits listed in the problem list box on initial evaluation, provide pt/family education and to maximize pt's level of independence in the home and community environment.     Pt's spiritual, cultural and educational needs considered and pt agreeable to plan of care and goals.     Anticipated barriers to physical therapy: none    Goals:     Short Term Goals: 4 weeks   1. This patient will be independent with a basic HEP. In progress, not met  2. This patient will increase B LE strength by 1 grade in order to be able to better take care of her special needs child with less difficulty. In progress, not met  3. This patient will have a pain rating of 7/10 at worst with ADLs. In progress, not met  4. Patient able to score greater than or equal to 35% on the FOTO Lumbar Spine Survey indicating patient is 65% impaired, limited, or restricted and demonstrating overall decreased low back pain with functional activities. In progress, not met     Long Term Goals 8 weeks   1. This patient will be independent with an updated HEP. In progress, not met  2. This patient will increase B LE strength to 5/5 in order to be able to perform usual household duties and caring for her child with minimal complaints of pain. In progress, not met  3. Patient able to score greater than or equal to 50% on the FOTO Lumbar Spine  Survey indicating patient is 50% impaired, limited, or restricted and demonstrating overall decreased low back pain with functional activities. In progress, not met     PLAN     Cont. PT POC.    Bhupinder Salazar, PTA

## 2023-02-09 ENCOUNTER — CLINICAL SUPPORT (OUTPATIENT)
Dept: REHABILITATION | Facility: HOSPITAL | Age: 48
End: 2023-02-09
Payer: MEDICAID

## 2023-02-09 DIAGNOSIS — R29.898 WEAKNESS OF BOTH LOWER EXTREMITIES: ICD-10-CM

## 2023-02-09 DIAGNOSIS — R26.89 GAIT, ANTALGIC: Primary | ICD-10-CM

## 2023-02-09 DIAGNOSIS — M53.86 DECREASED RANGE OF MOTION OF LUMBAR SPINE: ICD-10-CM

## 2023-02-09 DIAGNOSIS — Z74.09 IMPAIRED FUNCTIONAL MOBILITY, BALANCE, AND ENDURANCE: ICD-10-CM

## 2023-02-09 PROCEDURE — 97110 THERAPEUTIC EXERCISES: CPT | Mod: PO,CQ

## 2023-02-09 NOTE — PROGRESS NOTES
"[facility]   Physical Therapy Treatment Note     Name: Inna Gallardo  Clinic Number: [id number]    Therapy Diagnosis:   Encounter Diagnoses   Name Primary?    Gait, antalgic Yes    Impaired functional mobility, balance, and endurance     Decreased range of motion of lumbar spine     Weakness of both lower extremities        Physician: Temo Larson MD    Visit Date: 2/9/2023    Physician: Temo Larson MD     Physician Orders: PT Eval and Treat   Medical Diagnosis from Referral: M54.50 (ICD-10-CM) - Low back pain  Evaluation Date: 2/2/2023  Authorization Period Expiration: 12/31/2023  Plan of Care Expiration: 3/16/2023  Progress Note Due: 3/2/2023  Visit # / Visits authorized: 1/ 1, 2/12   FOTO: 0/5     Precautions: Standard     PTA Visit #: 2/5     Time In: 0900  Time Out: 1000  Total Billable Time: 60 minutes    SUBJECTIVE     Pt reports: she felt like her lower back loosened up after last visit.    She was compliant with home exercise program.  Response to previous treatment: Initial eval.   Functional change: Ongoing    Pain: /10  Location:       OBJECTIVE     Objective Measures updated at progress report unless specified.     Treatment     Inna received the treatments listed below:      therapeutic exercises to develop strength, endurance, ROM, flexibility, posture, and core stabilization for 60 minutes including:    Nustep 8'   LTR's x 30   Supine hip adduction sqz's 2' 5" holds   Supine clamshells x 30 3" holds GTB  Supine bridge's 3 x 10 GTB   SLR's 2 x 10 B   Side lying clamshells 3 x 10 RTB x 3" hold   LAQ's 3 x 10 3# B         manual therapy techniques:       Patient Education and Home Exercises     Home Exercises Provided and Patient Education Provided     Education provided:   -     Written Home Exercises Provided: Patient instructed to cont prior HEP. Exercises were reviewed and Inna was able to demonstrate them prior to the end of the session.  Inna demonstrated " good  understanding of the education provided. See EMR under Patient Instructions for exercises provided during therapy sessions    ASSESSMENT      PT reports of decreased lower back stiffness after her previous treatment session.  Pt with no reports of pain pre or post treatment session.  Pt with good exercises tolerance throughout treatment session.  Will continue to monitor and progress pt within her tolerance.       Inna Is progressing well towards her goals.   Pt prognosis is Good.     Pt will continue to benefit from skilled outpatient physical therapy to address the deficits listed in the problem list box on initial evaluation, provide pt/family education and to maximize pt's level of independence in the home and community environment.     Pt's spiritual, cultural and educational needs considered and pt agreeable to plan of care and goals.     Anticipated barriers to physical therapy: none    Goals:     Short Term Goals: 4 weeks   1. This patient will be independent with a basic HEP. In progress, not met  2. This patient will increase B LE strength by 1 grade in order to be able to better take care of her special needs child with less difficulty. In progress, not met  3. This patient will have a pain rating of 7/10 at worst with ADLs. In progress, not met  4. Patient able to score greater than or equal to 35% on the FOTO Lumbar Spine Survey indicating patient is 65% impaired, limited, or restricted and demonstrating overall decreased low back pain with functional activities. In progress, not met     Long Term Goals 8 weeks   1. This patient will be independent with an updated HEP. In progress, not met  2. This patient will increase B LE strength to 5/5 in order to be able to perform usual household duties and caring for her child with minimal complaints of pain. In progress, not met  3. Patient able to score greater than or equal to 50% on the FOTO Lumbar Spine Survey indicating patient is 50% impaired,  limited, or restricted and demonstrating overall decreased low back pain with functional activities. In progress, not met     PLAN     Cont. PT POC.    Bhupinder Salazar, PTA

## 2023-02-15 ENCOUNTER — CLINICAL SUPPORT (OUTPATIENT)
Dept: REHABILITATION | Facility: HOSPITAL | Age: 48
End: 2023-02-15
Payer: MEDICAID

## 2023-02-15 DIAGNOSIS — M53.86 DECREASED RANGE OF MOTION OF LUMBAR SPINE: ICD-10-CM

## 2023-02-15 DIAGNOSIS — R29.898 WEAKNESS OF BOTH LOWER EXTREMITIES: ICD-10-CM

## 2023-02-15 DIAGNOSIS — Z74.09 IMPAIRED FUNCTIONAL MOBILITY, BALANCE, AND ENDURANCE: ICD-10-CM

## 2023-02-15 DIAGNOSIS — R26.89 GAIT, ANTALGIC: Primary | ICD-10-CM

## 2023-02-15 PROCEDURE — 97110 THERAPEUTIC EXERCISES: CPT | Mod: PO

## 2023-02-15 NOTE — PROGRESS NOTES
"[facility]   Physical Therapy Treatment Note     Name: Inna Gallardo  [facility] Number: [id number]    Therapy Diagnosis:   Encounter Diagnoses   Name Primary?    Gait, antalgic Yes    Impaired functional mobility, balance, and endurance     Decreased range of motion of lumbar spine     Weakness of both lower extremities        Physician: Temo Larson MD    Visit Date: 2/15/2023    Physician: Temo Larson MD     Physician Orders: PT Eval and Treat   Medical Diagnosis from Referral: M54.50 (ICD-10-CM) - Low back pain  Evaluation Date: 2/2/2023  Authorization Period Expiration: 12/31/2023  Plan of Care Expiration: 3/16/2023  Progress Note Due: 3/2/2023  Visit # / Visits authorized: 4/12 (+eval)   FOTO: 4/5     Precautions: Standard     PTA Visit #: 0/5     Time In: 8:03 am  Time Out: 8:57 am  Total Billable Time: 55 minutes    SUBJECTIVE     Pt reports: I am feeling the same level of pain as always.  I fell right before my first visit because my knee gave out.   She was compliant with home exercise program.  Response to previous treatment: good  Functional change: Ongoing    Pain: 8/10  Location:  low back    OBJECTIVE     Objective Measures updated at progress report unless specified.     Treatment     Inna received the treatments listed below:      therapeutic exercises to develop strength, endurance, ROM, flexibility, posture, and core stabilization for 55 minutes including:    Nustep 10'   LTR's x 30   Supine hip adduction sqz's 2' 5" holds   Supine clamshells 2' w 5" holds GTB  Supine bridge's 3 x 10 w 3" holds GTB   SLR's 2 x 10 B   Side lying clamshells 3 x 10 RTB x 3" hold   LAQ's 3 x 10 3# B   Ball 3 way - 3' w 5" holds  Marching on physioball - 3'        manual therapy techniques:   MET - 3' *billed as therex      Patient Education and Home Exercises     Home Exercises Provided and Patient Education Provided     Education provided:   - importance of consistent performance " of HEP    Written Home Exercises Provided: Patient instructed to cont prior HEP. Exercises were reviewed and Inna was able to demonstrate them prior to the end of the session.  Inna demonstrated good  understanding of the education provided. See EMR under Patient Instructions for exercises provided during therapy sessions    ASSESSMENT     Inna presents to therapy with significant pain in her low back and knees. She tolerates today's session well without symptom provocation. She displays increased challenge with performance of bridges and marching on the physioball with decreased hip flexor and hip extensor strength. She states that ball 3-way helps her to feel better, especially forward emphasizing lumbar flexion. She requires minimal to moderate VC and TC for proper performance of prescribed activities with appropriate body mechanics and for postural awareness in order to avoid compensatory movements.       Inna Is progressing well towards her goals.   Pt prognosis is Good.     Pt will continue to benefit from skilled outpatient physical therapy to address the deficits listed in the problem list box on initial evaluation, provide pt/family education and to maximize pt's level of independence in the home and community environment.     Pt's spiritual, cultural and educational needs considered and pt agreeable to plan of care and goals.     Anticipated barriers to physical therapy: none    Goals:     Short Term Goals: 4 weeks   1. This patient will be independent with a basic HEP. In progress, not met  2. This patient will increase B LE strength by 1 grade in order to be able to better take care of her special needs child with less difficulty. In progress, not met  3. This patient will have a pain rating of 7/10 at worst with ADLs. In progress, not met  4. Patient able to score greater than or equal to 35% on the FOTO Lumbar Spine Survey indicating patient is 65% impaired, limited, or restricted and demonstrating overall  decreased low back pain with functional activities. In progress, not met     Long Term Goals 8 weeks   1. This patient will be independent with an updated HEP. In progress, not met  2. This patient will increase B LE strength to 5/5 in order to be able to perform usual household duties and caring for her child with minimal complaints of pain. In progress, not met  3. Patient able to score greater than or equal to 50% on the FOTO Lumbar Spine Survey indicating patient is 50% impaired, limited, or restricted and demonstrating overall decreased low back pain with functional activities. In progress, not met     PLAN     Cont. PT POC.    Elenita Washington, PT

## 2023-02-28 ENCOUNTER — CLINICAL SUPPORT (OUTPATIENT)
Dept: REHABILITATION | Facility: HOSPITAL | Age: 48
End: 2023-02-28
Payer: MEDICAID

## 2023-02-28 DIAGNOSIS — R26.89 GAIT, ANTALGIC: Primary | ICD-10-CM

## 2023-02-28 DIAGNOSIS — M53.86 DECREASED RANGE OF MOTION OF LUMBAR SPINE: ICD-10-CM

## 2023-02-28 DIAGNOSIS — Z74.09 IMPAIRED FUNCTIONAL MOBILITY, BALANCE, AND ENDURANCE: ICD-10-CM

## 2023-02-28 DIAGNOSIS — R29.898 WEAKNESS OF BOTH LOWER EXTREMITIES: ICD-10-CM

## 2023-02-28 PROCEDURE — 97110 THERAPEUTIC EXERCISES: CPT | Mod: PO,CQ

## 2023-02-28 NOTE — PROGRESS NOTES
"[facility]   Physical Therapy Treatment Note     Name: Inna Gallardo  [facility] Number: [id number]    Therapy Diagnosis:   Encounter Diagnoses   Name Primary?    Gait, antalgic Yes    Impaired functional mobility, balance, and endurance     Decreased range of motion of lumbar spine     Weakness of both lower extremities          Physician: Temo Larson MD    Visit Date: 2/28/2023    Physician: Temo Larson MD     Physician Orders: PT Eval and Treat   Medical Diagnosis from Referral: M54.50 (ICD-10-CM) - Low back pain  Evaluation Date: 2/2/2023  Authorization Period Expiration: 12/31/2023  Plan of Care Expiration: 3/16/2023  Progress Note Due: 3/2/2023  Visit # / Visits authorized: 4/12 (+eval)   FOTO: 4/5     Precautions: Standard     PTA Visit #: 1/5     Time In: 8:00 am  Time Out: 8:55 am  Total Billable Time: 55 minutes    SUBJECTIVE     Pt reports:  no new issues or concerns at this time.   She was compliant with home exercise program.  Response to previous treatment: good  Functional change: Ongoing    Pain: /10  Location:  low back    OBJECTIVE     Objective Measures updated at progress report unless specified.     Treatment     Inna received the treatments listed below:      therapeutic exercises to develop strength, endurance, ROM, flexibility, posture, and core stabilization for 55 minutes including:    Nustep 10'   LTR's x 30   Supine hip adduction sqz's 2' 5" holds   Supine clamshells 2' w 5" holds GTB  Supine bridge's 3 x 10 w 3" holds GTB   SLR's 3 x 10 B   Side lying clamshells 3 x 10 GTB x 3" hold   LAQ's 3 x 10 3# B   Ball 3 way - 3' w 5" holds  Marching on physioball - 3'        manual therapy techniques:     MET - 3' *billed as therex      Patient Education and Home Exercises     Home Exercises Provided and Patient Education Provided     Education provided:   - importance of consistent performance of HEP    Written Home Exercises Provided: Patient instructed to " cont prior HEP. Exercises were reviewed and Inna was able to demonstrate them prior to the end of the session.  Inna demonstrated good  understanding of the education provided. See EMR under Patient Instructions for exercises provided during therapy sessions    ASSESSMENT     Pt with no reports of pain post treatment session.  Pt with good exercise tolerance throughout treatment session.  Will continue to monitor and progress progress pt within her tolerance.      Inna Is progressing well towards her goals.   Pt prognosis is Good.     Pt will continue to benefit from skilled outpatient physical therapy to address the deficits listed in the problem list box on initial evaluation, provide pt/family education and to maximize pt's level of independence in the home and community environment.     Pt's spiritual, cultural and educational needs considered and pt agreeable to plan of care and goals.     Anticipated barriers to physical therapy: none    Goals:     Short Term Goals: 4 weeks   1. This patient will be independent with a basic HEP. In progress, not met  2. This patient will increase B LE strength by 1 grade in order to be able to better take care of her special needs child with less difficulty. In progress, not met  3. This patient will have a pain rating of 7/10 at worst with ADLs. In progress, not met  4. Patient able to score greater than or equal to 35% on the FOTO Lumbar Spine Survey indicating patient is 65% impaired, limited, or restricted and demonstrating overall decreased low back pain with functional activities. In progress, not met     Long Term Goals 8 weeks   1. This patient will be independent with an updated HEP. In progress, not met  2. This patient will increase B LE strength to 5/5 in order to be able to perform usual household duties and caring for her child with minimal complaints of pain. In progress, not met  3. Patient able to score greater than or equal to 50% on the FOTO Lumbar Spine Survey  indicating patient is 50% impaired, limited, or restricted and demonstrating overall decreased low back pain with functional activities. In progress, not met     PLAN     Cont. PT POC.    Bhupinder Salazar, PTA

## 2023-03-01 ENCOUNTER — OFFICE VISIT (OUTPATIENT)
Dept: ORTHOPEDICS | Facility: CLINIC | Age: 48
End: 2023-03-01
Payer: MEDICAID

## 2023-03-01 VITALS
BODY MASS INDEX: 47.89 KG/M2 | HEIGHT: 63 IN | HEART RATE: 66 BPM | WEIGHT: 270.31 LBS | DIASTOLIC BLOOD PRESSURE: 89 MMHG | SYSTOLIC BLOOD PRESSURE: 149 MMHG

## 2023-03-01 DIAGNOSIS — M17.11 PRIMARY OSTEOARTHRITIS OF RIGHT KNEE: ICD-10-CM

## 2023-03-01 DIAGNOSIS — M17.12 PRIMARY OSTEOARTHRITIS OF LEFT KNEE: Primary | ICD-10-CM

## 2023-03-01 PROCEDURE — 1159F MED LIST DOCD IN RCRD: CPT | Mod: CPTII,,, | Performed by: PHYSICIAN ASSISTANT

## 2023-03-01 PROCEDURE — 20610 DRAIN/INJ JOINT/BURSA W/O US: CPT | Mod: 50,PBBFAC,PN | Performed by: PHYSICIAN ASSISTANT

## 2023-03-01 PROCEDURE — 3008F BODY MASS INDEX DOCD: CPT | Mod: CPTII,,, | Performed by: PHYSICIAN ASSISTANT

## 2023-03-01 PROCEDURE — 20610 DRAIN/INJ JOINT/BURSA W/O US: CPT | Mod: 50,S$PBB,, | Performed by: PHYSICIAN ASSISTANT

## 2023-03-01 PROCEDURE — 99999 PR PBB SHADOW E&M-EST. PATIENT-LVL III: CPT | Mod: PBBFAC,,, | Performed by: PHYSICIAN ASSISTANT

## 2023-03-01 PROCEDURE — 99999 PR PBB SHADOW E&M-EST. PATIENT-LVL III: ICD-10-PCS | Mod: PBBFAC,,, | Performed by: PHYSICIAN ASSISTANT

## 2023-03-01 PROCEDURE — 20610 LARGE JOINT ASPIRATION/INJECTION: BILATERAL KNEE: ICD-10-PCS | Mod: 50,S$PBB,, | Performed by: PHYSICIAN ASSISTANT

## 2023-03-01 PROCEDURE — 1160F PR REVIEW ALL MEDS BY PRESCRIBER/CLIN PHARMACIST DOCUMENTED: ICD-10-PCS | Mod: CPTII,,, | Performed by: PHYSICIAN ASSISTANT

## 2023-03-01 PROCEDURE — 3077F PR MOST RECENT SYSTOLIC BLOOD PRESSURE >= 140 MM HG: ICD-10-PCS | Mod: CPTII,,, | Performed by: PHYSICIAN ASSISTANT

## 2023-03-01 PROCEDURE — 99499 NO LOS: ICD-10-PCS | Mod: S$PBB,,, | Performed by: PHYSICIAN ASSISTANT

## 2023-03-01 PROCEDURE — 3077F SYST BP >= 140 MM HG: CPT | Mod: CPTII,,, | Performed by: PHYSICIAN ASSISTANT

## 2023-03-01 PROCEDURE — 3079F DIAST BP 80-89 MM HG: CPT | Mod: CPTII,,, | Performed by: PHYSICIAN ASSISTANT

## 2023-03-01 PROCEDURE — 1160F RVW MEDS BY RX/DR IN RCRD: CPT | Mod: CPTII,,, | Performed by: PHYSICIAN ASSISTANT

## 2023-03-01 PROCEDURE — 1159F PR MEDICATION LIST DOCUMENTED IN MEDICAL RECORD: ICD-10-PCS | Mod: CPTII,,, | Performed by: PHYSICIAN ASSISTANT

## 2023-03-01 PROCEDURE — 3008F PR BODY MASS INDEX (BMI) DOCUMENTED: ICD-10-PCS | Mod: CPTII,,, | Performed by: PHYSICIAN ASSISTANT

## 2023-03-01 PROCEDURE — 99213 OFFICE O/P EST LOW 20 MIN: CPT | Mod: PBBFAC,PN,25 | Performed by: PHYSICIAN ASSISTANT

## 2023-03-01 PROCEDURE — 99499 UNLISTED E&M SERVICE: CPT | Mod: S$PBB,,, | Performed by: PHYSICIAN ASSISTANT

## 2023-03-01 PROCEDURE — 3079F PR MOST RECENT DIASTOLIC BLOOD PRESSURE 80-89 MM HG: ICD-10-PCS | Mod: CPTII,,, | Performed by: PHYSICIAN ASSISTANT

## 2023-03-01 RX ORDER — KETOROLAC TROMETHAMINE 30 MG/ML
30 INJECTION, SOLUTION INTRAMUSCULAR; INTRAVENOUS
Status: DISCONTINUED | OUTPATIENT
Start: 2023-03-01 | End: 2023-03-01 | Stop reason: HOSPADM

## 2023-03-01 RX ADMIN — KETOROLAC TROMETHAMINE 30 MG: 30 INJECTION, SOLUTION INTRAMUSCULAR at 10:03

## 2023-03-01 NOTE — PROCEDURES
Large Joint Aspiration/Injection: bilateral knee    Date/Time: 3/1/2023 10:00 AM  Performed by: Pat Taylor PA-C  Authorized by: Pat Taylor PA-C     Consent Done?:  Yes (Verbal)  Indications:  Arthritis  Site marked: the procedure site was marked    Timeout: prior to procedure the correct patient, procedure, and site was verified    Prep: patient was prepped and draped in usual sterile fashion      Local anesthesia used?: Yes    Local anesthetic:  Topical anesthetic    Details:  Needle Size:  22 G  Ultrasonic Guidance for needle placement?: No    Approach:  Anterolateral  Location:  Knee  Laterality:  Bilateral  Site:  Bilateral knee  Medications (Right):  30 mg ketorolac 30 mg/mL (1 mL)  Medications (Left):  30 mg ketorolac 30 mg/mL (1 mL)  Patient tolerance:  Patient tolerated the procedure well with no immediate complications

## 2023-03-01 NOTE — PROGRESS NOTES
Subjective:      Patient ID: Inna Gallardo is a 47 y.o. female.    Chief Complaint: Pain of the Left Knee and Pain of the Right Knee      47-year-old female follow-up bilateral knee osteoarthritis.  She is here for repeat injections.  She states she has an appointment with [facility] at the end of this month to discuss TKA.  She is using a walker.  She is currently in physical therapy but for her back.  She is previously tried gel injections which provide minimal relief.  She usually gets about 3 month relief from Kenalog injections.      Review of Systems   Constitutional: Negative for chills and fever.   Cardiovascular:  Negative for chest pain.   Respiratory:  Negative for cough.    Hematologic/Lymphatic: Does not bruise/bleed easily.   Skin:  Negative for poor wound healing and rash.   Musculoskeletal:  Positive for joint pain, myalgias and stiffness.   Gastrointestinal:  Negative for abdominal pain.   Genitourinary:  Negative for bladder incontinence.   Neurological:  Negative for dizziness, loss of balance and weakness.   Psychiatric/Behavioral:  Negative for altered mental status.      Review of patient's allergies indicates:   Allergen Reactions    Ace inhibitors Other (See Comments)     cough        Current Outpatient Medications   Medication Sig Dispense Refill    EPINEPHrine (EPIPEN) 0.3 mg/0.3 mL AtIn Inject 0.3 mLs into the muscle as needed.  0    ESTARYLLA 0.25-35 mg-mcg per tablet Take 1 tablet by mouth once daily.      fluticasone propionate (FLONASE) 50 mcg/actuation nasal spray       hydrOXYzine HCl (ATARAX) 25 MG tablet Take 25 mg by mouth 3 (three) times daily as needed.  2    LIDOcaine (LIDODERM) 5 % Place 1 patch onto the skin once daily. Remove & Discard patch within 12 hours or as directed by MD 15 patch 0    loratadine (CLARITIN) 10 mg tablet Take 1 tablet every day by oral route.      metFORMIN (GLUCOPHAGE-XR) 500 MG ER 24hr tablet Take 500 mg by mouth once daily.      naproxen (NAPROSYN) 500 MG  tablet Take 1 tablet (500 mg total) by mouth 2 (two) times daily with meals. 14 tablet 0    omeprazole (PRILOSEC) 40 MG capsule Take 1 capsule (40 mg total) by mouth every morning. 30 capsule 11    paroxetine (PAXIL) 20 MG tablet Take 20 mg by mouth every morning.      sucralfate (CARAFATE) 1 gram tablet Take 1 tablet (1 g total) by mouth before meals and at bedtime as needed (abdominal  pain). 90 tablet 1    amLODIPine (NORVASC) 5 MG tablet Take 5 mg by mouth once daily.      dicyclomine (BENTYL) 20 mg tablet Take 1 tablet (20 mg total) by mouth 3 (three) times daily as needed (abdominal pain). 60 tablet 2    ergocalciferol (ERGOCALCIFEROL) 50,000 unit Cap Take 50,000 Units by mouth every 7 days.      hydroCHLOROthiazide (MICROZIDE) 12.5 mg capsule Take 12.5 mg by mouth once daily.      ipratropium (ATROVENT) 21 mcg (0.03 %) nasal spray SMARTSI-2 Spray(s) Both Nares 3 Times Daily PRN      ketorolac (TORADOL) 10 mg tablet Take 1 tablet (10 mg total) by mouth every 6 (six) hours as needed for Pain. 20 tablet 0    lactulose (CHRONULAC) 20 gram/30 mL Soln Take 15 mLs (10 g total) by mouth every 8 (eight) hours as needed (Constipation). 150 mL 0    losartan (COZAAR) 100 MG tablet Take 100 mg by mouth once daily.      losartan (COZAAR) 50 MG tablet Take 50 mg by mouth once daily.       meloxicam (MOBIC) 7.5 MG tablet Take 1 tablet (7.5 mg total) by mouth once daily. 30 tablet 0    metoclopramide HCl (REGLAN) 10 MG tablet Take 1 tablet (10 mg total) by mouth every 6 (six) hours as needed (Nausea). 14 tablet 0    nabumetone (RELAFEN) 500 MG tablet Take 500 mg by mouth.      oxyCODONE (ROXICODONE) 5 MG immediate release tablet Take 5 mg by mouth every 6 (six) hours as needed.      predniSONE (DELTASONE) 20 MG tablet Take 20 mg by mouth once daily.      tamsulosin (FLOMAX) 0.4 mg Cap Take 1 capsule (0.4 mg total) by mouth once daily. (Patient not taking: Reported on 2022) 30 capsule 0    traMADoL (ULTRAM) 50 mg  "tablet Take 1 tablet (50 mg total) by mouth every 8 (eight) hours as needed for Pain. 30 tablet 0    XULANE 150-35 mcg/24 hr APPLY 1 PATCH AS DIRECTED ONCE A WEEK FOR 3 WEEKS AND OFF FOR 1 WEEK 3 patch 0     No current facility-administered medications for this visit.        The patient's relevant past medical, surgical, and social history was reviewed in Epic.       Objective:      VITAL SIGNS: BP (!) 149/89   Pulse 66   Ht 5' 3" (1.6 m)   Wt 122.6 kg (270 lb 4.5 oz)   LMP 03/03/2015   BMI 47.88 kg/m²     Ortho/SPM Exam         Assessment:       1. Primary osteoarthritis of left knee    2. Primary osteoarthritis of right knee          Plan:         Inna was seen today for pain and pain.    Diagnoses and all orders for this visit:    Primary osteoarthritis of left knee  -     Prior authorization Order  -     Ambulatory referral/consult to Physical/Occupational Therapy; Future  -     Large Joint Aspiration/Injection: bilateral knee    Primary osteoarthritis of right knee  -     Prior authorization Order  -     Ambulatory referral/consult to Physical/Occupational Therapy; Future  -     Large Joint Aspiration/Injection: bilateral knee    Discuss new long-acting steroid injection Zilretta with the patient today.  Believe she will benefit from this.  We will put in authorization and have her come back in 1 week.  Toradol injections given today to help with acute pain.  I will also refer her to physical therapy for her knees.  Follow-up with [facility] at the end of the month.    Diagnoses and plan discussed with the patient, as well as the expected course and duration of his symptoms.  All questions and concerns were addressed prior to the end of the visit.   Instructed patient to call office if they have any future questions/concerns or to schedule apt. Patient will return to see me if symptoms worsen or fail to improve    Note dictated with voice recognition software, please excuse any grammatical errors.      "   Pat Taylor PA-C   03/01/2023

## 2023-03-06 ENCOUNTER — CLINICAL SUPPORT (OUTPATIENT)
Dept: REHABILITATION | Facility: HOSPITAL | Age: 48
End: 2023-03-06
Payer: MEDICAID

## 2023-03-06 DIAGNOSIS — M17.11 PRIMARY OSTEOARTHRITIS OF RIGHT KNEE: ICD-10-CM

## 2023-03-06 DIAGNOSIS — M53.86 DECREASED RANGE OF MOTION OF LUMBAR SPINE: ICD-10-CM

## 2023-03-06 DIAGNOSIS — R29.898 WEAKNESS OF BOTH LOWER EXTREMITIES: ICD-10-CM

## 2023-03-06 DIAGNOSIS — Z74.09 IMPAIRED FUNCTIONAL MOBILITY, BALANCE, AND ENDURANCE: ICD-10-CM

## 2023-03-06 DIAGNOSIS — M17.12 PRIMARY OSTEOARTHRITIS OF LEFT KNEE: ICD-10-CM

## 2023-03-06 DIAGNOSIS — R26.89 GAIT, ANTALGIC: Primary | ICD-10-CM

## 2023-03-06 PROCEDURE — 97110 THERAPEUTIC EXERCISES: CPT | Mod: PO

## 2023-03-06 NOTE — PROGRESS NOTES
[facility]   Physical Therapy Treatment Note     Name: Inna Gallardo  [facility] Number: [id number]    Therapy Diagnosis:   Encounter Diagnoses   Name Primary?    Primary osteoarthritis of left knee     Primary osteoarthritis of right knee     Gait, antalgic Yes    Impaired functional mobility, balance, and endurance     Decreased range of motion of lumbar spine     Weakness of both lower extremities            Physician: Temo Larson MD    Visit Date: 3/6/2023    Physician: Temo Larson MD     Physician Orders: PT Eval and Treat   Medical Diagnosis from Referral: M54.50 (ICD-10-CM) - Low back pain  Evaluation Date: 2/2/2023  Authorization Period Expiration: 12/31/2023  Plan of Care Expiration: 4/24/2023  Progress Note Due: 4/6/2023  Visit # / Visits authorized: 5/12 (+eval)   FOTO: 5/5     Precautions: Standard     PTA Visit #: 0/5     Time In: 8:09 am  Time Out: 9:00 am  Total Billable Time: 51 minutes    SUBJECTIVE     Pt reports:  I have been having a lot of problems with my knees.   She was compliant with home exercise program.  Response to previous treatment: good  Functional change: Ongoing    Pain: 5/10 (back); 8/10 (B knees)   Location:  low back, B knees    OBJECTIVE     Objective Measures updated at progress report unless specified.     Observation: Pt is a 48 yo F presenting to therapy with significant challenges with transfers and gait. She is currently using a wide base quad cane and states that she is experiencing aching and significant weakness in B knees with L>R.     Transfers: Challenges w sit to stand requiring BUE support, bradykinetic movement     Gait: myopathic and antalgic gait, decreased R hip flexion, decreased R foot clearance in toe off, and multiple steps needed for turns     Knee Range of Motion:    Right Active (Passive) Left Active (Passive)   Flexion 113 (120)* 116 (123)*   Extension 10 (5) 12 (8)        Lumbar Range of Motion:     Degrees Pain  "  Flexion 75 deg    Yes         Extension 25 deg    Yes         Left Side Bending Patella Yes R>L         Right Side Bending Distal thigh Yes R>L         Left rotation    WFL No         Right Rotation    WFL No               Lower Extremity Strength  Right LE   Left LE     Knee extension: 4+/5 Knee extension: 5/5   Knee flexion: 4+/5 Knee flexion: 5/5   Hip flexion: 3+/5 (compensatory hip ER) Hip flexion: 4+/5   Hip extension:  3/5 Hip extension: 4/5   Hip abduction: 4-/5 Hip abduction: 4-/5   Hip adduction: 4/5 Hip adduction 4/5   Ankle dorsiflexion: 5/5 Ankle dorsiflexion: 5/5   Ankle plantarflexion: 3/5 Ankle plantarflexion: 3/5            Special Tests:  - Long sitting test - R posterior innominate rotation (MET performed for correction)  -JR - Pos R and L (SIJ Dysfunction)  -FADIR - Neg R and L     SIJ Cluster:  - Thigh thrust - Neg R and L  - Sacral thrust - Pos  - ASIS Compression - Pos  - ASIS Distraction - Pos        Neuro Dynamic Testing:               Sciatic nerve:                                       SLR:    R = 90 (Neg - painful)                                      L = 90 (Neg - painful)                               Joint Mobility: Hypomobile lumbar segments (some pain relief with PA mobs)     Palpation: TTP at lumbar paraspinals and R QL and glute max and glute med/min     Flexibility:               Ely's test: R = 112 degrees ; L = 105 degrees                   Treatment     Inna received the treatments listed below:      therapeutic exercises to develop strength, endurance, ROM, flexibility, posture, and core stabilization for 51 minutes including:    Nustep 10'   LTR's x 30   Supine hip adduction sqz's 2' 5" holds   Supine clamshells 3 x 10 w 5" holds GTB   Supine bridge's 3 x 10 w 3" holds GTB   QS - 2' w 10" holds B  SLR's 2 x 10 B   Side lying clamshells 3 x 10 GTB x 3" hold   LAQ's 3 x 10 3# B   Ball 3 way - 3' w 5" holds  Marching on physioball - 3'  Mini squats - next time  Step ups - " next time        manual therapy techniques:     MET - 3' *billed as therex      Patient Education and Home Exercises     Home Exercises Provided and Patient Education Provided     Education provided:   - importance of consistent performance of HEP    Written Home Exercises Provided: Patient instructed to cont prior HEP. Exercises were reviewed and Inna was able to demonstrate them prior to the end of the session.  Inna demonstrated good  understanding of the education provided. See EMR under Patient Instructions for exercises provided during therapy sessions    ASSESSMENT     Inna presents to therapy today with a wide base quad cane requiring a POC update due to additional referral for pt tx of her B knees. She expresses significant pain in B knees L>R. She tolerates today's session well, however continues to have pain levels much the same at the completion of this session. She continues to have the same issues with her lumbosacral spine, possibly due to decreased therapy attendance.    Inna Is progressing well towards her goals.   Pt prognosis is Good.     Pt will continue to benefit from skilled outpatient physical therapy to address the deficits listed in the problem list box on initial evaluation, provide pt/family education and to maximize pt's level of independence in the home and community environment.     Pt's spiritual, cultural and educational needs considered and pt agreeable to plan of care and goals.     Anticipated barriers to physical therapy: none    Goals:     Short Term Goals: 4 weeks   1. This patient will be independent with a basic HEP. In progress, not met  2. This patient will increase B LE strength by 1 grade in order to be able to better take care of her special needs child with less difficulty. In progress, not met  3. This patient will have a pain rating of 7/10 at worst with ADLs. In progress, not met  4. Patient able to score greater than or equal to 35% on the FOTO Lumbar Spine Survey  indicating patient is 65% impaired, limited, or restricted and demonstrating overall decreased low back pain with functional activities. In progress, not met     Long Term Goals 8 weeks   1. This patient will be independent with an updated HEP. In progress, not met  2. This patient will increase B LE strength to 5/5 in order to be able to perform usual household duties and caring for her child with minimal complaints of pain. In progress, not met  3. Patient able to score greater than or equal to 50% on the FOTO Lumbar Spine Survey indicating patient is 50% impaired, limited, or restricted and demonstrating overall decreased low back pain with functional activities. In progress, not met     PLAN     Cont. PT POC. Complete FOTO.    Elenita Washington, PT

## 2023-03-06 NOTE — PLAN OF CARE
[facility]  Physical Therapy Plan of Care Note    Name: Inna Gallardo  [facility] Number: [id number]    Therapy Diagnosis:   Encounter Diagnoses   Name Primary?    Primary osteoarthritis of left knee     Primary osteoarthritis of right knee     Gait, antalgic Yes    Impaired functional mobility, balance, and endurance     Decreased range of motion of lumbar spine     Weakness of both lower extremities      Physician: Temo Larson MD    Visit Date: 3/6/2023    Physician Orders: PT Eval and Treat   Medical Diagnosis from Referral: M54.50 (ICD-10-CM) - Low back pain  Evaluation Date: 2/2/2023  Authorization Period Expiration: 12/31/2023  Plan of Care Expiration: 4/24/2023  Progress Note Due: 4/6/2023  Visit # / Visits authorized: 5/12 (+eval)   FOTO: 5/5    Precautions: Standard  Functional Level Prior to Evaluation:  Independent (with bradykinetic movement)    SUBJECTIVE     Update:   Pt reports:  I have been having a lot of problems with my knees.   She was compliant with home exercise program.  Response to previous treatment: good  Functional change: Ongoing    Pain: 5/10 (back); 8/10 (B knees)   Location:  low back, B knees    OBJECTIVE     Update: Observation: Pt is a 48 yo F presenting to therapy with significant challenges with transfers and gait. She is currently using a wide base quad cane and states that she is experiencing aching and significant weakness in B knees with L>R.     Transfers: Challenges w sit to stand requiring BUE support, bradykinetic movement     Gait: myopathic and antalgic gait, decreased R hip flexion, decreased R foot clearance in toe off, and multiple steps needed for turns     Knee Range of Motion:    Right Active (Passive) Left Active (Passive)   Flexion 113 (120)* 116 (123)*   Extension 10 (5) 12 (8)        Lumbar Range of Motion:     Degrees Pain   Flexion 75 deg    Yes         Extension 25 deg    Yes         Left Side Bending Patella Yes R>L          Right Side Bending Distal thigh Yes R>L         Left rotation    WFL No         Right Rotation    WFL No               Lower Extremity Strength  Right LE   Left LE     Knee extension: 4+/5 Knee extension: 5/5   Knee flexion: 4+/5 Knee flexion: 5/5   Hip flexion: 3+/5 (compensatory hip ER) Hip flexion: 4+/5   Hip extension:  3/5 Hip extension: 4/5   Hip abduction: 4-/5 Hip abduction: 4-/5   Hip adduction: 4/5 Hip adduction 4/5   Ankle dorsiflexion: 5/5 Ankle dorsiflexion: 5/5   Ankle plantarflexion: 3/5 Ankle plantarflexion: 3/5            Special Tests:  - Long sitting test - R posterior innominate rotation (MET performed for correction)  -JR - Pos R and L (SIJ Dysfunction)  -FADIR - Neg R and L     SIJ Cluster:  - Thigh thrust - Neg R and L  - Sacral thrust - Pos  - ASIS Compression - Pos  - ASIS Distraction - Pos        Neuro Dynamic Testing:               Sciatic nerve:                                       SLR:    R = 90 (Neg - painful)                                      L = 90 (Neg - painful)                               Joint Mobility: Hypomobile lumbar segments (some pain relief with PA mobs)     Palpation: TTP at lumbar paraspinals and R QL and glute max and glute med/min     Flexibility:               Ely's test: R = 112 degrees ; L = 105 degrees    ASSESSMENT     Update:   Inna presents to therapy today with a wide base quad cane requiring a POC update due to additional referral for pt tx of her B knees. She expresses significant pain in B knees L>R. She tolerates today's session well, however continues to have pain levels much the same at the completion of this session. She continues to have the same issues with her lumbosacral spine, possibly due to decreased therapy attendance.    Inna Is progressing well towards her goals.   Pt prognosis is Good.     Pt will continue to benefit from skilled outpatient physical therapy to address the deficits listed in the problem list box on initial evaluation,  provide pt/family education and to maximize pt's level of independence in the home and community environment.     Pt's spiritual, cultural and educational needs considered and pt agreeable to plan of care and goals.    Previous Short Term Goals Status:   4/4 In progress, not met  New Short Term Goals Status:   4/4 In progress, not met  Long Term Goal Status: continue per initial plan of care.  Reasons for Recertification of Therapy:   Continues to have the same issues with her lumbosacral spine and B knee pain    GOALS  Short Term Goals: 4 weeks   1. This patient will be independent with a basic HEP. In progress, not met  2. This patient will increase B LE strength by 1 grade in order to be able to better take care of her special needs child with less difficulty. In progress, not met  3. This patient will have a pain rating of 7/10 at worst with ADLs. In progress, not met  4. Patient able to score greater than or equal to 35% on the FOTO Lumbar Spine Survey indicating patient is 65% impaired, limited, or restricted and demonstrating overall decreased low back pain with functional activities. In progress, not met     Long Term Goals 8 weeks   1. This patient will be independent with an updated HEP. In progress, not met  2. This patient will increase B LE strength to 5/5 in order to be able to perform usual household duties and caring for her child with minimal complaints of pain. In progress, not met  3. Patient able to score greater than or equal to 50% on the FOTO Lumbar Spine Survey indicating patient is 50% impaired, limited, or restricted and demonstrating overall decreased low back pain with functional activities. In progress, not met     PLAN     Updated Certification Period: 3/6/2023 to 4/24/2023   Recommended Treatment Plan: 2 times per week for 8 weeks:  Gait Training, Manual Therapy, Moist Heat/ Ice, Neuromuscular Re-ed, Patient Education, Therapeutic Activities, and Therapeutic Exercise  Other  Recommendations: Elizabeth Washington, PT    I CERTIFY THE NEED FOR THESE SERVICES FURNISHED UNDER THIS PLAN OF TREATMENT AND WHILE UNDER MY CARE  Physician's comments:      Physician's Signature: ___________________________________________________

## 2023-03-08 ENCOUNTER — OFFICE VISIT (OUTPATIENT)
Dept: ORTHOPEDICS | Facility: CLINIC | Age: 48
End: 2023-03-08
Payer: MEDICAID

## 2023-03-08 ENCOUNTER — CLINICAL SUPPORT (OUTPATIENT)
Dept: REHABILITATION | Facility: HOSPITAL | Age: 48
End: 2023-03-08
Payer: MEDICAID

## 2023-03-08 VITALS
SYSTOLIC BLOOD PRESSURE: 138 MMHG | HEIGHT: 63 IN | WEIGHT: 270.31 LBS | DIASTOLIC BLOOD PRESSURE: 82 MMHG | HEART RATE: 73 BPM | BODY MASS INDEX: 47.89 KG/M2

## 2023-03-08 DIAGNOSIS — R26.89 GAIT, ANTALGIC: Primary | ICD-10-CM

## 2023-03-08 DIAGNOSIS — Z74.09 IMPAIRED FUNCTIONAL MOBILITY, BALANCE, AND ENDURANCE: ICD-10-CM

## 2023-03-08 DIAGNOSIS — M53.86 DECREASED RANGE OF MOTION OF LUMBAR SPINE: ICD-10-CM

## 2023-03-08 DIAGNOSIS — M17.11 PRIMARY OSTEOARTHRITIS OF RIGHT KNEE: ICD-10-CM

## 2023-03-08 DIAGNOSIS — R29.898 WEAKNESS OF BOTH LOWER EXTREMITIES: ICD-10-CM

## 2023-03-08 DIAGNOSIS — M17.12 PRIMARY OSTEOARTHRITIS OF LEFT KNEE: Primary | ICD-10-CM

## 2023-03-08 PROCEDURE — 99999 PR PBB SHADOW E&M-EST. PATIENT-LVL III: CPT | Mod: PBBFAC,,, | Performed by: PHYSICIAN ASSISTANT

## 2023-03-08 PROCEDURE — 1160F PR REVIEW ALL MEDS BY PRESCRIBER/CLIN PHARMACIST DOCUMENTED: ICD-10-PCS | Mod: CPTII,,, | Performed by: PHYSICIAN ASSISTANT

## 2023-03-08 PROCEDURE — 20610 DRAIN/INJ JOINT/BURSA W/O US: CPT | Mod: 50,PBBFAC,PN | Performed by: PHYSICIAN ASSISTANT

## 2023-03-08 PROCEDURE — 99499 UNLISTED E&M SERVICE: CPT | Mod: S$PBB,,, | Performed by: PHYSICIAN ASSISTANT

## 2023-03-08 PROCEDURE — 3079F PR MOST RECENT DIASTOLIC BLOOD PRESSURE 80-89 MM HG: ICD-10-PCS | Mod: CPTII,,, | Performed by: PHYSICIAN ASSISTANT

## 2023-03-08 PROCEDURE — 20610 LARGE JOINT ASPIRATION/INJECTION: BILATERAL KNEE: ICD-10-PCS | Mod: 50,S$PBB,, | Performed by: PHYSICIAN ASSISTANT

## 2023-03-08 PROCEDURE — 3008F BODY MASS INDEX DOCD: CPT | Mod: CPTII,,, | Performed by: PHYSICIAN ASSISTANT

## 2023-03-08 PROCEDURE — 1159F PR MEDICATION LIST DOCUMENTED IN MEDICAL RECORD: ICD-10-PCS | Mod: CPTII,,, | Performed by: PHYSICIAN ASSISTANT

## 2023-03-08 PROCEDURE — 20610 DRAIN/INJ JOINT/BURSA W/O US: CPT | Mod: 50,S$PBB,, | Performed by: PHYSICIAN ASSISTANT

## 2023-03-08 PROCEDURE — 3075F PR MOST RECENT SYSTOLIC BLOOD PRESS GE 130-139MM HG: ICD-10-PCS | Mod: CPTII,,, | Performed by: PHYSICIAN ASSISTANT

## 2023-03-08 PROCEDURE — 99213 OFFICE O/P EST LOW 20 MIN: CPT | Mod: PBBFAC,PN | Performed by: PHYSICIAN ASSISTANT

## 2023-03-08 PROCEDURE — 1160F RVW MEDS BY RX/DR IN RCRD: CPT | Mod: CPTII,,, | Performed by: PHYSICIAN ASSISTANT

## 2023-03-08 PROCEDURE — 1159F MED LIST DOCD IN RCRD: CPT | Mod: CPTII,,, | Performed by: PHYSICIAN ASSISTANT

## 2023-03-08 PROCEDURE — 99999 PR PBB SHADOW E&M-EST. PATIENT-LVL III: ICD-10-PCS | Mod: PBBFAC,,, | Performed by: PHYSICIAN ASSISTANT

## 2023-03-08 PROCEDURE — 3008F PR BODY MASS INDEX (BMI) DOCUMENTED: ICD-10-PCS | Mod: CPTII,,, | Performed by: PHYSICIAN ASSISTANT

## 2023-03-08 PROCEDURE — 99499 NO LOS: ICD-10-PCS | Mod: S$PBB,,, | Performed by: PHYSICIAN ASSISTANT

## 2023-03-08 PROCEDURE — 3079F DIAST BP 80-89 MM HG: CPT | Mod: CPTII,,, | Performed by: PHYSICIAN ASSISTANT

## 2023-03-08 PROCEDURE — 3075F SYST BP GE 130 - 139MM HG: CPT | Mod: CPTII,,, | Performed by: PHYSICIAN ASSISTANT

## 2023-03-08 PROCEDURE — 97110 THERAPEUTIC EXERCISES: CPT | Mod: PO

## 2023-03-08 RX ORDER — ATORVASTATIN CALCIUM 20 MG/1
TABLET, FILM COATED ORAL
COMMUNITY
Start: 2023-03-01 | End: 2024-03-04

## 2023-03-08 RX ORDER — ALBUTEROL SULFATE 90 UG/1
2 AEROSOL, METERED RESPIRATORY (INHALATION)
COMMUNITY
Start: 2022-12-16 | End: 2024-02-26

## 2023-03-08 RX ORDER — SERTRALINE HYDROCHLORIDE 50 MG/1
TABLET, FILM COATED ORAL
COMMUNITY
Start: 2023-02-24

## 2023-03-08 RX ADMIN — TRIAMCINOLONE ACETONIDE EXTENDED-RELEASE INJECTABLE SUSPENSION 32 MG: KIT INTRA-ARTICULAR at 09:03

## 2023-03-08 NOTE — PROGRESS NOTES
"  [facility]   Physical Therapy Treatment Note     Name: Inna Gallardo  Clinic Number: [id number]    Therapy Diagnosis:   Encounter Diagnoses   Name Primary?    Gait, antalgic Yes    Impaired functional mobility, balance, and endurance     Decreased range of motion of lumbar spine     Weakness of both lower extremities        Physician: Temo Larson MD    Visit Date: 3/8/2023    Physician: Temo Larson MD     Physician Orders: PT Eval and Treat   Medical Diagnosis from Referral: M54.50 (ICD-10-CM) - Low back pain  Evaluation Date: 2/2/2023  Authorization Period Expiration: 12/31/2023  Plan of Care Expiration: 4/24/2023  Progress Note Due: 4/6/2023  Visit # / Visits authorized: 5/12 (+eval)   FOTO: 5/5     Precautions: Standard     PTA Visit #: 0/5     Time In: 8:04 am  Time Out: 8:50 am  Total Billable Time: 46 minutes    SUBJECTIVE     Pt reports:  I am doing okay today. I got a massage yesterday and it really helped my back. I have to leave a little early today.  She was compliant with home exercise program.  Response to previous treatment: felt like I worked out  Functional change: Ongoing    Pain: 4/10 (back); 6/10 (B knees)   Location:  low back, B knees    OBJECTIVE     Objective Measures updated at progress report unless specified.                    Treatment     Inna received the treatments listed below:      therapeutic exercises to develop strength, endurance, ROM, flexibility, posture, and core stabilization for 46 minutes including:    Nustep 10'   LTR's x 30   Supine hip adduction sqz's 3' 10" holds   Supine clamshells 3 x 10 w 5" holds GTB   Supine bridge's 3 x 12 GTB   QS - 2' w 10" holds B  SLR's 3 x 10 B (R more challenging than L)  Side lying clamshells 3 x 10 GTB x 3" hold   LAQ's 3 x 10 3# B   Ball 3 way - 30x w 5" holds ea  Marching on physioball - next time  Mini squats - next time   Step ups - next time         manual therapy techniques: 0    MET - 0' " *billed as therex      Patient Education and Home Exercises     Home Exercises Provided and Patient Education Provided     Education provided:   - importance of consistent performance of HEP    Written Home Exercises Provided: Patient instructed to cont prior HEP. Exercises were reviewed and Inna was able to demonstrate them prior to the end of the session.  Inna demonstrated good  understanding of the education provided. See EMR under Patient Instructions for exercises provided during therapy sessions    ASSESSMENT     Inna presents to therapy today with a wide base quad cane continuing to display gait abnormalities, have challenges with transfers and transitions. She tolerates today's session well without symptom provocation. She displays some improvement with LTR and Ball 3-way, stating that these activities made her feel better. She continues to have some increased challenge with hip abduction on L>R. Inna requires minimal VC and TC for proper performance of prescribed activities in order to avoid use of compensatory movements.      Inna Is progressing well towards her goals.   Pt prognosis is Good.     Pt will continue to benefit from skilled outpatient physical therapy to address the deficits listed in the problem list box on initial evaluation, provide pt/family education and to maximize pt's level of independence in the home and community environment.     Pt's spiritual, cultural and educational needs considered and pt agreeable to plan of care and goals.     Anticipated barriers to physical therapy: none    Goals:     Short Term Goals: 4 weeks   1. This patient will be independent with a basic HEP. In progress, not met  2. This patient will increase B LE strength by 1 grade in order to be able to better take care of her special needs child with less difficulty. In progress, not met  3. This patient will have a pain rating of 7/10 at worst with ADLs. In progress, not met  4. Patient able to score greater than or  equal to 35% on the FOTO Lumbar Spine Survey indicating patient is 65% impaired, limited, or restricted and demonstrating overall decreased low back pain with functional activities. In progress, not met     Long Term Goals 8 weeks   1. This patient will be independent with an updated HEP. In progress, not met  2. This patient will increase B LE strength to 5/5 in order to be able to perform usual household duties and caring for her child with minimal complaints of pain. In progress, not met  3. Patient able to score greater than or equal to 50% on the FOTO Lumbar Spine Survey indicating patient is 50% impaired, limited, or restricted and demonstrating overall decreased low back pain with functional activities. In progress, not met     PLAN     Cont. PT POC. Complete FOTO.    Elenita Washington, PT

## 2023-03-08 NOTE — PROCEDURES
Large Joint Aspiration/Injection: bilateral knee    Date/Time: 3/8/2023 9:15 AM  Performed by: Pat Taylor PA-C  Authorized by: Pat Taylor PA-C     Consent Done?:  Yes (Verbal)  Indications:  Pain  Site marked: the procedure site was marked    Timeout: prior to procedure the correct patient, procedure, and site was verified    Prep: patient was prepped and draped in usual sterile fashion    Local anesthesia used?: No    Local anesthetic:  Topical anesthetic and lidocaine 1% without epinephrine    Details:  Needle Size:  22 G  Ultrasonic Guidance for needle placement?: No    Approach:  Anterolateral  Location:  Knee  Laterality:  Bilateral  Site:  Bilateral knee  Medications (Right):  32 mg triamcinolone acetonide 32 mg  Medications (Left):  32 mg triamcinolone acetonide 32 mg  Patient tolerance:  Patient tolerated the procedure well with no immediate complications

## 2023-03-08 NOTE — PROGRESS NOTES
Subjective:      Patient ID: Inna Gallardo is a 47 y.o. female.    Chief Complaint: Pain of the Right Knee and Pain of the Left Knee      48yo obese female here for bilateral knee Zilretta injections. Toradol helped minimally.       Review of Systems   Constitutional: Negative for chills and fever.   Cardiovascular:  Negative for chest pain.   Respiratory:  Negative for cough.    Hematologic/Lymphatic: Does not bruise/bleed easily.   Skin:  Negative for poor wound healing and rash.   Musculoskeletal:  Positive for joint pain, myalgias and stiffness.   Gastrointestinal:  Negative for abdominal pain.   Genitourinary:  Negative for bladder incontinence.   Neurological:  Negative for dizziness, loss of balance and weakness.   Psychiatric/Behavioral:  Negative for altered mental status.      Review of patient's allergies indicates:   Allergen Reactions    Ace inhibitors Other (See Comments)     cough        Current Outpatient Medications   Medication Sig Dispense Refill    albuterol (PROVENTIL/VENTOLIN HFA) 90 mcg/actuation inhaler 2 puffs every 4 to 6 hours as needed.      atorvastatin (LIPITOR) 20 MG tablet       EPINEPHrine (EPIPEN) 0.3 mg/0.3 mL AtIn Inject 0.3 mLs into the muscle as needed.  0    ESTARYLLA 0.25-35 mg-mcg per tablet Take 1 tablet by mouth once daily.      fluticasone propionate (FLONASE) 50 mcg/actuation nasal spray       hydrOXYzine HCl (ATARAX) 25 MG tablet Take 25 mg by mouth 3 (three) times daily as needed.  2    LIDOcaine (LIDODERM) 5 % Place 1 patch onto the skin once daily. Remove & Discard patch within 12 hours or as directed by MD 15 patch 0    loratadine (CLARITIN) 10 mg tablet Take 1 tablet every day by oral route.      metFORMIN (GLUCOPHAGE-XR) 500 MG ER 24hr tablet Take 500 mg by mouth once daily.      naproxen (NAPROSYN) 500 MG tablet Take 1 tablet (500 mg total) by mouth 2 (two) times daily with meals. 14 tablet 0    omeprazole (PRILOSEC) 40 MG capsule Take 1 capsule (40 mg total) by  "mouth every morning. 30 capsule 11    paroxetine (PAXIL) 20 MG tablet Take 20 mg by mouth every morning.      sertraline (ZOLOFT) 50 MG tablet       sucralfate (CARAFATE) 1 gram tablet Take 1 tablet (1 g total) by mouth before meals and at bedtime as needed (abdominal  pain). 90 tablet 1     No current facility-administered medications for this visit.        The patient's relevant past medical, surgical, and social history was reviewed in Epic.       Objective:      VITAL SIGNS: /82   Pulse 73   Ht 5' 3" (1.6 m)   Wt 122.6 kg (270 lb 4.5 oz)   LMP 03/03/2015   BMI 47.88 kg/m²     Ortho/SPM Exam         Assessment:       1. Primary osteoarthritis of left knee    2. Primary osteoarthritis of right knee          Plan:         Inna was seen today for pain and pain.    Diagnoses and all orders for this visit:    Primary osteoarthritis of left knee  -     Large Joint Aspiration/Injection: bilateral knee    Primary osteoarthritis of right knee  -     Large Joint Aspiration/Injection: bilateral knee    Bilateral knee Zilretta injections given today. Will see how this does. Continue PT as directed.     Diagnoses and plan discussed with the patient, as well as the expected course and duration of his symptoms.  All questions and concerns were addressed prior to the end of the visit.   Instructed patient to call office if they have any future questions/concerns or to schedule apt. Patient will return to see me if symptoms worsen or fail to improve    Note dictated with voice recognition software, please excuse any grammatical errors.        Pat Taylor PA-C   03/08/2023    "

## 2023-03-13 ENCOUNTER — CLINICAL SUPPORT (OUTPATIENT)
Dept: REHABILITATION | Facility: HOSPITAL | Age: 48
End: 2023-03-13
Payer: MEDICAID

## 2023-03-13 DIAGNOSIS — Z74.09 IMPAIRED FUNCTIONAL MOBILITY, BALANCE, AND ENDURANCE: ICD-10-CM

## 2023-03-13 DIAGNOSIS — M53.86 DECREASED RANGE OF MOTION OF LUMBAR SPINE: ICD-10-CM

## 2023-03-13 DIAGNOSIS — R29.898 WEAKNESS OF BOTH LOWER EXTREMITIES: ICD-10-CM

## 2023-03-13 DIAGNOSIS — R26.89 GAIT, ANTALGIC: Primary | ICD-10-CM

## 2023-03-13 PROCEDURE — 97110 THERAPEUTIC EXERCISES: CPT | Mod: PO

## 2023-03-13 NOTE — PROGRESS NOTES
"  [facility]   Physical Therapy Treatment Note     Name: Inna Gallardo  Clinic Number: [id number]    Therapy Diagnosis:   Encounter Diagnoses   Name Primary?    Gait, antalgic Yes    Impaired functional mobility, balance, and endurance     Decreased range of motion of lumbar spine     Weakness of both lower extremities          Physician: Temo Larson MD    Visit Date: 3/13/2023    Physician: Temo Larson MD     Physician Orders: PT Eval and Treat   Medical Diagnosis from Referral: M54.50 (ICD-10-CM) - Low back pain  Evaluation Date: 2/2/2023  Authorization Period Expiration: 12/31/2023  Plan of Care Expiration: 4/24/2023  Progress Note Due: 4/6/2023  Visit # / Visits authorized: 6/12 (+eval)   FOTO: 5/5     Precautions: Standard     PTA Visit #: 0/5     Time In: 8:00 am  Time Out: 9:00 am  Total Billable Time: 60 minutes    SUBJECTIVE     Pt reports:  I have been moving around a lot this morning, and I haven't noticed a lot of pain although my knees are always hurting and my back hurts some today.  She was compliant with home exercise program.  Response to previous treatment: felt stretched out  Functional change: Ongoing    Pain: 4/10 (back); 5/10 (B knees)   Location:  low back, B knees    OBJECTIVE     Objective Measures updated at progress report unless specified.                    Treatment     Inna received the treatments listed below:      therapeutic exercises to develop strength, endurance, ROM, flexibility, posture, and core stabilization for 60 minutes including:    Nustep 10'   LTR's x 30   Supine hip adduction sqz's 3' 10" holds   Supine clamshells 3 x 10 w 5" holds GTB   Supine bridge's 3 x 12 w 3" holds BTB   QS - 2' w 10" holds B  SLR's 3 x 10 B (R more challenging than L)   Side lying clamshells 3 x 10 BTB x 3" hold   LAQ's 3 x 10 3# B   Ball 3 way - 30x w 5" holds ea   Marching on physioball - 2' w PPT  Mini squats - 3 x 12 (some discomfort w knees)  Step " ups - 2 x 10 R and L fwd (inc discomfort in B knees)         manual therapy techniques: 0    MET - 0' *billed as therex      Patient Education and Home Exercises     Home Exercises Provided and Patient Education Provided     Education provided:   - importance of consistent performance of HEP    Written Home Exercises Provided: Patient instructed to cont prior HEP. Exercises were reviewed and Inna was able to demonstrate them prior to the end of the session.  Inna demonstrated good  understanding of the education provided. See EMR under Patient Instructions for exercises provided during therapy sessions    ASSESSMENT     Inna presents to therapy today with no AD stating that she is feeling better at this visit, as opposed to her previous visit where she was utilizing a WB cane. She continues to display gait abnormalities, and multiple attempts to complete transferring sit to stand. She tolerates She tolerates today's session well with some symptom provocation with bridges, SLR L>R and mini squats. Inna requires minimal VC and TC for proper performance of prescribed activities in order to avoid use of compensatory movements.      Inna Is progressing well towards her goals.   Pt prognosis is Good.     Pt will continue to benefit from skilled outpatient physical therapy to address the deficits listed in the problem list box on initial evaluation, provide pt/family education and to maximize pt's level of independence in the home and community environment.     Pt's spiritual, cultural and educational needs considered and pt agreeable to plan of care and goals.     Anticipated barriers to physical therapy: none    Goals:     Short Term Goals: 4 weeks   1. This patient will be independent with a basic HEP. In progress, not met  2. This patient will increase B LE strength by 1 grade in order to be able to better take care of her special needs child with less difficulty. In progress, not met  3. This patient will have a pain  rating of 7/10 at worst with ADLs. In progress, not met  4. Patient able to score greater than or equal to 35% on the FOTO Lumbar Spine Survey indicating patient is 65% impaired, limited, or restricted and demonstrating overall decreased low back pain with functional activities. In progress, not met     Long Term Goals 8 weeks   1. This patient will be independent with an updated HEP. In progress, not met  2. This patient will increase B LE strength to 5/5 in order to be able to perform usual household duties and caring for her child with minimal complaints of pain. In progress, not met  3. Patient able to score greater than or equal to 50% on the FOTO Lumbar Spine Survey indicating patient is 50% impaired, limited, or restricted and demonstrating overall decreased low back pain with functional activities. In progress, not met     PLAN     Cont. PT POC. Complete FOTO.    Elenita Washington, PT

## 2023-03-14 ENCOUNTER — HOSPITAL ENCOUNTER (EMERGENCY)
Facility: HOSPITAL | Age: 48
Discharge: HOME | End: 2023-03-14
Attending: EMERGENCY MEDICINE
Payer: MEDICAID

## 2023-03-14 VITALS
WEIGHT: 260 LBS | HEART RATE: 60 BPM | HEIGHT: 63 IN | SYSTOLIC BLOOD PRESSURE: 164 MMHG | OXYGEN SATURATION: 100 % | TEMPERATURE: 98 F | RESPIRATION RATE: 19 BRPM | DIASTOLIC BLOOD PRESSURE: 70 MMHG | BODY MASS INDEX: 46.07 KG/M2

## 2023-03-14 DIAGNOSIS — R11.2 NAUSEA AND VOMITING, UNSPECIFIED VOMITING TYPE: ICD-10-CM

## 2023-03-14 DIAGNOSIS — E87.6 HYPOKALEMIA: ICD-10-CM

## 2023-03-14 DIAGNOSIS — R10.31 RIGHT LOWER QUADRANT ABDOMINAL PAIN: ICD-10-CM

## 2023-03-14 DIAGNOSIS — R31.9 HEMATURIA, UNSPECIFIED TYPE: Primary | ICD-10-CM

## 2023-03-14 LAB
ALBUMIN SERPL BCP-MCNC: 5 G/DL (ref 3.5–5.2)
ALP SERPL-CCNC: 76 U/L (ref 38–126)
ALT SERPL W/O P-5'-P-CCNC: 18 U/L (ref 10–44)
ANION GAP SERPL CALC-SCNC: 9 MMOL/L (ref 8–16)
AST SERPL-CCNC: 20 U/L (ref 15–46)
BASOPHILS # BLD AUTO: 0.06 K/UL (ref 0–0.2)
BASOPHILS NFR BLD: 0.4 % (ref 0–1.9)
BILIRUB SERPL-MCNC: 0.6 MG/DL (ref 0.1–1)
BILIRUB UR QL STRIP: NEGATIVE
CALCIUM SERPL-MCNC: 9.3 MG/DL (ref 8.7–10.5)
CHLORIDE SERPL-SCNC: 105 MMOL/L (ref 95–110)
CLARITY UR REFRACT.AUTO: CLEAR
CO2 SERPL-SCNC: 26 MMOL/L (ref 23–29)
COLOR UR AUTO: YELLOW
CREAT SERPL-MCNC: 0.74 MG/DL (ref 0.5–1.4)
DIFFERENTIAL METHOD: ABNORMAL
EOSINOPHIL # BLD AUTO: 0.2 K/UL (ref 0–0.5)
EOSINOPHIL NFR BLD: 1.4 % (ref 0–8)
ERYTHROCYTE [DISTWIDTH] IN BLOOD BY AUTOMATED COUNT: 13.1 % (ref 11.5–14.5)
EST. GFR  (NO RACE VARIABLE): >60 ML/MIN/1.73 M^2
GLUCOSE SERPL-MCNC: 124 MG/DL (ref 70–110)
GLUCOSE UR QL STRIP: NEGATIVE
HCT VFR BLD AUTO: 41.9 % (ref 37–48.5)
HGB BLD-MCNC: 14.1 G/DL (ref 12–16)
HGB UR QL STRIP: ABNORMAL
IMM GRANULOCYTES # BLD AUTO: 0.05 K/UL (ref 0–0.04)
IMM GRANULOCYTES NFR BLD AUTO: 0.4 % (ref 0–0.5)
KETONES UR QL STRIP: NEGATIVE
LEUKOCYTE ESTERASE UR QL STRIP: NEGATIVE
LIPASE SERPL-CCNC: 93 U/L (ref 23–300)
LYMPHOCYTES # BLD AUTO: 3.2 K/UL (ref 1–4.8)
LYMPHOCYTES NFR BLD: 23.6 % (ref 18–48)
MCH RBC QN AUTO: 29.1 PG (ref 27–31)
MCHC RBC AUTO-ENTMCNC: 33.7 G/DL (ref 32–36)
MCV RBC AUTO: 86 FL (ref 82–98)
MICROSCOPIC COMMENT: ABNORMAL
MONOCYTES # BLD AUTO: 0.7 K/UL (ref 0.3–1)
MONOCYTES NFR BLD: 5.3 % (ref 4–15)
NEUTROPHILS # BLD AUTO: 9.4 K/UL (ref 1.8–7.7)
NEUTROPHILS NFR BLD: 68.9 % (ref 38–73)
NITRITE UR QL STRIP: NEGATIVE
NRBC BLD-RTO: 0 /100 WBC
PH UR STRIP: 6 [PH] (ref 5–8)
PLATELET # BLD AUTO: 393 K/UL (ref 150–450)
PMV BLD AUTO: 10.2 FL (ref 9.2–12.9)
POTASSIUM SERPL-SCNC: 3.2 MMOL/L (ref 3.5–5.1)
PROT SERPL-MCNC: 7.9 G/DL (ref 6–8.4)
PROT UR QL STRIP: NEGATIVE
RBC # BLD AUTO: 4.85 M/UL (ref 4–5.4)
RBC #/AREA URNS AUTO: >100 /HPF (ref 0–4)
SODIUM SERPL-SCNC: 140 MMOL/L (ref 136–145)
SP GR UR STRIP: 1.01 (ref 1–1.03)
URN SPEC COLLECT METH UR: ABNORMAL
UROBILINOGEN UR STRIP-ACNC: NEGATIVE EU/DL
UUN UR-MCNC: 20 MG/DL (ref 7–17)
WBC # BLD AUTO: 13.65 K/UL (ref 3.9–12.7)
WBC #/AREA URNS AUTO: 4 /HPF (ref 0–5)

## 2023-03-14 PROCEDURE — 25000003 PHARM REV CODE 250: Mod: ER | Performed by: PHYSICIAN ASSISTANT

## 2023-03-14 PROCEDURE — 96375 TX/PRO/DX INJ NEW DRUG ADDON: CPT | Mod: ER

## 2023-03-14 PROCEDURE — 25000003 PHARM REV CODE 250: Mod: ER | Performed by: NURSE PRACTITIONER

## 2023-03-14 PROCEDURE — 83690 ASSAY OF LIPASE: CPT | Mod: ER | Performed by: NURSE PRACTITIONER

## 2023-03-14 PROCEDURE — 25500020 PHARM REV CODE 255: Mod: ER | Performed by: EMERGENCY MEDICINE

## 2023-03-14 PROCEDURE — 63600175 PHARM REV CODE 636 W HCPCS: Mod: ER | Performed by: NURSE PRACTITIONER

## 2023-03-14 PROCEDURE — 96361 HYDRATE IV INFUSION ADD-ON: CPT | Mod: ER

## 2023-03-14 PROCEDURE — 99285 EMERGENCY DEPT VISIT HI MDM: CPT | Mod: 25,ER

## 2023-03-14 PROCEDURE — 96374 THER/PROPH/DIAG INJ IV PUSH: CPT | Mod: ER

## 2023-03-14 PROCEDURE — 85025 COMPLETE CBC W/AUTO DIFF WBC: CPT | Mod: ER | Performed by: NURSE PRACTITIONER

## 2023-03-14 PROCEDURE — 63600175 PHARM REV CODE 636 W HCPCS: Mod: ER | Performed by: PHYSICIAN ASSISTANT

## 2023-03-14 PROCEDURE — 81000 URINALYSIS NONAUTO W/SCOPE: CPT | Mod: ER | Performed by: NURSE PRACTITIONER

## 2023-03-14 PROCEDURE — 80053 COMPREHEN METABOLIC PANEL: CPT | Mod: ER | Performed by: NURSE PRACTITIONER

## 2023-03-14 RX ORDER — ONDANSETRON 2 MG/ML
4 INJECTION INTRAMUSCULAR; INTRAVENOUS
Status: COMPLETED | OUTPATIENT
Start: 2023-03-14 | End: 2023-03-14

## 2023-03-14 RX ORDER — MORPHINE SULFATE 4 MG/ML
4 INJECTION, SOLUTION INTRAMUSCULAR; INTRAVENOUS
Status: COMPLETED | OUTPATIENT
Start: 2023-03-14 | End: 2023-03-14

## 2023-03-14 RX ORDER — POTASSIUM CHLORIDE 20 MEQ/1
40 TABLET, EXTENDED RELEASE ORAL
Status: COMPLETED | OUTPATIENT
Start: 2023-03-14 | End: 2023-03-14

## 2023-03-14 RX ORDER — KETOROLAC TROMETHAMINE 30 MG/ML
15 INJECTION, SOLUTION INTRAMUSCULAR; INTRAVENOUS
Status: COMPLETED | OUTPATIENT
Start: 2023-03-14 | End: 2023-03-14

## 2023-03-14 RX ORDER — KETOROLAC TROMETHAMINE 10 MG/1
10 TABLET, FILM COATED ORAL EVERY 6 HOURS
Qty: 12 TABLET | Refills: 0 | Status: SHIPPED | OUTPATIENT
Start: 2023-03-14 | End: 2023-03-17

## 2023-03-14 RX ORDER — ONDANSETRON 4 MG/1
4 TABLET, ORALLY DISINTEGRATING ORAL EVERY 6 HOURS PRN
Qty: 30 TABLET | Refills: 0 | Status: SHIPPED | OUTPATIENT
Start: 2023-03-14 | End: 2024-03-04

## 2023-03-14 RX ADMIN — POTASSIUM CHLORIDE 40 MEQ: 1500 TABLET, EXTENDED RELEASE ORAL at 03:03

## 2023-03-14 RX ADMIN — MORPHINE SULFATE 4 MG: 4 INJECTION INTRAVENOUS at 03:03

## 2023-03-14 RX ADMIN — KETOROLAC TROMETHAMINE 15 MG: 30 INJECTION, SOLUTION INTRAMUSCULAR at 02:03

## 2023-03-14 RX ADMIN — ONDANSETRON 4 MG: 2 INJECTION INTRAMUSCULAR; INTRAVENOUS at 02:03

## 2023-03-14 RX ADMIN — IOHEXOL 100 ML: 350 INJECTION, SOLUTION INTRAVENOUS at 03:03

## 2023-03-14 RX ADMIN — SODIUM CHLORIDE 1000 ML: 0.9 INJECTION, SOLUTION INTRAVENOUS at 02:03

## 2023-03-14 NOTE — FIRST PROVIDER EVALUATION
Emergency Department TeleTriage Encounter Note      CHIEF COMPLAINT    Chief Complaint   Patient presents with    Abdominal Pain     I am having abdominal pain that started around 10 am and has gotten worse.I did have blood in my urine two weeks ago.        VITAL SIGNS   Initial Vitals [03/14/23 1350]   BP Pulse Resp Temp SpO2   138/74 90 16 97.6 °F (36.4 °C) 98 %      MAP       --            ALLERGIES    Review of patient's allergies indicates:   Allergen Reactions    Ace inhibitors Other (See Comments)     cough       PROVIDER TRIAGE NOTE  This is a teletriage evaluation of a 47 y.o. female presenting to the ED with c/o right sided abdominal pain.  Pt states pain started at 10am but worsened since that time.  Pt states she had blood in her urine 2 weeks ago.     PE: VSS.  Distressed due to pain.      Plan: labs, hydrate, medicate    Limited physical exam via telehealth: The patient is awake, alert, answering questions appropriately and is not in respiratory distress. All ED beds are full at present; patient notified of this status.  Patient seen and medically screened by Nurse Practitioner via teletriage.      Initial orders will be placed and care will be transferred to an alternate provider when patient is roomed for a full evaluation. Any additional orders and the final disposition will be determined by that provider.         ORDERS  Labs Reviewed   CBC W/ AUTO DIFFERENTIAL   COMPREHENSIVE METABOLIC PANEL   LIPASE   URINALYSIS, REFLEX TO URINE CULTURE       ED Orders (720h ago, onward)      Start Ordered     Status Ordering Provider    03/14/23 1400 03/14/23 1355  sodium chloride 0.9% bolus 1,000 mL 1,000 mL  Once         Ordered TONY SWANSON    03/14/23 1400 03/14/23 1355  ketorolac injection 15 mg  ED 1 Time         Ordered TONY SWANSON.    03/14/23 1356 03/14/23 1355  Vital signs  Every 2 hours         Ordered TONY SWANSON.    03/14/23 1355 03/14/23 1355  Diet NPO  Diet effective now          Ordered TONY SWANSON PADMINI.    03/14/23 1355 03/14/23 1355  Insert peripheral IV  Once         Ordered ZEEVI, TONY L.    03/14/23 1355 03/14/23 1355  CBC W/ AUTO DIFFERENTIAL  STAT         Ordered ZEEVI TONY L.    03/14/23 1355 03/14/23 1355  Comp. Metabolic Panel  STAT         Ordered ZEEVI TONY L.    03/14/23 1355 03/14/23 1355  Lipase  STAT         Ordered ZEEVI, TONY PADMINI.    03/14/23 1355 03/14/23 1355  Urinalysis, Reflex to Urine Culture Urine, Clean Catch  STAT         Ordered SKY TONY L.              Virtual Visit Note: The provider triage portion of this emergency department evaluation and documentation was performed via [employer], a HIPAA-compliant telemedicine application, in concert with a tele-presenter in the room. A face to face patient evaluation with one of my colleagues will occur once the patient is placed in an emergency department room.      DISCLAIMER: This note was prepared with [employer] que [employer] voice recognition transcription software. Garbled syntax, mangled pronouns, and other bizarre constructions may be attributed to that software system.

## 2023-03-14 NOTE — ED PROVIDER NOTES
"Encounter Date: 3/14/2023       History     Chief Complaint   Patient presents with    Abdominal Pain     I am having abdominal pain that started around 10 am and has gotten worse.I did have blood in my urine two weeks ago.      HPI: Inna Gallardo, a 47 y.o. female  has a past medical history of Anxiety, Diverticulitis, Endometriosis, General anesthetics causing adverse effect in therapeutic use, Hypertension, Kidney stone, Prolactinoma (2011), Sliding hiatal hernia, Urinary tract infection, and Vaginal infection.    She presents to the ED for stabbing abdominal pain since 10a this morning. Pt reports two episodes of violent vomiting prior to pain acutely worsening 45 min before ER presentation. Reports pain was initially in R middle abdomen and has migrated to her RLQ. Pain does not radiate. Reports 2 weeks ago that she had occult blood in her urine with no associated pain or lower urinary  tract symptoms and follow-up UA was normal. No pain meds before arrival. Reports dizziness and a feeling of "peeing glass". No hematuria, fever/chills, SOB.         The history is provided by the patient. No  was used.   Review of patient's allergies indicates:   Allergen Reactions    Ace inhibitors Other (See Comments)     cough     Past Medical History:   Diagnosis Date    Anxiety     Diverticulitis     Endometriosis     General anesthetics causing adverse effect in therapeutic use     Hypertension     Kidney stone     Prolactinoma 2011    Sliding hiatal hernia     Urinary tract infection     Vaginal infection      Past Surgical History:   Procedure Laterality Date    ARTHROSCOPY OF KNEE Left     BRAIN TUMOR EXCISION  9/2011    removal of prolactinoma    COLONOSCOPY N/A 8/27/2020    Procedure: COLONOSCOPY;  Surgeon: Valentino Negro MD;  Location: [facility];  Service: Endoscopy;  Laterality: N/A;    CYSTOSCOPY      CYSTOSCOPY W/ URETERAL STENT PLACEMENT  2016    CYSTOSCOPY W/ URETERAL STENT REMOVAL  "     ESOPHAGEAL MANOMETRY N/A 6/3/2019    Procedure: MANOMETRY, ESOPHAGUS;  Surgeon: Bhupinder Schmitz MD;  Location: [facility] ENDO (4TH FLR);  Service: Endoscopy;  Laterality: N/A;    ESOPHAGOGASTRODUODENOSCOPY N/A 2019    Procedure: ESOPHAGOGASTRODUODENOSCOPY (EGD);  Surgeon: Altagracia Bose MD;  Location: [facility] ENDO;  Service: Endoscopy;  Laterality: N/A;    ESOPHAGOGASTRODUODENOSCOPY N/A 2021    Procedure: EGD (ESOPHAGOGASTRODUODENOSCOPY);  Surgeon: Altagracia Bose MD;  Location: [facility] ENDO;  Service: Endoscopy;  Laterality: N/A;    FOOT HARDWARE REMOVAL Right 2020    Procedure: REMOVAL, HARDWARE, FOOT;  Surgeon: Adrianna Tillman DPM;  Location: [facility] OR;  Service: Podiatry;  Laterality: Right;    HARVESTING OF BONE GRAFT Right 2019    Procedure: SURGICAL PROCUREMENT, BONE GRAFT;  Surgeon: Adrianna Tillman DPM;  Location: [facility] OR;  Service: Podiatry;  Laterality: Right;    HYSTERECTOMY  3/16/15    [facility]/BSO for Endometriosis, AUB, fibroids, cyst    LITHOTRIPSY      MANDIBLE SURGERY      severe overbite correction    PELVIC LAPAROSCOPY      Dx scope, chromopertubation-Endometriosis    TONSILLECTOMY, ADENOIDECTOMY       Family History   Problem Relation Age of Onset    Cancer Mother     Breast cancer Neg Hx     Ovarian cancer Neg Hx     Kidney disease Neg Hx      Social History     Tobacco Use    Smoking status: Former     Years: 13.00     Types: Cigarettes     Start date: 1989     Quit date: 2000     Years since quittin.1    Smokeless tobacco: Never    Tobacco comments:     pt stated she smoked one cig/day   Substance Use Topics    Alcohol use: No     Alcohol/week: 0.0 standard drinks    Drug use: No     Review of Systems   Constitutional:  Positive for activity change, appetite change and chills. Negative for diaphoresis and fever.   Respiratory:  Negative for shortness of breath.    Cardiovascular:  Negative for chest pain.   Gastrointestinal:  Positive for abdominal pain,  nausea and vomiting. Negative for constipation and diarrhea.   Genitourinary:  Positive for dysuria and flank pain. Negative for frequency, hematuria and urgency.   Musculoskeletal:  Negative for back pain and myalgias.   Skin:  Negative for color change, pallor, rash and wound.   Neurological:  Positive for dizziness. Negative for headaches.   All other systems reviewed and are negative.    Physical Exam     Initial Vitals [03/14/23 1350]   BP Pulse Resp Temp SpO2   138/74 90 16 97.6 °F (36.4 °C) 98 %      MAP       --         Physical Exam    Nursing note and vitals reviewed.  Constitutional: She appears well-developed and well-nourished. She is not diaphoretic. No distress.   HENT:   Head: Normocephalic and atraumatic.   Right Ear: External ear normal.   Left Ear: External ear normal.   Nose: Nose normal.   Eyes: Conjunctivae and EOM are normal.   Neck:   Normal range of motion.  Cardiovascular:  Normal rate, regular rhythm and normal heart sounds.           Pulmonary/Chest: Breath sounds normal. No respiratory distress. She has no wheezes. She has no rhonchi. She has no rales. She exhibits no tenderness.   Abdominal: Abdomen is soft. Bowel sounds are normal. She exhibits no distension. There is generalized abdominal tenderness and tenderness in the right lower quadrant.   There is right CVA tenderness.  No left CVA tenderness. There is no rebound, no guarding, no tenderness at McBurney's point and negative Petty's sign. negative Rovsing's sign  Musculoskeletal:         General: Normal range of motion.      Cervical back: Normal range of motion.     Neurological: She is alert and oriented to person, place, and time.   Skin: Skin is warm and dry. Capillary refill takes less than 2 seconds. No rash noted. No erythema.   Psychiatric: She has a normal mood and affect. Thought content normal.       ED Course   Procedures  Labs Reviewed   CBC W/ AUTO DIFFERENTIAL - Abnormal; Notable for the following components:        Result Value    WBC 13.65 (*)     Gran # (ANC) 9.4 (*)     Immature Grans (Abs) 0.05 (*)     All other components within normal limits   COMPREHENSIVE METABOLIC PANEL - Abnormal; Notable for the following components:    Potassium 3.2 (*)     Glucose 124 (*)     BUN 20 (*)     All other components within normal limits   URINALYSIS, REFLEX TO URINE CULTURE - Abnormal; Notable for the following components:    Occult Blood UA 3+ (*)     All other components within normal limits    Narrative:     Preferred Collection Type->Urine, Clean Catch  Specimen Source->Urine   URINALYSIS MICROSCOPIC - Abnormal; Notable for the following components:    RBC, UA >100 (*)     All other components within normal limits    Narrative:     Preferred Collection Type->Urine, Clean Catch  Specimen Source->Urine   LIPASE          Imaging Results              CT Abdomen Pelvis With Contrast (Final result)  Result time 03/14/23 15:55:57      Final result by Denilson Bhat MD (03/14/23 15:55:57)                   Impression:      1. There is no CT evidence of obstructive uropathy.  There are a few bilateral punctate nonobstructing renal calculi.  Small cyst upper pole right kidney.  2. There is no CT evidence of an acute intra-abdominal inflammatory process.  Normal appendix.  Few scattered distal colonic diverticula.  Question small hiatal hernia.  All CT scans at this facility are performed  using dose modulation techniques as appropriate to performed exam including the following:  automated exposure control; adjustment of mA and/or kV according to the patients size (this includes techniques or standardized protocols for targeted exams where dose is matched to indication/reason for exam: i.e. extremities or head);  iterative reconstruction technique.      Electronically signed by: Denilson Bhat MD  Date:    03/14/2023  Time:    15:55               Narrative:    EXAMINATION:  CT ABDOMEN PELVIS WITH CONTRAST    CLINICAL HISTORY:  RLQ abdominal  pain (Age >= 14y);    TECHNIQUE:  Abdominal and pelvic CT with intravenous contrast with imaging during the portal venous phase following 100 cc Omnipaque 350.  Coronal and sagittal reformations.    COMPARISON:  10/28/2020    FINDINGS:  Abdominal CT.  The lung bases are clear.    Liver, spleen, pancreas, adrenal glands, and kidneys are normal other than a few bilateral punctate nonobstructing renal calculi.  There is no ureteral or bladder calculus.  No perinephric stranding.  There is also a small cortical cyst upper pole right kidney, 1.2 cm.    Normal aorta.  No adenopathy.  Normal gallbladder.  No bile duct dilatation.    No bowel obstruction.  The appendix is normal.  There are few scattered diverticula distal colon without diverticulitis.  Question small hiatal hernia.  The GI tract is otherwise unremarkable.    There are T10 and L4 vertebral body hemangiomas.  Lower thoracic spondylosis.    Pelvic CT.  The pelvic ring is intact.  Pelvic bowel loops are otherwise unremarkable.  Ureters and urinary bladder are normal.  Multiple pelvic phleboliths.  Hysterectomy.  Ovaries are absent.  No pelvic mass, free fluid, or lymphadenopathy.                                       Medications   sodium chloride 0.9% bolus 1,000 mL 1,000 mL (0 mLs Intravenous Stopped 3/14/23 1619)   ketorolac injection 15 mg (15 mg Intravenous Given 3/14/23 1423)   ondansetron injection 4 mg (4 mg Intravenous Given 3/14/23 1445)   potassium chloride SA CR tablet 40 mEq (40 mEq Oral Given 3/14/23 1533)   iohexoL (OMNIPAQUE 350) injection 100 mL (100 mLs Intravenous Given 3/14/23 1525)   morphine injection 4 mg (4 mg Intravenous Given 3/14/23 1536)     Medical Decision Making:   Initial Assessment:   Abdominal pain  Differential Diagnosis:   Differential Diagnosis includes, but is not limited to:  Bowel obstruction, incarcerated/strangulated hernia, ileus, appendicitis, cholecystitis, aspirated foreign body, esophageal food impaction, biliary  colic, colitis/diverticulitis, gastroenteritis, esophagitis, hepatitis, pancreatitis, GERD, PUD, constipation, nephrolithiasis, UTI/pyelonephritis.      ED Management:  Pt presents to the ED for stabbing abdominal pain. Pt is visibly uncomfortable, wanting to stay in one position, afebrile, and nontachycardiac. Given toradol, fluid bolus, and zofran with symptom relief  CBC - elevated WBC  CMP - hypokalemia, elevated BUN  Lipase WNL  UA - 3+ occult blood; microscopic - >100 RBC  CT reveals no obstructive uropathy.  There are a few bilateral punctate nonobstructing renal calculi.  Small cyst upper pole right kidney.   No acute intra-abdominal inflammatory process.  Normal appendix.  Few scattered distal colonic diverticula.  Question small hiatal hernia.   Pt discharged on zofran and toradol. Urology referral placed. Strict ER precautions given. All questions asked and answered.                         Clinical Impression:   Final diagnoses:  [R31.9] Hematuria, unspecified type (Primary)  [R10.31] Right lower quadrant abdominal pain  [R11.2] Nausea and vomiting, unspecified vomiting type  [E87.6] Hypokalemia        ED Disposition Condition    Discharge Stable          ED Prescriptions       Medication Sig Dispense Start Date End Date Auth. Provider    ondansetron (ZOFRAN-ODT) 4 MG TbDL Take 1 tablet (4 mg total) by mouth every 6 (six) hours as needed. 30 tablet 3/14/2023 -- Elidia Wong PA-C    ketorolac (TORADOL) 10 mg tablet (Expires today) Take 1 tablet (10 mg total) by mouth every 6 (six) hours. for 3 days 12 tablet 3/14/2023 3/17/2023 Elidia Wong PA-C          Follow-up Information    None          Elidia Wong PA-C  03/17/23 [id number]

## 2023-03-14 NOTE — DISCHARGE INSTRUCTIONS
You have been prescribed Toradol for pain. This is an Non-Steroidal Anti-Inflammatory (NSAID) Medication. Please do not take any additional NSAIDs while you are taking this medication including (Advil, Aleve, Motrin, Ibuprofen, Mobic\meloxicam, Naprosyn, Toradol, ketoralac, etc.). Please stop taking this medication if you experience: weakness, itching, yellow skin or eyes, joint pains, vomiting blood, blood or black stools, unusual weight gain, or swelling in your arms, legs, hands, or feet.         While in the Emergency Department you received medication that may cause drowsiness, dizziness, impaired judgment, and reduced physical capabilities. You should not drive, operate heavy machinery, swim, or make life  changing decisions within 24 hours of receiving this medication.

## 2023-03-16 ENCOUNTER — CLINICAL SUPPORT (OUTPATIENT)
Dept: REHABILITATION | Facility: HOSPITAL | Age: 48
End: 2023-03-16
Payer: MEDICAID

## 2023-03-16 DIAGNOSIS — R29.898 WEAKNESS OF BOTH LOWER EXTREMITIES: ICD-10-CM

## 2023-03-16 DIAGNOSIS — Z74.09 IMPAIRED FUNCTIONAL MOBILITY, BALANCE, AND ENDURANCE: ICD-10-CM

## 2023-03-16 DIAGNOSIS — R26.89 GAIT, ANTALGIC: Primary | ICD-10-CM

## 2023-03-16 DIAGNOSIS — M53.86 DECREASED RANGE OF MOTION OF LUMBAR SPINE: ICD-10-CM

## 2023-03-16 PROCEDURE — 97110 THERAPEUTIC EXERCISES: CPT | Mod: PO

## 2023-03-16 NOTE — PROGRESS NOTES
"  [facility]   Physical Therapy Treatment Note     Name: Inna Gallardo  Clinic Number: [id number]    Therapy Diagnosis:   Encounter Diagnoses   Name Primary?    Gait, antalgic Yes    Impaired functional mobility, balance, and endurance     Decreased range of motion of lumbar spine     Weakness of both lower extremities        Physician: Temo Larson MD    Visit Date: 3/16/2023    Physician: Temo Larson MD     Physician Orders: PT Eval and Treat   Medical Diagnosis from Referral: M54.50 (ICD-10-CM) - Low back pain  Evaluation Date: 2/2/2023  Authorization Period Expiration: 12/31/2023  Plan of Care Expiration: 4/24/2023  Progress Note Due: 4/6/2023  Visit # / Visits authorized: 7/12 (+eval)   FOTO: 5/5     Precautions: Standard     PTA Visit #: 0/5     Time In: 9:04 am  Time Out: 9:57 am  Total Billable Time: 53 minutes    SUBJECTIVE     Pt reports:  I am feeling okay. I am having more pain in my L knee than in my R.  She was compliant with home exercise program.  Response to previous treatment: felt stretched out, good soreness  Functional change: Ongoing    Pain: 6/10 (L knee); 4-5/10 (R knees)   Location:  low back, B knees    OBJECTIVE     Objective Measures updated at progress report unless specified.                    Treatment     Inna received the treatments listed below:      therapeutic exercises to develop strength, endurance, ROM, flexibility, posture, and core stabilization for 53 minutes including:    Nustep 6' at L6  LTR's x 30   Supine hip adduction sqz's 3' 10" holds   Supine clamshells 3 x 10 w 5" holds BTB   Supine bridge's 3 x 15 w 3" holds BTB   QS - 2' w 10" holds B  SLR's 3 x 10 B (R more challenging than L)   Side lying clamshells 3 x 10 BTB x 3" hold   LAQ's 3 x 10 3# B   Ball 3 way - 30x w 5" holds ea   Marching on physioball - 2' w PPT   Mini squats - 3 x 12 (some discomfort w knees)   Step ups - 2 x 10 R and L fwd (inc discomfort in B knees) " "        manual therapy techniques: 0    MET - 0' *billed as therex      Patient Education and Home Exercises     Home Exercises Provided and Patient Education Provided     Education provided:   - importance of consistent performance of HEP    Written Home Exercises Provided: Patient instructed to cont prior HEP. Exercises were reviewed and Inna was able to demonstrate them prior to the end of the session.  Inna demonstrated good  understanding of the education provided. See EMR under Patient Instructions for exercises provided during therapy sessions    ASSESSMENT     Inna presents to therapy today with no AD stating that she is having more pain in her L knee than R knee, continuing to display gait abnormalities with increased stance time on the RLE. She tolerates today's session well with some symptom provocation with bridges, SLR L>R, mini squats, and step ups (with feeling of B knees "giving out" indicative of quad weakness). Inna requires minimal VC and TC for proper performance of prescribed activities in order to avoid use of compensatory movements.      Inna Is progressing well towards her goals.   Pt prognosis is Good.     Pt will continue to benefit from skilled outpatient physical therapy to address the deficits listed in the problem list box on initial evaluation, provide pt/family education and to maximize pt's level of independence in the home and community environment.     Pt's spiritual, cultural and educational needs considered and pt agreeable to plan of care and goals.     Anticipated barriers to physical therapy: none    Goals:     Short Term Goals: 4 weeks   1. This patient will be independent with a basic HEP. In progress, not met  2. This patient will increase B LE strength by 1 grade in order to be able to better take care of her special needs child with less difficulty. In progress, not met  3. This patient will have a pain rating of 7/10 at worst with ADLs. In progress, not met  4. Patient able " to score greater than or equal to 35% on the FOTO Lumbar Spine Survey indicating patient is 65% impaired, limited, or restricted and demonstrating overall decreased low back pain with functional activities. In progress, not met     Long Term Goals 8 weeks   1. This patient will be independent with an updated HEP. In progress, not met  2. This patient will increase B LE strength to 5/5 in order to be able to perform usual household duties and caring for her child with minimal complaints of pain. In progress, not met  3. Patient able to score greater than or equal to 50% on the FOTO Lumbar Spine Survey indicating patient is 50% impaired, limited, or restricted and demonstrating overall decreased low back pain with functional activities. In progress, not met     PLAN     Cont. PT POC.     Elenita Washington, PT

## 2023-03-20 ENCOUNTER — CLINICAL SUPPORT (OUTPATIENT)
Dept: REHABILITATION | Facility: HOSPITAL | Age: 48
End: 2023-03-20
Payer: MEDICAID

## 2023-03-20 DIAGNOSIS — Z74.09 IMPAIRED FUNCTIONAL MOBILITY, BALANCE, AND ENDURANCE: ICD-10-CM

## 2023-03-20 DIAGNOSIS — M53.86 DECREASED RANGE OF MOTION OF LUMBAR SPINE: ICD-10-CM

## 2023-03-20 DIAGNOSIS — R29.898 WEAKNESS OF BOTH LOWER EXTREMITIES: ICD-10-CM

## 2023-03-20 DIAGNOSIS — R26.89 GAIT, ANTALGIC: Primary | ICD-10-CM

## 2023-03-20 PROCEDURE — 97110 THERAPEUTIC EXERCISES: CPT | Mod: PO

## 2023-03-20 NOTE — PROGRESS NOTES
"  [facility]   Physical Therapy Treatment Note     Name: Inna Gallardo  [facility] Number: [id number]    Therapy Diagnosis:   Encounter Diagnoses   Name Primary?    Gait, antalgic Yes    Impaired functional mobility, balance, and endurance     Decreased range of motion of lumbar spine     Weakness of both lower extremities          Physician: Temo Larson MD    Visit Date: 3/20/2023    Physician: Temo Larson MD     Physician Orders: PT Eval and Treat   Medical Diagnosis from Referral: M54.50 (ICD-10-CM) - Low back pain  Evaluation Date: 2/2/2023  Authorization Period Expiration: 12/31/2023  Plan of Care Expiration: 4/24/2023  Progress Note Due: 4/6/2023  Visit # / Visits authorized: 7/12 (+eval)   FOTO: 5/5     Precautions: Standard     PTA Visit #: 0/5     Time In: 8:09 am (late arrival)  Time Out: 8:51 am  Total Billable Time: 42 minutes    SUBJECTIVE     Pt reports:  I am feeling okay.   She was compliant with home exercise program.  Response to previous treatment: felt good  Functional change: Ongoing    Pain: 5/10 (B knee); 4/10 (low back)   Location:  low back, B knees    OBJECTIVE     Objective Measures updated at progress report unless specified.                    Treatment     Inna received the treatments listed below:      therapeutic exercises to develop strength, endurance, ROM, flexibility, posture, and core stabilization for 42 minutes including:    Nustep 6' at L6   LTR's x 30   Supine hip adduction sqz's 3' 10" holds   Supine clamshells 3 x 12 w 5" holds Black TB   Supine bridge's 3 x 15 w 3" holds Black TB   QS - 2' w 10" holds B  SLR's 3 x 10 w 1# B (R more challenging than L)   Side lying clamshells 3 x 10 BTB x 3" hold   LAQ's 3 x 10 3# B   Ball 3 way - 30x w 5" holds ea   Marching on physioball - 3' w PPT   Mini squats - 3 x 10 (some discomfort w knees)   Step ups - 2 x 10 R and B fwd (inc discomfort in B knees)  Step downs - unable to perform lateral step " "downs  SLS - 2 x 30" B     manual therapy techniques: 0    MET - 0' *billed as therex      Patient Education and Home Exercises     Home Exercises Provided and Patient Education Provided     Education provided:   - importance of consistent performance of HEP    Written Home Exercises Provided: Patient instructed to cont prior HEP. Exercises were reviewed and Inna was able to demonstrate them prior to the end of the session.  Inna demonstrated good  understanding of the education provided. See EMR under Patient Instructions for exercises provided during therapy sessions    ASSESSMENT     Inna presents to therapy today with moderate pain in B knees and her low back. She tolerates today's session well with some symptom provocation with lateral steps downs and SLR R>L. Inna requires minimal VC and TC for proper performance of prescribed activities in order to avoid use of compensatory movements. Focus of this session is improving BLE strength and endurance as well as lumbar ROM and increased functional mobility.      Inna Is progressing well towards her goals.   Pt prognosis is Good.     Pt will continue to benefit from skilled outpatient physical therapy to address the deficits listed in the problem list box on initial evaluation, provide pt/family education and to maximize pt's level of independence in the home and community environment.     Pt's spiritual, cultural and educational needs considered and pt agreeable to plan of care and goals.     Anticipated barriers to physical therapy: none    Goals:     Short Term Goals: 4 weeks   1. This patient will be independent with a basic HEP. In progress, not met  2. This patient will increase B LE strength by 1 grade in order to be able to better take care of her special needs child with less difficulty. In progress, not met  3. This patient will have a pain rating of 7/10 at worst with ADLs. In progress, not met  4. Patient able to score greater than or equal to 35% on the FOTO " Lumbar Spine Survey indicating patient is 65% impaired, limited, or restricted and demonstrating overall decreased low back pain with functional activities. In progress, not met     Long Term Goals 8 weeks   1. This patient will be independent with an updated HEP. In progress, not met  2. This patient will increase B LE strength to 5/5 in order to be able to perform usual household duties and caring for her child with minimal complaints of pain. In progress, not met  3. Patient able to score greater than or equal to 50% on the FOTO Lumbar Spine Survey indicating patient is 50% impaired, limited, or restricted and demonstrating overall decreased low back pain with functional activities. In progress, not met     PLAN     Cont. PT POC.     Elenita Washington, PT

## 2023-03-29 ENCOUNTER — CLINICAL SUPPORT (OUTPATIENT)
Dept: REHABILITATION | Facility: HOSPITAL | Age: 48
End: 2023-03-29
Payer: MEDICAID

## 2023-03-29 DIAGNOSIS — M53.86 DECREASED RANGE OF MOTION OF LUMBAR SPINE: ICD-10-CM

## 2023-03-29 DIAGNOSIS — Z74.09 IMPAIRED FUNCTIONAL MOBILITY, BALANCE, AND ENDURANCE: ICD-10-CM

## 2023-03-29 DIAGNOSIS — R29.898 WEAKNESS OF BOTH LOWER EXTREMITIES: ICD-10-CM

## 2023-03-29 DIAGNOSIS — R26.89 GAIT, ANTALGIC: Primary | ICD-10-CM

## 2023-03-29 PROCEDURE — 97110 THERAPEUTIC EXERCISES: CPT | Mod: PO

## 2023-03-29 NOTE — PROGRESS NOTES
"  [facility]   Physical Therapy Treatment Note     Name: Inna Gallardo  [facility] Number: [id number]    Therapy Diagnosis:   Encounter Diagnoses   Name Primary?    Gait, antalgic Yes    Impaired functional mobility, balance, and endurance     Decreased range of motion of lumbar spine     Weakness of both lower extremities            Physician: Temo Larson MD    Visit Date: 3/29/2023    Physician: Temo Larson MD     Physician Orders: PT Eval and Treat   Medical Diagnosis from Referral: M54.50 (ICD-10-CM) - Low back pain  Evaluation Date: 2/2/2023  Authorization Period Expiration: 12/31/2023  Plan of Care Expiration: 4/24/2023  Progress Note Due: 4/6/2023  Visit # / Visits authorized: 8/12 (+eval)   FOTO: 5/5     Precautions: Standard     PTA Visit #: 0/5     Time In: 8:00 am   Time Out: 8:38 am  Total Billable Time: 38 minutes    SUBJECTIVE     Pt reports:  I woke up today with a cramp in my L leg.   She was compliant with home exercise program.  Response to previous treatment: felt good  Functional change: Ongoing    Pain: 6/10 (B knee); 5/10 (low back)   Location:  low back, B knees    OBJECTIVE     Objective Measures updated at progress report unless specified.                    Treatment     Inna received the treatments listed below:      therapeutic exercises to develop strength, endurance, ROM, flexibility, posture, and core stabilization for 38 minutes including:    Nustep 3' at L6   Hip flexor str - 1' L  HS str - 2 x 1' L  Gastroc str - 2 x 1' L  LTR's x 30   Supine hip adduction sqz's 3' 10" holds   Supine clamshells 3 x 12 w 5" holds Black TB   Supine bridge's 3 x 15 w 3" holds Black TB   QS - 2' w 10" holds B  SLR's 3 x 10 w 1# B (R more challenging than L)   Side lying clamshells 3 x 10 BTB x 3" hold   LAQ's 3 x 10 3# B   Ball 3 way - 30x w 5" holds ea   Marching on physioball - 3' w PPT   Mini squats - 3 x 10 (some discomfort w knees)   Step ups - 2 x 10 R and B " "fwd (inc discomfort in B knees)   Step downs - unable to perform lateral step downs   SLS - 2 x 30" B     manual therapy techniques: 0    MET - 0' *billed as therex      Patient Education and Home Exercises     Home Exercises Provided and Patient Education Provided     Education provided:   - importance of consistent performance of HEP    Written Home Exercises Provided: Patient instructed to cont prior HEP. Exercises were reviewed and Inna was able to demonstrate them prior to the end of the session.  Inna demonstrated good  understanding of the education provided. See EMR under Patient Instructions for exercises provided during therapy sessions    ASSESSMENT     Inna presents to therapy today ambulating with decreased weight bearing on the LLE. She displays some improvement after performance of combination of stretches for the LLE, however continues to have discomfort. She leaves this session early, requiring the use of the clinic SPC for support due to experience of weakness in her LLE. She has okay tolerance for the performance of prescribed activities. Focus of this session is improving BLE strength and endurance as well as lumbar ROM and increased functional mobility.      Inna Is progressing well towards her goals.   Pt prognosis is Good.     Pt will continue to benefit from skilled outpatient physical therapy to address the deficits listed in the problem list box on initial evaluation, provide pt/family education and to maximize pt's level of independence in the home and community environment.     Pt's spiritual, cultural and educational needs considered and pt agreeable to plan of care and goals.     Anticipated barriers to physical therapy: none    Goals:     Short Term Goals: 4 weeks   1. This patient will be independent with a basic HEP. In progress, not met  2. This patient will increase B LE strength by 1 grade in order to be able to better take care of her special needs child with less difficulty. In " progress, not met  3. This patient will have a pain rating of 7/10 at worst with ADLs. In progress, not met  4. Patient able to score greater than or equal to 35% on the FOTO Lumbar Spine Survey indicating patient is 65% impaired, limited, or restricted and demonstrating overall decreased low back pain with functional activities. In progress, not met     Long Term Goals 8 weeks   1. This patient will be independent with an updated HEP. In progress, not met  2. This patient will increase B LE strength to 5/5 in order to be able to perform usual household duties and caring for her child with minimal complaints of pain. In progress, not met  3. Patient able to score greater than or equal to 50% on the FOTO Lumbar Spine Survey indicating patient is 50% impaired, limited, or restricted and demonstrating overall decreased low back pain with functional activities. In progress, not met     PLAN     Cont. PT POC.     Elenita Washington, PT

## 2023-04-03 ENCOUNTER — CLINICAL SUPPORT (OUTPATIENT)
Dept: REHABILITATION | Facility: HOSPITAL | Age: 48
End: 2023-04-03
Payer: MEDICAID

## 2023-04-03 DIAGNOSIS — R26.89 GAIT, ANTALGIC: Primary | ICD-10-CM

## 2023-04-03 DIAGNOSIS — M53.86 DECREASED RANGE OF MOTION OF LUMBAR SPINE: ICD-10-CM

## 2023-04-03 DIAGNOSIS — R29.898 WEAKNESS OF BOTH LOWER EXTREMITIES: ICD-10-CM

## 2023-04-03 DIAGNOSIS — Z74.09 IMPAIRED FUNCTIONAL MOBILITY, BALANCE, AND ENDURANCE: ICD-10-CM

## 2023-04-03 PROCEDURE — 97110 THERAPEUTIC EXERCISES: CPT | Mod: PO

## 2023-04-03 NOTE — PROGRESS NOTES
"  [facility]   Physical Therapy Treatment Note     Name: Inna Gallardo  Clinic Number: [id number]    Therapy Diagnosis:   Encounter Diagnoses   Name Primary?    Gait, antalgic Yes    Impaired functional mobility, balance, and endurance     Decreased range of motion of lumbar spine     Weakness of both lower extremities          Physician: Temo Larson MD    Visit Date: 4/3/2023    Physician: Temo Larson MD     Physician Orders: PT Eval and Treat   Medical Diagnosis from Referral: M54.50 (ICD-10-CM) - Low back pain  Evaluation Date: 2/2/2023  Authorization Period Expiration: 12/31/2023  Plan of Care Expiration: 4/24/2023  Progress Note Due: 4/6/2023  Visit # / Visits authorized: 9/12 (+eval)   FOTO: 5/5     Precautions: Standard     PTA Visit #: 0/5     Time In: 8:07 am   Time Out: 9:00 am  Total Billable Time: 53 minutes    SUBJECTIVE     Pt reports:  I was hurting so bad last time that I almost went to the ED. I was having a lot of trouble moving my knee.  She was compliant with home exercise program.  Response to previous treatment: felt some pain afterward and was in my bed for 2 days afterward, which may be due to significant exertion prior to therapy session (cleaning the yard)  Functional change: Ongoing    Pain: 5/10 (B knee); 5/10 (low back)   Location:  low back, B knees    OBJECTIVE     Objective Measures updated at progress report unless specified.                    Treatment     Inna received the treatments listed below:      therapeutic exercises to develop strength, endurance, ROM, flexibility, posture, and core stabilization for 53 minutes including:    Nustep 3' at L6   Hip flexor str - 1' L  HS str - 2 x 1' L  Gastroc str - 2 x 1' L  LTR's x 30   Supine hip adduction sqz's 3' 10" holds   Supine clamshells 3 x 15 w 5" holds Black TB   Supine bridge's 3 x 15 Black TB   QS - 3' w 10" holds B  SLR's 3 x 8 B (R more challenging than L)   Side lying clamshells 3 x " "10 BTB x 3" hold   LAQ's 3 x 10 3# B   Ball 3 way - 30x w 5" holds ea   Marching on physioball - 3' w PPT   Mini squats - 3 x 10 (some discomfort w knees)   Step ups - 2 x 10 R and B fwd (inc discomfort in B knees)   Step downs - unable to perform lateral step downs   SLS - 2 x 30" B     manual therapy techniques: 0    MET - 0' *billed as therex      Patient Education and Home Exercises     Home Exercises Provided and Patient Education Provided     Education provided:   - importance of consistent performance of HEP    Written Home Exercises Provided: Patient instructed to cont prior HEP. Exercises were reviewed and Inna was able to demonstrate them prior to the end of the session.  Inna demonstrated good  understanding of the education provided. See EMR under Patient Instructions for exercises provided during therapy sessions    ASSESSMENT     Inna presents to therapy with her small base quad cane today continuing to ambulate with antalgic gait. Her pain level remains about the same in her knees and low back. She tolerates today's session well without symptom provocation. She displays increased challenge with SLR and bridges. Some activities re-added however all are not re-added at this time, as pt continues to display antalgic gait. Focus of this session is improving BLE strength and endurance as well as lumbar ROM and increased functional mobility.      Inna Is progressing well towards her goals.   Pt prognosis is Good.     Pt will continue to benefit from skilled outpatient physical therapy to address the deficits listed in the problem list box on initial evaluation, provide pt/family education and to maximize pt's level of independence in the home and community environment.     Pt's spiritual, cultural and educational needs considered and pt agreeable to plan of care and goals.     Anticipated barriers to physical therapy: none    Goals:     Short Term Goals: 4 weeks   1. This patient will be independent with a " basic HEP. In progress, not met  2. This patient will increase B LE strength by 1 grade in order to be able to better take care of her special needs child with less difficulty. In progress, not met  3. This patient will have a pain rating of 7/10 at worst with ADLs. In progress, not met  4. Patient able to score greater than or equal to 35% on the FOTO Lumbar Spine Survey indicating patient is 65% impaired, limited, or restricted and demonstrating overall decreased low back pain with functional activities. In progress, not met     Long Term Goals 8 weeks   1. This patient will be independent with an updated HEP. In progress, not met  2. This patient will increase B LE strength to 5/5 in order to be able to perform usual household duties and caring for her child with minimal complaints of pain. In progress, not met  3. Patient able to score greater than or equal to 50% on the FOTO Lumbar Spine Survey indicating patient is 50% impaired, limited, or restricted and demonstrating overall decreased low back pain with functional activities. In progress, not met     PLAN     Cont. PT POC.     Elenita Washington, PT

## 2023-04-17 ENCOUNTER — CLINICAL SUPPORT (OUTPATIENT)
Dept: REHABILITATION | Facility: HOSPITAL | Age: 48
End: 2023-04-17
Payer: MEDICAID

## 2023-04-17 DIAGNOSIS — Z74.09 IMPAIRED FUNCTIONAL MOBILITY, BALANCE, AND ENDURANCE: ICD-10-CM

## 2023-04-17 DIAGNOSIS — M53.86 DECREASED RANGE OF MOTION OF LUMBAR SPINE: ICD-10-CM

## 2023-04-17 DIAGNOSIS — R29.898 WEAKNESS OF BOTH LOWER EXTREMITIES: ICD-10-CM

## 2023-04-17 DIAGNOSIS — R26.89 GAIT, ANTALGIC: Primary | ICD-10-CM

## 2023-04-17 PROCEDURE — 97110 THERAPEUTIC EXERCISES: CPT | Mod: PO,CQ

## 2023-04-17 NOTE — PROGRESS NOTES
"  [facility]   Physical Therapy Treatment Note     Name: Inna Gallardo  [facility] Number: [id number]    Therapy Diagnosis:   Encounter Diagnoses   Name Primary?    Gait, antalgic Yes    Impaired functional mobility, balance, and endurance     Decreased range of motion of lumbar spine     Weakness of both lower extremities            Physician: Temo Larson MD    Visit Date: 4/17/2023    Physician: Temo Larson MD     Physician Orders: PT Eval and Treat   Medical Diagnosis from Referral: M54.50 (ICD-10-CM) - Low back pain  Evaluation Date: 2/2/2023  Authorization Period Expiration: 12/31/2023  Plan of Care Expiration: 4/24/2023  Progress Note Due: 4/6/2023  Visit # / Visits authorized: 12/28 (+eval)   FOTO: 5/5     Precautions: Standard     PTA Visit #: 1/5     Time In: 9:00 am   Time Out: 9:53 am  Total Billable Time: 53 minutes    SUBJECTIVE     Pt reports: she is a little sore everywhere from doing a lot over the weekend.    She was compliant with home exercise program.  Response to previous treatment: felt okay.   Functional change: Ongoing    Pain: not rated /10 (B knee); not rated /10 (low back)   Location:  low back, B knees    OBJECTIVE     Objective Measures updated at progress report unless specified.                    Treatment     Inna received the treatments listed below:      therapeutic exercises to develop strength, endurance, ROM, flexibility, posture, and core stabilization for 53 minutes including:    Nustep 8'   Hip flexor str - 1' L  HS str - 2 x 1' L  Gastroc str - 2 x 1' L  LTR's x 30   Supine hip adduction sqz's 3' 10" holds   Supine clamshells 3 x 15 w 5" holds Black TB   Supine bridge's 3 x 15 Black TB   QS - 3' w 10" holds B  SLR's 3 x 8 B (R more challenging than L)   Side lying clamshells 3 x 10 BTB x 3" hold   LAQ's 3 x 10 3# B   Ball 3 way - 30x w 5" holds ea   Marching on physioball - 3' w PPT   Mini squats - 3 x 10 (some discomfort w knees)   Step " "ups - 2 x 10 R and B fwd (inc discomfort in B knees)   Step downs - unable to perform lateral step downs   SLS - 2 x 30" B     manual therapy techniques: 0    MET - 0' *billed as therex      Patient Education and Home Exercises     Home Exercises Provided and Patient Education Provided     Education provided:   - importance of consistent performance of HEP    Written Home Exercises Provided: Patient instructed to cont prior HEP. Exercises were reviewed and Inna was able to demonstrate them prior to the end of the session.  Inna demonstrated good  understanding of the education provided. See EMR under Patient Instructions for exercises provided during therapy sessions    ASSESSMENT     Pt with some reports of soreness prior to beginning PT treatment session.  Supine bridge's were held due to patient reporting increases in lower back pain.  Pt performed all other exercises with good tolerance.  Will continue to monitor and progress pt within her tolerance.      Inna Is progressing well towards her goals.   Pt prognosis is Good.     Pt will continue to benefit from skilled outpatient physical therapy to address the deficits listed in the problem list box on initial evaluation, provide pt/family education and to maximize pt's level of independence in the home and community environment.     Pt's spiritual, cultural and educational needs considered and pt agreeable to plan of care and goals.     Anticipated barriers to physical therapy: none    Goals:     Short Term Goals: 4 weeks   1. This patient will be independent with a basic HEP. In progress, not met  2. This patient will increase B LE strength by 1 grade in order to be able to better take care of her special needs child with less difficulty. In progress, not met  3. This patient will have a pain rating of 7/10 at worst with ADLs. In progress, not met  4. Patient able to score greater than or equal to 35% on the FOTO Lumbar Spine Survey indicating patient is 65% " impaired, limited, or restricted and demonstrating overall decreased low back pain with functional activities. In progress, not met     Long Term Goals 8 weeks   1. This patient will be independent with an updated HEP. In progress, not met  2. This patient will increase B LE strength to 5/5 in order to be able to perform usual household duties and caring for her child with minimal complaints of pain. In progress, not met  3. Patient able to score greater than or equal to 50% on the FOTO Lumbar Spine Survey indicating patient is 50% impaired, limited, or restricted and demonstrating overall decreased low back pain with functional activities. In progress, not met     PLAN     Cont. PT POC.     Bhupinder Salazar, PTA

## 2023-06-12 ENCOUNTER — OFFICE VISIT (OUTPATIENT)
Dept: UROLOGY | Facility: CLINIC | Age: 48
End: 2023-06-12
Payer: MEDICAID

## 2023-06-12 VITALS
SYSTOLIC BLOOD PRESSURE: 139 MMHG | BODY MASS INDEX: 45.96 KG/M2 | HEART RATE: 72 BPM | DIASTOLIC BLOOD PRESSURE: 96 MMHG | WEIGHT: 259.38 LBS | HEIGHT: 63 IN

## 2023-06-12 DIAGNOSIS — N20.0 BILATERAL NEPHROLITHIASIS: Primary | ICD-10-CM

## 2023-06-12 DIAGNOSIS — G89.29 CHRONIC RIGHT-SIDED LOW BACK PAIN WITHOUT SCIATICA: ICD-10-CM

## 2023-06-12 DIAGNOSIS — R30.0 DYSURIA: ICD-10-CM

## 2023-06-12 DIAGNOSIS — N28.1 RENAL CYST, RIGHT: ICD-10-CM

## 2023-06-12 DIAGNOSIS — R31.0 GROSS HEMATURIA: ICD-10-CM

## 2023-06-12 DIAGNOSIS — M54.50 CHRONIC RIGHT-SIDED LOW BACK PAIN WITHOUT SCIATICA: ICD-10-CM

## 2023-06-12 DIAGNOSIS — R39.89 URETHRAL PAIN: ICD-10-CM

## 2023-06-12 LAB
BACTERIA #/AREA URNS AUTO: ABNORMAL /HPF
BILIRUB SERPL-MCNC: NORMAL MG/DL
BILIRUB UR QL STRIP: NEGATIVE
BLOOD URINE, POC: NEGATIVE
CLARITY UR REFRACT.AUTO: ABNORMAL
CLARITY, POC UA: CLEAR
COLOR UR AUTO: YELLOW
COLOR, POC UA: YELLOW
GLUCOSE UR QL STRIP: NEGATIVE
GLUCOSE UR QL STRIP: NEGATIVE
HGB UR QL STRIP: NEGATIVE
HYALINE CASTS UR QL AUTO: 0 /LPF
KETONES UR QL STRIP: ABNORMAL
KETONES UR QL STRIP: NORMAL
LEUKOCYTE ESTERASE UR QL STRIP: NEGATIVE
LEUKOCYTE ESTERASE URINE, POC: NEGATIVE
MICROSCOPIC COMMENT: ABNORMAL
NITRITE UR QL STRIP: NEGATIVE
NITRITE, POC UA: NEGATIVE
PH UR STRIP: 7 [PH] (ref 5–8)
PH, POC UA: 6
PROT UR QL STRIP: ABNORMAL
PROTEIN, POC: NORMAL
RBC #/AREA URNS AUTO: 3 /HPF (ref 0–4)
SP GR UR STRIP: 1.03 (ref 1–1.03)
SPECIFIC GRAVITY, POC UA: 1.02
SQUAMOUS #/AREA URNS AUTO: 38 /HPF
URN SPEC COLLECT METH UR: ABNORMAL
UROBILINOGEN, POC UA: 1
WBC #/AREA URNS AUTO: 7 /HPF (ref 0–5)

## 2023-06-12 PROCEDURE — 99214 PR OFFICE/OUTPT VISIT, EST, LEVL IV, 30-39 MIN: ICD-10-PCS | Mod: S$PBB,,, | Performed by: NURSE PRACTITIONER

## 2023-06-12 PROCEDURE — 99999 PR PBB SHADOW E&M-EST. PATIENT-LVL IV: CPT | Mod: PBBFAC,,, | Performed by: NURSE PRACTITIONER

## 2023-06-12 PROCEDURE — 81002 URINALYSIS NONAUTO W/O SCOPE: CPT | Mod: PBBFAC,PO | Performed by: NURSE PRACTITIONER

## 2023-06-12 PROCEDURE — 3008F BODY MASS INDEX DOCD: CPT | Mod: CPTII,,, | Performed by: NURSE PRACTITIONER

## 2023-06-12 PROCEDURE — 99214 OFFICE O/P EST MOD 30 MIN: CPT | Mod: S$PBB,,, | Performed by: NURSE PRACTITIONER

## 2023-06-12 PROCEDURE — 3075F SYST BP GE 130 - 139MM HG: CPT | Mod: CPTII,,, | Performed by: NURSE PRACTITIONER

## 2023-06-12 PROCEDURE — 3080F DIAST BP >= 90 MM HG: CPT | Mod: CPTII,,, | Performed by: NURSE PRACTITIONER

## 2023-06-12 PROCEDURE — 3080F PR MOST RECENT DIASTOLIC BLOOD PRESSURE >= 90 MM HG: ICD-10-PCS | Mod: CPTII,,, | Performed by: NURSE PRACTITIONER

## 2023-06-12 PROCEDURE — 1159F MED LIST DOCD IN RCRD: CPT | Mod: CPTII,,, | Performed by: NURSE PRACTITIONER

## 2023-06-12 PROCEDURE — 87086 URINE CULTURE/COLONY COUNT: CPT | Performed by: NURSE PRACTITIONER

## 2023-06-12 PROCEDURE — 1160F PR REVIEW ALL MEDS BY PRESCRIBER/CLIN PHARMACIST DOCUMENTED: ICD-10-PCS | Mod: CPTII,,, | Performed by: NURSE PRACTITIONER

## 2023-06-12 PROCEDURE — 3008F PR BODY MASS INDEX (BMI) DOCUMENTED: ICD-10-PCS | Mod: CPTII,,, | Performed by: NURSE PRACTITIONER

## 2023-06-12 PROCEDURE — 1159F PR MEDICATION LIST DOCUMENTED IN MEDICAL RECORD: ICD-10-PCS | Mod: CPTII,,, | Performed by: NURSE PRACTITIONER

## 2023-06-12 PROCEDURE — 99214 OFFICE O/P EST MOD 30 MIN: CPT | Mod: PBBFAC,PO | Performed by: NURSE PRACTITIONER

## 2023-06-12 PROCEDURE — 3075F PR MOST RECENT SYSTOLIC BLOOD PRESS GE 130-139MM HG: ICD-10-PCS | Mod: CPTII,,, | Performed by: NURSE PRACTITIONER

## 2023-06-12 PROCEDURE — 99999 PR PBB SHADOW E&M-EST. PATIENT-LVL IV: ICD-10-PCS | Mod: PBBFAC,,, | Performed by: NURSE PRACTITIONER

## 2023-06-12 PROCEDURE — 1160F RVW MEDS BY RX/DR IN RCRD: CPT | Mod: CPTII,,, | Performed by: NURSE PRACTITIONER

## 2023-06-12 PROCEDURE — 81001 URINALYSIS AUTO W/SCOPE: CPT | Performed by: NURSE PRACTITIONER

## 2023-06-12 RX ORDER — KETOROLAC TROMETHAMINE 10 MG/1
10 TABLET, FILM COATED ORAL EVERY 6 HOURS PRN
Qty: 20 TABLET | Refills: 0 | Status: SHIPPED | OUTPATIENT
Start: 2023-06-12 | End: 2023-06-17

## 2023-06-12 RX ORDER — PHENAZOPYRIDINE HYDROCHLORIDE 200 MG/1
200 TABLET, FILM COATED ORAL 3 TIMES DAILY PRN
Qty: 9 TABLET | Refills: 0 | Status: SHIPPED | OUTPATIENT
Start: 2023-06-12 | End: 2023-06-15

## 2023-06-12 NOTE — PROGRESS NOTES
Subjective:       Patient ID: Inna Gallardo is a 48 y.o. female.    Chief Complaint: Hematuria, Flank Pain, and Abdominal Pain    Patient is here today with c/o right lower back pain, dysuria, and urethra pain. Her suprapubic pain and hematuria has resolved    Abdominal Pain  This is a chronic problem. The current episode started more than 1 year ago. The problem occurs intermittently. The problem has been waxing and waning. The pain is located in the suprapubic region. The pain is at a severity of 0/10. The patient is experiencing no pain. The abdominal pain does not radiate. Associated symptoms include constipation, diarrhea and dysuria. Pertinent negatives include no fever, frequency, headaches, hematuria (resolved), nausea or vomiting. Nothing aggravates the pain. The pain is relieved by Nothing. She has tried nothing for the symptoms. There is no history of abdominal surgery. Patient's medical history includes kidney stones and UTI.   Hematuria  This is a new problem. Episode onset: 3 months ago. The problem has been resolved since onset. She describes the hematuria as gross hematuria. She reports no clotting in her urine stream. Her pain is at a severity of 0/10. She is experiencing no pain. She describes her urine color as yellow. Irritative symptoms include nocturia (x1). Irritative symptoms do not include frequency or urgency. Associated symptoms include dysuria. Pertinent negatives include no abdominal pain, chills, fever, flank pain, genital pain, hesitancy, inability to urinate, nausea or vomiting. Her past medical history is significant for hypertension, kidney stones and UTI. There is no history of  trauma.   Flank Pain  This is a chronic problem. Episode onset: 1 year ago. The problem occurs daily. The problem has been waxing and waning since onset. The pain is present in the sacro-iliac (right). The quality of the pain is described as aching and stabbing. The pain does not radiate. The pain is at  a severity of 6/10. The pain is moderate. Associated symptoms include dysuria. Pertinent negatives include no abdominal pain, fever, headaches, pelvic pain (left; resolved) or weakness. Risk factors include obesity and renal stones. She has tried nothing for the symptoms.   Review of Systems   Constitutional:  Negative for chills, fatigue and fever.   Gastrointestinal:  Positive for change in bowel habit, constipation, diarrhea and change in bowel habit. Negative for abdominal pain, nausea and vomiting.   Genitourinary:  Positive for dysuria and nocturia (x1). Negative for decreased urine volume, difficulty urinating, flank pain, frequency, hematuria (resolved), hesitancy, pelvic pain (left; resolved), urgency, vaginal bleeding, vaginal discharge and vaginal pain.        Urethra pain   Musculoskeletal:  Positive for back pain (Right lower back pain).   Neurological:  Negative for dizziness, weakness and headaches.   Psychiatric/Behavioral: Negative.         Objective:      Physical Exam  Vitals and nursing note reviewed.   Constitutional:       General: She is not in acute distress.     Appearance: She is well-developed. She is obese. She is not ill-appearing.   HENT:      Head: Normocephalic and atraumatic.   Eyes:      Pupils: Pupils are equal, round, and reactive to light.   Cardiovascular:      Rate and Rhythm: Normal rate.   Pulmonary:      Effort: Pulmonary effort is normal. No respiratory distress.   Abdominal:      Palpations: Abdomen is soft.      Tenderness: There is no abdominal tenderness. There is right CVA tenderness and left CVA tenderness.   Musculoskeletal:         General: Normal range of motion.      Cervical back: Normal range of motion.   Skin:     General: Skin is warm and dry.   Neurological:      Mental Status: She is alert and oriented to person, place, and time.      Coordination: Coordination normal.   Psychiatric:         Mood and Affect: Mood normal.         Behavior: Behavior normal.          Thought Content: Thought content normal.         Judgment: Judgment normal.       Assessment:       Problem List Items Addressed This Visit          Renal/    Renal cyst, right    Relevant Orders    Urine culture    Urinalysis (Completed)    US Retroperitoneal Complete    POCT URINE DIPSTICK WITHOUT MICROSCOPE (Completed)     Other Visit Diagnoses       Bilateral nephrolithiasis    -  Primary    Relevant Orders    Urine culture    Urinalysis (Completed)    US Retroperitoneal Complete    POCT URINE DIPSTICK WITHOUT MICROSCOPE (Completed)    Dysuria        Relevant Medications    phenazopyridine (PYRIDIUM) 200 MG tablet    Other Relevant Orders    Urine culture    Urinalysis (Completed)    POCT URINE DIPSTICK WITHOUT MICROSCOPE (Completed)    Urethral pain        Relevant Medications    ketorolac (TORADOL) 10 mg tablet    Other Relevant Orders    Urine culture    Urinalysis (Completed)    POCT URINE DIPSTICK WITHOUT MICROSCOPE (Completed)    Gross hematuria        Relevant Orders    Urine culture    Urinalysis (Completed)    US Retroperitoneal Complete    POCT URINE DIPSTICK WITHOUT MICROSCOPE (Completed)    Chronic right-sided low back pain without sciatica        Relevant Medications    ketorolac (TORADOL) 10 mg tablet    Other Relevant Orders    Urine culture    Urinalysis (Completed)    US Retroperitoneal Complete    POCT URINE DIPSTICK WITHOUT MICROSCOPE (Completed)            Plan:           Inna was seen today for hematuria.    Diagnoses and all orders for this visit:    Bilateral nephrolithiasis  -     Urine culture  -     Urinalysis  -     US Retroperitoneal Complete; Future  -     POCT URINE DIPSTICK WITHOUT MICROSCOPE    Renal cyst, right  -     Urine culture  -     Urinalysis  -     US Retroperitoneal Complete; Future  -     POCT URINE DIPSTICK WITHOUT MICROSCOPE    Dysuria  -     Urine culture  -     Urinalysis  -     phenazopyridine (PYRIDIUM) 200 MG tablet; Take 1 tablet (200 mg total) by mouth 3  (three) times daily as needed for Pain.  -     POCT URINE DIPSTICK WITHOUT MICROSCOPE    Urethral pain  -     Urine culture  -     Urinalysis  -     ketorolac (TORADOL) 10 mg tablet; Take 1 tablet (10 mg total) by mouth every 6 (six) hours as needed for Pain.  -     POCT URINE DIPSTICK WITHOUT MICROSCOPE    Gross hematuria  -     Urine culture  -     Urinalysis  -     US Retroperitoneal Complete; Future  -     POCT URINE DIPSTICK WITHOUT MICROSCOPE    Chronic right-sided low back pain without sciatica  -     Urine culture  -     Urinalysis  -     US Retroperitoneal Complete; Future  -     ketorolac (TORADOL) 10 mg tablet; Take 1 tablet (10 mg total) by mouth every 6 (six) hours as needed for Pain.  -     POCT URINE DIPSTICK WITHOUT MICROSCOPE    Follow-up pending urine cx and U/S results.    Rja Dubon NP

## 2023-06-12 NOTE — PATIENT INSTRUCTIONS
Urine dipstick and urine cx  U/S retroperitoneal complete  Follow-up pending urine cx and U/S results.

## 2023-06-13 LAB
BACTERIA UR CULT: NORMAL
BACTERIA UR CULT: NORMAL

## 2023-06-14 ENCOUNTER — TELEPHONE (OUTPATIENT)
Dept: UROLOGY | Facility: CLINIC | Age: 48
End: 2023-06-14
Payer: MEDICAID

## 2023-06-14 DIAGNOSIS — R30.0 DYSURIA: Primary | ICD-10-CM

## 2023-06-14 NOTE — TELEPHONE ENCOUNTER
----- Message from Raj Dubon NP sent at 6/14/2023 12:16 PM CDT -----  Please inform patient via telephone that her urine cx showed some bacteria but none in predominance to diagnose a UTI. Repeat urine cx and re-educate patient on clean catch urine specimen. Thanks.

## 2023-06-15 ENCOUNTER — HOSPITAL ENCOUNTER (OUTPATIENT)
Dept: RADIOLOGY | Facility: HOSPITAL | Age: 48
Discharge: HOME | End: 2023-06-15
Attending: NURSE PRACTITIONER
Payer: MEDICAID

## 2023-06-15 DIAGNOSIS — M54.50 CHRONIC RIGHT-SIDED LOW BACK PAIN WITHOUT SCIATICA: ICD-10-CM

## 2023-06-15 DIAGNOSIS — G89.29 CHRONIC RIGHT-SIDED LOW BACK PAIN WITHOUT SCIATICA: ICD-10-CM

## 2023-06-15 DIAGNOSIS — R31.0 GROSS HEMATURIA: ICD-10-CM

## 2023-06-15 DIAGNOSIS — N28.1 RENAL CYST, RIGHT: ICD-10-CM

## 2023-06-15 DIAGNOSIS — N20.0 BILATERAL NEPHROLITHIASIS: ICD-10-CM

## 2023-06-15 PROCEDURE — 76770 US EXAM ABDO BACK WALL COMP: CPT | Mod: TC,PO

## 2023-06-15 PROCEDURE — 76770 US EXAM ABDO BACK WALL COMP: CPT | Mod: 26,,, | Performed by: STUDENT IN AN ORGANIZED HEALTH CARE EDUCATION/TRAINING PROGRAM

## 2023-06-15 PROCEDURE — 76770 US RETROPERITONEAL COMPLETE: ICD-10-PCS | Mod: 26,,, | Performed by: STUDENT IN AN ORGANIZED HEALTH CARE EDUCATION/TRAINING PROGRAM

## 2023-06-16 ENCOUNTER — TELEPHONE (OUTPATIENT)
Dept: UROLOGY | Facility: CLINIC | Age: 48
End: 2023-06-16
Payer: MEDICAID

## 2023-06-16 DIAGNOSIS — N20.0 BILATERAL NEPHROLITHIASIS: Primary | ICD-10-CM

## 2023-06-16 RX ORDER — TAMSULOSIN HYDROCHLORIDE 0.4 MG/1
0.4 CAPSULE ORAL DAILY
Qty: 30 CAPSULE | Refills: 2 | Status: SHIPPED | OUTPATIENT
Start: 2023-06-16 | End: 2023-07-18

## 2023-06-16 NOTE — TELEPHONE ENCOUNTER
Pt informed via phone call    ----- Message from Raj Dubon NP sent at 6/16/2023  4:10 PM CDT -----  A simple right renal cyst also noted. Please inform patient. Will monitor with U/S in 1 year.

## 2023-06-16 NOTE — PROGRESS NOTES
Diagnoses and all orders for this visit:    Bilateral nephrolithiasis  -     tamsulosin (FLOMAX) 0.4 mg Cap; Take 1 capsule (0.4 mg total) by mouth once daily.    Follow-up in 3 months or sooner if needed.     Raj Dubon, NP

## 2023-06-16 NOTE — TELEPHONE ENCOUNTER
Pt informed via phone call.      ----- Message from Raj Dubon NP sent at 6/16/2023  4:09 PM CDT -----  Please inform patient via telephone that her U/S of kidneys and bladder still showed bilateral non-obstructing renal stones. I would like patient to start taking tamsulosin and increase water intake if not contraindicated. Script sent to [facility]. She should also take toradol as previously prescribed for any pain. Follow-up in 3 months or sooner if needed.

## 2023-06-19 ENCOUNTER — TELEPHONE (OUTPATIENT)
Dept: UROLOGY | Facility: CLINIC | Age: 48
End: 2023-06-19
Payer: MEDICAID

## 2023-06-19 ENCOUNTER — PATIENT MESSAGE (OUTPATIENT)
Dept: UROLOGY | Facility: CLINIC | Age: 48
End: 2023-06-19
Payer: MEDICAID

## 2023-06-19 DIAGNOSIS — N30.00 ACUTE CYSTITIS WITHOUT HEMATURIA: Primary | ICD-10-CM

## 2023-06-19 RX ORDER — SULFAMETHOXAZOLE AND TRIMETHOPRIM 800; 160 MG/1; MG/1
1 TABLET ORAL 2 TIMES DAILY
Qty: 14 TABLET | Refills: 0 | Status: SHIPPED | OUTPATIENT
Start: 2023-06-19 | End: 2023-06-26

## 2023-06-19 NOTE — TELEPHONE ENCOUNTER
----- Message from Raj Dubon NP sent at 6/19/2023 11:56 AM CDT -----  Please inform patient via telephone that her repeat urine cx still showed some bacteria but none in predominance to diagnose a UTI. However, I would like to treat patient with a round of antibiotic. Script for Bactrim DS sent to [facility].

## 2023-07-10 ENCOUNTER — TELEPHONE (OUTPATIENT)
Dept: ORTHOPEDICS | Facility: CLINIC | Age: 48
End: 2023-07-10
Payer: MEDICAID

## 2023-07-10 NOTE — TELEPHONE ENCOUNTER
----- Message from Geneva Olivarez sent at 7/10/2023  2:00 PM CDT -----  Type:  Needs Medical Advice     Who Called: pt     Would the patient rather a call back or a response via [facility]? Call back  Best Call Back Number: [phone]  Additional Information:      Pt stated she missed a call and would like a call back please

## 2023-07-10 NOTE — TELEPHONE ENCOUNTER
Spoke with patient.  Clarified she is coming in for steroid injections on both knees. Stated she would like Zilretta but needs something now. Will discuss with Pat at appointment on Friday.

## 2023-07-10 NOTE — TELEPHONE ENCOUNTER
----- Message from Mary Lou Rucker sent at 7/10/2023 11:56 AM CDT -----  .Type:  Needs Medical Advice    Who Called: pt    Would the patient rather a call back or a response via [facility]? Call back  Best Call Back Number: [phone]  Additional Information:     Pt stated she missed a call and would like a call back please

## 2023-07-14 ENCOUNTER — OFFICE VISIT (OUTPATIENT)
Dept: ORTHOPEDICS | Facility: CLINIC | Age: 48
End: 2023-07-14
Payer: MEDICAID

## 2023-07-14 VITALS
DIASTOLIC BLOOD PRESSURE: 84 MMHG | HEART RATE: 70 BPM | SYSTOLIC BLOOD PRESSURE: 131 MMHG | WEIGHT: 254.19 LBS | BODY MASS INDEX: 45.04 KG/M2 | HEIGHT: 63 IN

## 2023-07-14 DIAGNOSIS — M17.11 PRIMARY OSTEOARTHRITIS OF RIGHT KNEE: ICD-10-CM

## 2023-07-14 DIAGNOSIS — M17.12 PRIMARY OSTEOARTHRITIS OF LEFT KNEE: Primary | ICD-10-CM

## 2023-07-14 PROCEDURE — 99999 PR PBB SHADOW E&M-EST. PATIENT-LVL IV: ICD-10-PCS | Mod: PBBFAC,,, | Performed by: PHYSICIAN ASSISTANT

## 2023-07-14 PROCEDURE — 1159F MED LIST DOCD IN RCRD: CPT | Mod: CPTII,,, | Performed by: PHYSICIAN ASSISTANT

## 2023-07-14 PROCEDURE — 3079F PR MOST RECENT DIASTOLIC BLOOD PRESSURE 80-89 MM HG: ICD-10-PCS | Mod: CPTII,,, | Performed by: PHYSICIAN ASSISTANT

## 2023-07-14 PROCEDURE — 20610 DRAIN/INJ JOINT/BURSA W/O US: CPT | Mod: 50,PBBFAC,PN | Performed by: PHYSICIAN ASSISTANT

## 2023-07-14 PROCEDURE — 3075F PR MOST RECENT SYSTOLIC BLOOD PRESS GE 130-139MM HG: ICD-10-PCS | Mod: CPTII,,, | Performed by: PHYSICIAN ASSISTANT

## 2023-07-14 PROCEDURE — 3075F SYST BP GE 130 - 139MM HG: CPT | Mod: CPTII,,, | Performed by: PHYSICIAN ASSISTANT

## 2023-07-14 PROCEDURE — 99213 OFFICE O/P EST LOW 20 MIN: CPT | Mod: 25,S$PBB,, | Performed by: PHYSICIAN ASSISTANT

## 2023-07-14 PROCEDURE — 99214 OFFICE O/P EST MOD 30 MIN: CPT | Mod: PBBFAC,PN,25 | Performed by: PHYSICIAN ASSISTANT

## 2023-07-14 PROCEDURE — 3008F PR BODY MASS INDEX (BMI) DOCUMENTED: ICD-10-PCS | Mod: CPTII,,, | Performed by: PHYSICIAN ASSISTANT

## 2023-07-14 PROCEDURE — 20610 LARGE JOINT ASPIRATION/INJECTION: BILATERAL KNEE: ICD-10-PCS | Mod: 50,S$PBB,, | Performed by: PHYSICIAN ASSISTANT

## 2023-07-14 PROCEDURE — 20610 DRAIN/INJ JOINT/BURSA W/O US: CPT | Mod: 50,S$PBB,, | Performed by: PHYSICIAN ASSISTANT

## 2023-07-14 PROCEDURE — 1160F PR REVIEW ALL MEDS BY PRESCRIBER/CLIN PHARMACIST DOCUMENTED: ICD-10-PCS | Mod: CPTII,,, | Performed by: PHYSICIAN ASSISTANT

## 2023-07-14 PROCEDURE — 1159F PR MEDICATION LIST DOCUMENTED IN MEDICAL RECORD: ICD-10-PCS | Mod: CPTII,,, | Performed by: PHYSICIAN ASSISTANT

## 2023-07-14 PROCEDURE — 3008F BODY MASS INDEX DOCD: CPT | Mod: CPTII,,, | Performed by: PHYSICIAN ASSISTANT

## 2023-07-14 PROCEDURE — 3079F DIAST BP 80-89 MM HG: CPT | Mod: CPTII,,, | Performed by: PHYSICIAN ASSISTANT

## 2023-07-14 PROCEDURE — 99213 PR OFFICE/OUTPT VISIT, EST, LEVL III, 20-29 MIN: ICD-10-PCS | Mod: 25,S$PBB,, | Performed by: PHYSICIAN ASSISTANT

## 2023-07-14 PROCEDURE — 1160F RVW MEDS BY RX/DR IN RCRD: CPT | Mod: CPTII,,, | Performed by: PHYSICIAN ASSISTANT

## 2023-07-14 PROCEDURE — 99999 PR PBB SHADOW E&M-EST. PATIENT-LVL IV: CPT | Mod: PBBFAC,,, | Performed by: PHYSICIAN ASSISTANT

## 2023-07-14 RX ORDER — KETOROLAC TROMETHAMINE 30 MG/ML
30 INJECTION, SOLUTION INTRAMUSCULAR; INTRAVENOUS
Status: DISCONTINUED | OUTPATIENT
Start: 2023-07-14 | End: 2023-07-14 | Stop reason: HOSPADM

## 2023-07-14 RX ADMIN — KETOROLAC TROMETHAMINE 30 MG: 30 INJECTION, SOLUTION INTRAMUSCULAR at 08:07

## 2023-07-14 NOTE — PROGRESS NOTES
Subjective:      Patient ID: Inna Gallardo is a 48 y.o. female.    Chief Complaint: Pain of the Left Knee and Pain and Injections of the Right Knee      48-year-old obese female follow-up bilateral knee bone-on-bone osteoarthritis.  Previous Zilretta injections helped for about 3 months.  She states she has been losing weight to qualify for a TKA.  She has an appointment at [facility] to possibly schedule a total knee replacement in September.       Review of Systems   Constitutional: Negative for chills and fever.   Cardiovascular:  Negative for chest pain.   Respiratory:  Negative for cough.    Hematologic/Lymphatic: Does not bruise/bleed easily.   Skin:  Negative for poor wound healing and rash.   Musculoskeletal:  Positive for arthritis, joint pain, myalgias and stiffness.   Gastrointestinal:  Negative for abdominal pain.   Genitourinary:  Negative for bladder incontinence.   Neurological:  Negative for dizziness, loss of balance and weakness.   Psychiatric/Behavioral:  Negative for altered mental status.      Review of patient's allergies indicates:   Allergen Reactions    Ace inhibitors Other (See Comments)     cough        Current Outpatient Medications   Medication Sig Dispense Refill    albuterol (PROVENTIL/VENTOLIN HFA) 90 mcg/actuation inhaler 2 puffs every 4 to 6 hours as needed.      atorvastatin (LIPITOR) 20 MG tablet       EPINEPHrine (EPIPEN) 0.3 mg/0.3 mL AtIn Inject 0.3 mLs into the muscle as needed.  0    ESTARYLLA 0.25-35 mg-mcg per tablet Take 1 tablet by mouth once daily.      fluticasone propionate (FLONASE) 50 mcg/actuation nasal spray       hydrOXYzine HCl (ATARAX) 25 MG tablet Take 25 mg by mouth 3 (three) times daily as needed.  2    LIDOcaine (LIDODERM) 5 % Place 1 patch onto the skin once daily. Remove & Discard patch within 12 hours or as directed by MD 15 patch 0    loratadine (CLARITIN) 10 mg tablet Take 1 tablet every day by oral route.      metFORMIN (GLUCOPHAGE-XR) 500 MG ER 24hr tablet  "Take 500 mg by mouth once daily.      naproxen (NAPROSYN) 500 MG tablet Take 1 tablet (500 mg total) by mouth 2 (two) times daily with meals. 14 tablet 0    omeprazole (PRILOSEC) 40 MG capsule Take 1 capsule (40 mg total) by mouth every morning. 30 capsule 11    ondansetron (ZOFRAN-ODT) 4 MG TbDL Take 1 tablet (4 mg total) by mouth every 6 (six) hours as needed. 30 tablet 0    paroxetine (PAXIL) 20 MG tablet Take 20 mg by mouth every morning.      sertraline (ZOLOFT) 50 MG tablet       sucralfate (CARAFATE) 1 gram tablet Take 1 tablet (1 g total) by mouth before meals and at bedtime as needed (abdominal  pain). 90 tablet 1    tamsulosin (FLOMAX) 0.4 mg Cap Take 1 capsule (0.4 mg total) by mouth once daily. 30 capsule 2     No current facility-administered medications for this visit.        The patient's relevant past medical, surgical, and social history was reviewed in Epic.       Objective:      VITAL SIGNS: /84   Pulse 70   Ht 5' 3" (1.6 m)   Wt 115.3 kg (254 lb 3.1 oz)   LMP 03/03/2015   BMI 45.03 kg/m²     General    Nursing note and vitals reviewed.  Constitutional: She is oriented to person, place, and time. She appears well-developed and well-nourished.   Neurological: She is alert and oriented to person, place, and time.           Right Knee Exam     Inspection   Swelling: present    Tenderness   The patient is tender to palpation of the medial joint line.    Range of Motion   Extension:  abnormal   Flexion:  abnormal     Left Knee Exam     Inspection   Swelling: present    Tenderness   The patient tender to palpation of the medial joint line.    Range of Motion   Extension:  abnormal   Flexion:  abnormal     Muscle Strength   Right Lower Extremity   Quadriceps:  4/5   Left Lower Extremity   Quadriceps:  4/5          Assessment:       1. Primary osteoarthritis of left knee    2. Primary osteoarthritis of right knee          Plan:         Inna was seen today for pain, pain and " injections.    Diagnoses and all orders for this visit:    Primary osteoarthritis of left knee  -     Prior authorization Order  -     Large Joint Aspiration/Injection: bilateral knee    Primary osteoarthritis of right knee  -     Prior authorization Order  -     Large Joint Aspiration/Injection: bilateral knee    Bilateral knee Toradol injections given today for acute pain.  I will put a new authorization for bilateral knees Zilretta injections.  She will return next week for these injections once approved.    Diagnoses and plan discussed with the patient, as well as the expected course and duration of his symptoms.  All questions and concerns were addressed prior to the end of the visit.   Instructed patient to call office if they have any future questions/concerns or to schedule apt. Patient will return to see me if symptoms worsen or fail to improve    Note dictated with voice recognition software, please excuse any grammatical errors.        Pat Taylor PA-C   07/14/2023

## 2023-07-14 NOTE — PROCEDURES
Large Joint Aspiration/Injection: bilateral knee    Date/Time: 7/14/2023 8:30 AM  Performed by: Pat Taylor PA-C  Authorized by: Pat Taylor PA-C     Consent Done?:  Yes (Verbal)  Indications:  Arthritis  Site marked: the procedure site was marked    Timeout: prior to procedure the correct patient, procedure, and site was verified    Prep: patient was prepped and draped in usual sterile fashion      Local anesthesia used?: Yes    Local anesthetic:  Topical anesthetic    Details:  Needle Size:  22 G  Ultrasonic Guidance for needle placement?: No    Approach:  Anterolateral  Location:  Knee  Laterality:  Bilateral  Site:  Bilateral knee  Medications (Right):  30 mg ketorolac 30 mg/mL (1 mL)  Medications (Left):  30 mg ketorolac 30 mg/mL (1 mL)  Patient tolerance:  Patient tolerated the procedure well with no immediate complications

## 2023-07-15 DIAGNOSIS — N20.0 BILATERAL NEPHROLITHIASIS: ICD-10-CM

## 2023-07-18 RX ORDER — TAMSULOSIN HYDROCHLORIDE 0.4 MG/1
CAPSULE ORAL
Qty: 90 CAPSULE | Refills: 0 | Status: SHIPPED | OUTPATIENT
Start: 2023-07-18 | End: 2024-02-26 | Stop reason: SDUPTHER

## 2023-07-24 ENCOUNTER — OFFICE VISIT (OUTPATIENT)
Dept: ORTHOPEDICS | Facility: CLINIC | Age: 48
End: 2023-07-24
Payer: MEDICAID

## 2023-07-24 VITALS
HEART RATE: 65 BPM | HEIGHT: 63 IN | SYSTOLIC BLOOD PRESSURE: 144 MMHG | DIASTOLIC BLOOD PRESSURE: 85 MMHG | BODY MASS INDEX: 45.5 KG/M2 | WEIGHT: 256.81 LBS

## 2023-07-24 DIAGNOSIS — E66.01 MORBID OBESITY DUE TO EXCESS CALORIES: ICD-10-CM

## 2023-07-24 DIAGNOSIS — M17.12 PRIMARY OSTEOARTHRITIS OF LEFT KNEE: ICD-10-CM

## 2023-07-24 DIAGNOSIS — M17.11 PRIMARY OSTEOARTHRITIS OF RIGHT KNEE: Primary | ICD-10-CM

## 2023-07-24 PROCEDURE — 99999 PR PBB SHADOW E&M-EST. PATIENT-LVL III: ICD-10-PCS | Mod: PBBFAC,,, | Performed by: PHYSICIAN ASSISTANT

## 2023-07-24 PROCEDURE — 99499 UNLISTED E&M SERVICE: CPT | Mod: S$PBB,,, | Performed by: PHYSICIAN ASSISTANT

## 2023-07-24 PROCEDURE — 99499 NO LOS: ICD-10-PCS | Mod: S$PBB,,, | Performed by: PHYSICIAN ASSISTANT

## 2023-07-24 PROCEDURE — 20610 DRAIN/INJ JOINT/BURSA W/O US: CPT | Mod: 50,PBBFAC,PN | Performed by: PHYSICIAN ASSISTANT

## 2023-07-24 PROCEDURE — 20610 LARGE JOINT ASPIRATION/INJECTION: BILATERAL KNEE: ICD-10-PCS | Mod: 50,S$PBB,, | Performed by: PHYSICIAN ASSISTANT

## 2023-07-24 PROCEDURE — 99213 OFFICE O/P EST LOW 20 MIN: CPT | Mod: PBBFAC,PN | Performed by: PHYSICIAN ASSISTANT

## 2023-07-24 PROCEDURE — 99999 PR PBB SHADOW E&M-EST. PATIENT-LVL III: CPT | Mod: PBBFAC,,, | Performed by: PHYSICIAN ASSISTANT

## 2023-07-24 PROCEDURE — 20610 DRAIN/INJ JOINT/BURSA W/O US: CPT | Mod: 50,S$PBB,, | Performed by: PHYSICIAN ASSISTANT

## 2023-07-24 RX ADMIN — Medication 32 MG: at 03:07

## 2023-07-24 NOTE — PROCEDURES
Large Joint Aspiration/Injection: bilateral knee    Date/Time: 7/24/2023 3:30 PM  Performed by: Pat Taylor PA-C  Authorized by: Pat Taylor PA-C     Consent Done?:  Yes (Verbal)  Indications:  Arthritis  Site marked: the procedure site was marked    Timeout: prior to procedure the correct patient, procedure, and site was verified    Prep: patient was prepped and draped in usual sterile fashion      Local anesthesia used?: Yes    Local anesthetic:  Topical anesthetic    Details:  Needle Size:  22 G  Ultrasonic Guidance for needle placement?: No    Approach:  Anterolateral  Location:  Knee  Laterality:  Bilateral  Site:  Bilateral knee  Medications (Right):  32 mg triamcinolone acetonide 32 mg  Medications (Left):  32 mg triamcinolone acetonide 32 mg  Patient tolerance:  Patient tolerated the procedure well with no immediate complications

## 2023-07-24 NOTE — PROGRESS NOTES
Subjective:      Patient ID: Inna Gallardo is a 48 y.o. female.    Chief Complaint: Pain and Injections of the Left Knee and Pain and Injections of the Right Knee      Patient is here for First Zilretta  injection(s) for bilateral knee osteoarthritis. Patient tolerated last injection well. No new complaints today.         Review of Systems   Constitutional: Negative for chills and fever.   Cardiovascular:  Negative for chest pain.   Respiratory:  Negative for cough.    Hematologic/Lymphatic: Does not bruise/bleed easily.   Skin:  Negative for poor wound healing and rash.   Musculoskeletal:  Positive for joint pain, myalgias and stiffness.   Gastrointestinal:  Negative for abdominal pain.   Genitourinary:  Negative for bladder incontinence.   Neurological:  Negative for dizziness, loss of balance and weakness.   Psychiatric/Behavioral:  Negative for altered mental status.      Review of patient's allergies indicates:   Allergen Reactions    Ace inhibitors Other (See Comments)     cough        Current Outpatient Medications   Medication Sig Dispense Refill    albuterol (PROVENTIL/VENTOLIN HFA) 90 mcg/actuation inhaler 2 puffs every 4 to 6 hours as needed.      atorvastatin (LIPITOR) 20 MG tablet       EPINEPHrine (EPIPEN) 0.3 mg/0.3 mL AtIn Inject 0.3 mLs into the muscle as needed.  0    ESTARYLLA 0.25-35 mg-mcg per tablet Take 1 tablet by mouth once daily.      fluticasone propionate (FLONASE) 50 mcg/actuation nasal spray       hydrOXYzine HCl (ATARAX) 25 MG tablet Take 25 mg by mouth 3 (three) times daily as needed.  2    LIDOcaine (LIDODERM) 5 % Place 1 patch onto the skin once daily. Remove & Discard patch within 12 hours or as directed by MD 15 patch 0    loratadine (CLARITIN) 10 mg tablet Take 1 tablet every day by oral route.      metFORMIN (GLUCOPHAGE-XR) 500 MG ER 24hr tablet Take 500 mg by mouth once daily.      naproxen (NAPROSYN) 500 MG tablet Take 1 tablet (500 mg total) by mouth 2 (two) times daily with  "meals. 14 tablet 0    omeprazole (PRILOSEC) 40 MG capsule Take 1 capsule (40 mg total) by mouth every morning. 30 capsule 11    ondansetron (ZOFRAN-ODT) 4 MG TbDL Take 1 tablet (4 mg total) by mouth every 6 (six) hours as needed. 30 tablet 0    paroxetine (PAXIL) 20 MG tablet Take 20 mg by mouth every morning.      sertraline (ZOLOFT) 50 MG tablet       sucralfate (CARAFATE) 1 gram tablet Take 1 tablet (1 g total) by mouth before meals and at bedtime as needed (abdominal  pain). 90 tablet 1    tamsulosin (FLOMAX) 0.4 mg Cap TAKE 1 CAPSULE(0.4 MG) BY MOUTH EVERY DAY 90 capsule 0     No current facility-administered medications for this visit.        The patient's relevant past medical, surgical, and social history was reviewed in Epic.       Objective:      VITAL SIGNS: BP (!) 144/85   Pulse 65   Ht 5' 3" (1.6 m)   Wt 116.5 kg (256 lb 13.4 oz)   LMP 03/03/2015   BMI 45.50 kg/m²     Ortho/SPM Exam         Assessment:       1. Primary osteoarthritis of right knee    2. Primary osteoarthritis of left knee    3. Morbid obesity due to excess calories          Plan:         Inna was seen today for pain, injections, pain and injections.    Diagnoses and all orders for this visit:    Primary osteoarthritis of right knee  -     Large Joint Aspiration/Injection: bilateral knee    Primary osteoarthritis of left knee  -     Large Joint Aspiration/Injection: bilateral knee    Morbid obesity due to excess calories    I injected the bilateral knee with one dose of Zilretta  today.  We will see the patient back in 6 mos or as needed.      Diagnoses and plan discussed with the patient, as well as the expected course and duration of his symptoms.  All questions and concerns were addressed prior to the end of the visit.   Instructed patient to call office if they have any future questions/concerns or to schedule apt. Patient will return to see me if symptoms worsen or fail to improve    Note dictated with voice recognition " software, please excuse any grammatical errors.        Pat Taylor PA-C   07/24/2023

## 2023-09-27 ENCOUNTER — TELEPHONE (OUTPATIENT)
Dept: HEMATOLOGY/ONCOLOGY | Facility: CLINIC | Age: 48
End: 2023-09-27
Payer: MEDICAID

## 2023-09-27 NOTE — TELEPHONE ENCOUNTER
----- Message from Monchomicheline Baird sent at 9/27/2023  8:17 AM CDT -----  Contact: pt  Type: Requesting to speak with nurse        Who Called: PT  Regarding: pt sick. Would like to reschedule 9/28 appointment for they next available day.  Would the patient rather a call back or a response via [facility]? Call back  Best Call Back Number: [phone]  Additional Information:

## 2023-10-10 ENCOUNTER — OFFICE VISIT (OUTPATIENT)
Dept: HEMATOLOGY/ONCOLOGY | Facility: CLINIC | Age: 48
End: 2023-10-10
Payer: MEDICAID

## 2023-10-10 ENCOUNTER — LAB VISIT (OUTPATIENT)
Dept: LAB | Facility: HOSPITAL | Age: 48
End: 2023-10-10
Attending: INTERNAL MEDICINE
Payer: MEDICAID

## 2023-10-10 ENCOUNTER — TELEPHONE (OUTPATIENT)
Dept: HEMATOLOGY/ONCOLOGY | Facility: CLINIC | Age: 48
End: 2023-10-10
Payer: MEDICAID

## 2023-10-10 VITALS
BODY MASS INDEX: 45.81 KG/M2 | SYSTOLIC BLOOD PRESSURE: 137 MMHG | DIASTOLIC BLOOD PRESSURE: 84 MMHG | WEIGHT: 258.63 LBS | OXYGEN SATURATION: 97 % | HEART RATE: 63 BPM

## 2023-10-10 DIAGNOSIS — Z86.39 HISTORY OF IRON DEFICIENCY: ICD-10-CM

## 2023-10-10 DIAGNOSIS — D75.839 THROMBOCYTOSIS, UNSPECIFIED: ICD-10-CM

## 2023-10-10 DIAGNOSIS — Z87.891 HISTORY OF SMOKING: ICD-10-CM

## 2023-10-10 DIAGNOSIS — D72.828 OTHER ELEVATED WHITE BLOOD CELL (WBC) COUNT: ICD-10-CM

## 2023-10-10 DIAGNOSIS — D72.828 OTHER ELEVATED WHITE BLOOD CELL (WBC) COUNT: Primary | ICD-10-CM

## 2023-10-10 DIAGNOSIS — E66.01 MORBID OBESITY: ICD-10-CM

## 2023-10-10 DIAGNOSIS — Z71.89 ADVANCE CARE PLANNING: ICD-10-CM

## 2023-10-10 LAB
BASOPHILS # BLD AUTO: 0.06 K/UL (ref 0–0.2)
BASOPHILS NFR BLD: 0.4 % (ref 0–1.9)
CRP SERPL-MCNC: 1.7 MG/L (ref 0–8.2)
DIFFERENTIAL METHOD: ABNORMAL
EOSINOPHIL # BLD AUTO: 0.1 K/UL (ref 0–0.5)
EOSINOPHIL NFR BLD: 0.6 % (ref 0–8)
ERYTHROCYTE [DISTWIDTH] IN BLOOD BY AUTOMATED COUNT: 13.8 % (ref 11.5–14.5)
ERYTHROCYTE [SEDIMENTATION RATE] IN BLOOD BY PHOTOMETRIC METHOD: 15 MM/HR (ref 0–36)
FERRITIN SERPL-MCNC: 54 NG/ML (ref 20–300)
HCT VFR BLD AUTO: 41.4 % (ref 37–48.5)
HGB BLD-MCNC: 13.7 G/DL (ref 12–16)
IMM GRANULOCYTES # BLD AUTO: 0.07 K/UL (ref 0–0.04)
IMM GRANULOCYTES NFR BLD AUTO: 0.5 % (ref 0–0.5)
IRON SERPL-MCNC: 150 UG/DL (ref 30–160)
LYMPHOCYTES # BLD AUTO: 4.1 K/UL (ref 1–4.8)
LYMPHOCYTES NFR BLD: 27.5 % (ref 18–48)
MCH RBC QN AUTO: 29.1 PG (ref 27–31)
MCHC RBC AUTO-ENTMCNC: 33.1 G/DL (ref 32–36)
MCV RBC AUTO: 88 FL (ref 82–98)
MONOCYTES # BLD AUTO: 1.1 K/UL (ref 0.3–1)
MONOCYTES NFR BLD: 7.6 % (ref 4–15)
NEUTROPHILS # BLD AUTO: 9.4 K/UL (ref 1.8–7.7)
NEUTROPHILS NFR BLD: 63.4 % (ref 38–73)
NRBC BLD-RTO: 0 /100 WBC
PLATELET # BLD AUTO: 409 K/UL (ref 150–450)
PMV BLD AUTO: 10.8 FL (ref 9.2–12.9)
RBC # BLD AUTO: 4.71 M/UL (ref 4–5.4)
SATURATED IRON: 34 % (ref 20–50)
TOTAL IRON BINDING CAPACITY: 444 UG/DL (ref 250–450)
TRANSFERRIN SERPL-MCNC: 300 MG/DL (ref 200–375)
WBC # BLD AUTO: 14.8 K/UL (ref 3.9–12.7)

## 2023-10-10 PROCEDURE — 84466 ASSAY OF TRANSFERRIN: CPT | Performed by: INTERNAL MEDICINE

## 2023-10-10 PROCEDURE — 1160F RVW MEDS BY RX/DR IN RCRD: CPT | Mod: CPTII,,, | Performed by: INTERNAL MEDICINE

## 2023-10-10 PROCEDURE — 1160F PR REVIEW ALL MEDS BY PRESCRIBER/CLIN PHARMACIST DOCUMENTED: ICD-10-PCS | Mod: CPTII,,, | Performed by: INTERNAL MEDICINE

## 2023-10-10 PROCEDURE — 3079F PR MOST RECENT DIASTOLIC BLOOD PRESSURE 80-89 MM HG: ICD-10-PCS | Mod: CPTII,,, | Performed by: INTERNAL MEDICINE

## 2023-10-10 PROCEDURE — 99214 PR OFFICE/OUTPT VISIT, EST, LEVL IV, 30-39 MIN: ICD-10-PCS | Mod: S$PBB,,, | Performed by: INTERNAL MEDICINE

## 2023-10-10 PROCEDURE — 81206 BCR/ABL1 GENE MAJOR BP: CPT | Performed by: INTERNAL MEDICINE

## 2023-10-10 PROCEDURE — 3079F DIAST BP 80-89 MM HG: CPT | Mod: CPTII,,, | Performed by: INTERNAL MEDICINE

## 2023-10-10 PROCEDURE — 1159F MED LIST DOCD IN RCRD: CPT | Mod: CPTII,,, | Performed by: INTERNAL MEDICINE

## 2023-10-10 PROCEDURE — 85652 RBC SED RATE AUTOMATED: CPT | Performed by: INTERNAL MEDICINE

## 2023-10-10 PROCEDURE — 99999 PR PBB SHADOW E&M-EST. PATIENT-LVL III: ICD-10-PCS | Mod: PBBFAC,,, | Performed by: INTERNAL MEDICINE

## 2023-10-10 PROCEDURE — 1159F PR MEDICATION LIST DOCUMENTED IN MEDICAL RECORD: ICD-10-PCS | Mod: CPTII,,, | Performed by: INTERNAL MEDICINE

## 2023-10-10 PROCEDURE — 81207 BCR/ABL1 GENE MINOR BP: CPT | Performed by: INTERNAL MEDICINE

## 2023-10-10 PROCEDURE — 82728 ASSAY OF FERRITIN: CPT | Performed by: INTERNAL MEDICINE

## 2023-10-10 PROCEDURE — 3075F PR MOST RECENT SYSTOLIC BLOOD PRESS GE 130-139MM HG: ICD-10-PCS | Mod: CPTII,,, | Performed by: INTERNAL MEDICINE

## 2023-10-10 PROCEDURE — 85025 COMPLETE CBC W/AUTO DIFF WBC: CPT | Performed by: INTERNAL MEDICINE

## 2023-10-10 PROCEDURE — 36415 COLL VENOUS BLD VENIPUNCTURE: CPT | Performed by: INTERNAL MEDICINE

## 2023-10-10 PROCEDURE — 3075F SYST BP GE 130 - 139MM HG: CPT | Mod: CPTII,,, | Performed by: INTERNAL MEDICINE

## 2023-10-10 PROCEDURE — 99213 OFFICE O/P EST LOW 20 MIN: CPT | Mod: PBBFAC,PO | Performed by: INTERNAL MEDICINE

## 2023-10-10 PROCEDURE — 3008F BODY MASS INDEX DOCD: CPT | Mod: CPTII,,, | Performed by: INTERNAL MEDICINE

## 2023-10-10 PROCEDURE — 3008F PR BODY MASS INDEX (BMI) DOCUMENTED: ICD-10-PCS | Mod: CPTII,,, | Performed by: INTERNAL MEDICINE

## 2023-10-10 PROCEDURE — 99999 PR PBB SHADOW E&M-EST. PATIENT-LVL III: CPT | Mod: PBBFAC,,, | Performed by: INTERNAL MEDICINE

## 2023-10-10 PROCEDURE — 83540 ASSAY OF IRON: CPT | Performed by: INTERNAL MEDICINE

## 2023-10-10 PROCEDURE — 99214 OFFICE O/P EST MOD 30 MIN: CPT | Mod: S$PBB,,, | Performed by: INTERNAL MEDICINE

## 2023-10-10 PROCEDURE — 86140 C-REACTIVE PROTEIN: CPT | Performed by: INTERNAL MEDICINE

## 2023-10-10 NOTE — PROGRESS NOTES
PATIENT: Inna Gallardo  MRN: [id number]  DATE: 10/10/2023    Diagnosis:   1. Other elevated white blood cell (WBC) count    2. Thrombocytosis, unspecified    3. History of iron deficiency    4. History of smoking    5. Morbid obesity    6. Advance care planning      Chief Complaint: leukocytosis    Oncologic History:      Oncologic History     Oncologic Treatment     Pathology       Subjective:    History of Present Illness: Ms. Gallardo is a 48 y.o. female who presents for evaluation and management of leukocytosis/thrombocytosis. She is referred by physician assistant Lesli Cohen.    - labs on 8/24/23 revealed a mild leukocytosis at 12.4 k/uL, granulocyte-predominant; a mild thrombocytosis at 481 k/uL.    - today, she is doing well. She endorses fatigue. He denies shortness of breath, chest pain, nausea, vomiting, diarrhea, constipation.    - she had a hysterectomy in 2014.    Past medical, surgical, family, and social histories have been reviewed and updated below.    Past Medical History:   Past Medical History:   Diagnosis Date    Anxiety     Diverticulitis     Endometriosis     General anesthetics causing adverse effect in therapeutic use     Hypertension     Kidney stone     Prolactinoma 2011    Sliding hiatal hernia     Urinary tract infection     Vaginal infection        Past Surgical History:   Past Surgical History:   Procedure Laterality Date    ARTHROSCOPY OF KNEE Left     BRAIN TUMOR EXCISION  9/2011    removal of prolactinoma    COLONOSCOPY N/A 8/27/2020    Procedure: COLONOSCOPY;  Surgeon: Valentino Negro MD;  Location: [facility];  Service: Endoscopy;  Laterality: N/A;    CYSTOSCOPY      CYSTOSCOPY W/ URETERAL STENT PLACEMENT  2016    CYSTOSCOPY W/ URETERAL STENT REMOVAL  2016    ESOPHAGEAL MANOMETRY N/A 6/3/2019    Procedure: MANOMETRY, ESOPHAGUS;  Surgeon: Bhupinder Schmitz MD;  Location: [address]);  Service: Endoscopy;  Laterality: N/A;    ESOPHAGOGASTRODUODENOSCOPY N/A 4/30/2019     Procedure: ESOPHAGOGASTRODUODENOSCOPY (EGD);  Surgeon: Altagracia Bose MD;  Location: [facility] END[facility];  Service: Endoscopy;  Laterality: N/A;    ESOPHAGOGASTRODUODENOSCOPY N/A 8/2/2021    Procedure: EGD (ESOPHAGOGASTRODUODENOSCOPY);  Surgeon: Altagracia Bose MD;  Location: [facility] ENDO;  Service: Endoscopy;  Laterality: N/A;    FOOT HARDWARE REMOVAL Right 6/16/2020    Procedure: REMOVAL, HARDWARE, FOOT;  Surgeon: Adrianna Tillman DPM;  Location: [facility] OR;  Service: Podiatry;  Laterality: Right;    HARVESTING OF BONE GRAFT Right 7/16/2019    Procedure: SURGICAL PROCUREMENT, BONE GRAFT;  Surgeon: Adrianna Tillman DPM;  Location: [facility] OR;  Service: Podiatry;  Laterality: Right;    HYSTERECTOMY  3/16/15    [facility]/BSO for Endometriosis, AUB, fibroids, cyst    LITHOTRIPSY      MANDIBLE SURGERY  2002    severe overbite correction    PELVIC LAPAROSCOPY  2014    Dx scope, chromopertubation-Endometriosis    TONSILLECTOMY, ADENOIDECTOMY         Family History:   Family History   Problem Relation Age of Onset    Cancer Mother     Breast cancer Neg Hx     Ovarian cancer Neg Hx     Kidney disease Neg Hx        Social History:  reports that she quit smoking about 23 years ago. Her smoking use included cigarettes. She started smoking about 34 years ago. She has never used smokeless tobacco. She reports that she does not drink alcohol and does not use drugs.    Allergies:  Review of patient's allergies indicates:   Allergen Reactions    Ace inhibitors Other (See Comments)     cough       Medications:  Current Outpatient Medications   Medication Sig Dispense Refill    EPINEPHrine (EPIPEN) 0.3 mg/0.3 mL AtIn Inject 0.3 mLs into the muscle as needed.  0    ESTARYLLA 0.25-35 mg-mcg per tablet Take 1 tablet by mouth once daily.      loratadine (CLARITIN) 10 mg tablet Take 1 tablet every day by oral route.      omeprazole (PRILOSEC) 40 MG capsule Take 1 capsule (40 mg total) by mouth every morning. 30 capsule 11    sertraline (ZOLOFT) 50 MG tablet        albuterol (PROVENTIL/VENTOLIN HFA) 90 mcg/actuation inhaler 2 puffs every 4 to 6 hours as needed.      atorvastatin (LIPITOR) 20 MG tablet       fluticasone propionate (FLONASE) 50 mcg/actuation nasal spray       hydrOXYzine HCl (ATARAX) 25 MG tablet Take 25 mg by mouth 3 (three) times daily as needed.  2    LIDOcaine (LIDODERM) 5 % Place 1 patch onto the skin once daily. Remove & Discard patch within 12 hours or as directed by MD (Patient not taking: Reported on 10/10/2023) 15 patch 0    metFORMIN (GLUCOPHAGE-XR) 500 MG ER 24hr tablet Take 500 mg by mouth once daily.      naproxen (NAPROSYN) 500 MG tablet Take 1 tablet (500 mg total) by mouth 2 (two) times daily with meals. (Patient not taking: Reported on 10/10/2023) 14 tablet 0    ondansetron (ZOFRAN-ODT) 4 MG TbDL Take 1 tablet (4 mg total) by mouth every 6 (six) hours as needed. (Patient not taking: Reported on 10/10/2023) 30 tablet 0    paroxetine (PAXIL) 20 MG tablet Take 20 mg by mouth every morning.      sucralfate (CARAFATE) 1 gram tablet Take 1 tablet (1 g total) by mouth before meals and at bedtime as needed (abdominal  pain). (Patient not taking: Reported on 10/10/2023) 90 tablet 1    tamsulosin (FLOMAX) 0.4 mg Cap TAKE 1 CAPSULE(0.4 MG) BY MOUTH EVERY DAY (Patient not taking: Reported on 10/10/2023) 90 capsule 0     No current facility-administered medications for this visit.       Review of Systems   Constitutional:  Positive for fatigue.   HENT:  Negative for sore throat.    Eyes:  Negative for visual disturbance.   Respiratory:  Negative for cough and shortness of breath.    Cardiovascular:  Negative for chest pain.   Gastrointestinal:  Negative for abdominal pain, constipation, diarrhea, nausea and vomiting.   Genitourinary:  Negative for dysuria.   Musculoskeletal:  Negative for back pain.   Skin:  Negative for rash.   Neurological:  Negative for headaches.   Hematological:  Negative for adenopathy.   Psychiatric/Behavioral:  The patient is  not nervous/anxious.        ECOG Performance Status:   ECOG SCORE    1 - Restricted in strenuous activity-ambulatory and able to carry out work of a light nature         Objective:      Vitals:   Vitals:    10/10/23 1056   BP: 137/84   Pulse: 63   SpO2: 97%   Weight: 117.3 kg (258 lb 9.6 oz)     BMI: Body mass index is 45.81 kg/m².    Physical Exam  Vitals and nursing note reviewed.   Constitutional:       Appearance: She is well-developed.   HENT:      Head: Normocephalic and atraumatic.   Eyes:      Pupils: Pupils are equal, round, and reactive to light.   Cardiovascular:      Rate and Rhythm: Normal rate and regular rhythm.   Pulmonary:      Effort: Pulmonary effort is normal.      Breath sounds: Normal breath sounds.   Abdominal:      General: Bowel sounds are normal.      Palpations: Abdomen is soft.   Musculoskeletal:         General: Normal range of motion.      Cervical back: Normal range of motion and neck supple.   Skin:     General: Skin is warm and dry.   Neurological:      Mental Status: She is alert and oriented to person, place, and time.   Psychiatric:         Behavior: Behavior normal.         Thought Content: Thought content normal.         Judgment: Judgment normal.         Laboratory Data:  Labs have been reviewed.    Lab Results   Component Value Date    WBC 13.65 (H) 03/14/2023    HGB 14.1 03/14/2023    HCT 41.9 03/14/2023    MCV 86 03/14/2023     03/14/2023 8/24/23:        Imaging:    Assessment:       1. Other elevated white blood cell (WBC) count    2. Thrombocytosis, unspecified    3. History of iron deficiency    4. History of smoking    5. Morbid obesity    6. Advance care planning           Plan:     Leukocytosis / thrombocytosis  - I have reviewed her chart/outside records  - labs on 8/24/23 revealed a mild leukocytosis at 12.4 k/uL, granulocyte-predominant; a mild thrombocytosis at 481 k/uL.  - I suspect her leukocytosis and thrombocytosis are reactive, perhaps from  inflammation. I will perform a workup to rule in/out primary cause of elevated counts.  - JAK2 with reflex testing was previously negative, so won't repeat that.  - check cbc, sedimentation rate, c-reactive protein, iron/TIBC, ferritin, BCR/ABL.  - I will contact her with results of testing. If needed, I will schedule a follow-up appointment.    2. History of iron deficiency  - iron studies (2015) revealed a decreased ferritin  - repeat iron studies today    3. History of smoking      4. Morbid obesity  - Body mass index is 45.81 kg/m².  - adipose tissue is inflammatory and can cause leukocytosis/thrombocytosis    5. Advance Care Planning     Date: 10/10/2023    Power of   I initiated the process of voluntary advance care planning today and explained the importance of this process to the patient.  I introduced the concept of advance directives to the patient, as well. Then the patient received detailed information about the importance of designating a Health Care Power of  (HCPOA). She was also instructed to communicate with this person about their wishes for future healthcare, should she become sick and lose decision-making capacity. The patient has not previously appointed a HCPOA. After our discussion, the patient has decided not to complete a HCPOA.          - I will contact her with results of testing. If needed, I will schedule a follow-up appointment.    Christiano Sotelo M.D.  Hematology/Oncology  [facility] - [address], [address]  [place] [address]  Phone: [phone]  Fax: [phone]

## 2023-10-10 NOTE — TELEPHONE ENCOUNTER
----- Message from Akiko Conklin sent at 10/10/2023  9:32 AM CDT -----  Needs advice from nurse:      Who Called:pt  Regarding:patient thought today's appt was at 10, however she was scheduled for 9. Needs to reschedule   Would the patient rather a call back or [facility]IA [facility]?  Best Call Back number:[phone]  Additional Info:

## 2023-10-16 LAB
DIAGNOSTIC BCR/ABL 1 RESULT: NORMAL
NARRATIVE DIAGNOSTIC REPORT-IMP: NORMAL
SPECIMEN TYPE, BCR/ABL: NORMAL

## 2023-10-19 ENCOUNTER — TELEPHONE (OUTPATIENT)
Dept: HEMATOLOGY/ONCOLOGY | Facility: CLINIC | Age: 48
End: 2023-10-19
Payer: MEDICAID

## 2023-10-19 NOTE — TELEPHONE ENCOUNTER
I called an d reviewed lab results. BCR/ABL is negative. So I suspect her intermittent leukocytosis is reactive in nature. No further workup at this time.    Christiano Sotelo M.D.  Hematology/Oncology  [facility] - [address], [address]  [place] [address]  Phone: [phone]  Fax: [phone]

## 2023-10-30 ENCOUNTER — TELEPHONE (OUTPATIENT)
Dept: ORTHOPEDICS | Facility: CLINIC | Age: 48
End: 2023-10-30
Payer: MEDICAID

## 2023-10-30 NOTE — TELEPHONE ENCOUNTER
----- Message from Keturah Adamson sent at 10/30/2023  7:38 AM CDT -----  Regarding: call back  Contact: [phone]  Type:  Sooner Apoointment Request    Caller is requesting a sooner appointment.  Caller declined first available appointment listed below.  Caller will not accept being placed on the waitlist and is requesting a message be sent to doctor.  Name of Caller: PT   When is the first available appointment? November   Symptoms: Injection on both knees   Would the patient rather a call back or a response via [facility]? Call Back   Best Call Back Number: [phone]  Additional Information:

## 2023-10-30 NOTE — TELEPHONE ENCOUNTER
----- Message from Lela Barcenas sent at 10/30/2023 12:19 PM CDT -----  Regarding: injection  Contact: [phone]  Patient is requesting a call back regarding scheduling injections for both knees.   Would the patient rather a call back or a response via [facility]?  Call   Best Call Back Number:  [phone]  Additional Information:

## 2023-10-30 NOTE — TELEPHONE ENCOUNTER
Spoke with patient. Having a lot of pain in knees. Would like bilateral Zilretta again if possible. Will forward to [place].

## 2023-10-31 ENCOUNTER — TELEPHONE (OUTPATIENT)
Dept: ORTHOPEDICS | Facility: CLINIC | Age: 48
End: 2023-10-31
Payer: MEDICAID

## 2023-10-31 DIAGNOSIS — M17.11 PRIMARY OSTEOARTHRITIS OF RIGHT KNEE: Primary | ICD-10-CM

## 2023-10-31 DIAGNOSIS — M17.12 PRIMARY OSTEOARTHRITIS OF LEFT KNEE: ICD-10-CM

## 2023-11-08 ENCOUNTER — OFFICE VISIT (OUTPATIENT)
Dept: ORTHOPEDICS | Facility: CLINIC | Age: 48
End: 2023-11-08
Payer: MEDICAID

## 2023-11-08 ENCOUNTER — PATIENT MESSAGE (OUTPATIENT)
Dept: ORTHOPEDICS | Facility: CLINIC | Age: 48
End: 2023-11-08

## 2023-11-08 VITALS
DIASTOLIC BLOOD PRESSURE: 85 MMHG | WEIGHT: 264.31 LBS | HEIGHT: 63 IN | BODY MASS INDEX: 46.83 KG/M2 | HEART RATE: 68 BPM | SYSTOLIC BLOOD PRESSURE: 137 MMHG

## 2023-11-08 DIAGNOSIS — E66.01 MORBID OBESITY DUE TO EXCESS CALORIES: ICD-10-CM

## 2023-11-08 DIAGNOSIS — M17.11 PRIMARY OSTEOARTHRITIS OF RIGHT KNEE: Primary | ICD-10-CM

## 2023-11-08 DIAGNOSIS — M17.12 PRIMARY OSTEOARTHRITIS OF LEFT KNEE: ICD-10-CM

## 2023-11-08 PROCEDURE — 20610 DRAIN/INJ JOINT/BURSA W/O US: CPT | Mod: 50,PBBFAC,PN | Performed by: PHYSICIAN ASSISTANT

## 2023-11-08 PROCEDURE — 99499 NO LOS: ICD-10-PCS | Mod: S$PBB,,, | Performed by: PHYSICIAN ASSISTANT

## 2023-11-08 PROCEDURE — 20610 DRAIN/INJ JOINT/BURSA W/O US: CPT | Mod: 50,S$PBB,, | Performed by: PHYSICIAN ASSISTANT

## 2023-11-08 PROCEDURE — 99213 OFFICE O/P EST LOW 20 MIN: CPT | Mod: PBBFAC,PN,25 | Performed by: PHYSICIAN ASSISTANT

## 2023-11-08 PROCEDURE — 99499 UNLISTED E&M SERVICE: CPT | Mod: S$PBB,,, | Performed by: PHYSICIAN ASSISTANT

## 2023-11-08 PROCEDURE — 99999PBSHW PR PBB SHADOW TECHNICAL ONLY FILED TO HB: ICD-10-PCS | Mod: JZ,PBBFAC,,

## 2023-11-08 PROCEDURE — 99999 PR PBB SHADOW E&M-EST. PATIENT-LVL III: ICD-10-PCS | Mod: PBBFAC,,, | Performed by: PHYSICIAN ASSISTANT

## 2023-11-08 PROCEDURE — 99999PBSHW PR PBB SHADOW TECHNICAL ONLY FILED TO HB: Mod: JZ,PBBFAC,,

## 2023-11-08 PROCEDURE — 20610 LARGE JOINT ASPIRATION/INJECTION: BILATERAL KNEE: ICD-10-PCS | Mod: 50,S$PBB,, | Performed by: PHYSICIAN ASSISTANT

## 2023-11-08 PROCEDURE — 99999 PR PBB SHADOW E&M-EST. PATIENT-LVL III: CPT | Mod: PBBFAC,,, | Performed by: PHYSICIAN ASSISTANT

## 2023-11-08 RX ADMIN — Medication 32 MG: at 11:11

## 2023-11-08 NOTE — PROGRESS NOTES
Subjective:      Patient ID: Inna Gallardo is a 48 y.o. female.    Chief Complaint: Pain and Injections of the Left Knee and Pain and Injections of the Right Knee      Patient is here for First Zilretta  injection(s) for bilateral knee osteoarthritis. Patient tolerated last injection well.Helps for about 4 mon. States she is discussing knee surgery early next year with a surgeon.  No new complaints today.          Review of Systems   Constitutional: Negative for chills and fever.   Cardiovascular:  Negative for chest pain.   Respiratory:  Negative for cough.    Hematologic/Lymphatic: Does not bruise/bleed easily.   Skin:  Negative for poor wound healing and rash.   Musculoskeletal:  Positive for joint pain, myalgias and stiffness.   Gastrointestinal:  Negative for abdominal pain.   Genitourinary:  Negative for bladder incontinence.   Neurological:  Negative for dizziness, loss of balance and weakness.   Psychiatric/Behavioral:  Negative for altered mental status.        Review of patient's allergies indicates:   Allergen Reactions    Ace inhibitors Other (See Comments)     cough        Current Outpatient Medications   Medication Sig Dispense Refill    albuterol (PROVENTIL/VENTOLIN HFA) 90 mcg/actuation inhaler 2 puffs every 4 to 6 hours as needed.      atorvastatin (LIPITOR) 20 MG tablet       EPINEPHrine (EPIPEN) 0.3 mg/0.3 mL AtIn Inject 0.3 mLs into the muscle as needed.  0    ESTARYLLA 0.25-35 mg-mcg per tablet Take 1 tablet by mouth once daily.      fluticasone propionate (FLONASE) 50 mcg/actuation nasal spray       hydrOXYzine HCl (ATARAX) 25 MG tablet Take 25 mg by mouth 3 (three) times daily as needed.  2    LIDOcaine (LIDODERM) 5 % Place 1 patch onto the skin once daily. Remove & Discard patch within 12 hours or as directed by MD 15 patch 0    loratadine (CLARITIN) 10 mg tablet Take 1 tablet every day by oral route.      metFORMIN (GLUCOPHAGE-XR) 500 MG ER 24hr tablet Take 500 mg by mouth once daily.       "naproxen (NAPROSYN) 500 MG tablet Take 1 tablet (500 mg total) by mouth 2 (two) times daily with meals. 14 tablet 0    omeprazole (PRILOSEC) 40 MG capsule Take 1 capsule (40 mg total) by mouth every morning. 30 capsule 11    ondansetron (ZOFRAN-ODT) 4 MG TbDL Take 1 tablet (4 mg total) by mouth every 6 (six) hours as needed. 30 tablet 0    paroxetine (PAXIL) 20 MG tablet Take 20 mg by mouth every morning.      sertraline (ZOLOFT) 50 MG tablet       sucralfate (CARAFATE) 1 gram tablet Take 1 tablet (1 g total) by mouth before meals and at bedtime as needed (abdominal  pain). 90 tablet 1    tamsulosin (FLOMAX) 0.4 mg Cap TAKE 1 CAPSULE(0.4 MG) BY MOUTH EVERY DAY (Patient not taking: Reported on 10/10/2023) 90 capsule 0     No current facility-administered medications for this visit.        The patient's relevant past medical, surgical, and social history was reviewed in Epic.       Objective:      VITAL SIGNS: /85   Pulse 68   Ht 5' 3" (1.6 m)   Wt 119.9 kg (264 lb 5.3 oz)   LMP 03/03/2015   BMI 46.82 kg/m²     Ortho/SPM Exam         Assessment:       1. Primary osteoarthritis of right knee    2. Primary osteoarthritis of left knee    3. Morbid obesity due to excess calories          Plan:         Inna was seen today for pain, injections, pain and injections.    Diagnoses and all orders for this visit:    Primary osteoarthritis of right knee  -     Large Joint Aspiration/Injection: bilateral knee    Primary osteoarthritis of left knee  -     Large Joint Aspiration/Injection: bilateral knee    Morbid obesity due to excess calories    I injected the bilateral knee with one dose of Zilretta  today.  We will see the patient back in 6 mos or as needed.      Diagnoses and plan discussed with the patient, as well as the expected course and duration of his symptoms.  All questions and concerns were addressed prior to the end of the visit.   Instructed patient to call office if they have any future questions/concerns " or to schedule apt. Patient will return to see me if symptoms worsen or fail to improve    Note dictated with voice recognition software, please excuse any grammatical errors.        Pat Taylor PA-C   11/08/2023

## 2023-12-05 ENCOUNTER — TELEPHONE (OUTPATIENT)
Dept: HEMATOLOGY/ONCOLOGY | Facility: CLINIC | Age: 48
End: 2023-12-05
Payer: MEDICAID

## 2023-12-05 NOTE — TELEPHONE ENCOUNTER
----- Message from Ro Luther sent at 12/5/2023  3:14 PM CST -----  Contact: [facility] Orozco  12/5/23    Request for office notes, please fax to [phone]    Thanks    Ro PENNINGTON

## 2023-12-07 ENCOUNTER — HOSPITAL ENCOUNTER (EMERGENCY)
Facility: HOSPITAL | Age: 48
Discharge: HOME | End: 2023-12-07
Attending: STUDENT IN AN ORGANIZED HEALTH CARE EDUCATION/TRAINING PROGRAM
Payer: MEDICAID

## 2023-12-07 VITALS
BODY MASS INDEX: 44.83 KG/M2 | RESPIRATION RATE: 18 BRPM | DIASTOLIC BLOOD PRESSURE: 89 MMHG | TEMPERATURE: 98 F | WEIGHT: 253 LBS | OXYGEN SATURATION: 96 % | SYSTOLIC BLOOD PRESSURE: 132 MMHG | HEART RATE: 66 BPM | HEIGHT: 63 IN

## 2023-12-07 DIAGNOSIS — M79.662 BILATERAL CALF PAIN: ICD-10-CM

## 2023-12-07 DIAGNOSIS — S99.911A RIGHT ANKLE INJURY, INITIAL ENCOUNTER: ICD-10-CM

## 2023-12-07 DIAGNOSIS — I82.431 ACUTE DEEP VEIN THROMBOSIS (DVT) OF POPLITEAL VEIN OF RIGHT LOWER EXTREMITY: Primary | ICD-10-CM

## 2023-12-07 DIAGNOSIS — M79.661 BILATERAL CALF PAIN: ICD-10-CM

## 2023-12-07 PROCEDURE — 99284 EMERGENCY DEPT VISIT MOD MDM: CPT | Mod: 25,ER

## 2023-12-07 PROCEDURE — 25000003 PHARM REV CODE 250: Mod: ER | Performed by: STUDENT IN AN ORGANIZED HEALTH CARE EDUCATION/TRAINING PROGRAM

## 2023-12-07 RX ORDER — HYDROCODONE BITARTRATE AND ACETAMINOPHEN 5; 325 MG/1; MG/1
1 TABLET ORAL
Status: COMPLETED | OUTPATIENT
Start: 2023-12-07 | End: 2023-12-07

## 2023-12-07 RX ORDER — IBUPROFEN 600 MG/1
600 TABLET ORAL
Status: COMPLETED | OUTPATIENT
Start: 2023-12-07 | End: 2023-12-07

## 2023-12-07 RX ORDER — RIVAROXABAN 15 MG-20MG
KIT ORAL
Qty: 51 TABLET | Refills: 0 | Status: SHIPPED | OUTPATIENT
Start: 2023-12-07 | End: 2024-03-04

## 2023-12-07 RX ADMIN — HYDROCODONE BITARTRATE AND ACETAMINOPHEN 1 TABLET: 5; 325 TABLET ORAL at 08:12

## 2023-12-07 RX ADMIN — IBUPROFEN 600 MG: 600 TABLET ORAL at 08:12

## 2023-12-07 RX ADMIN — RIVAROXABAN 15 MG: 15 TABLET, FILM COATED ORAL at 10:12

## 2023-12-08 NOTE — ED PROVIDER NOTES
ED Provider Note - 12/7/2023    History     Chief Complaint   Patient presents with    Leg Pain     Bilateral lower leg pain since Thanksgiving. Patient reports pain has increased in the right ankle over time. She has contusion type to right ankle. She denies fall or injury.        The history is provided by the patient and medical records.        Inna Gallardo is a 48 y.o. year old female with past medical and surgical history as seen below, presenting with chief complaint of bilateral lower extremity pain.  Pain is worse on the right side but present on both sides.  Pain is in the bilateral calves.  Patient was also noticed bruising to the right ankle and forefoot.  No known trauma or injury.  No new athletic or work out endeavors.  No prolonged immobility or long travel.    Past Medical History:   Diagnosis Date    Anxiety     Diverticulitis     Endometriosis     General anesthetics causing adverse effect in therapeutic use     Hypertension     Kidney stone     Prolactinoma 2011    Sliding hiatal hernia     Urinary tract infection     Vaginal infection      Past Surgical History:   Procedure Laterality Date    ARTHROSCOPY OF KNEE Left     BRAIN TUMOR EXCISION  9/2011    removal of prolactinoma    COLONOSCOPY N/A 8/27/2020    Procedure: COLONOSCOPY;  Surgeon: Valentino Negro MD;  Location: [facility];  Service: Endoscopy;  Laterality: N/A;    CYSTOSCOPY      CYSTOSCOPY W/ URETERAL STENT PLACEMENT  2016    CYSTOSCOPY W/ URETERAL STENT REMOVAL  2016    ESOPHAGEAL MANOMETRY N/A 6/3/2019    Procedure: MANOMETRY, ESOPHAGUS;  Surgeon: Bhupinder Schmitz MD;  Location: [address]);  Service: Endoscopy;  Laterality: N/A;    ESOPHAGOGASTRODUODENOSCOPY N/A 4/30/2019    Procedure: ESOPHAGOGASTRODUODENOSCOPY (EGD);  Surgeon: Altagracia Bose MD;  Location: [facility];  Service: Endoscopy;  Laterality: N/A;    ESOPHAGOGASTRODUODENOSCOPY N/A 8/2/2021    Procedure: EGD (ESOPHAGOGASTRODUODENOSCOPY);  Surgeon:  Altagracia Bose MD;  Location: [facility] ENDO;  Service: Endoscopy;  Laterality: N/A;    FOOT HARDWARE REMOVAL Right 2020    Procedure: REMOVAL, HARDWARE, FOOT;  Surgeon: Adrianna Tillman DPM;  Location: [facility] OR;  Service: Podiatry;  Laterality: Right;    HARVESTING OF BONE GRAFT Right 2019    Procedure: SURGICAL PROCUREMENT, BONE GRAFT;  Surgeon: Adrianna Tillman DPM;  Location: [facility] OR;  Service: Podiatry;  Laterality: Right;    HYSTERECTOMY  3/16/15    [facility]O for Endometriosis, AUB, fibroids, cyst    LITHOTRIPSY      MANDIBLE SURGERY      severe overbite correction    PELVIC LAPAROSCOPY      Dx scope, chromopertubation-Endometriosis    TONSILLECTOMY, ADENOIDECTOMY           Family History   Problem Relation Age of Onset    Cancer Mother     Breast cancer Neg Hx     Ovarian cancer Neg Hx     Kidney disease Neg Hx      Social History     Tobacco Use    Smoking status: Former     Current packs/day: 0.00     Types: Cigarettes     Start date: 1989     Quit date: 2000     Years since quittin.8    Smokeless tobacco: Never    Tobacco comments:     pt stated she smoked one cig/day   Substance Use Topics    Alcohol use: No     Alcohol/week: 0.0 standard drinks of alcohol    Drug use: No     Social Determinants of Health with Concerns     Tobacco Use: Medium Risk (2023)    Patient History     Smoking Tobacco Use: Former     Smokeless Tobacco Use: Never     Passive Exposure: Not on file   Alcohol Use: Not on file   Financial Resource Strain: Not on file   Food Insecurity: Not on file   Transportation Needs: Not on file   Physical Activity: Not on file   Stress: Not on file   Social Connections: Not on file   Housing Stability: Not on file      Review of patient's allergies indicates:   Allergen Reactions    Ace inhibitors Other (See Comments)     cough       Review of Systems     A full Review of Systems (ROS) was performed and was negative unless otherwise stated in the HPI.      Physical Exam  "    Vitals:    12/07/23 1957 12/07/23 2041 12/07/23 2227 12/07/23 2229   BP: (!) 142/82   132/89   BP Location: Right arm      Patient Position: Sitting      Pulse: 77  66    Resp: 20 18     Temp: 97.8 °F (36.6 °C)      TempSrc: Oral      SpO2: 96%  96%    Weight: 114.8 kg (253 lb)      Height: 5' 3" (1.6 m)           Physical Exam    Nursing note and vitals reviewed.  Constitutional: She appears well-developed and well-nourished.   HENT:   Head: Normocephalic and atraumatic.   Right Ear: External ear normal.   Left Ear: External ear normal.   Nose: Nose normal.   Eyes: EOM are normal. Pupils are equal, round, and reactive to light.   Neck: No tracheal deviation present.   Normal range of motion.  Cardiovascular:  Normal rate and regular rhythm.           Pulmonary/Chest: No respiratory distress. She has no wheezes.   Abdominal: Abdomen is soft. There is no abdominal tenderness.   Musculoskeletal:         General: No edema. Normal range of motion.      Cervical back: Normal range of motion.      Comments: Ecchymosis noted to right ankle and proximal foot.  Positive Homans sign on right lower extremity.     Neurological: She is alert and oriented to person, place, and time. She has normal strength. No cranial nerve deficit or sensory deficit. Gait normal.   Skin: Skin is warm and dry.   Psychiatric: She has a normal mood and affect. Her behavior is normal. Thought content normal.         Lab Results- Independently reviewed by myself      Labs Reviewed - No data to display        Imaging     Imaging Results              US Lower Extremity Veins Bilateral (Final result)  Result time 12/07/23 21:42:57      Final result by Claus Nelson MD (12/07/23 21:42:57)                   Impression:      Positive DVT in the right popliteal vein    No left-sided DVT      Electronically signed by: Claus Nelson  Date:    12/07/2023  Time:    21:42               Narrative:    EXAMINATION:  US LOWER EXTREMITY VEINS " BILATERAL    CLINICAL HISTORY:  Pain in right lower leg    TECHNIQUE:  Duplex and color flow Doppler and dynamic compression was performed of the bilateral lower extremity veins was performed.    COMPARISON:  None    FINDINGS:  Right thigh veins: Positive DVT in the right popliteal vein.  Otherwise the common femoral, femoral, upper greater saphenous, and deep femoral veins are patent and free of thrombus. The veins are normally compressible and have normal phasic flow and augmentation response.    Right calf veins: The visualized calf veins are patent.    Left thigh veins: The common femoral, femoral, popliteal, upper greater saphenous, and deep femoral veins are patent and free of thrombus. The veins are normally compressible and have normal phasic flow and augmentation response.    Left calf veins: The visualized calf veins are patent.    Miscellaneous: None                                       X-Ray Ankle Complete Right (Final result)  Result time 12/07/23 21:16:04      Final result by Claus Nelson MD (12/07/23 21:16:04)                   Impression:      As above      Electronically signed by: Claus Nelson  Date:    12/07/2023  Time:    21:16               Narrative:    EXAMINATION:  XR ANKLE COMPLETE 3 VIEW RIGHT    CLINICAL HISTORY:  Unspecified injury of right ankle, initial encounter    TECHNIQUE:  AP, lateral, and oblique images of the right ankle were performed.    COMPARISON:  None    FINDINGS:  Prominent soft tissues around the ankle joint may be related to body habitus.  Postsurgical changes along the mid foot involving the middle cuneiform.  Calcaneal spurring.  No acute fracture or dislocation.  Postsurgical changes proximal 2nd metatarsal noted.  Mild degenerative joint disease of the medial tibiotalar joint suspected.  No acute fracture or dislocation.                                    X-Rays:   Independently Interpreted Readings:   Other Readings:  No acute fractures or dislocations                 ED Course         Procedures         Orders Placed This Encounter    US Lower Extremity Veins Bilateral    X-Ray Ankle Complete Right    HYDROcodone-acetaminophen 5-325 mg per tablet 1 tablet    ibuprofen tablet 600 mg    rivaroxaban (XARELTO DVT-PE TREAT 30D START) 15 mg (42)- 20 mg (9) tablet dose pack    rivaroxaban tablet 15 mg                      Medical Decision Making       The patient's list of active medical problems, social history, medications, and allergies as documented per RN staff has been reviewed.           Medical Decision Making  This patient presents for evaluation of right LE edema and pain. Overall, patient is nontoxic appearing with stable VS. No lymphangitic spread visible. No fluid pockets or fluctuance concerning for abscess noted. S/S are most concerning for DVT, cellulitis, or venous insufficiency. Full DDX includes chronic venous stasis changes, lymphedema, fracture/trauma, MSK pain, and other nonemergent causes of leg swelling.    On exam, there is no evidence of phlegmasia cerulea or alba dolens. Focal and unilateral nature of patient's symptoms are not consistent with heart failure or PE, especially in setting of patient without any SOB.     LE Doppler obtained and was positive for popliteal DVT.    Hasbled score calculated at 0, should be safe for anticoagulation, discussed anticoagulation precautions.  Discussed use of compression stockings.    Patient prescribed Xarelto with 1st dose given in emergency department.    Close follow-up with PCP advised. Return to the ED for reevaluation should symptoms worsen, return, or change in character.        Amount and/or Complexity of Data Reviewed  Radiology: ordered and independent interpretation performed.    Risk  Prescription drug management.                    ED Prescriptions       Medication Sig Dispense Start Date End Date Auth. Provider    rivaroxaban (XARELTO DVT-PE TREAT 30D START) 15 mg (42)- 20 mg (9) tablet dose pack  Take 1 tablet (15 mg) by mouth twice daily with food for 21 days followed by 1 tablet (20 mg) by mouth once daily with food 51 tablet 12/7/2023 -- Anthony Covington MD              Clinical Impression       Follow-up Information       Follow up With Specialties Details Why Contact Info    Cathi Chávez NP Family Medicine Schedule an appointment as soon as possible for a visit in 3 days For follow-up on today's visit. [address] [facility] [address]  [phone]      [facility] - Emergency Dept Emergency Medicine Go to  As needed, If symptoms worsen [address] [address] [phone]            Disposition   ED Disposition Condition    Discharge Stable            Diagnosis    ICD-10-CM ICD-9-CM   1. Acute deep vein thrombosis (DVT) of popliteal vein of right lower extremity  I82.431 453.41   2. Bilateral calf pain  M79.661 729.5    M79.662    3. Right ankle injury, initial encounter  S99.911A 959.7           Anthony Covington MD        12/08/2023          DISCLAIMER: This note was prepared with [employer] voice recognition transcription software. Garbled syntax, mangled pronouns, and other bizarre constructions may be attributed to that software system.       Anthony Covington MD  12/08/23 0209

## 2023-12-12 ENCOUNTER — TELEPHONE (OUTPATIENT)
Dept: HEMATOLOGY/ONCOLOGY | Facility: CLINIC | Age: 48
End: 2023-12-12
Payer: MEDICAID

## 2023-12-12 NOTE — TELEPHONE ENCOUNTER
----- Message from Fortino Schmidt sent at 12/12/2023  8:45 AM CST -----  Contact: Pt  .Type:  Sooner Apoointment Request    Caller is requesting a sooner appointment.  Caller declined first available appointment listed below.  Caller will not accept being placed on the waitlist and is requesting a message be sent to doctor.  Name of Caller:pt  When is the first available appointment?  Symptoms: blood clot in her right leg  Would the patient rather a call back or a response via [email or web address]?  Call back  Best Call Back Number: [phone]  Additional Information: Pt. Was seen in the emergency room on 12/07/2023 was told to f/u with Hemo provider

## 2023-12-13 ENCOUNTER — OFFICE VISIT (OUTPATIENT)
Dept: HEMATOLOGY/ONCOLOGY | Facility: CLINIC | Age: 48
End: 2023-12-13
Payer: MEDICAID

## 2023-12-13 ENCOUNTER — PATIENT MESSAGE (OUTPATIENT)
Dept: HEMATOLOGY/ONCOLOGY | Facility: CLINIC | Age: 48
End: 2023-12-13

## 2023-12-13 VITALS
BODY MASS INDEX: 47.49 KG/M2 | SYSTOLIC BLOOD PRESSURE: 180 MMHG | DIASTOLIC BLOOD PRESSURE: 84 MMHG | HEART RATE: 79 BPM | OXYGEN SATURATION: 96 % | WEIGHT: 268.06 LBS | RESPIRATION RATE: 16 BRPM

## 2023-12-13 DIAGNOSIS — I82.431 ACUTE DEEP VEIN THROMBOSIS (DVT) OF RIGHT POPLITEAL VEIN: Primary | ICD-10-CM

## 2023-12-13 DIAGNOSIS — E61.1 IRON DEFICIENCY: ICD-10-CM

## 2023-12-13 DIAGNOSIS — E66.01 MORBID OBESITY: ICD-10-CM

## 2023-12-13 DIAGNOSIS — D75.839 THROMBOCYTOSIS, UNSPECIFIED: ICD-10-CM

## 2023-12-13 DIAGNOSIS — Z87.891 HISTORY OF SMOKING: ICD-10-CM

## 2023-12-13 DIAGNOSIS — D72.828 OTHER ELEVATED WHITE BLOOD CELL (WBC) COUNT: ICD-10-CM

## 2023-12-13 PROCEDURE — 1160F RVW MEDS BY RX/DR IN RCRD: CPT | Mod: CPTII,,, | Performed by: INTERNAL MEDICINE

## 2023-12-13 PROCEDURE — 99999 PR PBB SHADOW E&M-EST. PATIENT-LVL IV: ICD-10-PCS | Mod: PBBFAC,,, | Performed by: INTERNAL MEDICINE

## 2023-12-13 PROCEDURE — 99214 PR OFFICE/OUTPT VISIT, EST, LEVL IV, 30-39 MIN: ICD-10-PCS | Mod: S$PBB,,, | Performed by: INTERNAL MEDICINE

## 2023-12-13 PROCEDURE — 3077F SYST BP >= 140 MM HG: CPT | Mod: CPTII,,, | Performed by: INTERNAL MEDICINE

## 2023-12-13 PROCEDURE — 99999 PR PBB SHADOW E&M-EST. PATIENT-LVL IV: CPT | Mod: PBBFAC,,, | Performed by: INTERNAL MEDICINE

## 2023-12-13 PROCEDURE — 1159F PR MEDICATION LIST DOCUMENTED IN MEDICAL RECORD: ICD-10-PCS | Mod: CPTII,,, | Performed by: INTERNAL MEDICINE

## 2023-12-13 PROCEDURE — 3077F PR MOST RECENT SYSTOLIC BLOOD PRESSURE >= 140 MM HG: ICD-10-PCS | Mod: CPTII,,, | Performed by: INTERNAL MEDICINE

## 2023-12-13 PROCEDURE — 99214 OFFICE O/P EST MOD 30 MIN: CPT | Mod: PBBFAC,PO | Performed by: INTERNAL MEDICINE

## 2023-12-13 PROCEDURE — 3079F DIAST BP 80-89 MM HG: CPT | Mod: CPTII,,, | Performed by: INTERNAL MEDICINE

## 2023-12-13 PROCEDURE — 3079F PR MOST RECENT DIASTOLIC BLOOD PRESSURE 80-89 MM HG: ICD-10-PCS | Mod: CPTII,,, | Performed by: INTERNAL MEDICINE

## 2023-12-13 PROCEDURE — 3008F BODY MASS INDEX DOCD: CPT | Mod: CPTII,,, | Performed by: INTERNAL MEDICINE

## 2023-12-13 PROCEDURE — 3008F PR BODY MASS INDEX (BMI) DOCUMENTED: ICD-10-PCS | Mod: CPTII,,, | Performed by: INTERNAL MEDICINE

## 2023-12-13 PROCEDURE — 1160F PR REVIEW ALL MEDS BY PRESCRIBER/CLIN PHARMACIST DOCUMENTED: ICD-10-PCS | Mod: CPTII,,, | Performed by: INTERNAL MEDICINE

## 2023-12-13 PROCEDURE — 99214 OFFICE O/P EST MOD 30 MIN: CPT | Mod: S$PBB,,, | Performed by: INTERNAL MEDICINE

## 2023-12-13 PROCEDURE — 1159F MED LIST DOCD IN RCRD: CPT | Mod: CPTII,,, | Performed by: INTERNAL MEDICINE

## 2023-12-13 RX ORDER — EPINEPHRINE 0.3 MG/.3ML
0.3 INJECTION SUBCUTANEOUS ONCE AS NEEDED
Status: CANCELLED | OUTPATIENT
Start: 2023-12-20

## 2023-12-13 RX ORDER — SODIUM CHLORIDE 0.9 % (FLUSH) 0.9 %
10 SYRINGE (ML) INJECTION
Status: CANCELLED | OUTPATIENT
Start: 2024-01-10

## 2023-12-13 RX ORDER — EPINEPHRINE 0.3 MG/.3ML
0.3 INJECTION SUBCUTANEOUS ONCE AS NEEDED
OUTPATIENT
Start: 2024-01-31

## 2023-12-13 RX ORDER — EPINEPHRINE 0.3 MG/.3ML
0.3 INJECTION SUBCUTANEOUS ONCE AS NEEDED
Status: CANCELLED | OUTPATIENT
Start: 2024-01-10

## 2023-12-13 RX ORDER — DIPHENHYDRAMINE HYDROCHLORIDE 50 MG/ML
50 INJECTION INTRAMUSCULAR; INTRAVENOUS ONCE AS NEEDED
OUTPATIENT
Start: 2024-01-31

## 2023-12-13 RX ORDER — SODIUM CHLORIDE 0.9 % (FLUSH) 0.9 %
10 SYRINGE (ML) INJECTION
OUTPATIENT
Start: 2024-01-31

## 2023-12-13 RX ORDER — DIPHENHYDRAMINE HYDROCHLORIDE 50 MG/ML
50 INJECTION INTRAMUSCULAR; INTRAVENOUS ONCE AS NEEDED
Status: CANCELLED | OUTPATIENT
Start: 2024-01-10

## 2023-12-13 RX ORDER — HEPARIN 100 UNIT/ML
500 SYRINGE INTRAVENOUS
Status: CANCELLED | OUTPATIENT
Start: 2024-01-24

## 2023-12-13 RX ORDER — HEPARIN 100 UNIT/ML
500 SYRINGE INTRAVENOUS
Status: CANCELLED | OUTPATIENT
Start: 2024-01-10

## 2023-12-13 RX ORDER — DIPHENHYDRAMINE HYDROCHLORIDE 50 MG/ML
50 INJECTION INTRAMUSCULAR; INTRAVENOUS ONCE AS NEEDED
Status: CANCELLED | OUTPATIENT
Start: 2023-12-20

## 2023-12-13 RX ORDER — DIPHENHYDRAMINE HYDROCHLORIDE 50 MG/ML
50 INJECTION INTRAMUSCULAR; INTRAVENOUS ONCE AS NEEDED
Status: CANCELLED | OUTPATIENT
Start: 2024-01-24

## 2023-12-13 RX ORDER — SODIUM CHLORIDE 0.9 % (FLUSH) 0.9 %
10 SYRINGE (ML) INJECTION
Status: CANCELLED | OUTPATIENT
Start: 2024-01-24

## 2023-12-13 RX ORDER — SODIUM CHLORIDE 0.9 % (FLUSH) 0.9 %
10 SYRINGE (ML) INJECTION
Status: CANCELLED | OUTPATIENT
Start: 2023-12-20

## 2023-12-13 RX ORDER — EPINEPHRINE 0.3 MG/.3ML
0.3 INJECTION SUBCUTANEOUS ONCE AS NEEDED
Status: CANCELLED | OUTPATIENT
Start: 2024-01-24

## 2023-12-13 RX ORDER — HEPARIN 100 UNIT/ML
500 SYRINGE INTRAVENOUS
OUTPATIENT
Start: 2024-01-31

## 2023-12-13 RX ORDER — HEPARIN 100 UNIT/ML
500 SYRINGE INTRAVENOUS
Status: CANCELLED | OUTPATIENT
Start: 2023-12-20

## 2023-12-13 NOTE — PROGRESS NOTES
PATIENT: Inna Gallardo  MRN: [id number]  DATE: 12/13/2023    Diagnosis:   1. Acute deep vein thrombosis (DVT) of right popliteal vein    2. Other elevated white blood cell (WBC) count    3. Thrombocytosis, unspecified    4. Iron deficiency    5. History of smoking    6. Morbid obesity      Chief Complaint: leukocytosis    Oncologic History:      Oncologic History     Oncologic Treatment     Pathology       Subjective:    History of Present Illness: Ms. Gallardo is a 48 y.o. female who presented in October 2023 for evaluation and management of leukocytosis/thrombocytosis. She was referred by physician assistant Lesli Cohen.    - labs on 8/24/23 revealed a mild leukocytosis at 12.4 k/uL, granulocyte-predominant; a mild thrombocytosis at 481 k/uL.    - she had a hysterectomy in 2014.    Interval history:  - she presents for a follow-up appointment for her leukocytosis/thrombocytosis.  - on 12/7/23, she presented to the emergency room with leg discoloration/swelling. Ultrasound lower extremity (12/7/23) revealed a deep vein thrombosis in the right popliteal vein. She was started on rivaroxaban.  - she was using an estrogen cream at time of diagnosis. She is no longer using it.  - she denies a family history of thrombosis.  - she had labs this week that showed a mild leukocytosis/thrombocytosis.  - today, she is doing well. She endorses fatigue. He denies shortness of breath, chest pain, nausea, vomiting, diarrhea, constipation.      Past medical, surgical, family, and social histories have been reviewed and updated below.    Past Medical History:   Past Medical History:   Diagnosis Date    Anxiety     Diverticulitis     Endometriosis     General anesthetics causing adverse effect in therapeutic use     Hypertension     Kidney stone     Prolactinoma 2011    Sliding hiatal hernia     Urinary tract infection     Vaginal infection        Past Surgical History:   Past Surgical History:   Procedure Laterality Date     ARTHROSCOPY OF KNEE Left     BRAIN TUMOR EXCISION  9/2011    removal of prolactinoma    COLONOSCOPY N/A 8/27/2020    Procedure: COLONOSCOPY;  Surgeon: Valentino Negro MD;  Location: [facility] ENDO;  Service: Endoscopy;  Laterality: N/A;    CYSTOSCOPY      CYSTOSCOPY W/ URETERAL STENT PLACEMENT  2016    CYSTOSCOPY W/ URETERAL STENT REMOVAL  2016    ESOPHAGEAL MANOMETRY N/A 6/3/2019    Procedure: MANOMETRY, ESOPHAGUS;  Surgeon: Bhupinder Schmitz MD;  Location: [facility] ENDO ([facility] FLR);  Service: Endoscopy;  Laterality: N/A;    ESOPHAGOGASTRODUODENOSCOPY N/A 4/30/2019    Procedure: ESOPHAGOGASTRODUODENOSCOPY (EGD);  Surgeon: Altagracia Bose MD;  Location: [facility] ENDO;  Service: Endoscopy;  Laterality: N/A;    ESOPHAGOGASTRODUODENOSCOPY N/A 8/2/2021    Procedure: EGD (ESOPHAGOGASTRODUODENOSCOPY);  Surgeon: Altagracia Bose MD;  Location: [facility] ENDO;  Service: Endoscopy;  Laterality: N/A;    FOOT HARDWARE REMOVAL Right 6/16/2020    Procedure: REMOVAL, HARDWARE, FOOT;  Surgeon: Adrianna Tillman DPM;  Location: [facility] OR;  Service: Podiatry;  Laterality: Right;    HARVESTING OF BONE GRAFT Right 7/16/2019    Procedure: SURGICAL PROCUREMENT, BONE GRAFT;  Surgeon: Adrianna Tillman DPM;  Location: [facility] OR;  Service: Podiatry;  Laterality: Right;    HYSTERECTOMY  3/16/15    TLH/BSO for Endometriosis, AUB, fibroids, cyst    LITHOTRIPSY      MANDIBLE SURGERY  2002    severe overbite correction    PELVIC LAPAROSCOPY  2014    Dx scope, chromopertubation-Endometriosis    TONSILLECTOMY, ADENOIDECTOMY         Family History:   Family History   Problem Relation Age of Onset    Cancer Mother     Breast cancer Neg Hx     Ovarian cancer Neg Hx     Kidney disease Neg Hx        Social History:  reports that she quit smoking about 23 years ago. Her smoking use included cigarettes. She started smoking about 34 years ago. She has never used smokeless tobacco. She reports that she does not drink alcohol and does not use drugs.    Allergies:  Review of  patient's allergies indicates:   Allergen Reactions    Ace inhibitors Other (See Comments)     cough       Medications:  Current Outpatient Medications   Medication Sig Dispense Refill    albuterol (PROVENTIL/VENTOLIN HFA) 90 mcg/actuation inhaler 2 puffs every 4 to 6 hours as needed.      atorvastatin (LIPITOR) 20 MG tablet       EPINEPHrine (EPIPEN) 0.3 mg/0.3 mL AtIn Inject 0.3 mLs into the muscle as needed.  0    ESTARYLLA 0.25-35 mg-mcg per tablet Take 1 tablet by mouth once daily.      fluticasone propionate (FLONASE) 50 mcg/actuation nasal spray       hydrOXYzine HCl (ATARAX) 25 MG tablet Take 25 mg by mouth 3 (three) times daily as needed.  2    LIDOcaine (LIDODERM) 5 % Place 1 patch onto the skin once daily. Remove & Discard patch within 12 hours or as directed by MD 15 patch 0    loratadine (CLARITIN) 10 mg tablet Take 1 tablet every day by oral route.      metFORMIN (GLUCOPHAGE-XR) 500 MG ER 24hr tablet Take 500 mg by mouth once daily.      naproxen (NAPROSYN) 500 MG tablet Take 1 tablet (500 mg total) by mouth 2 (two) times daily with meals. 14 tablet 0    omeprazole (PRILOSEC) 40 MG capsule Take 1 capsule (40 mg total) by mouth every morning. 30 capsule 11    ondansetron (ZOFRAN-ODT) 4 MG TbDL Take 1 tablet (4 mg total) by mouth every 6 (six) hours as needed. 30 tablet 0    paroxetine (PAXIL) 20 MG tablet Take 20 mg by mouth every morning.      rivaroxaban (XARELTO DVT-PE TREAT 30D START) 15 mg (42)- 20 mg (9) tablet dose pack Take 1 tablet (15 mg) by mouth twice daily with food for 21 days followed by 1 tablet (20 mg) by mouth once daily with food 51 tablet 0    sertraline (ZOLOFT) 50 MG tablet       sucralfate (CARAFATE) 1 gram tablet Take 1 tablet (1 g total) by mouth before meals and at bedtime as needed (abdominal  pain). 90 tablet 1    tamsulosin (FLOMAX) 0.4 mg Cap TAKE 1 CAPSULE(0.4 MG) BY MOUTH EVERY DAY (Patient not taking: Reported on 10/10/2023) 90 capsule 0     No current  facility-administered medications for this visit.       Review of Systems   Constitutional:  Positive for fatigue.   HENT:  Negative for sore throat.    Eyes:  Negative for visual disturbance.   Respiratory:  Negative for cough and shortness of breath.    Cardiovascular:  Negative for chest pain.   Gastrointestinal:  Negative for abdominal pain, constipation, diarrhea, nausea and vomiting.   Genitourinary:  Negative for dysuria.   Musculoskeletal:  Negative for back pain.   Skin:  Negative for rash.   Neurological:  Negative for headaches.   Hematological:  Negative for adenopathy.   Psychiatric/Behavioral:  The patient is not nervous/anxious.      ECOG Performance Status:   ECOG SCORE    1 - Restricted in strenuous activity-ambulatory and able to carry out work of a light nature         Objective:      Vitals:   Vitals:    12/13/23 1402   BP: (!) 180/84   BP Location: Left arm   Patient Position: Sitting   BP Method: Large (Automatic)   Pulse: 79   Resp: 16   SpO2: 96%   Weight: 121.6 kg (268 lb 1.3 oz)     BMI: Body mass index is 47.49 kg/m².    Physical Exam  Vitals and nursing note reviewed.   Constitutional:       Appearance: She is well-developed.   HENT:      Head: Normocephalic and atraumatic.   Eyes:      Pupils: Pupils are equal, round, and reactive to light.   Cardiovascular:      Rate and Rhythm: Normal rate and regular rhythm.   Pulmonary:      Effort: Pulmonary effort is normal.      Breath sounds: Normal breath sounds.   Abdominal:      General: Bowel sounds are normal.      Palpations: Abdomen is soft.   Musculoskeletal:         General: Normal range of motion.      Cervical back: Normal range of motion and neck supple.   Skin:     General: Skin is warm and dry.   Neurological:      Mental Status: She is alert and oriented to person, place, and time.   Psychiatric:         Behavior: Behavior normal.         Thought Content: Thought content normal.         Judgment: Judgment normal.         Laboratory  Data:  Labs have been reviewed.    Lab Results   Component Value Date    WBC 14.80 (H) 10/10/2023    HGB 13.7 10/10/2023    HCT 41.4 10/10/2023    MCV 88 10/10/2023     10/10/2023           8/24/23:        Imaging:    Ultrasound lower extremities bilateral (12/7/23):    Positive DVT in the right popliteal vein     No left-sided DVT      Assessment:       1. Acute deep vein thrombosis (DVT) of right popliteal vein    2. Other elevated white blood cell (WBC) count    3. Thrombocytosis, unspecified    4. History of iron deficiency    5. History of smoking    6. Morbid obesity           Plan:     Deep vein thrombosis  - on 12/7/23, she presented to the emergency room with leg pain/swelling. Ultrasound lower extremity (12/7/23) revealed a deep vein thrombosis in the right popliteal vein.  - she was placed on rivaroxaban.  - I recommend at least 3 months of full-dose anticoagulation  - return to clinic in 3 months with hypercoagulable workup and repeat ultrasound. We will make a determination about long-term anticoagulation at that time.    2. Leukocytosis / thrombocytosis / iron deficiency  - I have reviewed her chart/outside records  - labs on 8/24/23 revealed a mild leukocytosis at 12.4 k/uL, granulocyte-predominant; a mild thrombocytosis at 481 k/uL.  - I suspect her leukocytosis and thrombocytosis are reactive, perhaps from inflammation. I will perform a workup to rule in/out primary cause of elevated counts.  - JAK2 with reflex testing was previously negative, so won't repeat that.  - BCR/ABL was negative/normal.  - reviewing her iron studies, her TIBC is upper limits of normal. Perhaps borderline iron deficiency is a contributing cause to her thrombocytosis  - given her fatigue, I recommend iron sucrose x 4 doses.  - return to clinic in 3 months with repeat labs.    3. History of smoking  - she quit smoking 30 years ago.    4. Morbid obesity  - Body mass index is 47.49 kg/m².  - adipose tissue is  inflammatory and can cause leukocytosis/thrombocytosis    5. Advance Care Planning     Power of   After our discussion (at previous visit), the patient decided not to complete a HCPOA.          - return to clinic in 3 months with repeat labs.    Christiano Sotelo M.D.  Hematology/Oncology  [facility] - [address], [address]  [place] [address]  Phone: [phone]  Fax: [phone]

## 2023-12-18 ENCOUNTER — TELEPHONE (OUTPATIENT)
Dept: INFUSION THERAPY | Facility: HOSPITAL | Age: 48
End: 2023-12-18
Payer: MEDICAID

## 2023-12-18 NOTE — TELEPHONE ENCOUNTER
Confirmed upcoming infusion appointments with the patient. Reviewed pre infusion instructions with the patient  1/2 at 130p  1/9 at 130p  1/16 at 130p  1/23 at 130p

## 2024-01-08 ENCOUNTER — LAB VISIT (OUTPATIENT)
Dept: LAB | Facility: HOSPITAL | Age: 49
End: 2024-01-08
Attending: PHYSICIAN ASSISTANT
Payer: MEDICAID

## 2024-01-08 ENCOUNTER — TELEPHONE (OUTPATIENT)
Dept: INFUSION THERAPY | Facility: HOSPITAL | Age: 49
End: 2024-01-08
Payer: MEDICAID

## 2024-01-08 DIAGNOSIS — I82.401 ACUTE EMBOLISM AND THROMBOSIS OF UNSPECIFIED DEEP VEINS OF RIGHT LOWER EXTREMITY: Primary | ICD-10-CM

## 2024-01-08 DIAGNOSIS — M79.661 PAIN IN RIGHT LOWER LEG: ICD-10-CM

## 2024-01-08 LAB
ALBUMIN SERPL BCP-MCNC: 4.5 G/DL (ref 3.5–5.2)
ALP SERPL-CCNC: 83 U/L (ref 38–126)
ALT SERPL W/O P-5'-P-CCNC: 21 U/L (ref 10–44)
ANION GAP SERPL CALC-SCNC: 11 MMOL/L (ref 8–16)
APTT PPP: 29.1 SEC (ref 21–32)
AST SERPL-CCNC: 21 U/L (ref 15–46)
BASOPHILS # BLD AUTO: 0.06 K/UL (ref 0–0.2)
BASOPHILS NFR BLD: 0.6 % (ref 0–1.9)
BILIRUB SERPL-MCNC: 0.4 MG/DL (ref 0.1–1)
CALCIUM SERPL-MCNC: 9.7 MG/DL (ref 8.7–10.5)
CHLORIDE SERPL-SCNC: 104 MMOL/L (ref 95–110)
CO2 SERPL-SCNC: 26 MMOL/L (ref 23–29)
CREAT SERPL-MCNC: 0.6 MG/DL (ref 0.5–1.4)
DIFFERENTIAL METHOD BLD: ABNORMAL
EOSINOPHIL # BLD AUTO: 0.2 K/UL (ref 0–0.5)
EOSINOPHIL NFR BLD: 2.4 % (ref 0–8)
ERYTHROCYTE [DISTWIDTH] IN BLOOD BY AUTOMATED COUNT: 13.1 % (ref 11.5–14.5)
EST. GFR  (NO RACE VARIABLE): >60 ML/MIN/1.73 M^2
GLUCOSE SERPL-MCNC: 96 MG/DL (ref 70–110)
HCT VFR BLD AUTO: 43.2 % (ref 37–48.5)
HGB BLD-MCNC: 13.8 G/DL (ref 12–16)
IMM GRANULOCYTES # BLD AUTO: 0.05 K/UL (ref 0–0.04)
IMM GRANULOCYTES NFR BLD AUTO: 0.5 % (ref 0–0.5)
INR PPP: 1.1 (ref 0.8–1.2)
LYMPHOCYTES # BLD AUTO: 3.4 K/UL (ref 1–4.8)
LYMPHOCYTES NFR BLD: 33.5 % (ref 18–48)
MAGNESIUM SERPL-MCNC: 2.2 MG/DL (ref 1.6–2.6)
MCH RBC QN AUTO: 28.3 PG (ref 27–31)
MCHC RBC AUTO-ENTMCNC: 31.9 G/DL (ref 32–36)
MCV RBC AUTO: 89 FL (ref 82–98)
MONOCYTES # BLD AUTO: 0.8 K/UL (ref 0.3–1)
MONOCYTES NFR BLD: 8.3 % (ref 4–15)
NEUTROPHILS # BLD AUTO: 5.6 K/UL (ref 1.8–7.7)
NEUTROPHILS NFR BLD: 54.7 % (ref 38–73)
NRBC BLD-RTO: 0 /100 WBC
PLATELET # BLD AUTO: 435 K/UL (ref 150–450)
PMV BLD AUTO: 9.9 FL (ref 9.2–12.9)
POTASSIUM SERPL-SCNC: 3.7 MMOL/L (ref 3.5–5.1)
PROT SERPL-MCNC: 7.9 G/DL (ref 6–8.4)
PROTHROMBIN TIME: 11.6 SEC (ref 9–12.5)
RBC # BLD AUTO: 4.88 M/UL (ref 4–5.4)
SODIUM SERPL-SCNC: 141 MMOL/L (ref 136–145)
UUN UR-MCNC: 16 MG/DL (ref 7–17)
WBC # BLD AUTO: 10.18 K/UL (ref 3.9–12.7)

## 2024-01-08 PROCEDURE — 85025 COMPLETE CBC W/AUTO DIFF WBC: CPT | Mod: PN | Performed by: PHYSICIAN ASSISTANT

## 2024-01-08 PROCEDURE — 85730 THROMBOPLASTIN TIME PARTIAL: CPT | Mod: PN | Performed by: PHYSICIAN ASSISTANT

## 2024-01-08 PROCEDURE — 83735 ASSAY OF MAGNESIUM: CPT | Mod: PN | Performed by: PHYSICIAN ASSISTANT

## 2024-01-08 PROCEDURE — 36415 COLL VENOUS BLD VENIPUNCTURE: CPT | Mod: PN | Performed by: PHYSICIAN ASSISTANT

## 2024-01-08 PROCEDURE — 80053 COMPREHEN METABOLIC PANEL: CPT | Mod: PN | Performed by: PHYSICIAN ASSISTANT

## 2024-01-08 PROCEDURE — 85610 PROTHROMBIN TIME: CPT | Mod: PN | Performed by: PHYSICIAN ASSISTANT

## 2024-01-08 NOTE — TELEPHONE ENCOUNTER
Pt called to cancel/reschedule venofer appointment scheduled for tomorrow. Pt requesting to reschedule to another day, at a time before 1- during school hours. Notified .  will call pt back to reschedule all venofer appointments. Pt informed and verbalized understanding.

## 2024-01-09 ENCOUNTER — TELEPHONE (OUTPATIENT)
Dept: INFUSION THERAPY | Facility: HOSPITAL | Age: 49
End: 2024-01-09
Payer: MEDICAID

## 2024-01-09 ENCOUNTER — INFUSION (OUTPATIENT)
Dept: INFUSION THERAPY | Facility: HOSPITAL | Age: 49
End: 2024-01-09
Attending: INTERNAL MEDICINE
Payer: MEDICAID

## 2024-01-09 VITALS
HEART RATE: 75 BPM | RESPIRATION RATE: 16 BRPM | TEMPERATURE: 98 F | OXYGEN SATURATION: 96 % | DIASTOLIC BLOOD PRESSURE: 98 MMHG | SYSTOLIC BLOOD PRESSURE: 156 MMHG

## 2024-01-09 DIAGNOSIS — E61.1 IRON DEFICIENCY: Primary | ICD-10-CM

## 2024-01-09 PROCEDURE — 25000003 PHARM REV CODE 250: Performed by: INTERNAL MEDICINE

## 2024-01-09 PROCEDURE — 63600175 PHARM REV CODE 636 W HCPCS: Performed by: INTERNAL MEDICINE

## 2024-01-09 PROCEDURE — A4216 STERILE WATER/SALINE, 10 ML: HCPCS | Performed by: INTERNAL MEDICINE

## 2024-01-09 PROCEDURE — 96374 THER/PROPH/DIAG INJ IV PUSH: CPT

## 2024-01-09 RX ORDER — SODIUM CHLORIDE 0.9 % (FLUSH) 0.9 %
10 SYRINGE (ML) INJECTION
Status: DISCONTINUED | OUTPATIENT
Start: 2024-01-09 | End: 2024-01-09 | Stop reason: HOSPADM

## 2024-01-09 RX ORDER — DIPHENHYDRAMINE HYDROCHLORIDE 50 MG/ML
50 INJECTION, SOLUTION INTRAMUSCULAR; INTRAVENOUS ONCE AS NEEDED
Status: DISCONTINUED | OUTPATIENT
Start: 2024-01-09 | End: 2024-01-09 | Stop reason: HOSPADM

## 2024-01-09 RX ORDER — EPINEPHRINE 0.3 MG/.3ML
0.3 INJECTION SUBCUTANEOUS ONCE AS NEEDED
Status: DISCONTINUED | OUTPATIENT
Start: 2024-01-09 | End: 2024-01-09 | Stop reason: HOSPADM

## 2024-01-09 RX ADMIN — IRON SUCROSE 200 MG: 20 INJECTION, SOLUTION INTRAVENOUS at 01:01

## 2024-01-09 RX ADMIN — Medication 10 ML: at 02:01

## 2024-01-09 RX ADMIN — SODIUM CHLORIDE: 9 INJECTION, SOLUTION INTRAVENOUS at 01:01

## 2024-01-09 NOTE — PLAN OF CARE
Pt tolerated Venofer 200mg IVP over 5 min dose 1 of 4 well.  No adverse reaction noted during medication administration and 30 min post. PIV flushed with NS and D/C per protocol. Patient left clinic in no acute distress.

## 2024-01-09 NOTE — TELEPHONE ENCOUNTER
Returned call to patient to reschedule cancelled appts due to pt wanting appt before 1p. Pt declined next available on 2/19. The patient asked if she could still keep cancelled appts at 130p. Infusion appts rescheduled on 1/9, 1/16, 1/23, 1/30 at 130p

## 2024-01-16 ENCOUNTER — TELEPHONE (OUTPATIENT)
Dept: INFUSION THERAPY | Facility: HOSPITAL | Age: 49
End: 2024-01-16
Payer: MEDICAID

## 2024-01-16 NOTE — TELEPHONE ENCOUNTER
Pt called infusion center stating she was coming for her appointment today but [address] is [address] and she can not make it here. Pt states she will make it to next weeks appointment and requested to add appointment to end of scheduled days. Appointment for today cancelled per request and 4th dose scheduled for 2/6/24

## 2024-01-23 ENCOUNTER — INFUSION (OUTPATIENT)
Dept: INFUSION THERAPY | Facility: HOSPITAL | Age: 49
End: 2024-01-23
Attending: INTERNAL MEDICINE
Payer: MEDICAID

## 2024-01-23 VITALS
HEIGHT: 63 IN | DIASTOLIC BLOOD PRESSURE: 82 MMHG | OXYGEN SATURATION: 94 % | RESPIRATION RATE: 18 BRPM | SYSTOLIC BLOOD PRESSURE: 145 MMHG | HEART RATE: 73 BPM | TEMPERATURE: 98 F | WEIGHT: 268.06 LBS | BODY MASS INDEX: 47.5 KG/M2

## 2024-01-23 DIAGNOSIS — E61.1 IRON DEFICIENCY: Primary | ICD-10-CM

## 2024-01-23 PROCEDURE — 96374 THER/PROPH/DIAG INJ IV PUSH: CPT

## 2024-01-23 PROCEDURE — A4216 STERILE WATER/SALINE, 10 ML: HCPCS | Performed by: INTERNAL MEDICINE

## 2024-01-23 PROCEDURE — 63600175 PHARM REV CODE 636 W HCPCS: Performed by: INTERNAL MEDICINE

## 2024-01-23 PROCEDURE — 25000003 PHARM REV CODE 250: Performed by: INTERNAL MEDICINE

## 2024-01-23 RX ORDER — SODIUM CHLORIDE 0.9 % (FLUSH) 0.9 %
10 SYRINGE (ML) INJECTION
Status: DISCONTINUED | OUTPATIENT
Start: 2024-01-23 | End: 2024-01-23 | Stop reason: HOSPADM

## 2024-01-23 RX ORDER — DIPHENHYDRAMINE HYDROCHLORIDE 50 MG/ML
50 INJECTION INTRAMUSCULAR; INTRAVENOUS ONCE AS NEEDED
Status: DISCONTINUED | OUTPATIENT
Start: 2024-01-23 | End: 2024-01-23 | Stop reason: HOSPADM

## 2024-01-23 RX ORDER — EPINEPHRINE 0.3 MG/.3ML
0.3 INJECTION SUBCUTANEOUS ONCE AS NEEDED
Status: DISCONTINUED | OUTPATIENT
Start: 2024-01-23 | End: 2024-01-23 | Stop reason: HOSPADM

## 2024-01-23 RX ADMIN — SODIUM CHLORIDE: 9 INJECTION, SOLUTION INTRAVENOUS at 01:01

## 2024-01-23 RX ADMIN — Medication 10 ML: at 02:01

## 2024-01-23 RX ADMIN — IRON SUCROSE 200 MG: 20 INJECTION, SOLUTION INTRAVENOUS at 01:01

## 2024-01-30 ENCOUNTER — INFUSION (OUTPATIENT)
Dept: INFUSION THERAPY | Facility: HOSPITAL | Age: 49
End: 2024-01-30
Attending: INTERNAL MEDICINE
Payer: MEDICAID

## 2024-01-30 VITALS
SYSTOLIC BLOOD PRESSURE: 139 MMHG | HEIGHT: 63 IN | TEMPERATURE: 98 F | BODY MASS INDEX: 47.5 KG/M2 | DIASTOLIC BLOOD PRESSURE: 86 MMHG | HEART RATE: 77 BPM | WEIGHT: 268.06 LBS | RESPIRATION RATE: 18 BRPM | OXYGEN SATURATION: 97 %

## 2024-01-30 DIAGNOSIS — E61.1 IRON DEFICIENCY: Primary | ICD-10-CM

## 2024-01-30 PROCEDURE — 63600175 PHARM REV CODE 636 W HCPCS: Performed by: INTERNAL MEDICINE

## 2024-01-30 PROCEDURE — 96374 THER/PROPH/DIAG INJ IV PUSH: CPT

## 2024-01-30 PROCEDURE — 25000003 PHARM REV CODE 250: Performed by: INTERNAL MEDICINE

## 2024-01-30 PROCEDURE — A4216 STERILE WATER/SALINE, 10 ML: HCPCS | Performed by: INTERNAL MEDICINE

## 2024-01-30 RX ORDER — SODIUM CHLORIDE 0.9 % (FLUSH) 0.9 %
10 SYRINGE (ML) INJECTION
Status: DISCONTINUED | OUTPATIENT
Start: 2024-01-30 | End: 2024-01-30 | Stop reason: HOSPADM

## 2024-01-30 RX ORDER — EPINEPHRINE 0.3 MG/.3ML
0.3 INJECTION SUBCUTANEOUS ONCE AS NEEDED
Status: DISCONTINUED | OUTPATIENT
Start: 2024-01-30 | End: 2024-01-30 | Stop reason: HOSPADM

## 2024-01-30 RX ORDER — DIPHENHYDRAMINE HYDROCHLORIDE 50 MG/ML
50 INJECTION INTRAMUSCULAR; INTRAVENOUS ONCE AS NEEDED
Status: DISCONTINUED | OUTPATIENT
Start: 2024-01-30 | End: 2024-01-30 | Stop reason: HOSPADM

## 2024-01-30 RX ADMIN — SODIUM CHLORIDE: 9 INJECTION, SOLUTION INTRAVENOUS at 02:01

## 2024-01-30 RX ADMIN — IRON SUCROSE 200 MG: 20 INJECTION, SOLUTION INTRAVENOUS at 02:01

## 2024-01-30 RX ADMIN — Medication 10 ML: at 02:01

## 2024-01-30 NOTE — NURSING
Pt tolerated venofer 3/4 ivp well.  No adverse reaction noted.   IV flushed with NS and D/C per protocol.  Patient left clinic in no acute distress.

## 2024-02-06 ENCOUNTER — TELEPHONE (OUTPATIENT)
Dept: INFUSION THERAPY | Facility: HOSPITAL | Age: 49
End: 2024-02-06
Payer: MEDICAID

## 2024-02-06 NOTE — TELEPHONE ENCOUNTER
Pt called infusion ceter stating she is unable to make it to her infusion appt today 2/6/24 at 1:30. Pt rescheduled to Monday 2/12/24 at 10:30 and verbalized understanding.

## 2024-02-09 DIAGNOSIS — I82.431 ACUTE DEEP VEIN THROMBOSIS (DVT) OF RIGHT POPLITEAL VEIN: Primary | ICD-10-CM

## 2024-02-12 ENCOUNTER — TELEPHONE (OUTPATIENT)
Dept: INFUSION THERAPY | Facility: HOSPITAL | Age: 49
End: 2024-02-12
Payer: MEDICAID

## 2024-02-21 ENCOUNTER — HOSPITAL ENCOUNTER (EMERGENCY)
Facility: HOSPITAL | Age: 49
Discharge: HOME | End: 2024-02-21
Attending: STUDENT IN AN ORGANIZED HEALTH CARE EDUCATION/TRAINING PROGRAM
Payer: MEDICAID

## 2024-02-21 VITALS
BODY MASS INDEX: 46.94 KG/M2 | WEIGHT: 265 LBS | OXYGEN SATURATION: 99 % | RESPIRATION RATE: 20 BRPM | DIASTOLIC BLOOD PRESSURE: 92 MMHG | SYSTOLIC BLOOD PRESSURE: 157 MMHG | HEART RATE: 60 BPM | TEMPERATURE: 98 F

## 2024-02-21 DIAGNOSIS — R04.0 EPISTAXIS: Primary | ICD-10-CM

## 2024-02-21 LAB
ALBUMIN SERPL BCP-MCNC: 4.3 G/DL (ref 3.5–5.2)
ALP SERPL-CCNC: 73 U/L (ref 38–126)
ALT SERPL W/O P-5'-P-CCNC: 20 U/L (ref 10–44)
ANION GAP SERPL CALC-SCNC: 5 MMOL/L (ref 8–16)
AST SERPL-CCNC: 19 U/L (ref 15–46)
BASOPHILS # BLD AUTO: 0.05 K/UL (ref 0–0.2)
BASOPHILS NFR BLD: 0.6 % (ref 0–1.9)
BILIRUB SERPL-MCNC: 0.6 MG/DL (ref 0.1–1)
CALCIUM SERPL-MCNC: 9.6 MG/DL (ref 8.7–10.5)
CHLORIDE SERPL-SCNC: 106 MMOL/L (ref 95–110)
CO2 SERPL-SCNC: 29 MMOL/L (ref 23–29)
CREAT SERPL-MCNC: 0.56 MG/DL (ref 0.5–1.4)
DIFFERENTIAL METHOD BLD: NORMAL
EOSINOPHIL # BLD AUTO: 0.3 K/UL (ref 0–0.5)
EOSINOPHIL NFR BLD: 3 % (ref 0–8)
ERYTHROCYTE [DISTWIDTH] IN BLOOD BY AUTOMATED COUNT: 14.3 % (ref 11.5–14.5)
EST. GFR  (NO RACE VARIABLE): >60 ML/MIN/1.73 M^2
GLUCOSE SERPL-MCNC: 96 MG/DL (ref 70–110)
HCT VFR BLD AUTO: 42.4 % (ref 37–48.5)
HGB BLD-MCNC: 13.9 G/DL (ref 12–16)
IMM GRANULOCYTES # BLD AUTO: 0.01 K/UL (ref 0–0.04)
IMM GRANULOCYTES NFR BLD AUTO: 0.1 % (ref 0–0.5)
LYMPHOCYTES # BLD AUTO: 3.3 K/UL (ref 1–4.8)
LYMPHOCYTES NFR BLD: 37 % (ref 18–48)
MCH RBC QN AUTO: 28.7 PG (ref 27–31)
MCHC RBC AUTO-ENTMCNC: 32.8 G/DL (ref 32–36)
MCV RBC AUTO: 87 FL (ref 82–98)
MONOCYTES # BLD AUTO: 0.7 K/UL (ref 0.3–1)
MONOCYTES NFR BLD: 7.5 % (ref 4–15)
NEUTROPHILS # BLD AUTO: 4.7 K/UL (ref 1.8–7.7)
NEUTROPHILS NFR BLD: 51.8 % (ref 38–73)
NRBC BLD-RTO: 0 /100 WBC
PLATELET # BLD AUTO: 325 K/UL (ref 150–450)
PMV BLD AUTO: 10 FL (ref 9.2–12.9)
POTASSIUM SERPL-SCNC: 3.6 MMOL/L (ref 3.5–5.1)
PROT SERPL-MCNC: 7.7 G/DL (ref 6–8.4)
RBC # BLD AUTO: 4.85 M/UL (ref 4–5.4)
SODIUM SERPL-SCNC: 140 MMOL/L (ref 136–145)
UUN UR-MCNC: 15 MG/DL (ref 7–17)
WBC # BLD AUTO: 9.03 K/UL (ref 3.9–12.7)

## 2024-02-21 PROCEDURE — 85025 COMPLETE CBC W/AUTO DIFF WBC: CPT | Mod: ER

## 2024-02-21 PROCEDURE — 80053 COMPREHEN METABOLIC PANEL: CPT | Mod: ER

## 2024-02-21 PROCEDURE — 99283 EMERGENCY DEPT VISIT LOW MDM: CPT | Mod: ER

## 2024-02-21 NOTE — DISCHARGE INSTRUCTIONS
The most common cause for epistaxis or nosebleed is from irritation of the lining of the nose. This can be caused by allergies, viral or bacterial nasal infection, severe dryness in the house, nasal manipulation (i.e. nose picking), and severe nose blowing.    The best way to manage nosebleeds is to prevent them by keeping the lining of the nose healthy and well moisturized. Some tips for this include:  1. Avoid nose picking, vigorous nose blowing, vigorous nose rubbing  2. Keep the nose moist by using an over-the-counter nasal moisturizer such as ramez gel or ocean gel, or making your own (see instructions blow). You can also use vaseline applied gently to the front of the inside of nose with a fingertip. Use this three times a day, especially during dry months    If you have a nosebleed, do the followin. Sit up in a chair and do not tilt your head back, lean forward slightly; do not place your head downwards or between your legs  2. Use a small pan or bowl to catch any dripping blood.  3. Prior to catching the blood in the bowl, gently blow your nose to free any clots, as clots can sometimes cause more problems.  4. Do not stuff cotton or tissue up the nose, as this will only act as a wick and cause more bleeding.  5. Use some afrin nose spray medication in your nose. Pinch the nose in the front, not high up. Do this for fifteen minutes by a clock or watch. Repeat this 4 or 5 times if there is still bleeding.  6. Ice or heat on the bridge of the nose or on the neck has no effect on the nosebleed (but if it makes you feel good, please do this).  7. After the bleeding has stopped, use the afrin three times a day for three days to prevent any further bleeding (fo not continue daily afrin use any longer than three days)  8. Use a vaporizer or humidifier in your bedroom at nighttime. Warm or cold does not make a difference.  9. Use ointment as prescribed by the doctor.

## 2024-02-21 NOTE — ED PROVIDER NOTES
Encounter Date: 2/21/2024       History     Chief Complaint   Patient presents with    Epistaxis     Pt states she has had nosebleeds x 2 days off and on. Pt on xerelto. Denies trauma or nasal congestion. Currently not bleeding      Inna Gallardo is a 48 y.o. female  has a past medical history of Anxiety, Diverticulitis, Endometriosis, General anesthetics causing adverse effect in therapeutic use, Hypertension, Kidney stone, Prolactinoma, Sliding hiatal hernia, Urinary tract infection, and Vaginal infection. presenting to the Emergency Department for Nosebleeds.  Reports 3 nosebleeds yesterday with the longest lasting 25 minutes.  Reports 1 nosebleed today lasting 10 minutes.  Reports feeling blood drip down the back of her throat and was spitting that up to.  Reports some dizziness and weakness since the nosebleeds.  No shortness of breath or chest pain.  No abdominal pain, nausea, vomiting, diarrhea.      The history is provided by the patient.     Review of patient's allergies indicates:   Allergen Reactions    Ace inhibitors Other (See Comments)     cough     Past Medical History:   Diagnosis Date    Anxiety     Diverticulitis     Endometriosis     General anesthetics causing adverse effect in therapeutic use     Hypertension     Kidney stone     Prolactinoma 2011    Sliding hiatal hernia     Urinary tract infection     Vaginal infection      Past Surgical History:   Procedure Laterality Date    ARTHROSCOPY OF KNEE Left     BRAIN TUMOR EXCISION  9/2011    removal of prolactinoma    COLONOSCOPY N/A 8/27/2020    Procedure: COLONOSCOPY;  Surgeon: Valentino Negro MD;  Location: [facility];  Service: Endoscopy;  Laterality: N/A;    CYSTOSCOPY      CYSTOSCOPY W/ URETERAL STENT PLACEMENT  2016    CYSTOSCOPY W/ URETERAL STENT REMOVAL  2016    ESOPHAGEAL MANOMETRY N/A 6/3/2019    Procedure: MANOMETRY, ESOPHAGUS;  Surgeon: Bhupinder Schmitz MD;  Location: [address]);  Service: Endoscopy;  Laterality: N/A;     ESOPHAGOGASTRODUODENOSCOPY N/A 2019    Procedure: ESOPHAGOGASTRODUODENOSCOPY (EGD);  Surgeon: Altagracia Bose MD;  Location: [facility] ENDO;  Service: Endoscopy;  Laterality: N/A;    ESOPHAGOGASTRODUODENOSCOPY N/A 2021    Procedure: EGD (ESOPHAGOGASTRODUODENOSCOPY);  Surgeon: Altagracia Bose MD;  Location: [facility] ENDO;  Service: Endoscopy;  Laterality: N/A;    FOOT HARDWARE REMOVAL Right 2020    Procedure: REMOVAL, HARDWARE, FOOT;  Surgeon: Adrianna Tillman DPM;  Location: [facility] OR;  Service: Podiatry;  Laterality: Right;    HARVESTING OF BONE GRAFT Right 2019    Procedure: SURGICAL PROCUREMENT, BONE GRAFT;  Surgeon: Adrianna Tillman DPM;  Location: [facility] OR;  Service: Podiatry;  Laterality: Right;    HYSTERECTOMY  3/16/15    TLH/BSO for Endometriosis, AUB, fibroids, cyst    LITHOTRIPSY      MANDIBLE SURGERY      severe overbite correction    PELVIC LAPAROSCOPY      Dx scope, chromopertubation-Endometriosis    TONSILLECTOMY, ADENOIDECTOMY       Family History   Problem Relation Age of Onset    Cancer Mother     Breast cancer Neg Hx     Ovarian cancer Neg Hx     Kidney disease Neg Hx      Social History     Tobacco Use    Smoking status: Former     Current packs/day: 0.00     Types: Cigarettes     Start date: 1989     Quit date: 2000     Years since quittin.0    Smokeless tobacco: Never    Tobacco comments:     pt stated she smoked one cig/day   Substance Use Topics    Alcohol use: No     Alcohol/week: 0.0 standard drinks of alcohol    Drug use: No     Review of Systems   Constitutional:  Negative for fever.   HENT:  Positive for nosebleeds. Negative for sore throat.    Respiratory:  Negative for shortness of breath.    Cardiovascular:  Negative for chest pain.   Gastrointestinal:  Negative for nausea.   Genitourinary:  Negative for dysuria.   Musculoskeletal:  Negative for back pain.   Skin:  Negative for rash.   Neurological:  Negative for weakness.   Hematological:  Does not  bruise/bleed easily.   All other systems reviewed and are negative.      Physical Exam     Initial Vitals [02/21/24 0947]   BP Pulse Resp Temp SpO2   136/89 71 17 98.4 °F (36.9 °C) 96 %      MAP       --         Physical Exam    Nursing note and vitals reviewed.  Constitutional: She appears well-developed and well-nourished. She is not diaphoretic. No distress.   HENT:   Head: Normocephalic and atraumatic.   Right Ear: Hearing, tympanic membrane and external ear normal.   Left Ear: Hearing, tympanic membrane and external ear normal.   Nose: Nose normal. No septal deviation or nasal septal hematoma. No epistaxis.  No foreign bodies.   Mouth/Throat: Oropharynx is clear and moist.   Dried blood noted in both nares. Dried blood noted on the left nasal septum. Enlarged turbinates bilaterally. No active bleeding observed.    Eyes: Conjunctivae, EOM and lids are normal. Pupils are equal, round, and reactive to light.   Neck: Neck supple.   Normal range of motion.  Cardiovascular:  Normal rate, regular rhythm and normal heart sounds.           Pulmonary/Chest: Breath sounds normal. No respiratory distress. She has no wheezes. She has no rhonchi.   Abdominal: Abdomen is soft. Bowel sounds are normal. There is no abdominal tenderness. There is no rebound and no guarding.   Musculoskeletal:         General: Normal range of motion.      Cervical back: Normal range of motion and neck supple. No rigidity.     Lymphadenopathy:     She has no cervical adenopathy.   Neurological: She is alert and oriented to person, place, and time. She has normal strength. GCS score is 15. GCS eye subscore is 4. GCS verbal subscore is 5. GCS motor subscore is 6.   Skin: Skin is warm and dry. Capillary refill takes less than 2 seconds. No rash noted.   Psychiatric: She has a normal mood and affect. Her behavior is normal. Judgment and thought content normal.         ED Course   Procedures  Labs Reviewed   COMPREHENSIVE METABOLIC PANEL - Abnormal;  Notable for the following components:       Result Value    Anion Gap 5 (*)     All other components within normal limits   CBC W/ AUTO DIFFERENTIAL          Imaging Results    None          Medications - No data to display  Medical Decision Making  This is an emergent evaluation of 48 y.o. female in the ED presenting for epistaxis. Physical exam reveals a non-toxic, afebrile, and well-appearing female in no apparent respiratory distress. Pertinent physical exam findings above. Vital signs reassuring. If available, previous records reviewed.  Patient did not have a nosebleed while in the ED and remained stable.     My overall impression is epistaxis. Differential Diagnoses:  Nosebleed, allergies, nasal trauma, anticoagulation reaction    Discharge Meds/Instructions:  Moisturization, humidifiers, ENT follow up.    There does not appear to be any indication for further emergent testing, observation, or hospitalization at this time. A mutual shared decision making discussion was had with the patient. Patient appears stable for and is comfortable with discharge home. The diagnosis, treatment plan, instructions for follow-up as well as ED return precautions were discussed. Advised to follow-up with PCP for outpatient follow-up in 2-3 days. Signs and symptoms that would warrant immediate return to ED were reviewed prior to discharge. All questions and concerns were asked, answered, and addressed. Patient expressed understanding and agreement with the plan.     This case was discussed with my attending, Dr. Christy who is in agreement with my assessment and plan.      Amount and/or Complexity of Data Reviewed  Labs: ordered. Decision-making details documented in ED Course.               ED Course as of 02/21/24 1327   Wed Feb 21, 2024   1102 CBC auto differential  WNL [LH]   1129 Comprehensive metabolic panel(!)  No significant abnormality. [LH]      ED Course User Index  [LH] Madelyn Dupont PA-C                            Clinical Impression:  Final diagnoses:  [R04.0] Epistaxis (Primary)          ED Disposition Condition    Discharge Stable          ED Prescriptions    None       Follow-up Information    None          Madelyn Dupont PA-C  02/21/24 3483

## 2024-02-22 ENCOUNTER — PATIENT MESSAGE (OUTPATIENT)
Dept: ORTHOPEDICS | Facility: CLINIC | Age: 49
End: 2024-02-22
Payer: MEDICAID

## 2024-02-22 DIAGNOSIS — M17.12 PRIMARY OSTEOARTHRITIS OF LEFT KNEE: ICD-10-CM

## 2024-02-22 DIAGNOSIS — M17.11 PRIMARY OSTEOARTHRITIS OF RIGHT KNEE: Primary | ICD-10-CM

## 2024-02-25 NOTE — PROGRESS NOTES
PATIENT: Inna Gallardo  MRN: [id number]  DATE: 3/1/2024    Diagnosis:   1. Iron deficiency    2. History of deep vein thrombosis    3. History of smoking    4. Morbid obesity      Chief Complaint: iron deficiency    Oncologic History:      Oncologic History     Oncologic Treatment     Pathology       Subjective:    History of Present Illness: Ms. Gallardo is a 48 y.o. female who presented in October 2023 for evaluation and management of leukocytosis/thrombocytosis. She was referred by physician assistant Lesli Cohen.    - labs on 8/24/23 revealed a mild leukocytosis at 12.4 k/uL, granulocyte-predominant; a mild thrombocytosis at 481 k/uL.      - she had a hysterectomy in 2014.  - on 12/7/23, she presented to the emergency room with leg discoloration/swelling. Ultrasound lower extremity (12/7/23) revealed a deep vein thrombosis in the right popliteal vein. She was started on rivaroxaban.  - she was using an estrogen cream at time of diagnosis. She is no longer using it.  - she denies a family history of thrombosis.    Interval history:  - she presents for a follow-up appointment for her leukocytosis/thrombocytosis/iron deficiency  - she received iron sucrose x 3 doses in January 2024.  - she reports improvement in her fatigue/dyspnea after iron infusions. She endorses leg pain.        Past medical, surgical, family, and social histories have been reviewed and updated below.    Past Medical History:   Past Medical History:   Diagnosis Date    Anxiety     Diverticulitis     Endometriosis     General anesthetics causing adverse effect in therapeutic use     Hypertension     Kidney stone     Prolactinoma 2011    Sliding hiatal hernia     Urinary tract infection     Vaginal infection        Past Surgical History:   Past Surgical History:   Procedure Laterality Date    ARTHROSCOPY OF KNEE Left     BRAIN TUMOR EXCISION  9/2011    removal of prolactinoma    COLONOSCOPY N/A 8/27/2020    Procedure: COLONOSCOPY;  Surgeon:  Valentino Negro MD;  Location: [facility];  Service: Endoscopy;  Laterality: N/A;    CYSTOSCOPY      CYSTOSCOPY W/ URETERAL STENT PLACEMENT  2016    CYSTOSCOPY W/ URETERAL STENT REMOVAL  2016    ESOPHAGEAL MANOMETRY N/A 6/3/2019    Procedure: MANOMETRY, ESOPHAGUS;  Surgeon: Bhupinder Schmitz MD;  Location: [facility] ENDO (4TH FLR);  Service: Endoscopy;  Laterality: N/A;    ESOPHAGOGASTRODUODENOSCOPY N/A 4/30/2019    Procedure: ESOPHAGOGASTRODUODENOSCOPY (EGD);  Surgeon: Altagracia Bose MD;  Location: [facility] ENDO;  Service: Endoscopy;  Laterality: N/A;    ESOPHAGOGASTRODUODENOSCOPY N/A 8/2/2021    Procedure: EGD (ESOPHAGOGASTRODUODENOSCOPY);  Surgeon: Altagracia Bose MD;  Location: [facility];  Service: Endoscopy;  Laterality: N/A;    FOOT HARDWARE REMOVAL Right 6/16/2020    Procedure: REMOVAL, HARDWARE, FOOT;  Surgeon: Adrianna Tillman DPM;  Location: [facility] OR;  Service: Podiatry;  Laterality: Right;    HARVESTING OF BONE GRAFT Right 7/16/2019    Procedure: SURGICAL PROCUREMENT, BONE GRAFT;  Surgeon: Adrianna Tillman DPM;  Location: [facility] OR;  Service: Podiatry;  Laterality: Right;    HYSTERECTOMY  3/16/15    [facility]/BSO for Endometriosis, AUB, fibroids, cyst    LITHOTRIPSY      MANDIBLE SURGERY  2002    severe overbite correction    PELVIC LAPAROSCOPY  2014    Dx scope, chromopertubation-Endometriosis    TONSILLECTOMY, ADENOIDECTOMY         Family History:   Family History   Problem Relation Age of Onset    Cancer Mother     Breast cancer Neg Hx     Ovarian cancer Neg Hx     Kidney disease Neg Hx        Social History:  reports that she quit smoking about 24 years ago. Her smoking use included cigarettes. She started smoking about 35 years ago. She has never used smokeless tobacco. She reports that she does not drink alcohol and does not use drugs.    Allergies:  Review of patient's allergies indicates:   Allergen Reactions    Ace inhibitors Other (See Comments)     cough       Medications:  Current Outpatient Medications    Medication Sig Dispense Refill    albuterol (PROVENTIL/VENTOLIN HFA) 90 mcg/actuation inhaler 2 puffs every 4 to 6 hours as needed.      atorvastatin (LIPITOR) 20 MG tablet       EPINEPHrine (EPIPEN) 0.3 mg/0.3 mL AtIn Inject 0.3 mLs into the muscle as needed.  0    ESTARYLLA 0.25-35 mg-mcg per tablet Take 1 tablet by mouth once daily.      fluticasone propionate (FLONASE) 50 mcg/actuation nasal spray       hydrOXYzine HCl (ATARAX) 25 MG tablet Take 25 mg by mouth 3 (three) times daily as needed.  2    LIDOcaine (LIDODERM) 5 % Place 1 patch onto the skin once daily. Remove & Discard patch within 12 hours or as directed by MD 15 patch 0    loratadine (CLARITIN) 10 mg tablet Take 1 tablet every day by oral route.      metFORMIN (GLUCOPHAGE-XR) 500 MG ER 24hr tablet Take 500 mg by mouth once daily.      naproxen (NAPROSYN) 500 MG tablet Take 1 tablet (500 mg total) by mouth 2 (two) times daily with meals. 14 tablet 0    omeprazole (PRILOSEC) 40 MG capsule Take 1 capsule (40 mg total) by mouth every morning. 30 capsule 11    ondansetron (ZOFRAN-ODT) 4 MG TbDL Take 1 tablet (4 mg total) by mouth every 6 (six) hours as needed. 30 tablet 0    paroxetine (PAXIL) 20 MG tablet Take 20 mg by mouth every morning.      rivaroxaban (XARELTO DVT-PE TREAT 30D START) 15 mg (42)- 20 mg (9) tablet dose pack Take 1 tablet (15 mg) by mouth twice daily with food for 21 days followed by 1 tablet (20 mg) by mouth once daily with food 51 tablet 0    sertraline (ZOLOFT) 50 MG tablet       sucralfate (CARAFATE) 1 gram tablet Take 1 tablet (1 g total) by mouth before meals and at bedtime as needed (abdominal  pain). 90 tablet 1    tamsulosin (FLOMAX) 0.4 mg Cap TAKE 1 CAPSULE(0.4 MG) BY MOUTH EVERY DAY (Patient not taking: Reported on 10/10/2023) 90 capsule 0     No current facility-administered medications for this visit.       Review of Systems   Constitutional:  Positive for fatigue.   HENT:  Negative for sore throat.    Eyes:  Negative  for visual disturbance.   Respiratory:  Positive for shortness of breath (improved). Negative for cough.    Cardiovascular:  Negative for chest pain.   Gastrointestinal:  Negative for abdominal pain, constipation, diarrhea, nausea and vomiting.   Genitourinary:  Negative for dysuria.   Musculoskeletal:  Negative for back pain.        Leg pain   Skin:  Negative for rash.   Neurological:  Negative for headaches.   Hematological:  Negative for adenopathy.   Psychiatric/Behavioral:  The patient is not nervous/anxious.      ECOG Performance Status:   ECOG SCORE    1 - Restricted in strenuous activity-ambulatory and able to carry out work of a light nature         Objective:      Vitals:   Vitals:    03/01/24 0821   BP: (!) 140/87   BP Location: Right forearm   Patient Position: Sitting   BP Method: Large (Automatic)   Pulse: 67   Resp: 16   SpO2: 95%   Weight: 124.9 kg (275 lb 5.7 oz)     BMI: Body mass index is 48.78 kg/m².    Physical Exam  Vitals and nursing note reviewed.   Constitutional:       Appearance: She is well-developed.   HENT:      Head: Normocephalic and atraumatic.   Eyes:      Pupils: Pupils are equal, round, and reactive to light.   Cardiovascular:      Rate and Rhythm: Normal rate and regular rhythm.   Pulmonary:      Effort: Pulmonary effort is normal.      Breath sounds: Normal breath sounds.   Abdominal:      General: Bowel sounds are normal.      Palpations: Abdomen is soft.   Musculoskeletal:         General: Normal range of motion.      Cervical back: Normal range of motion and neck supple.   Skin:     General: Skin is warm and dry.   Neurological:      Mental Status: She is alert and oriented to person, place, and time.   Psychiatric:         Behavior: Behavior normal.         Thought Content: Thought content normal.         Judgment: Judgment normal.         Laboratory Data:  Labs have been reviewed.    Lab Results   Component Value Date    WBC 9.03 02/21/2024    HGB 13.9 02/21/2024    HCT  42.4 02/21/2024    MCV 87 02/21/2024     02/21/2024 8/24/23:        Imaging:      Ultrasound right lower extremity (2/27/24):  No evidence of deep venous thrombosis in the right lower extremity.     4.0 cm right popliteal fossa collection suggesting Baker's cyst.    Ultrasound lower extremities bilateral (12/7/23):    Positive DVT in the right popliteal vein     No left-sided DVT      Assessment:       1. Iron deficiency    2. History of deep vein thrombosis    3. History of smoking    4. Morbid obesity           Plan:     History of deep vein thrombosis  - on 12/7/23, she presented to the emergency room with leg pain/swelling. Ultrasound lower extremity (12/7/23) revealed a deep vein thrombosis in the right popliteal vein.  - she was placed on rivaroxaban.  - Ultrasound right lower extremity (2/27/24):  No evidence of deep venous thrombosis in the right lower extremity.  - d-dimer was normal  - follow up factor 5 leiden and prothrombin testing  - I discussed risks/benefits of indefinite anticoagulation. Of concern to her was increased bleeding while on full-dose xarelto.  - at this time, we will discontinue anticoagulation. I stated that at any time, if she wants, we can start prophylactic rivaroxaban at 10mg daily.  - okay to proceed with surgery on 5/10/24. Consider postoperative anticoagulation for 4 weeks following surgery.    2. Leukocytosis / thrombocytosis / iron deficiency  - I have reviewed her chart/outside records  - labs on 8/24/23 revealed a mild leukocytosis at 12.4 k/uL, granulocyte-predominant; a mild thrombocytosis at 481 k/uL.  - I suspect her leukocytosis and thrombocytosis are reactive, perhaps from inflammation. I will perform a workup to rule in/out primary cause of elevated counts.  - JAK2 with reflex testing was previously negative, so won't repeat that.  - BCR/ABL was negative/normal.  - reviewing her iron studies, her TIBC is upper limits of normal. Perhaps borderline iron  deficiency is a contributing cause to her thrombocytosis  - given her fatigue, I recommended iron sucrose x 4 doses.  - she received iron sucrose x 3 doses in January 2024.  - Labs have been reviewed. Ferritin has increased, and TIBC has decreased, consistent with response.  - repeat labs in 6 months  - return to clinic in one year    3. History of smoking  - she quit smoking 30 years ago.    4. Morbid obesity  - Body mass index is 48.78 kg/m².  - adipose tissue is inflammatory and can cause leukocytosis/thrombocytosis    5. Advance Care Planning     Power of   After our discussion (at previous visit), the patient decided not to complete a HCPOA.          - repeat labs in 6 months  - return to clinic in one year    Christiano Sotelo M.D.  Hematology/Oncology  [facility] - [address], [address]  [place] [address]  Phone: [phone]  Fax: [phone]

## 2024-02-26 ENCOUNTER — OFFICE VISIT (OUTPATIENT)
Dept: UROLOGY | Facility: CLINIC | Age: 49
End: 2024-02-26
Payer: MEDICAID

## 2024-02-26 VITALS
SYSTOLIC BLOOD PRESSURE: 117 MMHG | WEIGHT: 276.88 LBS | DIASTOLIC BLOOD PRESSURE: 68 MMHG | BODY MASS INDEX: 49.06 KG/M2 | HEART RATE: 81 BPM | HEIGHT: 63 IN

## 2024-02-26 DIAGNOSIS — R30.0 DYSURIA: ICD-10-CM

## 2024-02-26 DIAGNOSIS — R39.89 BLADDER PAIN: ICD-10-CM

## 2024-02-26 DIAGNOSIS — N30.01 ACUTE CYSTITIS WITH HEMATURIA: ICD-10-CM

## 2024-02-26 DIAGNOSIS — N20.0 BILATERAL NEPHROLITHIASIS: ICD-10-CM

## 2024-02-26 DIAGNOSIS — R31.0 GROSS HEMATURIA: Primary | ICD-10-CM

## 2024-02-26 PROCEDURE — 87186 SC STD MICRODIL/AGAR DIL: CPT | Performed by: NURSE PRACTITIONER

## 2024-02-26 PROCEDURE — 99999 PR PBB SHADOW E&M-EST. PATIENT-LVL IV: CPT | Mod: PBBFAC,,, | Performed by: NURSE PRACTITIONER

## 2024-02-26 PROCEDURE — 99214 OFFICE O/P EST MOD 30 MIN: CPT | Mod: PBBFAC,PO | Performed by: NURSE PRACTITIONER

## 2024-02-26 PROCEDURE — 3074F SYST BP LT 130 MM HG: CPT | Mod: CPTII,,, | Performed by: NURSE PRACTITIONER

## 2024-02-26 PROCEDURE — 87077 CULTURE AEROBIC IDENTIFY: CPT | Performed by: NURSE PRACTITIONER

## 2024-02-26 PROCEDURE — 3078F DIAST BP <80 MM HG: CPT | Mod: CPTII,,, | Performed by: NURSE PRACTITIONER

## 2024-02-26 PROCEDURE — 1159F MED LIST DOCD IN RCRD: CPT | Mod: CPTII,,, | Performed by: NURSE PRACTITIONER

## 2024-02-26 PROCEDURE — 87086 URINE CULTURE/COLONY COUNT: CPT | Performed by: NURSE PRACTITIONER

## 2024-02-26 PROCEDURE — 3008F BODY MASS INDEX DOCD: CPT | Mod: CPTII,,, | Performed by: NURSE PRACTITIONER

## 2024-02-26 PROCEDURE — 99214 OFFICE O/P EST MOD 30 MIN: CPT | Mod: S$PBB,,, | Performed by: NURSE PRACTITIONER

## 2024-02-26 PROCEDURE — 87088 URINE BACTERIA CULTURE: CPT | Performed by: NURSE PRACTITIONER

## 2024-02-26 PROCEDURE — 1160F RVW MEDS BY RX/DR IN RCRD: CPT | Mod: CPTII,,, | Performed by: NURSE PRACTITIONER

## 2024-02-26 RX ORDER — TAMSULOSIN HYDROCHLORIDE 0.4 MG/1
0.4 CAPSULE ORAL DAILY
Qty: 90 CAPSULE | Refills: 0 | Status: SHIPPED | OUTPATIENT
Start: 2024-02-26 | End: 2024-05-26

## 2024-02-26 RX ORDER — SULFAMETHOXAZOLE AND TRIMETHOPRIM 800; 160 MG/1; MG/1
1 TABLET ORAL 2 TIMES DAILY
Qty: 20 TABLET | Refills: 0 | Status: SHIPPED | OUTPATIENT
Start: 2024-02-26 | End: 2024-03-07

## 2024-02-26 RX ORDER — PHENAZOPYRIDINE HYDROCHLORIDE 200 MG/1
200 TABLET, FILM COATED ORAL 3 TIMES DAILY PRN
Qty: 9 TABLET | Refills: 0 | Status: SHIPPED | OUTPATIENT
Start: 2024-02-26 | End: 2024-02-29

## 2024-02-26 NOTE — PATIENT INSTRUCTIONS
Urine cx today  Start back taking tamsulosin (Flomax) 0.4 mg daily and increase water intake if not contraindicated.   Follow-up pending urine cx results.

## 2024-02-26 NOTE — PROGRESS NOTES
Subjective:       Patient ID: Inna Gallardo is a 48 y.o. female.    Chief Complaint: Hematuria    Patient is here today for an ER follow-up from today for gross hematuria x2 days. Patient is currently taking Xarelto (newly started December 2023). She has a hx of nephrolithiasis. CT RSS was performed today in the ER which showed multiple non-obstructing bilateral renal stones with the largest measuring up to 5 mm in size in left kidney. No ureter or bladder stones noted. Results discussed with patient. Patient reports c/o bladder pain/burning and dysuria that started today.     Hematuria  This is a new problem. Episode onset: 2 days ago. She describes the hematuria as gross hematuria. The hematuria occurs throughout her entire urinary stream. She reports no clotting in her urine stream. Her pain is at a severity of 5/10 (bladder pain and dysuria). The pain is moderate. She describes her urine color as dark red. Irritative symptoms do not include frequency or urgency. Associated symptoms include abdominal pain and dysuria. Pertinent negatives include no chills, fever, flank pain, genital pain, hesitancy, inability to urinate, nausea or vomiting. Her past medical history is significant for hypertension and kidney stones. There is no history of  trauma, tobacco use or UTI.     Review of Systems   Constitutional:  Positive for fatigue. Negative for chills and fever.   Gastrointestinal:  Positive for abdominal pain. Negative for change in bowel habit, constipation, diarrhea, nausea and vomiting.   Genitourinary:  Positive for dysuria and hematuria. Negative for decreased urine volume, difficulty urinating, flank pain, frequency, hesitancy and urgency.   Musculoskeletal:  Positive for arthralgias.   Neurological: Negative.    Psychiatric/Behavioral: Negative.           Objective:      Physical Exam  Vitals and nursing note reviewed.   Constitutional:       General: She is not in acute distress.     Appearance: She is  well-developed. She is morbidly obese. She is not ill-appearing.   HENT:      Head: Normocephalic and atraumatic.   Eyes:      Pupils: Pupils are equal, round, and reactive to light.   Cardiovascular:      Rate and Rhythm: Normal rate.   Pulmonary:      Effort: Pulmonary effort is normal. No respiratory distress.   Abdominal:      Palpations: Abdomen is soft.      Tenderness: There is no abdominal tenderness.   Musculoskeletal:         General: Normal range of motion.      Cervical back: Normal range of motion.   Skin:     General: Skin is warm and dry.   Neurological:      Mental Status: She is alert and oriented to person, place, and time.      Coordination: Coordination normal.   Psychiatric:         Mood and Affect: Mood normal.         Behavior: Behavior normal.         Thought Content: Thought content normal.         Judgment: Judgment normal.         Assessment:       Problem List Items Addressed This Visit    None  Visit Diagnoses       Gross hematuria    -  Primary    Relevant Orders    Urine culture    Bilateral nephrolithiasis        Relevant Medications    tamsulosin (FLOMAX) 0.4 mg Cap    Dysuria        Relevant Medications    phenazopyridine (PYRIDIUM) 200 MG tablet    Other Relevant Orders    Urine culture    Bladder pain        Relevant Orders    Urine culture    Acute cystitis with hematuria        Relevant Medications    sulfamethoxazole-trimethoprim 800-160mg (BACTRIM DS) 800-160 mg Tab    Other Relevant Orders    Urine culture            Plan:           Inna was seen today for hematuria.    Diagnoses and all orders for this visit:    Gross hematuria  -     Urine culture    Bilateral nephrolithiasis  -     tamsulosin (FLOMAX) 0.4 mg Cap; Take 1 capsule (0.4 mg total) by mouth once daily.    Dysuria  -     Urine culture  -     phenazopyridine (PYRIDIUM) 200 MG tablet; Take 1 tablet (200 mg total) by mouth 3 (three) times daily as needed for Pain.    Bladder pain  -     Urine culture    Acute  cystitis with hematuria  -     Urine culture  -     sulfamethoxazole-trimethoprim 800-160mg (BACTRIM DS) 800-160 mg Tab; Take 1 tablet by mouth 2 (two) times daily. for 10 days    Other order  Start back taking tamsulosin (Flomax) 0.4 mg daily and increase water intake if not contraindicated.      NOTE: Patient informed that antibiotic may need to change or be discontinued based on urine cx results. Patient verbalized understanding.     Follow-up pending urine cx results.    Raj Dubon NP

## 2024-02-27 ENCOUNTER — HOSPITAL ENCOUNTER (OUTPATIENT)
Dept: RADIOLOGY | Facility: HOSPITAL | Age: 49
Discharge: HOME | End: 2024-02-27
Attending: INTERNAL MEDICINE
Payer: MEDICAID

## 2024-02-27 DIAGNOSIS — I82.431 ACUTE DEEP VEIN THROMBOSIS (DVT) OF RIGHT POPLITEAL VEIN: ICD-10-CM

## 2024-02-27 PROCEDURE — 93971 EXTREMITY STUDY: CPT | Mod: TC,PN,RT

## 2024-02-27 PROCEDURE — 93971 EXTREMITY STUDY: CPT | Mod: 26,RT,, | Performed by: STUDENT IN AN ORGANIZED HEALTH CARE EDUCATION/TRAINING PROGRAM

## 2024-02-29 ENCOUNTER — TELEPHONE (OUTPATIENT)
Dept: UROLOGY | Facility: CLINIC | Age: 49
End: 2024-02-29
Payer: MEDICAID

## 2024-02-29 LAB — BACTERIA UR CULT: ABNORMAL

## 2024-02-29 NOTE — TELEPHONE ENCOUNTER
----- Message from Raj Dubon NP sent at 2/29/2024  4:44 PM CST -----  Please inform patient via telephone that her urine cx came back positive for a UTI. Continue taking Bactrim DS as prescribed in-clinic because it is sensitive to bacteria. Follow-up in 2 weeks at [facility]. Thanks.

## 2024-03-01 ENCOUNTER — OFFICE VISIT (OUTPATIENT)
Dept: HEMATOLOGY/ONCOLOGY | Facility: CLINIC | Age: 49
End: 2024-03-01
Payer: MEDICAID

## 2024-03-01 ENCOUNTER — OFFICE VISIT (OUTPATIENT)
Dept: ORTHOPEDICS | Facility: CLINIC | Age: 49
End: 2024-03-01
Payer: MEDICAID

## 2024-03-01 VITALS — WEIGHT: 276 LBS | HEIGHT: 63 IN | BODY MASS INDEX: 48.9 KG/M2

## 2024-03-01 VITALS
HEART RATE: 67 BPM | WEIGHT: 275.38 LBS | SYSTOLIC BLOOD PRESSURE: 140 MMHG | DIASTOLIC BLOOD PRESSURE: 87 MMHG | OXYGEN SATURATION: 95 % | RESPIRATION RATE: 16 BRPM | BODY MASS INDEX: 48.78 KG/M2

## 2024-03-01 DIAGNOSIS — E61.1 IRON DEFICIENCY: Primary | ICD-10-CM

## 2024-03-01 DIAGNOSIS — Z86.718 HISTORY OF DEEP VEIN THROMBOSIS: ICD-10-CM

## 2024-03-01 DIAGNOSIS — E66.01 MORBID OBESITY: ICD-10-CM

## 2024-03-01 DIAGNOSIS — M17.11 PRIMARY OSTEOARTHRITIS OF RIGHT KNEE: Primary | ICD-10-CM

## 2024-03-01 DIAGNOSIS — Z87.891 HISTORY OF SMOKING: ICD-10-CM

## 2024-03-01 DIAGNOSIS — M17.12 PRIMARY OSTEOARTHRITIS OF LEFT KNEE: ICD-10-CM

## 2024-03-01 PROCEDURE — 99999PBSHW PR PBB SHADOW TECHNICAL ONLY FILED TO HB: Mod: JZ,PBBFAC,,

## 2024-03-01 PROCEDURE — 3079F DIAST BP 80-89 MM HG: CPT | Mod: CPTII,,, | Performed by: INTERNAL MEDICINE

## 2024-03-01 PROCEDURE — 1159F MED LIST DOCD IN RCRD: CPT | Mod: CPTII,,, | Performed by: INTERNAL MEDICINE

## 2024-03-01 PROCEDURE — 20610 DRAIN/INJ JOINT/BURSA W/O US: CPT | Mod: 50,S$PBB,, | Performed by: PHYSICIAN ASSISTANT

## 2024-03-01 PROCEDURE — 3008F BODY MASS INDEX DOCD: CPT | Mod: CPTII,,, | Performed by: INTERNAL MEDICINE

## 2024-03-01 PROCEDURE — 20610 DRAIN/INJ JOINT/BURSA W/O US: CPT | Mod: 50,PBBFAC,PN | Performed by: PHYSICIAN ASSISTANT

## 2024-03-01 PROCEDURE — 99214 OFFICE O/P EST MOD 30 MIN: CPT | Mod: S$PBB,,, | Performed by: INTERNAL MEDICINE

## 2024-03-01 PROCEDURE — 99213 OFFICE O/P EST LOW 20 MIN: CPT | Mod: PBBFAC,27,PN,25 | Performed by: PHYSICIAN ASSISTANT

## 2024-03-01 PROCEDURE — 3077F SYST BP >= 140 MM HG: CPT | Mod: CPTII,,, | Performed by: INTERNAL MEDICINE

## 2024-03-01 PROCEDURE — 99999 PR PBB SHADOW E&M-EST. PATIENT-LVL III: CPT | Mod: PBBFAC,,, | Performed by: PHYSICIAN ASSISTANT

## 2024-03-01 PROCEDURE — 99499 UNLISTED E&M SERVICE: CPT | Mod: 25,S$PBB,, | Performed by: PHYSICIAN ASSISTANT

## 2024-03-01 PROCEDURE — 99999 PR PBB SHADOW E&M-EST. PATIENT-LVL III: CPT | Mod: PBBFAC,,, | Performed by: INTERNAL MEDICINE

## 2024-03-01 PROCEDURE — 1160F RVW MEDS BY RX/DR IN RCRD: CPT | Mod: CPTII,,, | Performed by: INTERNAL MEDICINE

## 2024-03-01 PROCEDURE — 99213 OFFICE O/P EST LOW 20 MIN: CPT | Mod: PBBFAC,PO,25 | Performed by: INTERNAL MEDICINE

## 2024-03-01 RX ADMIN — Medication 32 MG: at 08:03

## 2024-03-01 NOTE — PROCEDURES
Large Joint Aspiration/Injection: bilateral knee    Date/Time: 3/1/2024 8:45 AM    Performed by: Pat Taylor PA-C  Authorized by: Pat Taylor PA-C    Consent Done?:  Yes (Verbal)  Indications:  Arthritis  Site marked: the procedure site was marked    Timeout: prior to procedure the correct patient, procedure, and site was verified    Prep: patient was prepped and draped in usual sterile fashion      Local anesthesia used?: Yes    Local anesthetic:  Topical anesthetic    Details:  Needle Size:  22 G  Ultrasonic Guidance for needle placement?: No    Approach:  Anterolateral  Location:  Knee  Laterality:  Bilateral  Site:  Bilateral knee  Medications (Right):  32 mg triamcinolone acetonide 32 mg  Medications (Left):  32 mg triamcinolone acetonide 32 mg  Patient tolerance:  Patient tolerated the procedure well with no immediate complications

## 2024-03-01 NOTE — PROGRESS NOTES
Subjective:      Patient ID: Inna Gallardo is a 48 y.o. female.    Chief Complaint: Pain and Injections of the Right Knee and Pain and Injections of the Left Knee      Patient is here for  Zilretta  injection(s) for bilateral knee osteoarthritis. Patient tolerated last injection well. Will be following up with knee surgeon at [facility] next month to discuss TKA.           Review of Systems   Constitutional: Negative for chills and fever.   Cardiovascular:  Negative for chest pain.   Respiratory:  Negative for cough.    Hematologic/Lymphatic: Does not bruise/bleed easily.   Skin:  Negative for poor wound healing and rash.   Musculoskeletal:  Positive for arthritis, joint pain, myalgias and stiffness.   Gastrointestinal:  Negative for abdominal pain.   Genitourinary:  Negative for bladder incontinence.   Neurological:  Negative for dizziness, loss of balance and weakness.   Psychiatric/Behavioral:  Negative for altered mental status.        Review of patient's allergies indicates:   Allergen Reactions    Ace inhibitors Other (See Comments)     cough        Current Outpatient Medications   Medication Sig Dispense Refill    atorvastatin (LIPITOR) 20 MG tablet       EPINEPHrine (EPIPEN) 0.3 mg/0.3 mL AtIn Inject 0.3 mLs into the muscle as needed.  0    ESTARYLLA 0.25-35 mg-mcg per tablet Take 1 tablet by mouth once daily.      loratadine (CLARITIN) 10 mg tablet Take 1 tablet every day by oral route.      naproxen (NAPROSYN) 500 MG tablet Take 1 tablet (500 mg total) by mouth 2 (two) times daily with meals. 14 tablet 0    omeprazole (PRILOSEC) 40 MG capsule Take 1 capsule (40 mg total) by mouth every morning. 30 capsule 11    ondansetron (ZOFRAN-ODT) 4 MG TbDL Take 1 tablet (4 mg total) by mouth every 6 (six) hours as needed. 30 tablet 0    paroxetine (PAXIL) 20 MG tablet Take 20 mg by mouth every morning.      rivaroxaban (XARELTO DVT-PE TREAT 30D START) 15 mg (42)- 20 mg (9) tablet dose pack Take 1 tablet (15 mg) by mouth  "twice daily with food for 21 days followed by 1 tablet (20 mg) by mouth once daily with food 51 tablet 0    sertraline (ZOLOFT) 50 MG tablet       sucralfate (CARAFATE) 1 gram tablet Take 1 tablet (1 g total) by mouth before meals and at bedtime as needed (abdominal  pain). 90 tablet 1    sulfamethoxazole-trimethoprim 800-160mg (BACTRIM DS) 800-160 mg Tab Take 1 tablet by mouth 2 (two) times daily. for 10 days 20 tablet 0    tamsulosin (FLOMAX) 0.4 mg Cap Take 1 capsule (0.4 mg total) by mouth once daily. 90 capsule 0     No current facility-administered medications for this visit.        The patient's relevant past medical, surgical, and social history was reviewed in Epic.       Objective:      VITAL SIGNS: Ht 5' 3" (1.6 m)   Wt 125.2 kg (276 lb 0.3 oz)   LMP 03/03/2015   BMI 48.89 kg/m²     Ortho/SPM Exam         Assessment:       1. Primary osteoarthritis of right knee    2. Primary osteoarthritis of left knee          Plan:         Inna was seen today for pain, injections, pain and injections.    Diagnoses and all orders for this visit:    Primary osteoarthritis of right knee  -     Large Joint Aspiration/Injection: bilateral knee    Primary osteoarthritis of left knee  -     Large Joint Aspiration/Injection: bilateral knee    I injected the bilateral knee with one dose of Zilretta  today.  We will see the patient back in 6 mos or as needed.      Diagnoses and plan discussed with the patient, as well as the expected course and duration of his symptoms.  All questions and concerns were addressed prior to the end of the visit.   Instructed patient to call office if they have any future questions/concerns or to schedule apt. Patient will return to see me if symptoms worsen or fail to improve    Note dictated with voice recognition software, please excuse any grammatical errors.        Pat Taylor PA-C   03/01/2024    "

## 2024-03-02 ENCOUNTER — HOSPITAL ENCOUNTER (EMERGENCY)
Facility: HOSPITAL | Age: 49
Discharge: HOME | End: 2024-03-02
Attending: FAMILY MEDICINE
Payer: MEDICAID

## 2024-03-02 VITALS
HEART RATE: 80 BPM | RESPIRATION RATE: 23 BRPM | OXYGEN SATURATION: 97 % | DIASTOLIC BLOOD PRESSURE: 85 MMHG | TEMPERATURE: 98 F | SYSTOLIC BLOOD PRESSURE: 168 MMHG | WEIGHT: 276 LBS | BODY MASS INDEX: 48.89 KG/M2

## 2024-03-02 DIAGNOSIS — I10 PRIMARY HYPERTENSION: ICD-10-CM

## 2024-03-02 DIAGNOSIS — R10.32 LEFT LOWER QUADRANT ABDOMINAL PAIN: ICD-10-CM

## 2024-03-02 DIAGNOSIS — N20.0 KIDNEY STONE: Primary | ICD-10-CM

## 2024-03-02 DIAGNOSIS — R31.9 HEMATURIA, UNSPECIFIED TYPE: ICD-10-CM

## 2024-03-02 LAB
ALBUMIN SERPL BCP-MCNC: 4.9 G/DL (ref 3.5–5.2)
ALP SERPL-CCNC: 66 U/L (ref 38–126)
ALT SERPL W/O P-5'-P-CCNC: 22 U/L (ref 10–44)
ANION GAP SERPL CALC-SCNC: 12 MMOL/L (ref 8–16)
AST SERPL-CCNC: 25 U/L (ref 15–46)
BACTERIA #/AREA URNS AUTO: ABNORMAL /HPF
BASOPHILS # BLD AUTO: 0.05 K/UL (ref 0–0.2)
BASOPHILS NFR BLD: 0.4 % (ref 0–1.9)
BILIRUB SERPL-MCNC: 0.6 MG/DL (ref 0.1–1)
BILIRUB UR QL STRIP: ABNORMAL
CALCIUM SERPL-MCNC: 9.6 MG/DL (ref 8.7–10.5)
CAOX CRY UR QL COMP ASSIST: ABNORMAL
CHLORIDE SERPL-SCNC: 105 MMOL/L (ref 95–110)
CLARITY UR REFRACT.AUTO: ABNORMAL
CO2 SERPL-SCNC: 22 MMOL/L (ref 23–29)
COLOR UR AUTO: YELLOW
CREAT SERPL-MCNC: 0.81 MG/DL (ref 0.5–1.4)
DIFFERENTIAL METHOD BLD: ABNORMAL
EOSINOPHIL # BLD AUTO: 0.2 K/UL (ref 0–0.5)
EOSINOPHIL NFR BLD: 1.5 % (ref 0–8)
ERYTHROCYTE [DISTWIDTH] IN BLOOD BY AUTOMATED COUNT: 14.6 % (ref 11.5–14.5)
EST. GFR  (NO RACE VARIABLE): >60 ML/MIN/1.73 M^2
GLUCOSE SERPL-MCNC: 144 MG/DL (ref 70–110)
GLUCOSE UR QL STRIP: NEGATIVE
HCT VFR BLD AUTO: 41.9 % (ref 37–48.5)
HGB BLD-MCNC: 13.9 G/DL (ref 12–16)
HGB UR QL STRIP: ABNORMAL
HYALINE CASTS UR QL AUTO: 0 /LPF
IMM GRANULOCYTES # BLD AUTO: 0.05 K/UL (ref 0–0.04)
IMM GRANULOCYTES NFR BLD AUTO: 0.4 % (ref 0–0.5)
KETONES UR QL STRIP: ABNORMAL
LEUKOCYTE ESTERASE UR QL STRIP: NEGATIVE
LYMPHOCYTES # BLD AUTO: 3.2 K/UL (ref 1–4.8)
LYMPHOCYTES NFR BLD: 22.5 % (ref 18–48)
MCH RBC QN AUTO: 28.8 PG (ref 27–31)
MCHC RBC AUTO-ENTMCNC: 33.2 G/DL (ref 32–36)
MCV RBC AUTO: 87 FL (ref 82–98)
MICROSCOPIC COMMENT: ABNORMAL
MONOCYTES # BLD AUTO: 1 K/UL (ref 0.3–1)
MONOCYTES NFR BLD: 7.2 % (ref 4–15)
NEUTROPHILS # BLD AUTO: 9.7 K/UL (ref 1.8–7.7)
NEUTROPHILS NFR BLD: 68 % (ref 38–73)
NITRITE UR QL STRIP: NEGATIVE
NRBC BLD-RTO: 0 /100 WBC
PH UR STRIP: 6 [PH] (ref 5–8)
PLATELET # BLD AUTO: 381 K/UL (ref 150–450)
PMV BLD AUTO: 10.7 FL (ref 9.2–12.9)
POTASSIUM SERPL-SCNC: 4.2 MMOL/L (ref 3.5–5.1)
PROT SERPL-MCNC: 8.6 G/DL (ref 6–8.4)
PROT UR QL STRIP: ABNORMAL
RBC # BLD AUTO: 4.83 M/UL (ref 4–5.4)
RBC #/AREA URNS AUTO: >100 /HPF (ref 0–4)
SODIUM SERPL-SCNC: 139 MMOL/L (ref 136–145)
SP GR UR STRIP: >=1.03 (ref 1–1.03)
SQUAMOUS #/AREA URNS AUTO: 2 /HPF
URN SPEC COLLECT METH UR: ABNORMAL
UROBILINOGEN UR STRIP-ACNC: 1 EU/DL
UUN UR-MCNC: 18 MG/DL (ref 7–17)
WBC # BLD AUTO: 14.28 K/UL (ref 3.9–12.7)
WBC #/AREA URNS AUTO: 3 /HPF (ref 0–5)

## 2024-03-02 PROCEDURE — 25000003 PHARM REV CODE 250: Mod: ER | Performed by: FAMILY MEDICINE

## 2024-03-02 PROCEDURE — 80053 COMPREHEN METABOLIC PANEL: CPT | Mod: ER | Performed by: FAMILY MEDICINE

## 2024-03-02 PROCEDURE — 63600175 PHARM REV CODE 636 W HCPCS: Mod: ER | Performed by: FAMILY MEDICINE

## 2024-03-02 PROCEDURE — 96376 TX/PRO/DX INJ SAME DRUG ADON: CPT | Mod: ER

## 2024-03-02 PROCEDURE — 85025 COMPLETE CBC W/AUTO DIFF WBC: CPT | Mod: ER | Performed by: FAMILY MEDICINE

## 2024-03-02 PROCEDURE — 99284 EMERGENCY DEPT VISIT MOD MDM: CPT | Mod: 25,ER

## 2024-03-02 PROCEDURE — 96375 TX/PRO/DX INJ NEW DRUG ADDON: CPT | Mod: ER

## 2024-03-02 PROCEDURE — 96374 THER/PROPH/DIAG INJ IV PUSH: CPT | Mod: ER

## 2024-03-02 PROCEDURE — 94761 N-INVAS EAR/PLS OXIMETRY MLT: CPT | Mod: ER

## 2024-03-02 PROCEDURE — 96361 HYDRATE IV INFUSION ADD-ON: CPT | Mod: ER

## 2024-03-02 PROCEDURE — 81000 URINALYSIS NONAUTO W/SCOPE: CPT | Mod: ER

## 2024-03-02 RX ORDER — LOSARTAN POTASSIUM AND HYDROCHLOROTHIAZIDE 25; 100 MG/1; MG/1
1 TABLET ORAL DAILY
COMMUNITY
End: 2024-03-04

## 2024-03-02 RX ORDER — KETOROLAC TROMETHAMINE 10 MG/1
10 TABLET, FILM COATED ORAL EVERY 6 HOURS
Qty: 20 TABLET | Refills: 0 | Status: SHIPPED | OUTPATIENT
Start: 2024-03-02 | End: 2024-03-07

## 2024-03-02 RX ORDER — MORPHINE SULFATE 4 MG/ML
4 INJECTION, SOLUTION INTRAMUSCULAR; INTRAVENOUS
Status: COMPLETED | OUTPATIENT
Start: 2024-03-02 | End: 2024-03-02

## 2024-03-02 RX ORDER — OXYCODONE AND ACETAMINOPHEN 5; 325 MG/1; MG/1
1 TABLET ORAL
Status: COMPLETED | OUTPATIENT
Start: 2024-03-02 | End: 2024-03-02

## 2024-03-02 RX ORDER — OXYCODONE AND ACETAMINOPHEN 5; 325 MG/1; MG/1
1 TABLET ORAL EVERY 6 HOURS PRN
Qty: 12 TABLET | Refills: 0 | Status: SHIPPED | OUTPATIENT
Start: 2024-03-02 | End: 2024-04-16

## 2024-03-02 RX ORDER — ONDANSETRON 4 MG/1
4 TABLET, ORALLY DISINTEGRATING ORAL EVERY 8 HOURS PRN
Qty: 15 TABLET | Refills: 0 | Status: SHIPPED | OUTPATIENT
Start: 2024-03-02 | End: 2024-03-04

## 2024-03-02 RX ORDER — ONDANSETRON HYDROCHLORIDE 2 MG/ML
4 INJECTION, SOLUTION INTRAVENOUS
Status: COMPLETED | OUTPATIENT
Start: 2024-03-02 | End: 2024-03-02

## 2024-03-02 RX ORDER — KETOROLAC TROMETHAMINE 30 MG/ML
15 INJECTION, SOLUTION INTRAMUSCULAR; INTRAVENOUS
Status: COMPLETED | OUTPATIENT
Start: 2024-03-02 | End: 2024-03-02

## 2024-03-02 RX ORDER — HYDRALAZINE HYDROCHLORIDE 20 MG/ML
10 INJECTION INTRAMUSCULAR; INTRAVENOUS
Status: COMPLETED | OUTPATIENT
Start: 2024-03-02 | End: 2024-03-02

## 2024-03-02 RX ADMIN — KETOROLAC TROMETHAMINE 15 MG: 30 INJECTION, SOLUTION INTRAMUSCULAR; INTRAVENOUS at 05:03

## 2024-03-02 RX ADMIN — SODIUM CHLORIDE 1000 ML: 9 INJECTION, SOLUTION INTRAVENOUS at 02:03

## 2024-03-02 RX ADMIN — ONDANSETRON 4 MG: 2 INJECTION INTRAMUSCULAR; INTRAVENOUS at 03:03

## 2024-03-02 RX ADMIN — KETOROLAC TROMETHAMINE 15 MG: 30 INJECTION, SOLUTION INTRAMUSCULAR; INTRAVENOUS at 03:03

## 2024-03-02 RX ADMIN — HYDRALAZINE HYDROCHLORIDE 10 MG: 20 INJECTION, SOLUTION INTRAMUSCULAR; INTRAVENOUS at 03:03

## 2024-03-02 RX ADMIN — OXYCODONE HYDROCHLORIDE AND ACETAMINOPHEN 1 TABLET: 5; 325 TABLET ORAL at 05:03

## 2024-03-02 RX ADMIN — HYDRALAZINE HYDROCHLORIDE 10 MG: 20 INJECTION, SOLUTION INTRAMUSCULAR; INTRAVENOUS at 05:03

## 2024-03-02 RX ADMIN — MORPHINE SULFATE 4 MG: 4 INJECTION INTRAVENOUS at 03:03

## 2024-03-02 RX ADMIN — SODIUM CHLORIDE 1000 ML: 9 INJECTION, SOLUTION INTRAVENOUS at 03:03

## 2024-03-02 NOTE — ED NOTES
LOC: The patient is awake, alert and aware of environment with an appropriate affect, the patient is oriented x 3 and speaking appropriate.  APPEARANCE: Patient resting comfortably and in no acute distress, patient is clean and well groomed, patient's clothing is properly fastened.  SKIN: The skin is warm and dry, patient has normal skin turgor and moist mucus membranes.  RESPIRATORY: Airway is open and patent, respirations are spontaneous, patient has a normal effort and rate.  CARDIAC: Patient has a normal rate and rhythm, no periphreal edema noted, capillary refill < 3 seconds.  ABDOMEN: Soft and non tender to palpation, no distention noted. Nausea and Vomitting. Left lower quadrant Pain.   NEUROLOGIC: PERRL, facial expression is symmetrical, patient moving all extremities, normal sensation in all extremities when touched with a finger. Patient is able to grasp both hands and has equal strength and the patient was able to stick his tongue out and smile.  Patient also able to push against hands with his feet-equal strength bilaterally.    Pt reports had blood in urine last week.  Was seen by urology.  History of kidney stones.

## 2024-03-02 NOTE — ED PROVIDER NOTES
Encounter Date: 3/2/2024       History     Chief Complaint   Patient presents with    Abdominal Pain     Left lower abd pain that started this morning. PT reports nausea and difficulty urinating     48-year-old female complains of left lower quadrant pain which started few hours ago.  Moderate nausea.  No vomiting.  No fever no chills no rigors.  She is history of kidney stones and recently been evaluated for hematuria.  Difficulty in urination and decreased urination.  Patient denies constipation or diarrhea.    The history is provided by the patient.     Review of patient's allergies indicates:   Allergen Reactions    Ace inhibitors Other (See Comments)     cough     Past Medical History:   Diagnosis Date    Anxiety     Diverticulitis     Endometriosis     General anesthetics causing adverse effect in therapeutic use     Hypertension     Kidney stone     Prolactinoma 2011    Sliding hiatal hernia     Urinary tract infection     Vaginal infection      Past Surgical History:   Procedure Laterality Date    ARTHROSCOPY OF KNEE Left     BRAIN TUMOR EXCISION  9/2011    removal of prolactinoma    COLONOSCOPY N/A 8/27/2020    Procedure: COLONOSCOPY;  Surgeon: Valentino Negro MD;  Location: [facility];  Service: Endoscopy;  Laterality: N/A;    CYSTOSCOPY      CYSTOSCOPY W/ URETERAL STENT PLACEMENT  2016    CYSTOSCOPY W/ URETERAL STENT REMOVAL  2016    ESOPHAGEAL MANOMETRY N/A 6/3/2019    Procedure: MANOMETRY, ESOPHAGUS;  Surgeon: Bhupinder Schmitz MD;  Location: [address]);  Service: Endoscopy;  Laterality: N/A;    ESOPHAGOGASTRODUODENOSCOPY N/A 4/30/2019    Procedure: ESOPHAGOGASTRODUODENOSCOPY (EGD);  Surgeon: Altagracia Bose MD;  Location: [facility];  Service: Endoscopy;  Laterality: N/A;    ESOPHAGOGASTRODUODENOSCOPY N/A 8/2/2021    Procedure: EGD (ESOPHAGOGASTRODUODENOSCOPY);  Surgeon: Altagracia Bose MD;  Location: [facility];  Service: Endoscopy;  Laterality: N/A;    EXTRACORPOREAL SHOCK WAVE  LITHOTRIPSY Left 3/7/2024    Procedure: LITHOTRIPSY, ESWL;  Surgeon: Roverto Harley MD;  Location: [facility];  Service: Urology;  Laterality: Left;    FOOT HARDWARE REMOVAL Right 2020    Procedure: REMOVAL, HARDWARE, FOOT;  Surgeon: Adrianna Tillman DPM;  Location: [facility];  Service: Podiatry;  Laterality: Right;    HARVESTING OF BONE GRAFT Right 2019    Procedure: SURGICAL PROCUREMENT, BONE GRAFT;  Surgeon: Adrianna Tillman DPM;  Location: [facility];  Service: Podiatry;  Laterality: Right;    HYSTERECTOMY  3/16/15    [facility]/BSO for Endometriosis, AUB, fibroids, cyst    LITHOTRIPSY      MANDIBLE SURGERY      severe overbite correction    PELVIC LAPAROSCOPY      Dx scope, chromopertubation-Endometriosis    TONSILLECTOMY, ADENOIDECTOMY       Family History   Problem Relation Age of Onset    Cancer Mother     Breast cancer Neg Hx     Ovarian cancer Neg Hx     Kidney disease Neg Hx      Social History     Tobacco Use    Smoking status: Former     Current packs/day: 0.00     Types: Cigarettes     Start date: 1989     Quit date: 2000     Years since quittin.1    Smokeless tobacco: Never    Tobacco comments:     pt stated she smoked one cig/day   Substance Use Topics    Alcohol use: Never    Drug use: Never     Review of Systems   Constitutional:  Negative for activity change, appetite change, chills and fever.   HENT:  Negative for congestion, ear discharge, rhinorrhea, sinus pressure, sinus pain, sore throat and trouble swallowing.    Eyes:  Negative for photophobia, pain, discharge, redness, itching and visual disturbance.   Respiratory:  Negative for cough, chest tightness, shortness of breath and wheezing.    Cardiovascular:  Negative for chest pain, palpitations and leg swelling.   Gastrointestinal:  Positive for abdominal pain. Negative for abdominal distention, constipation, diarrhea, nausea and vomiting.   Genitourinary:  Negative for dysuria, flank pain, frequency and hematuria.    Musculoskeletal:  Negative for back pain, gait problem, neck pain and neck stiffness.   Skin:  Negative for rash and wound.   Neurological:  Negative for dizziness, tremors, seizures, syncope, speech difficulty, weakness, light-headedness, numbness and headaches.   Psychiatric/Behavioral:  Negative for behavioral problems, confusion, hallucinations and sleep disturbance. The patient is not nervous/anxious.    All other systems reviewed and are negative.      Physical Exam     Initial Vitals [03/02/24 1413]   BP Pulse Resp Temp SpO2   (!) 175/121 71 20 98 °F (36.7 °C) 98 %      MAP       --         Physical Exam    Nursing note and vitals reviewed.  Constitutional: Vital signs are normal. She appears well-developed and well-nourished. She is active. No distress.   HENT:   Head: Normocephalic.   Nose: Nose normal.   Mouth/Throat: Oropharynx is clear and moist and mucous membranes are normal.   Eyes: Conjunctivae, EOM and lids are normal.   Neck: Neck supple.   Normal range of motion.  Cardiovascular:  Normal rate, regular rhythm, S1 normal, S2 normal and normal heart sounds.           Pulmonary/Chest: Breath sounds normal. No respiratory distress. She has no wheezes. She has no rhonchi. She has no rales.   Abdominal: Abdomen is soft. Bowel sounds are normal. She exhibits no distension. There is abdominal tenderness.     There is guarding. There is no rebound.   Musculoskeletal:      Right upper arm: Normal.      Left upper arm: Normal.      Cervical back: Normal range of motion and neck supple.      Right lower leg: Normal.      Left lower leg: Normal.     Neurological: She is alert and oriented to person, place, and time. She has normal strength. GCS score is 15. GCS eye subscore is 4. GCS verbal subscore is 5. GCS motor subscore is 6.   Skin: Skin is warm. Capillary refill takes less than 2 seconds.   Psychiatric: She has a normal mood and affect. Her speech is normal and behavior is normal. Thought content  normal. Cognition and memory are normal.         ED Course   Procedures  Labs Reviewed   URINALYSIS, REFLEX TO URINE CULTURE - Abnormal; Notable for the following components:       Result Value    Appearance, UA Hazy (*)     Specific Gravity, UA >=1.030 (*)     Protein, UA 1+ (*)     Ketones, UA Trace (*)     Bilirubin (UA) 1+ (*)     Occult Blood UA 3+ (*)     All other components within normal limits    Narrative:     Preferred Collection Type->Urine, Clean Catch  Specimen Source->Urine   CBC W/ AUTO DIFFERENTIAL - Abnormal; Notable for the following components:    WBC 14.28 (*)     RDW 14.6 (*)     Gran # (ANC) 9.7 (*)     Immature Grans (Abs) 0.05 (*)     All other components within normal limits   COMPREHENSIVE METABOLIC PANEL - Abnormal; Notable for the following components:    CO2 22 (*)     Glucose 144 (*)     BUN 18 (*)     Total Protein 8.6 (*)     All other components within normal limits   URINALYSIS MICROSCOPIC - Abnormal; Notable for the following components:    RBC, UA >100 (*)     All other components within normal limits    Narrative:     Preferred Collection Type->Urine, Clean Catch  Specimen Source->Urine          Imaging Results              X-Ray Abdomen AP 1 View (KUB) (Final result)  Result time 03/02/24 16:44:30      Final result by ARMAND Vazquez Sr., MD (03/02/24 16:44:30)                   Impression:      Left nephrolithiasis      Electronically signed by: Jose Elias Vazquez MD  Date:    03/02/2024  Time:    16:44               Narrative:    EXAMINATION:  XR ABDOMEN AP 1 VIEW    CLINICAL HISTORY:  Left lower quadrant pain    COMPARISON:  04/20/2022    FINDINGS:  The bowel gas pattern is normal in appearance.  There is a 7 mm calcification projected over the inferior pole of the left kidney.  There is no pneumoperitoneum. The bony structures appear intact.                                       Medications   sodium chloride 0.9% bolus 1,000 mL 1,000 mL (0 mLs Intravenous Stopped 3/2/24  1558)   ketorolac injection 15 mg (15 mg Intravenous Given 3/2/24 1507)   ondansetron injection 4 mg (4 mg Intravenous Given 3/2/24 1500)   morphine injection 4 mg (4 mg Intravenous Given 3/2/24 1505)   hydrALAZINE injection 10 mg (10 mg Intravenous Given 3/2/24 1542)   sodium chloride 0.9% bolus 1,000 mL 1,000 mL (0 mLs Intravenous Stopped 3/2/24 1844)   hydrALAZINE injection 10 mg (10 mg Intravenous Given 3/2/24 1728)   ketorolac injection 15 mg (15 mg Intravenous Given 3/2/24 1724)   oxyCODONE-acetaminophen 5-325 mg per tablet 1 tablet (1 tablet Oral Given 3/2/24 1723)     Medical Decision Making  48-year-old female presents with left lower quadrant pain and difficulty in urination.  History of kidney stones and hematuria been evaluated by Urology.  Moderate nausea.  No vomiting.  No fever, chills.  No back pain or flank pain.  Patient claims her symptoms are similar to kidney stone and not like she had diverticulitis.  Differential diagnosis include and not limited to- UTI, cystitis, pyelonephritis, kidney stone, diverticulitis,    Labs, IV fluids, Toradol, Zofran, morphine have been ordered.  Xray with kidney stone.  Will consider is CT if pain is not controlled.  On revaluation Pt symptoms have improved.  Pain Controlled.  Advised to continue to monitor her blood pressure and control pain with p.o. medication.  Follow-up ED immediately with any worsening symptoms.    Amount and/or Complexity of Data Reviewed  Labs: ordered. Decision-making details documented in ED Course.     Details: WBC 15115 slightly elevated.  Urine RBC more than 100, electrolytes within normal range.  Radiology: ordered and independent interpretation performed. Decision-making details documented in ED Course.     Details: Calcification in left kidney area.    Risk  Prescription drug management.                                      Clinical Impression:  Final diagnoses:  [R10.32] Left lower quadrant abdominal pain  [N20.0] Kidney stone  (Primary)  [I10] Primary hypertension  [R31.9] Hematuria, unspecified type          ED Disposition Condition    Discharge Stable          ED Prescriptions       Medication Sig Dispense Start Date End Date Auth. Provider    oxyCODONE-acetaminophen (PERCOCET) 5-325 mg per tablet Take 1 tablet by mouth every 6 (six) hours as needed for Pain. 12 tablet 3/2/2024 -- Bonifacio Alfredo MD    ondansetron (ZOFRAN-ODT) 4 MG TbDL () Take 1 tablet (4 mg total) by mouth every 8 (eight) hours as needed (nausea). 15 tablet 3/2/2024 3/4/2024 Bonifacio Alfredo MD    ketorolac (TORADOL) 10 mg tablet () Take 1 tablet (10 mg total) by mouth every 6 (six) hours. for 5 days 20 tablet 3/2/2024 3/7/2024 Bonifacio Alfredo MD          Follow-up Information       Follow up With Specialties Details Why Contact Info    Raj Dubon NP Urology Call in 2 days  [id number] [facility]  [address]  [facility] [address] [phone]      [facility] - Emergency Dept Emergency Medicine  If symptoms worsen [address] [facility] [address] [phone]             Bonifacio Alfredo MD  24 [phone]

## 2024-03-02 NOTE — ED NOTES
Pt reports nausea is improved.      MD notified of BP.  Patient reports had not taken her HTN medication yet today.

## 2024-03-03 ENCOUNTER — ON-DEMAND VIRTUAL (OUTPATIENT)
Dept: URGENT CARE | Facility: CLINIC | Age: 49
End: 2024-03-03
Payer: MEDICAID

## 2024-03-03 ENCOUNTER — NURSE TRIAGE (OUTPATIENT)
Dept: ADMINISTRATIVE | Facility: CLINIC | Age: 49
End: 2024-03-03
Payer: MEDICAID

## 2024-03-03 NOTE — TELEPHONE ENCOUNTER
"Inna reports diagnosed with kidney stones and UTI 1 week ago. Pt states "have had kidney stones before and this feels different as if stone is stuck."  Left flank pain not improved  with prescribed meds Percocet, Toradol, Flomax & Zofran. Advised pt per triage protocol to go to nearest ED now for physician eval. V/u.     Reason for Disposition   [1] SEVERE pain (e.g., excruciating, scale 8-10) AND [2] not improved after pain medicine    Additional Information   Negative: Shock suspected (e.g., cold/pale/clammy skin, too weak to stand, low BP, rapid pulse)   Negative: Sounds like a life-threatening emergency to the triager   Negative: [1] Unable to urinate (or only a few drops) > 4 hours AND [2] bladder feels very full (e.g., palpable bladder or strong urge to urinate)    Protocols used: Kidney Stone Follow-up Call-[facility]    "

## 2024-03-04 ENCOUNTER — TELEPHONE (OUTPATIENT)
Dept: INFUSION THERAPY | Facility: HOSPITAL | Age: 49
End: 2024-03-04
Payer: MEDICAID

## 2024-03-04 DIAGNOSIS — N23 RENAL COLIC ON LEFT SIDE: ICD-10-CM

## 2024-03-04 DIAGNOSIS — N20.0 RENAL CALCULUS, LEFT: Primary | ICD-10-CM

## 2024-03-04 RX ORDER — CIPROFLOXACIN 2 MG/ML
400 INJECTION, SOLUTION INTRAVENOUS
Status: CANCELLED | OUTPATIENT
Start: 2024-03-04

## 2024-03-04 RX ORDER — SODIUM CHLORIDE 9 MG/ML
INJECTION, SOLUTION INTRAVENOUS CONTINUOUS
Status: CANCELLED | OUTPATIENT
Start: 2024-03-04

## 2024-03-04 NOTE — TELEPHONE ENCOUNTER
Confirmed rescheduled infusion appt due to kidney stones. Unable to drive today. New appt 3/28 at 1130a

## 2024-03-04 NOTE — TELEPHONE ENCOUNTER
Spoke to patient and she agreed to 3/7 procedure date, she stated she is not taking Xarelto anymore. Her last dose was last Monday.  Notified her that surgery team will call her on 3/6 for arrival time.

## 2024-03-07 PROBLEM — N23 RENAL COLIC ON LEFT SIDE: Status: ACTIVE | Noted: 2024-03-07

## 2024-03-07 PROBLEM — N20.0 RENAL CALCULUS, LEFT: Status: ACTIVE | Noted: 2024-03-07

## 2024-03-08 ENCOUNTER — TELEPHONE (OUTPATIENT)
Dept: UROLOGY | Facility: CLINIC | Age: 49
End: 2024-03-08
Payer: MEDICAID

## 2024-03-08 DIAGNOSIS — N20.0 RENAL CALCULUS, LEFT: Primary | ICD-10-CM

## 2024-04-02 ENCOUNTER — HOSPITAL ENCOUNTER (OUTPATIENT)
Dept: RADIOLOGY | Facility: HOSPITAL | Age: 49
Discharge: HOME | End: 2024-04-02
Attending: UROLOGY
Payer: MEDICAID

## 2024-04-02 DIAGNOSIS — N20.0 RENAL CALCULUS, LEFT: ICD-10-CM

## 2024-04-02 PROCEDURE — 74018 RADEX ABDOMEN 1 VIEW: CPT | Mod: TC,FY,PN

## 2024-04-02 PROCEDURE — 74018 RADEX ABDOMEN 1 VIEW: CPT | Mod: 26,,, | Performed by: RADIOLOGY

## 2024-04-03 ENCOUNTER — PATIENT MESSAGE (OUTPATIENT)
Dept: UROLOGY | Facility: CLINIC | Age: 49
End: 2024-04-03
Payer: MEDICAID

## 2024-04-05 ENCOUNTER — OFFICE VISIT (OUTPATIENT)
Dept: UROLOGY | Facility: CLINIC | Age: 49
End: 2024-04-05
Payer: MEDICAID

## 2024-04-05 VITALS
BODY MASS INDEX: 47.5 KG/M2 | DIASTOLIC BLOOD PRESSURE: 91 MMHG | HEART RATE: 56 BPM | SYSTOLIC BLOOD PRESSURE: 137 MMHG | HEIGHT: 63 IN | WEIGHT: 268.06 LBS

## 2024-04-05 DIAGNOSIS — N20.0 RENAL CALCULUS, LEFT: ICD-10-CM

## 2024-04-05 DIAGNOSIS — Z98.890 POST-OPERATIVE STATE: Primary | ICD-10-CM

## 2024-04-05 DIAGNOSIS — N23 RENAL COLIC ON LEFT SIDE: ICD-10-CM

## 2024-04-05 DIAGNOSIS — N20.0 RENAL CALCULUS, RIGHT: ICD-10-CM

## 2024-04-05 PROCEDURE — 1160F RVW MEDS BY RX/DR IN RCRD: CPT | Mod: CPTII,,, | Performed by: NURSE PRACTITIONER

## 2024-04-05 PROCEDURE — 99024 POSTOP FOLLOW-UP VISIT: CPT | Mod: ,,, | Performed by: NURSE PRACTITIONER

## 2024-04-05 PROCEDURE — 3080F DIAST BP >= 90 MM HG: CPT | Mod: CPTII,,, | Performed by: NURSE PRACTITIONER

## 2024-04-05 PROCEDURE — 3075F SYST BP GE 130 - 139MM HG: CPT | Mod: CPTII,,, | Performed by: NURSE PRACTITIONER

## 2024-04-05 PROCEDURE — 99999 PR PBB SHADOW E&M-EST. PATIENT-LVL IV: CPT | Mod: PBBFAC,,, | Performed by: NURSE PRACTITIONER

## 2024-04-05 PROCEDURE — 99214 OFFICE O/P EST MOD 30 MIN: CPT | Mod: PBBFAC,PN | Performed by: NURSE PRACTITIONER

## 2024-04-05 PROCEDURE — 1159F MED LIST DOCD IN RCRD: CPT | Mod: CPTII,,, | Performed by: NURSE PRACTITIONER

## 2024-04-05 PROCEDURE — 82365 CALCULUS SPECTROSCOPY: CPT | Performed by: NURSE PRACTITIONER

## 2024-04-05 NOTE — PATIENT INSTRUCTIONS
Urinary stone analysis today  Follow-up in 6 months with U/S for evaluation of right renal calculi.

## 2024-04-05 NOTE — PROGRESS NOTES
Subjective:       Patient ID: Inna Gallardo is a 48 y.o. female.    Chief Complaint: Post-op Evaluation    Patient is here today for her post-op evaluation. She is S/P left renal ESWL by Dr. Harley on 3/7/2024. Post-op KUB performed on 4/2/2024. It was normal without any residual calcifications noted. However, previous CT did show 3 small stones in right kidney (lithotripsy was not performed on this kidney). It appears stones may not be radio dense. Results discussed with patient. Patient reports passing stone particles a week after sx and brought it to clinic today for analysis.      Follow-up  Chronicity: S/P left renal ESWL. Episode onset: 3/7/2024. The problem has been rapidly improving. Pertinent negatives include no abdominal pain, anorexia, chills, fatigue, fever, nausea, swollen glands, urinary symptoms, vomiting or weakness. Nothing aggravates the symptoms. Treatments tried: left renal ESWL. The treatment provided significant relief.     Review of Systems   Constitutional:  Negative for chills, fatigue and fever.   Gastrointestinal: Negative.  Negative for abdominal pain, anorexia, nausea and vomiting.   Genitourinary:  Negative for decreased urine volume, difficulty urinating, dysuria, flank pain, frequency, hematuria, nocturia and urgency.   Neurological:  Negative for weakness.   Psychiatric/Behavioral: Negative.           Objective:      Physical Exam  Vitals and nursing note reviewed.   Constitutional:       General: She is not in acute distress.     Appearance: She is well-developed. She is morbidly obese. She is not ill-appearing.   HENT:      Head: Normocephalic and atraumatic.   Eyes:      Pupils: Pupils are equal, round, and reactive to light.   Cardiovascular:      Rate and Rhythm: Normal rate.   Pulmonary:      Effort: Pulmonary effort is normal. No respiratory distress.   Abdominal:      Palpations: Abdomen is soft.      Tenderness: There is no abdominal tenderness.   Musculoskeletal:          General: Normal range of motion.      Cervical back: Normal range of motion.   Skin:     General: Skin is warm and dry.   Neurological:      Mental Status: She is alert and oriented to person, place, and time.      Coordination: Coordination normal.   Psychiatric:         Mood and Affect: Mood normal.         Behavior: Behavior normal.         Thought Content: Thought content normal.         Judgment: Judgment normal.         Assessment:       Problem List Items Addressed This Visit          Renal/    Renal colic on left side    Renal calculus, left    Relevant Orders    Urinary Stone Analysis     Other Visit Diagnoses       Post-operative state    -  Primary    Renal calculus, right        Relevant Orders    US Retroperitoneal Complete            Plan:           Inna was seen today for post-op evaluation.    Diagnoses and all orders for this visit:    Post-operative state    Renal calculus, left  -     Urinary Stone Analysis    Renal colic on left side    Renal calculus, right  -     US Retroperitoneal Complete; Future    Follow-up in 6 months with U/S for evaluation of right renal calculi.    Raj Dubon, DNP

## 2024-04-13 LAB
COMPN STONE: NORMAL
LABORATORY COMMENT REPORT: NORMAL
SPECIMEN SOURCE: NORMAL
STONE ANALYSIS IR-IMP: NORMAL

## 2024-04-16 ENCOUNTER — HOSPITAL ENCOUNTER (EMERGENCY)
Facility: HOSPITAL | Age: 49
Discharge: HOME | End: 2024-04-16
Attending: EMERGENCY MEDICINE
Payer: MEDICAID

## 2024-04-16 VITALS
OXYGEN SATURATION: 96 % | HEART RATE: 65 BPM | DIASTOLIC BLOOD PRESSURE: 77 MMHG | HEIGHT: 63 IN | TEMPERATURE: 98 F | BODY MASS INDEX: 46.95 KG/M2 | RESPIRATION RATE: 14 BRPM | SYSTOLIC BLOOD PRESSURE: 134 MMHG | WEIGHT: 265 LBS

## 2024-04-16 DIAGNOSIS — R19.7 NAUSEA VOMITING AND DIARRHEA: Primary | ICD-10-CM

## 2024-04-16 DIAGNOSIS — R11.2 NAUSEA VOMITING AND DIARRHEA: Primary | ICD-10-CM

## 2024-04-16 DIAGNOSIS — R10.9 ABDOMINAL PAIN, UNSPECIFIED ABDOMINAL LOCATION: ICD-10-CM

## 2024-04-16 LAB
ALBUMIN SERPL BCP-MCNC: 4.6 G/DL (ref 3.5–5.2)
ALP SERPL-CCNC: 74 U/L (ref 38–126)
ALT SERPL W/O P-5'-P-CCNC: 22 U/L (ref 10–44)
ANION GAP SERPL CALC-SCNC: 11 MMOL/L (ref 8–16)
APTT PPP: 27.1 SEC (ref 21–32)
AST SERPL-CCNC: 25 U/L (ref 15–46)
BASOPHILS # BLD AUTO: 0.03 K/UL (ref 0–0.2)
BASOPHILS NFR BLD: 0.2 % (ref 0–1.9)
BILIRUB SERPL-MCNC: 0.8 MG/DL (ref 0.1–1)
BILIRUB UR QL STRIP: NEGATIVE
CALCIUM SERPL-MCNC: 9.3 MG/DL (ref 8.7–10.5)
CHLORIDE SERPL-SCNC: 106 MMOL/L (ref 95–110)
CLARITY UR REFRACT.AUTO: CLEAR
CO2 SERPL-SCNC: 25 MMOL/L (ref 23–29)
COLOR UR AUTO: YELLOW
CREAT SERPL-MCNC: 0.45 MG/DL (ref 0.5–1.4)
DIFFERENTIAL METHOD BLD: ABNORMAL
EOSINOPHIL # BLD AUTO: 0.1 K/UL (ref 0–0.5)
EOSINOPHIL NFR BLD: 1 % (ref 0–8)
ERYTHROCYTE [DISTWIDTH] IN BLOOD BY AUTOMATED COUNT: 13.9 % (ref 11.5–14.5)
EST. GFR  (NO RACE VARIABLE): >60 ML/MIN/1.73 M^2
GLUCOSE SERPL-MCNC: 106 MG/DL (ref 70–110)
GLUCOSE UR QL STRIP: NEGATIVE
HCT VFR BLD AUTO: 44.2 % (ref 37–48.5)
HGB BLD-MCNC: 14.6 G/DL (ref 12–16)
HGB UR QL STRIP: NEGATIVE
IMM GRANULOCYTES # BLD AUTO: 0.04 K/UL (ref 0–0.04)
IMM GRANULOCYTES NFR BLD AUTO: 0.3 % (ref 0–0.5)
INR PPP: 1 (ref 0.8–1.2)
KETONES UR QL STRIP: NEGATIVE
LEUKOCYTE ESTERASE UR QL STRIP: NEGATIVE
LIPASE SERPL-CCNC: 113 U/L (ref 23–300)
LYMPHOCYTES # BLD AUTO: 1.1 K/UL (ref 1–4.8)
LYMPHOCYTES NFR BLD: 8.5 % (ref 18–48)
MCH RBC QN AUTO: 29.3 PG (ref 27–31)
MCHC RBC AUTO-ENTMCNC: 33 G/DL (ref 32–36)
MCV RBC AUTO: 89 FL (ref 82–98)
MONOCYTES # BLD AUTO: 0.5 K/UL (ref 0.3–1)
MONOCYTES NFR BLD: 4.2 % (ref 4–15)
NEUTROPHILS # BLD AUTO: 10.6 K/UL (ref 1.8–7.7)
NEUTROPHILS NFR BLD: 85.8 % (ref 38–73)
NITRITE UR QL STRIP: NEGATIVE
NRBC BLD-RTO: 0 /100 WBC
OB PNL STL: NEGATIVE
PH UR STRIP: 7 [PH] (ref 5–8)
PLATELET # BLD AUTO: 326 K/UL (ref 150–450)
PMV BLD AUTO: 10.3 FL (ref 9.2–12.9)
POTASSIUM SERPL-SCNC: 3.8 MMOL/L (ref 3.5–5.1)
PROT SERPL-MCNC: 8.1 G/DL (ref 6–8.4)
PROT UR QL STRIP: NEGATIVE
PROTHROMBIN TIME: 10.8 SEC (ref 9–12.5)
RBC # BLD AUTO: 4.99 M/UL (ref 4–5.4)
SODIUM SERPL-SCNC: 142 MMOL/L (ref 136–145)
SP GR UR STRIP: 1.02 (ref 1–1.03)
URN SPEC COLLECT METH UR: NORMAL
UROBILINOGEN UR STRIP-ACNC: NEGATIVE EU/DL
UUN UR-MCNC: 20 MG/DL (ref 7–17)
WBC # BLD AUTO: 12.34 K/UL (ref 3.9–12.7)

## 2024-04-16 PROCEDURE — 25000003 PHARM REV CODE 250: Mod: ER | Performed by: PHYSICIAN ASSISTANT

## 2024-04-16 PROCEDURE — 63600175 PHARM REV CODE 636 W HCPCS: Mod: ER | Performed by: PHYSICIAN ASSISTANT

## 2024-04-16 PROCEDURE — 85610 PROTHROMBIN TIME: CPT | Mod: ER | Performed by: PHYSICIAN ASSISTANT

## 2024-04-16 PROCEDURE — 85730 THROMBOPLASTIN TIME PARTIAL: CPT | Mod: ER | Performed by: PHYSICIAN ASSISTANT

## 2024-04-16 PROCEDURE — 96361 HYDRATE IV INFUSION ADD-ON: CPT | Mod: ER

## 2024-04-16 PROCEDURE — 85025 COMPLETE CBC W/AUTO DIFF WBC: CPT | Mod: ER | Performed by: EMERGENCY MEDICINE

## 2024-04-16 PROCEDURE — 99285 EMERGENCY DEPT VISIT HI MDM: CPT | Mod: 25,ER

## 2024-04-16 PROCEDURE — 83690 ASSAY OF LIPASE: CPT | Mod: ER | Performed by: EMERGENCY MEDICINE

## 2024-04-16 PROCEDURE — 82272 OCCULT BLD FECES 1-3 TESTS: CPT | Mod: ER | Performed by: PHYSICIAN ASSISTANT

## 2024-04-16 PROCEDURE — 81003 URINALYSIS AUTO W/O SCOPE: CPT | Mod: ER | Performed by: PHYSICIAN ASSISTANT

## 2024-04-16 PROCEDURE — 80053 COMPREHEN METABOLIC PANEL: CPT | Mod: ER | Performed by: EMERGENCY MEDICINE

## 2024-04-16 PROCEDURE — 96374 THER/PROPH/DIAG INJ IV PUSH: CPT | Mod: 59,ER

## 2024-04-16 PROCEDURE — 25500020 PHARM REV CODE 255: Mod: ER | Performed by: EMERGENCY MEDICINE

## 2024-04-16 RX ORDER — BACLOFEN 10 MG/1
10 TABLET ORAL 3 TIMES DAILY PRN
COMMUNITY
Start: 2024-04-11

## 2024-04-16 RX ORDER — HYDROXYZINE HYDROCHLORIDE 25 MG/1
25 TABLET, FILM COATED ORAL 3 TIMES DAILY
COMMUNITY
Start: 2024-04-11

## 2024-04-16 RX ORDER — CETIRIZINE HYDROCHLORIDE 10 MG/1
10 TABLET ORAL DAILY
COMMUNITY
Start: 2024-04-11

## 2024-04-16 RX ORDER — ONDANSETRON HYDROCHLORIDE 2 MG/ML
4 INJECTION, SOLUTION INTRAVENOUS
Status: DISCONTINUED | OUTPATIENT
Start: 2024-04-16 | End: 2024-04-16

## 2024-04-16 RX ORDER — ONDANSETRON HYDROCHLORIDE 2 MG/ML
4 INJECTION, SOLUTION INTRAVENOUS
Status: COMPLETED | OUTPATIENT
Start: 2024-04-16 | End: 2024-04-16

## 2024-04-16 RX ORDER — DICYCLOMINE HYDROCHLORIDE 20 MG/1
20 TABLET ORAL 2 TIMES DAILY
Qty: 20 TABLET | Refills: 0 | Status: SHIPPED | OUTPATIENT
Start: 2024-04-16 | End: 2024-05-16

## 2024-04-16 RX ORDER — ONDANSETRON 4 MG/1
4 TABLET, ORALLY DISINTEGRATING ORAL EVERY 6 HOURS PRN
Qty: 20 TABLET | Refills: 0 | Status: SHIPPED | OUTPATIENT
Start: 2024-04-16

## 2024-04-16 RX ORDER — SERTRALINE HYDROCHLORIDE 100 MG/1
100 TABLET, FILM COATED ORAL NIGHTLY
COMMUNITY

## 2024-04-16 RX ADMIN — ONDANSETRON 4 MG: 2 INJECTION INTRAMUSCULAR; INTRAVENOUS at 09:04

## 2024-04-16 RX ADMIN — IOHEXOL 100 ML: 350 INJECTION, SOLUTION INTRAVENOUS at 11:04

## 2024-04-16 RX ADMIN — SODIUM CHLORIDE 1000 ML: 0.9 INJECTION, SOLUTION INTRAVENOUS at 09:04

## 2024-04-16 NOTE — PHARMACY MED REC
"[facility]           Pharmacy  Admission Medication History     The home medication history was taken by Kristen Kraft.      Medication history obtained from Medications listed below were obtained from: Patient/family    Based on information gathered for medication list, you may go to "Admission" then "Reconcile Home Medications" tabs to review and/or act upon those items.     The home medication list has been updated by the Pharmacy department.   Please read ALL comments highlighted in yellow.   Please address this information as you see fit.    Feel free to contact us if you have any questions or require assistance.    The medications listed below were removed from the home medication list.  Please reorder if appropriate:    Patient reports NOT TAKING the following medication(s):  Norco 5-325mg  Claritin 10mg  Naprosyn 500mg  Percocet 5-325mg      No current facility-administered medications on file prior to encounter.     Current Outpatient Medications on File Prior to Encounter   Medication Sig Dispense Refill    baclofen (LIORESAL) 10 MG tablet Take 10 mg by mouth 3 (three) times daily as needed.      losartan-hydrochlorothiazide 100-25 mg (HYZAAR) 100-25 mg per tablet Take 1 tablet by mouth once daily.      omeprazole (PRILOSEC) 40 MG capsule Take 1 capsule (40 mg total) by mouth every morning. 30 capsule 11    sertraline (ZOLOFT) 100 MG tablet Take 100 mg by mouth every evening.      sucralfate (CARAFATE) 1 gram tablet Take 1 tablet (1 g total) by mouth before meals and at bedtime as needed (abdominal  pain). 90 tablet 1    EPINEPHrine (EPIPEN) 0.3 mg/0.3 mL AtIn Inject 0.3 mLs into the muscle as needed.  0       Please address this information as you see fit.  Feel free to contact us if you have any questions or require assistance.    Kristen Kraft  [phone]                  .          "

## 2024-04-16 NOTE — ED PROVIDER NOTES
Encounter Date: 4/16/2024       History     Chief Complaint   Patient presents with    Rectal Bleeding     PT states I have been having stomach issues for the past few days. Pt states she has mid abdominal pain and pt states she has been having bloody stool. Pt states she has been vomiting since this morning.        49-year-old female, PMH diverticulitis, hiatal hernia, HTN, presents to ED with concern of  abdominal pain and bloody stool.  Patient reports over past week she has worsening reflux and belching with mild epigastric pain.  She relates her symptoms to her hiatal hernia.  She has taken home Pepto-Bismol with minimal relief.  She reports overnight her pain symptoms did worsened and she noticed her loose stool appearing black along with maroon tinged blood while wiping. No straining with bowel movement and denies hemorrhoid.  She is having associated nausea along with few episodes of NBNB vomiting.  No chest pain, cough, shortness of breath, URI like symptoms, fevers or chills, dysuria, changes in urine color or frequency.  Previous abdominal surgery includes total hysterectomy.   She has not take blood thinner but does admit to history of easily bruising.  No other acute complaints at this time    The history is provided by the patient.     Review of patient's allergies indicates:   Allergen Reactions    Ace inhibitors Other (See Comments)     cough     Past Medical History:   Diagnosis Date    Anxiety     Diverticulitis     Endometriosis     General anesthetics causing adverse effect in therapeutic use     Hypertension     Kidney stone     Prolactinoma 2011    Sliding hiatal hernia     Urinary tract infection     Vaginal infection      Past Surgical History:   Procedure Laterality Date    ARTHROSCOPY OF KNEE Left     BRAIN TUMOR EXCISION  9/2011    removal of prolactinoma    COLONOSCOPY N/A 8/27/2020    Procedure: COLONOSCOPY;  Surgeon: Valentino Negro MD;  Location: [facility];  Service: Endoscopy;   Laterality: N/A;    CYSTOSCOPY      CYSTOSCOPY W/ URETERAL STENT PLACEMENT      CYSTOSCOPY W/ URETERAL STENT REMOVAL      ESOPHAGEAL MANOMETRY N/A 6/3/2019    Procedure: MANOMETRY, ESOPHAGUS;  Surgeon: Bhupinder Schmitz MD;  Location: [facility] ENDO (4TH FLR);  Service: Endoscopy;  Laterality: N/A;    ESOPHAGOGASTRODUODENOSCOPY N/A 2019    Procedure: ESOPHAGOGASTRODUODENOSCOPY (EGD);  Surgeon: Altagracia Bose MD;  Location: [facility] ENDO;  Service: Endoscopy;  Laterality: N/A;    ESOPHAGOGASTRODUODENOSCOPY N/A 2021    Procedure: EGD (ESOPHAGOGASTRODUODENOSCOPY);  Surgeon: Altagracia Bose MD;  Location: [facility];  Service: Endoscopy;  Laterality: N/A;    EXTRACORPOREAL SHOCK WAVE LITHOTRIPSY Left 3/7/2024    Procedure: LITHOTRIPSY, ESWL;  Surgeon: Roverto Harley MD;  Location: [facility] OR;  Service: Urology;  Laterality: Left;    FOOT HARDWARE REMOVAL Right 2020    Procedure: REMOVAL, HARDWARE, FOOT;  Surgeon: Adrianna Tillman DPM;  Location: [facility] OR;  Service: Podiatry;  Laterality: Right;    HARVESTING OF BONE GRAFT Right 2019    Procedure: SURGICAL PROCUREMENT, BONE GRAFT;  Surgeon: Adrianna Tillman DPM;  Location: [facility] OR;  Service: Podiatry;  Laterality: Right;    HYSTERECTOMY  3/16/15    TLH/BSO for Endometriosis, AUB, fibroids, cyst    LITHOTRIPSY      MANDIBLE SURGERY      severe overbite correction    PELVIC LAPAROSCOPY      Dx scope, chromopertubation-Endometriosis    TONSILLECTOMY, ADENOIDECTOMY       Family History   Problem Relation Name Age of Onset    Cancer Mother      Breast cancer Neg Hx      Ovarian cancer Neg Hx      Kidney disease Neg Hx       Social History     Tobacco Use    Smoking status: Former     Current packs/day: 0.00     Types: Cigarettes     Start date: 1989     Quit date: 2000     Years since quittin.2    Smokeless tobacco: Never    Tobacco comments:     pt stated she smoked one cig/day   Substance Use Topics    Alcohol use: Never    Drug use:  Never     Review of Systems   Constitutional:  Negative for chills, fatigue and fever.   HENT:  Negative for congestion and sore throat.    Respiratory:  Negative for cough and shortness of breath.    Cardiovascular:  Negative for chest pain.   Gastrointestinal:  Positive for abdominal pain, blood in stool, diarrhea, nausea and vomiting. Negative for rectal pain.   Genitourinary:  Negative for dysuria, flank pain, hematuria and vaginal bleeding.   Musculoskeletal:  Negative for back pain.   Hematological:  Bruises/bleeds easily.       Physical Exam     Initial Vitals [04/16/24 0802]   BP Pulse Resp Temp SpO2   (!) 144/90 69 18 98 °F (36.7 °C) 95 %      MAP       --         Physical Exam    Vitals reviewed.  Constitutional: She appears well-developed and well-nourished. She is cooperative. She does not have a sickly appearance. She does not appear ill. No distress.   HENT:   Head: Normocephalic and atraumatic.   Eyes: EOM are normal.   Neck:   Normal range of motion.  Cardiovascular:  Normal rate.           Pulmonary/Chest: Effort normal.   Abdominal:     Generalized left-sided abdominal tenderness reported with mild tenderness extending to epigastric region.  Denying significant right-sided tenderness.  No guarding.  No obvious distention.   Rectal exam performed with nursegenoveva, at bedside.  No significant external abnormalities,  large hemorrhoids, visible fissure or fistula.  Occult stool was obtained with brown appearing stool.  No bright red blood or melena   Musculoskeletal:      Cervical back: Normal range of motion.     Neurological: She is alert and oriented to person, place, and time. GCS eye subscore is 4. GCS verbal subscore is 5. GCS motor subscore is 6.   Psychiatric: She has a normal mood and affect. Her speech is normal and behavior is normal.         ED Course   Procedures  Labs Reviewed   CBC W/ AUTO DIFFERENTIAL - Abnormal; Notable for the following components:       Result Value    Gran #  (ANC) 10.6 (*)     Gran % 85.8 (*)     Lymph % 8.5 (*)     All other components within normal limits   COMPREHENSIVE METABOLIC PANEL - Abnormal; Notable for the following components:    BUN 20 (*)     Creatinine 0.45 (*)     All other components within normal limits   LIPASE   APTT   PROTIME-INR   OCCULT BLOOD X 1, STOOL   URINALYSIS, REFLEX TO URINE CULTURE    Narrative:     Preferred Collection Type->Urine, Clean Catch  Specimen Source->Urine          Imaging Results              CT Abdomen Pelvis With IV Contrast NO Oral Contrast (Final result)  Result time 04/16/24 11:55:21      Final result by Wilfrido Wheat MD (04/16/24 11:55:21)                   Impression:      No evidence of GI bleed however evaluation limited due to phase of contrast.    Scattered diverticulosis.    No acute abnormalities.    All CT scans at this facility use dose modulation, iterative reconstruction, and/or weight based dosing when appropriate to reduce radiation dose to as low as reasonable achievable.      Electronically signed by: Wilfrido Wheat MD  Date:    04/16/2024  Time:    11:55               Narrative:    EXAMINATION:  CT ABDOMEN PELVIS WITH IV CONTRAST    CLINICAL HISTORY:  GI bleed;LLQ abdominal pain;    TECHNIQUE:  Low dose axial images, sagittal and coronal reformations were obtained from the lung bases to the pubic symphysis following the IV administration of 100 mL of Omnipaque 350.  Oral contrast was not administered.    COMPARISON:  04/02/2024    FINDINGS:  Heart: Normal size. No effusion.    Lung Bases: Clear.    Liver: Normal size and attenuation. No focal lesions.    Gallbladder: No calcified gallstones.    Bile Ducts: No dilatation.    Pancreas: No mass. No peripancreatic fat stranding.    Spleen: Normal.    Adrenals: Normal.    Kidneys/Ureters: Normal enhancement.  Punctate nonobstructing stones bilaterally.  No mass or  hydroureteronephrosis.    Bladder: No wall thickening.    Reproductive organs: Post  hysterectomy    GI Tract/Mesentery: No evidence of bowel obstruction or inflammation.  Diverticulosis without diverticulitis.    Peritoneal Space: No ascites or free air.    Retroperitoneum: No significant adenopathy.    Abdominal wall: Small fat containing umbilical and inguinal hernias.    Vasculature: No aneurysm.    Bones: No acute fracture.  Mild bilateral SI joint degenerative changes.  Benign hemangioma L4 vertebral body.  No suspicious lytic or sclerotic lesions.                                       Medications   sodium chloride 0.9% bolus 1,000 mL 1,000 mL (1,000 mLs Intravenous New Bag 4/16/24 0964)   ondansetron injection 4 mg (4 mg Intravenous Given 4/16/24 0950)   iohexoL (OMNIPAQUE 350) injection 100 mL (100 mLs Intravenous Given 4/16/24 1107)     Medical Decision Making    Patient presents with concern of generalized upper abdominal pain and reflux over past week followed by dark and maroon tinged stool after loose bowel movement overnight.  +nausea and NBNB emesis.  Afebrile arrival with vitals grossly unremarkable.  Patient in no distress on exam.  Generalized left-sided and epigastric tenderness noted    DDx:  Including but not limited to  upper GI bleed, lower GI bleed, gastric ulcer, hernia, GERD, intestinal spasm, gastroenteritis, gastritis, ulcer, cholecystitis, cholelithiasis, gallstones, pancreatitis, ileus, small bowel obstruction, appendicitis, diverticulitis, colitis, constipation, intestinal gas pain    Amount and/or Complexity of Data Reviewed  Labs: ordered. Decision-making details documented in ED Course.  Radiology: ordered. Decision-making details documented in ED Course.    Risk  Prescription drug management.               ED Course as of 04/16/24 1242   Tue Apr 16, 2024   0946 Urinalysis, Reflex to Urine Culture Urine, Clean Catch   WNL [KS]   1009 CBC Auto Differential(!)   No elevation in WBC.  Stable H/H. [KS]   1015 APTT    WNL [KS]   1016 Protime-INR   WNL [KS]   1022  Comprehensive Metabolic Panel(!)    Grossly unremarkable [KS]   1029 Occult blood x 1, stool  Negative [KS]   1029 Lipase  WNL [KS]   1241 CT Abdomen Pelvis With IV Contrast NO Oral Contrast  CT abdomen pelvis per Radiology review showing no acute intra-abdominal findings. [KS]   1241 Patient was reassessed and continues to rest comfortably.  Symptoms have improved with ED intervention.  Patient was further discussed with attending, Dr. Montes.  Stable for discharge at this time with close outpatient follow-up.  She will be referred to GI for further evaluation.  Prescriptions as written below.  Encouraged hydration, rest, and ED return precautions.  Patient states her understanding and agrees with plan [KS]      ED Course User Index  [KS] Anthony Marie PA-C                             Clinical Impression:  Final diagnoses:  [R11.2, R19.7] Nausea vomiting and diarrhea (Primary)  [R10.9] Abdominal pain, unspecified abdominal location          ED Disposition Condition    Discharge Stable          ED Prescriptions       Medication Sig Dispense Start Date End Date Auth. Provider    dicyclomine (BENTYL) 20 mg tablet Take 1 tablet (20 mg total) by mouth 2 (two) times daily. 20 tablet 4/16/2024 5/16/2024 Anthony Marie PA-C    ondansetron (ZOFRAN-ODT) 4 MG TbDL Take 1 tablet (4 mg total) by mouth every 6 (six) hours as needed (nausea). 20 tablet 4/16/2024 -- Anthony Marie PA-C          Follow-up Information       Follow up With Specialties Details Why Contact Info    Cathi Chávez NP Family Medicine   [id number] [id number] [phone]               Anthony Marie PA-C  04/16/24 9086

## 2024-04-16 NOTE — DISCHARGE INSTRUCTIONS

## 2024-04-17 ENCOUNTER — TELEPHONE (OUTPATIENT)
Dept: UROLOGY | Facility: CLINIC | Age: 49
End: 2024-04-17
Payer: MEDICAID

## 2024-04-17 NOTE — TELEPHONE ENCOUNTER
----- Message from Raj Dubon NP sent at 4/16/2024  8:30 PM CDT -----  Please inform patient via telephone that the primary constituent identified was blood and/or protein. No other compounds consistent with the composition of a kidney stone were found.

## 2024-04-23 ENCOUNTER — OFFICE VISIT (OUTPATIENT)
Dept: GASTROENTEROLOGY | Facility: CLINIC | Age: 49
End: 2024-04-23
Payer: MEDICAID

## 2024-04-23 DIAGNOSIS — R19.7 NAUSEA VOMITING AND DIARRHEA: ICD-10-CM

## 2024-04-23 DIAGNOSIS — K59.09 CHRONIC CONSTIPATION: ICD-10-CM

## 2024-04-23 DIAGNOSIS — R11.2 NAUSEA VOMITING AND DIARRHEA: ICD-10-CM

## 2024-04-23 DIAGNOSIS — R10.13 EPIGASTRIC PAIN: Primary | ICD-10-CM

## 2024-04-23 DIAGNOSIS — K21.9 GASTROESOPHAGEAL REFLUX DISEASE, UNSPECIFIED WHETHER ESOPHAGITIS PRESENT: ICD-10-CM

## 2024-04-23 PROCEDURE — 1160F RVW MEDS BY RX/DR IN RCRD: CPT | Mod: CPTII,95,, | Performed by: NURSE PRACTITIONER

## 2024-04-23 PROCEDURE — 1159F MED LIST DOCD IN RCRD: CPT | Mod: CPTII,95,, | Performed by: NURSE PRACTITIONER

## 2024-04-23 PROCEDURE — 99214 OFFICE O/P EST MOD 30 MIN: CPT | Mod: 95,,, | Performed by: NURSE PRACTITIONER

## 2024-04-23 NOTE — PROGRESS NOTES
Subjective:       Patient ID: Inna Gallardo is a 49 y.o. female.    Chief Complaint: Emesis, Melena, and Abdominal Pain    The patient location is: [place], [place]  The chief complaint leading to consultation is: hospital follow up    Visit type: audiovisual    Face to Face time with patient: 20 minutes  30 minutes of total time spent on the encounter, which includes face to face time and non-face to face time preparing to see the patient (eg, review of tests), Obtaining and/or reviewing separately obtained history, Documenting clinical information in the electronic or other health record, Independently interpreting results (not separately reported) and communicating results to the patient/family/caregiver, or Care coordination (not separately reported).         Each patient to whom he or she provides medical services by telemedicine is:  (1) informed of the relationship between the physician and patient and the respective role of any other health care provider with respect to management of the patient; and (2) notified that he or she may decline to receive medical services by telemedicine and may withdraw from such care at any time.    Notes:     48 y/o female with hx of GERD, diverticulitis, and kidney stones presents for [facility] follow up with c/o abdominal pain. She presented to ER last with with c/o abdominal pain and blood in stool. Reports worsening epigastric pain with heartburn, nausea and diarrhea. Tried Pepto bismol in addition to daily Prilosec without relief. Had an episode of dark black and maroon covered stool that has since resolved. Hx of frequent NSAIDs use for chronic knee pain which she has since stopped. Feels constipated now. Last BM 3 days ago. CT abdomen done in ER negative for any acute findings.       Previous Endoscopy  - 8/27/2020 colonoscopy revealed diverticulosis in the sigmoid colon; examination was otherwise normal on direct and retroflexion views; No specimens collected.  -  8/02/2021 EGD revealed normal upper third of esophagus, middle third of esophagus and lower third of esophagus; benign-appearing esophageal stenosis. Dilated; erythematous mucosa in the antrum. Biopsied; normal examined duodenum; 2 cm hiatal hernia.    Past Medical History:   Diagnosis Date    Anxiety     Diverticulitis     Endometriosis     General anesthetics causing adverse effect in therapeutic use     Hypertension     Kidney stone     Prolactinoma 2011    Sliding hiatal hernia     Urinary tract infection     Vaginal infection        Past Surgical History:   Procedure Laterality Date    ARTHROSCOPY OF KNEE Left     BRAIN TUMOR EXCISION  9/2011    removal of prolactinoma    COLONOSCOPY N/A 8/27/2020    Procedure: COLONOSCOPY;  Surgeon: Valentino Negro MD;  Location: [facility];  Service: Endoscopy;  Laterality: N/A;    CYSTOSCOPY      CYSTOSCOPY W/ URETERAL STENT PLACEMENT  2016    CYSTOSCOPY W/ URETERAL STENT REMOVAL  2016    ESOPHAGEAL MANOMETRY N/A 6/3/2019    Procedure: MANOMETRY, ESOPHAGUS;  Surgeon: Bhupinder Schmitz MD;  Location: [facility] ENDO (4TH FLR);  Service: Endoscopy;  Laterality: N/A;    ESOPHAGOGASTRODUODENOSCOPY N/A 4/30/2019    Procedure: ESOPHAGOGASTRODUODENOSCOPY (EGD);  Surgeon: Altagracia Bose MD;  Location: [facility];  Service: Endoscopy;  Laterality: N/A;    ESOPHAGOGASTRODUODENOSCOPY N/A 8/2/2021    Procedure: EGD (ESOPHAGOGASTRODUODENOSCOPY);  Surgeon: Altagracia Bose MD;  Location: [facility];  Service: Endoscopy;  Laterality: N/A;    EXTRACORPOREAL SHOCK WAVE LITHOTRIPSY Left 3/7/2024    Procedure: LITHOTRIPSY, ESWL;  Surgeon: Roverto Harley MD;  Location: [facility];  Service: Urology;  Laterality: Left;    FOOT HARDWARE REMOVAL Right 6/16/2020    Procedure: REMOVAL, HARDWARE, FOOT;  Surgeon: Adrianna Tillman DPM;  Location: [facility];  Service: Podiatry;  Laterality: Right;    HARVESTING OF BONE GRAFT Right 7/16/2019    Procedure: SURGICAL PROCUREMENT, BONE GRAFT;  Surgeon: Adrianna  HENRI Tillman DPM;  Location: [facility] OR;  Service: Podiatry;  Laterality: Right;    HYSTERECTOMY  3/16/15    TLH/BSO for Endometriosis, AUB, fibroids, cyst    LITHOTRIPSY      MANDIBLE SURGERY  2002    severe overbite correction    PELVIC LAPAROSCOPY      Dx scope, chromopertubation-Endometriosis    TONSILLECTOMY, ADENOIDECTOMY         Family History   Problem Relation Name Age of Onset    Cancer Mother      Breast cancer Neg Hx      Ovarian cancer Neg Hx      Kidney disease Neg Hx         Social History     Socioeconomic History    Marital status:     Years of education: college   Tobacco Use    Smoking status: Former     Current packs/day: 0.00     Types: Cigarettes     Start date: 1989     Quit date: 2000     Years since quittin.2    Smokeless tobacco: Never    Tobacco comments:     pt stated she smoked one cig/day   Substance and Sexual Activity    Alcohol use: Never    Drug use: Never    Sexual activity: Yes     Partners: Male     Birth control/protection: Surgical, See Surgical Hx     Social Determinants of Health     Financial Resource Strain: Low Risk  (2022)    [place] [facility]    Overall Financial Resource Strain (CARDIA)     Difficulty of Paying Living Expenses: Not hard at all   Food Insecurity: No Food Insecurity (2022)    [place] [facility]    Hunger Vital Sign     Worried About Running Out of Food in the Last Year: Never true     Ran Out of Food in the Last Year: Never true   Transportation Needs: No Transportation Needs (2022)    [place] [facility]    PRAPARE - Transportation     Lack of Transportation (Medical): No     Lack of Transportation (Non-Medical): No   Physical Activity: Unknown (2022)    Received [facility]    Exercise Vital Sign     Days of Exercise per Week: Patient declined   Stress: No Stress Concern Present (2022)    [place] [facility]     [facility] [employer]ational [employer] - Occupational Stress Questionnaire     Feeling of Stress : Only a little   Social Connections: Socially Integrated (4/18/2022)    Received from [facility] [employer], [facility]    Social Connection and Isolation Panel [NHANES]     Frequency of Communication with Friends and Family: More than three times a week     Frequency of Social Gatherings with Friends and Family: Twice a week     Attends [employer] Services: 1 to 4 times per year     Active Member of Clubs or Organizations: Yes     Attends Club or Organization Meetings: 1 to 4 times per year     Marital Status:    Housing Stability: Low Risk  (4/18/2022)    Received from [facility], [facility]    Housing Stability Vital Sign     Unable to Pay for Housing in the Last Year: No     Number of Places Lived in the Last Year: 1     In the last 12 months, was there a time when you did not have a steady place to sleep or slept in a shelter (including now)?: No       Review of Systems   Constitutional:  Negative for appetite change, fatigue, fever and unexpected weight change.   HENT:  Negative for trouble swallowing.    Respiratory:  Negative for shortness of breath.    Cardiovascular:  Negative for chest pain.   Gastrointestinal:  Positive for abdominal pain, constipation and nausea. Negative for diarrhea and vomiting.   Genitourinary:  Negative for dysuria and frequency.   Allergic/Immunologic: Negative for food allergies.   Neurological:  Negative for dizziness.   Hematological:  Negative for adenopathy. Does not bruise/bleed easily.   Psychiatric/Behavioral:  Negative for dysphoric mood.          Objective:     There were no vitals filed for this visit.       Physical Exam  Constitutional:       General: She is not in acute distress.     Appearance: Normal appearance.   HENT:      Head: Normocephalic.   Eyes:      Conjunctiva/sclera: Conjunctivae normal.   Pulmonary:      Effort: Pulmonary effort is normal. No respiratory  distress.   Skin:     Coloration: Skin is not jaundiced or pale.   Neurological:      Mental Status: She is alert and oriented to person, place, and time.   Psychiatric:         Mood and Affect: Mood normal.         Behavior: Behavior normal.               Assessment:         ICD-10-CM ICD-9-CM   1. Epigastric pain  R10.13 789.06   2. Gastroesophageal reflux disease, unspecified whether esophagitis present  K21.9 530.81   3. Nausea vomiting and diarrhea  R11.2 787.91    R19.7 787.01   4. Chronic constipation  K59.09 564.00       Plan:       Epigastric pain; Gastroesophageal reflux disease, unspecified whether esophagitis present  Nausea vomiting and diarrhea  -     Continue omeprazole 40 mg daily  -     Stop NSAID use  -     May take OTC famotidine nightly as needed  - Discussed elimination of dietary triggers (fatty foods, caffeine, chocolate, spicy foods, food with high fat content, carbonated beverages, and peppermint.  - Raise the head of your bed by 6 to 8 inches - You can do this by putting blocks of wood or rubber under 2 legs of the bed or a foam wedge under the mattress.  - Avoid late meals - Lying down with a full stomach can make reflux worse. Try to plan meals for at least 2 to 3 hours before bedtime.  - Avoid tight clothing - Some people feel better if they wear comfortable clothing that does not squeeze the stomach area.   -     Case Request Endoscopy: EGD (ESOPHAGOGASTRODUODENOSCOPY)    Chronic constipation  -     linaCLOtide (LINZESS) 145 mcg Cap capsule; Take 1 capsule (145 mcg total) by mouth once daily.  Dispense: 30 capsule; Refill: 2    Follow up if symptoms worsen or fail to improve.     Patient's Medications   New Prescriptions    LINACLOTIDE (LINZESS) 145 MCG CAP CAPSULE    Take 1 capsule (145 mcg total) by mouth once daily.   Previous Medications    BACLOFEN (LIORESAL) 10 MG TABLET    Take 10 mg by mouth 3 (three) times daily as needed.    CETIRIZINE (ZYRTEC) 10 MG TABLET    Take 10 mg by  mouth once daily.    DICYCLOMINE (BENTYL) 20 MG TABLET    Take 1 tablet (20 mg total) by mouth 2 (two) times daily.    EPINEPHRINE (EPIPEN) 0.3 MG/0.3 ML ATIN    Inject 0.3 mLs into the muscle as needed.    HYDROXYZINE HCL (ATARAX) 25 MG TABLET    Take 25 mg by mouth 3 (three) times daily.    LOSARTAN-HYDROCHLOROTHIAZIDE 100-25 MG (HYZAAR) 100-25 MG PER TABLET    Take 1 tablet by mouth once daily.    OMEPRAZOLE (PRILOSEC) 40 MG CAPSULE    Take 1 capsule (40 mg total) by mouth every morning.    ONDANSETRON (ZOFRAN-ODT) 4 MG TBDL    Take 1 tablet (4 mg total) by mouth every 6 (six) hours as needed (nausea).    SERTRALINE (ZOLOFT) 100 MG TABLET    Take 100 mg by mouth every evening.    TAMSULOSIN (FLOMAX) 0.4 MG CAP    Take 1 capsule (0.4 mg total) by mouth once daily.   Modified Medications    No medications on file   Discontinued Medications    SUCRALFATE (CARAFATE) 1 GRAM TABLET    Take 1 tablet (1 g total) by mouth before meals and at bedtime as needed (abdominal  pain).

## 2024-04-23 NOTE — PATIENT INSTRUCTIONS
- Continue omeprazole 40 mg daily  - Start Linzess 145 mcg daily for constipation      EGD Prep Instructions    [facility] [address] [place]  [address]    You are scheduled for an EGD with Dr. Bose on 5/9/2024 at [facility].  You will enter through the [facility] entrance and check in at Same Day Surgery.    Nothing to eat or drink after midnight before the procedure.  You MAY brush your teeth.    You MAY take your blood pressure, heart, and seizure medication on the morning of the procedure, with a SIP of water.  Hold ALL other medications until after the procedure.    You must have someone with you to DRIVE YOU HOME since you will be receiving IV sedation for the procedure.    If you are on blood thinners THAT YOU HAVE BEEN INSTRUCTED TO HOLD BY YOUR DOCTOR FOR THIS PROCEDURE, then do NOT take this the morning of your EGD.  Do NOT stop these medications on your own, they must be approved to be held by your doctor.  Your EGD can NOT be done if you are on these medications.  Examples of blood thinners include: Coumadin, Aggrenox, Plavix, Pradaxa, Reapro, Pletal, Xarelto, Ticagrelor, Brilinta, Eliquis, and high dose aspirin (325 mg).  You do not have to stop baby aspirin 81 mg.    You will receive a call the afternoon before your EGD to tell you the time to arrive.  If you have not received a call by the day before your procedure, call the Pre-op Coordinator at [phone].

## 2024-05-06 ENCOUNTER — TELEPHONE (OUTPATIENT)
Dept: GASTROENTEROLOGY | Facility: CLINIC | Age: 49
End: 2024-05-06
Payer: MEDICAID

## 2024-05-06 NOTE — TELEPHONE ENCOUNTER
Pt rescheduled EGD to Thursday May 23, 2024. Informed pt I will send a new copy of EGD instructions to her patient portal. Pt voiced understanding.       ----- Message from Keturah Adamson sent at 5/6/2024 11:24 AM CDT -----  Regarding: call back  Contact: [phone]  Who Called: PT     Patient is calling to reschedule her procedure. Please advice

## 2024-05-16 ENCOUNTER — TELEPHONE (OUTPATIENT)
Dept: ORTHOPEDICS | Facility: CLINIC | Age: 49
End: 2024-05-16
Payer: MEDICAID

## 2024-05-16 ENCOUNTER — TELEPHONE (OUTPATIENT)
Dept: ENDOSCOPY | Facility: HOSPITAL | Age: 49
End: 2024-05-16
Payer: MEDICAID

## 2024-05-16 NOTE — TELEPHONE ENCOUNTER
----- Message from Geneva Olivarez sent at 5/16/2024  8:47 AM CDT -----  Type:  Needs Medical Advice    Who Called: pt     Would the patient rather a call back or a response via [facility]?  Call back   Best Call Back Number:  [phone]  Additional Information: pt is requesting to get a appointment for bilateral knee injections

## 2024-05-16 NOTE — TELEPHONE ENCOUNTER
Spoke with patient. Informed her that she cannot get Zilretta again.  Wants a regular CS.  Made appointment for June 4.  Also informed her that she was supposed to follow up with [facility] for TKA.  Patient has not done so and still needs to make an appointment.

## 2024-05-16 NOTE — TELEPHONE ENCOUNTER
Contacted Pt for endoscopy pre-call for upcoming procedure.  Pre-call complete, Pt does not have any further questions. Arrival time: 8:30AM

## 2024-05-21 ENCOUNTER — TELEPHONE (OUTPATIENT)
Dept: GASTROENTEROLOGY | Facility: CLINIC | Age: 49
End: 2024-05-21
Payer: MEDICAID

## 2024-05-21 NOTE — TELEPHONE ENCOUNTER
Returned pt's call. Pt stated she wanted to cancel upcoming EGD on 5/23. Pt stated back when she went to the ED for stomach issues she was having the issues for only one time. Pt stated she never had any issues again and believes it was a stomach bug. Pt stated she is feeling find and really don't want to go under anesthesia. Informed pt I will go ahead and cancel her EGD. Informed pt if she is having any future symptoms then she can give us a call or schedule an appt. Pt voiced understanding.       ----- Message from Cathi Ascencio sent at 5/21/2024 11:56 AM CDT -----  Type:  Cancel Upcoming Procedure     Who Called: Pt   Would the patient rather a call back or a response via [facility]? Call back   Best Call Back Number: [phone]   Additional Information: Please be advised, pt states she would like to cancel upcoming procedure on 05/23

## 2024-05-24 ENCOUNTER — ON-DEMAND VIRTUAL (OUTPATIENT)
Dept: URGENT CARE | Facility: CLINIC | Age: 49
End: 2024-05-24
Payer: MEDICAID

## 2024-05-24 DIAGNOSIS — M79.671 RIGHT FOOT PAIN: Primary | ICD-10-CM

## 2024-05-24 PROCEDURE — 99202 OFFICE O/P NEW SF 15 MIN: CPT | Mod: 95,,, | Performed by: NURSE PRACTITIONER

## 2024-05-24 RX ORDER — ERGOCALCIFEROL 1.25 MG/1
50000 CAPSULE ORAL
COMMUNITY
Start: 2024-04-23

## 2024-05-24 NOTE — PROGRESS NOTES
Subjective:      Patient ID: Inna Gallardo is a 49 y.o. female.    Vitals:  vitals were not taken for this visit.     Chief Complaint: right foot pain      Visit Type: TELE AUDIOVISUAL    Present with the patient at the time of consultation: TELEMED PRESENT WITH PATIENT: None, at home    Past Medical History:   Diagnosis Date    Anxiety     Diverticulitis     Endometriosis     General anesthetics causing adverse effect in therapeutic use     Hypertension     Kidney stone     Prolactinoma 2011    Sliding hiatal hernia     Urinary tract infection     Vaginal infection      Past Surgical History:   Procedure Laterality Date    ARTHROSCOPY OF KNEE Left     BRAIN TUMOR EXCISION  9/2011    removal of prolactinoma    COLONOSCOPY N/A 8/27/2020    Procedure: COLONOSCOPY;  Surgeon: Valentino Negro MD;  Location: [facility];  Service: Endoscopy;  Laterality: N/A;    CYSTOSCOPY      CYSTOSCOPY W/ URETERAL STENT PLACEMENT  2016    CYSTOSCOPY W/ URETERAL STENT REMOVAL  2016    ESOPHAGEAL MANOMETRY N/A 6/3/2019    Procedure: MANOMETRY, ESOPHAGUS;  Surgeon: Bhupinder Schmitz MD;  Location: [address]);  Service: Endoscopy;  Laterality: N/A;    ESOPHAGOGASTRODUODENOSCOPY N/A 4/30/2019    Procedure: ESOPHAGOGASTRODUODENOSCOPY (EGD);  Surgeon: Altagracia Bose MD;  Location: [facility];  Service: Endoscopy;  Laterality: N/A;    ESOPHAGOGASTRODUODENOSCOPY N/A 8/2/2021    Procedure: EGD (ESOPHAGOGASTRODUODENOSCOPY);  Surgeon: Altagracia Bose MD;  Location: [facility];  Service: Endoscopy;  Laterality: N/A;    EXTRACORPOREAL SHOCK WAVE LITHOTRIPSY Left 3/7/2024    Procedure: LITHOTRIPSY, ESWL;  Surgeon: Roverto Harley MD;  Location: [facility];  Service: Urology;  Laterality: Left;    FOOT HARDWARE REMOVAL Right 6/16/2020    Procedure: REMOVAL, HARDWARE, FOOT;  Surgeon: Adrianna Tillman DPM;  Location: [facility];  Service: Podiatry;  Laterality: Right;    HARVESTING OF BONE GRAFT Right 7/16/2019    Procedure: SURGICAL  PROCUREMENT, BONE GRAFT;  Surgeon: Adrianna Tillman DPM;  Location: [facility];  Service: Podiatry;  Laterality: Right;    HYSTERECTOMY  3/16/15    TL[facility]/BSO for Endometriosis, AUB, fibroids, cyst    LITHOTRIPSY      MANDIBLE SURGERY  2002    severe overbite correction    PELVIC LAPAROSCOPY  2014    Dx scope, chromopertubation-Endometriosis    TONSILLECTOMY, ADENOIDECTOMY       Review of patient's allergies indicates:   Allergen Reactions    Ace inhibitors Other (See Comments)     cough     Current Outpatient Medications on File Prior to Visit   Medication Sig Dispense Refill    ergocalciferol (ERGOCALCIFEROL) 50,000 unit Cap Take 50,000 Units by mouth every 7 days.      baclofen (LIORESAL) 10 MG tablet Take 10 mg by mouth 3 (three) times daily as needed.      cetirizine (ZYRTEC) 10 MG tablet Take 10 mg by mouth once daily. (Patient not taking: Reported on 4/23/2024)      EPINEPHrine (EPIPEN) 0.3 mg/0.3 mL AtIn Inject 0.3 mLs into the muscle as needed. (Patient not taking: Reported on 4/23/2024)  0    hydrOXYzine HCL (ATARAX) 25 MG tablet Take 25 mg by mouth 3 (three) times daily. (Patient not taking: Reported on 4/23/2024)      linaCLOtide (LINZESS) 145 mcg Cap capsule Take 1 capsule (145 mcg total) by mouth once daily. 30 capsule 2    losartan-hydrochlorothiazide 100-25 mg (HYZAAR) 100-25 mg per tablet Take 1 tablet by mouth once daily.      omeprazole (PRILOSEC) 40 MG capsule Take 1 capsule (40 mg total) by mouth every morning. 30 capsule 11    ondansetron (ZOFRAN-ODT) 4 MG TbDL Take 1 tablet (4 mg total) by mouth every 6 (six) hours as needed (nausea). 20 tablet 0    sertraline (ZOLOFT) 100 MG tablet Take 100 mg by mouth every evening.      tamsulosin (FLOMAX) 0.4 mg Cap Take 1 capsule (0.4 mg total) by mouth once daily. (Patient not taking: Reported on 4/5/2024) 90 capsule 0     No current facility-administered medications on file prior to visit.     Family History   Problem Relation Name Age of Onset    Cancer Mother       Breast cancer Neg Hx      Ovarian cancer Neg Hx      Kidney disease Neg Hx             Ohs Peq Odvv Intake    5/24/2024  1:46 PM CDT - Filed by Patient   What is your current physical address in the event of a medical emergency?    Are you able to take your vital signs? No   Please attach any relevant images or files          Right foot pain, top of foot. Hx of previous surgery to the foot for cyst removal and hardware. Gradual pain. Denies recent trauma, injury or fall. + swelling, + pain with walking, pain with ROM. +warmth to touch. No ankle pain. Using ice and NSAIDS with little relief. Next Ortho appt in June. Seeking x-ray.        Musculoskeletal:  Positive for pain, joint pain and joint swelling. Negative for trauma and abnormal ROM of joint.        Objective:   The physical exam was conducted virtually.  Physical Exam   Constitutional: She is oriented to person, place, and time. She does not appear ill. No distress.   HENT:   Head: Normocephalic and atraumatic.   Nose: Nose normal.   Eyes: Extraocular movement intact   Pulmonary/Chest: Effort normal.   Abdominal: Normal appearance.   Musculoskeletal: Normal range of motion.         General: Normal range of motion.   Neurological: no focal deficit. She is alert and oriented to person, place, and time.   Psychiatric: Her behavior is normal. Mood normal.   Vitals reviewed.      Assessment:     1. Right foot pain        Plan:   Follow-up with Ortho as discussed.    Patient encouraged to monitor symptoms closely and instructed to follow-up for new or worsening symptoms. Further, in-person, evaluation is necessary for continued treatment. Please follow up with your primary care doctor or specialist as needed. Verbally discussed plan. Patient confirms understanding and is in agreement with treatment and plan.     You must understand that you've received a [facility] Care evaluation only and that you may be released before all your medical problems are known or  treated. You, the patient, will arrange for follow up care as instructed.    Right foot pain        Patient Instructions   Recommendations:     .Rest, Ice/Heat, Compression(brace or ace wrap), and Elevation may be beneficial.     .NSAIDs(Ibuprofen, or Aleve) and Tylenol over the counter as directed.     . Monitor for worsening symptoms: increased pain, swelling, redness/bruising, numbness, tingling or decreased mobility.

## 2024-05-24 NOTE — PATIENT INSTRUCTIONS
Recommendations:     .Rest, Ice/Heat, Compression(brace or ace wrap), and Elevation may be beneficial.     .NSAIDs(Ibuprofen, or Aleve) and Tylenol over the counter as directed.     . Monitor for worsening symptoms: increased pain, swelling, redness/bruising, numbness, tingling or decreased mobility.

## 2024-05-27 DIAGNOSIS — M25.562 PAIN IN BOTH KNEES, UNSPECIFIED CHRONICITY: Primary | ICD-10-CM

## 2024-05-27 DIAGNOSIS — M25.561 PAIN IN BOTH KNEES, UNSPECIFIED CHRONICITY: Primary | ICD-10-CM

## 2024-05-28 ENCOUNTER — TELEPHONE (OUTPATIENT)
Dept: PODIATRY | Facility: CLINIC | Age: 49
End: 2024-05-28
Payer: MEDICAID

## 2024-05-28 NOTE — TELEPHONE ENCOUNTER
----- Message from Monchomicheline Baird sent at 5/24/2024  1:57 PM CDT -----  Contact: pt  Type: Requesting to speak with nurse        Who Called: PT  Regarding: order for xray on foot. States she is in a lot of pain   Would the patient rather a call back or a response via [facility]? Call back  Best Call Back Number:  [phone]  Additional Information:

## 2024-05-28 NOTE — TELEPHONE ENCOUNTER
Called and spoke to patient, she wanted a sooner appt, informed her I did not have any sooner, she can be placed on wait list for something sooner

## 2024-06-04 ENCOUNTER — HOSPITAL ENCOUNTER (OUTPATIENT)
Dept: RADIOLOGY | Facility: HOSPITAL | Age: 49
Discharge: HOME | End: 2024-06-04
Attending: PHYSICIAN ASSISTANT
Payer: MEDICAID

## 2024-06-04 ENCOUNTER — OFFICE VISIT (OUTPATIENT)
Dept: ORTHOPEDICS | Facility: CLINIC | Age: 49
End: 2024-06-04
Payer: MEDICAID

## 2024-06-04 ENCOUNTER — PATIENT MESSAGE (OUTPATIENT)
Dept: ORTHOPEDICS | Facility: CLINIC | Age: 49
End: 2024-06-04

## 2024-06-04 VITALS — HEIGHT: 63 IN | BODY MASS INDEX: 48.01 KG/M2 | WEIGHT: 270.94 LBS

## 2024-06-04 DIAGNOSIS — M25.561 PAIN IN BOTH KNEES, UNSPECIFIED CHRONICITY: ICD-10-CM

## 2024-06-04 DIAGNOSIS — M17.12 PRIMARY OSTEOARTHRITIS OF LEFT KNEE: ICD-10-CM

## 2024-06-04 DIAGNOSIS — E66.01 MORBID OBESITY DUE TO EXCESS CALORIES: ICD-10-CM

## 2024-06-04 DIAGNOSIS — M17.11 PRIMARY OSTEOARTHRITIS OF RIGHT KNEE: Primary | ICD-10-CM

## 2024-06-04 DIAGNOSIS — M25.562 PAIN IN BOTH KNEES, UNSPECIFIED CHRONICITY: ICD-10-CM

## 2024-06-04 PROCEDURE — 99213 OFFICE O/P EST LOW 20 MIN: CPT | Mod: PBBFAC,25,PN | Performed by: PHYSICIAN ASSISTANT

## 2024-06-04 PROCEDURE — 3008F BODY MASS INDEX DOCD: CPT | Mod: CPTII,,, | Performed by: PHYSICIAN ASSISTANT

## 2024-06-04 PROCEDURE — 99213 OFFICE O/P EST LOW 20 MIN: CPT | Mod: 25,S$PBB,, | Performed by: PHYSICIAN ASSISTANT

## 2024-06-04 PROCEDURE — 1159F MED LIST DOCD IN RCRD: CPT | Mod: CPTII,,, | Performed by: PHYSICIAN ASSISTANT

## 2024-06-04 PROCEDURE — 99999PBSHW PR PBB SHADOW TECHNICAL ONLY FILED TO HB: Mod: PBBFAC,,,

## 2024-06-04 PROCEDURE — 99999 PR PBB SHADOW E&M-EST. PATIENT-LVL III: CPT | Mod: PBBFAC,,, | Performed by: PHYSICIAN ASSISTANT

## 2024-06-04 PROCEDURE — 20610 DRAIN/INJ JOINT/BURSA W/O US: CPT | Mod: 50,S$PBB,, | Performed by: PHYSICIAN ASSISTANT

## 2024-06-04 PROCEDURE — 73564 X-RAY EXAM KNEE 4 OR MORE: CPT | Mod: TC,50,PN

## 2024-06-04 PROCEDURE — 20610 DRAIN/INJ JOINT/BURSA W/O US: CPT | Mod: 50,PBBFAC,PN | Performed by: PHYSICIAN ASSISTANT

## 2024-06-04 PROCEDURE — 73564 X-RAY EXAM KNEE 4 OR MORE: CPT | Mod: 26,50,, | Performed by: INTERNAL MEDICINE

## 2024-06-04 RX ORDER — TRIAMCINOLONE ACETONIDE 40 MG/ML
40 INJECTION, SUSPENSION INTRA-ARTICULAR; INTRAMUSCULAR
Status: DISCONTINUED | OUTPATIENT
Start: 2024-06-04 | End: 2024-06-04 | Stop reason: HOSPADM

## 2024-06-04 RX ADMIN — TRIAMCINOLONE ACETONIDE 40 MG: 40 INJECTION, SUSPENSION INTRA-ARTICULAR; INTRAMUSCULAR at 09:06

## 2024-06-04 NOTE — PROGRESS NOTES
Subjective:      Patient ID: Inna Gallardo is a 49 y.o. female.    Chief Complaint: Pain of the Left Knee and Pain of the Right Knee      48yo morbidly obese female follow up bone on bone osteoarthritis. Previous zilretta injections used to help about 4 month but last injection helped about 2 month. She has tried gel injections and iovera in the past. Unsure of the efficiacy. Also does not remember how short-acting steroids helped. She has appt with [facility] surgeon in August to discuss TKA. This was rescheduled from last month. States she has been trying to lose weight. Using a wheeled walker today.         Review of Systems   Constitutional: Negative for chills and fever.   Cardiovascular:  Negative for chest pain.   Respiratory:  Negative for cough.    Hematologic/Lymphatic: Does not bruise/bleed easily.   Skin:  Negative for poor wound healing and rash.   Musculoskeletal:  Positive for arthritis, joint pain, myalgias and stiffness.   Gastrointestinal:  Negative for abdominal pain.   Genitourinary:  Negative for bladder incontinence.   Neurological:  Negative for dizziness, loss of balance and weakness.   Psychiatric/Behavioral:  Negative for altered mental status.        Review of patient's allergies indicates:   Allergen Reactions    Ace inhibitors Other (See Comments)     cough        Current Outpatient Medications   Medication Sig Dispense Refill    baclofen (LIORESAL) 10 MG tablet Take 10 mg by mouth 3 (three) times daily as needed.      cetirizine (ZYRTEC) 10 MG tablet Take 10 mg by mouth once daily.      EPINEPHrine (EPIPEN) 0.3 mg/0.3 mL AtIn Inject 0.3 mLs into the muscle as needed.  0    ergocalciferol (ERGOCALCIFEROL) 50,000 unit Cap Take 50,000 Units by mouth every 7 days.      hydrOXYzine HCL (ATARAX) 25 MG tablet Take 25 mg by mouth 3 (three) times daily.      linaCLOtide (LINZESS) 145 mcg Cap capsule Take 1 capsule (145 mcg total) by mouth once daily. 30 capsule 2    losartan-hydrochlorothiazide 100-25  "mg (HYZAAR) 100-25 mg per tablet Take 1 tablet by mouth once daily.      omeprazole (PRILOSEC) 40 MG capsule Take 1 capsule (40 mg total) by mouth every morning. 30 capsule 11    ondansetron (ZOFRAN-ODT) 4 MG TbDL Take 1 tablet (4 mg total) by mouth every 6 (six) hours as needed (nausea). 20 tablet 0    sertraline (ZOLOFT) 100 MG tablet Take 100 mg by mouth every evening.      tamsulosin (FLOMAX) 0.4 mg Cap Take 1 capsule (0.4 mg total) by mouth once daily. (Patient not taking: Reported on 4/5/2024) 90 capsule 0     No current facility-administered medications for this visit.        The patient's relevant past medical, surgical, and social history was reviewed in Epic.       Objective:      VITAL SIGNS: Ht 5' 3" (1.6 m)   Wt 122.9 kg (270 lb 15.1 oz)   LMP 03/03/2015   BMI 48.00 kg/m²     General    Nursing note and vitals reviewed.  Constitutional: She is oriented to person, place, and time. She appears well-developed and well-nourished.   Neurological: She is alert and oriented to person, place, and time.     General Musculoskeletal Exam   Gait: antalgic       Right Knee Exam     Inspection   Swelling: present    Tenderness   The patient is tender to palpation of the medial joint line.    Range of Motion   Extension:  abnormal   Flexion:  abnormal     Left Knee Exam     Inspection   Swelling: present    Tenderness   The patient tender to palpation of the medial joint line.    Range of Motion   Extension:  abnormal   Flexion:  abnormal     Muscle Strength   Right Lower Extremity   Quadriceps:  3/5   Hamstring:  3/5   Left Lower Extremity   Quadriceps:  3/5   Hamstring:  3/5      X-Ray Knee Complete 4 Or More Views Bilat  Narrative: EXAMINATION:  XR KNEE COMP 4 OR MORE VIEWS BILAT    CLINICAL HISTORY:  Pain in right knee    TECHNIQUE:  Four views of each knee were performed.    COMPARISON:  Radiographs 11/09/2015    FINDINGS:  No fracture or dislocation.  Tricompartmental degenerative changes in both knees, " severe in the medial compartments with bone-on-bone contact, progressed from 2015.  Small joint effusions bilaterally, left greater than right.  No soft tissue swelling.  Impression: Degenerative changes as described.    Electronically signed by: Donal Zapien  Date:    06/04/2024  Time:    10:31    I have reviewed the above radiograph and agree with the findings stated by the radiologist.         Assessment:       1. Primary osteoarthritis of right knee    2. Primary osteoarthritis of left knee    3. Morbid obesity due to excess calories          Plan:         Inna was seen today for pain and pain.    Diagnoses and all orders for this visit:    Primary osteoarthritis of right knee  -     Large Joint Aspiration/Injection: bilateral knee    Primary osteoarthritis of left knee  -     Large Joint Aspiration/Injection: bilateral knee    Morbid obesity due to excess calories    Bilateral knee osteoarthritis. Bone on bone. Discussed options with the patient today. Could try Iovera again with toradol injection given today or short acting kenalog. She would like to try short-acting kenalog. Will see how this does. Has appt with [facility] to discuss TKA. Needs to lose around 40 lbs to get to BMI of 40.   Could be stim router candidate?     Diagnoses and plan discussed with the patient, as well as the expected course and duration of his symptoms.  All questions and concerns were addressed prior to the end of the visit.   Instructed patient to call office if they have any future questions/concerns or to schedule apt. Patient will return to see me if symptoms worsen or fail to improve    Note dictated with voice recognition software, please excuse any grammatical errors.        Pat Taylor PA-C   06/04/2024

## 2024-06-04 NOTE — PROCEDURES
Large Joint Aspiration/Injection: bilateral knee    Date/Time: 6/4/2024 9:30 AM    Performed by: Pat Taylor PA-C  Authorized by: Pat Taylor PA-C    Consent Done?:  Yes (Verbal)  Indications:  Pain  Site marked: the procedure site was marked    Timeout: prior to procedure the correct patient, procedure, and site was verified    Prep: patient was prepped and draped in usual sterile fashion    Local anesthesia used?: No    Local anesthetic:  Topical anesthetic and lidocaine 1% without epinephrine    Details:  Needle Size:  22 G  Ultrasonic Guidance for needle placement?: No    Approach:  Anterolateral  Location:  Knee  Laterality:  Bilateral  Site:  Bilateral knee  Medications (Right):  40 mg triamcinolone acetonide 40 mg/mL  Medications (Left):  40 mg triamcinolone acetonide 40 mg/mL  Patient tolerance:  Patient tolerated the procedure well with no immediate complications

## 2024-06-05 DIAGNOSIS — M79.671 FOOT PAIN, RIGHT: Primary | ICD-10-CM

## 2024-06-06 ENCOUNTER — HOSPITAL ENCOUNTER (OUTPATIENT)
Dept: RADIOLOGY | Facility: HOSPITAL | Age: 49
Discharge: HOME | End: 2024-06-06
Attending: PODIATRIST
Payer: MEDICAID

## 2024-06-06 DIAGNOSIS — M79.671 FOOT PAIN, RIGHT: ICD-10-CM

## 2024-06-06 PROCEDURE — 73630 X-RAY EXAM OF FOOT: CPT | Mod: 26,RT,, | Performed by: RADIOLOGY

## 2024-06-06 PROCEDURE — 73630 X-RAY EXAM OF FOOT: CPT | Mod: TC,FY,PN,RT

## 2024-06-11 ENCOUNTER — OFFICE VISIT (OUTPATIENT)
Dept: PODIATRY | Facility: CLINIC | Age: 49
End: 2024-06-11
Payer: MEDICAID

## 2024-06-11 VITALS — WEIGHT: 262.81 LBS | BODY MASS INDEX: 46.55 KG/M2

## 2024-06-11 DIAGNOSIS — S92.901K CLOSED FRACTURE OF RIGHT FOOT WITH NONUNION, SUBSEQUENT ENCOUNTER: ICD-10-CM

## 2024-06-11 DIAGNOSIS — M19.071 ARTHRITIS OF FOOT, RIGHT: Primary | ICD-10-CM

## 2024-06-11 DIAGNOSIS — Z98.890 H/O FOOT SURGERY: ICD-10-CM

## 2024-06-11 PROCEDURE — 1160F RVW MEDS BY RX/DR IN RCRD: CPT | Mod: CPTII,,, | Performed by: PODIATRIST

## 2024-06-11 PROCEDURE — 99999PBSHW PR PBB SHADOW TECHNICAL ONLY FILED TO HB: Mod: PBBFAC,,,

## 2024-06-11 PROCEDURE — 20600 DRAIN/INJ JOINT/BURSA W/O US: CPT | Mod: PBBFAC,PN,RT | Performed by: PODIATRIST

## 2024-06-11 PROCEDURE — 3008F BODY MASS INDEX DOCD: CPT | Mod: CPTII,,, | Performed by: PODIATRIST

## 2024-06-11 PROCEDURE — 1159F MED LIST DOCD IN RCRD: CPT | Mod: CPTII,,, | Performed by: PODIATRIST

## 2024-06-11 PROCEDURE — 99213 OFFICE O/P EST LOW 20 MIN: CPT | Mod: 25,S$PBB,, | Performed by: PODIATRIST

## 2024-06-11 PROCEDURE — 99213 OFFICE O/P EST LOW 20 MIN: CPT | Mod: PBBFAC,PN | Performed by: PODIATRIST

## 2024-06-11 PROCEDURE — 99999 PR PBB SHADOW E&M-EST. PATIENT-LVL III: CPT | Mod: PBBFAC,,, | Performed by: PODIATRIST

## 2024-06-11 RX ORDER — DEXAMETHASONE SODIUM PHOSPHATE 4 MG/ML
4 INJECTION, SOLUTION INTRA-ARTICULAR; INTRALESIONAL; INTRAMUSCULAR; INTRAVENOUS; SOFT TISSUE
Status: DISCONTINUED | OUTPATIENT
Start: 2024-06-11 | End: 2024-06-11 | Stop reason: HOSPADM

## 2024-06-11 RX ADMIN — DEXAMETHASONE SODIUM PHOSPHATE 4 MG: 4 INJECTION, SOLUTION INTRAMUSCULAR; INTRAVENOUS at 09:06

## 2024-06-11 NOTE — PROGRESS NOTES
[facility]  [facility] - PODIATRY  [address]  [address]  [facility] [place] [facility] [address]  Dept: [phone]  Dept Fax: [phone]    Jose Elias Laughlin Jr., DPM     Assessment:   MDM    Coding  1. Arthritis of foot, right  Small Joint Aspiration/Injection      2. H/O foot surgery        3. 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, subsequent encounter            Plan:     Small Joint Aspiration/Injection: R intertarsal    Date/Time: 6/11/2024 9:49 AM    Performed by: Jose Elias Laughlin Jr., DPM  Authorized by: Jose Elias Laughlin Jr., DPM    Consent Done?:  Yes (Verbal)  Indications:  Joint swelling, pain and arthritis  Site marked: the procedure site was marked    Timeout: prior to procedure the correct patient, procedure, and site was verified    Prep: patient was prepped and draped in usual sterile fashion      Local anesthesia used?: Yes    Anesthesia:  Local infiltration  Local anesthetic:  Bupivacaine 0.5% without epinephrine and co-phenylcaine spray  Anesthetic total (ml):  2    Location:  Foot  Site:  R intertarsal  Ultrasonic guidance for needle placement?: No    Needle size:  25 G  Approach:  Dorsal  Medications:  4 mg dexAMETHasone 4 mg/mL  Patient tolerance:  Patient tolerated the procedure well with no immediate complications    1. Arthritis of foot, right  -     Small Joint Aspiration/Injection    2. H/O foot surgery    3. 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 All questions/concerns addressed  -all tx options discussed. All alternatives, risks, benefits of all txs discussed  -We discussed conservative care options possible including but not limited to shoe wear and/or padding, bracing/strapping, at home ROM,  formal PT, medical therapy, injection therapy  - The utilization of NSAIDs can be considered but their benefit has to be tempered against the risk of GI/ concerns  - A steroid injection can be undertaken.  We did discuss the potential mechanism of action of this shot.  Understanding that multiple injections at the same anatomic site do have deleterious effects on the soft tissue.  Generic risks include: steroid flare (advised to ice if necessary), skin hypo-pgimentation (which can be permanent and unsightly), elevation of blood sugar, subcutaneous atrophy (can be permanent) and infection.   -XR/imaging reviewed by me: agree with read  -labs reviewed by me: ok for vgel  -implemented icing/stretching regimen      -rxs dispensed: none  -referrals: none  -WB: wbat      Follow up if symptoms worsen or fail to improve.    Subjective:      Patient ID: Inna Gallardo is a 49 y.o. female.    Chief Complaint: No chief complaint on file.      CC - foot pain: patient presents to the podiatry clinic  with complaint of  right foot pain. Onset of the symptoms was  had OA treated with midfoot fusion. Fusion had nonunion and painful hardware. Hardware was removed and pain improved slightly but never went away. All surgeries with dr. bettencourt . Precipitating event: unk. Current symptoms include: ability to bear weight, but with some pain, stiffness, swelling and worsening symptoms after a period of activity. Aggravating factors: walking and certain shoegear. Symptoms have gradually worsened. Patient has had prior foot problems. Evaluation to date: plain films: pending . Treatment to date:  as above . Patients rates pain 7/10 on pain scale.    HPI    Last Podiatry Enc: Visit date not found  Last Enc w/ Me: Visit date not found    Outside reports reviewed: historical medical records.  Family hx: as below  Past Medical History:   Diagnosis Date    Anxiety     Diverticulitis     Endometriosis     General anesthetics causing adverse effect  in therapeutic use     Hypertension     Kidney stone     Prolactinoma 2011    Sliding hiatal hernia     Urinary tract infection     Vaginal infection      Past Surgical History:   Procedure Laterality Date    ARTHROSCOPY OF KNEE Left     BRAIN TUMOR EXCISION  9/2011    removal of prolactinoma    COLONOSCOPY N/A 8/27/2020    Procedure: COLONOSCOPY;  Surgeon: Valentino Negro MD;  Location: [facility];  Service: Endoscopy;  Laterality: N/A;    CYSTOSCOPY      CYSTOSCOPY W/ URETERAL STENT PLACEMENT  2016    CYSTOSCOPY W/ URETERAL STENT REMOVAL  2016    ESOPHAGEAL MANOMETRY N/A 6/3/2019    Procedure: MANOMETRY, ESOPHAGUS;  Surgeon: Bhupinder Schmitz MD;  [facility]  Service: Endoscopy;  Laterality: N/A;    ESOPHAGOGASTRODUODENOSCOPY N/A 4/30/2019    Procedure: ESOPHAGOGASTRODUODENOSCOPY (EGD);  Surgeon: Altagracia Bose MD;  Location: [facility];  Service: Endoscopy;  Laterality: N/A;    ESOPHAGOGASTRODUODENOSCOPY N/A 8/2/2021    Procedure: EGD (ESOPHAGOGASTRODUODENOSCOPY);  Surgeon: Altagracia Bose MD;  Location: [facility];  Service: Endoscopy;  Laterality: N/A;    EXTRACORPOREAL SHOCK WAVE LITHOTRIPSY Left 3/7/2024    Procedure: LITHOTRIPSY, ESWL;  Surgeon: Roverto Harley MD;  [facility]: [facility];  Service: Urology;  Laterality: Left;    FOOT HARDWARE REMOVAL Right 6/16/2020    Procedure: REMOVAL, HARDWARE, FOOT;  Surgeon: Adrianna Tillman DPM;  Location: [facility];  Service: Podiatry;  Laterality: Right;    HARVESTING OF BONE GRAFT Right 7/16/2019    Procedure: SURGICAL PROCUREMENT, BONE GRAFT;  Surgeon: Adrianna Tillman DPM;  Location: [facility];  Service: Podiatry;  Laterality: Right;    HYSTERECTOMY  3/16/15    [facility]/BSO for Endometriosis, AUB, fibroids, cyst    LITHOTRIPSY      MANDIBLE SURGERY  2002    severe overbite correction    PELVIC LAPAROSCOPY  2014    Dx scope, chromopertubation-Endometriosis    TONSILLECTOMY, ADENOIDECTOMY       Family History   Problem Relation Name Age of Onset     Cancer Mother      Breast cancer Neg Hx      Ovarian cancer Neg Hx      Kidney disease Neg Hx       Current Outpatient Medications   Medication Sig Dispense Refill    EPINEPHrine (EPIPEN) 0.3 mg/0.3 mL AtIn Inject 0.3 mLs into the muscle as needed.  0    linaCLOtide (LINZESS) 145 mcg Cap capsule Take 1 capsule (145 mcg total) by mouth once daily. 30 capsule 2    losartan-hydrochlorothiazide 100-25 mg (HYZAAR) 100-25 mg per tablet Take 1 tablet by mouth once daily.      omeprazole (PRILOSEC) 40 MG capsule Take 1 capsule (40 mg total) by mouth every morning. 30 capsule 11    sertraline (ZOLOFT) 100 MG tablet Take 100 mg by mouth every evening.      baclofen (LIORESAL) 10 MG tablet Take 10 mg by mouth 3 (three) times daily as needed. (Patient not taking: Reported on 2024)      cetirizine (ZYRTEC) 10 MG tablet Take 10 mg by mouth once daily. (Patient not taking: Reported on 2024)      ergocalciferol (ERGOCALCIFEROL) 50,000 unit Cap Take 50,000 Units by mouth every 7 days.      hydrOXYzine HCL (ATARAX) 25 MG tablet Take 25 mg by mouth 3 (three) times daily. (Patient not taking: Reported on 2024)      ondansetron (ZOFRAN-ODT) 4 MG TbDL Take 1 tablet (4 mg total) by mouth every 6 (six) hours as needed (nausea). (Patient not taking: Reported on 2024) 20 tablet 0    tamsulosin (FLOMAX) 0.4 mg Cap Take 1 capsule (0.4 mg total) by mouth once daily. (Patient not taking: Reported on 2024) 90 capsule 0     No current facility-administered medications for this visit.     Review of patient's allergies indicates:   Allergen Reactions    Ace inhibitors Other (See Comments)     cough     Social History     Socioeconomic History    Marital status:     Years of education: college   Tobacco Use    Smoking status: Former     Current packs/day: 0.00     Types: Cigarettes     Start date: 1989     Quit date: 2000     Years since quittin.3    Smokeless tobacco: Never    Tobacco comments:     pt  stated she smoked one cig/day   Substance and Sexual Activity    Alcohol use: Never    Drug use: Never    Sexual activity: Yes     Partners: Male     Birth control/protection: Surgical, See Surgical Hx     Social Determinants of Health     Financial Resource Strain: Low Risk  (5/20/2024)    Received from [facility]    Overall Financial Resource Strain (CARDIA)     Difficulty of Paying Living Expenses: Not hard at all   Food Insecurity: No Food Insecurity (5/20/2024)    Received from [facility]    Hunger Vital Sign     Worried About Running Out of Food in the Last Year: Never true     Ran Out of Food in the Last Year: Never true   Transportation Needs: No Transportation Needs (5/20/2024)    Received from [facility]    PRAPARE - Transportation     Lack of Transportation (Medical): No     Lack of Transportation (Non-Medical): No   Physical Activity: Unknown (5/20/2024)    Received from [facility]    Exercise Vital Sign     Days of Exercise per Week: 3 days   Stress: No Stress Concern Present (5/20/2024)    Received from [facility]    [employer] [place] [employer] - Occupational Stress Questionnaire     Feeling of Stress : Not at all   Housing Stability: Low Risk  (4/18/2022)    Received from [facility]    Housing Stability Vital Sign     Unable to Pay for Housing in the Last Year: No     Number of Places Lived in the Last Year: 1     In the last 12 months, was there a time when you did not have a steady place to sleep or slept in a shelter (including now)?: No       ROS    REVIEW OF SYSTEMS: Negative as documented below as well as positive findings in bold.       Constitutional  Respiratory  Gastrointestinal  Skin   - Fever - Cough - Heartburn - Rash   - Chills - Spit blood - Nausea - Itching   - Weight Loss - Shortness of breath - Vomiting - Nail pain   - Malaise/Fatigue - Wheezing - Abdominal Pain  Wound/Ulcer   - Weight Gain   - Blood in Stool  Poor wound healing       - Diarrhea           Cardiovascular  Genitourinary  Neurological  HEENT   - Chest Pain - Dysuria - Burning Sensation of feet - Headache   - Palpitations - Hematuria - Tingling / Paresthesia - Congestion   - Pain at night in legs - Flank Pain - Dizziness - Sore Throat   - Cramping   - Tremor - Blurred Vision   - Leg Swelling   - Sensory Change - Double Vision   - Dizzy when standing   - Speech Change - Eye Redness       - Focal Weakness - Dry Eyes       - Loss of Consciousness          Endocrine  Musculoskeletal  Psychiatric   - Cold intolerance - Muscle Pain - Depression   - Heat intolerance - Neck Pain - Insomnia   - Anemia - Joint Pain - Memory Loss   -  Easy bruising, bleeding - Heel pain - Anxiety      Toe Pain        Leg/Ankle/Foot Pain         Objective:     Wt 119.2 kg (262 lb 12.6 oz)   LMP 03/03/2015   BMI 46.55 kg/m²   Vitals:    06/11/24 0923   Weight: 119.2 kg (262 lb 12.6 oz)   PainSc:   7       Physical Exam    General Appearance:   Patient appears well developed, well nourished  Patient appears stated age    Psychiatric:   Patient is oriented to time, place, and person.  Patient has appropriate mood and affect    Neck:  Trachea Midline  No visible masses    Respiratory/Ears:  No distress or labored breathing.  Able to differentiate between normal talking voice and whisper.  Able to follow commands    Eyes:  Visual Acuity intact  Lids and conjunctivae normal. No discoloration noted.    Foot Exam  Physical Exam  Ortho Exam  Ortho/SPM Exam  Physical Exam  Foot/Ankle Musculoskeletal Exam    R LE exam con't:  V:  DP 2/4, PT 2/4   CRT< 3s to all digits tested   Tibial and popliteal lymph nodes are w/o abnormality   Edema: present, varicosities: present    N:  Patient displays normal ankle reflexes   SILT in SP/DP/T/Jimmy/Saph distributions    Ortho: +Motor EHL/FHL/TA/GA   Observed enlarged bony prominence at the midfoot   bony deformity present   There is decreased ROM at the lesser TMT joints: pain is present, crepitus  is present  There is moderate pain with palpation of lesser TMTJs  Compartments soft/compressible. No pain on passive stretch of big toe. No calf  Pain.    Derm:  skin intact, skin warm and dry, skin without ulcers or lesions, skin without induration, nails normal, no erythema and no ecchymosis    Imaging / Labs:      X-Ray Foot Complete Right    Result Date: 6/6/2024  EXAMINATION: XR FOOT COMPLETE 3 VIEW RIGHT CLINICAL HISTORY: XR FOOT COMPLETE 3 VIEW RIGHTPain in right foot COMPARISON: None FINDINGS: Three views of the right foot were obtained. No evidence of acute fracture or dislocation.  Bony mineralization is normal.  Moderate soft tissue swelling.  Small heel spur.  Portion of a fractured screw is seen within the distal carpal row.     No acute fracture or dislocation.    See above Electronically signed by: Wilfrido Wheat MD Date:    06/06/2024 Time:    08:42          Note: This was dictated using a computer transcription program. Although proofread, it may contain computer transcription errors and phonetic errors. Other human proofreading errors may also exist. Corrections may be performed at a later time. Please contact us for any clarification if needed.    Jose Elias Laughlin DPM  [facility]

## 2024-06-11 NOTE — PATIENT INSTRUCTIONS
What Is Arthritis in the Foot?    Osteoarthritis is a condition characterized by the breakdown and eventual loss of cartilage in one or more joints. Cartilage (the connective tissue found at the end of the bones in the joints) protects and cushions the bones during movement. When cartilage deteriorates or is lost, symptoms develop that can restrict ones ability to easily perform daily activities.    Osteoarthritis is also known as degenerative arthritis, reflecting its nature to develop as part of the aging process. As the most common form of arthritis, osteoarthritis affects millions of Americans. Some people refer to osteoarthritis simply as arthritis, even though there are many different types of arthritis.    Osteoarthritis appears at various joints throughout the body, including the hands, feet, spine, hips and knees. In the foot, the disease most frequently occurs in the big toe, although it is also often found in the midfoot and ankle.    Degenerative arthritis is a condition that slowly wears away joints, the area where bones meet and move. In the beginning, you may notice that the affected joint seems stiff. It may even ache. As the joint lining (cartilage) breaks down, the bones rub against each other, causing pain and swelling. Over time, small pieces of rough or splintered bone (bone spurs) develop, and the joints range of motion becomes limited. But movement doesnt have to cause pain. The effects of arthritis can be reduced.    The big-toe joint  When arthritis affects your big toe, your foot hurts when it pushes off the ground. Arthritis often appears in the big-toe joint along with a bunion (a bony bump at the side of the joint) or a bone spur on top of the joint.    Other joints  When arthritis affects the rear or midfoot joints, you feel pain when you put weight on your foot. Arthritis may affect the joint where the ankle and foot meet. It may also affect other joints  nearby.    Symptoms  People with osteoarthritis in the foot or ankle experience, in varying degrees, one or more of the following:    -Pain and stiffness in the joint  -Swelling in or near the joint  -Difficulty walking or bending the joint     Some patients with osteoarthritis also develop a bone spur (a bony protrusion) at the affected joint. Shoe pressure may cause pain at the site of a bone spur, and in some cases, blisters or calluses may form over its surface. Bone spurs can also limit the movement of the joint.    Diagnosis  In diagnosing osteoarthritis, the foot and ankle surgeon will examine the foot thoroughly, looking for swelling in the joint, limited mobility and pain with movement. In some cases, deformity and/or enlargement (spur) of the joint may be noted. X-rays may be ordered to evaluate the extent of the disease.     Causes  Osteoarthritis is considered a wear-and-tear disease because the cartilage in the joint wears down with repeated stress and use over time. As the cartilage deteriorates and gets thinner, the bones lose their protective covering and eventually may rub together, causing pain and inflammation of the joint.    An injury may also lead to osteoarthritis, although it may take months or years after the injury for the condition to develop. For example, osteoarthritis in the big toe is often caused by kicking or jamming the toe or by dropping something on the toe. Osteoarthritis in the midfoot is often caused by dropping something on it or by a sprain or fracture. In the ankle, osteoarthritis is usually caused by a fracture and occasionally by a severe sprain.      Sometimes osteoarthritis develops as a result of abnormal foot mechanics, such as flat feet or high arches. A flat foot causes less stability in the ligaments (bands of tissue that connect bones), resulting in excessive strain on the joints, which can cause arthritis. A high arch is rigid and lacks mobility, causing a jamming  of joints that creates an increased risk of arthritis.        Treating Arthritis in the Foot  If your symptoms are mild, medications may be enough to reduce pain and swelling. For more severe arthritis, surgery may be needed to improve the condition of the joint.      Nonsurgical Treatment  To help relieve symptoms, the surgeon may begin treating osteoarthritis with one or more of the following nonsurgical approaches:    -Oral medications. Nonsteroidal anti-inflammatory drugs (NSAIDs), such as ibuprofen, are often helpful in reducing the inflammation and pain. Occasionally, a prescription for a steroid medication is needed to adequately reduce symptoms.  -Orthotic devices. Custom orthotic devices (shoe inserts) are often prescribed to provide support to improve the foots mechanics or cushioning to help minimize pain.  -Bracing. Bracing, which restricts motion and supports the joint, can reduce pain during walking and can help prevent further deformity.  -Immobilization. Protecting the foot from movement by wearing a cast or removable cast-boot may be necessary to allow the inflammation to resolve.  -Steroid injections. In some cases, steroid injections are applied to the affected joint to deliver anti-inflammatory medication.  -Physical therapy. Exercises to strengthen the muscles, especially when osteoarthritis occurs in the ankle, may give the patient greater stability and may help him or her avoid injury that might worsen the condition.    When Is Surgery Needed?  When osteoarthritis has progressed substantially or has failed to improve with nonsurgical treatment, surgery may be recommended. In advanced cases, surgery may be the only option. The goal of surgery is to decrease pain and improve function. The foot and ankle surgeon will consider a number of factors when selecting the procedure best suited to the patients condition and lifestyle.    Surgery and bone trimming  To ease movement and reduce pain, your  doctor may trim damaged bone. If arthritis is severe, the joint may be fused or removed. If the bone is not damaged too badly, your doctor may simply shave away bone spurs. Any excess bone growth related to a bunion may also be trimmed.    Fusing joints / Replacing Joints  If damage is more severe, your doctor may fuse the joint to prevent the bones from rubbing. Afterward, staples, plates, or screws may hold the bones in place so they heal properly. In some cases, the joint may be removed and replaced with an implant.    After surgery  During the early stages of recovery, your foot is likely to be bandaged and immobilized for a while. For best results, follow up with your doctor as scheduled. These visits help ensure that your foot heals properly.    As you heal  After surgery, youll be told how to care for your incision and how soon to begin walking on the foot. Until the foot can bear weight, you may need to walk with crutches or a cane.  For surgery on the big toe, your foot may be splinted to limit movement for several weeks. Despite this, you should be able to walk soon after surgery.  For surgery on rear or midfoot joints, you may need to wear a cast or surgical shoe. These joints are fairly large, so full recovery may take a few months. Once the bone has healed, any staples, plates, or screws may be removed.    Date Last Reviewed: 7/1/2016 © 2000-2016 [employer]. [address] [place] [address], [place] [address]. All rights reserved. This information is not intended as a substitute for professional medical care. Always follow your healthcare professional's instructions.    Nonunions    With modern treatment methods, most broken bones (fractures) or elective bone osteotomies or fusions heal without any problems. After a broken bone is treated, new bone tissue begins to form and connect the broken pieces.  Some broken bones  or elective bone osteotomies or fusions do not heal even when they get the  "best surgical or nonsurgical treatment. In some cases, certain risk factors make it more likely that a bone will fail to heal. When a broken bone fails to heal it is called a "nonunion." A "delayed union" is when a fracture takes longer than usual to heal.    Bone Healing  For bone healing to happen, the bone needs adequate stability and blood supply. Good nutrition also plays a role in bone healing.  Stability. All treatment of broken bones follows one basic rule: the broken pieces must be put back into position and prevented from moving out of place until they heal. Some fractures can be held in position with a cast. Some fractures require surgical fixation with devices like screws, plates, rods and frames.   Blood supply. Blood delivers the components required for healing to the fracture site. These include oxygen, healing cells, and the body's own chemicals necessary for healing (growth factors). The blood supply to the injured bone usually comes back on its own during the healing period  Nutrition. A broken bone also needs adequate nutrition to heal. Eating a healthy and well-balanced diet that includes protein, calcium, vitamin C, and vitamin D is the best way to ensure adequate nutrition; dietary supplements that go beyond the daily requirements are not effective. (The rare exception is the severely malnourished patient with many injured organs. In this case, the doctor will discuss dietary guidelines and make recommendations for adding dietary supplements.)  Causes  Nonunions happen when the bone lacks adequate stability, blood flow, or both. They also are more likely if the bone breaks from a high-energy injury, such as from a car wreck, because severe injuries often impair blood supply to the broken bone.  Risk Factors  Several factors increase the risk of nonunion.  Use of tobacco or nicotine in any form (smoking, chewing tobacco, and use of nicotine gum or patches) inhibits bone healing and increase the " chance of a nonunion   Older age   Severe anemia   Diabetes   A low vitamin D level   Hypthyroidism   Poor nutrition   Medications including anti-inflammatory drugs such as aspirin, ibuprofen, and prednisone. The physician and patient should always discuss the risks and benefits of using these medications during fracture healing   Infection   A complicated break that is open or compound  Blood Supply  Nonunions are more likely to happen if the injured bone has a limited blood supply.  Some bones, such as toe bones, have inherent stability and excellent blood supply. They can be expected to heal with minimal treatment.   Some bones, such as the upper thighbone (femoral head and neck) and small wrist bone (scaphoid), have a limited blood supply. The blood supply can be destroyed when these bones are broken.   Some bones, such as the shinbone (tibia), have a moderate blood supply, however, an injury can disrupt it. For example, a high-energy injury can damage the skin and muscle over the bone and destroy the external blood supply. In addition, the injury can destroy the internal blood supply found in the marrow at the center of the bone.  Symptoms  Patients with nonunions usually feel pain at the site of the break long after the initial pain of the fracture disappears. This pain may last months, or even years. It may be constant, or it may occur only when the broken arm or leg is used.    Treatment  Nonsurgical and surgical treatments for nonunions have advantages and disadvantages. More than one alternative may be appropriate. Discuss with your doctor the unique benefits and risks of treating your nonunion. Your doctor will recommend the treatment option that is right for you.  Nonsurgical Treatment  Some nonunions can be treated nonsurgically. The most common nonsurgical treatment is a bone stimulator. This small device delivers ultrasonic or pulsed electromagnetic waves that stimulate healing The patient places the  "stimulator on the skin over the nonunion from 20 minutes to several hours daily. This treatment must be used every day to be effective.  Surgical Treatment  Surgery is needed when nonsurgical methods fail. You may also need a second surgery if the first surgery failed. Surgical options include bone graft or bone graft substitute, internal fixation, and/or external fixation.  Bone Graft. During this procedure, bone from another part of the body at the fracture site to "jump start" the healing process. A bone graft provides a scaffold on which new bone may grow. Bone grafts also provide fresh bone cells and the naturally occurring chemicals the body needs for bone healing.    During the procedure, a surgeon makes an incision and removes (harvests) pieces of bone from different areas on the patient. These are then transplanted to the nonunion site. The rim of the pelvis or "iliac crest" is most often used for harvesting bone. Although harvesting the bone may be painful, the amount of bone removed usually does not cause functional, structural, or cosmetic problems.  Allograft (cadaver bone graft). An allograft (cadaver) bone graft avoids harvesting bone from the patient, and therefore, decreases the pain involved with treating the nonunion. Like a traditional bone graft, it provides scaffolding for the patient's bone to heal across the area of the nonunion. As time goes on, the patient's bone replaces the cadaver bone. Although there is a theoretical risk of infection, the cadaver bone graft is processed and sterilized to minimize this risk.   Bone graft substitutes and/or osteobiologics. As with allografts, bone graft substitutes avoid the bone harvesting procedure and related pain. Although bone graft substitutes do not provide the fresh bone cells needed for normal healing, they do provide a scaffold chemicals needed for growth.  Depending on the type of nonunion, any of the above materials, or a combination of " materials, may be used to fix the nonunion.  Bone grafts (or bone graft substitutes) alone provide no stability to the fracture site. Unless the nonunion is inherently stable, you may also need more surgical procedures (internal or external fixation) to improve stabililty.  Internal Fixation. Internal fixation stabilizes a nonunion. The surgeon attaches metal plates and screws to the outside of the bone or places a nail (thaddeus) in the inside canal of the bone.    If a nonunion occurs after internal fixation surgery, another internal fixation surgery may be needed to increase stability. The surgeon may use a more rigid device, such as a larger thaddeus (nail) or a longer plate. Removing a previously inserted nail and inserting a larger one (exchange nailing) increases stability and stimulates healing within the bone. Internal fixation can be combined with bone grafting to help stability and stimulate healing.  External fixation. External fixation stabilizes the injured bone, as well. The surgeon attaches a rigid frame to the outside of the injured arm or leg. The frame is attached to the bone with wires or pins. External fixation may be used to increase the stability of the fracture site if instability helped cause the nonunion. External fixation can treat nonunions in a patient who also has bone loss and/or infection.

## 2024-06-13 ENCOUNTER — TELEPHONE (OUTPATIENT)
Dept: ORTHOPEDICS | Facility: CLINIC | Age: 49
End: 2024-06-13
Payer: MEDICAID

## 2024-06-13 NOTE — TELEPHONE ENCOUNTER
----- Message from Corwin Avalos sent at 6/13/2024 12:26 PM CDT -----  Type: General Call Back     Name of Caller:pt   Reason pt had an appt originally scheduled on 06/20 the appt was moved to 06/27 and the paitent states no one gave her an call to notify her of the change, please give the pt an call as soon as possible   Would the patient rather a call back or a response via [facility]? Call   Best Call Back Number:[phone]   Additional Information:

## 2024-06-27 ENCOUNTER — HOSPITAL ENCOUNTER (OUTPATIENT)
Dept: RADIOLOGY | Facility: HOSPITAL | Age: 49
Discharge: HOME | End: 2024-06-27
Attending: ORTHOPAEDIC SURGERY
Payer: MEDICAID

## 2024-06-27 ENCOUNTER — CLINICAL SUPPORT (OUTPATIENT)
Dept: LAB | Facility: HOSPITAL | Age: 49
End: 2024-06-27
Attending: ORTHOPAEDIC SURGERY
Payer: MEDICAID

## 2024-06-27 ENCOUNTER — OFFICE VISIT (OUTPATIENT)
Dept: ORTHOPEDICS | Facility: CLINIC | Age: 49
End: 2024-06-27
Payer: MEDICAID

## 2024-06-27 VITALS — WEIGHT: 262.81 LBS | BODY MASS INDEX: 46.57 KG/M2 | HEIGHT: 63 IN

## 2024-06-27 DIAGNOSIS — E66.01 MORBID OBESITY: ICD-10-CM

## 2024-06-27 DIAGNOSIS — M25.562 CHRONIC PAIN OF LEFT KNEE: ICD-10-CM

## 2024-06-27 DIAGNOSIS — G89.29 CHRONIC PAIN OF LEFT KNEE: ICD-10-CM

## 2024-06-27 DIAGNOSIS — M17.11 PRIMARY OSTEOARTHRITIS OF RIGHT KNEE: ICD-10-CM

## 2024-06-27 DIAGNOSIS — G89.29 CHRONIC PAIN OF LEFT KNEE: Primary | ICD-10-CM

## 2024-06-27 DIAGNOSIS — M17.12 PRIMARY OSTEOARTHRITIS OF LEFT KNEE: ICD-10-CM

## 2024-06-27 DIAGNOSIS — M25.562 CHRONIC PAIN OF LEFT KNEE: Primary | ICD-10-CM

## 2024-06-27 LAB
OHS QRS DURATION: 90 MS
OHS QTC CALCULATION: 406 MS

## 2024-06-27 PROCEDURE — 99999PBSHW PR PBB SHADOW TECHNICAL ONLY FILED TO HB: Mod: PBBFAC,,,

## 2024-06-27 PROCEDURE — 77073 BONE LENGTH STUDIES: CPT | Mod: TC,FY

## 2024-06-27 PROCEDURE — 1159F MED LIST DOCD IN RCRD: CPT | Mod: CPTII,,, | Performed by: ORTHOPAEDIC SURGERY

## 2024-06-27 PROCEDURE — 3008F BODY MASS INDEX DOCD: CPT | Mod: CPTII,,, | Performed by: ORTHOPAEDIC SURGERY

## 2024-06-27 PROCEDURE — 71045 X-RAY EXAM CHEST 1 VIEW: CPT | Mod: 26,,, | Performed by: RADIOLOGY

## 2024-06-27 PROCEDURE — 99999 PR PBB SHADOW E&M-EST. PATIENT-LVL IV: CPT | Mod: PBBFAC,,, | Performed by: ORTHOPAEDIC SURGERY

## 2024-06-27 PROCEDURE — 93010 ELECTROCARDIOGRAM REPORT: CPT | Mod: ,,, | Performed by: INTERNAL MEDICINE

## 2024-06-27 PROCEDURE — 77073 BONE LENGTH STUDIES: CPT | Mod: 26,,, | Performed by: RADIOLOGY

## 2024-06-27 PROCEDURE — 20610 DRAIN/INJ JOINT/BURSA W/O US: CPT | Mod: 50,PBBFAC,PN | Performed by: ORTHOPAEDIC SURGERY

## 2024-06-27 PROCEDURE — 71045 X-RAY EXAM CHEST 1 VIEW: CPT | Mod: TC,FY

## 2024-06-27 PROCEDURE — 99214 OFFICE O/P EST MOD 30 MIN: CPT | Mod: PBBFAC,PN,25 | Performed by: ORTHOPAEDIC SURGERY

## 2024-06-27 PROCEDURE — 99215 OFFICE O/P EST HI 40 MIN: CPT | Mod: 25,S$PBB,, | Performed by: ORTHOPAEDIC SURGERY

## 2024-06-27 PROCEDURE — 93005 ELECTROCARDIOGRAM TRACING: CPT

## 2024-06-27 RX ORDER — KETOROLAC TROMETHAMINE 30 MG/ML
30 INJECTION, SOLUTION INTRAMUSCULAR; INTRAVENOUS
Status: DISCONTINUED | OUTPATIENT
Start: 2024-06-27 | End: 2024-06-27 | Stop reason: HOSPADM

## 2024-06-27 RX ORDER — LIDOCAINE HYDROCHLORIDE 10 MG/ML
4 INJECTION INFILTRATION; PERINEURAL
Status: DISCONTINUED | OUTPATIENT
Start: 2024-06-27 | End: 2024-06-27 | Stop reason: HOSPADM

## 2024-06-27 RX ORDER — BUPIVACAINE HYDROCHLORIDE 5 MG/ML
5 INJECTION, SOLUTION PERINEURAL
Status: DISCONTINUED | OUTPATIENT
Start: 2024-06-27 | End: 2024-06-27 | Stop reason: HOSPADM

## 2024-06-27 RX ADMIN — BUPIVACAINE HYDROCHLORIDE 5 ML: 5 INJECTION, SOLUTION PERINEURAL at 09:06

## 2024-06-27 RX ADMIN — LIDOCAINE HYDROCHLORIDE 4 ML: 10 INJECTION, SOLUTION INFILTRATION; PERINEURAL at 09:06

## 2024-06-27 RX ADMIN — KETOROLAC TROMETHAMINE 30 MG: 30 INJECTION, SOLUTION INTRAMUSCULAR; INTRAVENOUS at 09:06

## 2024-06-27 NOTE — H&P (VIEW-ONLY)
Subjective:      Patient ID: Inna Gallardo is a 49 y.o. female.    Chief Complaint: Pain of the Left Knee and Pain of the Right Knee    Knee Pain: bilateral  swelling: Yes  worsens with activity: Yes  relieved by: injections, 1 mo relief           Social History     Occupational History    Not on file   Tobacco Use    Smoking status: Former     Current packs/day: 0.00     Types: Cigarettes     Start date: 1989     Quit date: 2000     Years since quittin.4    Smokeless tobacco: Never    Tobacco comments:     pt stated she smoked one cig/day   Substance and Sexual Activity    Alcohol use: Never    Drug use: Never    Sexual activity: Yes     Partners: Male     Birth control/protection: Surgical, See Surgical Hx      Social Determinants of Health     Tobacco Use: Medium Risk (2024)    Patient History     Smoking Tobacco Use: Former     Smokeless Tobacco Use: Never     Passive Exposure: Not on file   Alcohol Use: Not At Risk (2024)    Received from [facility]    AUDIT-C     Frequency of Alcohol Consumption: Never     Average Number of Drinks: Patient does not drink     Frequency of Binge Drinking: Never   Financial Resource Strain: Low Risk  (2024)    Received from [facility]    Overall Financial Resource Strain (CARDIA)     Difficulty of Paying Living Expenses: Not hard at all   Food Insecurity: No Food Insecurity (2024)    Received from [facility]    Hunger Vital Sign     Worried About Running Out of Food in the Last Year: Never true     Ran Out of Food in the Last Year: Never true   Transportation Needs: No Transportation Needs (2024)    Received from [facility]    PRAPARE - Transportation     Lack of Transportation (Medical): No     Lack of Transportation (Non-Medical): No   Physical Activity: Unknown (2024)    Received from [facility]    Exercise Vital Sign     Days of Exercise per Week: 3 days     Minutes of Exercise per Session: Not on file   Stress: No Stress  Concern Present (5/20/2024)    Received from [facility]    [employer] - Occupational Stress Questionnaire     Feeling of Stress : Not at all   Housing Stability: Low Risk  (4/18/2022)    [facility]    Housing Stability Vital Sign     Unable to Pay for Housing in the Last Year: No     Number of Places Lived in the Last Year: 1     In the last 12 months, was there a time when you did not have a steady place to sleep or slept in a shelter (including now)?: No   Depression: Low Risk  (6/4/2024)    Depression     Last PHQ-4: Flowsheet Data: 0   Utilities: Not At Risk (5/20/2024)    Received from [facility] Utilities     Threatened with loss of utilities: No   Health Literacy: Not on file   Social Isolation: Not on file      Review of Systems   Constitutional: Negative for diaphoresis.   HENT:  Negative for ear discharge, nosebleeds and stridor.    Eyes:  Negative for photophobia.   Cardiovascular:  Negative for syncope.   Respiratory:  Negative for hemoptysis, shortness of breath and wheezing.    Neurological:  Negative for tremors.   Psychiatric/Behavioral: Negative.           Objective:    General    Constitutional: She is oriented to person, place, and time. She appears well-developed and well-nourished.   HENT:   Head: Normocephalic and atraumatic.   Right Ear: External ear normal.   Left Ear: External ear normal.   Eyes: EOM are normal.   Cardiovascular:  Intact distal pulses.            Pulmonary/Chest: Effort normal.   Neurological: She is alert and oriented to person, place, and time.   Psychiatric: She has a normal mood and affect. Her behavior is normal. Judgment and thought content normal.     General Musculoskeletal Exam   Gait: antalgic and abnormal       Right Knee Exam     Inspection   Swelling: present  Effusion: present    Tenderness   The patient is tender to palpation of the medial joint line.    Crepitus   The patient has crepitus of  the patella.    Range of Motion   Flexion:  abnormal     Other   Sensation: normal    Left Knee Exam     Inspection   Swelling: present  Effusion: present    Tenderness   The patient tender to palpation of the medial joint line.    Crepitus   The patient has crepitus of the patella.    Other   Sensation: normal    Muscle Strength   Right Lower Extremity   Quadriceps:  4/5   Hamstrin/5   Left Lower Extremity   Quadriceps:  4/5   Hamstrin/5          Assessment:       1. Chronic pain of left knee    2. Morbid obesity    3. Primary osteoarthritis of right knee    4. Primary osteoarthritis of left knee          Plan:   Needs bmi < 40 for tka    we injected the bilateral knee w 1cc of ketorolac, marcaine and lidocaine under sterile conditions with the patient's informed consent for severe bone on bone knee oa. KL score 4     Left knee: We had a lengthy conversation about the benefits and risks of peripheral nerve stimulation.  they understand that the 1st phase is a trial where we will place leads and do a test to see if implantation helps improve their pain.  If it does improve the pain symptoms then they will be scheduled for the permanent placement. With respect to the trial they understand that leads will be coming out of the skin and we will activate the leads after they are awake from .  Risks include bleeding, continued pain, damage to the nerves, injury to the bone and soft tissues.  they understand this and feel that the benefits outweigh the risks.  We will go ahead and schedule this. Patient requesting to talk to  [employer]

## 2024-06-27 NOTE — PROGRESS NOTES
Subjective:      Patient ID: Inna Gallardo is a 49 y.o. female.    Chief Complaint: Pain of the Left Knee and Pain of the Right Knee    Knee Pain: bilateral  swelling: Yes  worsens with activity: Yes  relieved by: injections, 1 mo relief           Social History     Occupational History    Not on file   Tobacco Use    Smoking status: Former     Current packs/day: 0.00     Types: Cigarettes     Start date: 1989     Quit date: 2000     Years since quittin.4    Smokeless tobacco: Never    Tobacco comments:     pt stated she smoked one cig/day   Substance and Sexual Activity    Alcohol use: Never    Drug use: Never    Sexual activity: Yes     Partners: Male     Birth control/protection: Surgical, See Surgical Hx      Social Determinants of Health     Tobacco Use: Medium Risk (2024)    Patient History     Smoking Tobacco Use: Former     Smokeless Tobacco Use: Never     Passive Exposure: Not on file   Alcohol Use: Not At Risk (2024)    Received from [facility]    AUDIT-C     Frequency of Alcohol Consumption: Never     Average Number of Drinks: Patient does not drink     Frequency of Binge Drinking: Never   Financial Resource Strain: Low Risk  (2024)    Received from [facility]    Overall Financial Resource Strain (CARDIA)     Difficulty of Paying Living Expenses: Not hard at all   Food Insecurity: No Food Insecurity (2024)    Received from [facility]    Hunger Vital Sign     Worried About Running Out of Food in the Last Year: Never true     Ran Out of Food in the Last Year: Never true   Transportation Needs: No Transportation Needs (2024)    Received from [facility]    PRAPARE - Transportation     Lack of Transportation (Medical): No     Lack of Transportation (Non-Medical): No   Physical Activity: Unknown (2024)    Received from [facility]    Exercise Vital Sign     Days of Exercise per Week: 3 days     Minutes of Exercise per Session: Not on file   Stress: No Stress  Concern Present (5/20/2024)    Received from [facility]    [employer] [place] [employer] - Occupational Stress Questionnaire     Feeling of Stress : Not at all   Housing Stability: Low Risk  (4/18/2022)    Received from [facility], [facility]    Housing Stability Vital Sign     Unable to Pay for Housing in the Last Year: No     Number of Places Lived in the Last Year: 1     In the last 12 months, was there a time when you did not have a steady place to sleep or slept in a shelter (including now)?: No   Depression: Low Risk  (6/4/2024)    Depression     Last PHQ-4: Flowsheet Data: 0   Utilities: Not At Risk (5/20/2024)    Received from [facility] Utilities     Threatened with loss of utilities: No   Health Literacy: Not on file   Social Isolation: Not on file      Review of Systems   Constitutional: Negative for diaphoresis.   HENT:  Negative for ear discharge, nosebleeds and stridor.    Eyes:  Negative for photophobia.   Cardiovascular:  Negative for syncope.   Respiratory:  Negative for hemoptysis, shortness of breath and wheezing.    Neurological:  Negative for tremors.   Psychiatric/Behavioral: Negative.           Objective:    General    Constitutional: She is oriented to person, place, and time. She appears well-developed and well-nourished.   HENT:   Head: Normocephalic and atraumatic.   Right Ear: External ear normal.   Left Ear: External ear normal.   Eyes: EOM are normal.   Cardiovascular:  Intact distal pulses.            Pulmonary/Chest: Effort normal.   Neurological: She is alert and oriented to person, place, and time.   Psychiatric: She has a normal mood and affect. Her behavior is normal. Judgment and thought content normal.     General Musculoskeletal Exam   Gait: antalgic and abnormal       Right Knee Exam     Inspection   Swelling: present  Effusion: present    Tenderness   The patient is tender to palpation of the medial joint line.    Crepitus   The patient has crepitus of  the patella.    Range of Motion   Flexion:  abnormal     Other   Sensation: normal    Left Knee Exam     Inspection   Swelling: present  Effusion: present    Tenderness   The patient tender to palpation of the medial joint line.    Crepitus   The patient has crepitus of the patella.    Other   Sensation: normal    Muscle Strength   Right Lower Extremity   Quadriceps:  4/5   Hamstrin/5   Left Lower Extremity   Quadriceps:  4/5   Hamstrin/5          Assessment:       1. Chronic pain of left knee    2. Morbid obesity    3. Primary osteoarthritis of right knee    4. Primary osteoarthritis of left knee          Plan:   Needs bmi < 40 for tka    we injected the bilateral knee w 1cc of ketorolac, marcaine and lidocaine under sterile conditions with the patient's informed consent for severe bone on bone knee oa. KL score 4     Left knee: We had a lengthy conversation about the benefits and risks of peripheral nerve stimulation.  they understand that the 1st phase is a trial where we will place leads and do a test to see if implantation helps improve their pain.  If it does improve the pain symptoms then they will be scheduled for the permanent placement. With respect to the trial they understand that leads will be coming out of the skin and we will activate the leads after they are awake from .  Risks include bleeding, continued pain, damage to the nerves, injury to the bone and soft tissues.  they understand this and feel that the benefits outweigh the risks.  We will go ahead and schedule this. Patient requesting to talk to  re technology

## 2024-06-27 NOTE — PROCEDURES
Large Joint Aspiration/Injection: bilateral knee    Date/Time: 6/27/2024 9:15 AM    Performed by: Calvin Nguyen MD  Authorized by: Calvin Nguyen MD    Consent Done?:  Yes (Verbal)  Indications:  Arthritis  Site marked: the procedure site was marked    Timeout: prior to procedure the correct patient, procedure, and site was verified    Prep: patient was prepped and draped in usual sterile fashion      Local anesthesia used?: Yes    Local anesthetic:  Topical anesthetic    Details:  Needle Size:  22 G  Ultrasonic Guidance for needle placement?: No    Approach:  Anterolateral  Location:  Knee  Laterality:  Bilateral  Site:  Bilateral knee  Medications (Right):  30 mg ketorolac 30 mg/mL (1 mL); 5 mL BUPivacaine 0.5 % (5 mg/mL); 4 mL LIDOcaine HCL 10 mg/ml (1%) 10 mg/mL (1 %)  Medications (Left):  30 mg ketorolac 30 mg/mL (1 mL); 5 mL BUPivacaine 0.5 % (5 mg/mL); 4 mL LIDOcaine HCL 10 mg/ml (1%) 10 mg/mL (1 %)  Patient tolerance:  Patient tolerated the procedure well with no immediate complications

## 2024-07-11 NOTE — DISCHARGE INSTRUCTIONS
Activity as tolerated   Ok to change dressing   Ok to shower  Follow up with Dr. Nguyen as scheduled      ANESTHESIA  -For the first 24 hours after surgery:  Do not drive, use heavy equipment, make important decisions, or drink alcohol  -It is normal to feel sleepy for several hours.  Rest until you are more awake.  -Have someone stay with you, if needed.  They can watch for problems and help keep you safe.  -Some possible post anesthesia side effects include: nausea and vomiting, sore throat and hoarseness, sleepiness, and dizziness.    PAIN  -If you have pain after surgery, pain medicine will help you feel better.  Take it as directed, before pain becomes severe.  Most pain relievers taken by mouth need at least 20-30 minutes to start working.  -Do not drive or drink alcohol while taking pain medicine.  -Pain medication can upset your stomach.  Taking them with a little food may help.  -Other ways to help control pain: elevation, ice, and relaxation  -Call your surgeon if still having unmanageable pain an hour after taking pain medicine.  -Pain medicine can cause constipation.  Taking an over-the counter stool softener while on prescription pain medicine and drinking plenty of fluids can prevent this side effect.  -Call your surgeon if you have severe side effects like: breathing problems, trouble waking up, dizziness, confusion, or severe constipation.    NAUSEA  -Some people have nausea after surgery.  This is often because of anesthesia, pain, pain medicine, or the stress of surgery.  -Do not push yourself to eat.  Start off with clear liquids and soup.  Slowly move to solid foods.  Don't eat fatty, rich, spicy foods at first.  Eat smaller amounts.  -If you develop persistent nausea and vomiting please notify your surgeon immediately.    BLEEDING  -Different types of surgery require different types of care and dressing changes.  It is important to follow all instructions and advice from your surgeon.  Change  dressing as directed.  Call your surgeon for any concerns regarding postop bleeding.    SIGNS OF INFECTION  -Signs of infection include: fever, swelling, drainage, and redness  -Notify your surgeon if you have a fever of 100.4 F (38.0 C) or higher.  -Notify your surgeon if you notice redness, swelling, increased pain, pus, or a foul smell at the incision site.

## 2024-07-12 ENCOUNTER — OFFICE VISIT (OUTPATIENT)
Dept: PSYCHIATRY | Facility: CLINIC | Age: 49
End: 2024-07-12
Payer: MEDICAID

## 2024-07-12 DIAGNOSIS — F41.9 ANXIETY: ICD-10-CM

## 2024-07-12 DIAGNOSIS — M25.562 CHRONIC PAIN OF LEFT KNEE: ICD-10-CM

## 2024-07-12 DIAGNOSIS — Z00.8 ENCOUNTER FOR PRE-SURGICAL PSYCHOLOGICAL ASSESSMENT: Primary | ICD-10-CM

## 2024-07-12 DIAGNOSIS — E66.01 MORBID OBESITY: ICD-10-CM

## 2024-07-12 DIAGNOSIS — G89.29 CHRONIC PAIN OF LEFT KNEE: ICD-10-CM

## 2024-07-12 NOTE — PROGRESS NOTES
The patient location is: [place]  The chief complaint leading to consultation is: encounter     Visit type: audiovisual    Face to Face time with patient: 36 minutes    Each patient to whom he or she provides medical services by telemedicine is:  (1) informed of the relationship between the physician and patient and the respective role of any other health care provider with respect to management of the patient; and (2) notified that he or she may decline to receive medical services by telemedicine and may withdraw from such care at any time.    PRESURGICAL PSYCHOLOGICAL EVALUATION - PAIN MANAGEMENT  Psychiatry Initial Visit (PhD)  Psychological Intake and Assessment    Site: [facility], Department of Psychiatry, Psychology Section  [facility] [address] [address]    CPT Codes:   67923 (1 hour): Psychiatric Diagnostic Evaluation  38990 (1 hour), 90814 (1 hour): Integration of patient data, interpretation of standardized test results and clinical data, clinical decision-making, treatment planning and report, and interactive feedback to the patient  56722 (30 minutes), 54367 (30 minutes): Psychological test administration and scoring by technician, two or more tests, any method    NAME: Inna Gallardo  MRN: [id number]  : 1975     Date:  2024  Evaluation Length (direct face-to-face time): 36 minutes  Total Time including report writing, test scoring, chart review, integration of data and feedback: 120 minutes (1 hour- 74084, 1 hour-32741)    Clinical status of patient: Outpatient  Met with: Patient  Referred by: Calvin Nguyen M.D.    Chief complaint/reason for encounter: Psychological Evaluation prior to surgical implantation of a peripheral nerve stimulator (PNS) to address chronic pain.     Before this evaluation was initiated, the purposes and process of the assessment and the limits of confidentiality were discussed with the patient who expressed understanding of these issues and  verbally consented to proceed with the evaluation.      HISTORY OF PRESENT ILLNESS:  Ms. Gallardo is a 49-year-old female referred for Psychological Evaluation prior to surgical implantation of a peripheral nerve stimulator (PNS) to address chronic pain. She has chronic pain in her knees. Her pain has been present for approximately 10 years. She has tried physical therapy, medications, and injections without receiving sufficient relief. Her activities are greatly limited. She reports, It affects every part of my life. I'm in constant pain and hurting all the time. Especially with my special needs daughter, it's getting really difficult. I can't have knee replacement because I don't have help with her, and it's not a good time. She noted that she struggles with activities such as walking, playing with her daughter, being able to go to any function that requires any kind of walking, and standing for extended periods of time. She reported, I push through the pain, but it's miserable.    Ms. Gallardo is now interested in a trial of PNS. She has reviewed the educational materials and is familiar with the procedures involved. She understood risks of surgery including bleeding, infection, failure of surgery, misplaced hardware, migration of hardware, need for reoperation, etc. She reported no significant concerns related to PNS. When asked about her expectations regarding PNS, she reported that her doctor told me I would be just about pain free, so I'm hoping for that. Ms. Gallardo was informed that most individuals who complete PNS do not experience 100% reduction in pain and was encouraged to have flexible expectations related to the procedure. When asked about her motivations if she were to experience reductions in pain, she noted, I'd just like to do things with my daughter like go on the playground, go on the beach with her because she loves water, anything. I want to bring her places, walk a long  distance with her while she rides bikes. If PNS is not effective for her she noted she would not consider suicide as an option and would look forward to future treatment options. Her  and eldest daughter will be available to help her during recovery after surgery.    When asked how pain affects her mood, Ms. Gallardo reported, I get a little sad about it. Regarding mental health history, she reported past psychiatric treatment for anxiety. She does not report current psychiatric problems or major psychosocial stressors aside from anxiety.       MEDICAL HISTORY:  Relevant Medical History:   Past Medical History:   Diagnosis Date    Anxiety     Diverticulitis     Endometriosis     General anesthetics causing adverse effect in therapeutic use     Hypertension     Kidney stone     Prolactinoma 2011    Sliding hiatal hernia     Urinary tract infection     Vaginal infection       Patient Active Problem List   Diagnosis    Chronic pelvic pain in female    HTN (hypertension)    Gastroesophageal reflux disease    Endometriosis    Iron deficiency anemia    Pituitary microadenoma    Calculus of both kidneys    Chronic pain of left knee    Abnormal MRI, knee    Primary osteoarthritis of both knees    Surgical menopause on hormone replacement therapy    Tear of meniscus of left knee    Muscle weakness    Difficulty walking    Epigastric pain    Palpitations    Morbid obesity    Dysphagia    Foot pain, right    Mixed hyperlipidemia    Status post right foot surgery - Right Foot    Renal cyst, right    Primary osteoarthritis of left knee    Primary osteoarthritis of right knee    Leukocytosis (leucocytosis)    History of renal calculi    Gait, antalgic    Impaired functional mobility, balance, and endurance    Decreased range of motion of lumbar spine    Weakness of both lower extremities    Iron deficiency    Renal colic on left side    Renal calculus, left    Nausea vomiting and diarrhea     Pain Scales:   Current  level of pain: 6/10  Worst pain rating: 10/10  Best pain ratin/10    Current Health Behaviors:  Compliant with medical regimens and appointments: Yes  Prescription medication misuse: No  Exercise: No; Ms. Gallardo reported that she is not able to exercise currently due to chronic pain.   Adequate sleep: No; Ms. Gallardo reported that she struggles with getting adequate sleep due to chronic pain.   Adequate cognitive functioning: Yes    Current and Past Substance Use/Abuse:  Alcohol: Denied current use; denied history of abuse or dependency.   Drugs: Denied current use; denied history of abuse or dependency.  Tobacco: None.   Caffeine: Ms. Gallardo consumes approximately one cup of coffee and one soda daily.    Social History and Current Social Situation:    Ms. Gallardo was born and raised in [place], [place], by her biological mother and father along with two older brothers and one younger brother. She described her childhood as pretty good and noted, We were provided for and loved and everything. She denied childhood trauma, abuse, and neglect. She reported that her academic performance was not too good and made enough grades to pass. She was held back in the eighth grade. She reported a history of bullying throughout schooling. She earned a high school diploma and a cosmetology license. She denied being enrolled in special education. She denied significant detentions, suspensions, and expulsions but noted that she got into trouble in tato high due to fighting. 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 She previously worked as a  for 25 years and as a  for 4 years. She denied  service. She is not on disability, and finances are stable. She has been  to her  for 22 years. She has two biological children and one adopted daughter (ages 27, 24, and 10). She  currently lives with her  and 10-year-old daughter. Confucianist is important to her as a source of support and identifies as Shinto.  Legal history: She denied history of arrests and convictions. She denied current involvement in litigation.    PAST AND CURRENT MENTAL HEALTH:  Past Mental Health History:  Previous diagnosis: Anxiety; Ms. Gallardo reported that she began experiencing anxiety approximately two years ago. She reported that these symptoms likely relate to stress that comes with serving as a caretaker for her 10-year-old daughter.   Inpatient treatment: Denied.  Prior substance abuse treatment: Denied.  Outpatient treatment: Ms. Gallardo reported that she experienced a full hysterectomy in 2014 and was prescribed Paxil and hormones. She discontinued these medications in 2020. Ms. Gallardo was prescribed Zoloft approximately two years ago secondary to anxious symptomology.   Suicide attempt: Denied.  Non-suicidal self-injury: Denied.    Trauma History: Ms. Gallardo described the unexpected death of her brother at age 34 as traumatic, noting she had to take care of everything and make all arrangements following his death. She also described the death of her parents as traumatic.     Family Mental Health History:  Psychiatric illness: Denied.   Substance abuse: Denied.   Suicide: Denied.     Current Mental Health Treatment:  Medications: Ms. Gallardo has been prescribed Zoloft for approximately two years. She reported that she feels that this medication is helping in managing anxious symptomology.   Psychotherapy: Denied.    Current Mental Health Symptoms:  Depression: Denied. She denied episodes of depressed mood or depression-related anhedonia, lack of motivation, lethargy, difficulty concentrating, feelings of worthlessness or guilt, hopelessness, appetite changes, or psychomotor changes.   Elizabeth/Hypomania: Denied. She denied periods of elevated mood or abnormally increased energy or  goal-directed activity.  Anxiety:   Generalized Anxiety: Denied. She denied experiencing excessive, exaggerated anxiety that was unmanageable. She feels that anxiety is currently well-managed by psychiatric medication.   Panic Disorder: Denied.  OCD: Denied.  Insomnia: Denied.  Thought dysfunction: Denied delusions, hallucinations.  Non-suicidal or Suicidal thoughts/behaviors: Denied.  Personality functioning: Ms. Gallardo does not display any personality characteristics which would be an impediment to pursuing PNS.    Current Stress Management:  Current psychosocial stressors: Ms. Gallardo reported that her knee pain currently serves as her primary source of stress.   Report of coping skills: Ms. Gallardo noted that her radha serves as a source of coping. She noted that she frequently listens to Mandaeism music when stressed. She also noted that she turns to others for support when needed.   Recreational activities: Ms. Gallardo enjoys spending time with her daughter, going out to eat with her , and spending time with friends.   Support system: Ms. Gallardo reported that her  and her  serve as her primary sources of support.       PSYCHOLOGICAL ASSESSMENT/TESTING:  All tests were administered according to standardized procedures and were selected based on the reason for referral. Effort on all tests was satisfactory to produce valid results.    MMPI-3. The MMPI-3 provides an assessment of personality and psychopathology with specific evaluation of psychosocial risk factors associated with outcomes of PNS.  Ms. Gallardo produced an interpretable MMPI-3 profile. Of note, validity scale results suggest Ms. Gallardo tended to portray herself in a positive light by denying some minor faults and shortcomings that most people acknowledge. This level of self-presentation may reflect a background stressing traditional values. Ms. Gallardo's results should be interpreted with this issue in  mind, as it may indicate an underestimation of problems assessed by this test.   TEST RESULTS. Ms. Gallardo reports experiencing significant malaise, neurological complaints, and somatic complaints. Ms. Gallardo reports multiple physical health concerns including chronic pain, gastrointestinal symptoms, and head pain. Her test results also suggest that she experiences poor health, fatigue, sleep disturbance, weakness, and feelings of incapacitation. Her results do not indicate significant emotional, thought, behavioral, or interpersonal dysfunction.   GROUP COMPARISONS. Compared to other SCS candidates, Ms. Gallardo reports greater levels of somatic complaints, antisocial behavior, ideas of persecution, malaise, neurological complaints, and juvenile conduct problems. Only bolded concerns are clinically significant.   RISK ASSESSMENTS. The following likelihoods are based on test results and research and are correlational rather than causational. Ms. Gallardo's results do not suggest significant risks for adverse post-surgical outcomes.     PAIRS and PCS. The PAIRS and PCS reveal beliefs and attitudes related to pain that may impact outcomes of PNS. Elevated scores indicate the need to query the ability to cope with the pain experience, high levels of emotional distress when pain occurs, and a negative mental set and persistent attention brought to bear during the experience of pain.   The PAIRS indicated significant perceptions of impairment with a total score of 81 (a score over 75 is clinically significant). These results suggest a tendency toward negative beliefs and attitudes about the ability to function and enjoy life despite discomfort from pain, which is more likely to contribute to greater functional impairments.  The PCS indicated significant catastrophizing of pain with a total score of 50 (a score over 30 is in the 75th percentile and is clinically significant). These results suggest a tendency  toward elevated pain perception, a tendency to focus on one's pain, increased perception of the seriousness of pain, and pessimism toward one's ability to cope with the pain experience. Catastrophic thinking is a risk factor for chronicity.     FEEDBACK. Ms. Gallardo was provided with test results and offered the opportunity to respond to feedback and clarify results if needed.      MENTAL STATUS EXAM:  General appearance: appears stated age, casually dressed, well groomed  Speech: normal rate and tone  Level of cooperation: cooperative  Thought processes: logical, goal-directed  Mood: euthymic  Thought content: no illusions, no visual hallucinations, no auditory hallucinations, no delusions, no active or passive homicidal thoughts, no active or passive suicidal ideation, no obsessions, no compulsions, no violence  Affect: appropriate  Orientation: oriented to person, place, and date  Memory:  Recent memory: 2 of 3 objects after brief delay.   Remote memory: - intact  Attention span and concentration: spelled HOUSE forward and backwards  Fund of general knowledge: 3 of 3 recent presidents  Abstract reasoning:   Similarities: abstract.   Proverbs: abstract.  Judgment and insight: fair  Language: intact      SUMMARY:  Ms. Gallardo is a 49-year-old female referred for presurgical psychological evaluation prior to surgical implantation of a peripheral nerve stimulator (PNS) to address chronic pain.    There are no indications of disabling psychopathology, substance use/abuse, cognitive problems, or disabilities that would prevent understanding and compliance with medical treatment. There are no reports of major psychosocial stressors that would interfere with her engagement in treatment. While Ms. Gallardo experiences anxiety, she feels that her symptoms are currently well-managed with medication. There is no evidence of suicidality.  She exhibits medium social stability and good social support. She has adequate  coping strategies to deal with stress and the demands of surgery and recovery.  She has fair knowledge about PNS, appropriate expectations for recovery, adequate understanding of possible risks of this treatment option, and a moderate willingness to sustain effort for lifestyle changes and health adaptations required. Ms. Gallardo could benefit from establishing flexibility related to expectations for pain relief following surgery. She reports adequate compliance with prior medical regimens.  Results from psychological assessment/testing revealed mild risks.       IMPRESSIONS AND RECOMMENDATIONS:  Ms. Gallardo is a suitable candidate from a psychological perspective.     There are no absolute psychological contraindications to PNS. Ms. Gallardo currently reports no significant psychiatric problems, major psychosocial stressors, or other problems that would contraindicate surgery or impact her ability to engage in treatment. While Ms. Gallardo experiences anxiety, she reported that her symptoms are currently well-managed through psychiatric care. She has adequate and appropriate knowledge regarding surgery, and she is motivated and willing to engage in behaviors to achieve successful outcomes with PNS. Ms. Gallardo could benefit from establishing flexibility related to expectations for pain relief following surgery. Results from psychological assessment revealed mild risks associated with elevated pain perception and negative beliefs about the ability to enjoy life despite chronic pain. However, these risks alone do not contraindicate surgery. She has multiple protective factors that should help her during surgery and recovery. There are no recommendations for psychological intervention at this time aside from her continued engagement in psychiatric care.     Diagnostic impressions:    ICD-10-CM ICD-9-CM   1. Encounter for pre-surgical psychological assessment  Z00.8 V70.2   2. Chronic pain of left knee   M25.562 719.46    G89.29 338.29   3. Morbid obesity  E66.01 278.01   4. Anxiety  F41.9 300.00        Plan: This report will be sent to the referring provider with impressions and recommendations. It will be the referring team's decision whether the patient proceeds with surgery. Services related to the presurgical psychological evaluation are now concluded.      Swapna Sy, Ph.D.

## 2024-07-12 NOTE — LETTER
July 12, 2024    Calvin Nguyen MD  [address]  [address]  [facility] [address]       [facility] - Psych [address]  [address]  [facility] [address]  Phone: [phone]   Patient: Inna Gallardo   MR Number: [id number]   YOB: 1975   Date of Visit: 7/12/2024       Dear Dr. Nguyen:    Thank you for referring Inna Gallardo to me for evaluation. Below are the relevant portions of my assessment and plan of care.    Ms. Gallardo is a suitable candidate from a psychological perspective.     There are no absolute psychological contraindications to PNS. Ms. Gallardo currently reports no significant psychiatric problems, major psychosocial stressors, or other problems that would contraindicate surgery or impact her ability to engage in treatment. While Ms. Gallardo experiences anxiety, she reported that her symptoms are currently well-managed through psychiatric care. She has adequate and appropriate knowledge regarding surgery, and she is motivated and willing to engage in behaviors to achieve successful outcomes with PNS. Ms. Gallardo could benefit from establishing flexibility related to expectations for pain relief following surgery. Results from psychological assessment revealed mild risks associated with elevated pain perception and negative beliefs about the ability to enjoy life despite chronic pain. However, these risks alone do not contraindicate surgery. She has multiple protective factors that should help her during surgery and recovery. There are no recommendations for psychological intervention at this time aside from her continued engagement in psychiatric care.     If you have questions, please do not hesitate to contact me.    Sincerely,    Swapna Sy, Ph.D.  Clinical Psychologist

## 2024-07-15 ENCOUNTER — ANESTHESIA EVENT (OUTPATIENT)
Dept: SURGERY | Facility: HOSPITAL | Age: 49
End: 2024-07-15
Payer: MEDICAID

## 2024-07-15 ENCOUNTER — HOSPITAL ENCOUNTER (OUTPATIENT)
Facility: HOSPITAL | Age: 49
Discharge: HOME | End: 2024-07-15
Attending: ORTHOPAEDIC SURGERY | Admitting: ORTHOPAEDIC SURGERY
Payer: MEDICAID

## 2024-07-15 ENCOUNTER — ANESTHESIA (OUTPATIENT)
Dept: SURGERY | Facility: HOSPITAL | Age: 49
End: 2024-07-15
Payer: MEDICAID

## 2024-07-15 DIAGNOSIS — G89.29 CHRONIC PAIN OF LEFT KNEE: Primary | ICD-10-CM

## 2024-07-15 DIAGNOSIS — M17.12 OSTEOARTHRITIS OF LEFT KNEE: ICD-10-CM

## 2024-07-15 DIAGNOSIS — M25.562 CHRONIC PAIN OF LEFT KNEE: Primary | ICD-10-CM

## 2024-07-15 DIAGNOSIS — M17.12 PRIMARY OSTEOARTHRITIS OF LEFT KNEE: ICD-10-CM

## 2024-07-15 PROCEDURE — 63600175 PHARM REV CODE 636 W HCPCS: Performed by: NURSE ANESTHETIST, CERTIFIED REGISTERED

## 2024-07-15 PROCEDURE — 71000016 HC POSTOP RECOV ADDL HR: Performed by: ORTHOPAEDIC SURGERY

## 2024-07-15 PROCEDURE — 36000707: Performed by: ORTHOPAEDIC SURGERY

## 2024-07-15 PROCEDURE — D9220A PRA ANESTHESIA: Mod: CRNA,,, | Performed by: NURSE ANESTHETIST, CERTIFIED REGISTERED

## 2024-07-15 PROCEDURE — 37000008 HC ANESTHESIA 1ST 15 MINUTES: Performed by: ORTHOPAEDIC SURGERY

## 2024-07-15 PROCEDURE — C1889 IMPLANT/INSERT DEVICE, NOC: HCPCS | Performed by: ORTHOPAEDIC SURGERY

## 2024-07-15 PROCEDURE — C1778 LEAD, NEUROSTIMULATOR: HCPCS | Performed by: ORTHOPAEDIC SURGERY

## 2024-07-15 PROCEDURE — 25000003 PHARM REV CODE 250: Performed by: NURSE ANESTHETIST, CERTIFIED REGISTERED

## 2024-07-15 PROCEDURE — 37000009 HC ANESTHESIA EA ADD 15 MINS: Performed by: ORTHOPAEDIC SURGERY

## 2024-07-15 PROCEDURE — 63600175 PHARM REV CODE 636 W HCPCS

## 2024-07-15 PROCEDURE — 25000003 PHARM REV CODE 250: Performed by: ORTHOPAEDIC SURGERY

## 2024-07-15 PROCEDURE — 64555 IMPLANT NEUROELECTRODES: CPT | Mod: LT,,, | Performed by: ORTHOPAEDIC SURGERY

## 2024-07-15 PROCEDURE — 36000706: Performed by: ORTHOPAEDIC SURGERY

## 2024-07-15 PROCEDURE — 71000015 HC POSTOP RECOV 1ST HR: Performed by: ORTHOPAEDIC SURGERY

## 2024-07-15 PROCEDURE — D9220A PRA ANESTHESIA: Mod: ANES,,, | Performed by: ANESTHESIOLOGY

## 2024-07-15 DEVICE — IMPLANTABLE DEVICE: Type: IMPLANTABLE DEVICE | Site: KNEE | Status: FUNCTIONAL

## 2024-07-15 RX ORDER — LIDOCAINE HYDROCHLORIDE 10 MG/ML
INJECTION INFILTRATION; PERINEURAL
Status: DISCONTINUED | OUTPATIENT
Start: 2024-07-15 | End: 2024-07-15 | Stop reason: HOSPADM

## 2024-07-15 RX ORDER — LIDOCAINE HYDROCHLORIDE 20 MG/ML
INJECTION INTRAVENOUS
Status: DISCONTINUED | OUTPATIENT
Start: 2024-07-15 | End: 2024-07-15

## 2024-07-15 RX ORDER — KETOROLAC TROMETHAMINE 30 MG/ML
INJECTION, SOLUTION INTRAMUSCULAR; INTRAVENOUS
Status: DISCONTINUED | OUTPATIENT
Start: 2024-07-15 | End: 2024-07-15

## 2024-07-15 RX ORDER — PROPOFOL 10 MG/ML
VIAL (ML) INTRAVENOUS CONTINUOUS PRN
Status: DISCONTINUED | OUTPATIENT
Start: 2024-07-15 | End: 2024-07-15

## 2024-07-15 RX ORDER — MIDAZOLAM HYDROCHLORIDE 1 MG/ML
INJECTION INTRAMUSCULAR; INTRAVENOUS
Status: DISCONTINUED | OUTPATIENT
Start: 2024-07-15 | End: 2024-07-15

## 2024-07-15 RX ORDER — ONDANSETRON HYDROCHLORIDE 2 MG/ML
INJECTION, SOLUTION INTRAVENOUS
Status: DISCONTINUED | OUTPATIENT
Start: 2024-07-15 | End: 2024-07-15

## 2024-07-15 RX ORDER — CEFAZOLIN SODIUM 2 G/50ML
2 SOLUTION INTRAVENOUS ONCE
Status: COMPLETED | OUTPATIENT
Start: 2024-07-15 | End: 2024-07-15

## 2024-07-15 RX ORDER — DICLOFENAC SODIUM 75 MG/1
75 TABLET, DELAYED RELEASE ORAL 2 TIMES DAILY
Qty: 84 TABLET | Refills: 0 | Status: SHIPPED | OUTPATIENT
Start: 2024-07-15 | End: 2024-08-26

## 2024-07-15 RX ORDER — FENTANYL CITRATE 50 UG/ML
INJECTION, SOLUTION INTRAMUSCULAR; INTRAVENOUS
Status: DISCONTINUED | OUTPATIENT
Start: 2024-07-15 | End: 2024-07-15

## 2024-07-15 RX ORDER — ACETAMINOPHEN 10 MG/ML
INJECTION, SOLUTION INTRAVENOUS
Status: DISCONTINUED | OUTPATIENT
Start: 2024-07-15 | End: 2024-07-15

## 2024-07-15 RX ORDER — PROPOFOL 10 MG/ML
VIAL (ML) INTRAVENOUS
Status: DISCONTINUED | OUTPATIENT
Start: 2024-07-15 | End: 2024-07-15

## 2024-07-15 RX ORDER — KETAMINE HCL IN 0.9 % NACL 50 MG/5 ML
SYRINGE (ML) INTRAVENOUS
Status: DISCONTINUED | OUTPATIENT
Start: 2024-07-15 | End: 2024-07-15

## 2024-07-15 RX ORDER — DEXAMETHASONE SODIUM PHOSPHATE 4 MG/ML
INJECTION, SOLUTION INTRA-ARTICULAR; INTRALESIONAL; INTRAMUSCULAR; INTRAVENOUS; SOFT TISSUE
Status: DISCONTINUED | OUTPATIENT
Start: 2024-07-15 | End: 2024-07-15

## 2024-07-15 RX ORDER — ACETAMINOPHEN 325 MG/1
325 TABLET ORAL EVERY 4 HOURS
Qty: 84 TABLET | Refills: 0 | Status: SHIPPED | OUTPATIENT
Start: 2024-07-15 | End: 2024-07-29

## 2024-07-15 RX ADMIN — DEXAMETHASONE SODIUM PHOSPHATE 4 MG: 4 INJECTION, SOLUTION INTRA-ARTICULAR; INTRALESIONAL; INTRAMUSCULAR; INTRAVENOUS; SOFT TISSUE at 04:07

## 2024-07-15 RX ADMIN — LIDOCAINE HYDROCHLORIDE 100 MG: 20 INJECTION, SOLUTION INTRAVENOUS at 04:07

## 2024-07-15 RX ADMIN — PROPOFOL 125 MCG/KG/MIN: 10 INJECTION, EMULSION INTRAVENOUS at 04:07

## 2024-07-15 RX ADMIN — Medication 20 MG: at 04:07

## 2024-07-15 RX ADMIN — FENTANYL CITRATE 25 MCG: 50 INJECTION INTRAMUSCULAR; INTRAVENOUS at 04:07

## 2024-07-15 RX ADMIN — PROPOFOL 50 MG: 10 INJECTION, EMULSION INTRAVENOUS at 04:07

## 2024-07-15 RX ADMIN — KETOROLAC TROMETHAMINE 30 MG: 30 INJECTION, SOLUTION INTRAMUSCULAR; INTRAVENOUS at 04:07

## 2024-07-15 RX ADMIN — MIDAZOLAM HYDROCHLORIDE 2 MG: 1 INJECTION, SOLUTION INTRAMUSCULAR; INTRAVENOUS at 03:07

## 2024-07-15 RX ADMIN — ACETAMINOPHEN 1000 MG: 10 INJECTION, SOLUTION INTRAVENOUS at 04:07

## 2024-07-15 RX ADMIN — SODIUM CHLORIDE, SODIUM LACTATE, POTASSIUM CHLORIDE, AND CALCIUM CHLORIDE: .6; .31; .03; .02 INJECTION, SOLUTION INTRAVENOUS at 03:07

## 2024-07-15 RX ADMIN — CEFAZOLIN SODIUM 2 G: 2 SOLUTION INTRAVENOUS at 04:07

## 2024-07-15 RX ADMIN — ONDANSETRON 4 MG: 2 INJECTION, SOLUTION INTRAMUSCULAR; INTRAVENOUS at 04:07

## 2024-07-15 NOTE — OP NOTE
7/15/2024    Pre op dx:  left knee pain     Post op dx:  Same     Procedure:  Percutaneous implantation of neurostimulator electrode array; peripheral nerve:  Superior lateral geniculate nerve     Percutaneous implantation of neurostimulator electrode array; peripheral nerve:  Superomedial geniculate nerve     Percutaneous implantation of neurostimulator electrode array; peripheral nerve:  Inferomedial geniculate nerve     Fluoroscopic guidance       Attending Surgeon: Calvin Nguyen MD     Assistants:      Complications: none     Estimated bood loss: < 20cc     Implants: stimrouter 3 trial leads with nerve stimulator attachment point     Indication:  Patient has had significant knee pain.  They have failed various non surgical treatments including medications.      Findings: bony anatomy was intact. No significant abnormalities were found.      Procedure:  Patient is brought to operating room placed supine operative table conscious sedation was induced without any difficulties.  The left knee was then prepped draped in sterile fashion.  Prior to beginning the procedure proper site and procedure were verified.  Under fluoroscopy we infiltrated skin and subcutaneous tissue with Marcaine along the anticipated path of the stimulator needle.  Using the 1st stimulator needle we came along the midline of the lateral aspect of the femur under fluoroscopy and punctured the skin down to lateral femoral cortex.  We verified location of the introducer both on the AP and lateral x-rays.  The wire was placed at the flare at the mid point of the AP distance along the periosteum .  The internal trocar was then removed and the flexible wire placed through the introducer and secured to the skin using Tegaderm.  Next we went to the medial side of the knee injected Marcaine along the tract.  We then introduced the needle and introducer again along the posterior 3rd cortex of the femur at the level of the flare.  This was verified using  fluoroscopy both AP and lateral.  The needle trocar was then removed and the flexible wire placed through the cannula and locked into place on the lower lock and then secured to the skin using Tegaderm.  We then turned our attention to the proximal tibia.  In an anterior to posterior direction we placed the needle along the proximal tibia aiming to be at the midway point in AP direction.  This was done at the tibial flare.  Location was verified on fluoro both on the AP and lateral x-rays.  We then removed the needle stylus and placed the flexible wire and locked it into place.  We then used Tegaderm and anchored the wire to the skin.  All wires were then tested and a signal was achieved.  We then wrapped all the wires in place using an Ace wrap and patient was then transferred to recovery room in stable condition.     Pre operative visual analog score:  9/10  Post operative visual analog score after stimulation of all 3 deep geniculate nerves:

## 2024-07-15 NOTE — PLAN OF CARE
Nerve stimulator Rep. In room with patient, reviewing instructions and answering pt. Questions. Waiting for ortho resident to pull electrode wires. All discharge criteria met. Pt. Ambulating well. VSS.

## 2024-07-15 NOTE — TRANSFER OF CARE
"Anesthesia Transfer of Care Note    Patient: Inna Gallardo    Procedure(s) Performed: Procedure(s) (LRB):  INSERTION,ELECTRODE LEAD,NEUROSTIMULATOR,PERIPHERAL (Left)    Patient location: [facility]    Anesthesia Type: general    Transport from OR: Transported from OR on room air with adequate spontaneous ventilation    Post pain: adequate analgesia    Post assessment: no apparent anesthetic complications and tolerated procedure well    Post vital signs: stable    Level of consciousness: awake, alert and oriented    Nausea/Vomiting: no nausea/vomiting    Complications: none    Transfer of care protocol was followed      Last vitals: Visit Vitals  BP (!) 158/82 (BP Location: Right arm, Patient Position: Lying)   Pulse 62   Temp 36.6 °C (97.9 °F) (Tympanic)   Resp 16   Ht 5' 3" (1.6 m)   Wt 118.8 kg (262 lb)   LMP 03/03/2015   SpO2 97%   Breastfeeding No   BMI 46.41 kg/m²     "

## 2024-07-15 NOTE — H&P
Interval H&P    Patient seen and examined in preop holding area. No changes to history or exam other than noted below.    To OR today for Left knee PNS trial insertion with Dr. Nguyen.          Expand All Collapse All    Subjective:         Subjective  Patient ID: Inna Gallardo is a 49 y.o. female.     Chief Complaint: Pain of the Left Knee and Pain of the Right Knee     Knee Pain: bilateral  swelling: Yes  worsens with activity: Yes  relieved by: injections, 1 mo relief              Social History            Occupational History    Not on file   Tobacco Use    Smoking status: Former       Current packs/day: 0.00       Types: Cigarettes       Start date: 1989       Quit date: 2000       Years since quittin.4    Smokeless tobacco: Never    Tobacco comments:       pt stated she smoked one cig/day   Substance and Sexual Activity    Alcohol use: Never    Drug use: Never    Sexual activity: Yes       Partners: Male       Birth control/protection: Surgical, See Surgical Hx      Social Determinants of Health           Tobacco Use: Medium Risk (2024)     Patient History      Smoking Tobacco Use: Former      Smokeless Tobacco Use: Never      Passive Exposure: Not on file   Alcohol Use: Not At Risk (2024)     Received from [facility]     AUDIT-C      Frequency of Alcohol Consumption: Never      Average Number of Drinks: Patient does not drink      Frequency of Binge Drinking: Never   Financial Resource Strain: Low Risk  (2024)     Received from [facility]     Overall Financial Resource Strain (CARDIA)      Difficulty of Paying Living Expenses: Not hard at all   Food Insecurity: No Food Insecurity (2024)     Received from [facility]     Hunger Vital Sign      Worried About Running Out of Food in the Last Year: Never true      Ran Out of Food in the Last Year: Never true   Transportation Needs: No Transportation Needs (2024)     Received from [facility]     PRAPARE - Transportation       Lack of Transportation (Medical): No      Lack of Transportation (Non-Medical): No   Physical Activity: Unknown (5/20/2024)     Received from [facility]     Exercise Vital Sign      Days of Exercise per Week: 3 days      Minutes of Exercise per Session: Not on file   Stress: No Stress Concern Present (5/20/2024)     Received from [facility]     [place] [employer] - Occupational Stress Questionnaire      Feeling of Stress : Not at all   Housing Stability: Low Risk  (4/18/2022)     Received from [facility], [facility]     Housing Stability Vital Sign      Unable to Pay for Housing in the Last Year: No      Number of Places Lived in the Last Year: 1      In the last 12 months, was there a time when you did not have a steady place to sleep or slept in a shelter (including now)?: No   Depression: Low Risk  (6/4/2024)     Depression      Last PHQ-4: Flowsheet Data: 0   Utilities: Not At Risk (5/20/2024)     Received from [facility]      Threatened with loss of utilities: No   Health Literacy: Not on file   Social Isolation: Not on file      Review of Systems   Constitutional: Negative for diaphoresis.   HENT:  Negative for ear discharge, nosebleeds and stridor.    Eyes:  Negative for photophobia.   Cardiovascular:  Negative for syncope.   Respiratory:  Negative for hemoptysis, shortness of breath and wheezing.    Neurological:  Negative for tremors.   Psychiatric/Behavioral: Negative.              Objective:      Objective  General     Constitutional: She is oriented to person, place, and time. She appears well-developed and well-nourished.   HENT:   Head: Normocephalic and atraumatic.   Right Ear: External ear normal.   Left Ear: External ear normal.   Eyes: EOM are normal.   Cardiovascular:  Intact distal pulses.            Pulmonary/Chest: Effort normal.   Neurological: She is alert and oriented to person, place, and time.   Psychiatric: She has a normal mood and affect. Her  behavior is normal. Judgment and thought content normal.      General Musculoskeletal Exam   Gait: antalgic and abnormal         Right Knee Exam      Inspection   Swelling: present  Effusion: present     Tenderness   The patient is tender to palpation of the medial joint line.     Crepitus   The patient has crepitus of the patella.     Range of Motion   Flexion:  abnormal      Other   Sensation: normal     Left Knee Exam      Inspection   Swelling: present  Effusion: present     Tenderness   The patient tender to palpation of the medial joint line.     Crepitus   The patient has crepitus of the patella.     Other   Sensation: normal     Muscle Strength   Right Lower Extremity   Quadriceps:  4/5   Hamstrin/5   Left Lower Extremity   Quadriceps:  4/5   Hamstrin/5            Assessment:      Assessment  1. Chronic pain of left knee    2. Morbid obesity    3. Primary osteoarthritis of right knee    4. Primary osteoarthritis of left knee             Plan:   Needs bmi < 40 for tka     we injected the bilateral knee w 1cc of ketorolac, marcaine and lidocaine under sterile conditions with the patient's informed consent for severe bone on bone knee oa. KL score 4      Left knee: We had a lengthy conversation about the benefits and risks of peripheral nerve stimulation.  they understand that the 1st phase is a trial where we will place leads and do a test to see if implantation helps improve their pain.  If it does improve the pain symptoms then they will be scheduled for the permanent placement. With respect to the trial they understand that leads will be coming out of the skin and we will activate the leads after they are awake from .  Risks include bleeding, continued pain, damage to the nerves, injury to the bone and soft tissues.  they understand this and feel that the benefits outweigh the risks.  We will go ahead and schedule this. Patient requesting to talk to  re technology       Electronically  signed by Calvin Nguyen MD at 6/27/2024 10:09 AM

## 2024-07-15 NOTE — ANESTHESIA PREPROCEDURE EVALUATION
07/15/2024  Inna Gallardo is a 49 y.o., female here for trial peripheral nerve stimulator insertion.      Pre-op Assessment    I have reviewed the Patient Summary Reports.    I have reviewed the NPO Status.   I have reviewed the Medications.     Review of Systems  Anesthesia Hx:               Denies Personal Hx of Anesthesia complications.                    Social:  Former Smoker       Cardiovascular:     Hypertension                                        Renal/:  Chronic Renal Disease                Hepatic/GI:    Hiatal Hernia, GERD             Musculoskeletal:  Arthritis               Neurological:    Neuromuscular Disease,                                   Endocrine:        Morbid Obesity / BMI > 40      Physical Exam  General: Well nourished, Cooperative, Alert and Oriented    Airway:  Mallampati: II   Mouth Opening: Normal  TM Distance: Normal  Tongue: Normal  Neck ROM: Normal ROM    Dental:  Intact        Anesthesia Plan  Type of Anesthesia, risks & benefits discussed:    Anesthesia Type: Gen Natural Airway  Intra-op Monitoring Plan: Standard ASA Monitors  Post Op Pain Control Plan: multimodal analgesia  Induction:  IV  Informed Consent: Informed consent signed with the Patient and all parties understand the risks and agree with anesthesia plan.  All questions answered.   ASA Score: 3    Ready For Surgery From Anesthesia Perspective.     .

## 2024-07-17 VITALS
HEART RATE: 62 BPM | RESPIRATION RATE: 16 BRPM | HEIGHT: 63 IN | OXYGEN SATURATION: 98 % | DIASTOLIC BLOOD PRESSURE: 77 MMHG | TEMPERATURE: 98 F | BODY MASS INDEX: 46.42 KG/M2 | SYSTOLIC BLOOD PRESSURE: 146 MMHG | WEIGHT: 262 LBS

## 2024-07-18 ENCOUNTER — OFFICE VISIT (OUTPATIENT)
Dept: ORTHOPEDICS | Facility: CLINIC | Age: 49
End: 2024-07-18
Payer: MEDICAID

## 2024-07-18 VITALS — HEIGHT: 63 IN | BODY MASS INDEX: 46.41 KG/M2 | WEIGHT: 261.94 LBS

## 2024-07-18 DIAGNOSIS — M25.562 CHRONIC PAIN OF LEFT KNEE: Primary | ICD-10-CM

## 2024-07-18 DIAGNOSIS — G89.29 CHRONIC PAIN OF LEFT KNEE: Primary | ICD-10-CM

## 2024-07-18 DIAGNOSIS — M17.12 PRIMARY OSTEOARTHRITIS OF LEFT KNEE: ICD-10-CM

## 2024-07-18 PROCEDURE — 99999 PR PBB SHADOW E&M-EST. PATIENT-LVL IV: CPT | Mod: PBBFAC,,, | Performed by: ORTHOPAEDIC SURGERY

## 2024-07-18 PROCEDURE — 99214 OFFICE O/P EST MOD 30 MIN: CPT | Mod: PBBFAC,PN | Performed by: ORTHOPAEDIC SURGERY

## 2024-07-18 NOTE — ANESTHESIA POSTPROCEDURE EVALUATION
Anesthesia Post Evaluation    Patient: Inna Gallardo    Procedure(s) Performed: Procedure(s) (LRB):  INSERTION,ELECTRODE LEAD,NEUROSTIMULATOR,PERIPHERAL (Left)    Final Anesthesia Type: general      Patient location during evaluation: PACU  Patient participation: Yes- Able to Participate  Level of consciousness: awake and alert, oriented and awake  Post-procedure vital signs: reviewed and stable  Pain management: adequate  Airway patency: patent    PONV status at discharge: No PONV  Anesthetic complications: no      Cardiovascular status: stable  Respiratory status: unassisted and room air  Hydration status: euvolemic  Follow-up not needed.              Vitals Value Taken Time   /77 07/15/24 1735   Temp 36.7 °C (98 °F) 07/15/24 1635   Pulse 62 07/15/24 1735   Resp 16 07/15/24 1720   SpO2 98 % 07/15/24 1735         No case tracking events are documented in the log.      Pain/Alesia Score: No data recorded

## 2024-07-18 NOTE — H&P (VIEW-ONLY)
Subjective:      Patient ID: Inna Gallardo is a 49 y.o. female.    Chief Complaint: Pain and Post-op Evaluation of the Left Knee    Patient presents today for follow-up after PNS trial on Monday.  She states she had significant relief and and was performing activities she has not been able to perform in quite some time with the pain relief she had from the trial.  That pain relief lasted for almost 2 days.  Her pain improved from a score of 10/10 down to 4/10 after stimulating all 3 deep geniculate nerves.  She is excited to move forward with a permanent PNS placement.      Social History     Occupational History    Not on file   Tobacco Use    Smoking status: Former     Current packs/day: 0.00     Types: Cigarettes     Start date: 1989     Quit date: 2000     Years since quittin.4    Smokeless tobacco: Never    Tobacco comments:     pt stated she smoked one cig/day   Substance and Sexual Activity    Alcohol use: Never    Drug use: Never    Sexual activity: Yes     Partners: Male     Birth control/protection: Surgical, See Surgical Hx      Social Determinants of Health     Tobacco Use: Medium Risk (2024)    Patient History     Smoking Tobacco Use: Former     Smokeless Tobacco Use: Never     Passive Exposure: Not on file   Alcohol Use: Not At Risk (7/3/2024)    AUDIT-C     Frequency of Alcohol Consumption: Never     Average Number of Drinks: Patient does not drink     Frequency of Binge Drinking: Never   Financial Resource Strain: Low Risk  (7/3/2024)    Overall Financial Resource Strain (CARDIA)     Difficulty of Paying Living Expenses: Not hard at all   Food Insecurity: No Food Insecurity (7/3/2024)    Hunger Vital Sign     Worried About Running Out of Food in the Last Year: Never true     Ran Out of Food in the Last Year: Never true   Transportation Needs: No Transportation Needs (2024)    Received from [facility]    PRAPARE - Transportation     Lack of Transportation (Medical): No      "Lack of Transportation (Non-Medical): No   Physical Activity: Inactive (7/3/2024)    Exercise Vital Sign     Days of Exercise per Week: 0 days     Minutes of Exercise per Session: 0 min   Stress: Stress Concern Present (7/3/2024)    Citizen of [place] [employer] - Occupational Stress Questionnaire     Feeling of Stress : To some extent   Housing Stability: Unknown (7/3/2024)    Housing Stability Vital Sign     Unable to Pay for Housing in the Last Year: No     Homeless in the Last Year: Not on file   Depression: Low Risk  (6/4/2024)    Depression     Last PHQ-4: Flowsheet Data: 0   Utilities: Not At Risk (7/3/2024)    [facility] Utilities     Threatened with loss of utilities: No   Health Literacy: Adequate Health Literacy (7/3/2024)     Health Literacy     Frequency of need for help with medical instructions: Never   Social Isolation: Not on file      ROS      Objective:    Ortho/SPM Exam       Assessment:       1. Chronic pain of left knee    2. Primary osteoarthritis of left knee          Plan:   We had a lengthy conversation about the benefits and risks of peripheral nerve stimulation.  they understand that the 1st phase was a trial where they received significant relief.  They would like to now move to the permanent placement of the leads for long lasting pain relief. This will be done in a minimally invasive fashion where the leads will be placed in the similar location as the trial. They will then wear a  stimrouter   "patch" on the skin overlying the leads to provide the signal to the nerves.   Risks include bleeding, continued pain, damage to the nerves, injury to the bone and soft tissues.  they understand this and feel that the benefits outweigh the risks.  We will go ahead and schedule this.      "

## 2024-07-18 NOTE — PROGRESS NOTES
Subjective:      Patient ID: Inna Gallardo is a 49 y.o. female.    Chief Complaint: Pain and Post-op Evaluation of the Left Knee    Patient presents today for follow-up after PNS trial on Monday.  She states she had significant relief and and was performing activities she has not been able to perform in quite some time with the pain relief she had from the trial.  That pain relief lasted for almost 2 days.  Her pain improved from a score of 10/10 down to 4/10 after stimulating all 3 deep geniculate nerves.  She is excited to move forward with a permanent PNS placement.      Social History     Occupational History    Not on file   Tobacco Use    Smoking status: Former     Current packs/day: 0.00     Types: Cigarettes     Start date: 1989     Quit date: 2000     Years since quittin.4    Smokeless tobacco: Never    Tobacco comments:     pt stated she smoked one cig/day   Substance and Sexual Activity    Alcohol use: Never    Drug use: Never    Sexual activity: Yes     Partners: Male     Birth control/protection: Surgical, See Surgical Hx      Social Determinants of Health     Tobacco Use: Medium Risk (2024)    Patient History     Smoking Tobacco Use: Former     Smokeless Tobacco Use: Never     Passive Exposure: Not on file   Alcohol Use: Not At Risk (7/3/2024)    AUDIT-C     Frequency of Alcohol Consumption: Never     Average Number of Drinks: Patient does not drink     Frequency of Binge Drinking: Never   Financial Resource Strain: Low Risk  (7/3/2024)    Overall Financial Resource Strain (CARDIA)     Difficulty of Paying Living Expenses: Not hard at all   Food Insecurity: No Food Insecurity (7/3/2024)    Hunger Vital Sign     Worried About Running Out of Food in the Last Year: Never true     Ran Out of Food in the Last Year: Never true   Transportation Needs: No Transportation Needs (2024)    Received from [facility]    PRAPARE - Transportation     Lack of Transportation (Medical): No      "Lack of Transportation (Non-Medical): No   Physical Activity: Inactive (7/3/2024)    Exercise Vital Sign     Days of Exercise per Week: 0 days     Minutes of Exercise per Session: 0 min   Stress: Stress Concern Present (7/3/2024)    [employer] [employer] - Occupational Stress Questionnaire     Feeling of Stress : To some extent   Housing Stability: Unknown (7/3/2024)    Housing Stability Vital Sign     Unable to Pay for Housing in the Last Year: No     Homeless in the Last Year: Not on file   Depression: Low Risk  (6/4/2024)    Depression     Last PHQ-4: Flowsheet Data: 0   Utilities: Not At Risk (7/3/2024)    [facility] Utilities     Threatened with loss of utilities: No   Health Literacy: Adequate Health Literacy (7/3/2024)     Health Literacy     Frequency of need for help with medical instructions: Never   Social Isolation: Not on file      ROS      Objective:    Ortho/SPM Exam       Assessment:       1. Chronic pain of left knee    2. Primary osteoarthritis of left knee          Plan:   We had a lengthy conversation about the benefits and risks of peripheral nerve stimulation.  they understand that the 1st phase was a trial where they received significant relief.  They would like to now move to the permanent placement of the leads for long lasting pain relief. This will be done in a minimally invasive fashion where the leads will be placed in the similar location as the trial. They will then wear a  stimrouter   "patch" on the skin overlying the leads to provide the signal to the nerves.   Risks include bleeding, continued pain, damage to the nerves, injury to the bone and soft tissues.  they understand this and feel that the benefits outweigh the risks.  We will go ahead and schedule this.      "

## 2024-08-08 RX ORDER — DICLOFENAC SODIUM 75 MG/1
75 TABLET, DELAYED RELEASE ORAL 2 TIMES DAILY
Qty: 84 TABLET | Refills: 0 | Status: SHIPPED | OUTPATIENT
Start: 2024-08-08 | End: 2024-09-19

## 2024-08-08 RX ORDER — ACETAMINOPHEN 325 MG/1
325 TABLET ORAL EVERY 4 HOURS
Qty: 84 TABLET | Refills: 0 | Status: SHIPPED | OUTPATIENT
Start: 2024-08-08 | End: 2024-08-26

## 2024-08-08 NOTE — DISCHARGE INSTRUCTIONS
Activity as tolerated   Ok to change dressing in 3-4 days   Ok to shower after 5 days   Follow up with Dr. Nguyen in 2 weeks       ANESTHESIA  -For the first 24 hours after surgery:  Do not drive, use heavy equipment, make important decisions, or drink alcohol  -It is normal to feel sleepy for several hours.  Rest until you are more awake.  -Have someone stay with you, if needed.  They can watch for problems and help keep you safe.  -Some possible post anesthesia side effects include: nausea and vomiting, sore throat and hoarseness, sleepiness, and dizziness.    PAIN  -If you have pain after surgery, pain medicine will help you feel better.  Take it as directed, before pain becomes severe.  Most pain relievers taken by mouth need at least 20-30 minutes to start working.  -Do not drive or drink alcohol while taking pain medicine.  -Pain medication can upset your stomach.  Taking them with a little food may help.  -Other ways to help control pain: elevation, ice, and relaxation  -Call your surgeon if still having unmanageable pain an hour after taking pain medicine.  -Pain medicine can cause constipation.  Taking an over-the counter stool softener while on prescription pain medicine and drinking plenty of fluids can prevent this side effect.  -Call your surgeon if you have severe side effects like: breathing problems, trouble waking up, dizziness, confusion, or severe constipation.    NAUSEA  -Some people have nausea after surgery.  This is often because of anesthesia, pain, pain medicine, or the stress of surgery.  -Do not push yourself to eat.  Start off with clear liquids and soup.  Slowly move to solid foods.  Don't eat fatty, rich, spicy foods at first.  Eat smaller amounts.  -If you develop persistent nausea and vomiting please notify your surgeon immediately.    BLEEDING  -Different types of surgery require different types of care and dressing changes.  It is important to follow all instructions and advice from  your surgeon.  Change dressing as directed.  Call your surgeon for any concerns regarding postop bleeding.    SIGNS OF INFECTION  -Signs of infection include: fever, swelling, drainage, and redness  -Notify your surgeon if you have a fever of 100.4 F (38.0 C) or higher.  -Notify your surgeon if you notice redness, swelling, increased pain, pus, or a foul smell at the incision site.

## 2024-08-12 ENCOUNTER — HOSPITAL ENCOUNTER (OUTPATIENT)
Facility: HOSPITAL | Age: 49
Discharge: HOME | End: 2024-08-12
Attending: ORTHOPAEDIC SURGERY | Admitting: ORTHOPAEDIC SURGERY
Payer: MEDICAID

## 2024-08-12 ENCOUNTER — ANESTHESIA (OUTPATIENT)
Dept: SURGERY | Facility: HOSPITAL | Age: 49
End: 2024-08-12
Payer: MEDICAID

## 2024-08-12 ENCOUNTER — ANESTHESIA EVENT (OUTPATIENT)
Dept: SURGERY | Facility: HOSPITAL | Age: 49
End: 2024-08-12
Payer: MEDICAID

## 2024-08-12 DIAGNOSIS — M17.12 PRIMARY OSTEOARTHRITIS OF LEFT KNEE: ICD-10-CM

## 2024-08-12 DIAGNOSIS — G89.29 CHRONIC PAIN OF LEFT KNEE: Primary | ICD-10-CM

## 2024-08-12 DIAGNOSIS — M25.562 CHRONIC PAIN OF LEFT KNEE: Primary | ICD-10-CM

## 2024-08-12 DIAGNOSIS — M17.12 OSTEOARTHRITIS OF LEFT KNEE: ICD-10-CM

## 2024-08-12 PROCEDURE — 63600175 PHARM REV CODE 636 W HCPCS: Performed by: NURSE ANESTHETIST, CERTIFIED REGISTERED

## 2024-08-12 PROCEDURE — 37000009 HC ANESTHESIA EA ADD 15 MINS: Performed by: ORTHOPAEDIC SURGERY

## 2024-08-12 PROCEDURE — 25000003 PHARM REV CODE 250: Performed by: ANESTHESIOLOGY

## 2024-08-12 PROCEDURE — 37000008 HC ANESTHESIA 1ST 15 MINUTES: Performed by: ORTHOPAEDIC SURGERY

## 2024-08-12 PROCEDURE — 71000015 HC POSTOP RECOV 1ST HR: Performed by: ORTHOPAEDIC SURGERY

## 2024-08-12 PROCEDURE — 64575 OPN IMPLTJ NEA PERPH NERVE: CPT | Mod: 51,,, | Performed by: ORTHOPAEDIC SURGERY

## 2024-08-12 PROCEDURE — 25000003 PHARM REV CODE 250: Performed by: ORTHOPAEDIC SURGERY

## 2024-08-12 PROCEDURE — C1778 LEAD, NEUROSTIMULATOR: HCPCS | Performed by: ORTHOPAEDIC SURGERY

## 2024-08-12 PROCEDURE — 63600175 PHARM REV CODE 636 W HCPCS

## 2024-08-12 PROCEDURE — 71000033 HC RECOVERY, INTIAL HOUR: Performed by: ORTHOPAEDIC SURGERY

## 2024-08-12 PROCEDURE — 36000707: Performed by: ORTHOPAEDIC SURGERY

## 2024-08-12 PROCEDURE — 25000003 PHARM REV CODE 250: Performed by: NURSE ANESTHETIST, CERTIFIED REGISTERED

## 2024-08-12 PROCEDURE — 36000706: Performed by: ORTHOPAEDIC SURGERY

## 2024-08-12 DEVICE — IMPLANTABLE DEVICE: Type: IMPLANTABLE DEVICE | Site: KNEE | Status: FUNCTIONAL

## 2024-08-12 RX ORDER — LIDOCAINE HYDROCHLORIDE 10 MG/ML
INJECTION, SOLUTION INFILTRATION; PERINEURAL
Status: DISCONTINUED | OUTPATIENT
Start: 2024-08-12 | End: 2024-08-12 | Stop reason: HOSPADM

## 2024-08-12 RX ORDER — MIDAZOLAM HYDROCHLORIDE 1 MG/ML
INJECTION INTRAMUSCULAR; INTRAVENOUS
Status: DISCONTINUED | OUTPATIENT
Start: 2024-08-12 | End: 2024-08-12

## 2024-08-12 RX ORDER — DEXAMETHASONE SODIUM PHOSPHATE 4 MG/ML
INJECTION, SOLUTION INTRA-ARTICULAR; INTRALESIONAL; INTRAMUSCULAR; INTRAVENOUS; SOFT TISSUE
Status: DISCONTINUED | OUTPATIENT
Start: 2024-08-12 | End: 2024-08-12

## 2024-08-12 RX ORDER — LIDOCAINE HYDROCHLORIDE 20 MG/ML
INJECTION INTRAVENOUS
Status: DISCONTINUED | OUTPATIENT
Start: 2024-08-12 | End: 2024-08-12

## 2024-08-12 RX ORDER — SODIUM CHLORIDE 0.9 % (FLUSH) 0.9 %
10 SYRINGE (ML) INJECTION
Status: DISCONTINUED | OUTPATIENT
Start: 2024-08-12 | End: 2024-08-12 | Stop reason: HOSPADM

## 2024-08-12 RX ORDER — ONDANSETRON HYDROCHLORIDE 2 MG/ML
INJECTION, SOLUTION INTRAMUSCULAR; INTRAVENOUS
Status: DISCONTINUED | OUTPATIENT
Start: 2024-08-12 | End: 2024-08-12

## 2024-08-12 RX ORDER — OXYCODONE HYDROCHLORIDE 5 MG/1
5 TABLET ORAL
Status: DISCONTINUED | OUTPATIENT
Start: 2024-08-12 | End: 2024-08-12 | Stop reason: HOSPADM

## 2024-08-12 RX ORDER — PHENYLEPHRINE HYDROCHLORIDE 10 MG/ML
INJECTION INTRAVENOUS
Status: DISCONTINUED | OUTPATIENT
Start: 2024-08-12 | End: 2024-08-12

## 2024-08-12 RX ORDER — KETOROLAC TROMETHAMINE 30 MG/ML
INJECTION, SOLUTION INTRAMUSCULAR; INTRAVENOUS
Status: DISCONTINUED | OUTPATIENT
Start: 2024-08-12 | End: 2024-08-12

## 2024-08-12 RX ORDER — PROPOFOL 10 MG/ML
VIAL (ML) INTRAVENOUS
Status: DISCONTINUED | OUTPATIENT
Start: 2024-08-12 | End: 2024-08-12

## 2024-08-12 RX ORDER — HYDROMORPHONE HYDROCHLORIDE 2 MG/ML
0.2 INJECTION, SOLUTION INTRAMUSCULAR; INTRAVENOUS; SUBCUTANEOUS EVERY 5 MIN PRN
Status: DISCONTINUED | OUTPATIENT
Start: 2024-08-12 | End: 2024-08-12 | Stop reason: HOSPADM

## 2024-08-12 RX ORDER — PROCHLORPERAZINE EDISYLATE 5 MG/ML
5 INJECTION INTRAMUSCULAR; INTRAVENOUS EVERY 30 MIN PRN
Status: DISCONTINUED | OUTPATIENT
Start: 2024-08-12 | End: 2024-08-12 | Stop reason: HOSPADM

## 2024-08-12 RX ORDER — FENTANYL CITRATE 50 UG/ML
INJECTION, SOLUTION INTRAMUSCULAR; INTRAVENOUS
Status: DISCONTINUED | OUTPATIENT
Start: 2024-08-12 | End: 2024-08-12

## 2024-08-12 RX ORDER — CEFAZOLIN SODIUM 2 G/50ML
2 SOLUTION INTRAVENOUS ONCE
Status: COMPLETED | OUTPATIENT
Start: 2024-08-12 | End: 2024-08-12

## 2024-08-12 RX ADMIN — PROPOFOL 150 MG: 10 INJECTION, EMULSION INTRAVENOUS at 05:08

## 2024-08-12 RX ADMIN — PROPOFOL 50 MG: 10 INJECTION, EMULSION INTRAVENOUS at 04:08

## 2024-08-12 RX ADMIN — KETOROLAC TROMETHAMINE 30 MG: 30 INJECTION, SOLUTION INTRAMUSCULAR; INTRAVENOUS at 05:08

## 2024-08-12 RX ADMIN — SODIUM CHLORIDE, SODIUM LACTATE, POTASSIUM CHLORIDE, AND CALCIUM CHLORIDE: .6; .31; .03; .02 INJECTION, SOLUTION INTRAVENOUS at 04:08

## 2024-08-12 RX ADMIN — PROPOFOL 200 MG: 10 INJECTION, EMULSION INTRAVENOUS at 04:08

## 2024-08-12 RX ADMIN — FENTANYL CITRATE 25 MCG: 0.05 INJECTION, SOLUTION INTRAMUSCULAR; INTRAVENOUS at 04:08

## 2024-08-12 RX ADMIN — PHENYLEPHRINE HYDROCHLORIDE 100 MCG: 10 INJECTION INTRAVENOUS at 04:08

## 2024-08-12 RX ADMIN — DEXAMETHASONE SODIUM PHOSPHATE 8 MG: 4 INJECTION, SOLUTION INTRA-ARTICULAR; INTRALESIONAL; INTRAMUSCULAR; INTRAVENOUS; SOFT TISSUE at 04:08

## 2024-08-12 RX ADMIN — LIDOCAINE HYDROCHLORIDE 100 MG: 20 INJECTION, SOLUTION INTRAVENOUS at 04:08

## 2024-08-12 RX ADMIN — GLYCOPYRROLATE 0.2 MG: 0.2 INJECTION, SOLUTION INTRAMUSCULAR; INTRAVITREAL at 04:08

## 2024-08-12 RX ADMIN — PHENYLEPHRINE HYDROCHLORIDE 100 MCG: 10 INJECTION INTRAVENOUS at 05:08

## 2024-08-12 RX ADMIN — OXYCODONE 5 MG: 5 TABLET ORAL at 05:08

## 2024-08-12 RX ADMIN — ONDANSETRON 8 MG: 2 INJECTION INTRAMUSCULAR; INTRAVENOUS at 04:08

## 2024-08-12 RX ADMIN — MIDAZOLAM HYDROCHLORIDE 2 MG: 1 INJECTION, SOLUTION INTRAMUSCULAR; INTRAVENOUS at 04:08

## 2024-08-12 RX ADMIN — CEFAZOLIN SODIUM 3 G: 2 SOLUTION INTRAVENOUS at 04:08

## 2024-08-12 NOTE — ANESTHESIA PROCEDURE NOTES
Intubation    Date/Time: 8/12/2024 4:29 PM    Performed by: Kaitlin Bailey CRNA  Authorized by: Alona De La Cruz MD    Intubation:     Intubated:  Postinduction    Mask Ventilation:  Not attempted    Attempts:  1    Attempted By:  CRNA    Difficult Airway Encountered?: No      Complications:  None    Airway Device:  Supraglottic airway/LMA    Airway Device Size:  4.0    Style/Cuff Inflation:  Cuffed    Secured at:  The lips    Placement Verified By:  Capnometry    Complicating Factors:  None    Findings Post-Intubation:  BS equal bilateral and atraumatic/condition of teeth unchanged

## 2024-08-12 NOTE — OP NOTE
8/12/2024    Pre op dx: left chronic knee pain related to nerves     Post op dx:  Same     Procedure:  Implantation permanent peripheral nerve lead and  along distal femur and proximal tibia x3 leads     Attending Surgeon: Calvin Nguyen MD     Assistants: dr saunders and pattie brar     Complications: none     Estimated bood loss: < 20cc     Implants:  Bioness stimrouter leads/ x 3     Indication:  Patient has had pain in this knee for some time.  they underwent a trial peripheral nerve stimulation test and received with significant pain relief (>50%). Temporary leads were placed at the superomedial and superolateral deep geniculate nerves and inferomedial deep geniculate nerve.  Testing postoperatively revealed she had >50% pain relief with stimulation of those nerves.     Findings:  All leads were placed along the flare of the distal femur and proximal tibia with the expected resistance from scar tissue from the previous surgeries.     Procedure:   Patient is brought to the operating room placed supine operative table general anesthesia was induced without difficulties. left  knee was then prepped draped in sterile fashion.  Prior to incisions proper sent procedures well as antibiotic administration was verified.  We then ja out our target area for the final  placement under the skin, since there will be 2 wires for the distal femur that will be controlled by the same generator. the target locations were placed approximately 7 cm away from each other 1 being medial and 1 being lateral. we First started with the lateral lead placement. We made a skin incision in line with the level of the flare at the midway point of the AP axis.   The introducer catheter was then placed  and the inner cannula then removed.  We then placed the stim router electrode array through the sheath and observed its placement along the cortex.  The sheath was then removed and the trocar replaced and a path tunneled to  the proximal anterior aspect of the thigh approximately 6 to 7 cm away from and anticipated medial  placement.  The  was then placed through the sheath to that terminal location.  The sheath was then peeled away and the remaining lead and  were placed approx 1 cm under the skin.  This was all verified under fluoro and all positions were acceptable.  We then turned our attention to the superior medial deep geniculate nerve and a small incision made midpoint between the AP axis at the level of the flare. The electrode array was then introduced through the sleeve and verified that it was along the cortex of the distal femur.  Sheath was then removed while holding a lead placed.  We then tunneled to the medial aspect of the original femoral location.  was then fed through the sheath to that location and the sheath peeled away while holding the lead in place. remaining lead and  was placed approx 1 cm under the skin.  Fluoro was taken showing the terminal branches of the  being in the predicted locations from the beginning of the case.  We then turned our attention to the proximal tibia.  Using a direct medial approach placed the skin incision at the midpoint in the AP axis at the level of the tibial flare.   the soft tissue sheath placed to the bone. inner cannula was then removed and wire then introduced so the electrode array were along the proximal medial flare of the proximal tibia.  lead was held in placed in the sheath then removed.  Wire was then tunneled along the medial aspect of the tibia and marked placed at the end of that location.  The  was then fed through the sheath and the  was placed at the terminal location.  Remaining lead and  was then placed approx 1 cm under the skin at that incision.  All wire locations were then verified using fluoroscopy.  incisions were then closed with simple interrupted Prolene suture.  They were then  dressed with sterile dressing.  Patient was then extubated by Anesthesia without any difficulties and transferred to recovery room bed in stable condition.

## 2024-08-12 NOTE — BRIEF OP NOTE
[facility] - [facility] (Hospital)  Brief Operative Note    Surgery Date: 8/12/2024     Surgeons and Role:     * Calvin Nguyen MD - Primary    Assisting Surgeon: None    Pre-op Diagnosis:  Chronic pain of left knee [M25.562, G89.29]  Primary osteoarthritis of left knee [M17.12]    Post-op Diagnosis:  Post-Op Diagnosis Codes:     * Chronic pain of left knee [M25.562, G89.29]     * Primary osteoarthritis of left knee [M17.12]    Procedure(s) (LRB):  INSERTION,ELECTRODE LEAD,NEUROSTIMULATOR,PERIPHERAL (Left)    Anesthesia: General/MAC    Operative Findings: see op note    Estimated Blood Loss: * No values recorded between 8/12/2024 12:00 AM and 8/12/2024  3:14 PM *         Specimens:   Specimen (24h ago, onward)      None              Discharge Note    OUTCOME: Patient tolerated treatment/procedure well without complication and is now ready for discharge.    DISPOSITION: Home or Self Care    FINAL DIAGNOSIS:  <principal problem not specified>    FOLLOWUP: In clinic    DISCHARGE INSTRUCTIONS:    Discharge Procedure Orders   Diet general     Call MD for:  temperature >100.4     Call MD for:  persistent nausea and vomiting     Call MD for:  severe uncontrolled pain     Call MD for:  difficulty breathing, headache or visual disturbances     Call MD for:  redness, tenderness, or signs of infection (pain, swelling, redness, odor or green/yellow discharge around incision site)     Call MD for:  hives     Call MD for:  persistent dizziness or light-headedness     Call MD for:  extreme fatigue     Keep surgical extremity elevated     Ice to affected area   Order Comments: using barrier between ice and skin (specify duration&frequency)     No driving, operating heavy equipment or signing legal documents while taking pain medication     Leave dressing on - Keep it clean, dry, and intact until clinic visit     Weight bearing as tolerated

## 2024-08-12 NOTE — ANESTHESIA PREPROCEDURE EVALUATION
08/12/2024  Inna Gallardo is a 49 y.o., female here for permanent peripheral nerve stimulator insertion.      Pre-op Assessment    I have reviewed the Patient Summary Reports.    I have reviewed the NPO Status.   I have reviewed the Medications.     Review of Systems  Anesthesia Hx:               Denies Personal Hx of Anesthesia complications.                    Social:  Former Smoker       Cardiovascular:     Hypertension                                        Renal/:  Chronic Renal Disease                Hepatic/GI:    Hiatal Hernia, GERD             Musculoskeletal:  Arthritis               Neurological:    Neuromuscular Disease,                                   Endocrine:        Morbid Obesity / BMI > 40      Physical Exam  General: Well nourished, Cooperative, Alert and Oriented    Airway:  Mallampati: II   Mouth Opening: Normal  TM Distance: Normal  Tongue: Normal  Neck ROM: Normal ROM    Dental:  Intact        Anesthesia Plan  Type of Anesthesia, risks & benefits discussed:    Anesthesia Type: Gen Supraglottic Airway  Intra-op Monitoring Plan: Standard ASA Monitors  Post Op Pain Control Plan: multimodal analgesia  Induction:  IV  Informed Consent: Informed consent signed with the Patient and all parties understand the risks and agree with anesthesia plan.  All questions answered.   ASA Score: 3    Ready For Surgery From Anesthesia Perspective.     .

## 2024-08-12 NOTE — TRANSFER OF CARE
"Anesthesia Transfer of Care Note    Patient: Inna Gallardo    Procedure(s) Performed: Procedure(s) (LRB):  INSERTION,ELECTRODE LEAD,NEUROSTIMULATOR,PERIPHERAL (Left)    Patient location: PACU    Anesthesia Type: general    Transport from OR: Transported from OR on room air with adequate spontaneous ventilation    Post pain: adequate analgesia    Post assessment: no apparent anesthetic complications and tolerated procedure well    Post vital signs: stable    Level of consciousness: awake and responds to stimulation    Nausea/Vomiting: no nausea/vomiting    Complications: none    Transfer of care protocol was followed      Last vitals: Visit Vitals  /70   Pulse 78   Temp 36.9 °C (98.5 °F) (Skin)   Resp 20   Ht 5' 3" (1.6 m)   Wt 120.2 kg (265 lb)   LMP 03/03/2015   SpO2 95%   Breastfeeding No   BMI 46.94 kg/m²     "

## 2024-08-13 ENCOUNTER — HOSPITAL ENCOUNTER (EMERGENCY)
Facility: HOSPITAL | Age: 49
Discharge: HOME | End: 2024-08-13
Attending: EMERGENCY MEDICINE
Payer: MEDICAID

## 2024-08-13 ENCOUNTER — NURSE TRIAGE (OUTPATIENT)
Dept: ADMINISTRATIVE | Facility: CLINIC | Age: 49
End: 2024-08-13
Payer: MEDICAID

## 2024-08-13 VITALS
HEART RATE: 63 BPM | TEMPERATURE: 98 F | HEIGHT: 63 IN | BODY MASS INDEX: 46.95 KG/M2 | WEIGHT: 265 LBS | DIASTOLIC BLOOD PRESSURE: 59 MMHG | SYSTOLIC BLOOD PRESSURE: 116 MMHG | OXYGEN SATURATION: 98 % | RESPIRATION RATE: 17 BRPM

## 2024-08-13 VITALS
DIASTOLIC BLOOD PRESSURE: 76 MMHG | OXYGEN SATURATION: 98 % | BODY MASS INDEX: 46.95 KG/M2 | TEMPERATURE: 98 F | RESPIRATION RATE: 20 BRPM | HEART RATE: 68 BPM | HEIGHT: 63 IN | SYSTOLIC BLOOD PRESSURE: 115 MMHG | WEIGHT: 265 LBS

## 2024-08-13 DIAGNOSIS — K92.0 HEMATEMESIS: ICD-10-CM

## 2024-08-13 LAB
ALBUMIN SERPL BCP-MCNC: 4.4 G/DL (ref 3.5–5.2)
ALP SERPL-CCNC: 66 U/L (ref 38–126)
ALT SERPL W/O P-5'-P-CCNC: 23 U/L (ref 10–44)
ANION GAP SERPL CALC-SCNC: 15 MMOL/L (ref 8–16)
AST SERPL-CCNC: 22 U/L (ref 15–46)
BASOPHILS # BLD AUTO: 0.03 K/UL (ref 0–0.2)
BASOPHILS NFR BLD: 0.2 % (ref 0–1.9)
BILIRUB SERPL-MCNC: 0.3 MG/DL (ref 0.1–1)
CALCIUM SERPL-MCNC: 9.9 MG/DL (ref 8.7–10.5)
CHLORIDE SERPL-SCNC: 104 MMOL/L (ref 95–110)
CO2 SERPL-SCNC: 22 MMOL/L (ref 23–29)
CREAT SERPL-MCNC: 0.57 MG/DL (ref 0.5–1.4)
DIFFERENTIAL METHOD BLD: ABNORMAL
EOSINOPHIL # BLD AUTO: 0 K/UL (ref 0–0.5)
EOSINOPHIL NFR BLD: 0 % (ref 0–8)
ERYTHROCYTE [DISTWIDTH] IN BLOOD BY AUTOMATED COUNT: 13.6 % (ref 11.5–14.5)
EST. GFR  (NO RACE VARIABLE): >60 ML/MIN/1.73 M^2
GLUCOSE SERPL-MCNC: 163 MG/DL (ref 70–110)
HCT VFR BLD AUTO: 41.6 % (ref 37–48.5)
HGB BLD-MCNC: 13.6 G/DL (ref 12–16)
IMM GRANULOCYTES # BLD AUTO: 0.11 K/UL (ref 0–0.04)
IMM GRANULOCYTES NFR BLD AUTO: 0.6 % (ref 0–0.5)
LIPASE SERPL-CCNC: 69 U/L (ref 23–300)
LYMPHOCYTES # BLD AUTO: 2 K/UL (ref 1–4.8)
LYMPHOCYTES NFR BLD: 10.4 % (ref 18–48)
MCH RBC QN AUTO: 29.4 PG (ref 27–31)
MCHC RBC AUTO-ENTMCNC: 32.7 G/DL (ref 32–36)
MCV RBC AUTO: 90 FL (ref 82–98)
MONOCYTES # BLD AUTO: 0.8 K/UL (ref 0.3–1)
MONOCYTES NFR BLD: 4.3 % (ref 4–15)
NEUTROPHILS # BLD AUTO: 15.9 K/UL (ref 1.8–7.7)
NEUTROPHILS NFR BLD: 84.5 % (ref 38–73)
NRBC BLD-RTO: 0 /100 WBC
PLATELET # BLD AUTO: 388 K/UL (ref 150–450)
PMV BLD AUTO: 10.5 FL (ref 9.2–12.9)
POTASSIUM SERPL-SCNC: 4 MMOL/L (ref 3.5–5.1)
PROT SERPL-MCNC: 7.3 G/DL (ref 6–8.4)
RBC # BLD AUTO: 4.62 M/UL (ref 4–5.4)
SODIUM SERPL-SCNC: 141 MMOL/L (ref 136–145)
UUN UR-MCNC: 18 MG/DL (ref 7–17)
WBC # BLD AUTO: 18.87 K/UL (ref 3.9–12.7)

## 2024-08-13 PROCEDURE — 80053 COMPREHEN METABOLIC PANEL: CPT | Mod: ER | Performed by: EMERGENCY MEDICINE

## 2024-08-13 PROCEDURE — 99284 EMERGENCY DEPT VISIT MOD MDM: CPT | Mod: 25,ER

## 2024-08-13 PROCEDURE — 96374 THER/PROPH/DIAG INJ IV PUSH: CPT | Mod: ER

## 2024-08-13 PROCEDURE — 96375 TX/PRO/DX INJ NEW DRUG ADDON: CPT | Mod: ER

## 2024-08-13 PROCEDURE — 63600175 PHARM REV CODE 636 W HCPCS: Mod: ER | Performed by: EMERGENCY MEDICINE

## 2024-08-13 PROCEDURE — 85025 COMPLETE CBC W/AUTO DIFF WBC: CPT | Mod: ER | Performed by: EMERGENCY MEDICINE

## 2024-08-13 PROCEDURE — 83690 ASSAY OF LIPASE: CPT | Mod: ER | Performed by: EMERGENCY MEDICINE

## 2024-08-13 RX ORDER — ONDANSETRON 4 MG/1
4 TABLET, ORALLY DISINTEGRATING ORAL EVERY 6 HOURS PRN
Qty: 20 TABLET | Refills: 0 | Status: SHIPPED | OUTPATIENT
Start: 2024-08-13 | End: 2024-09-02

## 2024-08-13 RX ORDER — METOCLOPRAMIDE HYDROCHLORIDE 5 MG/ML
10 INJECTION INTRAMUSCULAR; INTRAVENOUS
Status: COMPLETED | OUTPATIENT
Start: 2024-08-13 | End: 2024-08-13

## 2024-08-13 RX ORDER — ONDANSETRON HYDROCHLORIDE 2 MG/ML
4 INJECTION, SOLUTION INTRAVENOUS
Status: COMPLETED | OUTPATIENT
Start: 2024-08-13 | End: 2024-08-13

## 2024-08-13 RX ADMIN — METOCLOPRAMIDE 10 MG: 5 INJECTION, SOLUTION INTRAMUSCULAR; INTRAVENOUS at 08:08

## 2024-08-13 RX ADMIN — ONDANSETRON 4 MG: 2 INJECTION INTRAMUSCULAR; INTRAVENOUS at 08:08

## 2024-08-13 NOTE — DISCHARGE INSTRUCTIONS
If you have another episode of vomiting blood you should return the the Emergency Department, preferably in [place] as you'll need further evaluation.

## 2024-08-13 NOTE — TELEPHONE ENCOUNTER
Had a procedure on yesterday on her knee and had a tube placed down her throat and has been vomiting every other hour.States that she has been vomiting old blood since yesterday and is still nauseated and vomiting. Pt advised to ED now.  Reason for Disposition   [1] Vomiting AND [2] persists > 4 hours    Additional Information   Negative: Sounds like a life-threatening emergency to the triager   Negative: Chest pain   Negative: Difficulty breathing   Negative: [1] Widespread rash AND [2] bright red, sunburn-like   Negative: [1] SEVERE headache AND [2] after spinal (epidural) anesthesia    Protocols used: Post-Op Symptoms and [id number]

## 2024-08-13 NOTE — ED PROVIDER NOTES
Chief Complaint: vomiting blood     History of Present Illness:    Inna Gallardo 49 y.o. with a  has a past medical history of Anxiety, Diverticulitis, Endometriosis, General anesthetics causing adverse effect in therapeutic use, Hypertension, Kidney stone, Prolactinoma (2011), Sliding hiatal hernia, Urinary tract infection, and Vaginal infection. who presents to the emergency department today with a complaint of vomiting blood.  Pt had knee surgery yesterday, had an LMA placed.  Went home and started vomiting.  Vomited all night long.  Last episode 5:30 am. Reports it was all blood. She still has nausea and can taste blood despite brushing her teeth multiple times.  She c/o sore throat but that's normal for her, she has slight pain in the epigastric region.          ROS  Otherwise remaining ROS negative     The history is provided by the patient      Reviewed and verified by myself:   PMH/PSH/SOC/FH REVIEWED :    Past Medical History:   Diagnosis Date    Anxiety     Diverticulitis     Endometriosis     General anesthetics causing adverse effect in therapeutic use     Hypertension     Kidney stone     Prolactinoma 2011    Sliding hiatal hernia     Urinary tract infection     Vaginal infection        Past Surgical History:   Procedure Laterality Date    ARTHROSCOPY OF KNEE Left     BRAIN TUMOR EXCISION  9/2011    removal of prolactinoma    COLONOSCOPY N/A 8/27/2020    Procedure: COLONOSCOPY;  Surgeon: Valentino Negro MD;  Location: [facility];  Service: Endoscopy;  Laterality: N/A;    CYSTOSCOPY      CYSTOSCOPY W/ URETERAL STENT PLACEMENT  2016    CYSTOSCOPY W/ URETERAL STENT REMOVAL  2016    ESOPHAGEAL MANOMETRY N/A 6/3/2019    Procedure: MANOMETRY, ESOPHAGUS;  Surgeon: Bhupinder Schmitz MD;  Location: [address]);  Service: Endoscopy;  Laterality: N/A;    ESOPHAGOGASTRODUODENOSCOPY N/A 4/30/2019    Procedure: ESOPHAGOGASTRODUODENOSCOPY (EGD);  Surgeon: Altagracia Bose MD;  Location: [facility];   Service: Endoscopy;  Laterality: N/A;    ESOPHAGOGASTRODUODENOSCOPY N/A 2021    Procedure: EGD (ESOPHAGOGASTRODUODENOSCOPY);  Surgeon: Altagracia Bose MD;  Location: [facility] ENDO;  Service: Endoscopy;  Laterality: N/A;    EXTRACORPOREAL SHOCK WAVE LITHOTRIPSY Left 3/7/2024    Procedure: LITHOTRIPSY, ESWL;  Surgeon: Roverto Harley MD;  Location: [facility] OR;  Service: Urology;  Laterality: Left;    FOOT HARDWARE REMOVAL Right 2020    Procedure: REMOVAL, HARDWARE, FOOT;  Surgeon: Adrianna Tillman DPM;  Location: [facility] OR;  Service: Podiatry;  Laterality: Right;    HARVESTING OF BONE GRAFT Right 2019    Procedure: SURGICAL PROCUREMENT, BONE GRAFT;  Surgeon: Adrianna Tillman DPM;  Location: [facility] OR;  Service: Podiatry;  Laterality: Right;    HYSTERECTOMY  3/16/15    TLH/BSO for Endometriosis, AUB, fibroids, cyst    INSERTION, ELECTRODE LEAD, NEUROSTIMULATOR, PERIPHERAL Left 7/15/2024    Procedure: INSERTION,ELECTRODE LEAD,NEUROSTIMULATOR,PERIPHERAL;  Surgeon: Calvin Nguyen MD;  Location: [facility] OR;  Service: Pain Management;  Laterality: Left;  stimrouter trial    INSERTION, ELECTRODE LEAD, NEUROSTIMULATOR, PERIPHERAL Left 2024    Procedure: INSERTION,ELECTRODE LEAD,NEUROSTIMULATOR,PERIPHERAL;  Surgeon: Calvin Nguyen MD;  Location: [facility] OR;  Service: Pain Management;  Laterality: Left;  stimrouter permanent    LITHOTRIPSY      MANDIBLE SURGERY      severe overbite correction    PELVIC LAPAROSCOPY  2014    Dx scope, chromopertubation-Endometriosis    TONSILLECTOMY, ADENOIDECTOMY         Social History     Socioeconomic History    Marital status:     Years of education: college   Tobacco Use    Smoking status: Former     Current packs/day: 0.00     Types: Cigarettes     Start date: 1989     Quit date: 2000     Years since quittin.5    Smokeless tobacco: Never    Tobacco comments:     pt stated she smoked one cig/day   Substance and Sexual Activity    Alcohol use: Never    Drug use: Never     Sexual activity: Yes     Partners: Male     Birth control/protection: Surgical, See Surgical Hx     Social Determinants of Health     Financial Resource Strain: Low Risk  (7/3/2024)    Overall Financial Resource Strain (CARDIA)     Difficulty of Paying Living Expenses: Not hard at all   Food Insecurity: No Food Insecurity (7/3/2024)    Hunger Vital Sign     Worried About Running Out of Food in the Last Year: Never true     Ran Out of Food in the Last Year: Never true   Transportation Needs: No Transportation Needs (5/20/2024)    [facility], [facility]    PRAPARE - Transportation     Lack of Transportation (Medical): No     Lack of Transportation (Non-Medical): No   Physical Activity: Inactive (7/3/2024)    Exercise Vital Sign     Days of Exercise per Week: 0 days     Minutes of Exercise per Session: 0 min   Stress: Stress Concern Present (7/3/2024)    [employer] - Occupational Stress Questionnaire     Feeling of Stress : To some extent   Housing Stability: Unknown (7/3/2024)    Housing Stability Vital Sign     Unable to Pay for Housing in the Last Year: No       Family History   Problem Relation Name Age of Onset    Cancer Mother      Breast cancer Neg Hx      Ovarian cancer Neg Hx      Kidney disease Neg Hx           ALLERGIES REVIEWED  Review of patient's allergies indicates:   Allergen Reactions    Ace inhibitors Other (See Comments)     cough       MEDICATIONS REVIEWED  Medication List with Changes/Refills   Current Medications    ACETAMINOPHEN (TYLENOL) 325 MG TABLET    Take 1 tablet (325 mg total) by mouth every 4 (four) hours. for 14 days    BACLOFEN (LIORESAL) 10 MG TABLET    Take 10 mg by mouth 3 (three) times daily as needed.    DICLOFENAC (VOLTAREN) 75 MG EC TABLET    Take 1 tablet (75 mg total) by mouth 2 (two) times daily.    EPINEPHRINE (EPIPEN) 0.3 MG/0.3 ML ATIN    Inject 0.3 mLs into the muscle as needed.    ERGOCALCIFEROL (ERGOCALCIFEROL) 50,000 UNIT  CAP    Take 50,000 Units by mouth every 7 days.    HYDROXYZINE HCL (ATARAX) 25 MG TABLET    Take 25 mg by mouth 3 (three) times daily.    LINACLOTIDE (LINZESS) 145 MCG CAP CAPSULE    Take 1 capsule (145 mcg total) by mouth once daily.    LOSARTAN-HYDROCHLOROTHIAZIDE 100-25 MG (HYZAAR) 100-25 MG PER TABLET    Take 1 tablet by mouth once daily.    OMEPRAZOLE (PRILOSEC) 40 MG CAPSULE    Take 1 capsule (40 mg total) by mouth every morning.    SERTRALINE (ZOLOFT) 100 MG TABLET    Take 100 mg by mouth every evening.           VS reviewed    Nursing/Ancillary staff note reviewed.       Physical Exam     ED Triage Vitals [08/13/24 0817]   BP (!) 157/84   Pulse 68   Resp 19   Temp 97.8 °F (36.6 °C)   SpO2 96 %       Physical Exam    Constitutional: The patient is alert, has no immediate need for airway protection and no signs of toxicity. No acute distress. Lying in bed but able to sit up without difficulty.   ENT: Mucous membranes are moist. Oropharynx clear.   Neck: Neck is supple non-tender. No lymphadenopathy. No stridor.   Respiratory: There are no retractions, lungs are clear to auscultation. No wheezing, no crackles.   Cardiovascular: Regular rate and rhythm. No murmurs, rubs or gallops.  Gastrointestinal:  Abdomen is soft and has slight TTP in the epigastric region, no masses, bowel sounds normal. No guarding, no rebound.  No pulsatile mass.   Neurological: Alert and oriented x 4. CN II-XII grossly intact. No focal weakness. Strength intact 5/5 bilaterally in upper and lower extremities.   Skin: Warm and dry, no rashes.  Musculoskeletal: Extremities are non-tender, non-swollen and have full range of motion. Back NTTP along the midline.   Psyc: Normal behavior. Linear thought content.          ED Course         ED Course as of 08/13/24 1334   Tue Aug 13, 2024   0917 Hemoglobin: 13.6  Appropriate [JA]   0917 Hematocrit: 41.6  Appropriate [JA]   0918 CMP unremarkable.  [JA]   0918 Lipase Result: 69  Normal  [JA]   1050  Pt feeling improved. No vomiting here in the ED.  No hemetemsis.  Her HGB/HCT are appropriate. Would expect it to be low if she's been bleeding all night long.  Discussed with her observation and repeat labs however pt declines.  Feels well and ready to go home.  Joint decision making utilized.  If she has another event encouraged her to return to [place] ED as would need observation and likely GI.  Pt understands.  [JA]      ED Course User Index  [JA] Viki Rob MD             Medical Decision Making  Problems Addressed:  Hematemesis: complicated acute illness or injury with systemic symptoms    Amount and/or Complexity of Data Reviewed  External Data Reviewed: notes.     Details: Pt had surgery with Dr Nguyen yesterday, Procedure: INSERTION,ELECTRODE LEAD,NEUROSTIMULATOR,PERIPHERAL (Left: Knee)     LMA inserted by anesthesia.  No complications mentioned. Placement Time 1629; Airway Device LMA; Intubated Postinduction; Airway Device Size 4.0; Placement Verified by Capnometry; Complicating Factors None; Intubation Findings Bilateral breath sounds, Atraumatic/Condition of teeth unchanged; Securement Lips; Complications None; Removal Date 08/12/24; Removal Time 1717   Labs: ordered. Decision-making details documented in ED Course.  Radiology: ordered and independent interpretation performed.     Details: CXR without free air, no significant free fluid     Risk  Prescription drug management.      Social determinants of health taken into consideration during development of our treatment plan include   Body mass index is 46.94 kg/m². - Cumulative social disadvantage, denoted by higher SDOH burden, was associated with increased odds of obesity, independent of clinical and demographic factors. PMID: 78674409 DOI: 10.1002/daryl.68986  Former Smoking/tobacco use -  Smoking was the leading social determinant of health affecting mortality and life expectancy, although income was another strong SDOH predictor, according to  researchers from the [facility] at [facility] and the Department of Sociology at [facility]. CHRISTEN Netw Open. 2022;54):d510309. doi:10.1001/jamanetworkopen.2022.6547            Pt received the following in the ED:   Medications   ondansetron injection 4 mg (4 mg Intravenous Given 8/13/24 0856)   metoclopramide injection 10 mg (10 mg Intravenous Given 8/13/24 0856)       After consideration of the pts current home medications, the decision is made to maintain the current medication regimen.      Will start :  Discharge Medication List as of 8/13/2024 10:50 AM        START taking these medications    Details   ondansetron (ZOFRAN-ODT) 4 MG TbDL Take 1 tablet (4 mg total) by mouth every 6 (six) hours as needed (nausea or vomiting)., Starting Tue 8/13/2024, Until Mon 9/2/2024 at 2359, Normal                ED Management:  Differential diagnosis included but not limited to : trauma from intubation, trauma from vomiting, Valentina Rich tear, esophageal varices, gastritis, AVM.       Orders I ordered to further evaluate included:    Orders Placed This Encounter   Procedures    X-Ray Chest 1 View    CBC auto differential    Comprehensive metabolic panel    Lipase    Insert Saline lock IV         Comorbidity impacting this encounter includes:  has a past medical history of Anxiety, Diverticulitis, Endometriosis, General anesthetics causing adverse effect in therapeutic use, Hypertension, Kidney stone, Prolactinoma (2011), Sliding hiatal hernia, Urinary tract infection, and Vaginal infection.      MDM continued:     Inna Gallardo  presents to the emergency Department today with an acute, complicated problem with systemic symptoms of hematemesis.  She has had no episodes of vomiting here in the ED.  She reports vomiting blood all night long.  Her hgb/hct are appropriate, I'd expect a drop if actively bleeding.  CXR unremarkable.     I discussed with her observation for serial H/H given her HPI and  recent intubatuion however pt declines.  Reports that she feels much improved and would like to go home.  I discussed with her that should it occur again she should ideally go to [place] ED as she'll need further evaluation with specialist not available here.  Pt understands.           The pt is comfortable with this plan and comfortable going home at this time. After taking into careful account the historical factors and physical exam findings of the patient's presentation today, in conjunction with the empirical and objective data obtained on ED workup, no acute emergent medical condition requiring admission has been identified. The patient appears to be low risk for an emergent medical condition and I feel it is safe and appropriate at this time for the patient to be discharged to follow-up as detailed in their discharge instructions for reevaluation and possible continued outpatient workup and management. Regardless, an unremarkable evaluation in the ED does not preclude the development or presence of a serious or life threatening condition. As such, patient was instructed to return immediately for any worsening or change in current symptoms. Precautions for return discussed at length.  Discharge and follow-up instructions discussed with the patient who expressed understanding and willingness to comply with my recommendations.    Voice recognition software utilized in this note.       Impression      The encounter diagnosis was Hematemesis.        Discharge Medication List as of 8/13/2024 10:50 AM        START taking these medications    Details   ondansetron (ZOFRAN-ODT) 4 MG TbDL Take 1 tablet (4 mg total) by mouth every 6 (six) hours as needed (nausea or vomiting)., Starting Tue 8/13/2024, Until Mon 9/2/2024 at 2359, Normal              Follow-up Information       Cathi Chávez, NP. Schedule an appointment as soon as possible for a visit in 2 days.    Specialty: Family Medicine  Contact information:  [id number]  [facility] [address]  [phone]                                   Viki Rob MD  08/13/24 7732

## 2024-08-13 NOTE — ED TRIAGE NOTES
Pt had a left knee replacement under general anesthesia yesterday. Pt says she was intubated and has been coughing and vomiting blood since last night. Denies abd pain, diarrhea, dysuria

## 2024-08-14 NOTE — ANESTHESIA POSTPROCEDURE EVALUATION
Anesthesia Post Evaluation    Patient: Inna Gallardo    Procedure(s) Performed: Procedure(s) (LRB):  INSERTION,ELECTRODE LEAD,NEUROSTIMULATOR,PERIPHERAL (Left)    Final Anesthesia Type: general      Patient location during evaluation: PACU  Patient participation: Yes- Able to Participate  Level of consciousness: awake and alert, oriented and awake  Post-procedure vital signs: reviewed and stable  Pain management: adequate  Airway patency: patent    PONV status at discharge: No PONV  Anesthetic complications: no      Cardiovascular status: stable  Respiratory status: unassisted and room air  Hydration status: euvolemic  Follow-up not needed.              Vitals Value Taken Time   /76 08/12/24 1845   Temp 36.8 °C (98.2 °F) 08/12/24 1845   Pulse 68 08/12/24 1845   Resp 20 08/12/24 1845   SpO2 98 % 08/12/24 1845         Event Time   Out of Recovery 18:14:42         Pain/Alesia Score: No data recorded

## 2024-08-19 ENCOUNTER — HOSPITAL ENCOUNTER (EMERGENCY)
Facility: HOSPITAL | Age: 49
Discharge: HOME | End: 2024-08-19
Attending: STUDENT IN AN ORGANIZED HEALTH CARE EDUCATION/TRAINING PROGRAM
Payer: MEDICAID

## 2024-08-19 VITALS
OXYGEN SATURATION: 96 % | SYSTOLIC BLOOD PRESSURE: 177 MMHG | WEIGHT: 265 LBS | TEMPERATURE: 98 F | DIASTOLIC BLOOD PRESSURE: 98 MMHG | BODY MASS INDEX: 46.95 KG/M2 | HEIGHT: 63 IN | RESPIRATION RATE: 16 BRPM | HEART RATE: 102 BPM

## 2024-08-19 DIAGNOSIS — Z48.89 ENCOUNTER FOR POST SURGICAL WOUND CHECK: ICD-10-CM

## 2024-08-19 DIAGNOSIS — T81.30XA WOUND DEHISCENCE: Primary | ICD-10-CM

## 2024-08-19 PROCEDURE — 99281 EMR DPT VST MAYX REQ PHY/QHP: CPT | Mod: ER

## 2024-08-19 RX ORDER — METFORMIN HYDROCHLORIDE 500 MG/1
TABLET, EXTENDED RELEASE ORAL
COMMUNITY

## 2024-08-19 NOTE — DISCHARGE INSTRUCTIONS
Keep the wound covered. Keep the wound clean and dry. Change the bandaid when it becomes saturated or dirty.    Monitor for increased swelling, redness, pus draining from the wound, increased pain.     Follow up with your surgeon

## 2024-08-20 NOTE — ED PROVIDER NOTES
Encounter Date: 8/19/2024       History     Chief Complaint   Patient presents with    Wound Dehiscence     Patient had nerve stimulated implanted into left extremity states she believes the sutures got caught on her sheet and when she moved they were inadvertently pulled out; wound is no longer approximated; however remaining four incisions are still clean dry and approximated with sutures in place. No redness or purulent drainage from open wound.      Inna Gallardo is a 49 y.o. female who has a past medical history of Anxiety, Diverticulitis, Endometriosis, General anesthetics causing adverse effect in therapeutic use, Hypertension, Kidney stone, Prolactinoma, Sliding hiatal hernia, Urinary tract infection, and Vaginal infection. presenting to the Emergency Department for wound check.  Patient had implantation of permanent peripheral nerve lead and  along distal femur and proximal tibia x3 leads 1 week ago.  Today is postop day 7.  She has 6 small incisions to the left knee and lower leg.  She reports sutures to most lateral incision got stuck on the bed she last night and when she moved the sutures popped. She reports some blood squirted from the wound. There has been no increase in pain.  No color change. No purulent drainage.  She put some Steri-Strips on the wound and has kept it covered, but was concerned for infection prompting presentation to the ER today.    The history is provided by the patient.     Review of patient's allergies indicates:   Allergen Reactions    Ace inhibitors Other (See Comments)     cough     Past Medical History:   Diagnosis Date    Anxiety     Diverticulitis     Endometriosis     General anesthetics causing adverse effect in therapeutic use     Hypertension     Kidney stone     Prolactinoma 2011    Sliding hiatal hernia     Urinary tract infection     Vaginal infection      Past Surgical History:   Procedure Laterality Date    ARTHROSCOPY OF KNEE Left     BRAIN TUMOR  EXCISION  9/2011    removal of prolactinoma    COLONOSCOPY N/A 8/27/2020    Procedure: COLONOSCOPY;  Surgeon: Valentino Negro MD;  Location: [facility] ENDO;  Service: Endoscopy;  Laterality: N/A;    CYSTOSCOPY      CYSTOSCOPY W/ URETERAL STENT PLACEMENT  2016    CYSTOSCOPY W/ URETERAL STENT REMOVAL  2016    ESOPHAGEAL MANOMETRY N/A 6/3/2019    Procedure: MANOMETRY, ESOPHAGUS;  Surgeon: Bhupinder Schmitz MD;  Location: [facility] ENDO (4TH FLR);  Service: Endoscopy;  Laterality: N/A;    ESOPHAGOGASTRODUODENOSCOPY N/A 4/30/2019    Procedure: ESOPHAGOGASTRODUODENOSCOPY (EGD);  Surgeon: Altagracia Bose MD;  Location: [facility] ENDO;  Service: Endoscopy;  Laterality: N/A;    ESOPHAGOGASTRODUODENOSCOPY N/A 8/2/2021    Procedure: EGD (ESOPHAGOGASTRODUODENOSCOPY);  Surgeon: Altagracia Bose MD;  Location: [facility];  Service: Endoscopy;  Laterality: N/A;    EXTRACORPOREAL SHOCK WAVE LITHOTRIPSY Left 3/7/2024    Procedure: LITHOTRIPSY, ESWL;  Surgeon: Roverto Harley MD;  Location: [facility] OR;  Service: Urology;  Laterality: Left;    FOOT HARDWARE REMOVAL Right 6/16/2020    Procedure: REMOVAL, HARDWARE, FOOT;  Surgeon: Adrianna Tillman DPM;  Location: [facility] OR;  Service: Podiatry;  Laterality: Right;    HARVESTING OF BONE GRAFT Right 7/16/2019    Procedure: SURGICAL PROCUREMENT, BONE GRAFT;  Surgeon: Adrianna Tillman DPM;  Location: [facility] OR;  Service: Podiatry;  Laterality: Right;    HYSTERECTOMY  3/16/15    TLH/BSO for Endometriosis, AUB, fibroids, cyst    INSERTION, ELECTRODE LEAD, NEUROSTIMULATOR, PERIPHERAL Left 7/15/2024    Procedure: INSERTION,ELECTRODE LEAD,NEUROSTIMULATOR,PERIPHERAL;  Surgeon: Calvin Nguyen MD;  Location: [facility] OR;  Service: Pain Management;  Laterality: Left;  stimrouter trial    INSERTION, ELECTRODE LEAD, NEUROSTIMULATOR, PERIPHERAL Left 8/12/2024    Procedure: INSERTION,ELECTRODE LEAD,NEUROSTIMULATOR,PERIPHERAL;  Surgeon: Calvin Nguyen MD;  Location: [facility] OR;  Service: Pain Management;  Laterality: Left;   stimrouter permanent    LITHOTRIPSY      MANDIBLE SURGERY  2002    severe overbite correction    PELVIC LAPAROSCOPY  2014    Dx scope, chromopertubation-Endometriosis    TONSILLECTOMY, ADENOIDECTOMY       Family History   Problem Relation Name Age of Onset    Cancer Mother      Breast cancer Neg Hx      Ovarian cancer Neg Hx      Kidney disease Neg Hx       Social History     Tobacco Use    Smoking status: Former     Current packs/day: 0.00     Types: Cigarettes     Start date: 1989     Quit date: 2000     Years since quittin.5    Smokeless tobacco: Never    Tobacco comments:     pt stated she smoked one cig/day   Substance Use Topics    Alcohol use: Never    Drug use: Never     Review of Systems   Constitutional:  Negative for appetite change, chills and fever.   Skin:  Positive for wound.   Psychiatric/Behavioral:  Negative for agitation and confusion.        Physical Exam     Initial Vitals [24 1511]   BP Pulse Resp Temp SpO2   (!) 177/98 102 16 98.2 °F (36.8 °C) 96 %      MAP       --         Physical Exam    Nursing note and vitals reviewed.  Constitutional: She appears well-developed and well-nourished. She is not diaphoretic.  Non-toxic appearance. No distress.   HENT:   Head: Normocephalic and atraumatic.   Right Ear: Hearing and external ear normal.   Left Ear: Hearing and external ear normal.   Eyes: EOM are normal.   Neck: Neck supple.   Normal range of motion.  Cardiovascular:  Normal rate.           Pulmonary/Chest: No respiratory distress.   Abdominal: She exhibits no distension.   Musculoskeletal:      Cervical back: Normal range of motion and neck supple. Normal range of motion.     Neurological: She is alert and oriented to person, place, and time. GCS score is 15. GCS eye subscore is 4. GCS verbal subscore is 5. GCS motor subscore is 6.   Skin: Skin is warm and dry.        Extensive ecchymosis to left knee and lower leg which is not unexpected during postop period.    Lower  extremity compartments soft and compressible.  Ambulatory with stable gait.   Psychiatric: She has a normal mood and affect. Her behavior is normal. Judgment and thought content normal.         ED Course   Procedures  Labs Reviewed - No data to display       Imaging Results    None          Medications - No data to display  Medical Decision Making  49-year-old female presents for wound check following surgery 1 week ago.  She has 6 small wounds to LLE. Most lateral incision has dehisced, but is healing well without any SOI. It is approximately 1 cm in length, loose approximation about 3 mm. She does not require abx at this time. Advised patient to keep the wound covered. No soaking. Keep follow up with Orthopedics. Return to ER sooner for any signs of developing infection.  Patient agreeable with the plan and all questions answered.     Differential Diagnosis includes, but is not limited to:  Wound dehiscence, soft tissue infection, cellulitis, deep infection    Problems Addressed:  Encounter for post surgical wound check: acute illness or injury  Wound dehiscence: acute illness or injury                                      Clinical Impression:  Final diagnoses:  [T81.30XA] Wound dehiscence (Primary)  [Z48.89] Encounter for post surgical wound check          ED Disposition Condition    Discharge Stable          ED Prescriptions    None       Follow-up Information       Follow up With Specialties Details Why Contact Info    Calvin Nguyen MD Orthopedic Surgery Schedule an appointment as soon as possible for a visit   [address]  [address]  [facility] [id number] [phone]               Katina Zimmerman, AMALIA  08/20/24 [id number]

## 2024-08-22 ENCOUNTER — OFFICE VISIT (OUTPATIENT)
Dept: ORTHOPEDICS | Facility: CLINIC | Age: 49
End: 2024-08-22
Payer: MEDICAID

## 2024-08-22 ENCOUNTER — HOSPITAL ENCOUNTER (OUTPATIENT)
Dept: RADIOLOGY | Facility: HOSPITAL | Age: 49
Discharge: HOME | End: 2024-08-22
Attending: ORTHOPAEDIC SURGERY
Payer: MEDICAID

## 2024-08-22 VITALS — HEIGHT: 63 IN | WEIGHT: 265 LBS | BODY MASS INDEX: 46.95 KG/M2

## 2024-08-22 DIAGNOSIS — M25.562 CHRONIC PAIN OF LEFT KNEE: ICD-10-CM

## 2024-08-22 DIAGNOSIS — G89.29 CHRONIC PAIN OF LEFT KNEE: Primary | ICD-10-CM

## 2024-08-22 DIAGNOSIS — M17.12 PRIMARY OSTEOARTHRITIS OF LEFT KNEE: Primary | ICD-10-CM

## 2024-08-22 DIAGNOSIS — M17.12 PRIMARY OSTEOARTHRITIS OF LEFT KNEE: ICD-10-CM

## 2024-08-22 DIAGNOSIS — M25.562 CHRONIC PAIN OF LEFT KNEE: Primary | ICD-10-CM

## 2024-08-22 DIAGNOSIS — G89.29 CHRONIC PAIN OF LEFT KNEE: ICD-10-CM

## 2024-08-22 PROCEDURE — 73590 X-RAY EXAM OF LOWER LEG: CPT | Mod: TC,PN,LT

## 2024-08-22 PROCEDURE — 73552 X-RAY EXAM OF FEMUR 2/>: CPT | Mod: 26,LT,, | Performed by: STUDENT IN AN ORGANIZED HEALTH CARE EDUCATION/TRAINING PROGRAM

## 2024-08-22 PROCEDURE — 73590 X-RAY EXAM OF LOWER LEG: CPT | Mod: 26,LT,, | Performed by: STUDENT IN AN ORGANIZED HEALTH CARE EDUCATION/TRAINING PROGRAM

## 2024-08-22 PROCEDURE — 73552 X-RAY EXAM OF FEMUR 2/>: CPT | Mod: TC,PN,LT

## 2024-08-22 PROCEDURE — 99213 OFFICE O/P EST LOW 20 MIN: CPT | Mod: PBBFAC,25,PN | Performed by: ORTHOPAEDIC SURGERY

## 2024-08-22 PROCEDURE — 99999 PR PBB SHADOW E&M-EST. PATIENT-LVL III: CPT | Mod: PBBFAC,,, | Performed by: ORTHOPAEDIC SURGERY

## 2024-08-22 NOTE — PROGRESS NOTES
Subjective:      Patient ID: Inna Gallardo is a 49 y.o. female.    Chief Complaint: Pain of the Left Knee    Patient was brought to [facility] out from permanent PNS lead placement in her left knee.  She is doing well.  She states that she had pulled 1 of the stitches out accidentally along the lateral aspect of her knee.  She had sudden bleeding which then went away.  She presented to the emergency room and was told that this should heal uneventfully.      Social History     Occupational History    Not on file   Tobacco Use    Smoking status: Former     Current packs/day: 0.00     Types: Cigarettes     Start date: 1989     Quit date: 2000     Years since quittin.5    Smokeless tobacco: Never    Tobacco comments:     pt stated she smoked one cig/day   Substance and Sexual Activity    Alcohol use: Never    Drug use: Never    Sexual activity: Yes     Partners: Male     Birth control/protection: Surgical, See Surgical Hx      Social Determinants of Health     Tobacco Use: Medium Risk (2024)    Patient History     Smoking Tobacco Use: Former     Smokeless Tobacco Use: Never     Passive Exposure: Not on file   Alcohol Use: Not At Risk (7/3/2024)    AUDIT-C     Frequency of Alcohol Consumption: Never     Average Number of Drinks: Patient does not drink     Frequency of Binge Drinking: Never   Financial Resource Strain: Low Risk  (7/3/2024)    Overall Financial Resource Strain (CARDIA)     Difficulty of Paying Living Expenses: Not hard at all   Food Insecurity: No Food Insecurity (7/3/2024)    Hunger Vital Sign     Worried About Running Out of Food in the Last Year: Never true     Ran Out of Food in the Last Year: Never true   Transportation Needs: No Transportation Needs (2024)    Received from [facility]    PRAPARE - Transportation     Lack of Transportation (Medical): No     Lack of Transportation (Non-Medical): No   Physical Activity: Inactive (7/3/2024)    Exercise Vital Sign     Days  of Exercise per Week: 0 days     Minutes of Exercise per Session: 0 min   Stress: Stress Concern Present (7/3/2024)    [employer] [place] [employer] - Occupational Stress Questionnaire     Feeling of Stress : To some extent   Housing Stability: Unknown (7/3/2024)    Housing Stability Vital Sign     Unable to Pay for Housing in the Last Year: No     Homeless in the Last Year: Not on file   Depression: Low Risk  (6/4/2024)    Depression     Last PHQ-4: Flowsheet Data: 0   Utilities: Not At Risk (7/3/2024)    [facility] Utilities     Threatened with loss of utilities: No   Health Literacy: Adequate Health Literacy (7/3/2024)     Health Literacy     Frequency of need for help with medical instructions: Never   Social Isolation: Not on file      Review of Systems   Constitutional: Negative for diaphoresis.   HENT:  Negative for ear discharge, nosebleeds and stridor.    Eyes:  Negative for photophobia.   Cardiovascular:  Negative for syncope.   Respiratory:  Negative for hemoptysis, shortness of breath and wheezing.    Neurological:  Negative for tremors.   Psychiatric/Behavioral: Negative.           Objective:    Ortho/SPM Exam   Left knee incisions have all healed.  There is 1 on laterally where the stitch was inadvertently removed that is fairly deep but has healthy granulation tissue forming.  No active bleeding drainage or signs of infection.      Pain-free range of motion      Assessment:       1. Chronic pain of left knee          Plan:   Patient we will go ahead and start PNS treatments today.  X-rays today show all lead placement to be appropriate.  We will see her back proximally 1 month for repeat  x-rays

## 2024-09-05 DIAGNOSIS — K59.09 CHRONIC CONSTIPATION: ICD-10-CM

## 2024-09-05 RX ORDER — LINACLOTIDE 145 UG/1
CAPSULE, GELATIN COATED ORAL
Qty: 30 CAPSULE | Refills: 5 | Status: SHIPPED | OUTPATIENT
Start: 2024-09-05

## 2024-09-09 ENCOUNTER — LAB VISIT (OUTPATIENT)
Dept: LAB | Facility: HOSPITAL | Age: 49
End: 2024-09-09
Attending: INTERNAL MEDICINE
Payer: MEDICAID

## 2024-09-09 DIAGNOSIS — E61.1 IRON DEFICIENCY: ICD-10-CM

## 2024-09-09 LAB
BASOPHILS # BLD AUTO: 0.05 K/UL (ref 0–0.2)
BASOPHILS NFR BLD: 0.5 % (ref 0–1.9)
DIFFERENTIAL METHOD BLD: NORMAL
EOSINOPHIL # BLD AUTO: 0.4 K/UL (ref 0–0.5)
EOSINOPHIL NFR BLD: 3.6 % (ref 0–8)
ERYTHROCYTE [DISTWIDTH] IN BLOOD BY AUTOMATED COUNT: 12.8 % (ref 11.5–14.5)
FERRITIN SERPL-MCNC: 124 NG/ML (ref 20–300)
HCT VFR BLD AUTO: 41.8 % (ref 37–48.5)
HGB BLD-MCNC: 13.7 G/DL (ref 12–16)
IMM GRANULOCYTES # BLD AUTO: 0.04 K/UL (ref 0–0.04)
IMM GRANULOCYTES NFR BLD AUTO: 0.4 % (ref 0–0.5)
IRON SERPL-MCNC: 82 UG/DL (ref 30–160)
LYMPHOCYTES # BLD AUTO: 3.3 K/UL (ref 1–4.8)
LYMPHOCYTES NFR BLD: 32.7 % (ref 18–48)
MCH RBC QN AUTO: 29.6 PG (ref 27–31)
MCHC RBC AUTO-ENTMCNC: 32.8 G/DL (ref 32–36)
MCV RBC AUTO: 90 FL (ref 82–98)
MONOCYTES # BLD AUTO: 0.7 K/UL (ref 0.3–1)
MONOCYTES NFR BLD: 7.1 % (ref 4–15)
NEUTROPHILS # BLD AUTO: 5.7 K/UL (ref 1.8–7.7)
NEUTROPHILS NFR BLD: 55.7 % (ref 38–73)
NRBC BLD-RTO: 0 /100 WBC
PLATELET # BLD AUTO: 367 K/UL (ref 150–450)
PMV BLD AUTO: 10.3 FL (ref 9.2–12.9)
RBC # BLD AUTO: 4.63 M/UL (ref 4–5.4)
SATURATED IRON: 23 % (ref 20–50)
TOTAL IRON BINDING CAPACITY: 354 UG/DL (ref 250–450)
TRANSFERRIN SERPL-MCNC: 239 MG/DL (ref 200–375)
WBC # BLD AUTO: 10.15 K/UL (ref 3.9–12.7)

## 2024-09-09 PROCEDURE — 82728 ASSAY OF FERRITIN: CPT | Performed by: INTERNAL MEDICINE

## 2024-09-09 PROCEDURE — 85025 COMPLETE CBC W/AUTO DIFF WBC: CPT | Mod: PN | Performed by: INTERNAL MEDICINE

## 2024-09-09 PROCEDURE — 83540 ASSAY OF IRON: CPT | Mod: PN | Performed by: INTERNAL MEDICINE

## 2024-09-09 PROCEDURE — 36415 COLL VENOUS BLD VENIPUNCTURE: CPT | Mod: PN | Performed by: INTERNAL MEDICINE

## 2024-09-10 ENCOUNTER — PATIENT MESSAGE (OUTPATIENT)
Dept: HEMATOLOGY/ONCOLOGY | Facility: CLINIC | Age: 49
End: 2024-09-10
Payer: MEDICAID

## 2024-09-12 DIAGNOSIS — M17.12 PRIMARY OSTEOARTHRITIS OF LEFT KNEE: ICD-10-CM

## 2024-09-12 DIAGNOSIS — G89.29 CHRONIC PAIN OF LEFT KNEE: Primary | ICD-10-CM

## 2024-09-12 DIAGNOSIS — M25.562 CHRONIC PAIN OF LEFT KNEE: Primary | ICD-10-CM

## 2024-09-16 ENCOUNTER — TELEPHONE (OUTPATIENT)
Dept: ORTHOPEDICS | Facility: CLINIC | Age: 49
End: 2024-09-16
Payer: MEDICAID

## 2024-09-16 NOTE — TELEPHONE ENCOUNTER
----- Message from Sandra Mcclain sent at 9/16/2024  9:54 AM CDT -----  Contact: patient  Inna Gallardo would like a call back at [phone], in regards to rescheduling her post-op appt.

## 2024-09-17 ENCOUNTER — LAB VISIT (OUTPATIENT)
Dept: LAB | Facility: HOSPITAL | Age: 49
End: 2024-09-17
Attending: NURSE PRACTITIONER
Payer: MEDICAID

## 2024-09-17 DIAGNOSIS — M25.561 PAIN IN RIGHT KNEE: Primary | ICD-10-CM

## 2024-09-17 DIAGNOSIS — E88.810 METABOLIC SYNDROME: ICD-10-CM

## 2024-09-17 DIAGNOSIS — E53.8 DEFICIENCY OF OTHER SPECIFIED B GROUP VITAMINS: ICD-10-CM

## 2024-09-17 DIAGNOSIS — E56.9 VITAMIN DEFICIENCY, UNSPECIFIED: ICD-10-CM

## 2024-09-17 LAB
ALBUMIN SERPL BCP-MCNC: 4.7 G/DL (ref 3.5–5.2)
ALP SERPL-CCNC: 66 U/L (ref 38–126)
ALT SERPL W/O P-5'-P-CCNC: 48 U/L (ref 10–44)
ANION GAP SERPL CALC-SCNC: 11 MMOL/L (ref 8–16)
AST SERPL-CCNC: 42 U/L (ref 15–46)
BASOPHILS # BLD AUTO: 0.06 K/UL (ref 0–0.2)
BASOPHILS NFR BLD: 0.6 % (ref 0–1.9)
BILIRUB SERPL-MCNC: 0.4 MG/DL (ref 0.1–1)
CALCIUM SERPL-MCNC: 10 MG/DL (ref 8.7–10.5)
CHLORIDE SERPL-SCNC: 106 MMOL/L (ref 95–110)
CHOLEST SERPL-MCNC: 258 MG/DL (ref 120–199)
CHOLEST/HDLC SERPL: 5.1 {RATIO} (ref 2–5)
CO2 SERPL-SCNC: 23 MMOL/L (ref 23–29)
CREAT SERPL-MCNC: 0.52 MG/DL (ref 0.5–1.4)
DHEA-S SERPL-MCNC: 19 UG/DL (ref 56.2–282.9)
DIFFERENTIAL METHOD BLD: NORMAL
EOSINOPHIL # BLD AUTO: 0.3 K/UL (ref 0–0.5)
EOSINOPHIL NFR BLD: 3.1 % (ref 0–8)
ERYTHROCYTE [DISTWIDTH] IN BLOOD BY AUTOMATED COUNT: 12.9 % (ref 11.5–14.5)
ERYTHROCYTE [SEDIMENTATION RATE] IN BLOOD BY PHOTOMETRIC METHOD: 24 MM/HR (ref 0–36)
EST. GFR  (NO RACE VARIABLE): >60 ML/MIN/1.73 M^2
ESTIMATED AVG GLUCOSE: 114 MG/DL (ref 68–131)
GLUCOSE SERPL-MCNC: 122 MG/DL (ref 70–110)
HBA1C MFR BLD: 5.6 % (ref 4–5.6)
HCT VFR BLD AUTO: 43.1 % (ref 37–48.5)
HDLC SERPL-MCNC: 51 MG/DL (ref 40–75)
HDLC SERPL: 19.8 % (ref 20–50)
HGB BLD-MCNC: 14.3 G/DL (ref 12–16)
IMM GRANULOCYTES # BLD AUTO: 0.03 K/UL (ref 0–0.04)
IMM GRANULOCYTES NFR BLD AUTO: 0.3 % (ref 0–0.5)
INSULIN COLLECTION INTERVAL: 0
INSULIN SERPL-ACNC: 41.2 UU/ML
LDLC SERPL CALC-MCNC: 169.8 MG/DL (ref 63–159)
LYMPHOCYTES # BLD AUTO: 3.2 K/UL (ref 1–4.8)
LYMPHOCYTES NFR BLD: 32.6 % (ref 18–48)
MCH RBC QN AUTO: 29.4 PG (ref 27–31)
MCHC RBC AUTO-ENTMCNC: 33.2 G/DL (ref 32–36)
MCV RBC AUTO: 89 FL (ref 82–98)
MONOCYTES # BLD AUTO: 0.8 K/UL (ref 0.3–1)
MONOCYTES NFR BLD: 8.7 % (ref 4–15)
NEUTROPHILS # BLD AUTO: 5.3 K/UL (ref 1.8–7.7)
NEUTROPHILS NFR BLD: 54.7 % (ref 38–73)
NONHDLC SERPL-MCNC: 207 MG/DL
NRBC BLD-RTO: 0 /100 WBC
PLATELET # BLD AUTO: 389 K/UL (ref 150–450)
PMV BLD AUTO: 10.2 FL (ref 9.2–12.9)
POTASSIUM SERPL-SCNC: 3.5 MMOL/L (ref 3.5–5.1)
PROT SERPL-MCNC: 7.8 G/DL (ref 6–8.4)
RBC # BLD AUTO: 4.87 M/UL (ref 4–5.4)
SODIUM SERPL-SCNC: 140 MMOL/L (ref 136–145)
T4 FREE SERPL-MCNC: 0.99 NG/DL (ref 0.71–1.51)
TESTOST SERPL-MCNC: 11 NG/DL (ref 5–73)
TRIGL SERPL-MCNC: 186 MG/DL (ref 30–150)
TSH SERPL DL<=0.005 MIU/L-ACNC: 1.82 UIU/ML (ref 0.4–4)
TSH SERPL DL<=0.005 MIU/L-ACNC: 1.82 UIU/ML (ref 0.4–4)
URATE SERPL-MCNC: 5.5 MG/DL (ref 2.4–5.7)
UUN UR-MCNC: 17 MG/DL (ref 7–17)
VIT B12 SERPL-MCNC: 279 PG/ML (ref 210–950)
WBC # BLD AUTO: 9.67 K/UL (ref 3.9–12.7)

## 2024-09-17 PROCEDURE — 80053 COMPREHEN METABOLIC PANEL: CPT | Mod: PN | Performed by: NURSE PRACTITIONER

## 2024-09-17 PROCEDURE — 84403 ASSAY OF TOTAL TESTOSTERONE: CPT | Mod: PN | Performed by: NURSE PRACTITIONER

## 2024-09-17 PROCEDURE — 85652 RBC SED RATE AUTOMATED: CPT | Performed by: NURSE PRACTITIONER

## 2024-09-17 PROCEDURE — 80061 LIPID PANEL: CPT | Performed by: NURSE PRACTITIONER

## 2024-09-17 PROCEDURE — 82627 DEHYDROEPIANDROSTERONE: CPT | Mod: PN | Performed by: NURSE PRACTITIONER

## 2024-09-17 PROCEDURE — 83036 HEMOGLOBIN GLYCOSYLATED A1C: CPT | Performed by: NURSE PRACTITIONER

## 2024-09-17 PROCEDURE — 82607 VITAMIN B-12: CPT | Mod: PN | Performed by: NURSE PRACTITIONER

## 2024-09-17 PROCEDURE — 84550 ASSAY OF BLOOD/URIC ACID: CPT | Performed by: NURSE PRACTITIONER

## 2024-09-17 PROCEDURE — 85025 COMPLETE CBC W/AUTO DIFF WBC: CPT | Mod: PN | Performed by: NURSE PRACTITIONER

## 2024-09-17 PROCEDURE — 82671 ASSAY OF ESTROGENS: CPT | Mod: PN | Performed by: NURSE PRACTITIONER

## 2024-09-17 PROCEDURE — 84439 ASSAY OF FREE THYROXINE: CPT | Performed by: NURSE PRACTITIONER

## 2024-09-17 PROCEDURE — 83525 ASSAY OF INSULIN: CPT | Mod: PN | Performed by: NURSE PRACTITIONER

## 2024-09-17 PROCEDURE — 86038 ANTINUCLEAR ANTIBODIES: CPT | Mod: PN | Performed by: NURSE PRACTITIONER

## 2024-09-17 PROCEDURE — 84443 ASSAY THYROID STIM HORMONE: CPT | Mod: PN | Performed by: NURSE PRACTITIONER

## 2024-09-17 PROCEDURE — 82306 VITAMIN D 25 HYDROXY: CPT | Mod: PN | Performed by: NURSE PRACTITIONER

## 2024-09-18 LAB
25(OH)D3+25(OH)D2 SERPL-MCNC: 12 NG/ML (ref 30–96)
ANA SER QL IF: NORMAL

## 2024-09-24 ENCOUNTER — OFFICE VISIT (OUTPATIENT)
Dept: ORTHOPEDICS | Facility: CLINIC | Age: 49
End: 2024-09-24
Attending: ORTHOPAEDIC SURGERY
Payer: MEDICAID

## 2024-09-24 ENCOUNTER — HOSPITAL ENCOUNTER (OUTPATIENT)
Dept: RADIOLOGY | Facility: HOSPITAL | Age: 49
Discharge: HOME | End: 2024-09-24
Attending: ORTHOPAEDIC SURGERY
Payer: MEDICAID

## 2024-09-24 ENCOUNTER — CLINICAL SUPPORT (OUTPATIENT)
Dept: PSYCHIATRY | Facility: CLINIC | Age: 49
End: 2024-09-24
Payer: MEDICAID

## 2024-09-24 VITALS — BODY MASS INDEX: 48.75 KG/M2 | WEIGHT: 275.13 LBS | HEIGHT: 63 IN

## 2024-09-24 DIAGNOSIS — M17.12 PRIMARY OSTEOARTHRITIS OF LEFT KNEE: ICD-10-CM

## 2024-09-24 DIAGNOSIS — G89.29 CHRONIC PAIN OF RIGHT KNEE: ICD-10-CM

## 2024-09-24 DIAGNOSIS — G89.29 CHRONIC PAIN OF LEFT KNEE: ICD-10-CM

## 2024-09-24 DIAGNOSIS — M25.562 CHRONIC PAIN OF LEFT KNEE: ICD-10-CM

## 2024-09-24 DIAGNOSIS — M17.11 PRIMARY OSTEOARTHRITIS OF RIGHT KNEE: Primary | ICD-10-CM

## 2024-09-24 DIAGNOSIS — Z00.8 ENCOUNTER FOR PRE-SURGICAL PSYCHOLOGICAL ASSESSMENT: Primary | ICD-10-CM

## 2024-09-24 DIAGNOSIS — E66.01 MORBID OBESITY DUE TO EXCESS CALORIES: ICD-10-CM

## 2024-09-24 DIAGNOSIS — M25.561 CHRONIC PAIN OF RIGHT KNEE: ICD-10-CM

## 2024-09-24 LAB
ESTRADIOL SERPL HS-MCNC: <2 PG/ML
ESTROGEN SERPL CALC-MCNC: <18 PG/ML
ESTRONE SERPL-MCNC: 16 PG/ML

## 2024-09-24 PROCEDURE — 99999 PR PBB SHADOW E&M-EST. PATIENT-LVL IV: CPT | Mod: PBBFAC,,, | Performed by: PHYSICIAN ASSISTANT

## 2024-09-24 PROCEDURE — 99214 OFFICE O/P EST MOD 30 MIN: CPT | Mod: PBBFAC,25,PN | Performed by: PHYSICIAN ASSISTANT

## 2024-09-24 PROCEDURE — 73590 X-RAY EXAM OF LOWER LEG: CPT | Mod: 26,LT,, | Performed by: RADIOLOGY

## 2024-09-24 PROCEDURE — 73552 X-RAY EXAM OF FEMUR 2/>: CPT | Mod: 26,LT,, | Performed by: RADIOLOGY

## 2024-09-24 PROCEDURE — 73590 X-RAY EXAM OF LOWER LEG: CPT | Mod: TC,FY,LT

## 2024-09-24 PROCEDURE — 73552 X-RAY EXAM OF FEMUR 2/>: CPT | Mod: TC,FY,LT

## 2024-09-24 NOTE — H&P (VIEW-ONLY)
Subjective:      Chief Complaint: Pain and Post-op Evaluation of the Left Knee (L stim perm 8/12/24)    Patient ID: Inna Gallardo is a 49 y.o. female.  48yo morbidly obese F s/p permanent left knee stim router on 8/12/24. States she is doing well. Does not note complete resolution in pain. Rates left knee a 5/10 today. Notes more pain relief from the femoral leads than tibia. (70% and 30% pain relief). Is ready to schedule right knee trial stim router.         Past Medical History:   Diagnosis Date    Anxiety     Diverticulitis     Endometriosis     General anesthetics causing adverse effect in therapeutic use     Hypertension     Kidney stone     Prolactinoma 2011    Sliding hiatal hernia     Urinary tract infection     Vaginal infection         Past Surgical History:   Procedure Laterality Date    ARTHROSCOPY OF KNEE Left     BRAIN TUMOR EXCISION  9/2011    removal of prolactinoma    COLONOSCOPY N/A 8/27/2020    Procedure: COLONOSCOPY;  Surgeon: Valentino Negro MD;  Location: [facility];  Service: Endoscopy;  Laterality: N/A;    CYSTOSCOPY      CYSTOSCOPY W/ URETERAL STENT PLACEMENT  2016    CYSTOSCOPY W/ URETERAL STENT REMOVAL  2016    ESOPHAGEAL MANOMETRY N/A 6/3/2019    Procedure: MANOMETRY, ESOPHAGUS;  Surgeon: Bhupinder Schmitz MD;  [facility]  Service: Endoscopy;  Laterality: N/A;    ESOPHAGOGASTRODUODENOSCOPY N/A 4/30/2019    Procedure: ESOPHAGOGASTRODUODENOSCOPY (EGD);  Surgeon: Altagracia Bose MD;  Location: [facility];  Service: Endoscopy;  Laterality: N/A;    ESOPHAGOGASTRODUODENOSCOPY N/A 8/2/2021    Procedure: EGD (ESOPHAGOGASTRODUODENOSCOPY);  Surgeon: Altagracia Bose MD;  Location: [facility];  Service: Endoscopy;  Laterality: N/A;    EXTRACORPOREAL SHOCK WAVE LITHOTRIPSY Left 3/7/2024    Procedure: LITHOTRIPSY, ESWL;  Surgeon: Roverto Harley MD;  Location: [facility] OR;  Service: Urology;  Laterality: Left;    FOOT HARDWARE REMOVAL Right 6/16/2020    Procedure: REMOVAL,  HARDWARE, FOOT;  Surgeon: Adrianna Tillman DPM;  Location: [facility] OR;  Service: Podiatry;  Laterality: Right;    HARVESTING OF BONE GRAFT Right 7/16/2019    Procedure: SURGICAL PROCUREMENT, BONE GRAFT;  Surgeon: Adrianna Tillman DPM;  Location: [facility] OR;  Service: Podiatry;  Laterality: Right;    HYSTERECTOMY  3/16/15    TLH/BSO for Endometriosis, AUB, fibroids, cyst    INSERTION, ELECTRODE LEAD, NEUROSTIMULATOR, PERIPHERAL Left 7/15/2024    Procedure: INSERTION,ELECTRODE LEAD,NEUROSTIMULATOR,PERIPHERAL;  Surgeon: Calvin Nguyen MD;  Location: [facility] OR;  Service: Pain Management;  Laterality: Left;  stimrouter trial    INSERTION, ELECTRODE LEAD, NEUROSTIMULATOR, PERIPHERAL Left 8/12/2024    Procedure: INSERTION,ELECTRODE LEAD,NEUROSTIMULATOR,PERIPHERAL;  Surgeon: Calvin Nguyen MD;  Location: [facility] OR;  Service: Pain Management;  Laterality: Left;  stimrouter permanent    LITHOTRIPSY      MANDIBLE SURGERY  2002    severe overbite correction    PELVIC LAPAROSCOPY  2014    Dx scope, chromopertubation-Endometriosis    TONSILLECTOMY, ADENOIDECTOMY          Current Outpatient Medications   Medication Instructions    baclofen (LIORESAL) 10 mg, Oral, 3 times daily PRN    EPINEPHrine (EPIPEN) 0.3 mg/0.3 mL AtIn 0.3 mLs, Intramuscular, As needed (PRN)    ergocalciferol (ERGOCALCIFEROL) 50,000 Units, Oral, Every 7 days    hydrOXYzine HCL (ATARAX) 25 mg, Oral, 3 times daily    linaCLOtide (LINZESS) 145 mcg Cap capsule TAKE 1 CAPSULE(145 MCG) BY MOUTH DAILY    losartan-hydrochlorothiazide 100-25 mg (HYZAAR) 100-25 mg per tablet 1 tablet, Oral, Daily    metFORMIN (GLUCOPHAGE-XR) 500 MG ER 24hr tablet TAKE 1 TABLET BY MOUTH EVERY DAY AT DINNER    omeprazole (PRILOSEC) 40 mg, Oral, Every morning    sertraline (ZOLOFT) 100 mg, Oral, Nightly        Review of patient's allergies indicates:   Allergen Reactions    Ace inhibitors Other (See Comments)     cough       Review of Systems   Constitutional: Negative for fever and malaise/fatigue.   Eyes:   "Negative for blurred vision.   Cardiovascular:  Negative for chest pain.   Respiratory:  Negative for shortness of breath.    Skin:  Negative for poor wound healing.   Musculoskeletal:  Positive for arthritis, joint pain and myalgias.   Genitourinary:  Negative for bladder incontinence.   Neurological:  Negative for dizziness, numbness and paresthesias.   Psychiatric/Behavioral:  Negative for altered mental status.        The patient's relevant past medical, surgical, and social history was reviewed in Epic.       Objective:      VITAL SIGNS: Ht 5' 3" (1.6 m)   Wt 124.8 kg (275 lb 2.2 oz)   LMP 2015   BMI 48.74 kg/m²     General    Nursing note and vitals reviewed.  Constitutional: She is oriented to person, place, and time. She appears well-developed.   obese   Neurological: She is alert and oriented to person, place, and time.     General Musculoskeletal Exam   Gait: antalgic       Right Knee Exam     Inspection   Swelling: present    Tenderness   The patient is tender to palpation of the medial joint line.    Range of Motion   Extension:  5   Flexion:  110     Left Knee Exam     Inspection   Scars: present    Tenderness   The patient is experiencing no tenderness.     Range of Motion   Extension:  5   Flexion:  110     Muscle Strength   Right Lower Extremity   Quadriceps:  4/5   Hamstrin/5   Left Lower Extremity   Quadriceps:  4/5   Hamstrin/5        Imaging  X-Ray Femur 2 View Left  Narrative: EXAMINATION:  XR FEMUR 2 VIEW LEFT    CLINICAL HISTORY:  Pain in left knee    TECHNIQUE:  AP and lateral views of the left femur were performed.    COMPARISON:  2024    FINDINGS:  Degenerative changes seen at the hip and knee.  Left cul leads are in place.  Impression: See above    Electronically signed by: Davion Bustamante MD  Date:    2024  Time:    09:03  X-Ray Tibia Fibula 2 View Left  Narrative: EXAMINATION:  XR TIBIA FIBULA 2 VIEW LEFT    CLINICAL HISTORY:  Pain in left " knee    TECHNIQUE:  AP and lateral views of the left tibia and fibula were performed.    COMPARISON:  08/22/2024    FINDINGS:  The left knee joint is narrowed medially.  Tibia and fibula are intact.  Electrical lead is again seen.  Impression: See above    Electronically signed by: Davion Bustamante MD  Date:    09/24/2024  Time:    09:03    I have reviewed the above radiograph and agree with the findings stated by the radiologist.   Leads well placed.         Assessment:       Inna Gallardo is a 49 y.o. female seen in the office today for   1. Primary osteoarthritis of right knee    2. Morbid obesity due to excess calories    3. Chronic pain of right knee    4. Chronic pain of left knee    5. Primary osteoarthritis of left knee     Doing well with left knee stim router. She is ready to schedule right knee stim router today with Dr. Nguyen. Will get consents signed today. Scheduled tentatively for Oct 7 pending insurance approval.       Plan:       Inna was seen today for pain and post-op evaluation.    Diagnoses and all orders for this visit:    Primary osteoarthritis of right knee  -     CBC Auto Differential; Future  -     Basic Metabolic Panel; Future  -     Ambulatory referral/consult to Psychiatry; Future  -     Case Request Operating Room: INSERTION,ELECTRODE LEAD,NEUROSTIMULATOR,PERIPHERAL    Morbid obesity due to excess calories  -     CBC Auto Differential; Future  -     Basic Metabolic Panel; Future  -     Ambulatory referral/consult to Psychiatry; Future  -     Case Request Operating Room: INSERTION,ELECTRODE LEAD,NEUROSTIMULATOR,PERIPHERAL    Chronic pain of right knee  -     CBC Auto Differential; Future  -     Basic Metabolic Panel; Future  -     Ambulatory referral/consult to Psychiatry; Future  -     Case Request Operating Room: INSERTION,ELECTRODE LEAD,NEUROSTIMULATOR,PERIPHERAL    Chronic pain of left knee    Primary osteoarthritis of left knee    We had a lengthy conversation about the benefits and  risks of peripheral nerve stimulation.  they understand that the 1st phase is a trial where we will place leads and do a test to see if implantation helps improve their pain.  If it does improve the pain symptoms then they will be scheduled for the permanent placement. With respect to the trial they understand that leads will be coming out of the skin and we will activate the leads after they are awake from .  Risks include bleeding, continued pain, damage to the nerves, injury to the bone and soft tissues.  they understand this and feel that the benefits outweigh the risks.  We will go ahead and schedule this. Patient requesting to talk to  re technology. Tentatively scheduled for Oct 7 pending insurance approval.       Diagnoses and plan discussed with the patient, as well as the expected course and duration of his symptoms.  All questions and concerns were addressed prior to the end of the visit.   Instructed patient to call office if they have any future questions/concerns or to schedule apt. Patient will return to see me if symptoms worsen or fail to improve    Note dictated with voice recognition software, please excuse any grammatical errors.        Pat Taylor PA-C      Department of Orthopedic Surgery  [place]  Office: [phone]  09/24/2024

## 2024-09-24 NOTE — PROGRESS NOTES
Subjective:      Chief Complaint: Pain and Post-op Evaluation of the Left Knee (L stim perm 8/12/24)    Patient ID: Inna Gallardo is a 49 y.o. female.  50yo morbidly obese F s/p permanent left knee stim router on 8/12/24. States she is doing well. Does not note complete resolution in pain. Rates left knee a 5/10 today. Notes more pain relief from the femoral leads than tibia. (70% and 30% pain relief). Is ready to schedule right knee trial stim router.         Past Medical History:   Diagnosis Date    Anxiety     Diverticulitis     Endometriosis     General anesthetics causing adverse effect in therapeutic use     Hypertension     Kidney stone     Prolactinoma 2011    Sliding hiatal hernia     Urinary tract infection     Vaginal infection         Past Surgical History:   Procedure Laterality Date    ARTHROSCOPY OF KNEE Left     BRAIN TUMOR EXCISION  9/2011    removal of prolactinoma    COLONOSCOPY N/A 8/27/2020    Procedure: COLONOSCOPY;  Surgeon: Valentino Negro MD;  Location: [facility];  Service: Endoscopy;  Laterality: N/A;    CYSTOSCOPY      CYSTOSCOPY W/ URETERAL STENT PLACEMENT  2016    CYSTOSCOPY W/ URETERAL STENT REMOVAL  2016    ESOPHAGEAL MANOMETRY N/A 6/3/2019    Procedure: MANOMETRY, ESOPHAGUS;  Surgeon: Bhupinder Schmitz MD;  [facility]  Service: Endoscopy;  Laterality: N/A;    ESOPHAGOGASTRODUODENOSCOPY N/A 4/30/2019    Procedure: ESOPHAGOGASTRODUODENOSCOPY (EGD);  Surgeon: Altagracia Bose MD;  Location: [facility];  Service: Endoscopy;  Laterality: N/A;    ESOPHAGOGASTRODUODENOSCOPY N/A 8/2/2021    Procedure: EGD (ESOPHAGOGASTRODUODENOSCOPY);  Surgeon: Altagracia Bose MD;  Location: [facility];  Service: Endoscopy;  Laterality: N/A;    EXTRACORPOREAL SHOCK WAVE LITHOTRIPSY Left 3/7/2024    Procedure: LITHOTRIPSY, ESWL;  Surgeon: Roverto Harley MD;  Location: [facility] OR;  Service: Urology;  Laterality: Left;    FOOT HARDWARE REMOVAL Right 6/16/2020    Procedure: REMOVAL,  HARDWARE, FOOT;  Surgeon: Adrianna Tillman DPM;  Location: [facility] OR;  Service: Podiatry;  Laterality: Right;    HARVESTING OF BONE GRAFT Right 7/16/2019    Procedure: SURGICAL PROCUREMENT, BONE GRAFT;  Surgeon: Adrianna Tillman DPM;  Location: [facility] OR;  Service: Podiatry;  Laterality: Right;    HYSTERECTOMY  3/16/15    TLH/BSO for Endometriosis, AUB, fibroids, cyst    INSERTION, ELECTRODE LEAD, NEUROSTIMULATOR, PERIPHERAL Left 7/15/2024    Procedure: INSERTION,ELECTRODE LEAD,NEUROSTIMULATOR,PERIPHERAL;  Surgeon: Calvin Nguyen MD;  Location: [facility] OR;  Service: Pain Management;  Laterality: Left;  stimrouter trial    INSERTION, ELECTRODE LEAD, NEUROSTIMULATOR, PERIPHERAL Left 8/12/2024    Procedure: INSERTION,ELECTRODE LEAD,NEUROSTIMULATOR,PERIPHERAL;  Surgeon: Calvin Nguyen MD;  Location: [facility] OR;  Service: Pain Management;  Laterality: Left;  stimrouter permanent    LITHOTRIPSY      MANDIBLE SURGERY  2002    severe overbite correction    PELVIC LAPAROSCOPY  2014    Dx scope, chromopertubation-Endometriosis    TONSILLECTOMY, ADENOIDECTOMY          Current Outpatient Medications   Medication Instructions    baclofen (LIORESAL) 10 mg, Oral, 3 times daily PRN    EPINEPHrine (EPIPEN) 0.3 mg/0.3 mL AtIn 0.3 mLs, Intramuscular, As needed (PRN)    ergocalciferol (ERGOCALCIFEROL) 50,000 Units, Oral, Every 7 days    hydrOXYzine HCL (ATARAX) 25 mg, Oral, 3 times daily    linaCLOtide (LINZESS) 145 mcg Cap capsule TAKE 1 CAPSULE(145 MCG) BY MOUTH DAILY    losartan-hydrochlorothiazide 100-25 mg (HYZAAR) 100-25 mg per tablet 1 tablet, Oral, Daily    metFORMIN (GLUCOPHAGE-XR) 500 MG ER 24hr tablet TAKE 1 TABLET BY MOUTH EVERY DAY AT DINNER    omeprazole (PRILOSEC) 40 mg, Oral, Every morning    sertraline (ZOLOFT) 100 mg, Oral, Nightly        Review of patient's allergies indicates:   Allergen Reactions    Ace inhibitors Other (See Comments)     cough       Review of Systems   Constitutional: Negative for fever and malaise/fatigue.   Eyes:   "Negative for blurred vision.   Cardiovascular:  Negative for chest pain.   Respiratory:  Negative for shortness of breath.    Skin:  Negative for poor wound healing.   Musculoskeletal:  Positive for arthritis, joint pain and myalgias.   Genitourinary:  Negative for bladder incontinence.   Neurological:  Negative for dizziness, numbness and paresthesias.   Psychiatric/Behavioral:  Negative for altered mental status.        The patient's relevant past medical, surgical, and social history was reviewed in Epic.       Objective:      VITAL SIGNS: Ht 5' 3" (1.6 m)   Wt 124.8 kg (275 lb 2.2 oz)   LMP 2015   BMI 48.74 kg/m²     General    Nursing note and vitals reviewed.  Constitutional: She is oriented to person, place, and time. She appears well-developed.   obese   Neurological: She is alert and oriented to person, place, and time.     General Musculoskeletal Exam   Gait: antalgic       Right Knee Exam     Inspection   Swelling: present    Tenderness   The patient is tender to palpation of the medial joint line.    Range of Motion   Extension:  5   Flexion:  110     Left Knee Exam     Inspection   Scars: present    Tenderness   The patient is experiencing no tenderness.     Range of Motion   Extension:  5   Flexion:  110     Muscle Strength   Right Lower Extremity   Quadriceps:  4/5   Hamstrin/5   Left Lower Extremity   Quadriceps:  4/5   Hamstrin/5        Imaging  X-Ray Femur 2 View Left  Narrative: EXAMINATION:  XR FEMUR 2 VIEW LEFT    CLINICAL HISTORY:  Pain in left knee    TECHNIQUE:  AP and lateral views of the left femur were performed.    COMPARISON:  2024    FINDINGS:  Degenerative changes seen at the hip and knee.  Left cul leads are in place.  Impression: See above    Electronically signed by: Davion Bustamante MD  Date:    2024  Time:    09:03  X-Ray Tibia Fibula 2 View Left  Narrative: EXAMINATION:  XR TIBIA FIBULA 2 VIEW LEFT    CLINICAL HISTORY:  Pain in left " knee    TECHNIQUE:  AP and lateral views of the left tibia and fibula were performed.    COMPARISON:  08/22/2024    FINDINGS:  The left knee joint is narrowed medially.  Tibia and fibula are intact.  Electrical lead is again seen.  Impression: See above    Electronically signed by: Davion Bustamante MD  Date:    09/24/2024  Time:    09:03    I have reviewed the above radiograph and agree with the findings stated by the radiologist.   Leads well placed.         Assessment:       Inna Gallardo is a 49 y.o. female seen in the office today for   1. Primary osteoarthritis of right knee    2. Morbid obesity due to excess calories    3. Chronic pain of right knee    4. Chronic pain of left knee    5. Primary osteoarthritis of left knee     Doing well with left knee stim router. She is ready to schedule right knee stim router today with Dr. Nguyen. Will get consents signed today. Scheduled tentatively for Oct 7 pending insurance approval.       Plan:       Inna was seen today for pain and post-op evaluation.    Diagnoses and all orders for this visit:    Primary osteoarthritis of right knee  -     CBC Auto Differential; Future  -     Basic Metabolic Panel; Future  -     Ambulatory referral/consult to Psychiatry; Future  -     Case Request Operating Room: INSERTION,ELECTRODE LEAD,NEUROSTIMULATOR,PERIPHERAL    Morbid obesity due to excess calories  -     CBC Auto Differential; Future  -     Basic Metabolic Panel; Future  -     Ambulatory referral/consult to Psychiatry; Future  -     Case Request Operating Room: INSERTION,ELECTRODE LEAD,NEUROSTIMULATOR,PERIPHERAL    Chronic pain of right knee  -     CBC Auto Differential; Future  -     Basic Metabolic Panel; Future  -     Ambulatory referral/consult to Psychiatry; Future  -     Case Request Operating Room: INSERTION,ELECTRODE LEAD,NEUROSTIMULATOR,PERIPHERAL    Chronic pain of left knee    Primary osteoarthritis of left knee    We had a lengthy conversation about the benefits and  risks of peripheral nerve stimulation.  they understand that the 1st phase is a trial where we will place leads and do a test to see if implantation helps improve their pain.  If it does improve the pain symptoms then they will be scheduled for the permanent placement. With respect to the trial they understand that leads will be coming out of the skin and we will activate the leads after they are awake from .  Risks include bleeding, continued pain, damage to the nerves, injury to the bone and soft tissues.  they understand this and feel that the benefits outweigh the risks.  We will go ahead and schedule this. Patient requesting to talk to  re technology. Tentatively scheduled for Oct 7 pending insurance approval.       Diagnoses and plan discussed with the patient, as well as the expected course and duration of his symptoms.  All questions and concerns were addressed prior to the end of the visit.   Instructed patient to call office if they have any future questions/concerns or to schedule apt. Patient will return to see me if symptoms worsen or fail to improve    Note dictated with voice recognition software, please excuse any grammatical errors.        Pat Taylor PA-C      Department of Orthopedic Surgery  [facility]  Office: [phone]  09/24/2024

## 2024-09-27 ENCOUNTER — LAB VISIT (OUTPATIENT)
Dept: LAB | Facility: HOSPITAL | Age: 49
End: 2024-09-27
Attending: ORTHOPAEDIC SURGERY
Payer: MEDICAID

## 2024-09-27 ENCOUNTER — ANESTHESIA EVENT (OUTPATIENT)
Dept: SURGERY | Facility: HOSPITAL | Age: 49
End: 2024-09-27
Payer: MEDICAID

## 2024-09-27 DIAGNOSIS — M25.561 CHRONIC PAIN OF RIGHT KNEE: ICD-10-CM

## 2024-09-27 DIAGNOSIS — M17.11 PRIMARY OSTEOARTHRITIS OF RIGHT KNEE: ICD-10-CM

## 2024-09-27 DIAGNOSIS — G89.29 CHRONIC PAIN OF RIGHT KNEE: ICD-10-CM

## 2024-09-27 DIAGNOSIS — E66.01 MORBID OBESITY DUE TO EXCESS CALORIES: ICD-10-CM

## 2024-09-27 LAB
ANION GAP SERPL CALC-SCNC: 11 MMOL/L (ref 8–16)
BASOPHILS # BLD AUTO: 0.05 K/UL (ref 0–0.2)
BASOPHILS NFR BLD: 0.5 % (ref 0–1.9)
CALCIUM SERPL-MCNC: 9.8 MG/DL (ref 8.7–10.5)
CHLORIDE SERPL-SCNC: 105 MMOL/L (ref 95–110)
CO2 SERPL-SCNC: 25 MMOL/L (ref 23–29)
CREAT SERPL-MCNC: 0.6 MG/DL (ref 0.5–1.4)
DIFFERENTIAL METHOD BLD: NORMAL
EOSINOPHIL # BLD AUTO: 0.3 K/UL (ref 0–0.5)
EOSINOPHIL NFR BLD: 2.5 % (ref 0–8)
ERYTHROCYTE [DISTWIDTH] IN BLOOD BY AUTOMATED COUNT: 12.7 % (ref 11.5–14.5)
EST. GFR  (NO RACE VARIABLE): >60 ML/MIN/1.73 M^2
GLUCOSE SERPL-MCNC: 103 MG/DL (ref 70–110)
HCT VFR BLD AUTO: 41.7 % (ref 37–48.5)
HGB BLD-MCNC: 13.7 G/DL (ref 12–16)
IMM GRANULOCYTES # BLD AUTO: 0.04 K/UL (ref 0–0.04)
IMM GRANULOCYTES NFR BLD AUTO: 0.4 % (ref 0–0.5)
LYMPHOCYTES # BLD AUTO: 3.5 K/UL (ref 1–4.8)
LYMPHOCYTES NFR BLD: 34.1 % (ref 18–48)
MCH RBC QN AUTO: 29.3 PG (ref 27–31)
MCHC RBC AUTO-ENTMCNC: 32.9 G/DL (ref 32–36)
MCV RBC AUTO: 89 FL (ref 82–98)
MONOCYTES # BLD AUTO: 0.7 K/UL (ref 0.3–1)
MONOCYTES NFR BLD: 7.2 % (ref 4–15)
NEUTROPHILS # BLD AUTO: 5.6 K/UL (ref 1.8–7.7)
NEUTROPHILS NFR BLD: 55.3 % (ref 38–73)
NRBC BLD-RTO: 0 /100 WBC
PLATELET # BLD AUTO: 378 K/UL (ref 150–450)
PMV BLD AUTO: 10.6 FL (ref 9.2–12.9)
POTASSIUM SERPL-SCNC: 3.7 MMOL/L (ref 3.5–5.1)
RBC # BLD AUTO: 4.68 M/UL (ref 4–5.4)
SODIUM SERPL-SCNC: 141 MMOL/L (ref 136–145)
UUN UR-MCNC: 16 MG/DL (ref 7–17)
WBC # BLD AUTO: 10.21 K/UL (ref 3.9–12.7)

## 2024-09-27 PROCEDURE — 85025 COMPLETE CBC W/AUTO DIFF WBC: CPT | Mod: PN | Performed by: ORTHOPAEDIC SURGERY

## 2024-09-27 PROCEDURE — 80048 BASIC METABOLIC PNL TOTAL CA: CPT | Mod: PN | Performed by: ORTHOPAEDIC SURGERY

## 2024-09-27 PROCEDURE — 36415 COLL VENOUS BLD VENIPUNCTURE: CPT | Mod: PN | Performed by: ORTHOPAEDIC SURGERY

## 2024-09-30 ENCOUNTER — OFFICE VISIT (OUTPATIENT)
Dept: PSYCHIATRY | Facility: CLINIC | Age: 49
End: 2024-09-30
Payer: MEDICAID

## 2024-09-30 ENCOUNTER — DOCUMENTATION ONLY (OUTPATIENT)
Dept: PSYCHIATRY | Facility: CLINIC | Age: 49
End: 2024-09-30
Payer: MEDICAID

## 2024-09-30 DIAGNOSIS — E66.01 MORBID OBESITY DUE TO EXCESS CALORIES: ICD-10-CM

## 2024-09-30 DIAGNOSIS — M25.561 CHRONIC PAIN OF RIGHT KNEE: ICD-10-CM

## 2024-09-30 DIAGNOSIS — G89.29 CHRONIC PAIN OF RIGHT KNEE: ICD-10-CM

## 2024-09-30 DIAGNOSIS — Z01.818 PREOPERATIVE EVALUATION TO RULE OUT SURGICAL CONTRAINDICATION: Primary | ICD-10-CM

## 2024-09-30 DIAGNOSIS — M17.11 PRIMARY OSTEOARTHRITIS OF RIGHT KNEE: ICD-10-CM

## 2024-09-30 PROCEDURE — 90791 PSYCH DIAGNOSTIC EVALUATION: CPT | Mod: AH,HB,95, | Performed by: PSYCHOLOGIST

## 2024-09-30 PROCEDURE — 96130 PSYCL TST EVAL PHYS/QHP 1ST: CPT | Mod: AH,HB,95, | Performed by: PSYCHOLOGIST

## 2024-09-30 PROCEDURE — 96138 PSYCL/NRPSYC TECH 1ST: CPT | Mod: AH,HB,NDTC, | Performed by: PSYCHOLOGIST

## 2024-09-30 PROCEDURE — 3044F HG A1C LEVEL LT 7.0%: CPT | Mod: CPTII,95,, | Performed by: PSYCHOLOGIST

## 2024-09-30 PROCEDURE — 96139 PSYCL/NRPSYC TST TECH EA: CPT | Mod: AH,HB,NDTC, | Performed by: PSYCHOLOGIST

## 2024-09-30 NOTE — PROGRESS NOTES
Ms. Gallardo is a suitable candidate from a psychological perspective. There are no absolute psychological contraindications to PNS. There are no recommendations for psychological intervention at this time.

## 2024-09-30 NOTE — Clinical Note
There are no overt psychological contraindications for proceeding with PNS surgery. No recommendations made.

## 2024-10-03 DIAGNOSIS — M17.11 PRIMARY OSTEOARTHRITIS OF RIGHT KNEE: Primary | ICD-10-CM

## 2024-10-06 RX ORDER — DICLOFENAC SODIUM 75 MG/1
75 TABLET, DELAYED RELEASE ORAL 2 TIMES DAILY
Qty: 84 TABLET | Refills: 0 | Status: CANCELLED | OUTPATIENT
Start: 2024-10-06 | End: 2024-11-17

## 2024-10-06 RX ORDER — ACETAMINOPHEN 325 MG/1
325 TABLET ORAL EVERY 4 HOURS
Qty: 84 TABLET | Refills: 0 | Status: CANCELLED | OUTPATIENT
Start: 2024-10-06 | End: 2024-10-20

## 2024-10-07 ENCOUNTER — ANESTHESIA (OUTPATIENT)
Dept: SURGERY | Facility: HOSPITAL | Age: 49
End: 2024-10-07
Payer: MEDICAID

## 2024-10-07 ENCOUNTER — HOSPITAL ENCOUNTER (OUTPATIENT)
Facility: HOSPITAL | Age: 49
Discharge: HOME | End: 2024-10-07
Attending: ORTHOPAEDIC SURGERY | Admitting: ORTHOPAEDIC SURGERY
Payer: MEDICAID

## 2024-10-07 DIAGNOSIS — M25.561 RIGHT KNEE PAIN: ICD-10-CM

## 2024-10-07 DIAGNOSIS — M17.11 PRIMARY OSTEOARTHRITIS OF RIGHT KNEE: Primary | ICD-10-CM

## 2024-10-07 PROCEDURE — 63600175 PHARM REV CODE 636 W HCPCS: Performed by: ORTHOPAEDIC SURGERY

## 2024-10-07 PROCEDURE — 63600175 PHARM REV CODE 636 W HCPCS

## 2024-10-07 PROCEDURE — 25000003 PHARM REV CODE 250

## 2024-10-07 PROCEDURE — 37000008 HC ANESTHESIA 1ST 15 MINUTES: Performed by: ORTHOPAEDIC SURGERY

## 2024-10-07 PROCEDURE — 64555 IMPLANT NEUROELECTRODES: CPT | Mod: RT,,, | Performed by: ORTHOPAEDIC SURGERY

## 2024-10-07 PROCEDURE — C1778 LEAD, NEUROSTIMULATOR: HCPCS | Performed by: ORTHOPAEDIC SURGERY

## 2024-10-07 PROCEDURE — 71000016 HC POSTOP RECOV ADDL HR: Performed by: ORTHOPAEDIC SURGERY

## 2024-10-07 PROCEDURE — 71000015 HC POSTOP RECOV 1ST HR: Performed by: ORTHOPAEDIC SURGERY

## 2024-10-07 PROCEDURE — 36000706: Performed by: ORTHOPAEDIC SURGERY

## 2024-10-07 PROCEDURE — 36000707: Performed by: ORTHOPAEDIC SURGERY

## 2024-10-07 PROCEDURE — 37000009 HC ANESTHESIA EA ADD 15 MINS: Performed by: ORTHOPAEDIC SURGERY

## 2024-10-07 DEVICE — IMPLANTABLE DEVICE: Type: IMPLANTABLE DEVICE | Site: LEG | Status: FUNCTIONAL

## 2024-10-07 RX ORDER — ACETAMINOPHEN 10 MG/ML
INJECTION, SOLUTION INTRAVENOUS
Status: DISCONTINUED | OUTPATIENT
Start: 2024-10-07 | End: 2024-10-07

## 2024-10-07 RX ORDER — CEFAZOLIN SODIUM 1 G/3ML
INJECTION, POWDER, FOR SOLUTION INTRAMUSCULAR; INTRAVENOUS
Status: DISCONTINUED | OUTPATIENT
Start: 2024-10-07 | End: 2024-10-07

## 2024-10-07 RX ORDER — PROPOFOL 10 MG/ML
VIAL (ML) INTRAVENOUS CONTINUOUS PRN
Status: DISCONTINUED | OUTPATIENT
Start: 2024-10-07 | End: 2024-10-07

## 2024-10-07 RX ORDER — FENTANYL CITRATE 50 UG/ML
INJECTION, SOLUTION INTRAMUSCULAR; INTRAVENOUS
Status: DISCONTINUED | OUTPATIENT
Start: 2024-10-07 | End: 2024-10-07

## 2024-10-07 RX ORDER — KETOROLAC TROMETHAMINE 30 MG/ML
INJECTION, SOLUTION INTRAMUSCULAR; INTRAVENOUS
Status: DISCONTINUED | OUTPATIENT
Start: 2024-10-07 | End: 2024-10-07

## 2024-10-07 RX ORDER — HYDROMORPHONE HYDROCHLORIDE 2 MG/ML
0.2 INJECTION, SOLUTION INTRAMUSCULAR; INTRAVENOUS; SUBCUTANEOUS EVERY 5 MIN PRN
Status: DISCONTINUED | OUTPATIENT
Start: 2024-10-07 | End: 2024-10-07 | Stop reason: HOSPADM

## 2024-10-07 RX ORDER — ACETAMINOPHEN 500 MG
500 TABLET ORAL EVERY 6 HOURS PRN
COMMUNITY

## 2024-10-07 RX ORDER — DICLOFENAC SODIUM 75 MG/1
75 TABLET, DELAYED RELEASE ORAL 2 TIMES DAILY
COMMUNITY

## 2024-10-07 RX ORDER — LIDOCAINE HYDROCHLORIDE 10 MG/ML
INJECTION, SOLUTION INFILTRATION; PERINEURAL
Status: DISCONTINUED | OUTPATIENT
Start: 2024-10-07 | End: 2024-10-07 | Stop reason: HOSPADM

## 2024-10-07 RX ORDER — LIDOCAINE HYDROCHLORIDE 20 MG/ML
INJECTION INTRAVENOUS
Status: DISCONTINUED | OUTPATIENT
Start: 2024-10-07 | End: 2024-10-07

## 2024-10-07 RX ORDER — PROPOFOL 10 MG/ML
VIAL (ML) INTRAVENOUS
Status: DISCONTINUED | OUTPATIENT
Start: 2024-10-07 | End: 2024-10-07

## 2024-10-07 RX ORDER — PROCHLORPERAZINE EDISYLATE 5 MG/ML
5 INJECTION INTRAMUSCULAR; INTRAVENOUS EVERY 30 MIN PRN
Status: DISCONTINUED | OUTPATIENT
Start: 2024-10-07 | End: 2024-10-07 | Stop reason: HOSPADM

## 2024-10-07 RX ORDER — SODIUM CHLORIDE 0.9 % (FLUSH) 0.9 %
10 SYRINGE (ML) INJECTION
Status: DISCONTINUED | OUTPATIENT
Start: 2024-10-07 | End: 2024-10-07 | Stop reason: HOSPADM

## 2024-10-07 RX ORDER — MIDAZOLAM HYDROCHLORIDE 1 MG/ML
INJECTION INTRAMUSCULAR; INTRAVENOUS
Status: DISCONTINUED | OUTPATIENT
Start: 2024-10-07 | End: 2024-10-07

## 2024-10-07 RX ORDER — OXYCODONE HYDROCHLORIDE 5 MG/1
5 TABLET ORAL
Status: DISCONTINUED | OUTPATIENT
Start: 2024-10-07 | End: 2024-10-07 | Stop reason: HOSPADM

## 2024-10-07 RX ORDER — KETAMINE HCL IN 0.9 % NACL 50 MG/5 ML
SYRINGE (ML) INTRAVENOUS
Status: DISCONTINUED | OUTPATIENT
Start: 2024-10-07 | End: 2024-10-07

## 2024-10-07 RX ORDER — ONDANSETRON HYDROCHLORIDE 2 MG/ML
INJECTION, SOLUTION INTRAVENOUS
Status: DISCONTINUED | OUTPATIENT
Start: 2024-10-07 | End: 2024-10-07

## 2024-10-07 RX ADMIN — PROPOFOL 30 MG: 10 INJECTION, EMULSION INTRAVENOUS at 01:10

## 2024-10-07 RX ADMIN — PROPOFOL 50 MG: 10 INJECTION, EMULSION INTRAVENOUS at 12:10

## 2024-10-07 RX ADMIN — ACETAMINOPHEN 1000 MG: 10 INJECTION, SOLUTION INTRAVENOUS at 01:10

## 2024-10-07 RX ADMIN — MIDAZOLAM HYDROCHLORIDE 2 MG: 1 INJECTION, SOLUTION INTRAMUSCULAR; INTRAVENOUS at 12:10

## 2024-10-07 RX ADMIN — LIDOCAINE HYDROCHLORIDE 100 MG: 20 INJECTION, SOLUTION INTRAVENOUS at 12:10

## 2024-10-07 RX ADMIN — KETOROLAC TROMETHAMINE 30 MG: 30 INJECTION, SOLUTION INTRAMUSCULAR; INTRAVENOUS at 01:10

## 2024-10-07 RX ADMIN — ONDANSETRON 4 MG: 2 INJECTION, SOLUTION INTRAMUSCULAR; INTRAVENOUS at 12:10

## 2024-10-07 RX ADMIN — CEFAZOLIN 2 G: 330 INJECTION, POWDER, FOR SOLUTION INTRAMUSCULAR; INTRAVENOUS at 12:10

## 2024-10-07 RX ADMIN — FENTANYL CITRATE 50 MCG: 50 INJECTION INTRAMUSCULAR; INTRAVENOUS at 12:10

## 2024-10-07 RX ADMIN — FENTANYL CITRATE 25 MCG: 50 INJECTION INTRAMUSCULAR; INTRAVENOUS at 01:10

## 2024-10-07 RX ADMIN — SODIUM CHLORIDE, SODIUM LACTATE, POTASSIUM CHLORIDE, AND CALCIUM CHLORIDE: .6; .31; .03; .02 INJECTION, SOLUTION INTRAVENOUS at 12:10

## 2024-10-07 RX ADMIN — GLYCOPYRROLATE 0.2 MG: 0.2 INJECTION, SOLUTION INTRAMUSCULAR; INTRAVITREAL at 12:10

## 2024-10-07 RX ADMIN — PROPOFOL 75 MCG/KG/MIN: 10 INJECTION, EMULSION INTRAVENOUS at 12:10

## 2024-10-07 RX ADMIN — Medication 20 MG: at 12:10

## 2024-10-07 RX ADMIN — Medication 10 MG: at 12:10

## 2024-10-07 NOTE — ANESTHESIA POSTPROCEDURE EVALUATION
Anesthesia Post Evaluation    Patient: Inna Gallardo    Procedure(s) Performed: Procedure(s) (LRB):  INSERTION,ELECTRODE LEAD,NEUROSTIMULATOR,PERIPHERAL (Right)    Final Anesthesia Type: general      Patient location during evaluation: PACU  Patient participation: Yes- Able to Participate  Level of consciousness: awake and alert  Post-procedure vital signs: reviewed and stable  Pain management: adequate  Airway patency: patent    PONV status at discharge: No PONV  Anesthetic complications: no      Cardiovascular status: stable  Respiratory status: room air  Hydration status: euvolemic  Follow-up not needed.              Vitals Value Taken Time   /66 10/07/24 1340   Temp 36.7 °C (98 °F) 10/07/24 1340   Pulse 61 10/07/24 1340   Resp 19 10/07/24 1340   SpO2 98 % 10/07/24 1340         No case tracking events are documented in the log.      Pain/Alesia Score: Alesia Score: 10 (10/7/2024  1:40 PM)

## 2024-10-07 NOTE — DISCHARGE INSTRUCTIONS
Activity as tolerated   Ok to change dressing in 3 days  Ok to shower  Over the counter tylenol/ibuprofen   Follow up with Dr. Nguyen as scheduled

## 2024-10-07 NOTE — OP NOTE
10/7/2024    Pre op dx:  right knee pain     Post op dx:  Same     Procedure:  Percutaneous implantation of neurostimulator electrode array; peripheral nerve:  Superior lateral geniculate nerve     Percutaneous implantation of neurostimulator electrode array; peripheral nerve:  Superomedial geniculate nerve     Percutaneous implantation of neurostimulator electrode array; peripheral nerve:  Inferomedial geniculate nerve     Fluoroscopic guidance       Attending Surgeon: Calvin Nguyen MD     Assistants:      Complications: none     Estimated bood loss: < 20cc     Implants:stim pod 3 trial leads with nerve stimulator attachment point     Indication:  Patient has had significant knee pain.  They have failed various non surgical treatments including medications.      Findings: bony anatomy was intact. No significant abnormalities were found.      Procedure:  Patient is brought to operating room placed supine operative table conscious sedation was induced without any difficulties.  The right knee was then prepped draped in sterile fashion.  Prior to beginning the procedure proper site and procedure were verified.  Under fluoroscopy we infiltrated skin and subcutaneous tissue with Marcaine along the anticipated path of the stimulator needle.  Using the 1st stimulator needle we came along the midline of the lateral aspect of the femur under fluoroscopy and punctured the skin down to lateral femoral cortex.  We verified location of the introducer both on the AP and lateral x-rays.  The wire was placed at the flare at the mid point of the AP distance along the periosteum .  The internal trocar was then removed and the flexible wire placed through the introducer and secured to the skin using Tegaderm.  Next we went to the medial side of the knee injected Marcaine along the tract.  We then introduced the needle and introducer again along the posterior 3rd cortex of the femur at the level of the flare.  This was verified using  fluoroscopy both AP and lateral.  The needle trocar was then removed and the flexible wire placed through the cannula and locked into place on the lower lock and then secured to the skin using Tegaderm.  We then turned our attention to the proximal tibia.  In an anterior to posterior direction we placed the needle along the proximal tibia aiming to be at the midway point in AP direction.  This was done at the tibial flare.  Location was verified on fluoro both on the AP and lateral x-rays.  We then removed the needle stylus and placed the flexible wire and locked it into place.  We then used Tegaderm and anchored the wire to the skin.  All wires were then tested and a signal was achieved.  We then wrapped all the wires in place using an Ace wrap and patient was then transferred to recovery room in stable condition.     Pre operative visual analog score:  10/10  Post operative visual analog score after stimulation of all 3 deep geniculate nerves: 5/10

## 2024-10-07 NOTE — BRIEF OP NOTE
Orthopedic Surgery Brief Op Discharge     Procedure:Right Peripheral Nerve stimulator-Trial   Percutaneous implantation of neurostimulator electrode array; peripheral nerve:  Superior lateral geniculate nerve     Percutaneous implantation of neurostimulator electrode array; peripheral nerve:  Superomedial geniculate nerve     Percutaneous implantation of neurostimulator electrode array; peripheral nerve:  Inferomedial geniculate nerve     Pre-op diagnosis: right knee pain      Postop diagnosis: same     Surgeon: MD Isiah Merrill MD     Blood loss: <20cc     Fluids: see anesthesia documentation     Complications: none     Hospital Course  Patient presented to [facility] on day of scheduled surgery and underwent right lower extremity Peripheral Nerve stimulator-Trial, please see operative report for further detail. Patient did well postoperatively and was found to be fit for discharge on POD# 0.  Their pain was controlled.  The patient met all discharge criteria.  The patient was discharged home in stable condition. They were given a follow-up appointment in clinic. All questions and concerns of the patient were answered to their satisfaction.     Condition: Patient tolerated procedure well, was awakened from sedation, and returned to the recovery unit in stable condition.         Pain score right knee pre-op:10/10. Post-op with PNS trial 5/10 pain.     Post Op  Activity as tolerated   Ok to change dressing in 3 days  Ok to shower  Over the counter tylenol/ibuprofen   Follow up with Dr. Nguyen as scheduled     Please call our team with questions or concerns    Isiah Valles MD  [facility] PGY-3

## 2024-10-07 NOTE — TRANSFER OF CARE
"Anesthesia Transfer of Care Note    Patient: Inna Gallardo    Procedure(s) Performed: Procedure(s) (LRB):  INSERTION,ELECTRODE LEAD,NEUROSTIMULATOR,PERIPHERAL (Right)    Patient location: OPS    Anesthesia Type: general    Transport from OR: Transported from OR on room air with adequate spontaneous ventilation    Post pain: adequate analgesia    Post assessment: no apparent anesthetic complications and tolerated procedure well    Post vital signs: stable    Level of consciousness: awake    Nausea/Vomiting: no nausea/vomiting    Complications: none    Transfer of care protocol was followed      Last vitals: Visit Vitals  /64 (BP Location: Right arm, Patient Position: Lying)   Pulse 60   Temp 37 °C (98.6 °F) (Skin)   Resp 20   Ht 5' 3" (1.6 m)   Wt 124.7 kg (275 lb)   LMP 03/03/2015   SpO2 96%   Breastfeeding No   BMI 48.71 kg/m²     "

## 2024-10-07 NOTE — H&P
Subjective:      Chief Complaint: Pain and Post-op Evaluation of the Left Knee (L stim perm 8/12/24)    Patient ID: Inna Gallardo is a 49 y.o. female.  50yo morbidly obese F s/p permanent left knee stim router on 8/12/24. States she is doing well. Does not note complete resolution in pain. Rates left knee a 5/10 today. Notes more pain relief from the femoral leads than tibia. (70% and 30% pain relief). Is ready to schedule right knee trial stim router.         Past Medical History:   Diagnosis Date    Anxiety     Diverticulitis     Endometriosis     General anesthetics causing adverse effect in therapeutic use     Hypertension     Kidney stone     Prolactinoma 2011    Sliding hiatal hernia     Urinary tract infection     Vaginal infection         Past Surgical History:   Procedure Laterality Date    ARTHROSCOPY OF KNEE Left     BRAIN TUMOR EXCISION  9/2011    removal of prolactinoma    COLONOSCOPY N/A 8/27/2020    Procedure: COLONOSCOPY;  Surgeon: Valentino Negro MD;  Location: [facility];  Service: Endoscopy;  Laterality: N/A;    CYSTOSCOPY      CYSTOSCOPY W/ URETERAL STENT PLACEMENT  2016    CYSTOSCOPY W/ URETERAL STENT REMOVAL  2016    ESOPHAGEAL MANOMETRY N/A 6/3/2019    Procedure: MANOMETRY, ESOPHAGUS;  Surgeon: Bhupinder Schmitz MD;  [facility]  Service: Endoscopy;  Laterality: N/A;    ESOPHAGOGASTRODUODENOSCOPY N/A 4/30/2019    Procedure: ESOPHAGOGASTRODUODENOSCOPY (EGD);  Surgeon: Altagracia Bose MD;  Location: [facility];  Service: Endoscopy;  Laterality: N/A;    ESOPHAGOGASTRODUODENOSCOPY N/A 8/2/2021    Procedure: EGD (ESOPHAGOGASTRODUODENOSCOPY);  Surgeon: Altagracia Bose MD;  Location: [facility];  Service: Endoscopy;  Laterality: N/A;    EXTRACORPOREAL SHOCK WAVE LITHOTRIPSY Left 3/7/2024    Procedure: LITHOTRIPSY, ESWL;  Surgeon: Roverto Harley MD;  Location: [facility] OR;  Service: Urology;  Laterality: Left;    FOOT HARDWARE REMOVAL Right 6/16/2020    Procedure: REMOVAL,  HARDWARE, FOOT;  Surgeon: Adrianna Tillman DPM;  Location: [facility] OR;  Service: Podiatry;  Laterality: Right;    HARVESTING OF BONE GRAFT Right 7/16/2019    Procedure: SURGICAL PROCUREMENT, BONE GRAFT;  Surgeon: Adrianna Tillman DPM;  Location: [facility] OR;  Service: Podiatry;  Laterality: Right;    HYSTERECTOMY  3/16/15    TLH/BSO for Endometriosis, AUB, fibroids, cyst    INSERTION, ELECTRODE LEAD, NEUROSTIMULATOR, PERIPHERAL Left 7/15/2024    Procedure: INSERTION,ELECTRODE LEAD,NEUROSTIMULATOR,PERIPHERAL;  Surgeon: Calvin Nguyen MD;  Location: [facility] OR;  Service: Pain Management;  Laterality: Left;  stimrouter trial    INSERTION, ELECTRODE LEAD, NEUROSTIMULATOR, PERIPHERAL Left 8/12/2024    Procedure: INSERTION,ELECTRODE LEAD,NEUROSTIMULATOR,PERIPHERAL;  Surgeon: Calvin Nguyen MD;  Location: [facility] OR;  Service: Pain Management;  Laterality: Left;  stimrouter permanent    LITHOTRIPSY      MANDIBLE SURGERY  2002    severe overbite correction    PELVIC LAPAROSCOPY  2014    Dx scope, chromopertubation-Endometriosis    TONSILLECTOMY, ADENOIDECTOMY          Current Outpatient Medications   Medication Instructions    baclofen (LIORESAL) 10 mg, Oral, 3 times daily PRN    EPINEPHrine (EPIPEN) 0.3 mg/0.3 mL AtIn 0.3 mLs, Intramuscular, As needed (PRN)    ergocalciferol (ERGOCALCIFEROL) 50,000 Units, Oral, Every 7 days    hydrOXYzine HCL (ATARAX) 25 mg, Oral, 3 times daily    linaCLOtide (LINZESS) 145 mcg Cap capsule TAKE 1 CAPSULE(145 MCG) BY MOUTH DAILY    losartan-hydrochlorothiazide 100-25 mg (HYZAAR) 100-25 mg per tablet 1 tablet, Oral, Daily    metFORMIN (GLUCOPHAGE-XR) 500 MG ER 24hr tablet TAKE 1 TABLET BY MOUTH EVERY DAY AT DINNER    omeprazole (PRILOSEC) 40 mg, Oral, Every morning    sertraline (ZOLOFT) 100 mg, Oral, Nightly        Review of patient's allergies indicates:   Allergen Reactions    Ace inhibitors Other (See Comments)     cough       Review of Systems   Constitutional: Negative for fever and malaise/fatigue.   Eyes:   "Negative for blurred vision.   Cardiovascular:  Negative for chest pain.   Respiratory:  Negative for shortness of breath.    Skin:  Negative for poor wound healing.   Musculoskeletal:  Positive for arthritis, joint pain and myalgias.   Genitourinary:  Negative for bladder incontinence.   Neurological:  Negative for dizziness, numbness and paresthesias.   Psychiatric/Behavioral:  Negative for altered mental status.        The patient's relevant past medical, surgical, and social history was reviewed in Epic.       Objective:      VITAL SIGNS: Ht 5' 3" (1.6 m)   Wt 124.8 kg (275 lb 2.2 oz)   LMP 2015   BMI 48.74 kg/m²     General    Nursing note and vitals reviewed.  Constitutional: She is oriented to person, place, and time. She appears well-developed.   obese   Neurological: She is alert and oriented to person, place, and time.     General Musculoskeletal Exam   Gait: antalgic       Right Knee Exam     Inspection   Swelling: present    Tenderness   The patient is tender to palpation of the medial joint line.    Range of Motion   Extension:  5   Flexion:  110     Left Knee Exam     Inspection   Scars: present    Tenderness   The patient is experiencing no tenderness.     Range of Motion   Extension:  5   Flexion:  110     Muscle Strength   Right Lower Extremity   Quadriceps:  4/5   Hamstrin/5   Left Lower Extremity   Quadriceps:  4/5   Hamstrin/5        Imaging  X-Ray Femur 2 View Left  Narrative: EXAMINATION:  XR FEMUR 2 VIEW LEFT    CLINICAL HISTORY:  Pain in left knee    TECHNIQUE:  AP and lateral views of the left femur were performed.    COMPARISON:  2024    FINDINGS:  Degenerative changes seen at the hip and knee.  Left cul leads are in place.  Impression: See above    Electronically signed by: Davion Bustamante MD  Date:    2024  Time:    09:03  X-Ray Tibia Fibula 2 View Left  Narrative: EXAMINATION:  XR TIBIA FIBULA 2 VIEW LEFT    CLINICAL HISTORY:  Pain in left " knee    TECHNIQUE:  AP and lateral views of the left tibia and fibula were performed.    COMPARISON:  08/22/2024    FINDINGS:  The left knee joint is narrowed medially.  Tibia and fibula are intact.  Electrical lead is again seen.  Impression: See above    Electronically signed by: Davion Bustamante MD  Date:    09/24/2024  Time:    09:03    I have reviewed the above radiograph and agree with the findings stated by the radiologist.   Leads well placed.         Assessment:       Inna Gallardo is a 49 y.o. female seen in the office today for   1. Primary osteoarthritis of right knee    2. Morbid obesity due to excess calories    3. Chronic pain of right knee    4. Chronic pain of left knee    5. Primary osteoarthritis of left knee     Doing well with left knee stim router. She is ready to schedule right knee stim router today with Dr. Nguyen. Will get consents signed today. Scheduled tentatively for Oct 7 pending insurance approval.       Plan:       Inna was seen today for pain and post-op evaluation.    Diagnoses and all orders for this visit:    Primary osteoarthritis of right knee  -     CBC Auto Differential; Future  -     Basic Metabolic Panel; Future  -     Ambulatory referral/consult to Psychiatry; Future  -     Case Request Operating Room: INSERTION,ELECTRODE LEAD,NEUROSTIMULATOR,PERIPHERAL    Morbid obesity due to excess calories  -     CBC Auto Differential; Future  -     Basic Metabolic Panel; Future  -     Ambulatory referral/consult to Psychiatry; Future  -     Case Request Operating Room: INSERTION,ELECTRODE LEAD,NEUROSTIMULATOR,PERIPHERAL    Chronic pain of right knee  -     CBC Auto Differential; Future  -     Basic Metabolic Panel; Future  -     Ambulatory referral/consult to Psychiatry; Future  -     Case Request Operating Room: INSERTION,ELECTRODE LEAD,NEUROSTIMULATOR,PERIPHERAL    Chronic pain of left knee    Primary osteoarthritis of left knee    We had a lengthy conversation about the benefits and  risks of peripheral nerve stimulation.  they understand that the 1st phase is a trial where we will place leads and do a test to see if implantation helps improve their pain.  If it does improve the pain symptoms then they will be scheduled for the permanent placement. With respect to the trial they understand that leads will be coming out of the skin and we will activate the leads after they are awake from .  Risks include bleeding, continued pain, damage to the nerves, injury to the bone and soft tissues.  they understand this and feel that the benefits outweigh the risks.  We will go ahead and schedule this. Patient requesting to talk to  re technology. Tentatively scheduled for Oct 7 pending insurance approval.       Diagnoses and plan discussed with the patient, as well as the expected course and duration of his symptoms.  All questions and concerns were addressed prior to the end of the visit.   Instructed patient to call office if they have any future questions/concerns or to schedule apt. Patient will return to see me if symptoms worsen or fail to improve    Note dictated with voice recognition software, please excuse any grammatical errors.        Pat Taylor PA-C      Department of Orthopedic Surgery  [facility]  Office: [phone]  09/24/2024

## 2024-10-07 NOTE — ANESTHESIA PREPROCEDURE EVALUATION
10/07/2024  Inna Gallardo is a 49 y.o., female here for trial peripheral nerve stimulator insertion on R.      Pre-op Assessment    I have reviewed the Patient Summary Reports.    I have reviewed the NPO Status.   I have reviewed the Medications.     Review of Systems  Anesthesia Hx:  No problems with previous Anesthesia               Denies Personal Hx of Anesthesia complications.                    Social:  Former Smoker       Cardiovascular:     Hypertension                                        Renal/:  Chronic Renal Disease                Hepatic/GI:    Hiatal Hernia, GERD             Musculoskeletal:  Arthritis               Neurological:    Neuromuscular Disease,                                   Endocrine:        Morbid Obesity / BMI > 40      Physical Exam  General: Well nourished, Cooperative, Alert and Oriented    Airway:  Mallampati: II   Mouth Opening: Normal  TM Distance: Normal  Tongue: Normal  Neck ROM: Normal ROM    Dental:  Intact        Anesthesia Plan  Type of Anesthesia, risks & benefits discussed:    Anesthesia Type: Gen Natural Airway  Intra-op Monitoring Plan: Standard ASA Monitors  Post Op Pain Control Plan: multimodal analgesia  Induction:  IV  Informed Consent: Informed consent signed with the Patient and all parties understand the risks and agree with anesthesia plan.  All questions answered.   ASA Score: 3    Ready For Surgery From Anesthesia Perspective.     .

## 2024-10-10 ENCOUNTER — PATIENT MESSAGE (OUTPATIENT)
Dept: ORTHOPEDICS | Facility: CLINIC | Age: 49
End: 2024-10-10
Payer: MEDICAID

## 2024-10-10 ENCOUNTER — TELEPHONE (OUTPATIENT)
Dept: ORTHOPEDICS | Facility: CLINIC | Age: 49
End: 2024-10-10
Payer: MEDICAID

## 2024-10-10 VITALS
BODY MASS INDEX: 48.73 KG/M2 | HEART RATE: 66 BPM | SYSTOLIC BLOOD PRESSURE: 135 MMHG | RESPIRATION RATE: 18 BRPM | WEIGHT: 275 LBS | HEIGHT: 63 IN | OXYGEN SATURATION: 98 % | DIASTOLIC BLOOD PRESSURE: 63 MMHG | TEMPERATURE: 98 F

## 2024-10-10 NOTE — TELEPHONE ENCOUNTER
----- Message from Corwin sent at 10/10/2024  8:03 AM CDT -----  Type:  Sooner Apoointment Request    Caller is requesting a sooner appointment.   Name of Caller:pt  When is the first available appointment?10/31  Symptoms:post op   Would the patient rather a call back or a response via [facility]? Call   Best Call Back Number:[phone]  Additional Information: barry had to cancel post op for 10/10 due to family emergency

## 2024-10-15 ENCOUNTER — OFFICE VISIT (OUTPATIENT)
Dept: ORTHOPEDICS | Facility: CLINIC | Age: 49
End: 2024-10-15
Payer: MEDICAID

## 2024-10-15 ENCOUNTER — HOSPITAL ENCOUNTER (OUTPATIENT)
Dept: RADIOLOGY | Facility: HOSPITAL | Age: 49
Discharge: HOME | End: 2024-10-15
Attending: PHYSICIAN ASSISTANT
Payer: MEDICAID

## 2024-10-15 ENCOUNTER — RESEARCH ENCOUNTER (OUTPATIENT)
Dept: RESEARCH | Facility: HOSPITAL | Age: 49
End: 2024-10-15
Payer: MEDICAID

## 2024-10-15 ENCOUNTER — PATIENT MESSAGE (OUTPATIENT)
Dept: ORTHOPEDICS | Facility: CLINIC | Age: 49
End: 2024-10-15

## 2024-10-15 VITALS — HEIGHT: 63 IN | WEIGHT: 274.94 LBS | BODY MASS INDEX: 48.71 KG/M2

## 2024-10-15 DIAGNOSIS — M25.561 CHRONIC PAIN OF RIGHT KNEE: Primary | ICD-10-CM

## 2024-10-15 DIAGNOSIS — M17.11 PRIMARY OSTEOARTHRITIS OF RIGHT KNEE: ICD-10-CM

## 2024-10-15 DIAGNOSIS — G89.29 CHRONIC PAIN OF RIGHT KNEE: Primary | ICD-10-CM

## 2024-10-15 PROCEDURE — 73552 X-RAY EXAM OF FEMUR 2/>: CPT | Mod: 26,RT,, | Performed by: RADIOLOGY

## 2024-10-15 PROCEDURE — 73590 X-RAY EXAM OF LOWER LEG: CPT | Mod: 26,RT,, | Performed by: RADIOLOGY

## 2024-10-15 PROCEDURE — 73590 X-RAY EXAM OF LOWER LEG: CPT | Mod: TC,PN,RT

## 2024-10-15 PROCEDURE — 1159F MED LIST DOCD IN RCRD: CPT | Mod: CPTII,,, | Performed by: ORTHOPAEDIC SURGERY

## 2024-10-15 PROCEDURE — 99024 POSTOP FOLLOW-UP VISIT: CPT | Mod: S$PBB,,, | Performed by: ORTHOPAEDIC SURGERY

## 2024-10-15 PROCEDURE — 3008F BODY MASS INDEX DOCD: CPT | Mod: CPTII,,, | Performed by: ORTHOPAEDIC SURGERY

## 2024-10-15 PROCEDURE — 73552 X-RAY EXAM OF FEMUR 2/>: CPT | Mod: TC,PN,RT

## 2024-10-15 PROCEDURE — 99999 PR PBB SHADOW E&M-EST. PATIENT-LVL III: CPT | Mod: PBBFAC,,, | Performed by: ORTHOPAEDIC SURGERY

## 2024-10-15 PROCEDURE — 99213 OFFICE O/P EST LOW 20 MIN: CPT | Mod: PBBFAC,25,PN | Performed by: ORTHOPAEDIC SURGERY

## 2024-10-15 PROCEDURE — 3044F HG A1C LEVEL LT 7.0%: CPT | Mod: CPTII,,, | Performed by: ORTHOPAEDIC SURGERY

## 2024-10-15 NOTE — PROGRESS NOTES
Protocol: innovations in Genicular Outcomes Registry (Bradly)  Protocol#: Bradly  IRB#: 2021.156  Version Date: 10-Javy-2023  PI: Calvin Nguyen MD  Patient Initials: [email or web address]     Study Recruitment Note:     Research coordinator met with patient, in private clinic room, to discuss above mentioned protocol. Patient is alert and oriented x 3. Mood and affect appropriate to the situation. Patient states that she is not participating in any other research studies. Patient states understanding about the diagnosis of osteoarthritis of the knee and of the procedure to being done on that knee.  Purpose of study reviewed with the patient.  Patient states understanding of this information.   Length of study and number of participants reviewed with patient; patient states understanding of the length of the study.   Study procedure discussed in detail with patient. Patient states understanding of this information.  Potential benefits of study along with costs and/or payment reimbursement per insurance discussed with patient; Patient states understanding that insurance will cover cost of all standard of care medications and procedures. Patient aware of $20 payment for qualifying visits with completed questionnaires.  Alternative treatment methods discussed with patient; Patient states understanding of this information.   Study related questions/compensation for injury, and whom to contact regarding rights as a research subject all reviewed with patient; Patient verbalizes understanding of this information.   Voluntary participation and withdrawal from research stressed to patient; patient states understanding that she may withdraw consent at any time without compromise to care.   Confidentiality and HIPAA discussed with patient. Patient verbalizes understanding of this information.        Patient states her interest is study and will decide participation at a later date. Study brochure and paper copy of consent form was given to patient for  review. She was instructed to call if she has any questions or concerns.

## 2024-10-15 NOTE — PROGRESS NOTES
Subjective:      Patient ID: Inna Gallardo is a 49 y.o. female.    Chief Complaint: Pain and Post-op Evaluation of the Right Knee    Knee Pain: right  swelling: Yes  worsens with activity: Yes  relieved by: injections  Patient's pain pre trial was 10/10.  After stimulation of all 3 deep geniculate nerves her pain dropped to 5/10.  She is extremely happy with the result.  She would like to move forward with a permanent peripheral nerve stimulation.  She states she is also extremely happy with the results of her left knee and feels that the trial worked even better for her on the right knee in terms of length of relief.  She has received almost 2-3 days of pain relief after the trial         Social History     Occupational History    Not on file   Tobacco Use    Smoking status: Former     Current packs/day: 0.00     Types: Cigarettes     Start date: 1989     Quit date: 2000     Years since quittin.7    Smokeless tobacco: Never    Tobacco comments:     pt stated she smoked one cig/day   Substance and Sexual Activity    Alcohol use: Never    Drug use: Never    Sexual activity: Yes     Partners: Male     Birth control/protection: Surgical, See Surgical Hx      Social Drivers of Health     Tobacco Use: Medium Risk (10/15/2024)    Patient History     Smoking Tobacco Use: Former     Smokeless Tobacco Use: Never     Passive Exposure: Not on file   Alcohol Use: Not At Risk (7/3/2024)    AUDIT-C     Frequency of Alcohol Consumption: Never     Average Number of Drinks: Patient does not drink     Frequency of Binge Drinking: Never   Financial Resource Strain: Low Risk  (7/3/2024)    Overall Financial Resource Strain (CARDIA)     Difficulty of Paying Living Expenses: Not hard at all   Food Insecurity: No Food Insecurity (7/3/2024)    Hunger Vital Sign     Worried About Running Out of Food in the Last Year: Never true     Ran Out of Food in the Last Year: Never true   Transportation Needs: No Transportation Needs  (5/20/2024)    [facility]    PRAPARE - Transportation     Lack of Transportation (Medical): No     Lack of Transportation (Non-Medical): No   Physical Activity: Inactive (7/3/2024)    Exercise Vital Sign     Days of Exercise per Week: 0 days     Minutes of Exercise per Session: 0 min   Stress: Stress Concern Present (7/3/2024)    [employer] [place] [employer] - Occupational Stress Questionnaire     Feeling of Stress : To some extent   Housing Stability: Unknown (7/3/2024)    Housing Stability Vital Sign     Unable to Pay for Housing in the Last Year: No     Homeless in the Last Year: Not on file   Depression: Low Risk  (9/24/2024)    Depression     Last PHQ-4: Flowsheet Data: 0   Utilities: Not At Risk (7/3/2024)    [facility]     Threatened with loss of utilities: No   Health Literacy: Adequate Health Literacy (7/3/2024)     Health Literacy     Frequency of need for help with medical instructions: Never   Social Isolation: Not on file      Review of Systems   Constitutional: Negative for diaphoresis.   HENT:  Negative for ear discharge, nosebleeds and stridor.    Eyes:  Negative for photophobia.   Cardiovascular:  Negative for syncope.   Respiratory:  Negative for hemoptysis, shortness of breath and wheezing.    Neurological:  Negative for tremors.   Psychiatric/Behavioral: Negative.           Objective:    General    Constitutional: She is oriented to person, place, and time. She appears well-developed.   HENT:   Head: Normocephalic and atraumatic.   Right Ear: External ear normal.   Left Ear: External ear normal.   Eyes: EOM are normal.   Cardiovascular:  Intact distal pulses.            Neurological: She is alert and oriented to person, place, and time.   Psychiatric: She has a normal mood and affect. Her behavior is normal. Judgment and thought content normal.     General Musculoskeletal Exam   Gait: antalgic and abnormal       Right Knee Exam     Inspection  "  Swelling: present  Effusion: present    Tenderness   The patient is tender to palpation of the medial joint line.    Crepitus   The patient has crepitus of the patella.    Range of Motion   Flexion:  abnormal     Other   Sensation: normal    Muscle Strength   Right Lower Extremity   Quadriceps:  4/5   Hamstrin/5          Assessment:       1. Chronic pain of right knee          Plan:   We had a lengthy conversation about the benefits and risks of peripheral nerve stimulation.  they understand that the 1st phase was a trial where they received significant relief.  They would like to now move to the permanent placement of the leads for long lasting pain relief. This will be done in a minimally invasive fashion where the leads will be placed in the similar location as the trial. They will then wear a stimrouter    "patch" on the skin overlying the leads to provide the signal to the nerves.   Risks include bleeding, continued pain, damage to the nerves, injury to the bone and soft tissues.  they understand this and feel that the benefits outweigh the risks.  We will go ahead and schedule this.      "

## 2024-10-25 ENCOUNTER — RESEARCH ENCOUNTER (OUTPATIENT)
Dept: RESEARCH | Facility: HOSPITAL | Age: 49
End: 2024-10-25
Payer: MEDICAID

## 2024-10-25 NOTE — PROGRESS NOTES
Protocol: innovations in Genicular Outcomes Registry (Bradly)  Protocol#: Bradly  IRB#: 2021.156  Version Date: 10-Javy-2023  PI: Calvin Nguyen MD  Patient Initials: ROCHELLE   (Study ID#: [id number])    Note: PNS treatment will be completed on 10/28/2024. Follow up questionnaires will begin 2 weeks after.     Screening Note:    Patient reports the following information during screening visit.    Demographics:  female  Age: 49  White or   Non-    Social Hx:  Marital Status:   Former smoker  Hx of Alcohol Abuse or dependency: No  Hx of Opioid abuse: No  Hx of Illicit drug abuse: No    Economic hx:  Current Work Status: Unemployed  # of days missed in the past 30 days due to OA affected knee: not applicable   Status: No  Level of physical activity during last 12 months: Light  Highest Level of Education Obtained: Some college education    Medical Hx:  Target Knee for study: left   OA Severity according to patient: severe  Year OA was Dx on target knee: 2014  allergies and past medical history  Active Ambulatory Problems     Diagnosis Date Noted    Chronic pelvic pain in female 12/02/2014    HTN (hypertension) 02/02/2015    Gastroesophageal reflux disease 02/02/2015    Endometriosis 03/17/2015    Iron deficiency anemia 05/07/2015    Pituitary microadenoma 11/18/2013    Calculus of both kidneys 08/31/2015    Chronic pain of right knee 10/14/2015    Abnormal MRI, knee 10/14/2015    Primary osteoarthritis of both knees 02/22/2016    Surgical menopause on hormone replacement therapy 02/26/2016    Tear of meniscus of left knee 05/01/2017    Muscle weakness 02/01/2018    Difficulty walking 02/01/2018    Epigastric pain 04/30/2019    Palpitations 05/15/2019    Morbid obesity 05/15/2019    Dysphagia 06/03/2019    Foot pain, right 07/16/2019    Mixed hyperlipidemia 09/23/2019    Status post right foot surgery - Right Foot 01/20/2020    Renal cyst, right 10/27/2020    Primary osteoarthritis of left knee  01/07/2021    Primary osteoarthritis of right knee 01/07/2021    Leukocytosis (leucocytosis) 10/06/2021    History of renal calculi 10/19/2020    Gait, antalgic 02/02/2023    Impaired functional mobility, balance, and endurance 02/02/2023    Decreased range of motion of lumbar spine 02/02/2023    Weakness of both lower extremities 02/02/2023    Iron deficiency 12/13/2023    Renal colic on left side 03/07/2024    Renal calculus, left 03/07/2024    Nausea vomiting and diarrhea 04/23/2024     Resolved Ambulatory Problems     Diagnosis Date Noted    Headache 02/02/2015    Nasal congestion 02/02/2015    Screening 02/02/2015    Vaginal bleeding 03/29/2015    Post-op pain 03/29/2015    Cramer's esophagus 05/06/2015    Gastritis 05/06/2015    Hemorrhoids, internal 09/09/2015    Discharge from left nipple 02/26/2016    Difficult airway for intubation 05/01/2017    Left knee pain 05/02/2017    Decreased ROM of left knee 05/02/2017    Weakness of left lower extremity 05/02/2017    Impaired gait 05/02/2017    Decreased range of motion (ROM) of knee 09/14/2017    Weakness of both lower extremities 09/14/2017    Antalgic gait 09/14/2017    Decreased functional mobility 09/14/2017    Right foot pain 07/12/2019    Ankle weakness 07/12/2019    Diverticulitis 08/27/2020     Past Medical History:   Diagnosis Date    Anxiety     General anesthetics causing adverse effect in therapeutic use     Hypertension     Kidney stone     Prolactinoma 2011    Sliding hiatal hernia     Urinary tract infection     Vaginal infection        Surgical Hx:  Past Surgical History:   Procedure Laterality Date    ARTHROSCOPY OF KNEE Left 2017    BRAIN TUMOR EXCISION  9/2011    removal of prolactinoma    COLONOSCOPY N/A 8/27/2020    Procedure: COLONOSCOPY;  Surgeon: Valentino Negro MD;  Location: [facility];  Service: Endoscopy;  Laterality: N/A;    CYSTOSCOPY  2016    CYSTOSCOPY W/ URETERAL STENT PLACEMENT  2016    CYSTOSCOPY W/ URETERAL STENT REMOVAL   2016    ESOPHAGEAL MANOMETRY N/A 6/3/2019    Procedure: MANOMETRY, ESOPHAGUS;  Surgeon: Bhupinder Schmitz MD;  Location: [facility] ENDO (4TH FLR);  Service: Endoscopy;  Laterality: N/A;    ESOPHAGOGASTRODUODENOSCOPY N/A 4/30/2019    Procedure: ESOPHAGOGASTRODUODENOSCOPY (EGD);  Surgeon: Altagracia Bose MD;  Location: [facility] ENDO;  Service: Endoscopy;  Laterality: N/A;    ESOPHAGOGASTRODUODENOSCOPY N/A 8/2/2021    Procedure: EGD (ESOPHAGOGASTRODUODENOSCOPY);  Surgeon: Altagracia Bose MD;  Location: [facility] ENDO;  Service: Endoscopy;  Laterality: N/A;    EXTRACORPOREAL SHOCK WAVE LITHOTRIPSY Left 3/7/2024    Procedure: LITHOTRIPSY, ESWL;  Surgeon: Roverto Harley MD;  Location: [facility] OR;  Service: Urology;  Laterality: Left;    FOOT HARDWARE REMOVAL Right 6/16/2020    Procedure: REMOVAL, HARDWARE, FOOT;  Surgeon: Adrianna Tillman DPM;  Location: [facility] OR;  Service: Podiatry;  Laterality: Right;    HARVESTING OF BONE GRAFT Right 7/16/2019    Procedure: SURGICAL PROCUREMENT, BONE GRAFT;  Surgeon: Adrianna Tillman DPM;  Location: [facility] OR;  Service: Podiatry;  Laterality: Right;    HYSTERECTOMY  3/16/15    TLH/BSO for Endometriosis, AUB, fibroids, cyst    INSERTION, ELECTRODE LEAD, NEUROSTIMULATOR, PERIPHERAL Left 7/15/2024    Procedure: INSERTION,ELECTRODE LEAD,NEUROSTIMULATOR,PERIPHERAL;  Surgeon: Calvin Nguyen MD;  Location: [facility] OR;  Service: Pain Management;  Laterality: Left;  stimrouter trial    INSERTION, ELECTRODE LEAD, NEUROSTIMULATOR, PERIPHERAL Left 8/12/2024    Procedure: INSERTION,ELECTRODE LEAD,NEUROSTIMULATOR,PERIPHERAL;  Surgeon: Calvin Nguyen MD;  Location: [facility] OR;  Service: Pain Management;  Laterality: Left;  stimrouter permanent    INSERTION, ELECTRODE LEAD, NEUROSTIMULATOR, PERIPHERAL Right 10/7/2024    Procedure: INSERTION,ELECTRODE LEAD,NEUROSTIMULATOR,PERIPHERAL;  Surgeon: Calvin Nguyen MD;  Location: [facility] OR;  Service: Pain Management;  Laterality: Right;  stimrouter trial    LITHOTRIPSY  2023 MANDIBLE  SURGERY  2002    severe overbite correction    PELVIC LAPAROSCOPY  2014    Dx scope, chromopertubation-Endometriosis    TONSILLECTOMY, ADENOIDECTOMY           OA Treatment Hx:  Did pt receive steroid injections (intra-articular) for knee OA within last 12 months?  Yes  Did the patient receive viscosupplementation within last 12 months? No  Is there a hx of cryoanalgesia or radio-frequency ablation tx for the affected knee within last 12 months? No  Herbal therapy or supplements within last 12 months for OA knee pain? No  Medical marijuana use within last 12 months for OA pain? No  Hx of acupuncture within last 12 months for OA pain? No  Knee brace use within last 12 months for OA pain? No  Physical therapy within last 12 months for OA knee pain? No  Did the patient receive any treatments (other than the above) for Knee OA within the last 12 months? Yes (Left knee PNS)    Prior and Current OA Medications/ Any Analgesic Medications used in the last three months prior to enrollment: (Per study sponsor list)  Patient states the following medications were taken:      Tylenol for OA knee pain  Diclofenac for OA knee pain  Baclofen for back pain

## 2024-10-25 NOTE — PROGRESS NOTES
Protocol: innovations in Genicular Outcomes Registry (Bradly)  Protocol#: Bradly  IRB#: 2021.156  Version Date: 10-Javy-2023  PI: Calvin Nguyen MD  Patient Initials: A.C.     Informed Consent Process Note:  Consented via telephone     Research coordinator called patient on the phone to consent patient to study as discussed at previous clinic visit. Patient is alert and oriented x 3. Mood and affect appropriate to the situation. Patient states that she is not participating in any other research studies. Patient states understanding about the diagnosis of osteoarthritis of the knee and of the procedure to being done on that knee. All questions regarding consent form were answered to patient's satisfaction.      Patient states her willingness to participate in study; [employer] eligibility confirmed and [employer] link send to patient's e-mail address. Patient instructed to read informed consent in its entirety, initial at the bottom of each page, then sign the last page via their smart device or computer and submit. Throughout consenting process, patient given several opportunities to ask questions; all questions addressed and answered to patient's satisfaction.      Patient signed eConsent and research team completed the staff portion. Patient completed all baseline/screening questionnaires prior to any procedures.    Patient will be given clincard at next clinic appointment, which will be on 11/12/2024

## 2024-10-25 NOTE — PROGRESS NOTES
Protocol: innovations in Genicular Outcomes Registry (Bradly)  Protocol#: Bradly  IRB#: 2021.156  Version Date: 10-Javy-2023  PI: Calvin Nguyen MD  Patient Initials: A.C.     Eligibility Note:      - Inclusion Criteria  1. Planned to receive treatment for knee OA pain including, but not limited to, knee injections, nerve blocking  procedures, or knee arthroplasty within 60 days of screening yes, scheduled for Permanent Stimrouter for the right knee on 11/12/2024. Leads will be implanted on 10/28/2024.  2. Able to understand the informed consent and the assessment questionnaires and have the ability to complete  them in the opinion of the investigator: yes  3. Have access to a smartphone or internet access with a computer/tablet to complete the questionnaires using  the registry application: yes  4. KL Score according to Dr. Nguyen: 4    7.4 Exclusion Criteria  1. Actively enrolled in an investigational trial that would preclude patients from receiving the sites standard  of care for knee OA pain or knee arthroplasty recovery protocol: no   2. Planning to have a surgery other than on the target knee: no

## 2024-10-27 RX ORDER — CEFAZOLIN 2 G/1
2 INJECTION, POWDER, FOR SOLUTION INTRAMUSCULAR; INTRAVENOUS ONCE
OUTPATIENT
Start: 2024-10-27 | End: 2024-10-27

## 2024-10-28 ENCOUNTER — ANESTHESIA (OUTPATIENT)
Dept: SURGERY | Facility: HOSPITAL | Age: 49
End: 2024-10-28
Payer: MEDICAID

## 2024-10-28 ENCOUNTER — HOSPITAL ENCOUNTER (OUTPATIENT)
Facility: HOSPITAL | Age: 49
Discharge: HOME | End: 2024-10-28
Attending: ORTHOPAEDIC SURGERY | Admitting: ORTHOPAEDIC SURGERY
Payer: MEDICAID

## 2024-10-28 ENCOUNTER — ANESTHESIA EVENT (OUTPATIENT)
Dept: SURGERY | Facility: HOSPITAL | Age: 49
End: 2024-10-28
Payer: MEDICAID

## 2024-10-28 DIAGNOSIS — M25.561 RIGHT KNEE PAIN: ICD-10-CM

## 2024-10-28 DIAGNOSIS — M25.561 CHRONIC PAIN OF RIGHT KNEE: Primary | ICD-10-CM

## 2024-10-28 DIAGNOSIS — G89.29 CHRONIC PAIN OF RIGHT KNEE: Primary | ICD-10-CM

## 2024-10-28 PROCEDURE — 63600175 PHARM REV CODE 636 W HCPCS

## 2024-10-28 PROCEDURE — 71000015 HC POSTOP RECOV 1ST HR: Performed by: ORTHOPAEDIC SURGERY

## 2024-10-28 PROCEDURE — C1778 LEAD, NEUROSTIMULATOR: HCPCS | Performed by: ORTHOPAEDIC SURGERY

## 2024-10-28 PROCEDURE — 36000706: Performed by: ORTHOPAEDIC SURGERY

## 2024-10-28 PROCEDURE — 36000707: Performed by: ORTHOPAEDIC SURGERY

## 2024-10-28 PROCEDURE — 64575 OPN IMPLTJ NEA PERPH NERVE: CPT | Mod: 58,RT,, | Performed by: ORTHOPAEDIC SURGERY

## 2024-10-28 PROCEDURE — 25000003 PHARM REV CODE 250

## 2024-10-28 PROCEDURE — 37000008 HC ANESTHESIA 1ST 15 MINUTES: Performed by: ORTHOPAEDIC SURGERY

## 2024-10-28 PROCEDURE — 64555 IMPLANT NEUROELECTRODES: CPT | Mod: 79,RT,, | Performed by: ORTHOPAEDIC SURGERY

## 2024-10-28 PROCEDURE — 37000009 HC ANESTHESIA EA ADD 15 MINS: Performed by: ORTHOPAEDIC SURGERY

## 2024-10-28 PROCEDURE — 63600175 PHARM REV CODE 636 W HCPCS: Performed by: ORTHOPAEDIC SURGERY

## 2024-10-28 DEVICE — IMPLANTABLE DEVICE: Type: IMPLANTABLE DEVICE | Site: KNEE | Status: FUNCTIONAL

## 2024-10-28 RX ORDER — ACETAMINOPHEN 325 MG/1
325 TABLET ORAL EVERY 4 HOURS
Qty: 84 TABLET | Refills: 0 | Status: SHIPPED | OUTPATIENT
Start: 2024-10-28 | End: 2024-11-11

## 2024-10-28 RX ORDER — PROPOFOL 10 MG/ML
VIAL (ML) INTRAVENOUS CONTINUOUS PRN
Status: DISCONTINUED | OUTPATIENT
Start: 2024-10-28 | End: 2024-10-28

## 2024-10-28 RX ORDER — OXYCODONE HYDROCHLORIDE 5 MG/1
5 TABLET ORAL
Status: DISCONTINUED | OUTPATIENT
Start: 2024-10-28 | End: 2024-10-28 | Stop reason: HOSPADM

## 2024-10-28 RX ORDER — CEFAZOLIN SODIUM 1 G/3ML
INJECTION, POWDER, FOR SOLUTION INTRAMUSCULAR; INTRAVENOUS
Status: DISCONTINUED | OUTPATIENT
Start: 2024-10-28 | End: 2024-10-28

## 2024-10-28 RX ORDER — PROCHLORPERAZINE EDISYLATE 5 MG/ML
5 INJECTION INTRAMUSCULAR; INTRAVENOUS EVERY 30 MIN PRN
Status: DISCONTINUED | OUTPATIENT
Start: 2024-10-28 | End: 2024-10-28 | Stop reason: HOSPADM

## 2024-10-28 RX ORDER — GLUCAGON 1 MG
1 KIT INJECTION
Status: DISCONTINUED | OUTPATIENT
Start: 2024-10-28 | End: 2024-10-28 | Stop reason: HOSPADM

## 2024-10-28 RX ORDER — MIDAZOLAM HYDROCHLORIDE 1 MG/ML
INJECTION INTRAMUSCULAR; INTRAVENOUS
Status: DISCONTINUED | OUTPATIENT
Start: 2024-10-28 | End: 2024-10-28

## 2024-10-28 RX ORDER — ONDANSETRON HYDROCHLORIDE 2 MG/ML
4 INJECTION, SOLUTION INTRAVENOUS DAILY PRN
Status: DISCONTINUED | OUTPATIENT
Start: 2024-10-28 | End: 2024-10-28 | Stop reason: HOSPADM

## 2024-10-28 RX ORDER — KETAMINE HCL IN 0.9 % NACL 50 MG/5 ML
SYRINGE (ML) INTRAVENOUS
Status: DISCONTINUED | OUTPATIENT
Start: 2024-10-28 | End: 2024-10-28

## 2024-10-28 RX ORDER — DEXAMETHASONE SODIUM PHOSPHATE 4 MG/ML
INJECTION, SOLUTION INTRA-ARTICULAR; INTRALESIONAL; INTRAMUSCULAR; INTRAVENOUS; SOFT TISSUE
Status: DISCONTINUED | OUTPATIENT
Start: 2024-10-28 | End: 2024-10-28

## 2024-10-28 RX ORDER — FENTANYL CITRATE 50 UG/ML
INJECTION, SOLUTION INTRAMUSCULAR; INTRAVENOUS
Status: DISCONTINUED | OUTPATIENT
Start: 2024-10-28 | End: 2024-10-28

## 2024-10-28 RX ORDER — LIDOCAINE HYDROCHLORIDE 10 MG/ML
INJECTION, SOLUTION INFILTRATION; PERINEURAL
Status: DISCONTINUED | OUTPATIENT
Start: 2024-10-28 | End: 2024-10-28 | Stop reason: HOSPADM

## 2024-10-28 RX ORDER — HYDROMORPHONE HYDROCHLORIDE 2 MG/ML
0.2 INJECTION, SOLUTION INTRAMUSCULAR; INTRAVENOUS; SUBCUTANEOUS EVERY 5 MIN PRN
Status: DISCONTINUED | OUTPATIENT
Start: 2024-10-28 | End: 2024-10-28 | Stop reason: HOSPADM

## 2024-10-28 RX ORDER — PROPOFOL 10 MG/ML
VIAL (ML) INTRAVENOUS
Status: DISCONTINUED | OUTPATIENT
Start: 2024-10-28 | End: 2024-10-28

## 2024-10-28 RX ORDER — ONDANSETRON HYDROCHLORIDE 2 MG/ML
INJECTION, SOLUTION INTRAVENOUS
Status: DISCONTINUED | OUTPATIENT
Start: 2024-10-28 | End: 2024-10-28

## 2024-10-28 RX ORDER — LIDOCAINE HYDROCHLORIDE 20 MG/ML
INJECTION INTRAVENOUS
Status: DISCONTINUED | OUTPATIENT
Start: 2024-10-28 | End: 2024-10-28

## 2024-10-28 RX ORDER — OXYCODONE HYDROCHLORIDE 5 MG/1
10 TABLET ORAL EVERY 4 HOURS PRN
Status: DISCONTINUED | OUTPATIENT
Start: 2024-10-28 | End: 2024-10-28 | Stop reason: HOSPADM

## 2024-10-28 RX ORDER — DICLOFENAC SODIUM 75 MG/1
75 TABLET, DELAYED RELEASE ORAL 2 TIMES DAILY
Qty: 84 TABLET | Refills: 0 | Status: SHIPPED | OUTPATIENT
Start: 2024-10-28 | End: 2024-12-09

## 2024-10-28 RX ADMIN — GLYCOPYRROLATE 0.2 MG: 0.2 INJECTION, SOLUTION INTRAMUSCULAR; INTRAVITREAL at 02:10

## 2024-10-28 RX ADMIN — Medication 20 MG: at 02:10

## 2024-10-28 RX ADMIN — PROPOFOL 50 MG: 10 INJECTION, EMULSION INTRAVENOUS at 02:10

## 2024-10-28 RX ADMIN — CEFAZOLIN 3 G: 330 INJECTION, POWDER, FOR SOLUTION INTRAMUSCULAR; INTRAVENOUS at 02:10

## 2024-10-28 RX ADMIN — SODIUM CHLORIDE: 0.9 INJECTION, SOLUTION INTRAVENOUS at 01:10

## 2024-10-28 RX ADMIN — FENTANYL CITRATE 25 MCG: 50 INJECTION INTRAMUSCULAR; INTRAVENOUS at 02:10

## 2024-10-28 RX ADMIN — FENTANYL CITRATE 50 MCG: 50 INJECTION INTRAMUSCULAR; INTRAVENOUS at 02:10

## 2024-10-28 RX ADMIN — Medication 30 MG: at 02:10

## 2024-10-28 RX ADMIN — ONDANSETRON 4 MG: 2 INJECTION, SOLUTION INTRAMUSCULAR; INTRAVENOUS at 02:10

## 2024-10-28 RX ADMIN — LIDOCAINE HYDROCHLORIDE 100 MG: 20 INJECTION, SOLUTION INTRAVENOUS at 02:10

## 2024-10-28 RX ADMIN — MIDAZOLAM HYDROCHLORIDE 2 MG: 1 INJECTION, SOLUTION INTRAMUSCULAR; INTRAVENOUS at 01:10

## 2024-10-28 RX ADMIN — PROPOFOL 100 MCG/KG/MIN: 10 INJECTION, EMULSION INTRAVENOUS at 02:10

## 2024-10-28 RX ADMIN — DEXAMETHASONE SODIUM PHOSPHATE 10 MG: 4 INJECTION, SOLUTION INTRA-ARTICULAR; INTRALESIONAL; INTRAMUSCULAR; INTRAVENOUS; SOFT TISSUE at 02:10

## 2024-10-29 VITALS
TEMPERATURE: 98 F | SYSTOLIC BLOOD PRESSURE: 145 MMHG | RESPIRATION RATE: 16 BRPM | OXYGEN SATURATION: 100 % | HEIGHT: 63 IN | BODY MASS INDEX: 48.82 KG/M2 | HEART RATE: 84 BPM | DIASTOLIC BLOOD PRESSURE: 79 MMHG | WEIGHT: 275.56 LBS

## 2024-11-08 DIAGNOSIS — G89.29 CHRONIC PAIN OF RIGHT KNEE: Primary | ICD-10-CM

## 2024-11-08 DIAGNOSIS — M25.561 CHRONIC PAIN OF RIGHT KNEE: Primary | ICD-10-CM

## 2024-11-12 ENCOUNTER — RESEARCH ENCOUNTER (OUTPATIENT)
Dept: RESEARCH | Facility: HOSPITAL | Age: 49
End: 2024-11-12
Payer: MEDICAID

## 2024-11-12 ENCOUNTER — HOSPITAL ENCOUNTER (OUTPATIENT)
Dept: RADIOLOGY | Facility: HOSPITAL | Age: 49
Discharge: HOME | End: 2024-11-12
Attending: PHYSICIAN ASSISTANT
Payer: MEDICAID

## 2024-11-12 ENCOUNTER — OFFICE VISIT (OUTPATIENT)
Dept: ORTHOPEDICS | Facility: CLINIC | Age: 49
End: 2024-11-12
Payer: MEDICAID

## 2024-11-12 VITALS — HEIGHT: 63 IN | WEIGHT: 262.81 LBS | BODY MASS INDEX: 46.57 KG/M2

## 2024-11-12 DIAGNOSIS — M17.11 PRIMARY OSTEOARTHRITIS OF RIGHT KNEE: ICD-10-CM

## 2024-11-12 DIAGNOSIS — M25.561 CHRONIC PAIN OF RIGHT KNEE: Primary | ICD-10-CM

## 2024-11-12 DIAGNOSIS — T81.41XA INFECTION OF SUPERFICIAL INCISIONAL SURGICAL SITE AFTER PROCEDURE, INITIAL ENCOUNTER: ICD-10-CM

## 2024-11-12 DIAGNOSIS — R20.2 PARESTHESIA OF BOTH HANDS: ICD-10-CM

## 2024-11-12 DIAGNOSIS — G89.29 CHRONIC PAIN OF RIGHT KNEE: ICD-10-CM

## 2024-11-12 DIAGNOSIS — G89.29 CHRONIC PAIN OF RIGHT KNEE: Primary | ICD-10-CM

## 2024-11-12 DIAGNOSIS — M25.561 CHRONIC PAIN OF RIGHT KNEE: ICD-10-CM

## 2024-11-12 PROCEDURE — 99999 PR PBB SHADOW E&M-EST. PATIENT-LVL IV: CPT | Mod: PBBFAC,,, | Performed by: PHYSICIAN ASSISTANT

## 2024-11-12 PROCEDURE — 73552 X-RAY EXAM OF FEMUR 2/>: CPT | Mod: TC,PN,RT

## 2024-11-12 PROCEDURE — 3044F HG A1C LEVEL LT 7.0%: CPT | Mod: CPTII,,, | Performed by: PHYSICIAN ASSISTANT

## 2024-11-12 PROCEDURE — 99214 OFFICE O/P EST MOD 30 MIN: CPT | Mod: PBBFAC,25,PN | Performed by: PHYSICIAN ASSISTANT

## 2024-11-12 PROCEDURE — 99024 POSTOP FOLLOW-UP VISIT: CPT | Mod: S$PBB,,, | Performed by: PHYSICIAN ASSISTANT

## 2024-11-12 PROCEDURE — 3008F BODY MASS INDEX DOCD: CPT | Mod: CPTII,,, | Performed by: PHYSICIAN ASSISTANT

## 2024-11-12 PROCEDURE — 1159F MED LIST DOCD IN RCRD: CPT | Mod: CPTII,,, | Performed by: PHYSICIAN ASSISTANT

## 2024-11-12 PROCEDURE — 1160F RVW MEDS BY RX/DR IN RCRD: CPT | Mod: CPTII,,, | Performed by: PHYSICIAN ASSISTANT

## 2024-11-12 PROCEDURE — 73552 X-RAY EXAM OF FEMUR 2/>: CPT | Mod: 26,RT,, | Performed by: INTERNAL MEDICINE

## 2024-11-12 PROCEDURE — 73590 X-RAY EXAM OF LOWER LEG: CPT | Mod: 26,RT,, | Performed by: INTERNAL MEDICINE

## 2024-11-12 PROCEDURE — 73590 X-RAY EXAM OF LOWER LEG: CPT | Mod: TC,PN,RT

## 2024-11-12 RX ORDER — CEPHALEXIN 500 MG/1
500 CAPSULE ORAL EVERY 6 HOURS
Qty: 28 CAPSULE | Refills: 0 | Status: SHIPPED | OUTPATIENT
Start: 2024-11-12 | End: 2024-11-19

## 2024-11-12 NOTE — PROGRESS NOTES
Protocol: Innovations in Genicular Outcomes Registry (Bradly)  Protocol#:Bradly  IRB# 2021.156  Version Date: 10-Javy-2023  PI: Calvin Nguyen MD  Patient Initials: JOEL.  (Study ID# [id number])       Treatment Visit  PNS placement  Clinic visit is 2 week post surgical implementation of PNS leads in target knee (RIGHT KNEE).     Research staff approached patient in private clinic room prior to OA injection. Patient awake, alert. Mood and affect appropriate to situation. Patient stated willingness to continue participation in above study. Patient states pain for target knee is 6 on pain scale. Patient takes diclofenac at night to help with knee pain. Chuy entered the room with PNS representative to place PNS device ([employer]) on patient's target knee (RIGHT) with Dr. Nguyen oversight.    Patient tolerated procedure well with no immediate complications. No analgesic medication prescribed after the treatment.     Research staff discussed the daily questionnaires that start tomorrow. Patient stated understanding and said she will complete the questionnaires as soon as the e-mail reminder is received. Patient reports post procedure pain of target knee as a 4 on pain scale. Patient instructed to call Dr. Nguyen or research team with any questions or concerns.       She will follow up in 1 month with xrays.     Patient not eligible for TKA at this time due to high BMI.    KEFLEX  was ordered.

## 2024-11-12 NOTE — PROGRESS NOTES
Subjective:      Chief Complaint: Pain and Post-op Evaluation of the Right Knee (S/P R knee stim perm 10/28/24)    Patient ID: Inna Gallardo is a 49 y.o. female.  48yo F follow up right knee permanent stim router on 10/28. Reports no pain today. During the trial, her pain went from 10/10 to 5/10. Reports redness around one of the incision sites in the thigh. No fevers or chills.   Had left knee stim router performed in which she gets good relief from.   Device will be turned on today by the [facility].         Past Medical History:   Diagnosis Date    Anxiety     Diverticulitis     Endometriosis     General anesthetics causing adverse effect in therapeutic use     Hypertension     Kidney stone     Prolactinoma 2011    Sliding hiatal hernia     Urinary tract infection     Vaginal infection         Past Surgical History:   Procedure Laterality Date    ARTHROSCOPY OF KNEE Left     BRAIN TUMOR EXCISION  9/2011    removal of prolactinoma    COLONOSCOPY N/A 8/27/2020    Procedure: COLONOSCOPY;  Surgeon: Valentino Negro MD;  Location: [facility];  Service: Endoscopy;  Laterality: N/A;    CYSTOSCOPY      CYSTOSCOPY W/ URETERAL STENT PLACEMENT  2016    CYSTOSCOPY W/ URETERAL STENT REMOVAL  2016    ESOPHAGEAL MANOMETRY N/A 6/3/2019    Procedure: MANOMETRY, ESOPHAGUS;  Surgeon: Bhupinder Schmitz MD;  [facility]  Service: Endoscopy;  Laterality: N/A;    ESOPHAGOGASTRODUODENOSCOPY N/A 4/30/2019    Procedure: ESOPHAGOGASTRODUODENOSCOPY (EGD);  Surgeon: Altagracia Bose MD;  Location: [facility] ENDO;  Service: Endoscopy;  Laterality: N/A;    ESOPHAGOGASTRODUODENOSCOPY N/A 8/2/2021    Procedure: EGD (ESOPHAGOGASTRODUODENOSCOPY);  Surgeon: Altagracia Bose MD;  Location: [facility] ENDO;  Service: Endoscopy;  Laterality: N/A;    EXTRACORPOREAL SHOCK WAVE LITHOTRIPSY Left 3/7/2024    Procedure: LITHOTRIPSY, ESWL;  Surgeon: Roverto Harley MD;  Location: [facility] OR;  Service: Urology;  Laterality: Left;    FOOT HARDWARE  REMOVAL Right 6/16/2020    Procedure: REMOVAL, HARDWARE, FOOT;  Surgeon: Adrianna Tillman DPM;  Location: [facility] OR;  Service: Podiatry;  Laterality: Right;    HARVESTING OF BONE GRAFT Right 7/16/2019    Procedure: SURGICAL PROCUREMENT, BONE GRAFT;  Surgeon: Adrianna Tillman DPM;  Location: [facility] OR;  Service: Podiatry;  Laterality: Right;    HYSTERECTOMY  3/16/15    TLH/BSO for Endometriosis, AUB, fibroids, cyst    INSERTION, ELECTRODE LEAD, NEUROSTIMULATOR, PERIPHERAL Left 7/15/2024    Procedure: INSERTION,ELECTRODE LEAD,NEUROSTIMULATOR,PERIPHERAL;  Surgeon: Calvin Nguyen MD;  Location: [facility] OR;  Service: Pain Management;  Laterality: Left;  stimrouter trial    INSERTION, ELECTRODE LEAD, NEUROSTIMULATOR, PERIPHERAL Left 8/12/2024    Procedure: INSERTION,ELECTRODE LEAD,NEUROSTIMULATOR,PERIPHERAL;  Surgeon: Calvin Nguyen MD;  Location: [facility] OR;  Service: Pain Management;  Laterality: Left;  stimrouter permanent    INSERTION, ELECTRODE LEAD, NEUROSTIMULATOR, PERIPHERAL Right 10/7/2024    Procedure: INSERTION,ELECTRODE LEAD,NEUROSTIMULATOR,PERIPHERAL;  Surgeon: Calvin Nguyen MD;  Location: [facility] OR;  Service: Pain Management;  Laterality: Right;  stimrouter trial    INSERTION, ELECTRODE LEAD, NEUROSTIMULATOR, PERIPHERAL Right 10/28/2024    Procedure: INSERTION,ELECTRODE LEAD,NEUROSTIMULATOR,PERIPHERAL;  Surgeon: Calvin Nguyen MD;  Location: [facility] OR;  Service: Pain Management;  Laterality: Right;  stimrouter permanent    LITHOTRIPSY      MANDIBLE SURGERY  2002    severe overbite correction    PELVIC LAPAROSCOPY  2014    Dx scope, chromopertubation-Endometriosis    TONSILLECTOMY, ADENOIDECTOMY          Current Outpatient Medications   Medication Instructions    baclofen (LIORESAL) 10 mg, 3 times daily PRN    cephALEXin (KEFLEX) 500 mg, Oral, Every 6 hours    diclofenac (VOLTAREN) 75 mg, Oral, 2 times daily    EPINEPHrine (EPIPEN) 0.3 mg/0.3 mL AtIn 0.3 mLs, As needed (PRN)    ergocalciferol (ERGOCALCIFEROL) 50,000 Units, Every 7 days  "   hydrOXYzine HCL (ATARAX) 25 mg, 3 times daily    linaCLOtide (LINZESS) 145 mcg Cap capsule TAKE 1 CAPSULE(145 MCG) BY MOUTH DAILY    losartan-hydrochlorothiazide 100-25 mg (HYZAAR) 100-25 mg per tablet 1 tablet, Daily    metFORMIN (GLUCOPHAGE-XR) 500 MG ER 24hr tablet TAKE 1 TABLET BY MOUTH EVERY DAY AT DINNER    omeprazole (PRILOSEC) 40 mg, Oral, Every morning    sertraline (ZOLOFT) 100 mg, Nightly        Review of patient's allergies indicates:   Allergen Reactions    Ace inhibitors Other (See Comments)     cough       Review of Systems   Constitutional: Negative for fever and malaise/fatigue.   Eyes:  Negative for blurred vision.   Cardiovascular:  Negative for chest pain.   Respiratory:  Negative for shortness of breath.    Skin:  Negative for poor wound healing.   Musculoskeletal:  Positive for joint pain and myalgias.   Genitourinary:  Negative for bladder incontinence.   Neurological:  Negative for dizziness, numbness and paresthesias.   Psychiatric/Behavioral:  Negative for altered mental status.        The patient's relevant past medical, surgical, and social history was reviewed in Epic.       Objective:      VITAL SIGNS: Ht 5' 3" (1.6 m)   Wt 119.2 kg (262 lb 12.6 oz)   LMP 2015   BMI 46.55 kg/m²     General    Nursing note and vitals reviewed.  Constitutional: She is oriented to person, place, and time. She appears well-developed and well-nourished.   Neurological: She is alert and oriented to person, place, and time.           Right Knee Exam     Inspection   Scars: present  Swelling: present    Range of Motion   Extension:  5   Flexion:  100     Tests   Ligament Examination   Lachman: normal (-1 to 2mm)     Comments:  Lateral thigh incision has erythema surrounding . Looks healed . No drainage.   All other incisions healing well.      Muscle Strength   Right Lower Extremity   Quadriceps:  4/5   Hamstrin/5        Imaging  2views Right tib/fib and 2 views right femur reveal degenerative " changes of the right knee. Bone on bone medial compartment. Three PNS leads well placed.         Assessment:       Inna Gallardo is a 49 y.o. female seen in the office today for   1. Chronic pain of right knee    2. Primary osteoarthritis of right knee    3. Infection of superficial incisional surgical site after procedure, initial encounter    4. Paresthesia of both hands     Keflex for incisional cellulitis for 7 days.  Recommend she sent me a picture to the portal in one week to see if this has resolved.  I will see the patient back in 1 month for repeat assessment and x-rays.      Plan:       Inna was seen today for pain and post-op evaluation.    Diagnoses and all orders for this visit:    Chronic pain of right knee    Primary osteoarthritis of right knee    Infection of superficial incisional surgical site after procedure, initial encounter  -     cephALEXin (KEFLEX) 500 MG capsule; Take 1 capsule (500 mg total) by mouth every 6 (six) hours. for 7 days    Paresthesia of both hands  -     Ambulatory referral/consult to Hand Surgery; Future      Keflex x 7 days  Follow up in on month with xrays  WBAT. No impact activities.     Diagnoses and plan discussed with the patient, as well as the expected course and duration of his symptoms.  All questions and concerns were addressed prior to the end of the visit.   Instructed patient to call office if they have any future questions/concerns or to schedule apt. Patient will return to see me if symptoms worsen or fail to improve    Note dictated with voice recognition software, please excuse any grammatical errors.        Pat Taylor PA-C      Department of Orthopedic Surgery  [facility]  Office: [phone]  11/12/2024

## 2024-11-13 ENCOUNTER — HOSPITAL ENCOUNTER (OUTPATIENT)
Dept: RADIOLOGY | Facility: HOSPITAL | Age: 49
Discharge: HOME | End: 2024-11-13
Attending: NURSE PRACTITIONER
Payer: MEDICAID

## 2024-11-13 DIAGNOSIS — N20.0 RENAL CALCULUS, RIGHT: ICD-10-CM

## 2024-11-13 PROCEDURE — 76770 US EXAM ABDO BACK WALL COMP: CPT | Mod: 26,,, | Performed by: RADIOLOGY

## 2024-11-13 PROCEDURE — 76770 US EXAM ABDO BACK WALL COMP: CPT | Mod: TC,PN

## 2024-11-14 ENCOUNTER — TELEPHONE (OUTPATIENT)
Dept: UROLOGY | Facility: CLINIC | Age: 49
End: 2024-11-14
Payer: MEDICAID

## 2024-11-14 NOTE — TELEPHONE ENCOUNTER
----- Message from Raj Dubon DNP sent at 11/13/2024  7:14 PM CST -----  Please inform patient via telephone that her U/S of kidneys and bladder showed a 3 mm cortical calcification in left kidney. Will monitor with U/S in 1 year or sooner if needed.

## 2024-11-19 ENCOUNTER — PATIENT MESSAGE (OUTPATIENT)
Dept: ORTHOPEDICS | Facility: CLINIC | Age: 49
End: 2024-11-19
Payer: MEDICAID

## 2024-11-26 ENCOUNTER — TELEPHONE (OUTPATIENT)
Dept: ORTHOPEDICS | Facility: CLINIC | Age: 49
End: 2024-11-26
Payer: MEDICAID

## 2024-11-26 NOTE — TELEPHONE ENCOUNTER
LVM to schedule appt tomorrow for patient for bilateral carpal tunnel tomorrow with Fabby GageInaltagracia.    Rajendra Ochoa MS, OTC   Sports Medicine Assistant   [facility]  (P) [phone]  (F) [phone]

## 2024-11-26 NOTE — TELEPHONE ENCOUNTER
Patient communication:    We would really like to get you into clinic to treat you sooner, but Dr. Pruitt does not have any sooner availability. Pt can be directed to [facility] [phone] or [facility] [phone].

## 2024-11-27 ENCOUNTER — TELEPHONE (OUTPATIENT)
Dept: ORTHOPEDICS | Facility: CLINIC | Age: 49
End: 2024-11-27
Payer: MEDICAID

## 2024-11-27 NOTE — TELEPHONE ENCOUNTER
----- Message from Dionte sent at 11/27/2024  9:06 AM CST -----  Type:  Needs Medical Advice    Who Called: pt  Would the patient rather a call back or a response via [facility]? call  Best Call Back Number:  [phone]  Additional Information: Pt would like a call from nurse in office regarding her referral   TO HAND SURGERY  Currently unable to get pt. Schedule

## 2024-12-02 ENCOUNTER — TELEPHONE (OUTPATIENT)
Dept: ORTHOPEDICS | Facility: CLINIC | Age: 49
End: 2024-12-02
Payer: MEDICAID

## 2024-12-02 NOTE — TELEPHONE ENCOUNTER
----- Message from Sandra sent at 12/2/2024  8:41 AM CST -----  Contact: patient  Type:  Patient Returning Call    Who Called:Inna Gallardo   Who Left Message for Patient: nurse   Does the patient know what this is regarding?: referral   Would the patient rather a call back or a response via [facility]?  Call   Best Call Back Number:[phone]  Additional Information:  pt states she will not be available until after 3pm, and she is ok with the appt being scheduled for her.

## 2024-12-05 ENCOUNTER — HOSPITAL ENCOUNTER (EMERGENCY)
Facility: HOSPITAL | Age: 49
Discharge: HOME | End: 2024-12-05
Attending: EMERGENCY MEDICINE
Payer: MEDICAID

## 2024-12-05 VITALS
OXYGEN SATURATION: 99 % | WEIGHT: 265 LBS | HEART RATE: 85 BPM | DIASTOLIC BLOOD PRESSURE: 84 MMHG | SYSTOLIC BLOOD PRESSURE: 126 MMHG | HEIGHT: 63 IN | RESPIRATION RATE: 18 BRPM | TEMPERATURE: 98 F | BODY MASS INDEX: 46.95 KG/M2

## 2024-12-05 DIAGNOSIS — R07.9 CHEST PAIN: ICD-10-CM

## 2024-12-05 DIAGNOSIS — F19.929: Primary | ICD-10-CM

## 2024-12-05 LAB
ALBUMIN SERPL BCP-MCNC: 4.2 G/DL (ref 3.5–5.2)
ALP SERPL-CCNC: 70 U/L (ref 40–150)
ALT SERPL W/O P-5'-P-CCNC: 33 U/L (ref 10–44)
ANION GAP SERPL CALC-SCNC: 10 MMOL/L (ref 8–16)
AST SERPL-CCNC: 23 U/L (ref 10–40)
BASOPHILS # BLD AUTO: 0.07 K/UL (ref 0–0.2)
BASOPHILS NFR BLD: 0.5 % (ref 0–1.9)
BILIRUB SERPL-MCNC: 0.4 MG/DL (ref 0.1–1)
BUN SERPL-MCNC: 13 MG/DL (ref 6–20)
CALCIUM SERPL-MCNC: 9.6 MG/DL (ref 8.7–10.5)
CHLORIDE SERPL-SCNC: 108 MMOL/L (ref 95–110)
CO2 SERPL-SCNC: 22 MMOL/L (ref 23–29)
CREAT SERPL-MCNC: 0.8 MG/DL (ref 0.5–1.4)
DIFFERENTIAL METHOD BLD: ABNORMAL
EOSINOPHIL # BLD AUTO: 0.2 K/UL (ref 0–0.5)
EOSINOPHIL NFR BLD: 1.1 % (ref 0–8)
ERYTHROCYTE [DISTWIDTH] IN BLOOD BY AUTOMATED COUNT: 13.6 % (ref 11.5–14.5)
EST. GFR  (NO RACE VARIABLE): >60 ML/MIN/1.73 M^2
GLUCOSE SERPL-MCNC: 176 MG/DL (ref 70–110)
HCT VFR BLD AUTO: 41.9 % (ref 37–48.5)
HGB BLD-MCNC: 14 G/DL (ref 12–16)
HIV 1+2 AB+HIV1 P24 AG SERPL QL IA: NORMAL
IMM GRANULOCYTES # BLD AUTO: 0.07 K/UL (ref 0–0.04)
IMM GRANULOCYTES NFR BLD AUTO: 0.5 % (ref 0–0.5)
LYMPHOCYTES # BLD AUTO: 3.6 K/UL (ref 1–4.8)
LYMPHOCYTES NFR BLD: 24.1 % (ref 18–48)
MCH RBC QN AUTO: 29 PG (ref 27–31)
MCHC RBC AUTO-ENTMCNC: 33.4 G/DL (ref 32–36)
MCV RBC AUTO: 87 FL (ref 82–98)
MONOCYTES # BLD AUTO: 0.7 K/UL (ref 0.3–1)
MONOCYTES NFR BLD: 4.9 % (ref 4–15)
NEUTROPHILS # BLD AUTO: 10.3 K/UL (ref 1.8–7.7)
NEUTROPHILS NFR BLD: 68.9 % (ref 38–73)
NRBC BLD-RTO: 0 /100 WBC
OHS QRS DURATION: 80 MS
OHS QTC CALCULATION: 477 MS
PLATELET # BLD AUTO: 409 K/UL (ref 150–450)
PMV BLD AUTO: 10.4 FL (ref 9.2–12.9)
POTASSIUM SERPL-SCNC: 3.7 MMOL/L (ref 3.5–5.1)
PROT SERPL-MCNC: 7.5 G/DL (ref 6–8.4)
RBC # BLD AUTO: 4.83 M/UL (ref 4–5.4)
SODIUM SERPL-SCNC: 140 MMOL/L (ref 136–145)
TROPONIN I SERPL DL<=0.01 NG/ML-MCNC: <3 NG/L (ref 0–14)
WBC # BLD AUTO: 14.95 K/UL (ref 3.9–12.7)

## 2024-12-05 PROCEDURE — 87389 HIV-1 AG W/HIV-1&-2 AB AG IA: CPT | Performed by: PHYSICIAN ASSISTANT

## 2024-12-05 PROCEDURE — 99284 EMERGENCY DEPT VISIT MOD MDM: CPT | Mod: 25

## 2024-12-05 PROCEDURE — 80053 COMPREHEN METABOLIC PANEL: CPT | Performed by: EMERGENCY MEDICINE

## 2024-12-05 PROCEDURE — 93010 ELECTROCARDIOGRAM REPORT: CPT | Mod: ,,, | Performed by: INTERNAL MEDICINE

## 2024-12-05 PROCEDURE — 85025 COMPLETE CBC W/AUTO DIFF WBC: CPT | Performed by: EMERGENCY MEDICINE

## 2024-12-05 PROCEDURE — 84484 ASSAY OF TROPONIN QUANT: CPT | Performed by: EMERGENCY MEDICINE

## 2024-12-05 PROCEDURE — 93005 ELECTROCARDIOGRAM TRACING: CPT

## 2024-12-05 NOTE — ED TRIAGE NOTES
Patient arrived via personal transport for the chief complaint of chest pain. The patient is drowsy and not easy to arouse. The patient per the patients family member the patient took multiple 8 mg THC gummies this morning and now the patient is having chest pains, body heaviness, is slurring her speech, and says her body feels funny.  Upon assessing patient patient responds to all questions, patient endorsing fatigue, chest pain, and says her body feels funny patients oriented x4    2

## 2024-12-05 NOTE — ED NOTES
Medication:   Requested Prescriptions     Pending Prescriptions Disp Refills    amphetamine-dextroamphetamine (ADDERALL XR) 5 MG extended release capsule 60 capsule 0     Sig: Take 2 capsules by mouth daily for 30 days. Max Daily Amount: 10 mg        Last Filled:  10/13/2024     Patient Phone Number: [phone] (home)     Last appt: 9/5/2024   Next appt: Visit date not found    Last OARRS:       2/17/2023    11:57 AM   RX Monitoring   Periodic Controlled Substance Monitoring Possible medication side effects, risk of tolerance/dependence & alternative treatments discussed.;No signs of potential drug abuse or diversion identified.;Potential drug abuse or diversion identified, see note documentation.;Assessed functional status.            Telemetry Verification   Patient placed on Telemetry Box 0253    Box # [id number]       Rate 95   Rhythm NSR

## 2024-12-05 NOTE — FIRST PROVIDER EVALUATION
Medical screening examination initiated.  I have conducted a focused provider triage encounter, findings are as follows:    Brief history of present illness:  50 y/o f, c/o feeling funny after taking a THC gummy for the first time today.  States body feels heavy, chest hurts    There were no vitals filed for this visit.    Pertinent physical exam:  appears comfortable, mildly tachycardic    Brief workup plan:  labs, including troponin    Preliminary workup initiated; this workup will be continued and followed by the physician or advanced practice provider that is assigned to the patient when roomed.

## 2024-12-05 NOTE — ED PROVIDER NOTES
"Encounter Date: 12/5/2024       History     Chief Complaint   Patient presents with    Chest Pain     Took 8mg THC gummies this morning, now having chest pains, body heaviness, slurred speech, "body feels funny and I dont like it'     49-year-old year old female with past medical history of HTN, anxiety presents to the ED regarding chest pain. Per  at bedside, she took one 8 mg THC gummies this afternoon and now the patient is having chest pains, body heaviness, and says her body feels funny. She took for anxiety but has never taken one before. Her chest pain started at 1:00 pm. Denies shortness of breath, headache, vomiting, or other complaints at this time.    The history is provided by the patient and medical records.     Review of patient's allergies indicates:   Allergen Reactions    Ace inhibitors Other (See Comments)     cough     Past Medical History:   Diagnosis Date    Anxiety     Diverticulitis     Endometriosis     General anesthetics causing adverse effect in therapeutic use     Hypertension     Kidney stone     Prolactinoma 2011    Sliding hiatal hernia     Urinary tract infection     Vaginal infection      Past Surgical History:   Procedure Laterality Date    ARTHROSCOPY OF KNEE Left     BRAIN TUMOR EXCISION  9/2011    removal of prolactinoma    COLONOSCOPY N/A 8/27/2020    Procedure: COLONOSCOPY;  Surgeon: Valentino Negro MD;  Location: [facility];  Service: Endoscopy;  Laterality: N/A;    CYSTOSCOPY      CYSTOSCOPY W/ URETERAL STENT PLACEMENT  2016    CYSTOSCOPY W/ URETERAL STENT REMOVAL  2016    ESOPHAGEAL MANOMETRY N/A 6/3/2019    Procedure: MANOMETRY, ESOPHAGUS;  Surgeon: Bhupinder Schmitz MD;  Location: [address]);  Service: Endoscopy;  Laterality: N/A;    ESOPHAGOGASTRODUODENOSCOPY N/A 4/30/2019    Procedure: ESOPHAGOGASTRODUODENOSCOPY (EGD);  Surgeon: Altagracia Bose MD;  Location: [facility];  Service: Endoscopy;  Laterality: N/A;    ESOPHAGOGASTRODUODENOSCOPY N/A " 8/2/2021    Procedure: EGD (ESOPHAGOGASTRODUODENOSCOPY);  Surgeon: Altagracia Bose MD;  Location: [facility] ENDO;  Service: Endoscopy;  Laterality: N/A;    EXTRACORPOREAL SHOCK WAVE LITHOTRIPSY Left 3/7/2024    Procedure: LITHOTRIPSY, ESWL;  Surgeon: Roverto Harley MD;  Location: [facility] OR;  Service: Urology;  Laterality: Left;    FOOT HARDWARE REMOVAL Right 6/16/2020    Procedure: REMOVAL, HARDWARE, FOOT;  Surgeon: Adrianna Tillman DPM;  Location: [facility] OR;  Service: Podiatry;  Laterality: Right;    HARVESTING OF BONE GRAFT Right 7/16/2019    Procedure: SURGICAL PROCUREMENT, BONE GRAFT;  Surgeon: Adrianna Tillman DPM;  Location: [facility] OR;  Service: Podiatry;  Laterality: Right;    HYSTERECTOMY  3/16/15    TLH/BSO for Endometriosis, AUB, fibroids, cyst    INSERTION, ELECTRODE LEAD, NEUROSTIMULATOR, PERIPHERAL Left 7/15/2024    Procedure: INSERTION,ELECTRODE LEAD,NEUROSTIMULATOR,PERIPHERAL;  Surgeon: Calvin Nguyen MD;  Location: [facility] OR;  Service: Pain Management;  Laterality: Left;  stimrouter trial    INSERTION, ELECTRODE LEAD, NEUROSTIMULATOR, PERIPHERAL Left 8/12/2024    Procedure: INSERTION,ELECTRODE LEAD,NEUROSTIMULATOR,PERIPHERAL;  Surgeon: Calvin Nguyen MD;  Location: [facility] OR;  Service: Pain Management;  Laterality: Left;  stimrouter permanent    INSERTION, ELECTRODE LEAD, NEUROSTIMULATOR, PERIPHERAL Right 10/7/2024    Procedure: INSERTION,ELECTRODE LEAD,NEUROSTIMULATOR,PERIPHERAL;  Surgeon: Calvin Nguyen MD;  Location: [facility] OR;  Service: Pain Management;  Laterality: Right;  stimrouter trial    INSERTION, ELECTRODE LEAD, NEUROSTIMULATOR, PERIPHERAL Right 10/28/2024    Procedure: INSERTION,ELECTRODE LEAD,NEUROSTIMULATOR,PERIPHERAL;  Surgeon: Calvin Nguyen MD;  Location: [facility] OR;  Service: Pain Management;  Laterality: Right;  stimrouter permanent    LITHOTRIPSY      MANDIBLE SURGERY  2002    severe overbite correction    PELVIC LAPAROSCOPY  2014    Dx scope, chromopertubation-Endometriosis    TONSILLECTOMY, ADENOIDECTOMY        Family History   Problem Relation Name Age of Onset    Cancer Mother      Breast cancer Neg Hx      Ovarian cancer Neg Hx      Kidney disease Neg Hx       Social History     Tobacco Use    Smoking status: Former     Current packs/day: 0.00     Types: Cigarettes     Start date: 1989     Quit date: 2000     Years since quittin.8    Smokeless tobacco: Never    Tobacco comments:     pt stated she smoked one cig/day   Substance Use Topics    Alcohol use: Never    Drug use: Never     Review of Systems  See HPI  Physical Exam     Initial Vitals   BP Pulse Resp Temp SpO2   24 1427 24 1427 24 1427 24 1427 24 1429   (!) 127/98 104 (!) 22 97.4 °F (36.3 °C) 99 %      MAP       --                Physical Exam    Vitals reviewed.  Constitutional: Vital signs are normal. She appears well-developed and well-nourished. She appears lethargic. She is not diaphoretic. No distress.   Appears intoxicated and lethargic. Protecting airway.    HENT:   Head: Normocephalic and atraumatic.   Eyes: Conjunctivae and EOM are normal. Pupils are equal, round, and reactive to light.   Dilated pupils   Neck: Neck supple.   Cardiovascular:  Normal rate.           Pulmonary/Chest: No respiratory distress.   Musculoskeletal:      Cervical back: Neck supple.     Neurological: She is oriented to person, place, and time. She appears lethargic. No cranial nerve deficit. GCS eye subscore is 2. GCS verbal subscore is 3. GCS motor subscore is 6.   Follows verbal commands         ED Course   Procedures  Labs Reviewed   CBC W/ AUTO DIFFERENTIAL - Abnormal       Result Value    WBC 14.95 (*)     RBC 4.83      Hemoglobin 14.0      Hematocrit 41.9      MCV 87      MCH 29.0      MCHC 33.4      RDW 13.6      Platelets 409      MPV 10.4      Immature Granulocytes 0.5      Gran # (ANC) 10.3 (*)     Immature Grans (Abs) 0.07 (*)     Lymph # 3.6      Mono # 0.7      Eos # 0.2      Baso # 0.07      nRBC 0      Gran % 68.9       Lymph % 24.1      Mono % 4.9      Eosinophil % 1.1      Basophil % 0.5      Differential Method Automated      Narrative:     Release to patient->Immediate   COMPREHENSIVE METABOLIC PANEL - Abnormal    Sodium 140      Potassium 3.7      Chloride 108      CO2 22 (*)     Glucose 176 (*)     BUN 13      Creatinine 0.8      Calcium 9.6      Total Protein 7.5      Albumin 4.2      Total Bilirubin 0.4      Alkaline Phosphatase 70      AST 23      ALT 33      eGFR >60.0      Anion Gap 10      Narrative:     Release to patient->Immediate   TROPONIN I HIGH SENSITIVITY    Troponin I High Sensitivity <3      Narrative:     Release to patient->Immediate   HIV 1 / 2 ANTIBODY    HIV 1/2 Ag/Ab Non-reactive      Narrative:     Release to patient->Immediate        ECG Results              EKG 12-lead (Final result)        Collection Time Result Time QRS Duration OHS QTC Calculation    12/05/24 14:30:09 12/05/24 14:57:40 80 477                     Final result by Interface, Lab In [facility] (12/05/24 14:57:47)                   Narrative:    Test Reason : R07.9,    Vent. Rate :  96 BPM     Atrial Rate :  96 BPM     P-R Int : 164 ms          QRS Dur :  80 ms      QT Int : 378 ms       P-R-T Axes :  43  23 -26 degrees    QTcB Int : 477 ms    Normal sinus rhythm  Low septal forces ; Abnormal R wave progression in the precordial leads  T wave abnormality, consider inferolateral ischemia  Abnormal ECG  When compared with ECG of 27-Jun-2024 09:27,  Vent. rate has increased by  39 bpm  T wave inversion more evident in Inferior leads  T wave inversion now evident in Anterior-lateral leads  QT has lengthened  Confirmed by Jaskaran Clrak ([id number]) on 12/5/2024 2:57:38 PM    Referred By:            Confirmed By: Jaskaran Clark                                  Imaging Results    None          Medications - No data to display  Medical Decision Making  Emergent evaluation of 49-year-old regarding chest pain after ingesting THC gummy. Vitals stable. Appear  lethargic and intoxicated but nontoxic or disoriented. Labs including troponin to assess for ACS ordered by triage provider. Likely symptoms related to intoxication.    My differential diagnoses include but are not limited to: Drug intoxication, ACS, costochondritis, muscle strain, electrolyte abnormality, SARAH  See ED course.  I have reviewed the patient's records and discussed with my supervising physician.    Amount and/or Complexity of Data Reviewed  Labs:  Decision-making details documented in ED Course.               ED Course as of 12/05/24 2107   Thu Dec 05, 2024   1821 Patient sleeping [KB]   2016 WBC(!): 14.95  Likely reactive  [KB]   2018 Comprehensive metabolic panel(!)  Grossly unremarkable [KB]   2018 Troponin I High Sensitivity: <3 [KB]   2018 Patient clinically more well-appearing. GCS now 15, A&Ox4, moving extremities independently, and tolerating PO.   here to take care of patient and bring her home.  Educated to discontinue THC gummies.  Strict ED return precautions discussed with all questions answered. [KB]      ED Course User Index  [KB] Altagracia Bailey PA-C                             Clinical Impression:  Final diagnoses:  [R07.9] Chest pain  [F19.929] Intoxication due to misuse of recreational drug (Primary)          ED Disposition Condition    Discharge Stable          ED Prescriptions    None       Follow-up Information       Follow up With Specialties Details Why Contact Info    Tiff Berman MD Internal Medicine Schedule an appointment as soon as possible for a visit   [address]  [address]  [facility] [address]  [phone]      Emile katarzyna - Emergency Dept Emergency Medicine Go to  If symptoms worsen [address]  [facility] [address]  [phone]             Altagracia Bailey PA-C  12/05/24 [id number]

## 2024-12-19 ENCOUNTER — TELEPHONE (OUTPATIENT)
Dept: RESEARCH | Facility: HOSPITAL | Age: 49
End: 2024-12-19
Payer: MEDICAID

## 2024-12-19 NOTE — TELEPHONE ENCOUNTER
Bradly study  Study ID#[id number]    Patient was reminded to complete weekly questionnaires hat are due today by 1pm on the [employer] website. Left voicemail.     Email reminder was also sent.

## 2025-01-06 ENCOUNTER — TELEPHONE (OUTPATIENT)
Dept: ORTHOPEDICS | Facility: CLINIC | Age: 50
End: 2025-01-06
Payer: MEDICAID

## 2025-01-06 NOTE — TELEPHONE ENCOUNTER
----- Message from CuÃ­date sent at 1/6/2025  9:25 AM CST -----  Pt Requesting to Reschedule an Appointment     Pt is requesting to Reschedule an appointment that our scheduling dept cannot Reschedule.    Who called: pt  Initial appt date: 1/7/25  When pt wants appt: any date week of 6/13/25 or 6/20/25 except for on a Thursday  Reason: pt didn't say  Best call back #:  [phone]  Additional notes: this appt is for a post op so I wasn't able to reschedule it

## 2025-02-14 DIAGNOSIS — Z12.31 ENCOUNTER FOR SCREENING MAMMOGRAM FOR MALIGNANT NEOPLASM OF BREAST: Primary | ICD-10-CM

## 2025-02-17 ENCOUNTER — RESEARCH ENCOUNTER (OUTPATIENT)
Dept: RESEARCH | Facility: HOSPITAL | Age: 50
End: 2025-02-17
Payer: MEDICAID

## 2025-02-17 ENCOUNTER — HOSPITAL ENCOUNTER (OUTPATIENT)
Dept: RADIOLOGY | Facility: HOSPITAL | Age: 50
Discharge: HOME | End: 2025-02-17
Attending: PHYSICIAN ASSISTANT
Payer: MEDICAID

## 2025-02-17 ENCOUNTER — OFFICE VISIT (OUTPATIENT)
Dept: ORTHOPEDICS | Facility: CLINIC | Age: 50
End: 2025-02-17
Payer: MEDICAID

## 2025-02-17 VITALS — WEIGHT: 257.25 LBS | BODY MASS INDEX: 45.58 KG/M2 | HEIGHT: 63 IN

## 2025-02-17 DIAGNOSIS — M25.561 CHRONIC PAIN OF RIGHT KNEE: ICD-10-CM

## 2025-02-17 DIAGNOSIS — M25.562 CHRONIC PAIN OF LEFT KNEE: ICD-10-CM

## 2025-02-17 DIAGNOSIS — G89.29 CHRONIC PAIN OF RIGHT KNEE: ICD-10-CM

## 2025-02-17 DIAGNOSIS — M17.11 PRIMARY OSTEOARTHRITIS OF RIGHT KNEE: Primary | ICD-10-CM

## 2025-02-17 DIAGNOSIS — M17.12 PRIMARY OSTEOARTHRITIS OF LEFT KNEE: ICD-10-CM

## 2025-02-17 DIAGNOSIS — G89.29 CHRONIC PAIN OF LEFT KNEE: ICD-10-CM

## 2025-02-17 PROCEDURE — 99213 OFFICE O/P EST LOW 20 MIN: CPT | Mod: PBBFAC,25,PN | Performed by: PHYSICIAN ASSISTANT

## 2025-02-17 PROCEDURE — 3008F BODY MASS INDEX DOCD: CPT | Mod: CPTII,,, | Performed by: PHYSICIAN ASSISTANT

## 2025-02-17 PROCEDURE — 1159F MED LIST DOCD IN RCRD: CPT | Mod: CPTII,,, | Performed by: PHYSICIAN ASSISTANT

## 2025-02-17 PROCEDURE — 73590 X-RAY EXAM OF LOWER LEG: CPT | Mod: TC,PN,RT

## 2025-02-17 PROCEDURE — 73552 X-RAY EXAM OF FEMUR 2/>: CPT | Mod: TC,PN,RT

## 2025-02-17 PROCEDURE — 20610 DRAIN/INJ JOINT/BURSA W/O US: CPT | Mod: 50,PBBFAC,PN | Performed by: PHYSICIAN ASSISTANT

## 2025-02-17 RX ORDER — KETOROLAC TROMETHAMINE 30 MG/ML
30 INJECTION, SOLUTION INTRAMUSCULAR; INTRAVENOUS
Status: DISCONTINUED | OUTPATIENT
Start: 2025-02-17 | End: 2025-02-17 | Stop reason: HOSPADM

## 2025-02-17 RX ADMIN — KETOROLAC TROMETHAMINE 30 MG: 30 INJECTION, SOLUTION INTRAMUSCULAR at 10:02

## 2025-02-17 NOTE — PROGRESS NOTES
Subjective:      Chief Complaint: Pain of the Right Knee (S/P R knee stim perm 10/28/24)    Patient ID: Inna Gallardo is a 49 y.o. female.  50yo obese female presents for f/u right knee permanent stim router on 10/28. Left knee right knee permanent stim router on 8/12. Today she reports the PNS is helping minimally. She wears it for over 8 hours and states it only gives her pain relief for couple of hours.  She has change the settings and also spoken to the rep.  She states none of this improved her pain.  She is trying to lose weight to qualify for TKA.  Using a walker today.        Past Medical History:   Diagnosis Date    Anxiety     Diverticulitis     Endometriosis     General anesthetics causing adverse effect in therapeutic use     Hypertension     Kidney stone     Prolactinoma 2011    Sliding hiatal hernia     Urinary tract infection     Vaginal infection         Past Surgical History:   Procedure Laterality Date    ARTHROSCOPY OF KNEE Left     BRAIN TUMOR EXCISION  9/2011    removal of prolactinoma    COLONOSCOPY N/A 8/27/2020    Procedure: COLONOSCOPY;  Surgeon: Valentino Negro MD;  Location: [facility];  Service: Endoscopy;  Laterality: N/A;    CYSTOSCOPY      CYSTOSCOPY W/ URETERAL STENT PLACEMENT  2016    CYSTOSCOPY W/ URETERAL STENT REMOVAL  2016    ESOPHAGEAL MANOMETRY N/A 6/3/2019    Procedure: MANOMETRY, ESOPHAGUS;  Surgeon: Bhupinder Schmitz MD;  Location: [address]);  Service: Endoscopy;  Laterality: N/A;    ESOPHAGOGASTRODUODENOSCOPY N/A 4/30/2019    Procedure: ESOPHAGOGASTRODUODENOSCOPY (EGD);  Surgeon: Altagracia Bose MD;  Location: [facility];  Service: Endoscopy;  Laterality: N/A;    ESOPHAGOGASTRODUODENOSCOPY N/A 8/2/2021    Procedure: EGD (ESOPHAGOGASTRODUODENOSCOPY);  Surgeon: Altagracia Bose MD;  Location: [facility];  Service: Endoscopy;  Laterality: N/A;    EXTRACORPOREAL SHOCK WAVE LITHOTRIPSY Left 3/7/2024    Procedure: LITHOTRIPSY, ESWL;  Surgeon: Roverto Harley,  MD;  Location: [facility] OR;  Service: Urology;  Laterality: Left;    FOOT HARDWARE REMOVAL Right 6/16/2020    Procedure: REMOVAL, HARDWARE, FOOT;  Surgeon: Adrianna Tillman DPM;  Location: [facility] OR;  Service: Podiatry;  Laterality: Right;    HARVESTING OF BONE GRAFT Right 7/16/2019    Procedure: SURGICAL PROCUREMENT, BONE GRAFT;  Surgeon: Adrianna Tillman DPM;  Location: [facility] OR;  Service: Podiatry;  Laterality: Right;    HYSTERECTOMY  3/16/15    TLH/BSO for Endometriosis, AUB, fibroids, cyst    INSERTION, ELECTRODE LEAD, NEUROSTIMULATOR, PERIPHERAL Left 7/15/2024    Procedure: INSERTION,ELECTRODE LEAD,NEUROSTIMULATOR,PERIPHERAL;  Surgeon: Calvin Nguyen MD;  Location: [facility] OR;  Service: Pain Management;  Laterality: Left;  stimrouter trial    INSERTION, ELECTRODE LEAD, NEUROSTIMULATOR, PERIPHERAL Left 8/12/2024    Procedure: INSERTION,ELECTRODE LEAD,NEUROSTIMULATOR,PERIPHERAL;  Surgeon: Calvin Nguyen MD;  Location: [facility] OR;  Service: Pain Management;  Laterality: Left;  stimrouter permanent    INSERTION, ELECTRODE LEAD, NEUROSTIMULATOR, PERIPHERAL Right 10/7/2024    Procedure: INSERTION,ELECTRODE LEAD,NEUROSTIMULATOR,PERIPHERAL;  Surgeon: Calvin Nguyen MD;  Location: [facility] OR;  Service: Pain Management;  Laterality: Right;  stimrouter trial    INSERTION, ELECTRODE LEAD, NEUROSTIMULATOR, PERIPHERAL Right 10/28/2024    Procedure: INSERTION,ELECTRODE LEAD,NEUROSTIMULATOR,PERIPHERAL;  Surgeon: Calvin Nguyen MD;  Location: [facility] OR;  Service: Pain Management;  Laterality: Right;  stimrouter permanent    LITHOTRIPSY      MANDIBLE SURGERY  2002    severe overbite correction    PELVIC LAPAROSCOPY  2014    Dx scope, chromopertubation-Endometriosis    TONSILLECTOMY, ADENOIDECTOMY          Current Outpatient Medications   Medication Instructions    baclofen (LIORESAL) 10 mg, 3 times daily PRN    EPINEPHrine (EPIPEN) 0.3 mg/0.3 mL AtIn 0.3 mLs, As needed (PRN)    ergocalciferol (ERGOCALCIFEROL) 50,000 Units, Every 7 days    hydrOXYzine HCL  "(ATARAX) 25 mg, 3 times daily    linaCLOtide (LINZESS) 145 mcg Cap capsule TAKE 1 CAPSULE(145 MCG) BY MOUTH DAILY    losartan-hydrochlorothiazide 100-25 mg (HYZAAR) 100-25 mg per tablet 1 tablet, Daily    metFORMIN (GLUCOPHAGE-XR) 500 MG ER 24hr tablet TAKE 1 TABLET BY MOUTH EVERY DAY AT DINNER    omeprazole (PRILOSEC) 40 mg, Oral, Every morning    sertraline (ZOLOFT) 100 mg, Nightly        Review of patient's allergies indicates:   Allergen Reactions    Ace inhibitors Other (See Comments)     cough       Review of Systems   Constitutional: Negative for fever and malaise/fatigue.   Eyes:  Negative for blurred vision.   Cardiovascular:  Negative for chest pain.   Respiratory:  Negative for shortness of breath.    Skin:  Negative for poor wound healing.   Musculoskeletal:  Positive for arthritis, joint pain and myalgias.   Genitourinary:  Negative for bladder incontinence.   Neurological:  Negative for dizziness, numbness and paresthesias.   Psychiatric/Behavioral:  Negative for altered mental status.        The patient's relevant past medical, surgical, and social history was reviewed in Epic.       Objective:      VITAL SIGNS: Ht 5' 3" (1.6 m)   Wt 116.7 kg (257 lb 4.4 oz)   LMP 03/03/2015   BMI 45.57 kg/m²     General    Nursing note and vitals reviewed.  Constitutional: She is oriented to person, place, and time. She appears well-developed and well-nourished.   Neurological: She is alert and oriented to person, place, and time.     General Musculoskeletal Exam   Gait: antalgic       Right Knee Exam     Inspection   Scars: present    Tenderness   The patient is tender to palpation of the medial joint line.    Range of Motion   Extension:  5   Flexion:  110     Tests   Ligament Examination   Lachman: normal (-1 to 2mm)   Patella   Passive Patellar Tilt: neutral    Other   Sensation: normal    Left Knee Exam     Inspection   Scars: present    Tenderness   The patient tender to palpation of the medial joint " line.    Range of Motion   Extension:  5   Flexion:  110     Tests   Stability   Lachman: normal (-1 to 2mm)   Patella   Passive Patellar Tilt: neutral    Other   Sensation: normal    Muscle Strength   Right Lower Extremity   Quadriceps:  4/5   Hamstrin/5   Left Lower Extremity   Quadriceps:  4/5   Hamstrin/5        Imaging  X-Ray Tibia Fibula 2 View Right  Narrative: EXAMINATION:  XR FEMUR 2 VIEW RIGHT; XR TIBIA FIBULA 2 VIEW RIGHT    CLINICAL HISTORY:  Pain in right knee    TECHNIQUE:  AP and lateral views of the right femur were performed as well as AP and lateral views of right the tibia and fibula.    COMPARISON:  Right femur radiograph dated 2024    FINDINGS:  Redemonstration of nerve stimulators about the knee with intact electrodes.  Femorotibial DJD with severe joint space narrowing at the medial compartment.  Talar dome is intact.  Midfoot DJD with retained screw fragment.  Ghost track at the calcaneus with mild spurring.  No acute fracture, dislocation, or osseous destruction.  Impression: As above.    Electronically signed by: Von Houston  Date:    2025  Time:    13:52  X-Ray Femur 2 View Right  Narrative: EXAMINATION:  XR FEMUR 2 VIEW RIGHT; XR TIBIA FIBULA 2 VIEW RIGHT    CLINICAL HISTORY:  Pain in right knee    TECHNIQUE:  AP and lateral views of the right femur were performed as well as AP and lateral views of right the tibia and fibula.    COMPARISON:  Right femur radiograph dated 2024    FINDINGS:  Redemonstration of nerve stimulators about the knee with intact electrodes.  Femorotibial DJD with severe joint space narrowing at the medial compartment.  Talar dome is intact.  Midfoot DJD with retained screw fragment.  Ghost track at the calcaneus with mild spurring.  No acute fracture, dislocation, or osseous destruction.  Impression: As above.    Electronically signed by: Von Houston  Date:    2025  Time:    13:52    I have reviewed the above radiograph  and agree with the findings stated by the radiologist.           Assessment:       Inna Gallardo is a 49 y.o. female seen in the office today for   1. Primary osteoarthritis of right knee    2. Primary osteoarthritis of left knee     S/P right and left knee PNS. She reports minimal relief now.  She is losing weight in order to qualify for a total knee replacement.  Imaging looks good today.  No migration of leads that could be causing her pain issues.  I will try bilateral knee Toradol today and get her back for Zilretta injections in the future.  Hopefully this will improve her pain so that she could lose more weight to qualify for TKA.      Plan:       Inna was seen today for pain.    Diagnoses and all orders for this visit:    Primary osteoarthritis of right knee  -     Prior authorization Order  -     Large Joint Aspiration/Injection: bilateral knee    Primary osteoarthritis of left knee  -     Prior authorization Order  -     Large Joint Aspiration/Injection: bilateral knee    Continue weight loss  Bilateral knee toradol injections  Zilretta auth. RTC once approved.      Diagnoses and plan discussed with the patient, as well as the expected course and duration of his symptoms.  All questions and concerns were addressed prior to the end of the visit.   Instructed patient to call office if they have any future questions/concerns or to schedule apt. Patient will return to see me if symptoms worsen or fail to improve    Note dictated with voice recognition software, please excuse any grammatical errors.        Pat Taylor PA-C      Department of Orthopedic Surgery  [facility]  Office: [phone]  02/17/2025

## 2025-02-17 NOTE — PROCEDURES
Large Joint Aspiration/Injection: bilateral knee    Date/Time: 2/17/2025 10:30 AM    Performed by: Pat Taylor PA-C  Authorized by: Pat Taylor PA-C    Consent Done?:  Yes (Verbal)  Indications:  Arthritis  Site marked: the procedure site was marked    Timeout: prior to procedure the correct patient, procedure, and site was verified    Prep: patient was prepped and draped in usual sterile fashion      Local anesthesia used?: Yes    Local anesthetic:  Topical anesthetic    Details:  Needle Size:  22 G  Ultrasonic Guidance for needle placement?: No    Approach:  Anterolateral  Location:  Knee  Laterality:  Bilateral  Site:  Bilateral knee  Medications (Right):  30 mg ketorolac 30 mg/mL (1 mL)  Medications (Left):  30 mg ketorolac 30 mg/mL (1 mL)  Patient tolerance:  Patient tolerated the procedure well with no immediate complications

## 2025-02-17 NOTE — PROGRESS NOTES
Protocol: [employer] (Bradly)  Protocol#:Bradly  IRB# 2021.156  Version Date: 10-Javy-2023  PI: Calvin Nguyen MD  Patient Initials: ROCHELLE  (Study ID# [id number])       2nd Treatment Visit     Patient is in today and reports the PNS in both knees gives helping minimally. She wears it for over 8 hours and states it only gives her pain relief for couple of hours. She has change the settings and also spoken to the rep. She states none of this improved her pain. She is using a walker today and stated that she takes ibuprofen for knee pain medication.     Research staff approached patient in private clinic room prior to OA injection. Patient awake, alert. Mood and affect appropriate to situation. Patient stated willingness to continue participation in above study. Patient states pain for target knee is 8 on pain scale. Chuy entered the room to administer Toradol injection treatment to target knee (RIGHT) and non-target knee with Dr. Nguyen oversight. Patient tolerated procedure well with no immediate complications. No analgesic medication prescribed after the treatment.     Research staff discussed the daily questionnaires that start tomorrow. Patient stated understanding and said she will complete the questionnaires as soon as the e-mail reminder is received. Patient reports post procedure pain of target knee as a 7 on pain scale. Patient instructed to call Dr. Nguyen or research team with any questions or concerns.     She will come back for Bilateral Zilretta injection pending insurance approval. She will also continue to lose weight to be eligible for TKA.

## 2025-02-23 NOTE — PROGRESS NOTES
PATIENT: Inna Gallardo  MRN: [id number]  DATE: 3/10/2025    Diagnosis:   1. Iron deficiency    2. History of deep vein thrombosis    3. History of smoking    4. Morbid obesity      Chief Complaint: iron deficiency    Oncologic History:      Oncologic History     Oncologic Treatment     Pathology       Subjective:    History of Present Illness: Ms. Gallardo is a 49 y.o. female who presented in October 2023 for evaluation and management of leukocytosis/thrombocytosis. She was referred by physician assistant Lesli Coehn.    - labs on 8/24/23 revealed a mild leukocytosis at 12.4 k/uL, granulocyte-predominant; a mild thrombocytosis at 481 k/uL.      - she had a hysterectomy in 2014.  - on 12/7/23, she presented to the emergency room with leg discoloration/swelling. Ultrasound lower extremity (12/7/23) revealed a deep vein thrombosis in the right popliteal vein. She was started on rivaroxaban.  - she was using an estrogen cream at time of diagnosis. She is no longer using it.  - she denies a family history of thrombosis.  - she received iron sucrose x 3 doses in January 2024.    - she has a special needs child, so she is considering knee replacements in order to be able to walk more easily to take care of her.    Interval history:  - she presents for a follow-up appointment for her leukocytosis/thrombocytosis/iron deficiency   - today, she reports having a kidney stone and diverticulitis. She endorses fatigue, nausea, constipation/diarrhea.          Past medical, surgical, family, and social histories have been reviewed and updated below.    Past Medical History:   Past Medical History:   Diagnosis Date    Anxiety     Diverticulitis     Endometriosis     General anesthetics causing adverse effect in therapeutic use     Hypertension     Kidney stone     Prolactinoma 2011    Sliding hiatal hernia     Urinary tract infection     Vaginal infection        Past Surgical History:   Past Surgical History:   Procedure Laterality  Date    ARTHROSCOPY OF KNEE Left     BRAIN TUMOR EXCISION  9/2011    removal of prolactinoma    COLONOSCOPY N/A 8/27/2020    Procedure: COLONOSCOPY;  Surgeon: Valentino Negro MD;  Location: [facility];  Service: Endoscopy;  Laterality: N/A;    CYSTOSCOPY      CYSTOSCOPY W/ URETERAL STENT PLACEMENT  2016    CYSTOSCOPY W/ URETERAL STENT REMOVAL  2016    ESOPHAGEAL MANOMETRY N/A 6/3/2019    Procedure: MANOMETRY, ESOPHAGUS;  Surgeon: Bhupinder Schmitz MD;  [facility]  Service: Endoscopy;  Laterality: N/A;    ESOPHAGOGASTRODUODENOSCOPY N/A 4/30/2019    Procedure: ESOPHAGOGASTRODUODENOSCOPY (EGD);  Surgeon: Altagracia Bose MD;  Location: [facility];  Service: Endoscopy;  Laterality: N/A;    ESOPHAGOGASTRODUODENOSCOPY N/A 8/2/2021    Procedure: EGD (ESOPHAGOGASTRODUODENOSCOPY);  Surgeon: Altagracia Bose MD;  Location: [facility];  Service: Endoscopy;  Laterality: N/A;    EXTRACORPOREAL SHOCK WAVE LITHOTRIPSY Left 3/7/2024    Procedure: LITHOTRIPSY, ESWL;  Surgeon: Roverto Harley MD;  Location: [facility] OR;  Service: Urology;  Laterality: Left;    FOOT HARDWARE REMOVAL Right 6/16/2020    Procedure: REMOVAL, HARDWARE, FOOT;  Surgeon: Adrianna Tillman DPM;  Location: [facility] OR;  Service: Podiatry;  Laterality: Right;    HARVESTING OF BONE GRAFT Right 7/16/2019    Procedure: SURGICAL PROCUREMENT, BONE GRAFT;  Surgeon: Adrianna Tillman DPM;  Location: [facility] OR;  Service: Podiatry;  Laterality: Right;    HYSTERECTOMY  3/16/15    TLH/BSO for Endometriosis, AUB, fibroids, cyst    INSERTION, ELECTRODE LEAD, NEUROSTIMULATOR, PERIPHERAL Left 7/15/2024    Procedure: INSERTION,ELECTRODE LEAD,NEUROSTIMULATOR,PERIPHERAL;  Surgeon: Calvin Nguyen MD;  Location: [facility] OR;  Service: Pain Management;  Laterality: Left;  stimrouter trial    INSERTION, ELECTRODE LEAD, NEUROSTIMULATOR, PERIPHERAL Left 8/12/2024    Procedure: INSERTION,ELECTRODE LEAD,NEUROSTIMULATOR,PERIPHERAL;  Surgeon: Calvin Nguyen MD;  Location: [facility] OR;   Service: Pain Management;  Laterality: Left;  stimrouter permanent    INSERTION, ELECTRODE LEAD, NEUROSTIMULATOR, PERIPHERAL Right 10/7/2024    Procedure: INSERTION,ELECTRODE LEAD,NEUROSTIMULATOR,PERIPHERAL;  Surgeon: Calvin Nguyen MD;  Location: [facility] OR;  Service: Pain Management;  Laterality: Right;  stimrouter trial    INSERTION, ELECTRODE LEAD, NEUROSTIMULATOR, PERIPHERAL Right 10/28/2024    Procedure: INSERTION,ELECTRODE LEAD,NEUROSTIMULATOR,PERIPHERAL;  Surgeon: Calvin Nguyen MD;  Location: [facility] OR;  Service: Pain Management;  Laterality: Right;  stimrouter permanent    LITHOTRIPSY      MANDIBLE SURGERY  2002    severe overbite correction    PELVIC LAPAROSCOPY  2014    Dx scope, chromopertubation-Endometriosis    TONSILLECTOMY, ADENOIDECTOMY         Family History:   Family History   Problem Relation Name Age of Onset    Cancer Mother      Breast cancer Neg Hx      Ovarian cancer Neg Hx      Kidney disease Neg Hx         Social History:  reports that she quit smoking about 25 years ago. Her smoking use included cigarettes. She started smoking about 36 years ago. She has never used smokeless tobacco. She reports that she does not drink alcohol and does not use drugs.    Allergies:  Review of patient's allergies indicates:   Allergen Reactions    Ace inhibitors Other (See Comments)     cough       Medications:  Current Outpatient Medications   Medication Sig Dispense Refill    baclofen (LIORESAL) 10 MG tablet Take 10 mg by mouth 3 (three) times daily as needed.      EPINEPHrine (EPIPEN) 0.3 mg/0.3 mL AtIn Inject 0.3 mLs into the muscle as needed.  0    ergocalciferol (ERGOCALCIFEROL) 50,000 unit Cap Take 50,000 Units by mouth every 7 days.      hydrOXYzine HCL (ATARAX) 25 MG tablet Take 25 mg by mouth 3 (three) times daily.      linaCLOtide (LINZESS) 145 mcg Cap capsule TAKE 1 CAPSULE(145 MCG) BY MOUTH DAILY 30 capsule 5    losartan-hydrochlorothiazide 100-25 mg (HYZAAR) 100-25 mg per tablet Take 1 tablet by mouth  once daily.      metFORMIN (GLUCOPHAGE-XR) 500 MG ER 24hr tablet TAKE 1 TABLET BY MOUTH EVERY DAY AT DINNER      omeprazole (PRILOSEC) 40 MG capsule Take 1 capsule (40 mg total) by mouth every morning. 30 capsule 11    sertraline (ZOLOFT) 100 MG tablet Take 100 mg by mouth every evening.       No current facility-administered medications for this visit.       Review of Systems   Constitutional:  Positive for fatigue.   HENT:  Negative for sore throat.    Eyes:  Negative for visual disturbance.   Respiratory:  Positive for shortness of breath (improved). Negative for cough.    Cardiovascular:  Negative for chest pain.   Gastrointestinal:  Positive for constipation, diarrhea and nausea. Negative for abdominal pain and vomiting.   Genitourinary:  Negative for dysuria.   Musculoskeletal:  Negative for back pain.        Leg pain   Skin:  Negative for rash.   Neurological:  Negative for headaches.   Hematological:  Negative for adenopathy.   Psychiatric/Behavioral:  The patient is not nervous/anxious.      ECOG Performance Status:   ECOG SCORE    0 - Fully active-able to carry on all pre-disease performance without restriction         Objective:      Vitals:   Vitals:    03/10/25 1129   BP: (!) 142/86   BP Location: Right arm   Patient Position: Sitting   Pulse: 66   Resp: 18   SpO2: 96%   Weight: 116.2 kg (256 lb 2.8 oz)       BMI: Body mass index is 45.38 kg/m².    Physical Exam  Vitals and nursing note reviewed.   Constitutional:       Appearance: She is well-developed.   HENT:      Head: Normocephalic and atraumatic.   Eyes:      Pupils: Pupils are equal, round, and reactive to light.   Cardiovascular:      Rate and Rhythm: Normal rate and regular rhythm.   Pulmonary:      Effort: Pulmonary effort is normal.      Breath sounds: Normal breath sounds.   Abdominal:      General: Bowel sounds are normal.      Palpations: Abdomen is soft.   Musculoskeletal:         General: Normal range of motion.      Cervical back:  Normal range of motion and neck supple.   Skin:     General: Skin is warm and dry.   Neurological:      Mental Status: She is alert and oriented to person, place, and time.   Psychiatric:         Behavior: Behavior normal.         Thought Content: Thought content normal.         Judgment: Judgment normal.         Laboratory Data:  Labs have been reviewed.    Lab Results   Component Value Date    WBC 15.36 (H) 03/08/2025    HGB 13.0 03/08/2025    HCT 39.9 03/08/2025    MCV 89 03/08/2025     03/08/2025       Lab Results   Component Value Date    FERRITIN 52 03/05/2025     Lab Results   Component Value Date    IRON 78 03/05/2025    TRANSFERRIN 278 03/05/2025    TIBC 411 03/05/2025    FESATURATED 19 (L) 03/05/2025 8/24/23:        Imaging:      Ultrasound right lower extremity (2/27/24):  No evidence of deep venous thrombosis in the right lower extremity.     4.0 cm right popliteal fossa collection suggesting Baker's cyst.    Ultrasound lower extremities bilateral (12/7/23):    Positive DVT in the right popliteal vein     No left-sided DVT      Assessment:       1. Iron deficiency    2. History of deep vein thrombosis    3. History of smoking    4. Morbid obesity           Plan:     1. Leukocytosis / iron deficiency  - I have reviewed her chart/outside records  - labs on 8/24/23 revealed a mild leukocytosis at 12.4 k/uL, granulocyte-predominant; a mild thrombocytosis at 481 k/uL.  - I suspect her leukocytosis and thrombocytosis are reactive, perhaps from inflammation. I will perform a workup to rule in/out primary cause of elevated counts.  - JAK2 with reflex testing was previously negative, so won't repeat that.  - BCR/ABL was negative/normal.   - she received iron sucrose x 3 doses in January 2024.  - Labs have been reviewed. Ferritin has decreased to 52 ng/mL. Saturated iron is decreased at 19%.  - given her clinical symptoms, we will arrange for IV iron.  - repeat labs in 6 months  - return to clinic  in one year    2. History of deep vein thrombosis  - on 12/7/23, she presented to the emergency room with leg pain/swelling. Ultrasound lower extremity (12/7/23) revealed a deep vein thrombosis in the right popliteal vein.  - she was placed on rivaroxaban.  - Ultrasound right lower extremity (2/27/24):  No evidence of deep venous thrombosis in the right lower extremity.  - d-dimer was normal  - follow up factor 5 leiden and prothrombin testing  - I discussed risks/benefits of indefinite anticoagulation. Of concern to her was increased bleeding while on full-dose xarelto.  - in March 2024, we discontinued anticoagulation. I stated that at any time, if she wants, we can start prophylactic rivaroxaban at 10mg daily.    3. History of smoking  - she quit smoking 30 years ago.    4. Morbid obesity  - Body mass index is 45.38 kg/m².  - adipose tissue is inflammatory and can cause leukocytosis/thrombocytosis    5. Advance Care Planning     Power of   After our discussion (at previous visit), the patient decided not to complete a HCPOA.          - repeat labs in 6 months  - return to clinic in one year    Christiano Sotelo M.D.  Hematology/Oncology  [facility] - [address], [address]  [place] [address]  Phone: [phone]  Fax: [phone]

## 2025-02-27 ENCOUNTER — OFFICE VISIT (OUTPATIENT)
Dept: ORTHOPEDICS | Facility: CLINIC | Age: 50
End: 2025-02-27
Payer: MEDICAID

## 2025-02-27 ENCOUNTER — RESEARCH ENCOUNTER (OUTPATIENT)
Dept: RESEARCH | Facility: HOSPITAL | Age: 50
End: 2025-02-27
Payer: MEDICAID

## 2025-02-27 VITALS — HEIGHT: 63 IN | WEIGHT: 257.25 LBS | BODY MASS INDEX: 45.58 KG/M2

## 2025-02-27 DIAGNOSIS — E66.01 MORBID OBESITY DUE TO EXCESS CALORIES: ICD-10-CM

## 2025-02-27 DIAGNOSIS — M17.12 PRIMARY OSTEOARTHRITIS OF LEFT KNEE: ICD-10-CM

## 2025-02-27 DIAGNOSIS — M17.11 PRIMARY OSTEOARTHRITIS OF RIGHT KNEE: Primary | ICD-10-CM

## 2025-02-27 PROCEDURE — 99999PBSHW PR PBB SHADOW TECHNICAL ONLY FILED TO HB: Mod: JZ,PBBFAC,,

## 2025-02-27 PROCEDURE — 99499 UNLISTED E&M SERVICE: CPT | Mod: 25,S$PBB,, | Performed by: PHYSICIAN ASSISTANT

## 2025-02-27 PROCEDURE — 20610 DRAIN/INJ JOINT/BURSA W/O US: CPT | Mod: 50,S$PBB,, | Performed by: PHYSICIAN ASSISTANT

## 2025-02-27 PROCEDURE — 99999 PR PBB SHADOW E&M-EST. PATIENT-LVL III: CPT | Mod: PBBFAC,,, | Performed by: PHYSICIAN ASSISTANT

## 2025-02-27 PROCEDURE — 99213 OFFICE O/P EST LOW 20 MIN: CPT | Mod: PBBFAC,PN | Performed by: PHYSICIAN ASSISTANT

## 2025-02-27 PROCEDURE — 20610 DRAIN/INJ JOINT/BURSA W/O US: CPT | Mod: 50,PBBFAC,PN | Performed by: PHYSICIAN ASSISTANT

## 2025-02-27 RX ADMIN — Medication 32 MG: at 02:02

## 2025-02-27 NOTE — PROGRESS NOTES
Protocol: [employer] (Bradly)  Protocol#:Bradly  IRB# 2021.156  Version Date: 10-Javy-2023  PI: Calvin Nguyen MD  Patient Initials: ROCHELLE  (Study ID# [id number])       3rd Treatment Visit     Research staff approached patient in private clinic room prior to OA injection. Patient awake, alert. Mood and affect appropriate to situation. Patient stated willingness to continue participation in above study. Patient states pain for target knee is 8 on pain scale. Chuy entered the room to administer Zilretta treatment to target knee (RIGHT) and non-target knee with Dr. Nguyen oversight. Patient tolerated procedure well with no immediate complications. No analgesic medication prescribed after the treatment.     Research staff discussed the daily questionnaires that start tomorrow. Patient stated understanding and said she will complete the questionnaires as soon as the e-mail reminder is received. Patient reports post procedure pain of target knee as a 8 on pain scale. Patient instructed to call Dr. Nguyen or research team with any questions or concerns.     Note: Patient stated that she still uses the PNS every other day during the week. For the purpose of the study, treatment detail will state that patient has a Combination of Zilretta and PNS treatment for this visit.       She is working to lose weight to become eligible for TKA.

## 2025-02-27 NOTE — PROGRESS NOTES
Subjective:      Chief Complaint: Pain of the Left Knee, Pain of the Right Knee, and Injections (Arian Zilretta)    Patient ID: Inna Gallardo is a 49 y.o. female.  Patient is here for  Zilretta  injection(s) for bilateral knee osteoarthritis. Patient tolerated last injection well. No new complaints today.          Past Medical History:   Diagnosis Date    Anxiety     Diverticulitis     Endometriosis     General anesthetics causing adverse effect in therapeutic use     Hypertension     Kidney stone     Prolactinoma 2011    Sliding hiatal hernia     Urinary tract infection     Vaginal infection         Past Surgical History:   Procedure Laterality Date    ARTHROSCOPY OF KNEE Left     BRAIN TUMOR EXCISION  9/2011    removal of prolactinoma    COLONOSCOPY N/A 8/27/2020    Procedure: COLONOSCOPY;  Surgeon: Valentino Negro MD;  Location: [facility];  Service: Endoscopy;  Laterality: N/A;    CYSTOSCOPY      CYSTOSCOPY W/ URETERAL STENT PLACEMENT  2016    CYSTOSCOPY W/ URETERAL STENT REMOVAL  2016    ESOPHAGEAL MANOMETRY N/A 6/3/2019    Procedure: MANOMETRY, ESOPHAGUS;  Surgeon: Bhupinder Schmitz MD;  Location: [facility] ENDO ([address] [place] [address]);  Service: Endoscopy;  Laterality: N/A;    ESOPHAGOGASTRODUODENOSCOPY N/A 4/30/2019    Procedure: ESOPHAGOGASTRODUODENOSCOPY (EGD);  Surgeon: Altagracia Bose MD;  Location: [facility];  Service: Endoscopy;  Laterality: N/A;    ESOPHAGOGASTRODUODENOSCOPY N/A 8/2/2021    Procedure: EGD (ESOPHAGOGASTRODUODENOSCOPY);  Surgeon: Altagracia Bose MD;  Location: [facility];  Service: Endoscopy;  Laterality: N/A;    EXTRACORPOREAL SHOCK WAVE LITHOTRIPSY Left 3/7/2024    Procedure: LITHOTRIPSY, ESWL;  Surgeon: Roverto Harley MD;  Location: [facility] OR;  Service: Urology;  Laterality: Left;    FOOT HARDWARE REMOVAL Right 6/16/2020    Procedure: REMOVAL, HARDWARE, FOOT;  Surgeon: Adrianna Tillman DPM;  Location: [facility] OR;  Service: Podiatry;  Laterality: Right;    HARVESTING OF BONE GRAFT Right 7/16/2019     Procedure: SURGICAL PROCUREMENT, BONE GRAFT;  Surgeon: Adrianna Tillman DPM;  Location: [facility] OR;  Service: Podiatry;  Laterality: Right;    HYSTERECTOMY  3/16/15    TLH/BSO for Endometriosis, AUB, fibroids, cyst    INSERTION, ELECTRODE LEAD, NEUROSTIMULATOR, PERIPHERAL Left 7/15/2024    Procedure: INSERTION,ELECTRODE LEAD,NEUROSTIMULATOR,PERIPHERAL;  Surgeon: Calvin Nguyen MD;  Location: [facility] OR;  Service: Pain Management;  Laterality: Left;  stimrouter trial    INSERTION, ELECTRODE LEAD, NEUROSTIMULATOR, PERIPHERAL Left 8/12/2024    Procedure: INSERTION,ELECTRODE LEAD,NEUROSTIMULATOR,PERIPHERAL;  Surgeon: Calvin Nguyen MD;  Location: [facility] OR;  Service: Pain Management;  Laterality: Left;  stimrouter permanent    INSERTION, ELECTRODE LEAD, NEUROSTIMULATOR, PERIPHERAL Right 10/7/2024    Procedure: INSERTION,ELECTRODE LEAD,NEUROSTIMULATOR,PERIPHERAL;  Surgeon: Calvin Nguyen MD;  Location: [facility] OR;  Service: Pain Management;  Laterality: Right;  stimrouter trial    INSERTION, ELECTRODE LEAD, NEUROSTIMULATOR, PERIPHERAL Right 10/28/2024    Procedure: INSERTION,ELECTRODE LEAD,NEUROSTIMULATOR,PERIPHERAL;  Surgeon: Calvin Nguyen MD;  Location: [facility] OR;  Service: Pain Management;  Laterality: Right;  stimrouter permanent    LITHOTRIPSY      MANDIBLE SURGERY  2002    severe overbite correction    PELVIC LAPAROSCOPY  2014    Dx scope, chromopertubation-Endometriosis    TONSILLECTOMY, ADENOIDECTOMY          Current Outpatient Medications   Medication Instructions    baclofen (LIORESAL) 10 mg, 3 times daily PRN    EPINEPHrine (EPIPEN) 0.3 mg/0.3 mL AtIn 0.3 mLs, As needed (PRN)    ergocalciferol (ERGOCALCIFEROL) 50,000 Units, Every 7 days    hydrOXYzine HCL (ATARAX) 25 mg, 3 times daily    linaCLOtide (LINZESS) 145 mcg Cap capsule TAKE 1 CAPSULE(145 MCG) BY MOUTH DAILY    losartan-hydrochlorothiazide 100-25 mg (HYZAAR) 100-25 mg per tablet 1 tablet, Daily    metFORMIN (GLUCOPHAGE-XR) 500 MG ER 24hr tablet TAKE 1 TABLET BY MOUTH EVERY  "DAY AT DINNER    omeprazole (PRILOSEC) 40 mg, Oral, Every morning    sertraline (ZOLOFT) 100 mg, Nightly        Review of patient's allergies indicates:   Allergen Reactions    Ace inhibitors Other (See Comments)     cough       Review of Systems   Constitutional: Negative for fever and malaise/fatigue.   Eyes:  Negative for blurred vision.   Cardiovascular:  Negative for chest pain.   Respiratory:  Negative for shortness of breath.    Skin:  Negative for poor wound healing.   Musculoskeletal:  Positive for joint pain and myalgias.   Genitourinary:  Negative for bladder incontinence.   Neurological:  Negative for dizziness, numbness and paresthesias.   Psychiatric/Behavioral:  Negative for altered mental status.        The patient's relevant past medical, surgical, and social history was reviewed in Epic.       Objective:      VITAL SIGNS: Ht 5' 3" (1.6 m)   Wt 116.7 kg (257 lb 4.4 oz)   LMP 03/03/2015   BMI 45.57 kg/m²     Ortho/SPM Exam     Imaging          Assessment:       Inna Gallardo is a 49 y.o. female seen in the office today for   1. Primary osteoarthritis of right knee    2. Primary osteoarthritis of left knee    3. Morbid obesity due to excess calories           Plan:       Inna was seen today for pain, pain and injections.    Diagnoses and all orders for this visit:    Primary osteoarthritis of right knee  -     Large Joint Aspiration/Injection: bilateral knee    Primary osteoarthritis of left knee  -     Large Joint Aspiration/Injection: bilateral knee    Morbid obesity due to excess calories    I injected the bilateral knee with one dose of Zilretta  today.  We will see the patient back in 3-6 mons or as needed.         Diagnoses and plan discussed with the patient, as well as the expected course and duration of his symptoms.  All questions and concerns were addressed prior to the end of the visit.   Instructed patient to call office if they have any future questions/concerns or to schedule apt. " Patient will return to see me if symptoms worsen or fail to improve    Note dictated with voice recognition software, please excuse any grammatical errors.        Pat Taylor PA-C      Department of Orthopedic Surgery  [facility]  Office: [phone]  02/27/2025

## 2025-02-27 NOTE — PROCEDURES
Large Joint Aspiration/Injection: bilateral knee    Date/Time: 2/27/2025 2:30 PM    Performed by: Pat Taylor PA-C  Authorized by: Pat Taylor PA-C    Consent Done?:  Yes (Verbal)  Indications:  Arthritis  Site marked: the procedure site was marked    Timeout: prior to procedure the correct patient, procedure, and site was verified    Prep: patient was prepped and draped in usual sterile fashion      Local anesthesia used?: Yes    Local anesthetic:  Topical anesthetic    Details:  Needle Size:  22 G  Ultrasonic Guidance for needle placement?: No    Approach:  Anterolateral  Location:  Knee  Laterality:  Bilateral  Site:  Bilateral knee  Medications (Right):  32 mg triamcinolone acetonide 32 mg  Medications (Left):  32 mg triamcinolone acetonide 32 mg  Patient tolerance:  Patient tolerated the procedure well with no immediate complications

## 2025-03-05 ENCOUNTER — LAB VISIT (OUTPATIENT)
Dept: LAB | Facility: HOSPITAL | Age: 50
End: 2025-03-05
Attending: INTERNAL MEDICINE
Payer: MEDICAID

## 2025-03-05 DIAGNOSIS — E61.1 IRON DEFICIENCY: ICD-10-CM

## 2025-03-05 LAB
BASOPHILS # BLD AUTO: 0.05 K/UL (ref 0–0.2)
BASOPHILS NFR BLD: 0.4 % (ref 0–1.9)
DIFFERENTIAL METHOD BLD: ABNORMAL
EOSINOPHIL # BLD AUTO: 0.2 K/UL (ref 0–0.5)
EOSINOPHIL NFR BLD: 1.9 % (ref 0–8)
ERYTHROCYTE [DISTWIDTH] IN BLOOD BY AUTOMATED COUNT: 13.5 % (ref 11.5–14.5)
FERRITIN SERPL-MCNC: 52 NG/ML (ref 20–300)
HCT VFR BLD AUTO: 43.7 % (ref 37–48.5)
HGB BLD-MCNC: 14.1 G/DL (ref 12–16)
IMM GRANULOCYTES # BLD AUTO: 0.05 K/UL (ref 0–0.04)
IMM GRANULOCYTES NFR BLD AUTO: 0.4 % (ref 0–0.5)
IRON SERPL-MCNC: 78 UG/DL (ref 30–160)
LYMPHOCYTES # BLD AUTO: 3.7 K/UL (ref 1–4.8)
LYMPHOCYTES NFR BLD: 32.4 % (ref 18–48)
MCH RBC QN AUTO: 28.7 PG (ref 27–31)
MCHC RBC AUTO-ENTMCNC: 32.3 G/DL (ref 32–36)
MCV RBC AUTO: 89 FL (ref 82–98)
MONOCYTES # BLD AUTO: 0.8 K/UL (ref 0.3–1)
MONOCYTES NFR BLD: 6.6 % (ref 4–15)
NEUTROPHILS # BLD AUTO: 6.7 K/UL (ref 1.8–7.7)
NEUTROPHILS NFR BLD: 58.3 % (ref 38–73)
NRBC BLD-RTO: 0 /100 WBC
PLATELET # BLD AUTO: 408 K/UL (ref 150–450)
PMV BLD AUTO: 10.8 FL (ref 9.2–12.9)
RBC # BLD AUTO: 4.92 M/UL (ref 4–5.4)
SATURATED IRON: 19 % (ref 20–50)
TOTAL IRON BINDING CAPACITY: 411 UG/DL (ref 250–450)
TRANSFERRIN SERPL-MCNC: 278 MG/DL (ref 200–375)
WBC # BLD AUTO: 11.49 K/UL (ref 3.9–12.7)

## 2025-03-05 PROCEDURE — 82728 ASSAY OF FERRITIN: CPT | Performed by: INTERNAL MEDICINE

## 2025-03-05 PROCEDURE — 85025 COMPLETE CBC W/AUTO DIFF WBC: CPT | Mod: PN | Performed by: INTERNAL MEDICINE

## 2025-03-05 PROCEDURE — 36415 COLL VENOUS BLD VENIPUNCTURE: CPT | Mod: PN | Performed by: INTERNAL MEDICINE

## 2025-03-05 PROCEDURE — 84466 ASSAY OF TRANSFERRIN: CPT | Mod: PN | Performed by: INTERNAL MEDICINE

## 2025-03-08 ENCOUNTER — HOSPITAL ENCOUNTER (EMERGENCY)
Facility: HOSPITAL | Age: 50
Discharge: HOME | End: 2025-03-09
Attending: EMERGENCY MEDICINE
Payer: MEDICAID

## 2025-03-08 DIAGNOSIS — R10.9 LEFT FLANK PAIN: ICD-10-CM

## 2025-03-08 DIAGNOSIS — R11.0 NAUSEA: Primary | ICD-10-CM

## 2025-03-08 LAB
ALBUMIN SERPL BCP-MCNC: 4.2 G/DL (ref 3.5–5.2)
ALP SERPL-CCNC: 114 U/L (ref 38–126)
ALT SERPL W/O P-5'-P-CCNC: 18 U/L (ref 10–44)
ANION GAP SERPL CALC-SCNC: 10 MMOL/L (ref 8–16)
AST SERPL-CCNC: 18 U/L (ref 15–46)
BASOPHILS # BLD AUTO: 0.06 K/UL (ref 0–0.2)
BASOPHILS NFR BLD: 0.4 % (ref 0–1.9)
BILIRUB SERPL-MCNC: 0.4 MG/DL (ref 0.1–1)
CALCIUM SERPL-MCNC: 9.4 MG/DL (ref 8.7–10.5)
CHLORIDE SERPL-SCNC: 108 MMOL/L (ref 95–110)
CO2 SERPL-SCNC: 22 MMOL/L (ref 23–29)
CREAT SERPL-MCNC: 0.63 MG/DL (ref 0.5–1.4)
DIFFERENTIAL METHOD BLD: ABNORMAL
EOSINOPHIL # BLD AUTO: 0.3 K/UL (ref 0–0.5)
EOSINOPHIL NFR BLD: 1.8 % (ref 0–8)
ERYTHROCYTE [DISTWIDTH] IN BLOOD BY AUTOMATED COUNT: 13.4 % (ref 11.5–14.5)
EST. GFR  (NO RACE VARIABLE): >60 ML/MIN/1.73 M^2
GLUCOSE SERPL-MCNC: 139 MG/DL (ref 70–110)
HCT VFR BLD AUTO: 39.9 % (ref 37–48.5)
HGB BLD-MCNC: 13 G/DL (ref 12–16)
IMM GRANULOCYTES # BLD AUTO: 0.05 K/UL (ref 0–0.04)
IMM GRANULOCYTES NFR BLD AUTO: 0.3 % (ref 0–0.5)
LIPASE SERPL-CCNC: 120 U/L (ref 23–300)
LYMPHOCYTES # BLD AUTO: 4.3 K/UL (ref 1–4.8)
LYMPHOCYTES NFR BLD: 27.9 % (ref 18–48)
MCH RBC QN AUTO: 29.1 PG (ref 27–31)
MCHC RBC AUTO-ENTMCNC: 32.6 G/DL (ref 32–36)
MCV RBC AUTO: 89 FL (ref 82–98)
MONOCYTES # BLD AUTO: 1.4 K/UL (ref 0.3–1)
MONOCYTES NFR BLD: 9.2 % (ref 4–15)
NEUTROPHILS # BLD AUTO: 9.3 K/UL (ref 1.8–7.7)
NEUTROPHILS NFR BLD: 60.4 % (ref 38–73)
NRBC BLD-RTO: 0 /100 WBC
PLATELET # BLD AUTO: 356 K/UL (ref 150–450)
PMV BLD AUTO: 10.7 FL (ref 9.2–12.9)
POTASSIUM SERPL-SCNC: 3.8 MMOL/L (ref 3.5–5.1)
PROT SERPL-MCNC: 7.3 G/DL (ref 6–8.4)
RBC # BLD AUTO: 4.47 M/UL (ref 4–5.4)
SODIUM SERPL-SCNC: 140 MMOL/L (ref 136–145)
UUN UR-MCNC: 17 MG/DL (ref 7–17)
WBC # BLD AUTO: 15.36 K/UL (ref 3.9–12.7)

## 2025-03-08 PROCEDURE — 25000003 PHARM REV CODE 250: Mod: ER | Performed by: EMERGENCY MEDICINE

## 2025-03-08 PROCEDURE — 85025 COMPLETE CBC W/AUTO DIFF WBC: CPT | Mod: ER | Performed by: EMERGENCY MEDICINE

## 2025-03-08 PROCEDURE — 81003 URINALYSIS AUTO W/O SCOPE: CPT | Mod: ER | Performed by: EMERGENCY MEDICINE

## 2025-03-08 PROCEDURE — 96375 TX/PRO/DX INJ NEW DRUG ADDON: CPT | Mod: ER

## 2025-03-08 PROCEDURE — 83690 ASSAY OF LIPASE: CPT | Mod: ER | Performed by: EMERGENCY MEDICINE

## 2025-03-08 PROCEDURE — 99285 EMERGENCY DEPT VISIT HI MDM: CPT | Mod: 25,ER

## 2025-03-08 PROCEDURE — 80053 COMPREHEN METABOLIC PANEL: CPT | Mod: ER | Performed by: EMERGENCY MEDICINE

## 2025-03-08 PROCEDURE — 63600175 PHARM REV CODE 636 W HCPCS: Mod: ER | Performed by: EMERGENCY MEDICINE

## 2025-03-08 PROCEDURE — 96374 THER/PROPH/DIAG INJ IV PUSH: CPT | Mod: ER

## 2025-03-08 RX ORDER — CIPROFLOXACIN 500 MG/1
500 TABLET ORAL
Status: COMPLETED | OUTPATIENT
Start: 2025-03-08 | End: 2025-03-08

## 2025-03-08 RX ORDER — HYDROCODONE BITARTRATE AND ACETAMINOPHEN 5; 325 MG/1; MG/1
1 TABLET ORAL
Refills: 0 | Status: COMPLETED | OUTPATIENT
Start: 2025-03-08 | End: 2025-03-08

## 2025-03-08 RX ORDER — KETOROLAC TROMETHAMINE 30 MG/ML
15 INJECTION, SOLUTION INTRAMUSCULAR; INTRAVENOUS
Status: COMPLETED | OUTPATIENT
Start: 2025-03-08 | End: 2025-03-08

## 2025-03-08 RX ORDER — METRONIDAZOLE 500 MG/1
500 TABLET ORAL
Status: COMPLETED | OUTPATIENT
Start: 2025-03-08 | End: 2025-03-08

## 2025-03-08 RX ORDER — ONDANSETRON HYDROCHLORIDE 2 MG/ML
4 INJECTION, SOLUTION INTRAVENOUS
Status: COMPLETED | OUTPATIENT
Start: 2025-03-08 | End: 2025-03-08

## 2025-03-08 RX ADMIN — METRONIDAZOLE 500 MG: 500 TABLET ORAL at 11:03

## 2025-03-08 RX ADMIN — CIPROFLOXACIN 500 MG: 500 TABLET ORAL at 11:03

## 2025-03-08 RX ADMIN — KETOROLAC TROMETHAMINE 15 MG: 30 INJECTION, SOLUTION INTRAMUSCULAR; INTRAVENOUS at 10:03

## 2025-03-08 RX ADMIN — ONDANSETRON 4 MG: 2 INJECTION INTRAMUSCULAR; INTRAVENOUS at 10:03

## 2025-03-08 RX ADMIN — HYDROCODONE BITARTRATE AND ACETAMINOPHEN 1 TABLET: 5; 325 TABLET ORAL at 11:03

## 2025-03-09 VITALS
DIASTOLIC BLOOD PRESSURE: 81 MMHG | WEIGHT: 258 LBS | OXYGEN SATURATION: 98 % | HEIGHT: 63 IN | RESPIRATION RATE: 18 BRPM | BODY MASS INDEX: 45.71 KG/M2 | TEMPERATURE: 99 F | HEART RATE: 79 BPM | SYSTOLIC BLOOD PRESSURE: 141 MMHG

## 2025-03-09 LAB
BILIRUB UR QL STRIP: NEGATIVE
CLARITY UR REFRACT.AUTO: CLEAR
COLOR UR AUTO: YELLOW
GLUCOSE UR QL STRIP: NEGATIVE
HGB UR QL STRIP: ABNORMAL
KETONES UR QL STRIP: NEGATIVE
LEUKOCYTE ESTERASE UR QL STRIP: NEGATIVE
NITRITE UR QL STRIP: NEGATIVE
PH UR STRIP: 6 [PH] (ref 5–8)
PROT UR QL STRIP: NEGATIVE
SP GR UR STRIP: 1.02 (ref 1–1.03)
URN SPEC COLLECT METH UR: ABNORMAL
UROBILINOGEN UR STRIP-ACNC: NEGATIVE EU/DL

## 2025-03-09 RX ORDER — CIPROFLOXACIN 500 MG/1
500 TABLET ORAL 2 TIMES DAILY
Qty: 14 TABLET | Refills: 0 | Status: SHIPPED | OUTPATIENT
Start: 2025-03-09 | End: 2025-03-16

## 2025-03-09 RX ORDER — HYDROCODONE BITARTRATE AND ACETAMINOPHEN 5; 325 MG/1; MG/1
1 TABLET ORAL EVERY 6 HOURS PRN
Qty: 12 TABLET | Refills: 0 | Status: SHIPPED | OUTPATIENT
Start: 2025-03-09

## 2025-03-09 RX ORDER — METRONIDAZOLE 500 MG/1
500 TABLET ORAL 3 TIMES DAILY
Qty: 21 TABLET | Refills: 0 | Status: SHIPPED | OUTPATIENT
Start: 2025-03-09 | End: 2025-03-16

## 2025-03-09 RX ORDER — DOCUSATE SODIUM 100 MG/1
100 CAPSULE, LIQUID FILLED ORAL 2 TIMES DAILY
Qty: 60 CAPSULE | Refills: 0 | Status: SHIPPED | OUTPATIENT
Start: 2025-03-09

## 2025-03-09 NOTE — ED PROVIDER NOTES
Encounter Date: 3/8/2025       History     Chief Complaint   Patient presents with    Abdominal Pain    Nausea     Pt presents with LLQ abdominal pain that radiates to the left lower back with nausea that started yesterday morning. Pt reports hx of diverticulitis/diverticulosis.      HPI  49 y.o.   Co L flank and LLQ pain x yesterday, feels like it radiates  Worsening  Nausea  No exac/remitting factors  Mod  Remote h/o diverticulitis and kidney stones    Review of patient's allergies indicates:   Allergen Reactions    Ace inhibitors Other (See Comments)     cough     Past Medical History:   Diagnosis Date    Anxiety     Diverticulitis     Endometriosis     General anesthetics causing adverse effect in therapeutic use     Hypertension     Kidney stone     Prolactinoma 2011    Sliding hiatal hernia     Urinary tract infection     Vaginal infection      Past Surgical History:   Procedure Laterality Date    ARTHROSCOPY OF KNEE Left     BRAIN TUMOR EXCISION  9/2011    removal of prolactinoma    COLONOSCOPY N/A 8/27/2020    Procedure: COLONOSCOPY;  Surgeon: Valentino Negro MD;  Location: [facility];  Service: Endoscopy;  Laterality: N/A;    CYSTOSCOPY      CYSTOSCOPY W/ URETERAL STENT PLACEMENT  2016    CYSTOSCOPY W/ URETERAL STENT REMOVAL  2016    ESOPHAGEAL MANOMETRY N/A 6/3/2019    Procedure: MANOMETRY, ESOPHAGUS;  Surgeon: Bhupinder Schmitz MD;  [address]  Service: Endoscopy;  Laterality: N/A;    ESOPHAGOGASTRODUODENOSCOPY N/A 4/30/2019    Procedure: ESOPHAGOGASTRODUODENOSCOPY (EGD);  Surgeon: Altagracia Bose MD;  Location: [facility];  Service: Endoscopy;  Laterality: N/A;    ESOPHAGOGASTRODUODENOSCOPY N/A 8/2/2021    Procedure: EGD (ESOPHAGOGASTRODUODENOSCOPY);  Surgeon: Altagracia Bose MD;  Location: [facility];  Service: Endoscopy;  Laterality: N/A;    EXTRACORPOREAL SHOCK WAVE LITHOTRIPSY Left 3/7/2024    Procedure: LITHOTRIPSY, ESWL;  Surgeon: Roverto Harley MD;  Location: [facility]  OR;  Service: Urology;  Laterality: Left;    FOOT HARDWARE REMOVAL Right 6/16/2020    Procedure: REMOVAL, HARDWARE, FOOT;  Surgeon: Adrianna Tillman DPM;  Location: [facility]placed [facility] OR;  Service: Podiatry;  Laterality: Right;    HARVESTING OF BONE GRAFT Right 7/16/2019    Procedure: SURGICAL PROCUREMENT, BONE GRAFT;  Surgeon: Adrianna Tillman DPM;  Location: Replaced by [facility] OR;  Service: Podiatry;  Laterality: Right;    HYSTERECTOMY  3/16/15    TLH/BSO for Endometriosis, AUB, fibroids, cyst    INSERTION, ELECTRODE LEAD, NEUROSTIMULATOR, PERIPHERAL Left 7/15/2024    Procedure: INSERTION,ELECTRODE LEAD,NEUROSTIMULATOR,PERIPHERAL;  Surgeon: Calvin Nguyen MD;  Location: [facility] OR;  Service: Pain Management;  Laterality: Left;  stimrouter trial    INSERTION, ELECTRODE LEAD, NEUROSTIMULATOR, PERIPHERAL Left 8/12/2024    Procedure: INSERTION,ELECTRODE LEAD,NEUROSTIMULATOR,PERIPHERAL;  Surgeon: Calvin Nguyen MD;  Location: [facility] OR;  Service: Pain Management;  Laterality: Left;  stimrouter permanent    INSERTION, ELECTRODE LEAD, NEUROSTIMULATOR, PERIPHERAL Right 10/7/2024    Procedure: INSERTION,ELECTRODE LEAD,NEUROSTIMULATOR,PERIPHERAL;  Surgeon: Calvin Nguyen MD;  Location: [facility] OR;  Service: Pain Management;  Laterality: Right;  stimrouter trial    INSERTION, ELECTRODE LEAD, NEUROSTIMULATOR, PERIPHERAL Right 10/28/2024    Procedure: INSERTION,ELECTRODE LEAD,NEUROSTIMULATOR,PERIPHERAL;  Surgeon: Calvin Nguyen MD;  Location: [facility] OR;  Service: Pain Management;  Laterality: Right;  stimrouter permanent    LITHOTRIPSY      MANDIBLE SURGERY  2002    severe overbite correction    PELVIC LAPAROSCOPY  2014    Dx scope, chromopertubation-Endometriosis    TONSILLECTOMY, ADENOIDECTOMY       Family History   Problem Relation Name Age of Onset    Cancer Mother      Breast cancer Neg Hx      Ovarian cancer Neg Hx      Kidney disease Neg Hx       Social History[1]  Review of Systems  All systems were reviewed/examined and were negative except as noted in the  HPI.    Physical Exam     Initial Vitals [03/08/25 2157]   BP Pulse Resp Temp SpO2   (!) 161/90 69 18 97.8 °F (36.6 °C) 97 %      MAP       --         Physical Exam    General: the patient is awake, alert, and in no apparent distress.  Head: normocephalic and atraumatic, sclera are clear  Neck: supple without meningismus  Chest: no respiratory distress  Heart: regular rate and rhythm  ABD soft, mild LLQ t nondistended, no peritoneal signs  Back nt in the midline no CVAT  Extremities: warm and well perfused  Skin: warm and dry  Psych conversant  Neuro: awake, alert, moving all extremities    ED Course   Procedures  Labs Reviewed   CBC W/ AUTO DIFFERENTIAL - Abnormal       Result Value    WBC 15.36 (*)     RBC 4.47      Hemoglobin 13.0      Hematocrit 39.9      MCV 89      MCH 29.1      MCHC 32.6      RDW 13.4      Platelets 356      MPV 10.7      Immature Granulocytes 0.3      Gran # (ANC) 9.3 (*)     Immature Grans (Abs) 0.05 (*)     Lymph # 4.3      Mono # 1.4 (*)     Eos # 0.3      Baso # 0.06      nRBC 0      Gran % 60.4      Lymph % 27.9      Mono % 9.2      Eosinophil % 1.8      Basophil % 0.4      Differential Method Automated     COMPREHENSIVE METABOLIC PANEL - Abnormal    Sodium 140      Potassium 3.8      Chloride 108      CO2 22 (*)     Glucose 139 (*)     BUN 17      Creatinine 0.63      Calcium 9.4      Total Protein 7.3      Albumin 4.2      Total Bilirubin 0.4      Alkaline Phosphatase 114      AST 18      ALT 18      Anion Gap 10      eGFR >60.0     URINALYSIS, REFLEX TO URINE CULTURE - Abnormal    Specimen UA Urine, Clean Catch      Color, UA Yellow      Appearance, UA Clear      pH, UA 6.0      Specific Gravity, UA 1.025      Protein, UA Negative      Glucose, UA Negative      Ketones, UA Negative      Bilirubin (UA) Negative      Occult Blood UA Trace (*)     Nitrite, UA Negative      Urobilinogen, UA Negative      Leukocytes, UA Negative      Narrative:     Preferred Collection Type->Urine, Clean  Catch  Specimen Source->Urine   LIPASE    Lipase Result 120            Imaging Results              CT Renal Stone Study ABD Pelvis WO (Final result)  Result time 03/08/25 23:01:39      Final result by Jorge Parmar DO (03/08/25 23:01:39)                   Impression:     No acute findings.    All CT scans at [this location] are performed using dose modulation techniques as appropriate to a performed exam including the following:  Automated exposure control; adjustment of the mA and/or kV according to patient size (this includes techniques or standardized protocols for targeted exams where dose is matched to indication / reason for exam; i.e. extremities or head); use of iterative reconstruction technique.    Finalized on: 3/8/2025 11:01 PM By:  Jorge Parmar  [facility]# [id number]      2025-03-08 23:03:41.840     [facility]               Narrative:    EXAM: CT RENAL STONE STUDY ABD PELVIS WO    CLINICAL HISTORY: Flank pain    COMPARISON: 16 2024    TECHNIQUE: Standard thin-section axial images, with reformatted sagittal and coronal images.    FINDINGS: Lung bases are clear.    Within limits of a noncontrast CT, the liver, spleen, pancreas, adrenals and collapsed gallbladder are unremarkable.    Right kidney: Cluster of nonobstructing stones at the superior pole measuring up to 3 mm.  No hydronephrosis, hydroureter or ureteral stones.    Left kidney: No hydronephrosis, hydroureter, nephrolithiasis or ureteral stones.    No abdominal or retroperitoneal lymphadenopathy.  No ascites or fat stranding within the abdomen.  No dilatation of the large or small bowel.  Colonic diverticula without diverticulitis.  No evidence for appendicitis.    Pelvis: No pelvic free fluid, iliac chain or inguinal lymphadenopathy.  Uterus is absent.  Adnexal regions are unremarkable.    No concerning lytic or sclerotic bony lesions.  Probable hemangioma at L4.                                         Medications   ketorolac injection 15 mg (15 mg  Intravenous Given 3/8/25 2226)   ondansetron injection 4 mg (4 mg Intravenous Given 3/8/25 2226)   HYDROcodone-acetaminophen 5-325 mg per tablet 1 tablet (1 tablet Oral Given 3/8/25 2320)   ciprofloxacin HCl tablet 500 mg (500 mg Oral Given 3/8/25 2320)   metroNIDAZOLE tablet 500 mg (500 mg Oral Given 3/8/25 2320)     Medical Decision Making  Amount and/or Complexity of Data Reviewed  Labs: ordered.  Radiology: ordered.    Risk  OTC drugs.  Prescription drug management.       Medical Decision Making:    This is an emergent evaluation of a patient presenting to the ED.  Nursing notes were reviewed.  Ct renal stone nad  I reviewed radiology images personally along with interpretations.  I personally reviewed and interpreted the laboratory results.  Non critical  I decided to obtain and review old medical records, which showed: h/o diverticulosis    Evaluation for Emergency Medical Condition  The patient received a medical screening exam and within a reasonable degree of clinical confidence an emergency medical condition has not been identified.  The patient is instructed on proper follow up and return precautions to the ED.      Quincy Gil MD, MIKAYLA                                 Clinical Impression:  Final diagnoses:  [R11.0] Nausea (Primary)  [R10.9] Left flank pain          ED Disposition Condition    Discharge Stable          ED Prescriptions       Medication Sig Dispense Start Date End Date Auth. Provider    HYDROcodone-acetaminophen (NORCO) 5-325 mg per tablet Take 1 tablet by mouth every 6 (six) hours as needed for Pain. 12 tablet 3/9/2025 -- Davion Gil MD    docusate sodium (COLACE) 100 MG capsule Take 1 capsule (100 mg total) by mouth 2 (two) times daily. 60 capsule 3/9/2025 -- Davion Gil MD    ciprofloxacin HCl (CIPRO) 500 MG tablet Take 1 tablet (500 mg total) by mouth 2 (two) times daily. for 7 days 14 tablet 3/9/2025 3/16/2025 Davion Gil MD    metroNIDAZOLE (FLAGYL) 500 MG tablet  Take 1 tablet (500 mg total) by mouth 3 (three) times daily. for 7 days 21 tablet 3/9/2025 3/16/2025 Davion Gil MD          Follow-up Information       Follow up With Specialties Details Why Contact Info    Tiff Berman MD Internal Medicine Schedule an appointment as soon as possible for a visit   [address] [address]  [phone]            Discharged to home in stable condition, return to ED warnings given, follow up and patient care instructions given.      Quincy Gil MD, MIKAYLA, FACEP  Department of Emergency Medicine           [1]   Social History  Tobacco Use    Smoking status: Former     Current packs/day: 0.00     Types: Cigarettes     Start date: 1989     Quit date: 2000     Years since quittin.1    Smokeless tobacco: Never    Tobacco comments:     pt stated she smoked one cig/day   Substance Use Topics    Alcohol use: Never    Drug use: Never        Davion Gil MD  03/10/25 0515

## 2025-03-10 ENCOUNTER — OFFICE VISIT (OUTPATIENT)
Dept: HEMATOLOGY/ONCOLOGY | Facility: CLINIC | Age: 50
End: 2025-03-10
Payer: MEDICAID

## 2025-03-10 VITALS
DIASTOLIC BLOOD PRESSURE: 86 MMHG | WEIGHT: 256.19 LBS | BODY MASS INDEX: 45.38 KG/M2 | OXYGEN SATURATION: 96 % | RESPIRATION RATE: 18 BRPM | SYSTOLIC BLOOD PRESSURE: 142 MMHG | HEART RATE: 66 BPM

## 2025-03-10 DIAGNOSIS — Z87.891 HISTORY OF SMOKING: ICD-10-CM

## 2025-03-10 DIAGNOSIS — E61.1 IRON DEFICIENCY: Primary | ICD-10-CM

## 2025-03-10 DIAGNOSIS — E66.01 MORBID OBESITY: ICD-10-CM

## 2025-03-10 DIAGNOSIS — Z86.718 HISTORY OF DEEP VEIN THROMBOSIS: ICD-10-CM

## 2025-03-10 PROCEDURE — 3008F BODY MASS INDEX DOCD: CPT | Mod: CPTII,,, | Performed by: INTERNAL MEDICINE

## 2025-03-10 PROCEDURE — 1160F RVW MEDS BY RX/DR IN RCRD: CPT | Mod: CPTII,,, | Performed by: INTERNAL MEDICINE

## 2025-03-10 PROCEDURE — 1159F MED LIST DOCD IN RCRD: CPT | Mod: CPTII,,, | Performed by: INTERNAL MEDICINE

## 2025-03-10 PROCEDURE — 3077F SYST BP >= 140 MM HG: CPT | Mod: CPTII,,, | Performed by: INTERNAL MEDICINE

## 2025-03-10 PROCEDURE — 99213 OFFICE O/P EST LOW 20 MIN: CPT | Mod: PBBFAC,PO | Performed by: INTERNAL MEDICINE

## 2025-03-10 PROCEDURE — 3079F DIAST BP 80-89 MM HG: CPT | Mod: CPTII,,, | Performed by: INTERNAL MEDICINE

## 2025-03-10 PROCEDURE — 99214 OFFICE O/P EST MOD 30 MIN: CPT | Mod: S$PBB,,, | Performed by: INTERNAL MEDICINE

## 2025-03-10 PROCEDURE — 99999 PR PBB SHADOW E&M-EST. PATIENT-LVL III: CPT | Mod: PBBFAC,,, | Performed by: INTERNAL MEDICINE

## 2025-03-10 RX ORDER — HEPARIN 100 UNIT/ML
500 SYRINGE INTRAVENOUS
OUTPATIENT
Start: 2025-03-18

## 2025-03-10 RX ORDER — DIPHENHYDRAMINE HYDROCHLORIDE 50 MG/ML
50 INJECTION, SOLUTION INTRAMUSCULAR; INTRAVENOUS ONCE AS NEEDED
OUTPATIENT
Start: 2025-03-18

## 2025-03-10 RX ORDER — SODIUM FERRIC GLUCONATE COMPLEX IN SUCROSE 12.5 MG/ML
125 INJECTION INTRAVENOUS
OUTPATIENT
Start: 2025-03-18

## 2025-03-10 RX ORDER — SODIUM CHLORIDE 0.9 % (FLUSH) 0.9 %
10 SYRINGE (ML) INJECTION
OUTPATIENT
Start: 2025-03-18

## 2025-03-10 RX ORDER — EPINEPHRINE 0.3 MG/.3ML
0.3 INJECTION SUBCUTANEOUS ONCE AS NEEDED
OUTPATIENT
Start: 2025-03-18

## 2025-03-11 ENCOUNTER — TELEPHONE (OUTPATIENT)
Dept: INFUSION THERAPY | Facility: HOSPITAL | Age: 50
End: 2025-03-11
Payer: MEDICAID

## 2025-03-11 NOTE — TELEPHONE ENCOUNTER
Confirmed upcoming infusion appointments with the patient. Reviewed pre infusion instructions with the patient.   3/18 at 8a and every Monday x 7 thereafter at 8a

## 2025-03-18 ENCOUNTER — INFUSION (OUTPATIENT)
Dept: INFUSION THERAPY | Facility: HOSPITAL | Age: 50
End: 2025-03-18
Attending: INTERNAL MEDICINE
Payer: MEDICAID

## 2025-03-18 VITALS
RESPIRATION RATE: 18 BRPM | SYSTOLIC BLOOD PRESSURE: 144 MMHG | DIASTOLIC BLOOD PRESSURE: 84 MMHG | TEMPERATURE: 98 F | OXYGEN SATURATION: 98 % | HEART RATE: 62 BPM

## 2025-03-18 DIAGNOSIS — E61.1 IRON DEFICIENCY: Primary | ICD-10-CM

## 2025-03-18 PROCEDURE — A4216 STERILE WATER/SALINE, 10 ML: HCPCS | Performed by: INTERNAL MEDICINE

## 2025-03-18 PROCEDURE — 63600175 PHARM REV CODE 636 W HCPCS: Performed by: INTERNAL MEDICINE

## 2025-03-18 PROCEDURE — 25000003 PHARM REV CODE 250: Performed by: INTERNAL MEDICINE

## 2025-03-18 PROCEDURE — 96374 THER/PROPH/DIAG INJ IV PUSH: CPT

## 2025-03-18 RX ORDER — SODIUM FERRIC GLUCONATE COMPLEX IN SUCROSE 12.5 MG/ML
125 INJECTION INTRAVENOUS
OUTPATIENT
Start: 2025-03-25

## 2025-03-18 RX ORDER — DIPHENHYDRAMINE HYDROCHLORIDE 50 MG/ML
50 INJECTION, SOLUTION INTRAMUSCULAR; INTRAVENOUS ONCE AS NEEDED
OUTPATIENT
Start: 2025-03-25

## 2025-03-18 RX ORDER — SODIUM FERRIC GLUCONATE COMPLEX IN SUCROSE 12.5 MG/ML
125 INJECTION INTRAVENOUS
Status: COMPLETED | OUTPATIENT
Start: 2025-03-18 | End: 2025-03-18

## 2025-03-18 RX ORDER — EPINEPHRINE 0.3 MG/.3ML
0.3 INJECTION SUBCUTANEOUS ONCE AS NEEDED
OUTPATIENT
Start: 2025-03-25

## 2025-03-18 RX ORDER — SODIUM CHLORIDE 0.9 % (FLUSH) 0.9 %
10 SYRINGE (ML) INJECTION
Status: DISCONTINUED | OUTPATIENT
Start: 2025-03-18 | End: 2025-03-18 | Stop reason: HOSPADM

## 2025-03-18 RX ORDER — SODIUM CHLORIDE 0.9 % (FLUSH) 0.9 %
10 SYRINGE (ML) INJECTION
OUTPATIENT
Start: 2025-03-25

## 2025-03-18 RX ORDER — HEPARIN 100 UNIT/ML
500 SYRINGE INTRAVENOUS
OUTPATIENT
Start: 2025-03-25

## 2025-03-18 RX ADMIN — SODIUM FERRIC GLUCONATE COMPLEX IN SUCROSE 125 MG: 12.5 INJECTION INTRAVENOUS at 08:03

## 2025-03-18 RX ADMIN — SODIUM CHLORIDE: 9 INJECTION, SOLUTION INTRAVENOUS at 08:03

## 2025-03-18 RX ADMIN — Medication 10 ML: at 09:03

## 2025-03-18 NOTE — NURSING
Patient tolerated Ferrlecit #1 out of 8 well today. Observed for 30 min post infusion with no signs/symptoms of reaction noted. PIV removed, catheter tip intact. Patient uses the portal for appts. Discharged home, ambulated independently.

## 2025-03-24 ENCOUNTER — TELEPHONE (OUTPATIENT)
Dept: RESEARCH | Facility: HOSPITAL | Age: 50
End: 2025-03-24
Payer: MEDICAID

## 2025-03-24 NOTE — TELEPHONE ENCOUNTER
Bradly Study  Study ID#[id number]    Patient called with [employer] login issue. Issue has now been resolved.

## 2025-03-25 ENCOUNTER — INFUSION (OUTPATIENT)
Dept: INFUSION THERAPY | Facility: HOSPITAL | Age: 50
End: 2025-03-25
Attending: INTERNAL MEDICINE
Payer: MEDICAID

## 2025-03-25 VITALS
SYSTOLIC BLOOD PRESSURE: 160 MMHG | RESPIRATION RATE: 18 BRPM | DIASTOLIC BLOOD PRESSURE: 92 MMHG | HEART RATE: 67 BPM | OXYGEN SATURATION: 96 % | TEMPERATURE: 98 F

## 2025-03-25 DIAGNOSIS — E61.1 IRON DEFICIENCY: Primary | ICD-10-CM

## 2025-03-25 PROCEDURE — A4216 STERILE WATER/SALINE, 10 ML: HCPCS | Performed by: INTERNAL MEDICINE

## 2025-03-25 PROCEDURE — 96374 THER/PROPH/DIAG INJ IV PUSH: CPT

## 2025-03-25 PROCEDURE — 25000003 PHARM REV CODE 250: Performed by: INTERNAL MEDICINE

## 2025-03-25 PROCEDURE — 63600175 PHARM REV CODE 636 W HCPCS: Performed by: INTERNAL MEDICINE

## 2025-03-25 RX ORDER — EPINEPHRINE 0.3 MG/.3ML
0.3 INJECTION SUBCUTANEOUS ONCE AS NEEDED
OUTPATIENT
Start: 2025-04-01

## 2025-03-25 RX ORDER — DIPHENHYDRAMINE HYDROCHLORIDE 50 MG/ML
50 INJECTION, SOLUTION INTRAMUSCULAR; INTRAVENOUS ONCE AS NEEDED
OUTPATIENT
Start: 2025-04-01

## 2025-03-25 RX ORDER — SODIUM CHLORIDE 0.9 % (FLUSH) 0.9 %
10 SYRINGE (ML) INJECTION
OUTPATIENT
Start: 2025-04-01

## 2025-03-25 RX ORDER — HEPARIN 100 UNIT/ML
500 SYRINGE INTRAVENOUS
OUTPATIENT
Start: 2025-04-01

## 2025-03-25 RX ORDER — SODIUM FERRIC GLUCONATE COMPLEX IN SUCROSE 12.5 MG/ML
125 INJECTION INTRAVENOUS
Status: COMPLETED | OUTPATIENT
Start: 2025-03-25 | End: 2025-03-25

## 2025-03-25 RX ORDER — SODIUM FERRIC GLUCONATE COMPLEX IN SUCROSE 12.5 MG/ML
125 INJECTION INTRAVENOUS
OUTPATIENT
Start: 2025-04-01

## 2025-03-25 RX ORDER — SODIUM CHLORIDE 0.9 % (FLUSH) 0.9 %
10 SYRINGE (ML) INJECTION
Status: DISCONTINUED | OUTPATIENT
Start: 2025-03-25 | End: 2025-03-25 | Stop reason: HOSPADM

## 2025-03-25 RX ADMIN — SODIUM FERRIC GLUCONATE COMPLEX IN SUCROSE 125 MG: 12.5 INJECTION INTRAVENOUS at 08:03

## 2025-03-25 RX ADMIN — SODIUM CHLORIDE: 9 INJECTION, SOLUTION INTRAVENOUS at 08:03

## 2025-03-25 RX ADMIN — Medication 10 ML: at 08:03

## 2025-03-25 NOTE — NURSING
Patient tolerated Ferrlecit ivp #2 well today. NAD noted upon discharge. PIV removed, catheter tip intact. Plan to rtc 4/1 for next appt. Discharged home, ambulated independently.

## 2025-03-26 ENCOUNTER — TELEPHONE (OUTPATIENT)
Dept: ORTHOPEDICS | Facility: CLINIC | Age: 50
End: 2025-03-26
Payer: MEDICAID

## 2025-03-26 DIAGNOSIS — M79.642 BILATERAL HAND PAIN: Primary | ICD-10-CM

## 2025-03-26 DIAGNOSIS — M79.641 BILATERAL HAND PAIN: Primary | ICD-10-CM

## 2025-03-27 ENCOUNTER — HOSPITAL ENCOUNTER (OUTPATIENT)
Facility: HOSPITAL | Age: 50
Discharge: HOME | End: 2025-03-27
Attending: ORTHOPAEDIC SURGERY
Payer: MEDICAID

## 2025-03-27 ENCOUNTER — OFFICE VISIT (OUTPATIENT)
Dept: ORTHOPEDICS | Facility: CLINIC | Age: 50
End: 2025-03-27
Payer: MEDICAID

## 2025-03-27 DIAGNOSIS — M79.642 BILATERAL HAND PAIN: ICD-10-CM

## 2025-03-27 DIAGNOSIS — R20.2 PARESTHESIA OF BOTH HANDS: ICD-10-CM

## 2025-03-27 DIAGNOSIS — M79.641 BILATERAL HAND PAIN: ICD-10-CM

## 2025-03-27 DIAGNOSIS — G56.03 BILATERAL CARPAL TUNNEL SYNDROME: Primary | ICD-10-CM

## 2025-03-27 PROCEDURE — 99213 OFFICE O/P EST LOW 20 MIN: CPT | Mod: PBBFAC,25,PN | Performed by: ORTHOPAEDIC SURGERY

## 2025-03-27 PROCEDURE — 73130 X-RAY EXAM OF HAND: CPT | Mod: TC,50,PN

## 2025-03-27 PROCEDURE — 73130 X-RAY EXAM OF HAND: CPT | Mod: 26,50,, | Performed by: RADIOLOGY

## 2025-03-27 PROCEDURE — 20526 THER INJECTION CARP TUNNEL: CPT | Mod: 50,PBBFAC,PN | Performed by: ORTHOPAEDIC SURGERY

## 2025-03-27 PROCEDURE — 99999PBSHW PR PBB SHADOW TECHNICAL ONLY FILED TO HB: Mod: PBBFAC,,,

## 2025-03-27 PROCEDURE — 99999 PR PBB SHADOW E&M-EST. PATIENT-LVL III: CPT | Mod: PBBFAC,,, | Performed by: ORTHOPAEDIC SURGERY

## 2025-03-27 RX ORDER — TRIAMCINOLONE ACETONIDE 40 MG/ML
40 INJECTION, SUSPENSION INTRA-ARTICULAR; INTRAMUSCULAR
Status: COMPLETED | OUTPATIENT
Start: 2025-03-27 | End: 2025-03-27

## 2025-03-27 RX ADMIN — TRIAMCINOLONE ACETONIDE 40 MG: 40 INJECTION, SUSPENSION INTRA-ARTICULAR; INTRAMUSCULAR at 10:03

## 2025-03-27 NOTE — PROGRESS NOTES
Subjective:      Patient ID: Inna Gallardo is a 49 y.o. female.    Chief Complaint: Consult (Bilateral hand )      HPI  Inna Gallardo is a  49 y.o. female presenting today for hand symptoms including nocturnal pain numbness and tingling of the fingers.  There was not a history of trauma.  Onset of symptoms began more than a year ago   She has not had any specific treatment for this.      Review of patient's allergies indicates:   Allergen Reactions    Ace inhibitors Other (See Comments)     cough         Current Medications[1]    Past Medical History:   Diagnosis Date    Anxiety     Diverticulitis     Endometriosis     General anesthetics causing adverse effect in therapeutic use     Hypertension     Kidney stone     Prolactinoma 2011    Sliding hiatal hernia     Urinary tract infection     Vaginal infection        Past Surgical History:   Procedure Laterality Date    ARTHROSCOPY OF KNEE Left     BRAIN TUMOR EXCISION  9/2011    removal of prolactinoma    COLONOSCOPY N/A 8/27/2020    Procedure: COLONOSCOPY;  Surgeon: Valentino Negro MD;  Location: [facility];  Service: Endoscopy;  Laterality: N/A;    CYSTOSCOPY      CYSTOSCOPY W/ URETERAL STENT PLACEMENT  2016    CYSTOSCOPY W/ URETERAL STENT REMOVAL  2016    ESOPHAGEAL MANOMETRY N/A 6/3/2019    Procedure: MANOMETRY, ESOPHAGUS;  Surgeon: Bhupinder Schmitz MD;  Location: [address]);  Service: Endoscopy;  Laterality: N/A;    ESOPHAGOGASTRODUODENOSCOPY N/A 4/30/2019    Procedure: ESOPHAGOGASTRODUODENOSCOPY (EGD);  Surgeon: Altagracia Bose MD;  Location: [facility];  Service: Endoscopy;  Laterality: N/A;    ESOPHAGOGASTRODUODENOSCOPY N/A 8/2/2021    Procedure: EGD (ESOPHAGOGASTRODUODENOSCOPY);  Surgeon: Altagracia Bose MD;  Location: [facility];  Service: Endoscopy;  Laterality: N/A;    EXTRACORPOREAL SHOCK WAVE LITHOTRIPSY Left 3/7/2024    Procedure: LITHOTRIPSY, ESWL;  Surgeon: Roverto Harley MD;  Location: [facility];  Service: Urology;  Laterality:  Left;    FOOT HARDWARE REMOVAL Right 6/16/2020    Procedure: REMOVAL, HARDWARE, FOOT;  Surgeon: Adrianna Tillman DPM;  Location: [facility] OR;  Service: Podiatry;  Laterality: Right;    HARVESTING OF BONE GRAFT Right 7/16/2019    Procedure: SURGICAL PROCUREMENT, BONE GRAFT;  Surgeon: Adrianna Tillman DPM;  Location: [facility] OR;  Service: Podiatry;  Laterality: Right;    HYSTERECTOMY  3/16/15    TLH/BSO for Endometriosis, AUB, fibroids, cyst    INSERTION, ELECTRODE LEAD, NEUROSTIMULATOR, PERIPHERAL Left 7/15/2024    Procedure: INSERTION,ELECTRODE LEAD,NEUROSTIMULATOR,PERIPHERAL;  Surgeon: Calvin Nguyen MD;  Location: [facility] OR;  Service: Pain Management;  Laterality: Left;  stimrouter trial    INSERTION, ELECTRODE LEAD, NEUROSTIMULATOR, PERIPHERAL Left 8/12/2024    Procedure: INSERTION,ELECTRODE LEAD,NEUROSTIMULATOR,PERIPHERAL;  Surgeon: Calvin Nguyen MD;  Location: [facility] OR;  Service: Pain Management;  Laterality: Left;  stimrouter permanent    INSERTION, ELECTRODE LEAD, NEUROSTIMULATOR, PERIPHERAL Right 10/7/2024    Procedure: INSERTION,ELECTRODE LEAD,NEUROSTIMULATOR,PERIPHERAL;  Surgeon: Calvin Nguyen MD;  Location: [facility] OR;  Service: Pain Management;  Laterality: Right;  stimrouter trial    INSERTION, ELECTRODE LEAD, NEUROSTIMULATOR, PERIPHERAL Right 10/28/2024    Procedure: INSERTION,ELECTRODE LEAD,NEUROSTIMULATOR,PERIPHERAL;  Surgeon: Calvin Nguyen MD;  Location: [facility] OR;  Service: Pain Management;  Laterality: Right;  stimrouter permanent    LITHOTRIPSY      MANDIBLE SURGERY  2002    severe overbite correction    PELVIC LAPAROSCOPY  2014    Dx scope, chromopertubation-Endometriosis    TONSILLECTOMY, ADENOIDECTOMY         Review of Systems:  ROS    OBJECTIVE:     PHYSICAL EXAM:       Vitals:    03/27/25 1001   PainSc: 0-No pain   PainLoc: Hand     Well developed, well nourished female in no acute distress  Alert and oriented x 3  HEENT- Normal exam  Lungs- Clear to auscultation  Heart- Regular rate and rhythm  Abdomen- Soft  nontender  Extremity exam- examination of the hands show some mild swelling bilaterally   Positive Tinel sign   Positive Phalen's test on the right   No atrophy   Range of motion fingers full no triggering    RADIOGRAPHS:  AP lateral x-rays of the hands show no significant abnormalities  Comments: I have personally reviewed the imaging and I agree with the above radiologist's report.    ASSESSMENT/PLAN:     IMPRESSION:  Bilateral carpal tunnel syndrome    PLAN:  I explained the nature of the problem to the patient   Recommended injection today   After pause for time-out identified each wrist injected carpal tunnel bilateral with combination Kenalog 20 mg 0.5 cc xylocaine sterile technique  Tolerated the procedure well without complication   I have also given her a wrist braces for nighttime use and ordered nerve conduction studies both hands   Follow up after the nerve test is complete       - We talked at length about the anatomy and pathophysiology of   Encounter Diagnoses   Name Primary?    Paresthesia of both hands     Bilateral carpal tunnel syndrome Yes           Disclaimer: This note has been generated using voice-recognition software. There may be typographical errors that have been missed during proof-reading.          [1]   Current Outpatient Medications   Medication Sig Dispense Refill    baclofen (LIORESAL) 10 MG tablet Take 10 mg by mouth 3 (three) times daily as needed.      docusate sodium (COLACE) 100 MG capsule Take 1 capsule (100 mg total) by mouth 2 (two) times daily. 60 capsule 0    EPINEPHrine (EPIPEN) 0.3 mg/0.3 mL AtIn Inject 0.3 mLs into the muscle as needed.  0    linaCLOtide (LINZESS) 145 mcg Cap capsule TAKE 1 CAPSULE(145 MCG) BY MOUTH DAILY 30 capsule 5    losartan-hydrochlorothiazide 100-25 mg (HYZAAR) 100-25 mg per tablet Take 1 tablet by mouth once daily.      omeprazole (PRILOSEC) 40 MG capsule Take 1 capsule (40 mg total) by mouth every morning. 30 capsule 11    sertraline (ZOLOFT)  100 MG tablet Take 100 mg by mouth every evening.      ergocalciferol (ERGOCALCIFEROL) 50,000 unit Cap Take 50,000 Units by mouth every 7 days.      HYDROcodone-acetaminophen (NORCO) 5-325 mg per tablet Take 1 tablet by mouth every 6 (six) hours as needed for Pain. 12 tablet 0    hydrOXYzine HCL (ATARAX) 25 MG tablet Take 25 mg by mouth 3 (three) times daily.       No current facility-administered medications for this visit.

## 2025-04-01 ENCOUNTER — TELEPHONE (OUTPATIENT)
Dept: INFUSION THERAPY | Facility: HOSPITAL | Age: 50
End: 2025-04-01
Payer: MEDICAID

## 2025-04-01 NOTE — TELEPHONE ENCOUNTER
Pt called infusion center stating she is unable to make it to iv iron infusion appointment this morning at 8am d/t not feeling well. Today's appointment rescheduled and next scheduled infusion is Tuesday 4/8/25 at 8am. Pt verbalized understanding.

## 2025-04-04 DIAGNOSIS — M25.561 PAIN IN BOTH KNEES, UNSPECIFIED CHRONICITY: Primary | ICD-10-CM

## 2025-04-04 DIAGNOSIS — M25.562 PAIN IN BOTH KNEES, UNSPECIFIED CHRONICITY: Primary | ICD-10-CM

## 2025-04-05 DIAGNOSIS — K59.09 CHRONIC CONSTIPATION: ICD-10-CM

## 2025-04-07 RX ORDER — LINACLOTIDE 145 UG/1
CAPSULE, GELATIN COATED ORAL
Qty: 30 CAPSULE | Refills: 5 | Status: SHIPPED | OUTPATIENT
Start: 2025-04-07

## 2025-04-08 ENCOUNTER — INFUSION (OUTPATIENT)
Dept: INFUSION THERAPY | Facility: HOSPITAL | Age: 50
End: 2025-04-08
Attending: INTERNAL MEDICINE
Payer: MEDICAID

## 2025-04-08 VITALS
TEMPERATURE: 98 F | RESPIRATION RATE: 18 BRPM | OXYGEN SATURATION: 99 % | HEART RATE: 56 BPM | SYSTOLIC BLOOD PRESSURE: 127 MMHG | DIASTOLIC BLOOD PRESSURE: 84 MMHG

## 2025-04-08 DIAGNOSIS — E61.1 IRON DEFICIENCY: Primary | ICD-10-CM

## 2025-04-08 PROCEDURE — 63600175 PHARM REV CODE 636 W HCPCS: Performed by: INTERNAL MEDICINE

## 2025-04-08 PROCEDURE — 96374 THER/PROPH/DIAG INJ IV PUSH: CPT

## 2025-04-08 PROCEDURE — A4216 STERILE WATER/SALINE, 10 ML: HCPCS | Performed by: INTERNAL MEDICINE

## 2025-04-08 PROCEDURE — 25000003 PHARM REV CODE 250: Performed by: INTERNAL MEDICINE

## 2025-04-08 RX ORDER — SODIUM FERRIC GLUCONATE COMPLEX IN SUCROSE 12.5 MG/ML
125 INJECTION INTRAVENOUS
Status: COMPLETED | OUTPATIENT
Start: 2025-04-08 | End: 2025-04-08

## 2025-04-08 RX ORDER — DIPHENHYDRAMINE HYDROCHLORIDE 50 MG/ML
50 INJECTION, SOLUTION INTRAMUSCULAR; INTRAVENOUS ONCE AS NEEDED
OUTPATIENT
Start: 2025-04-15

## 2025-04-08 RX ORDER — SODIUM CHLORIDE 0.9 % (FLUSH) 0.9 %
10 SYRINGE (ML) INJECTION
OUTPATIENT
Start: 2025-04-15

## 2025-04-08 RX ORDER — EPINEPHRINE 0.3 MG/.3ML
0.3 INJECTION SUBCUTANEOUS ONCE AS NEEDED
OUTPATIENT
Start: 2025-04-15

## 2025-04-08 RX ORDER — DIPHENHYDRAMINE HYDROCHLORIDE 50 MG/ML
50 INJECTION, SOLUTION INTRAMUSCULAR; INTRAVENOUS ONCE AS NEEDED
Status: DISCONTINUED | OUTPATIENT
Start: 2025-04-08 | End: 2025-04-08 | Stop reason: HOSPADM

## 2025-04-08 RX ORDER — SODIUM CHLORIDE 0.9 % (FLUSH) 0.9 %
10 SYRINGE (ML) INJECTION
Status: DISCONTINUED | OUTPATIENT
Start: 2025-04-08 | End: 2025-04-08 | Stop reason: HOSPADM

## 2025-04-08 RX ORDER — SODIUM FERRIC GLUCONATE COMPLEX IN SUCROSE 12.5 MG/ML
125 INJECTION INTRAVENOUS
OUTPATIENT
Start: 2025-04-15

## 2025-04-08 RX ORDER — EPINEPHRINE 0.3 MG/.3ML
0.3 INJECTION SUBCUTANEOUS ONCE AS NEEDED
Status: DISCONTINUED | OUTPATIENT
Start: 2025-04-08 | End: 2025-04-08 | Stop reason: HOSPADM

## 2025-04-08 RX ORDER — HEPARIN 100 UNIT/ML
500 SYRINGE INTRAVENOUS
OUTPATIENT
Start: 2025-04-15

## 2025-04-08 RX ADMIN — SODIUM CHLORIDE: 9 INJECTION, SOLUTION INTRAVENOUS at 08:04

## 2025-04-08 RX ADMIN — Medication 10 ML: at 09:04

## 2025-04-08 RX ADMIN — SODIUM FERRIC GLUCONATE COMPLEX IN SUCROSE 125 MG: 12.5 INJECTION INTRAVENOUS at 08:04

## 2025-04-08 NOTE — PLAN OF CARE
Pt received IVP Ferrlecit dose 3/8. Pt tolerated it well. Next appointment scheduled. Pt prefers MyChart, AVS not given. Discharged with no acute distress.

## 2025-04-15 ENCOUNTER — INFUSION (OUTPATIENT)
Dept: INFUSION THERAPY | Facility: HOSPITAL | Age: 50
End: 2025-04-15
Attending: INTERNAL MEDICINE
Payer: MEDICAID

## 2025-04-15 VITALS
HEART RATE: 59 BPM | DIASTOLIC BLOOD PRESSURE: 84 MMHG | RESPIRATION RATE: 18 BRPM | SYSTOLIC BLOOD PRESSURE: 140 MMHG | TEMPERATURE: 97 F | OXYGEN SATURATION: 95 %

## 2025-04-15 DIAGNOSIS — E61.1 IRON DEFICIENCY: Primary | ICD-10-CM

## 2025-04-15 PROCEDURE — A4216 STERILE WATER/SALINE, 10 ML: HCPCS | Performed by: INTERNAL MEDICINE

## 2025-04-15 PROCEDURE — 63600175 PHARM REV CODE 636 W HCPCS: Performed by: INTERNAL MEDICINE

## 2025-04-15 PROCEDURE — 25000003 PHARM REV CODE 250: Performed by: INTERNAL MEDICINE

## 2025-04-15 PROCEDURE — 96374 THER/PROPH/DIAG INJ IV PUSH: CPT

## 2025-04-15 RX ORDER — SODIUM CHLORIDE 0.9 % (FLUSH) 0.9 %
10 SYRINGE (ML) INJECTION
OUTPATIENT
Start: 2025-04-22

## 2025-04-15 RX ORDER — EPINEPHRINE 0.3 MG/.3ML
0.3 INJECTION SUBCUTANEOUS ONCE AS NEEDED
OUTPATIENT
Start: 2025-04-22

## 2025-04-15 RX ORDER — SODIUM CHLORIDE 0.9 % (FLUSH) 0.9 %
10 SYRINGE (ML) INJECTION
Status: DISCONTINUED | OUTPATIENT
Start: 2025-04-15 | End: 2025-04-15 | Stop reason: HOSPADM

## 2025-04-15 RX ORDER — SODIUM FERRIC GLUCONATE COMPLEX IN SUCROSE 12.5 MG/ML
125 INJECTION INTRAVENOUS
OUTPATIENT
Start: 2025-04-22

## 2025-04-15 RX ORDER — HEPARIN 100 UNIT/ML
500 SYRINGE INTRAVENOUS
OUTPATIENT
Start: 2025-04-22

## 2025-04-15 RX ORDER — DIPHENHYDRAMINE HYDROCHLORIDE 50 MG/ML
50 INJECTION, SOLUTION INTRAMUSCULAR; INTRAVENOUS ONCE AS NEEDED
OUTPATIENT
Start: 2025-04-22

## 2025-04-15 RX ORDER — SODIUM FERRIC GLUCONATE COMPLEX IN SUCROSE 12.5 MG/ML
125 INJECTION INTRAVENOUS
Status: COMPLETED | OUTPATIENT
Start: 2025-04-15 | End: 2025-04-15

## 2025-04-15 RX ADMIN — Medication 10 ML: at 08:04

## 2025-04-15 RX ADMIN — SODIUM CHLORIDE: 9 INJECTION, SOLUTION INTRAVENOUS at 08:04

## 2025-04-15 RX ADMIN — SODIUM FERRIC GLUCONATE COMPLEX IN SUCROSE 125 MG: 12.5 INJECTION INTRAVENOUS at 08:04

## 2025-04-15 NOTE — PLAN OF CARE
Patient tolerated Ferrlecit IVP over 10 min well. No s/s adverse reaction. Next appt reviewed, PIV removed and patient ambulated out of clinic in NAD.

## 2025-04-17 ENCOUNTER — HOSPITAL ENCOUNTER (OUTPATIENT)
Dept: RADIOLOGY | Facility: HOSPITAL | Age: 50
Discharge: HOME | End: 2025-04-17
Payer: MEDICAID

## 2025-04-17 ENCOUNTER — OFFICE VISIT (OUTPATIENT)
Dept: ORTHOPEDICS | Facility: CLINIC | Age: 50
End: 2025-04-17
Payer: MEDICAID

## 2025-04-17 ENCOUNTER — PATIENT MESSAGE (OUTPATIENT)
Dept: ORTHOPEDICS | Facility: CLINIC | Age: 50
End: 2025-04-17

## 2025-04-17 DIAGNOSIS — Z12.31 ENCOUNTER FOR SCREENING MAMMOGRAM FOR MALIGNANT NEOPLASM OF BREAST: ICD-10-CM

## 2025-04-17 DIAGNOSIS — G56.03 BILATERAL CARPAL TUNNEL SYNDROME: Primary | ICD-10-CM

## 2025-04-17 PROCEDURE — 77063 BREAST TOMOSYNTHESIS BI: CPT | Mod: 26,,, | Performed by: RADIOLOGY

## 2025-04-17 PROCEDURE — 77067 SCR MAMMO BI INCL CAD: CPT | Mod: TC,PN

## 2025-04-17 PROCEDURE — 77067 SCR MAMMO BI INCL CAD: CPT | Mod: 26,,, | Performed by: RADIOLOGY

## 2025-04-17 NOTE — PROGRESS NOTES
Audio Only Telehealth Visit     The patient location is:  At home  The chief complaint leading to consultation is:  Bilateral hand symptoms  Visit type: Virtual visit with audio only (telephone)  Total time spent in medical discussion with patient:  12 minutes  Total time spent on date of the encounter:  12 minutes       The reason for the audio only service rather than synchronous audio and video virtual visit was related to technical difficulties or patient preference/necessity.       Each patient to whom I provide medical services by telemedicine is:  (1) informed of the relationship between the physician and patient and the respective role of any other health care provider with respect to management of the patient; and (2) notified that they may decline to receive medical services by telemedicine and may withdraw from such care at any time. Patient verbally consented to receive this service via voice-only telephone call.       HPI:  50-year-old female with bilateral hand symptoms including numbness tingling     Assessment and plan:  Recent nerve conduction study test showed no evidence of carpal tunnel in fact they were normal   I went over these findings with the patient today   She does report some improvement with the injection last visit   I recommended she continue with wrist splints at night   Keep an eye on symptoms   Follow up 1-2 months for re-evaluation                        This service was not originating from a related E/M service provided within the previous 7 days nor will  to an E/M service or procedure within the next 24 hours or my soonest available appointment.  Prevailing standard of care was able to be met in this audio-only visit.

## 2025-04-22 ENCOUNTER — INFUSION (OUTPATIENT)
Dept: INFUSION THERAPY | Facility: HOSPITAL | Age: 50
End: 2025-04-22
Attending: INTERNAL MEDICINE
Payer: MEDICAID

## 2025-04-22 VITALS
HEART RATE: 60 BPM | OXYGEN SATURATION: 97 % | RESPIRATION RATE: 18 BRPM | SYSTOLIC BLOOD PRESSURE: 170 MMHG | TEMPERATURE: 98 F | DIASTOLIC BLOOD PRESSURE: 89 MMHG

## 2025-04-22 DIAGNOSIS — E61.1 IRON DEFICIENCY: Primary | ICD-10-CM

## 2025-04-22 PROCEDURE — 63600175 PHARM REV CODE 636 W HCPCS: Performed by: INTERNAL MEDICINE

## 2025-04-22 PROCEDURE — 25000003 PHARM REV CODE 250: Performed by: INTERNAL MEDICINE

## 2025-04-22 PROCEDURE — A4216 STERILE WATER/SALINE, 10 ML: HCPCS | Performed by: INTERNAL MEDICINE

## 2025-04-22 PROCEDURE — 96374 THER/PROPH/DIAG INJ IV PUSH: CPT

## 2025-04-22 RX ORDER — SODIUM CHLORIDE 0.9 % (FLUSH) 0.9 %
10 SYRINGE (ML) INJECTION
OUTPATIENT
Start: 2025-04-29

## 2025-04-22 RX ORDER — SODIUM CHLORIDE 0.9 % (FLUSH) 0.9 %
10 SYRINGE (ML) INJECTION
Status: DISCONTINUED | OUTPATIENT
Start: 2025-04-22 | End: 2025-04-22 | Stop reason: HOSPADM

## 2025-04-22 RX ORDER — DIPHENHYDRAMINE HYDROCHLORIDE 50 MG/ML
50 INJECTION, SOLUTION INTRAMUSCULAR; INTRAVENOUS ONCE AS NEEDED
OUTPATIENT
Start: 2025-04-29

## 2025-04-22 RX ORDER — HEPARIN 100 UNIT/ML
500 SYRINGE INTRAVENOUS
OUTPATIENT
Start: 2025-04-29

## 2025-04-22 RX ORDER — SODIUM FERRIC GLUCONATE COMPLEX IN SUCROSE 12.5 MG/ML
125 INJECTION INTRAVENOUS
OUTPATIENT
Start: 2025-04-29

## 2025-04-22 RX ORDER — EPINEPHRINE 0.3 MG/.3ML
0.3 INJECTION SUBCUTANEOUS ONCE AS NEEDED
Status: DISCONTINUED | OUTPATIENT
Start: 2025-04-22 | End: 2025-04-22 | Stop reason: HOSPADM

## 2025-04-22 RX ORDER — DIPHENHYDRAMINE HYDROCHLORIDE 50 MG/ML
50 INJECTION, SOLUTION INTRAMUSCULAR; INTRAVENOUS ONCE AS NEEDED
Status: DISCONTINUED | OUTPATIENT
Start: 2025-04-22 | End: 2025-04-22 | Stop reason: HOSPADM

## 2025-04-22 RX ORDER — EPINEPHRINE 0.3 MG/.3ML
0.3 INJECTION SUBCUTANEOUS ONCE AS NEEDED
OUTPATIENT
Start: 2025-04-29

## 2025-04-22 RX ORDER — SODIUM FERRIC GLUCONATE COMPLEX IN SUCROSE 12.5 MG/ML
125 INJECTION INTRAVENOUS
Status: COMPLETED | OUTPATIENT
Start: 2025-04-22 | End: 2025-04-22

## 2025-04-22 RX ADMIN — SODIUM FERRIC GLUCONATE COMPLEX IN SUCROSE 125 MG: 12.5 INJECTION INTRAVENOUS at 08:04

## 2025-04-22 RX ADMIN — Medication 10 ML: at 08:04

## 2025-04-22 NOTE — NURSING
Patient tolerated Ferrlecit ivp #5 well today. PIV removed, catheter tip intact. NAD noted upon discharge. Plan to rtc next Tuesday. Discharged home, ambulated independently.

## 2025-04-29 ENCOUNTER — TELEPHONE (OUTPATIENT)
Dept: INFUSION THERAPY | Facility: HOSPITAL | Age: 50
End: 2025-04-29
Payer: MEDICAID

## 2025-04-29 NOTE — TELEPHONE ENCOUNTER
Pt called infusion center to cancel appointment this morning, 4/29/25, for IV iron. Pt states she will come to next weeks scheduled appointment 5/6/25 at 8am.

## 2025-05-01 ENCOUNTER — PATIENT MESSAGE (OUTPATIENT)
Dept: ORTHOPEDICS | Facility: CLINIC | Age: 50
End: 2025-05-01
Payer: MEDICAID

## 2025-05-06 ENCOUNTER — INFUSION (OUTPATIENT)
Dept: INFUSION THERAPY | Facility: HOSPITAL | Age: 50
End: 2025-05-06
Attending: INTERNAL MEDICINE
Payer: MEDICAID

## 2025-05-06 VITALS
RESPIRATION RATE: 18 BRPM | DIASTOLIC BLOOD PRESSURE: 79 MMHG | OXYGEN SATURATION: 96 % | HEART RATE: 58 BPM | SYSTOLIC BLOOD PRESSURE: 160 MMHG

## 2025-05-06 DIAGNOSIS — E61.1 IRON DEFICIENCY: Primary | ICD-10-CM

## 2025-05-06 PROCEDURE — 25000003 PHARM REV CODE 250: Performed by: INTERNAL MEDICINE

## 2025-05-06 PROCEDURE — 96374 THER/PROPH/DIAG INJ IV PUSH: CPT

## 2025-05-06 PROCEDURE — A4216 STERILE WATER/SALINE, 10 ML: HCPCS | Performed by: INTERNAL MEDICINE

## 2025-05-06 PROCEDURE — 63600175 PHARM REV CODE 636 W HCPCS: Performed by: INTERNAL MEDICINE

## 2025-05-06 RX ORDER — DIPHENHYDRAMINE HYDROCHLORIDE 50 MG/ML
50 INJECTION, SOLUTION INTRAMUSCULAR; INTRAVENOUS ONCE AS NEEDED
Status: DISCONTINUED | OUTPATIENT
Start: 2025-05-06 | End: 2025-05-06 | Stop reason: HOSPADM

## 2025-05-06 RX ORDER — EPINEPHRINE 0.3 MG/.3ML
0.3 INJECTION SUBCUTANEOUS ONCE AS NEEDED
OUTPATIENT
Start: 2025-05-13

## 2025-05-06 RX ORDER — DIPHENHYDRAMINE HYDROCHLORIDE 50 MG/ML
50 INJECTION, SOLUTION INTRAMUSCULAR; INTRAVENOUS ONCE AS NEEDED
OUTPATIENT
Start: 2025-05-13

## 2025-05-06 RX ORDER — SODIUM CHLORIDE 0.9 % (FLUSH) 0.9 %
10 SYRINGE (ML) INJECTION
Status: DISCONTINUED | OUTPATIENT
Start: 2025-05-06 | End: 2025-05-06 | Stop reason: HOSPADM

## 2025-05-06 RX ORDER — SODIUM FERRIC GLUCONATE COMPLEX IN SUCROSE 12.5 MG/ML
125 INJECTION INTRAVENOUS
Status: COMPLETED | OUTPATIENT
Start: 2025-05-06 | End: 2025-05-06

## 2025-05-06 RX ORDER — HEPARIN 100 UNIT/ML
500 SYRINGE INTRAVENOUS
OUTPATIENT
Start: 2025-05-13

## 2025-05-06 RX ORDER — EPINEPHRINE 0.3 MG/.3ML
0.3 INJECTION SUBCUTANEOUS ONCE AS NEEDED
Status: DISCONTINUED | OUTPATIENT
Start: 2025-05-06 | End: 2025-05-06 | Stop reason: HOSPADM

## 2025-05-06 RX ORDER — SODIUM CHLORIDE 0.9 % (FLUSH) 0.9 %
10 SYRINGE (ML) INJECTION
OUTPATIENT
Start: 2025-05-13

## 2025-05-06 RX ORDER — SODIUM FERRIC GLUCONATE COMPLEX IN SUCROSE 12.5 MG/ML
125 INJECTION INTRAVENOUS
OUTPATIENT
Start: 2025-05-13

## 2025-05-06 RX ADMIN — SODIUM FERRIC GLUCONATE COMPLEX IN SUCROSE 125 MG: 12.5 INJECTION INTRAVENOUS at 08:05

## 2025-05-06 RX ADMIN — SODIUM CHLORIDE: 9 INJECTION, SOLUTION INTRAVENOUS at 08:05

## 2025-05-06 RX ADMIN — Medication 10 ML: at 08:05

## 2025-05-06 NOTE — PLAN OF CARE
Pt received IVP Ferrlecit dose 6/8. Pt tolerated it well. AVS sent to portal. Next appointment scheduled. Discharged with no acute distress.

## 2025-05-12 ENCOUNTER — HOSPITAL ENCOUNTER (EMERGENCY)
Facility: HOSPITAL | Age: 50
Discharge: HOME | End: 2025-05-13
Attending: EMERGENCY MEDICINE
Payer: MEDICAID

## 2025-05-12 DIAGNOSIS — R10.11 RUQ ABDOMINAL PAIN: ICD-10-CM

## 2025-05-12 DIAGNOSIS — R42 LIGHTHEADEDNESS: Primary | ICD-10-CM

## 2025-05-12 LAB
ABSOLUTE EOSINOPHIL (OHS): 0.19 K/UL
ABSOLUTE MONOCYTE (OHS): 0.95 K/UL (ref 0.3–1)
ABSOLUTE NEUTROPHIL COUNT (OHS): 7.46 K/UL (ref 1.8–7.7)
ALBUMIN SERPL BCP-MCNC: 4.5 G/DL (ref 3.5–5.2)
ALP SERPL-CCNC: 106 UNIT/L (ref 38–126)
ALT SERPL W/O P-5'-P-CCNC: 18 UNIT/L (ref 10–44)
ANION GAP (OHS): 10 MMOL/L (ref 8–16)
AST SERPL-CCNC: 17 UNIT/L (ref 15–46)
BASOPHILS # BLD AUTO: 0.07 K/UL
BASOPHILS NFR BLD AUTO: 0.5 %
BILIRUB SERPL-MCNC: 0.3 MG/DL (ref 0.1–1)
BILIRUB UR QL STRIP.AUTO: NEGATIVE
BUN SERPL-MCNC: 17 MG/DL (ref 7–17)
CALCIUM SERPL-MCNC: 9.8 MG/DL (ref 8.7–10.5)
CHLORIDE SERPL-SCNC: 102 MMOL/L (ref 95–110)
CLARITY UR: CLEAR
CO2 SERPL-SCNC: 26 MMOL/L (ref 23–29)
COLOR UR AUTO: YELLOW
CREAT SERPL-MCNC: 0.7 MG/DL (ref 0.5–1.4)
ERYTHROCYTE [DISTWIDTH] IN BLOOD BY AUTOMATED COUNT: 13.8 % (ref 11.5–14.5)
GFR SERPLBLD CREATININE-BSD FMLA CKD-EPI: >60 ML/MIN/1.73/M2
GLUCOSE SERPL-MCNC: 127 MG/DL (ref 70–110)
GLUCOSE UR QL STRIP: NEGATIVE
HCT VFR BLD AUTO: 44.6 % (ref 37–48.5)
HGB BLD-MCNC: 15.1 GM/DL (ref 12–16)
HGB UR QL STRIP: NEGATIVE
HOLD SPECIMEN: NORMAL
IMM GRANULOCYTES # BLD AUTO: 0.04 K/UL (ref 0–0.04)
IMM GRANULOCYTES NFR BLD AUTO: 0.3 % (ref 0–0.5)
KETONES UR QL STRIP: NEGATIVE
LEUKOCYTE ESTERASE UR QL STRIP: NEGATIVE
LIPASE SERPL-CCNC: 83 U/L (ref 23–300)
LYMPHOCYTES # BLD AUTO: 4.87 K/UL (ref 1–4.8)
MCH RBC QN AUTO: 30 PG (ref 27–31)
MCHC RBC AUTO-ENTMCNC: 33.9 G/DL (ref 32–36)
MCV RBC AUTO: 89 FL (ref 82–98)
NITRITE UR QL STRIP: NEGATIVE
NT-PROBNP SERPL-MCNC: 108 PG/ML (ref 5–900)
NUCLEATED RBC (/100WBC) (OHS): 0 /100 WBC
PH UR STRIP: 7 [PH]
PLATELET # BLD AUTO: 372 K/UL (ref 150–450)
PMV BLD AUTO: 10.2 FL (ref 9.2–12.9)
POCT GLUCOSE: 144 MG/DL (ref 70–110)
POTASSIUM SERPL-SCNC: 3.5 MMOL/L (ref 3.5–5.1)
PROT SERPL-MCNC: 7.8 GM/DL (ref 6–8.4)
PROT UR QL STRIP: NEGATIVE
RBC # BLD AUTO: 5.04 M/UL (ref 4–5.4)
RELATIVE EOSINOPHIL (OHS): 1.4 %
RELATIVE LYMPHOCYTE (OHS): 35.9 % (ref 18–48)
RELATIVE MONOCYTE (OHS): 7 % (ref 4–15)
RELATIVE NEUTROPHIL (OHS): 54.9 % (ref 38–73)
SODIUM SERPL-SCNC: 138 MMOL/L (ref 136–145)
SP GR UR STRIP: 1.02
TROPONIN I SERPL DL<=0.01 NG/ML-MCNC: <0.012 NG/ML (ref 0–0.03)
UROBILINOGEN UR STRIP-ACNC: NEGATIVE EU/DL
WBC # BLD AUTO: 13.58 K/UL (ref 3.9–12.7)

## 2025-05-12 PROCEDURE — 83690 ASSAY OF LIPASE: CPT | Mod: ER | Performed by: PHYSICIAN ASSISTANT

## 2025-05-12 PROCEDURE — 85025 COMPLETE CBC W/AUTO DIFF WBC: CPT | Mod: ER | Performed by: PHYSICIAN ASSISTANT

## 2025-05-12 PROCEDURE — 84484 ASSAY OF TROPONIN QUANT: CPT | Mod: ER | Performed by: PHYSICIAN ASSISTANT

## 2025-05-12 PROCEDURE — 93010 ELECTROCARDIOGRAM REPORT: CPT | Mod: ,,, | Performed by: INTERNAL MEDICINE

## 2025-05-12 PROCEDURE — 83880 ASSAY OF NATRIURETIC PEPTIDE: CPT | Mod: ER | Performed by: PHYSICIAN ASSISTANT

## 2025-05-12 PROCEDURE — 81003 URINALYSIS AUTO W/O SCOPE: CPT | Mod: ER | Performed by: PHYSICIAN ASSISTANT

## 2025-05-12 PROCEDURE — 25000003 PHARM REV CODE 250: Mod: ER | Performed by: EMERGENCY MEDICINE

## 2025-05-12 PROCEDURE — 99285 EMERGENCY DEPT VISIT HI MDM: CPT | Mod: 25,ER

## 2025-05-12 PROCEDURE — 93005 ELECTROCARDIOGRAM TRACING: CPT | Mod: ER

## 2025-05-12 PROCEDURE — 80053 COMPREHEN METABOLIC PANEL: CPT | Mod: ER | Performed by: PHYSICIAN ASSISTANT

## 2025-05-12 PROCEDURE — 82962 GLUCOSE BLOOD TEST: CPT | Mod: ER

## 2025-05-12 RX ORDER — ONDANSETRON 4 MG/1
4 TABLET, ORALLY DISINTEGRATING ORAL
Status: COMPLETED | OUTPATIENT
Start: 2025-05-12 | End: 2025-05-12

## 2025-05-12 RX ADMIN — ONDANSETRON 4 MG: 4 TABLET, ORALLY DISINTEGRATING ORAL at 11:05

## 2025-05-13 VITALS
OXYGEN SATURATION: 95 % | BODY MASS INDEX: 46.07 KG/M2 | WEIGHT: 260 LBS | TEMPERATURE: 98 F | HEART RATE: 68 BPM | HEIGHT: 63 IN | RESPIRATION RATE: 20 BRPM | DIASTOLIC BLOOD PRESSURE: 89 MMHG | SYSTOLIC BLOOD PRESSURE: 130 MMHG

## 2025-05-13 LAB — TROPONIN I SERPL DL<=0.01 NG/ML-MCNC: <0.012 NG/ML (ref 0–0.03)

## 2025-05-13 PROCEDURE — 84484 ASSAY OF TROPONIN QUANT: CPT | Mod: ER | Performed by: EMERGENCY MEDICINE

## 2025-05-13 PROCEDURE — 25000003 PHARM REV CODE 250: Mod: ER | Performed by: EMERGENCY MEDICINE

## 2025-05-13 RX ORDER — HYOSCYAMINE SULFATE 0.12 MG/1
0.12 TABLET SUBLINGUAL
Status: COMPLETED | OUTPATIENT
Start: 2025-05-13 | End: 2025-05-13

## 2025-05-13 RX ADMIN — HYOSCYAMINE SULFATE 0.12 MG: 0.12 TABLET SUBLINGUAL at 01:05

## 2025-05-13 NOTE — ED PROVIDER NOTES
"ED Provider Note - 5/12/2025    History     Chief Complaint   Patient presents with    Dizziness     Pt reports dizziness, edema in legs and abdomen, RLQ pain, "head is hot and nauseated." Symptoms started at 1700.      Patient currently presents with complaint of dizziness.  Onset was first noted 4 hours ago.  Sensation is described as lightheadedness.  This has occurred previously.  There have not been episodes of syncope or other LOC.  There is not associated SOB or CP but she does endorse nausea.  There has not been suspicion of dehydration.   Patient has not initiated new medications.  Patient reports that she felt there was swelling in her legs and abdomen though this appears to have resolved at the time of our encounter.  Patient later notes that she has also developed a right upper quadrant discomfort as well.  Denies any changes in bowel habits.        Review of patient's allergies indicates:   Allergen Reactions    Ace inhibitors Other (See Comments)     cough     Past Medical History:   Diagnosis Date    Anxiety     Diverticulitis     Endometriosis     General anesthetics causing adverse effect in therapeutic use     Hypertension     Kidney stone     Prolactinoma 2011    Sliding hiatal hernia     Urinary tract infection     Vaginal infection      Past Surgical History:   Procedure Laterality Date    ARTHROSCOPY OF KNEE Left     BRAIN TUMOR EXCISION  9/2011    removal of prolactinoma    COLONOSCOPY N/A 8/27/2020    Procedure: COLONOSCOPY;  Surgeon: Valentino Negro MD;  Location: [facility];  Service: Endoscopy;  Laterality: N/A;    CYSTOSCOPY      CYSTOSCOPY W/ URETERAL STENT PLACEMENT  2016    CYSTOSCOPY W/ URETERAL STENT REMOVAL  2016    ESOPHAGEAL MANOMETRY N/A 6/3/2019    Procedure: MANOMETRY, ESOPHAGUS;  Surgeon: Bhupinder Schmitz MD;  Location: [address]);  Service: Endoscopy;  Laterality: N/A;    ESOPHAGOGASTRODUODENOSCOPY N/A 4/30/2019    Procedure: ESOPHAGOGASTRODUODENOSCOPY (EGD);  " Surgeon: Altagracia Bose MD;  Location: [facility] ENDO;  Service: Endoscopy;  Laterality: N/A;    ESOPHAGOGASTRODUODENOSCOPY N/A 8/2/2021    Procedure: EGD (ESOPHAGOGASTRODUODENOSCOPY);  Surgeon: Altagracia Bose MD;  Location: [facility] ENDO;  Service: Endoscopy;  Laterality: N/A;    EXTRACORPOREAL SHOCK WAVE LITHOTRIPSY Left 3/7/2024    Procedure: LITHOTRIPSY, ESWL;  Surgeon: Roverto Harley MD;  Location: [facility] OR;  Service: Urology;  Laterality: Left;    FOOT HARDWARE REMOVAL Right 6/16/2020    Procedure: REMOVAL, HARDWARE, FOOT;  Surgeon: Adrianna Tillman DPM;  Location: [facility] OR;  Service: Podiatry;  Laterality: Right;    HARVESTING OF BONE GRAFT Right 7/16/2019    Procedure: SURGICAL PROCUREMENT, BONE GRAFT;  Surgeon: Adrianna Tillman DPM;  Location: [facility] OR;  Service: Podiatry;  Laterality: Right;    HYSTERECTOMY  3/16/15    TLH/BSO for Endometriosis, AUB, fibroids, cyst    INSERTION, ELECTRODE LEAD, NEUROSTIMULATOR, PERIPHERAL Left 7/15/2024    Procedure: INSERTION,ELECTRODE LEAD,NEUROSTIMULATOR,PERIPHERAL;  Surgeon: Calvin Nguyen MD;  Location: [facility] OR;  Service: Pain Management;  Laterality: Left;  stimrouter trial    INSERTION, ELECTRODE LEAD, NEUROSTIMULATOR, PERIPHERAL Left 8/12/2024    Procedure: INSERTION,ELECTRODE LEAD,NEUROSTIMULATOR,PERIPHERAL;  Surgeon: Calvin Nguyen MD;  Location: [facility] OR;  Service: Pain Management;  Laterality: Left;  stimrouter permanent    INSERTION, ELECTRODE LEAD, NEUROSTIMULATOR, PERIPHERAL Right 10/7/2024    Procedure: INSERTION,ELECTRODE LEAD,NEUROSTIMULATOR,PERIPHERAL;  Surgeon: Calvin Nguyen MD;  Location: [facility] OR;  Service: Pain Management;  Laterality: Right;  stimrouter trial    INSERTION, ELECTRODE LEAD, NEUROSTIMULATOR, PERIPHERAL Right 10/28/2024    Procedure: INSERTION,ELECTRODE LEAD,NEUROSTIMULATOR,PERIPHERAL;  Surgeon: Calvin Nguyen MD;  Location: [facility] OR;  Service: Pain Management;  Laterality: Right;  stimrouter permanent    LITHOTRIPSY      MANDIBLE SURGERY  2002     "severe overbite correction    PELVIC LAPAROSCOPY  2014    Dx scope, chromopertubation-Endometriosis    TONSILLECTOMY, ADENOIDECTOMY       Family History   Problem Relation Name Age of Onset    Cancer Mother      Breast cancer Neg Hx      Ovarian cancer Neg Hx      Kidney disease Neg Hx       Social History[1]     Review of Systems   Constitutional:  Positive for fatigue. Negative for chills and fever.   HENT:  Negative for congestion and rhinorrhea.    Respiratory:  Negative for cough and shortness of breath.    Cardiovascular:  Negative for chest pain and palpitations.   Gastrointestinal:  Positive for nausea. Negative for abdominal pain, diarrhea and vomiting.   Genitourinary:  Negative for difficulty urinating and dysuria.   Neurological:  Positive for light-headedness. Negative for dizziness, syncope, facial asymmetry, speech difficulty, numbness and headaches.     The patient's list of active medical problems, social history, medications, and allergies as documented per RN staff has been reviewed.     Physical Exam     Vitals:    05/12/25 2050 05/12/25 2300 05/13/25 0030 05/13/25 0050   BP: (!) 159/99  100/61    Pulse: 84 (!) 56 (!) 57 (!) 54   Resp: 20      Temp: 98.1 °F (36.7 °C)      SpO2: 97%  96% 97%   Weight: 117.9 kg (260 lb)      Height: 5' 3" (1.6 m)       05/13/25 0100 05/13/25 0120 05/13/25 0121 05/13/25 0122   BP: (!) 107/59 108/62 129/73 130/89   Pulse: (!) 55 (!) 54 (!) 56 68   Resp:       Temp:       SpO2: 95%      Weight:       Height:         Physical Exam    Nursing note and vitals reviewed.  Constitutional: She appears well-developed and well-nourished. She is not diaphoretic. No distress.   HENT:   Head: Normocephalic and atraumatic.   Nose: Nose normal. Mouth/Throat: Oropharynx is clear and moist.   Eyes: Conjunctivae are normal. No scleral icterus.   Cardiovascular:  Normal rate, regular rhythm, normal heart sounds and intact distal pulses.           Pulmonary/Chest: Breath sounds normal. " No respiratory distress.   Musculoskeletal:         General: No edema. Normal range of motion.     Neurological: She is alert and oriented to person, place, and time.   Skin: Skin is warm and dry.       ED Procedures   Procedures    MDM  Differential Diagnoses   Based on available history, the working differential diagnoses considered during this evaluation include but are not limited to dehydration, orthostatic hypotension, electrolyte imbalance, symptomatic anemia, acute coronary syndrome, hypo or hyperglycemia, toxidrome/adverse side effect and conversion      LABS     Labs Reviewed   COMPREHENSIVE METABOLIC PANEL - Abnormal       Result Value    Sodium 138      Potassium 3.5      Chloride 102      CO2 26      Glucose 127 (*)     BUN 17      Creatinine 0.7      Calcium 9.8      Protein Total 7.8      Albumin 4.5      Bilirubin Total 0.3            AST 17      ALT 18      Anion Gap 10      eGFR >60     CBC WITH DIFFERENTIAL - Abnormal    WBC 13.58 (*)     RBC 5.04      HGB 15.1      HCT 44.6      MCV 89      MCH 30.0      MCHC 33.9      RDW 13.8      Platelet Count 372      MPV 10.2      Nucleated RBC 0      Neut % 54.9      Lymph % 35.9      Mono % 7.0      Eos % 1.4      Basophil % 0.5      Imm Grans % 0.3      Neut # 7.46      Lymph # 4.87 (*)     Mono # 0.95      Eos # 0.19      Baso # 0.07      Imm Grans # 0.04     POCT GLUCOSE - Abnormal    POCT Glucose 144 (*)    LIPASE - Normal    Lipase Level 83     URINALYSIS, REFLEX TO URINE CULTURE - Normal    Color, UA Yellow      Appearance, UA Clear      pH, UA 7.0      Spec Grav UA 1.020      Protein, UA Negative      Glucose, UA Negative      Ketones, UA Negative      Bilirubin, UA Negative      Blood, UA Negative      Nitrites, UA Negative      Urobilinogen, UA Negative      Leukocyte Esterase, UA Negative     TROPONIN I - Normal    Troponin-I <0.012     NT-PRO NATRIURETIC PEPTIDE - Normal    NT-proBNP 108     TROPONIN I - Normal    Troponin-I <0.012      CBC W/ AUTO DIFFERENTIAL    Narrative:     The following orders were created for panel order CBC Auto Differential.  Procedure                               Abnormality         Status                     ---------                               -----------         ------                     CBC with Differential[[id number]]       Abnormal            Final result                 Please view results for these tests on the individual orders.   GREY TOP URINE HOLD    Extra Tube Hold for add-ons.             Notable findings from my independent interpretations of the labs (if ordered) include:  CMP unremarkable.  CBC notable for mild leukocytosis with white blood cell count of 13.58.  Urinalysis unremarkable.  Troponin level 1. And 2. Both unremarkable.     Imaging     Imaging Results              US Abdomen Limited (Final result)  Result time 05/13/25 07:52:40      Final result by Emmanuel Kan MD (05/13/25 07:52:40)                   Impression:      Gallbladder wall is thickened measuring 3.7 mm.  This can be seen with under distension.  No cholelithiasis or other findings of acute cholecystitis.  Clinical correlation recommended.      Electronically signed by: Emmanuel Kan  Date:    05/13/2025  Time:    07:52               Narrative:    EXAMINATION:  US ABDOMEN LIMITED    CLINICAL HISTORY:  RUQ pain;    TECHNIQUE:  Limited ultrasound of the right upper quadrant of the abdomen  was performed.    COMPARISON:  None available.    FINDINGS:  Pancreas: Visualized portions are unremarkable.    IVC: Visualized portions are unremarkable.    Gallbladder: Gallbladder wall is thickened measuring 3.7 mm.  No other findings to suggest acute cholecystitis.    Biliary system: The common duct is not dilated, measuring 3.2 mm.  No intrahepatic ductal dilatation.    Liver: Normal in size, measuring 17 cm. Homogeneous echotexture. No focal hepatic lesions. MPV flows toward the liver.    Right kidney: Visualized portions are  unremarkable.                                       X-Ray Chest 1 View (Final result)  Result time 05/12/25 21:53:51   Procedure changed from X-Ray Chest AP Portable     Final result by Wilfrido Wheat MD (05/12/25 21:53:51)                   Impression:     No acute cardiopulmonary abnormality.    Finalized on: 5/12/2025 9:53 PM By:  Wilfrido Wheat MD  [facility]# [id number]      2025-05-12 21:55:52.425     [facility]               Narrative:    EXAM:  XR CHEST 1 VIEW    CLINICAL HISTORY: Dizziness    COMPARISON: None available.    FINDINGS: No confluent airspace opacity or consolidation.  There is no evidence of pleural effusion, pneumothorax, or other acute pulmonary disease.  The cardiomediastinal silhouette is within normal limits.  No acute osseous abnormality is evident.                                              Independent interpretation of the chest x-ray shows no evidence of infiltrate, effusion, cardiomegaly, or other acute findings.         EKG     EKG Readings: (Independently Interpreted)   Initial Reading: No STEMI. Previous EKG: Compared with most recent EKG Previous EKG Date: 12/5/2024. Rhythm: Normal Sinus Rhythm. Other Impression: T-wave inversion noted in the inferior and anterior leads similar to comparison study       ED Management/Discussion     Medications   ondansetron disintegrating tablet 4 mg (4 mg Oral Given 5/12/25 4161)   hyoscyamine SL tablet 0.125 mg (0.125 mg Sublingual Given 5/13/25 0116)                                                                 On final assessment, the patient appears suitable for discharge.  Symptoms appear to be largely improved.  Given the time frame involved, I feel that a satisfactory EKG, unremarkable exam, and a series of troponin levels as sufficient to rule out ACS.  Although considered, pulmonary embolus seems highly unlikely given the character of her symptoms.  I see no indication of an emergent process beyond that addressed during our encounter but have  duly counseled the patient/family regarding the need for prompt follow-up as well as the indications that should prompt immediate return to the emergency room.  The patient/family has been provided with language -specific verbal and printed direction regarding our final diagnosis(es) as well as instructions regarding use of OTC and/or Rx medications intended to manage the patient's aforementioned conditions including:  ED Prescriptions    None           Patient has been advised of the following recommended follow-up instructions:  Follow-up Information       Follow up With Specialties Details Why Contact Info    Tiff Berman MD Internal Medicine Schedule an appointment as soon as possible for a visit  for reassessment [address] [place] [address]  [address]  [facility] [id number] [phone]      [facility] - Emergency Dept Emergency Medicine Go to  As needed, If symptoms worsen [address] [facility]  Emergency Department  [facility] [id number] [phone]          The patient/family communicates understanding of all this information and all remaining questions and concerns were addressed at this time.      Referrals:  No orders of the defined types were placed in this encounter.            CLINICAL IMPRESSION    ICD-10-CM ICD-9-CM   1. Lightheadedness  R42 780.4   2. RUQ abdominal pain  R10.11 789.01        ED Disposition Condition    Discharge Stable            Social Drivers of Health     Tobacco Use: Medium Risk (5/12/2025)    Patient History     Smoking Tobacco Use: Former     Smokeless Tobacco Use: Never     Passive Exposure: Not on file   Alcohol Use: Not At Risk (7/3/2024)    AUDIT-C     Frequency of Alcohol Consumption: Never     Average Number of Drinks: Patient does not drink     Frequency of Binge Drinking: Never   Financial Resource Strain: Low Risk  (7/3/2024)    Overall Financial Resource Strain (CARDIA)     Difficulty of Paying Living Expenses: Not hard at all   Food Insecurity:  No Food Insecurity (7/3/2024)    Hunger Vital Sign     Worried About Running Out of Food in the Last Year: Never true     Ran Out of Food in the Last Year: Never true   Transportation Needs: No Transportation Needs (2024)    [facility]    [facility]     Lack of Transportation (Medical): No     Lack of Transportation (Non-Medical): No   Physical Activity: Inactive (7/3/2024)    Exercise Vital Sign     Days of Exercise per Week: 0 days     Minutes of Exercise per Session: 0 min   Stress: Stress Concern Present (7/3/2024)    [employer] - Occupational Stress Questionnaire     Feeling of Stress : To some extent   Housing Stability: Unknown (7/3/2024)    Housing Stability Vital Sign     Unable to Pay for Housing in the Last Year: No     Number of Times Moved in the Last Year: Not on file     Homeless in the Last Year: Not on file   Depression: Low Risk  (2025)    Depression     Last PHQ-4: Flowsheet Data: 0   Utilities: Not At Risk (7/3/2024)    [facility] Utilities     Threatened with loss of utilities: No   Health Literacy: Adequate Health Literacy (7/3/2024)     Health Literacy     Frequency of need for help with medical instructions: Never   Social Isolation: Not on file             [1]   Social History  Tobacco Use    Smoking status: Former     Current packs/day: 0.00     Types: Cigarettes     Start date: 1989     Quit date: 2000     Years since quittin.2    Smokeless tobacco: Never    Tobacco comments:     pt stated she smoked one cig/day   Substance Use Topics    Alcohol use: Never    Drug use: Never        Kyler Edgar MD  25

## 2025-05-13 NOTE — FIRST PROVIDER EVALUATION
" Emergency Department TeleTriage Encounter Note      CHIEF COMPLAINT    Chief Complaint   Patient presents with    Dizziness     Pt reports dizziness, edema in legs and abdomen, RLQ pain, "head is hot and nauseated." Symptoms started at 1700.        VITAL SIGNS   Initial Vitals [05/12/25 2050]   BP Pulse Resp Temp SpO2   (!) 159/99 84 20 98.1 °F (36.7 °C) 97 %      MAP       --            ALLERGIES    Review of patient's allergies indicates:   Allergen Reactions    Ace inhibitors Other (See Comments)     cough       PROVIDER TRIAGE NOTE    50-year-old female presents with abdominal discomfort, nausea, vomiting, stomach pain and leg swelling.  Patient also endorses dizziness.  Symptoms began around 5:00 p.m..    ORDERS  Labs Reviewed   POCT GLUCOSE - Abnormal       Result Value    POCT Glucose 144 (*)        ED Orders (720h ago, onward)      Start Ordered     Status Ordering Provider    05/12/25 2100 05/12/25 2059  EKG 12-lead  Once         Acknowledged CARIN MCKEON    05/12/25 2059 05/12/25 2059  POCT glucose  Once         Final result EMERGENCY, DEPT PHYSICIAN              Virtual Visit Note: The provider triage portion of this emergency department evaluation and documentation was performed via [employer], a HIPAA-compliant telemedicine application, in concert with a tele-presenter in the room. A face to face patient evaluation with one of my colleagues will occur once the patient is placed in an emergency department room.      DISCLAIMER: This note was prepared with M*Modal voice recognition transcription software. Garbled syntax, mangled pronouns, and other bizarre constructions may be attributed to that software system.    "

## 2025-05-14 LAB
OHS QRS DURATION: 80 MS
OHS QTC CALCULATION: 451 MS

## 2025-05-16 ENCOUNTER — TELEPHONE (OUTPATIENT)
Dept: INFUSION THERAPY | Facility: HOSPITAL | Age: 50
End: 2025-05-16
Payer: MEDICAID

## 2025-05-16 NOTE — TELEPHONE ENCOUNTER
Spoke with pt about future apts. Pt confirmed she will be at 5/20 apt. 1 additional apt will need to be scheduled to make up for previous missed apt. Pt understands and agrees.

## 2025-05-20 ENCOUNTER — INFUSION (OUTPATIENT)
Dept: INFUSION THERAPY | Facility: HOSPITAL | Age: 50
End: 2025-05-20
Attending: INTERNAL MEDICINE
Payer: MEDICAID

## 2025-05-20 VITALS
HEART RATE: 63 BPM | RESPIRATION RATE: 20 BRPM | TEMPERATURE: 98 F | OXYGEN SATURATION: 95 % | DIASTOLIC BLOOD PRESSURE: 84 MMHG | SYSTOLIC BLOOD PRESSURE: 170 MMHG

## 2025-05-20 DIAGNOSIS — E61.1 IRON DEFICIENCY: Primary | ICD-10-CM

## 2025-05-20 PROCEDURE — 63600175 PHARM REV CODE 636 W HCPCS: Performed by: INTERNAL MEDICINE

## 2025-05-20 PROCEDURE — A4216 STERILE WATER/SALINE, 10 ML: HCPCS | Performed by: INTERNAL MEDICINE

## 2025-05-20 PROCEDURE — 96374 THER/PROPH/DIAG INJ IV PUSH: CPT

## 2025-05-20 PROCEDURE — 25000003 PHARM REV CODE 250: Performed by: INTERNAL MEDICINE

## 2025-05-20 RX ORDER — HEPARIN 100 UNIT/ML
500 SYRINGE INTRAVENOUS
OUTPATIENT
Start: 2025-05-27

## 2025-05-20 RX ORDER — SODIUM CHLORIDE 0.9 % (FLUSH) 0.9 %
10 SYRINGE (ML) INJECTION
OUTPATIENT
Start: 2025-05-27

## 2025-05-20 RX ORDER — DIPHENHYDRAMINE HYDROCHLORIDE 50 MG/ML
50 INJECTION, SOLUTION INTRAMUSCULAR; INTRAVENOUS ONCE AS NEEDED
OUTPATIENT
Start: 2025-05-27

## 2025-05-20 RX ORDER — EPINEPHRINE 0.3 MG/.3ML
0.3 INJECTION SUBCUTANEOUS ONCE AS NEEDED
OUTPATIENT
Start: 2025-05-27

## 2025-05-20 RX ORDER — SODIUM FERRIC GLUCONATE COMPLEX IN SUCROSE 12.5 MG/ML
125 INJECTION INTRAVENOUS
OUTPATIENT
Start: 2025-05-27

## 2025-05-20 RX ORDER — SODIUM FERRIC GLUCONATE COMPLEX IN SUCROSE 12.5 MG/ML
125 INJECTION INTRAVENOUS
Status: COMPLETED | OUTPATIENT
Start: 2025-05-20 | End: 2025-05-20

## 2025-05-20 RX ORDER — SODIUM CHLORIDE 0.9 % (FLUSH) 0.9 %
10 SYRINGE (ML) INJECTION
Status: DISCONTINUED | OUTPATIENT
Start: 2025-05-20 | End: 2025-05-20 | Stop reason: HOSPADM

## 2025-05-20 RX ADMIN — SODIUM FERRIC GLUCONATE COMPLEX IN SUCROSE 125 MG: 12.5 INJECTION INTRAVENOUS at 08:05

## 2025-05-20 RX ADMIN — Medication 10 ML: at 08:05

## 2025-05-20 NOTE — NURSING
Patient tolerated Ferrlecit ivp #7 well today. PIV removed, catheter tip intact. NAD noted upon discharge. Discharged home, ambulated independently.

## 2025-06-18 NOTE — PATIENT INSTRUCTIONS
1. Urine dipstick  2. Urine cx  3. U/S retroperitoneal complete (STAT).  4. Continue taking tamsulosin 0.4 mg daily for kidney stones.  5. Continue taking toradol as previously prescribed for pain.  6. May take ibuprofen once you're out of toradol.   7. Follow-up pending U/S and urine cx results.    How Severe Are Your Spot(S)?: moderate Additional History: Declined removal of pants and shoes during the exam.

## 2025-07-07 NOTE — PROGRESS NOTES
Follow-up in 4 weeks.     Raj Dubon, NP       Patient Contacted for the patient to call back and schedule the following:    Appointment type: Return Cardiology  Provider: Teri Arcos  Return date: 8/5/2025  Specialty phone number: [phone] Opt 1  Additional appointment(s) needed: NA  Additonal Notes: Appt is for Post-[facility] follow up

## 2025-08-06 ENCOUNTER — OFFICE VISIT (OUTPATIENT)
Dept: GASTROENTEROLOGY | Facility: CLINIC | Age: 50
End: 2025-08-06
Payer: MEDICAID

## 2025-08-06 DIAGNOSIS — R10.10 UPPER ABDOMINAL PAIN: Primary | ICD-10-CM

## 2025-08-06 PROCEDURE — 98006 SYNCH AUDIO-VIDEO EST MOD 30: CPT | Mod: 95,,, | Performed by: NURSE PRACTITIONER

## 2025-08-06 PROCEDURE — 1159F MED LIST DOCD IN RCRD: CPT | Mod: CPTII,95,, | Performed by: NURSE PRACTITIONER

## 2025-08-06 PROCEDURE — 1160F RVW MEDS BY RX/DR IN RCRD: CPT | Mod: CPTII,95,, | Performed by: NURSE PRACTITIONER

## 2025-08-06 RX ORDER — ESTRADIOL 0.1 MG/G
CREAM VAGINAL
COMMUNITY

## 2025-08-06 RX ORDER — CARIPRAZINE 1.5 MG/1
1.5 CAPSULE, GELATIN COATED ORAL
COMMUNITY
Start: 2025-07-14

## 2025-08-06 RX ORDER — DICYCLOMINE HYDROCHLORIDE 20 MG/1
20 TABLET ORAL 3 TIMES DAILY PRN
Qty: 60 TABLET | Refills: 2 | Status: SHIPPED | OUTPATIENT
Start: 2025-08-06

## 2025-08-07 NOTE — PROGRESS NOTES
Subjective:       Patient ID: Inna Gallardo is a 50 y.o. female.    Chief Complaint: Abdominal Pain (Upper)    The patient location is: [place]  The chief complaint leading to consultation is: abdominal pain     Visit type: audiovisual     Face to Face time with patient: 20 minutes  30 minutes of total time spent on the encounter, which includes face to face time and non-face to face time preparing to see the patient (eg, review of tests), Obtaining and/or reviewing separately obtained history, Documenting clinical information in the electronic or other health record, Independently interpreting results (not separately reported) and communicating results to the patient/family/caregiver, or Care coordination (not separately reported).            Each patient to whom he or she provides medical services by telemedicine is:  (1) informed of the relationship between the physician and patient and the respective role of any other health care provider with respect to management of the patient; and (2) notified that he or she may decline to receive medical services by telemedicine and may withdraw from such care at any time.     Notes:      51 y/o female with hx of GERD, diverticulitis, and kidney stones presents for virtual follow up with c/o abdominal pain. Last seen GI clinic 4/2024. Patient reports cramping pain across upper abdomen after eating. Has associated nausea without vomiting. Reflux symptoms controlled with daily PPI. Denies constipation. Taking Linzess daily.    Abdominal Pain  This is a new problem. The current episode started 1 to 4 weeks ago. Pain location: upper abdomen. The quality of the pain is aching. The abdominal pain does not radiate. Associated symptoms include nausea. Pertinent negatives include no anorexia, constipation, diarrhea, hematochezia, melena, vomiting or weight loss. The pain is aggravated by eating. The pain is relieved by Nothing. She has tried nothing for the symptoms. Her past  medical history is significant for GERD.       Endoscopy History  - 8/27/2020 colonoscopy revealed diverticulosis in the sigmoid colon; examination was otherwise normal on direct and retroflexion views; No specimens collected.  - 8/02/2021 EGD revealed normal upper third of esophagus, middle third of esophagus and lower third of esophagus; benign-appearing esophageal stenosis. Dilated; erythematous mucosa in the antrum. Biopsied; normal examined duodenum; 2 cm hiatal hernia.    Past Medical History:   Diagnosis Date    Anxiety     Diverticulitis     Endometriosis     General anesthetics causing adverse effect in therapeutic use     Hypertension     Kidney stone     Prolactinoma 2011    Sliding hiatal hernia     Urinary tract infection     Vaginal infection        Past Surgical History:   Procedure Laterality Date    ARTHROSCOPY OF KNEE Left     BRAIN TUMOR EXCISION  9/2011    removal of prolactinoma    COLONOSCOPY N/A 8/27/2020    Procedure: COLONOSCOPY;  Surgeon: Valentino Negro MD;  Location: [facility];  Service: Endoscopy;  Laterality: N/A;    CYSTOSCOPY      CYSTOSCOPY W/ URETERAL STENT PLACEMENT  2016    CYSTOSCOPY W/ URETERAL STENT REMOVAL  2016    ESOPHAGEAL MANOMETRY N/A 6/3/2019    Procedure: MANOMETRY, ESOPHAGUS;  Surgeon: Bhupinder Schmitz MD;  [facility]  Service: Endoscopy;  Laterality: N/A;    ESOPHAGOGASTRODUODENOSCOPY N/A 4/30/2019    Procedure: ESOPHAGOGASTRODUODENOSCOPY (EGD);  Surgeon: Altagracia Bose MD;  Location: [facility];  Service: Endoscopy;  Laterality: N/A;    ESOPHAGOGASTRODUODENOSCOPY N/A 8/2/2021    Procedure: EGD (ESOPHAGOGASTRODUODENOSCOPY);  Surgeon: Altagracia Bose MD;  Location: [facility];  Service: Endoscopy;  Laterality: N/A;    EXTRACORPOREAL SHOCK WAVE LITHOTRIPSY Left 3/7/2024    Procedure: LITHOTRIPSY, ESWL;  Surgeon: Roverto Harley MD;  Location: [facility] OR;  Service: Urology;  Laterality: Left;    FOOT HARDWARE REMOVAL Right 6/16/2020     Procedure: REMOVAL, HARDWARE, FOOT;  Surgeon: Adrianna Tillman DPM;  Location: [facility] OR;  Service: Podiatry;  Laterality: Right;    HARVESTING OF BONE GRAFT Right 7/16/2019    Procedure: SURGICAL PROCUREMENT, BONE GRAFT;  Surgeon: Adrianna Tillman DPM;  Location: [facility] OR;  Service: Podiatry;  Laterality: Right;    HYSTERECTOMY  3/16/15    TLH/BSO for Endometriosis, AUB, fibroids, cyst    INSERTION, ELECTRODE LEAD, NEUROSTIMULATOR, PERIPHERAL Left 7/15/2024    Procedure: INSERTION,ELECTRODE LEAD,NEUROSTIMULATOR,PERIPHERAL;  Surgeon: Calvin Nguyen MD;  Location: [facility] OR;  Service: Pain Management;  Laterality: Left;  stimrouter trial    INSERTION, ELECTRODE LEAD, NEUROSTIMULATOR, PERIPHERAL Left 8/12/2024    Procedure: INSERTION,ELECTRODE LEAD,NEUROSTIMULATOR,PERIPHERAL;  Surgeon: Calvin Nguyen MD;  Location: [facility] OR;  Service: Pain Management;  Laterality: Left;  stimrouter permanent    INSERTION, ELECTRODE LEAD, NEUROSTIMULATOR, PERIPHERAL Right 10/7/2024    Procedure: INSERTION,ELECTRODE LEAD,NEUROSTIMULATOR,PERIPHERAL;  Surgeon: Calvin Nguyen MD;  Location: [facility] OR;  Service: Pain Management;  Laterality: Right;  stimrouter trial    INSERTION, ELECTRODE LEAD, NEUROSTIMULATOR, PERIPHERAL Right 10/28/2024    Procedure: INSERTION,ELECTRODE LEAD,NEUROSTIMULATOR,PERIPHERAL;  Surgeon: Calvin Nguyen MD;  Location: [facility] OR;  Service: Pain Management;  Laterality: Right;  stimrouter permanent    LITHOTRIPSY      MANDIBLE SURGERY  2002    severe overbite correction    PELVIC LAPAROSCOPY  2014    Dx scope, chromopertubation-Endometriosis    TONSILLECTOMY, ADENOIDECTOMY         Family History   Problem Relation Name Age of Onset    Cancer Mother      Breast cancer Neg Hx      Ovarian cancer Neg Hx      Kidney disease Neg Hx         Social History     Socioeconomic History    Marital status:     Years of education: college   Tobacco Use    Smoking status: Former     Current packs/day: 0.00     Types: Cigarettes     Start date:  1989     Quit date: 2000     Years since quittin.5    Smokeless tobacco: Never    Tobacco comments:     pt stated she smoked one cig/day   Substance and Sexual Activity    Alcohol use: Never    Drug use: Never    Sexual activity: Yes     Partners: Male     Birth control/protection: Surgical, See Surgical Hx     Social Drivers of Health     Financial Resource Strain: Low Risk  (7/3/2024)    Overall Financial Resource Strain (CARDIA)     Difficulty of Paying Living Expenses: Not hard at all   Food Insecurity: No Food Insecurity (7/3/2024)    Hunger Vital Sign     Worried About Running Out of Food in the Last Year: Never true     Ran Out of Food in the Last Year: Never true   Transportation Needs: No Transportation Needs (2024)    Received from [facility] - Transportation     Lack of Transportation (Medical): No     Lack of Transportation (Non-Medical): No   Physical Activity: Inactive (7/3/2024)    Exercise Vital Sign     Days of Exercise per Week: 0 days     Minutes of Exercise per Session: 0 min   Stress: Stress Concern Present (7/3/2024)    [employer] [place] [employer] [facility] - Occupational Stress Questionnaire     Feeling of Stress : To some extent   Housing Stability: Unknown (7/3/2024)    Housing Stability Vital Sign     Unable to Pay for Housing in the Last Year: No       Review of Systems   Constitutional:  Negative for appetite change, unexpected weight change and weight loss.   HENT:  Negative for trouble swallowing.    Respiratory:  Negative for shortness of breath.    Cardiovascular:  Negative for chest pain.   Gastrointestinal:  Positive for abdominal pain and nausea. Negative for anorexia, constipation, diarrhea, hematochezia, melena and vomiting.   Hematological:  Negative for adenopathy. Does not bruise/bleed easily.   Psychiatric/Behavioral:  Negative for dysphoric mood.          Objective:     There were no vitals filed for this visit.       Physical  Exam  Constitutional:       General: She is not in acute distress.     Appearance: Normal appearance. She is not ill-appearing.   HENT:      Head: Normocephalic.   Eyes:      Conjunctiva/sclera: Conjunctivae normal.   Pulmonary:      Effort: Pulmonary effort is normal. No respiratory distress.   Skin:     Coloration: Skin is not jaundiced or pale.   Neurological:      Mental Status: She is alert and oriented to person, place, and time.   Psychiatric:         Mood and Affect: Mood normal.         Behavior: Behavior normal.               Assessment:         ICD-10-CM ICD-9-CM   1. Upper abdominal pain  R10.10 789.09       Plan:       Upper abdominal pain  -     X-Ray Abdomen Flat And Erect; Future; Expected date: 08/06/2025  -     dicyclomine (BENTYL) 20 mg tablet; Take 1 tablet (20 mg total) by mouth 3 (three) times daily as needed (abdominal pain).  Dispense: 60 tablet; Refill: 2      Follow up if symptoms worsen or fail to improve.     Patient's Medications   New Prescriptions    DICYCLOMINE (BENTYL) 20 MG TABLET    Take 1 tablet (20 mg total) by mouth 3 (three) times daily as needed (abdominal pain).   Previous Medications    ESTRADIOL (ESTRACE) 0.01 % (0.1 MG/GRAM) VAGINAL CREAM    Place vaginally.    LINZESS 145 MCG CAP CAPSULE    TAKE 1 CAPSULE(145 MCG) BY MOUTH DAILY    LOSARTAN-HYDROCHLOROTHIAZIDE 100-25 MG (HYZAAR) 100-25 MG PER TABLET    Take 1 tablet by mouth once daily.    OMEPRAZOLE (PRILOSEC) 40 MG CAPSULE    Take 1 capsule (40 mg total) by mouth every morning.    SERTRALINE (ZOLOFT) 100 MG TABLET    Take 100 mg by mouth every evening.    VRAYLAR 1.5 MG CAP    Take 1.5 mg by mouth.   Modified Medications    No medications on file   Discontinued Medications    BACLOFEN (LIORESAL) 10 MG TABLET    Take 10 mg by mouth 3 (three) times daily as needed.    DOCUSATE SODIUM (COLACE) 100 MG CAPSULE    Take 1 capsule (100 mg total) by mouth 2 (two) times daily.    EPINEPHRINE (EPIPEN) 0.3 MG/0.3 ML ATIN    Inject  0.3 mLs into the muscle as needed.    ERGOCALCIFEROL (ERGOCALCIFEROL) 50,000 UNIT CAP    Take 50,000 Units by mouth every 7 days.    HYDROCODONE-ACETAMINOPHEN (NORCO) 5-325 MG PER TABLET    Take 1 tablet by mouth every 6 (six) hours as needed for Pain.    HYDROXYZINE HCL (ATARAX) 25 MG TABLET    Take 25 mg by mouth 3 (three) times daily.

## 2025-08-12 ENCOUNTER — RESULTS FOLLOW-UP (OUTPATIENT)
Dept: GASTROENTEROLOGY | Facility: CLINIC | Age: 50
End: 2025-08-12
Payer: MEDICAID

## (undated) DEVICE — DRAPE THREE-QTR REINF 53X77IN

## (undated) DEVICE — SPONGE COTTON TRAY 4X4IN

## (undated) DEVICE — DRAPE C-ARM ELAS CLIP 42X120IN

## (undated) DEVICE — GLOVE SENSICARE PI SURG 7.5

## (undated) DEVICE — BANDAGE MATRIX HK LOOP 6IN 5YD

## (undated) DEVICE — DRAPE C ARM 42 X 120 10/BX

## (undated) DEVICE — ALCOHOL 70% ISOP W/GREEN 16OZ

## (undated) DEVICE — SYR 30CC LUER LOCK

## (undated) DEVICE — ADHESIVE DERMABOND ADVANCED

## (undated) DEVICE — GLOVE SENSICARE PI GRN 8

## (undated) DEVICE — SYR 10CC LUER LOCK

## (undated) DEVICE — DURAPREP SURG SCRUB 26ML

## (undated) DEVICE — DRAPE C-ARMOR EQUIPMENT COVER

## (undated) DEVICE — SKINMARKER W/RULER DEVON

## (undated) DEVICE — SOCKINETTE IMPERVIOUS 12X48IN

## (undated) DEVICE — DRAPE CASSETTE 20 X 40

## (undated) DEVICE — DRESSING TEGADERM 2 3/8 X 2.75

## (undated) DEVICE — COVER OVERHEAD SURG LT BLUE

## (undated) DEVICE — NDL SPINAL 18GX3.5 SPINOCAN

## (undated) DEVICE — DRAPE U SPLIT SHEET 54X76IN

## (undated) DEVICE — COVER PROXIMA MAYO STAND

## (undated) DEVICE — TUBING 4-LEAD ARTHOSCOPY

## (undated) DEVICE — BANDAGE ADHESIVE PLAS STRL 1X3

## (undated) DEVICE — DRAPE EXTREMITY STD 89X128IN

## (undated) DEVICE — SEE MEDLINE ITEM 156953

## (undated) DEVICE — BLADE SURG CARBON STEEL SZ11

## (undated) DEVICE — PAD ABDOMINAL 5X9 STERILE

## (undated) DEVICE — TOWEL OR XRAY BLUE 17X26IN

## (undated) DEVICE — DRAPE TOP 53X102IN

## (undated) DEVICE — PACK SURGERY START

## (undated) DEVICE — BNDG COFLEX FOAM LF2 ST 6X5YD

## (undated) DEVICE — GAUZE SPONGE 4X4 12PLY

## (undated) DEVICE — NDL HYPO REG 25G X 1 1/2

## (undated) DEVICE — UNDERGLOVES BIOGEL PI SIZE 8

## (undated) DEVICE — SEE MEDLINE ITEM 146313

## (undated) DEVICE — SEE MEDLINE ITEM 146231

## (undated) DEVICE — SEE MEDLINE ITEM 146292

## (undated) DEVICE — SEE MEDLINE ITEM 157216

## (undated) DEVICE — CLOSURE SKIN STERI STRIP 1/2X4

## (undated) DEVICE — BANDAGE ACE ELASTIC 6"

## (undated) DEVICE — Device

## (undated) DEVICE — SEE MEDLINE ITEM 152622

## (undated) DEVICE — DRESSING TRANS 4X4 3/4

## (undated) DEVICE — GLOVE BIOGEL ORTHOPEDIC 7.5

## (undated) DEVICE — DRAPE PLASTIC U 60X72

## (undated) DEVICE — NDL 18GA X1 1/2 REG BEVEL

## (undated) DEVICE — MANIFOLD 4 PORT

## (undated) DEVICE — SEE MEDLINE ITEM 152530